# Patient Record
Sex: MALE | Race: WHITE | NOT HISPANIC OR LATINO | Employment: OTHER | URBAN - METROPOLITAN AREA
[De-identification: names, ages, dates, MRNs, and addresses within clinical notes are randomized per-mention and may not be internally consistent; named-entity substitution may affect disease eponyms.]

---

## 2022-11-14 ENCOUNTER — HOSPITAL ENCOUNTER (INPATIENT)
Facility: HOSPITAL | Age: 47
LOS: 1 days | Discharge: HOME HEALTH | End: 2022-11-16
Attending: EMERGENCY MEDICINE | Admitting: INTERNAL MEDICINE

## 2022-11-14 ENCOUNTER — APPOINTMENT (EMERGENCY)
Dept: RADIOLOGY | Facility: HOSPITAL | Age: 47
End: 2022-11-14

## 2022-11-14 DIAGNOSIS — R26.2 AMBULATORY DYSFUNCTION: ICD-10-CM

## 2022-11-14 DIAGNOSIS — M25.561 RIGHT KNEE PAIN: Primary | ICD-10-CM

## 2022-11-14 RX ORDER — DIAZEPAM 5 MG/ML
5 INJECTION, SOLUTION INTRAMUSCULAR; INTRAVENOUS ONCE
Status: COMPLETED | OUTPATIENT
Start: 2022-11-14 | End: 2022-11-14

## 2022-11-14 RX ORDER — MORPHINE SULFATE 10 MG/ML
6 INJECTION, SOLUTION INTRAMUSCULAR; INTRAVENOUS ONCE
Status: COMPLETED | OUTPATIENT
Start: 2022-11-14 | End: 2022-11-14

## 2022-11-14 RX ORDER — HYDROMORPHONE HCL/PF 1 MG/ML
1 SYRINGE (ML) INJECTION ONCE
Status: COMPLETED | OUTPATIENT
Start: 2022-11-14 | End: 2022-11-14

## 2022-11-14 RX ORDER — FENTANYL CITRATE 50 UG/ML
1 INJECTION, SOLUTION INTRAMUSCULAR; INTRAVENOUS ONCE
Status: COMPLETED | OUTPATIENT
Start: 2022-11-14 | End: 2022-11-14

## 2022-11-14 RX ADMIN — DIAZEPAM 5 MG: 10 INJECTION, SOLUTION INTRAMUSCULAR; INTRAVENOUS at 21:30

## 2022-11-14 RX ADMIN — MORPHINE SULFATE 6 MG: 10 INJECTION INTRAVENOUS at 21:29

## 2022-11-14 RX ADMIN — HYDROMORPHONE HYDROCHLORIDE 1 MG: 1 INJECTION, SOLUTION INTRAMUSCULAR; INTRAVENOUS; SUBCUTANEOUS at 23:00

## 2022-11-15 ENCOUNTER — APPOINTMENT (OUTPATIENT)
Dept: CT IMAGING | Facility: HOSPITAL | Age: 47
End: 2022-11-15

## 2022-11-15 PROBLEM — R65.10 SIRS (SYSTEMIC INFLAMMATORY RESPONSE SYNDROME) (HCC): Status: ACTIVE | Noted: 2022-11-15

## 2022-11-15 PROBLEM — S06.9XAA TBI (TRAUMATIC BRAIN INJURY): Status: ACTIVE | Noted: 2022-11-15

## 2022-11-15 PROBLEM — M25.561 RIGHT KNEE PAIN: Status: ACTIVE | Noted: 2022-11-15

## 2022-11-15 PROBLEM — I10 ESSENTIAL HYPERTENSION: Status: ACTIVE | Noted: 2022-11-15

## 2022-11-15 PROBLEM — F11.90 CHRONIC, CONTINUOUS USE OF OPIOIDS: Status: ACTIVE | Noted: 2022-11-15

## 2022-11-15 PROBLEM — E11.9 TYPE 2 DIABETES MELLITUS WITHOUT COMPLICATION, WITHOUT LONG-TERM CURRENT USE OF INSULIN (HCC): Status: ACTIVE | Noted: 2022-11-15

## 2022-11-15 PROBLEM — E87.6 HYPOKALEMIA: Status: ACTIVE | Noted: 2022-11-15

## 2022-11-15 PROBLEM — E83.51 HYPOCALCEMIA: Status: ACTIVE | Noted: 2022-11-15

## 2022-11-15 LAB
ALBUMIN SERPL BCP-MCNC: 3.4 G/DL (ref 3.5–5)
ALP SERPL-CCNC: 80 U/L (ref 46–116)
ALT SERPL W P-5'-P-CCNC: 260 U/L (ref 12–78)
ANION GAP SERPL CALCULATED.3IONS-SCNC: 13 MMOL/L (ref 4–13)
ANION GAP SERPL CALCULATED.3IONS-SCNC: 8 MMOL/L (ref 4–13)
AST SERPL W P-5'-P-CCNC: 53 U/L (ref 5–45)
BASOPHILS # BLD AUTO: 0.04 THOUSANDS/ÂΜL (ref 0–0.1)
BASOPHILS # BLD AUTO: 0.05 THOUSANDS/ÂΜL (ref 0–0.1)
BASOPHILS NFR BLD AUTO: 0 % (ref 0–1)
BASOPHILS NFR BLD AUTO: 0 % (ref 0–1)
BILIRUB SERPL-MCNC: 0.5 MG/DL (ref 0.2–1)
BUN SERPL-MCNC: 13 MG/DL (ref 5–25)
BUN SERPL-MCNC: 17 MG/DL (ref 5–25)
CA-I BLD-SCNC: 1.22 MMOL/L (ref 1.12–1.32)
CALCIUM ALBUM COR SERPL-MCNC: 10.2 MG/DL (ref 8.3–10.1)
CALCIUM SERPL-MCNC: 6.3 MG/DL (ref 8.3–10.1)
CALCIUM SERPL-MCNC: 9.7 MG/DL (ref 8.3–10.1)
CHLORIDE SERPL-SCNC: 113 MMOL/L (ref 96–108)
CHLORIDE SERPL-SCNC: 98 MMOL/L (ref 96–108)
CO2 SERPL-SCNC: 18 MMOL/L (ref 21–32)
CO2 SERPL-SCNC: 22 MMOL/L (ref 21–32)
CREAT SERPL-MCNC: 0.68 MG/DL (ref 0.6–1.3)
CREAT SERPL-MCNC: 1.03 MG/DL (ref 0.6–1.3)
DME PARACHUTE DELIVERY DATE REQUESTED: NORMAL
DME PARACHUTE ITEM DESCRIPTION: NORMAL
DME PARACHUTE ORDER STATUS: NORMAL
DME PARACHUTE SUPPLIER NAME: NORMAL
DME PARACHUTE SUPPLIER PHONE: NORMAL
EOSINOPHIL # BLD AUTO: 0.13 THOUSAND/ÂΜL (ref 0–0.61)
EOSINOPHIL # BLD AUTO: 0.15 THOUSAND/ÂΜL (ref 0–0.61)
EOSINOPHIL NFR BLD AUTO: 1 % (ref 0–6)
EOSINOPHIL NFR BLD AUTO: 1 % (ref 0–6)
ERYTHROCYTE [DISTWIDTH] IN BLOOD BY AUTOMATED COUNT: 12.3 % (ref 11.6–15.1)
ERYTHROCYTE [DISTWIDTH] IN BLOOD BY AUTOMATED COUNT: 12.4 % (ref 11.6–15.1)
GFR SERPL CREATININE-BSD FRML MDRD: 112 ML/MIN/1.73SQ M
GFR SERPL CREATININE-BSD FRML MDRD: 85 ML/MIN/1.73SQ M
GLUCOSE SERPL-MCNC: 157 MG/DL (ref 65–140)
GLUCOSE SERPL-MCNC: 181 MG/DL (ref 65–140)
GLUCOSE SERPL-MCNC: 214 MG/DL (ref 65–140)
GLUCOSE SERPL-MCNC: 228 MG/DL (ref 65–140)
GLUCOSE SERPL-MCNC: 236 MG/DL (ref 65–140)
GLUCOSE SERPL-MCNC: 242 MG/DL (ref 65–140)
HCT VFR BLD AUTO: 39.9 % (ref 36.5–49.3)
HCT VFR BLD AUTO: 42.4 % (ref 36.5–49.3)
HGB BLD-MCNC: 13.5 G/DL (ref 12–17)
HGB BLD-MCNC: 15 G/DL (ref 12–17)
IMM GRANULOCYTES # BLD AUTO: 0.04 THOUSAND/UL (ref 0–0.2)
IMM GRANULOCYTES # BLD AUTO: 0.09 THOUSAND/UL (ref 0–0.2)
IMM GRANULOCYTES NFR BLD AUTO: 0 % (ref 0–2)
IMM GRANULOCYTES NFR BLD AUTO: 1 % (ref 0–2)
LYMPHOCYTES # BLD AUTO: 2.06 THOUSANDS/ÂΜL (ref 0.6–4.47)
LYMPHOCYTES # BLD AUTO: 2.5 THOUSANDS/ÂΜL (ref 0.6–4.47)
LYMPHOCYTES NFR BLD AUTO: 16 % (ref 14–44)
LYMPHOCYTES NFR BLD AUTO: 23 % (ref 14–44)
MAGNESIUM SERPL-MCNC: 1.9 MG/DL (ref 1.6–2.6)
MCH RBC QN AUTO: 29 PG (ref 26.8–34.3)
MCH RBC QN AUTO: 29.8 PG (ref 26.8–34.3)
MCHC RBC AUTO-ENTMCNC: 33.8 G/DL (ref 31.4–37.4)
MCHC RBC AUTO-ENTMCNC: 35.4 G/DL (ref 31.4–37.4)
MCV RBC AUTO: 84 FL (ref 82–98)
MCV RBC AUTO: 86 FL (ref 82–98)
MONOCYTES # BLD AUTO: 0.58 THOUSAND/ÂΜL (ref 0.17–1.22)
MONOCYTES # BLD AUTO: 0.67 THOUSAND/ÂΜL (ref 0.17–1.22)
MONOCYTES NFR BLD AUTO: 4 % (ref 4–12)
MONOCYTES NFR BLD AUTO: 6 % (ref 4–12)
NEUTROPHILS # BLD AUTO: 10.35 THOUSANDS/ÂΜL (ref 1.85–7.62)
NEUTROPHILS # BLD AUTO: 7.41 THOUSANDS/ÂΜL (ref 1.85–7.62)
NEUTS SEG NFR BLD AUTO: 70 % (ref 43–75)
NEUTS SEG NFR BLD AUTO: 78 % (ref 43–75)
NRBC BLD AUTO-RTO: 0 /100 WBCS
NRBC BLD AUTO-RTO: 0 /100 WBCS
PLATELET # BLD AUTO: 250 THOUSANDS/UL (ref 149–390)
PLATELET # BLD AUTO: 271 THOUSANDS/UL (ref 149–390)
PMV BLD AUTO: 10.8 FL (ref 8.9–12.7)
PMV BLD AUTO: 11.1 FL (ref 8.9–12.7)
POTASSIUM SERPL-SCNC: 2.5 MMOL/L (ref 3.5–5.3)
POTASSIUM SERPL-SCNC: 4 MMOL/L (ref 3.5–5.3)
PROT SERPL-MCNC: 7.2 G/DL (ref 6.4–8.4)
RBC # BLD AUTO: 4.66 MILLION/UL (ref 3.88–5.62)
RBC # BLD AUTO: 5.04 MILLION/UL (ref 3.88–5.62)
SODIUM SERPL-SCNC: 133 MMOL/L (ref 135–147)
SODIUM SERPL-SCNC: 139 MMOL/L (ref 135–147)
WBC # BLD AUTO: 10.81 THOUSAND/UL (ref 4.31–10.16)
WBC # BLD AUTO: 13.26 THOUSAND/UL (ref 4.31–10.16)

## 2022-11-15 RX ORDER — AMLODIPINE BESYLATE 5 MG/1
5 TABLET ORAL DAILY
COMMUNITY

## 2022-11-15 RX ORDER — VITAMIN B COMPLEX
1 CAPSULE ORAL DAILY
COMMUNITY

## 2022-11-15 RX ORDER — HYDROMORPHONE HCL/PF 1 MG/ML
1 SYRINGE (ML) INJECTION EVERY 4 HOURS PRN
Status: DISCONTINUED | OUTPATIENT
Start: 2022-11-15 | End: 2022-11-16 | Stop reason: HOSPADM

## 2022-11-15 RX ORDER — GLIMEPIRIDE 2 MG/1
2 TABLET ORAL 4 TIMES DAILY
COMMUNITY

## 2022-11-15 RX ORDER — LISINOPRIL AND HYDROCHLOROTHIAZIDE 25; 20 MG/1; MG/1
1 TABLET ORAL DAILY
COMMUNITY

## 2022-11-15 RX ORDER — AMLODIPINE BESYLATE 5 MG/1
5 TABLET ORAL DAILY
Status: DISCONTINUED | OUTPATIENT
Start: 2022-11-15 | End: 2022-11-16 | Stop reason: HOSPADM

## 2022-11-15 RX ORDER — ACETAMINOPHEN 325 MG/1
650 TABLET ORAL EVERY 6 HOURS PRN
Status: DISCONTINUED | OUTPATIENT
Start: 2022-11-15 | End: 2022-11-15

## 2022-11-15 RX ORDER — ZOLPIDEM TARTRATE 10 MG/1
10 TABLET ORAL
COMMUNITY

## 2022-11-15 RX ORDER — CALCIUM GLUCONATE 20 MG/ML
1 INJECTION, SOLUTION INTRAVENOUS ONCE
Status: COMPLETED | OUTPATIENT
Start: 2022-11-15 | End: 2022-11-15

## 2022-11-15 RX ORDER — MORPHINE SULFATE 30 MG/1
60 TABLET, FILM COATED, EXTENDED RELEASE ORAL EVERY 12 HOURS SCHEDULED
Status: DISCONTINUED | OUTPATIENT
Start: 2022-11-15 | End: 2022-11-16 | Stop reason: HOSPADM

## 2022-11-15 RX ORDER — INSULIN LISPRO 100 [IU]/ML
1-6 INJECTION, SOLUTION INTRAVENOUS; SUBCUTANEOUS
Status: DISCONTINUED | OUTPATIENT
Start: 2022-11-15 | End: 2022-11-16 | Stop reason: HOSPADM

## 2022-11-15 RX ORDER — SIMVASTATIN 40 MG
40 TABLET ORAL DAILY
COMMUNITY

## 2022-11-15 RX ORDER — IBUPROFEN 600 MG/1
600 TABLET ORAL EVERY 6 HOURS PRN
Status: DISCONTINUED | OUTPATIENT
Start: 2022-11-15 | End: 2022-11-16 | Stop reason: HOSPADM

## 2022-11-15 RX ORDER — SUMATRIPTAN 100 MG/1
100 TABLET, FILM COATED ORAL
COMMUNITY

## 2022-11-15 RX ORDER — CYCLOBENZAPRINE HCL 10 MG
10 TABLET ORAL
COMMUNITY

## 2022-11-15 RX ORDER — ACETAMINOPHEN 325 MG/1
975 TABLET ORAL EVERY 8 HOURS SCHEDULED
Status: DISCONTINUED | OUTPATIENT
Start: 2022-11-15 | End: 2022-11-16 | Stop reason: HOSPADM

## 2022-11-15 RX ORDER — CYCLOBENZAPRINE HCL 10 MG
10 TABLET ORAL 3 TIMES DAILY PRN
Status: DISCONTINUED | OUTPATIENT
Start: 2022-11-15 | End: 2022-11-16 | Stop reason: HOSPADM

## 2022-11-15 RX ORDER — PRAVASTATIN SODIUM 80 MG/1
80 TABLET ORAL
Status: DISCONTINUED | OUTPATIENT
Start: 2022-11-15 | End: 2022-11-16 | Stop reason: HOSPADM

## 2022-11-15 RX ORDER — POTASSIUM CHLORIDE 14.9 MG/ML
20 INJECTION INTRAVENOUS ONCE
Status: COMPLETED | OUTPATIENT
Start: 2022-11-15 | End: 2022-11-15

## 2022-11-15 RX ORDER — RISPERIDONE 3 MG/1
3 TABLET ORAL DAILY
COMMUNITY

## 2022-11-15 RX ORDER — HYDROMORPHONE HCL/PF 1 MG/ML
1 SYRINGE (ML) INJECTION ONCE
Status: COMPLETED | OUTPATIENT
Start: 2022-11-15 | End: 2022-11-15

## 2022-11-15 RX ORDER — ONDANSETRON 2 MG/ML
4 INJECTION INTRAMUSCULAR; INTRAVENOUS EVERY 8 HOURS PRN
Status: DISCONTINUED | OUTPATIENT
Start: 2022-11-15 | End: 2022-11-16 | Stop reason: HOSPADM

## 2022-11-15 RX ORDER — POTASSIUM CHLORIDE 20 MEQ/1
40 TABLET, EXTENDED RELEASE ORAL ONCE
Status: COMPLETED | OUTPATIENT
Start: 2022-11-15 | End: 2022-11-15

## 2022-11-15 RX ORDER — MAGNESIUM SULFATE 1 G/100ML
1 INJECTION INTRAVENOUS ONCE
Status: COMPLETED | OUTPATIENT
Start: 2022-11-15 | End: 2022-11-15

## 2022-11-15 RX ORDER — ZOLPIDEM TARTRATE 5 MG/1
10 TABLET ORAL
Status: DISCONTINUED | OUTPATIENT
Start: 2022-11-15 | End: 2022-11-16 | Stop reason: HOSPADM

## 2022-11-15 RX ORDER — CLONAZEPAM 1 MG/1
1 TABLET ORAL 2 TIMES DAILY
Status: ON HOLD | COMMUNITY
End: 2022-11-15

## 2022-11-15 RX ORDER — DOCUSATE SODIUM 250 MG
250 CAPSULE ORAL 2 TIMES DAILY
COMMUNITY

## 2022-11-15 RX ADMIN — HYDROMORPHONE HYDROCHLORIDE 1 MG: 1 INJECTION, SOLUTION INTRAMUSCULAR; INTRAVENOUS; SUBCUTANEOUS at 06:01

## 2022-11-15 RX ADMIN — MORPHINE SULFATE 60 MG: 30 TABLET, EXTENDED RELEASE ORAL at 01:59

## 2022-11-15 RX ADMIN — IBUPROFEN 600 MG: 600 TABLET ORAL at 21:12

## 2022-11-15 RX ADMIN — RISPERIDONE 3 MG: 2 TABLET ORAL at 02:12

## 2022-11-15 RX ADMIN — RISPERIDONE 3 MG: 2 TABLET ORAL at 21:12

## 2022-11-15 RX ADMIN — ACETAMINOPHEN 975 MG: 325 TABLET, FILM COATED ORAL at 13:59

## 2022-11-15 RX ADMIN — ACETAMINOPHEN 975 MG: 325 TABLET, FILM COATED ORAL at 01:59

## 2022-11-15 RX ADMIN — POTASSIUM CHLORIDE 20 MEQ: 14.9 INJECTION, SOLUTION INTRAVENOUS at 01:37

## 2022-11-15 RX ADMIN — POTASSIUM CHLORIDE 40 MEQ: 1500 TABLET, EXTENDED RELEASE ORAL at 01:36

## 2022-11-15 RX ADMIN — HYDROMORPHONE HYDROCHLORIDE 1 MG: 1 INJECTION, SOLUTION INTRAMUSCULAR; INTRAVENOUS; SUBCUTANEOUS at 18:37

## 2022-11-15 RX ADMIN — AMLODIPINE BESYLATE 5 MG: 5 TABLET ORAL at 09:21

## 2022-11-15 RX ADMIN — MORPHINE SULFATE 60 MG: 30 TABLET, EXTENDED RELEASE ORAL at 09:21

## 2022-11-15 RX ADMIN — INSULIN LISPRO 3 UNITS: 100 INJECTION, SOLUTION INTRAVENOUS; SUBCUTANEOUS at 17:00

## 2022-11-15 RX ADMIN — CALCIUM GLUCONATE 1 G: 20 INJECTION, SOLUTION INTRAVENOUS at 03:38

## 2022-11-15 RX ADMIN — HYDROMORPHONE HYDROCHLORIDE 1 MG: 1 INJECTION, SOLUTION INTRAMUSCULAR; INTRAVENOUS; SUBCUTANEOUS at 10:29

## 2022-11-15 RX ADMIN — CYCLOBENZAPRINE HYDROCHLORIDE 10 MG: 10 TABLET, FILM COATED ORAL at 20:25

## 2022-11-15 RX ADMIN — MAGNESIUM SULFATE IN DEXTROSE 1 G: 10 INJECTION, SOLUTION INTRAVENOUS at 03:38

## 2022-11-15 RX ADMIN — PRAVASTATIN SODIUM 80 MG: 80 TABLET ORAL at 17:27

## 2022-11-15 RX ADMIN — MORPHINE SULFATE 60 MG: 30 TABLET, EXTENDED RELEASE ORAL at 21:12

## 2022-11-15 RX ADMIN — HYDROMORPHONE HYDROCHLORIDE 1 MG: 1 INJECTION, SOLUTION INTRAMUSCULAR; INTRAVENOUS; SUBCUTANEOUS at 14:32

## 2022-11-15 RX ADMIN — INSULIN LISPRO 2 UNITS: 100 INJECTION, SOLUTION INTRAVENOUS; SUBCUTANEOUS at 11:39

## 2022-11-15 RX ADMIN — INSULIN LISPRO 3 UNITS: 100 INJECTION, SOLUTION INTRAVENOUS; SUBCUTANEOUS at 21:23

## 2022-11-15 RX ADMIN — ZOLPIDEM TARTRATE 10 MG: 5 TABLET ORAL at 01:59

## 2022-11-15 RX ADMIN — INSULIN LISPRO 1 UNITS: 100 INJECTION, SOLUTION INTRAVENOUS; SUBCUTANEOUS at 07:20

## 2022-11-15 RX ADMIN — HYDROMORPHONE HYDROCHLORIDE 1 MG: 1 INJECTION, SOLUTION INTRAMUSCULAR; INTRAVENOUS; SUBCUTANEOUS at 23:31

## 2022-11-15 RX ADMIN — ZOLPIDEM TARTRATE 10 MG: 5 TABLET ORAL at 21:12

## 2022-11-15 RX ADMIN — HYDROMORPHONE HYDROCHLORIDE 1 MG: 1 INJECTION, SOLUTION INTRAMUSCULAR; INTRAVENOUS; SUBCUTANEOUS at 01:58

## 2022-11-15 RX ADMIN — ONDANSETRON 4 MG: 2 INJECTION INTRAMUSCULAR; INTRAVENOUS at 16:50
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
## 2022-11-15 NOTE — NURSING NOTE
Pt alerted nurse that he had meds in his backpack @ bedside and was requesting to take his personal morning supplements  Nurse educated pt on possibility of interactions between medications he's being given with home meds and to not take anything while in the hospital without provider approval   Pt agreeable to send meds in backpack to pharmacy for safe-keeping and collecting them @ ME  Meds sent to pharmacy in bag # [id number]  Pt denies any other meds in backpack

## 2022-11-15 NOTE — OCCUPATIONAL THERAPY NOTE
Occupational Therapy Evaluation & Treat       Patient Name: Catalina ELIAS Date: 11/15/2022  Problem List  Principal Problem:    Acute on Chronic Right knee pain  Active Problems:    Chronic, continuous use of opioids    Type 2 diabetes mellitus without complication, without long-term current use of insulin (HCC)    Essential hypertension    SIRS (systemic inflammatory response syndrome) (HCC)    Hypokalemia    Hypocalcemia    TBI (traumatic brain injury)    Past Medical History  Past Medical History:   Diagnosis Date    Diabetes mellitus ([address] )     Hypertension      Past Surgical History  Past Surgical History:   Procedure Laterality Date    KNEE SURGERY        11/15/22 1300   OT Last Visit   OT Visit Date 11/15/22   Note Type   Note type Evaluation  (& Treat)   Pain Assessment   Pain Assessment Tool 0-10   Pain Score 9   Hospital Pain Intervention(s) Rest   Restrictions/Precautions   Weight Bearing Precautions Per Order Yes   RLE Weight Bearing Per Order NWB   Braces or Orthoses   (knee immobilizer)   Home Living   Type of Home Apartment   Home Layout Multi-level   Bathroom Shower/Tub Tub/shower unit   Bathroom Toilet Standard   Bathroom Equipment Grab bars in shower;Grab bars around toilet   [address]   Prior Function   Level of [place] Independent with ADLs   Lives With Spouse; Son   [address] Help From Family   IADLs Independent with meal prep   Falls in the last 6 months 1 to 4   Comments Pt's wife at bedside  Pt reports that his memory is poor at baseline  Reports that wife assists pt to remember things  Lifestyle   Autonomy Pt reports that upon D/C will stay on 2nd floor  Wife plans to have all meals at pt's bedside in a cooler  Also plan to sponge bathe and use a urinal  Will go into bathroom to have BMs  Subjective   Subjective Pt received in supine position  Pt agreeable to session     ADL   Eating Assistance 7  Independent   Grooming Assistance 7  Independent   UB Bathing Assistance 5  Supervision/Setup   LB Bathing Assistance 5  Supervision/Setup   UB Dressing Assistance 5  Supervision/Setup   LB Dressing Assistance 5  Supervision/Setup   Toileting Assistance  5  Supervision/Setup   Bed Mobility   Supine to Sit 6  Modified independent   Additional Comments Pt remained seated in recliner by end of session   Transfers   Sit to Stand 6  Modified independent   Stand to Sit 6  Modified independent   Functional Mobility   Functional Mobility 5  Supervision   Additional Comments RW   Balance   Static Sitting Normal   Dynamic Sitting Good   Static Standing Fair +   Dynamic Standing Fair   Activity Tolerance   Activity Tolerance Patient limited by pain   Medical Staff Made Aware PT Rianna   Nurse Made Aware FREDERIC Peñaloza   RUE Assessment   RUE Assessment WFL   LUE Assessment   LUE Assessment WFL   Cognition   Overall Cognitive Status WFL   Arousal/Participation Alert   Attention Within functional limits   Orientation Level Oriented X4   Memory Decreased short term memory   Following Commands Follows all commands and directions without difficulty   Assessment   Limitation Decreased ADL status; Decreased self-care trans;Decreased high-level ADLs   Prognosis Good   Assessment Pt is a 49 y o  male seen for OT evaluation at [facility], admitted 11/14/2022 w/ Right knee pain  CT LE revealed Nondisplaced periprosthetic fracture of the distal femur  Per ortho, NWB in knee immobilizer  No finding of loosening  OT completed extensive review of pt's medical and social history  Comorbidities affecting pt's functional performance at time of assessment include: TBI, type II DM, SIRS, essential HTN, etc (see chart)  Personal factors affecting pt at time of IE include:steps to enter environment, difficulty performing ADLS, difficulty performing IADLS  and decreased functional mobility  Prior to admission, pt was living with family in apt with steps to manage    Pt was I w/  ADLS and required some asssist with IADLS, & required no DME/AD PTA  Upon evaluation: Pt requires mod I for bed mobility, sup-mod I for functional mobility/transfers, sup/I for UB ADLs and sup for LB ADLS 2* the following deficits impacting occupational performance: impaired memory, increased pain and orthopedic restrictions  Full objective findings from OT assessment regarding body systems outlined above  Pt to benefit from continued skilled OT tx while in the hospital to address deficits as defined above and maximize level of functional independence w ADL's and functional mobility  Occupational Performance areas to address include: bathing/shower, toilet hygiene, dressing and functional mobility  Based on findings, pt is of high complexity  The patient's raw score on the AM-PAC Daily Activity inpatient short form is 21  , standardized score is 44 27  , greater than 39 4  Patients at this level are likely to benefit from DC to home, which DOES coincide with CURRENT above OT recommendations  However please refer to therapist recommendation for discharge planning given other factors that may influence destination  At this time, OT recommendations at time of discharge are home OT/home with family support  Goals   Patient Goals Pt wishes to get home   Plan   Treatment Interventions ADL retraining;Functional transfer training; Compensatory technique education;Equipment evaluation/education;Patient/family training   Goal Expiration Date 11/25/22   OT Treatment Day 0   OT Frequency 1-2x/wk   Recommendation   OT Discharge Recommendation Home with home health rehabilitation   [facility] Daily Activity Inpatient   Lower Body Dressing 3   Bathing 3   Toileting 3   Upper Body Dressing 4   Grooming 4   Eating 4   Daily Activity Raw Score 21   Daily Activity Standardized Score (Calc for Raw Score >=11) 44 27   AM-PAC Applied Cognition Inpatient   Following a Speech/Presentation 4   Understanding Ordinary Conversation 4 Taking Medications 3   Remembering Where Things Are Placed or Put Away 3   Remembering List of 4-5 Errands 3   Taking Care of Complicated Tasks 3   Applied Cognition Raw Score 20   Applied Cognition Standardized Score 41 76   Additional Treatment Session   Start Time 1250   End Time 1300   Treatment Assessment Pt seen for subsequent OT treatment session to address LB ADL strategies, review safe tub transfers, and other related DME  Pt seated at EOB  Performed transfers with mod I and functional mobility within room and hallway with supervision and use of RW  Pt seated in chair  Performed sock donning with supervision  Reviewed and simulated donning shorts with appropriate strategy  Discussed tub transfers and use of shower chair, however wife reports that pt will spongebathe  Pt remained seated in recliner  + chair alarm  All needs met  Call bell in reach  End of Consult   Education Provided Yes   Patient Position at End of Consult Bedside chair   Nurse Communication Nurse aware of brace application;Nurse aware of consult       Pt will achieve the following goals within 10 days  *Pt will complete UB bathing and dressing with mod I     *Pt will complete LB bathing and dressing with mod I      * Pt will complete toileting w/ mod I w/ G hygiene/thoroughness using DME PRN    *Pt will complete bed mobility with I, with bed flat and no side rail to prep for purposeful tasks    *Pt will perform functional transfers with on/off all surfaces with mod I using DME as needed w/ G balance/safety  *Pt will increase standing tolerance to 5 minutes in order to complete sinkside ADL task  *Pt will identify 3-5 fall risks during ADL routine to ensure home safety upon discharge  *Pt will improve functional mobility during ADL/IADL/leisure tasks to mod I using DME as needed w/ G balance/safety           Gerald Ventura OTR/L

## 2022-11-15 NOTE — PLAN OF CARE
Problem: PHYSICAL THERAPY ADULT  Goal: Performs mobility at highest level of function for planned discharge setting  See evaluation for individualized goals  Description: Treatment/Interventions: Functional transfer training, ADL retraining, LE strengthening/ROM, Elevations, Therapeutic exercise, Endurance training, Patient/family training, Equipment eval/education, Gait training, Bed mobility, Compensatory technique education, Continued evaluation, Spoke to nursing, Spoke to case management, OT  Equipment Recommended: Brett Johnson       See flowsheet documentation for full assessment, interventions and recommendations  Note: Prognosis: Fair  Problem List: Decreased strength, Decreased range of motion, Decreased endurance, Impaired balance, Decreased mobility, Orthopedic restrictions, Pain  Assessment: Pt is a 49 y o  male who presented to ED 11/15/22 with c/o R knee pain after fall  Dx:  CT scan does demonstrate a nondisplaced distal femur periprosthetic fracture  Patient will need to be placed in a knee immobilizer and nonweightbearing  Surgical intervention is not required at this point  Comorbidities affecting pt's physical performance at time of assessment include: TBI, SIRS, continuous use of opioids  Personal factors affecting pt at time of IE include: pain, WBS, KI  PLOF and home set up listed above;  concerns for return home include but are not limited to lived in multi-level home  Upon evaluation: Pt mod I for bed mobility, mod I for sit to stand, and mod I for ambulation with RW  Full objective findings from PT assessment regarding body systems outlined above  Current limitations include WBS, steps to enter home  Pt's clinical presentation is currently unstable/unpredictable seen in pt's presentation of continuous monitoring in hospital, WBS, and pain     Pt would benefit from continued PT while in hospital and follow up with HHCPT at D/C to increase strength, balance, endurance, independence with funcitonal mobility to return to PLOF, maximize independence, decrease caregiver burden and improve quality of life  The patient's AM-PAC Basic Mobility Inpatient Short Form Raw Score is 23  A Raw score of greater than 17 suggests the patient may benefit from discharge to home  Please also refer to the recommendation of the Physical Therapist for safe discharge planning  Barriers to Discharge: Inaccessible home environment, Decreased caregiver support     PT Discharge Recommendation: Home with home health rehabilitation    See flowsheet documentation for full assessment

## 2022-11-15 NOTE — ASSESSMENT & PLAN NOTE
· Home regimen:  Amlodipine 5 mg daily, lisinopril-HCTZ 20-25 mg daily  · Hold home lisinopril due to hypokalemia

## 2022-11-15 NOTE — ASSESSMENT & PLAN NOTE
· Potassium 2 5 on admission   · Denies N/V/D or decreased appetite   · Hold home lisinopril-hctz  · Continue to monitor and replete as needed   · Telemetry

## 2022-11-15 NOTE — CONSULTS
Consultation - Ruth Osuna 49 y o  male MRN: [id number]  Unit/Bed#: -01 Encounter: [id number]      Assessment/Plan     Assessment:  Right knee injury    Plan:  X-rays of right knee show possible nondisplaced distal femur periprosthetic fracture  Will obtain CT scan RLE  In meantime, remain NWB to RLE with assistance  Knee immobilizer ordered  Can be placed by PT/OT  If no fracture is present will recommend outpatient follow up with possible MRI  Ice and elevation  Pain control  Will follow  History of Present Illness   Physician Requesting Consult: Antony Angelucci*  Reason for Consult / Principal Problem: right knee pain  HPI: Ace Mackay is a 49y o  year old male who is a community ambulator with no assistance presents with right knee pain after a mechanical fall yesterday  Patient states he was coming down the steps and on the last step he placed his right lower extremity on the ground and his right knee gave out on him and twisted  He is unsure if his knee hit the ground  He felt a pop in the right knee when he gave out on him followed by immediate pain  He was unable to bear weight due to pain  He was brought to the emergency room and x-rays were taken  He was admitted to the medical service orthopedics was consulted  He has had extensive surgeries to that knee since he was 5years old  In 2005, he had a right total knee arthroplasty  He then suffered a patella tendon rupture in 2006  It was treated conservatively  He states he has been doing well over the past several years regarding that knee until the fall yesterday  Inpatient consult to Orthopedic Surgery  Consult performed by: Lakia Alford PA-C  Consult ordered by: Fernando Peterson PA-C          Review of Systems   Constitutional: Negative  HENT: Negative  Eyes: Negative  Respiratory: Negative  Cardiovascular: Negative  Gastrointestinal: Negative  Endocrine: Negative      Genitourinary: Negative  Musculoskeletal: Positive for arthralgias, gait problem and joint swelling  Skin: Negative  Allergic/Immunologic: Negative  Hematological: Negative  Psychiatric/Behavioral: Negative  Historical Information   Past Medical History:   Diagnosis Date   • Diabetes mellitus ([address] )    • Hypertension      Past Surgical History:   Procedure Laterality Date   • KNEE SURGERY       Social History   Social History     Substance and Sexual Activity   Alcohol Use Not Currently     Social History     Substance and Sexual Activity   Drug Use Yes   • Types: Marijuana     E-Cigarette/Vaping   • E-Cigarette Use Never User      E-Cigarette/Vaping Substances   • Nicotine Yes    • THC Yes    • CBD Yes    • Flavoring Yes      Social History     Tobacco Use   Smoking Status Never Smoker   Smokeless Tobacco Never Used     Family History: non-contributory    Meds/Allergies   all current active meds have been reviewed  Allergies   Allergen Reactions   • Penicillins Hives       Objective   Vitals: Blood pressure 130/88, pulse 85, temperature (!) 97 3 °F (36 3 °C), resp  rate 18, height 6' 6" (1 981 m), weight 95 9 kg (211 lb 6 7 oz), SpO2 93 %  ,Body mass index is 24 43 kg/m²  No intake or output data in the 24 hours ending 11/15/22 0754  No intake/output data recorded  Invasive Devices  Report    Peripheral Intravenous Line  Duration           Peripheral IV 11/14/22 Left Forearm <1 day                Physical Exam  Vitals and nursing note reviewed  Constitutional:       Appearance: He is well-developed  HENT:      Head: Normocephalic and atraumatic  Eyes:      Conjunctiva/sclera: Conjunctivae normal    Cardiovascular:      Rate and Rhythm: Normal rate and regular rhythm  Heart sounds: No murmur heard  Pulmonary:      Effort: Pulmonary effort is normal  No respiratory distress  Breath sounds: Normal breath sounds  Abdominal:      Palpations: Abdomen is soft  Tenderness:  There is no abdominal tenderness  Musculoskeletal:         General: Swelling, tenderness and signs of injury present  Cervical back: Neck supple  Right knee: No effusion  Skin:     General: Skin is warm and dry  Neurological:      Mental Status: He is alert and oriented to person, place, and time  Psychiatric:         Mood and Affect: Mood normal        Right Knee Exam     Tenderness   Right knee tenderness location: medial femoral condyle, MCL insertion  Other   Erythema: absent  Scars: present  Sensation: normal  Pulse: present  Swelling: mild (medial distal femur)  Effusion: no effusion present    Comments:  Thigh and calf compartments soft and compressible  Actively moving ankle and toes  Quadriceps atrophy            Lab Results: I have personally reviewed pertinent lab results  Imaging Studies: I have personally reviewed pertinent films in PACS  X-ray right knee:  Right total knee prosthesis intact with no evidence of loosening    Possible nondisplaced distal femur periprosthetic fracture

## 2022-11-15 NOTE — ED NOTES
Pt  Reporting he had gotten no relief from any of the medication he has gotten     Felipe Britton, FREDERIC  11/15/22 8081
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
## 2022-11-15 NOTE — CASE MANAGEMENT
Case Management Progress Note    Patient name Leon Millan  Location /-90 MRN [id number]  : 1975 Date 11/15/2022       LOS (days): 0  Geometric Mean LOS (GMLOS) (days):   Days to GMLOS:        OBJECTIVE:        Current admission status: Observation  Preferred Pharmacy:   [address] [place] [address]  [place] [address]  Phone: [phone] Fax: [phone]    Primary Care Provider: Fabienne Pappas MD    Primary Insurance: MEDICARE  Secondary Insurance:     PROGRESS NOTE: Cm spoke with pt regarding PT/OT recommendations of [facility] and walker  Pt in agreement for walker and [facility]  No C agency preference   Referrals sent and walker ordered through [employer]

## 2022-11-15 NOTE — DISCHARGE INSTRUCTIONS
Discharge Instructions - Orthopedics  Kelechi Solis 49 y o  male MRN: [id number]  Unit/Bed#: UB CT SCAN    Weight Bearing Status:                                           Nonweightbearing to RLE with assistance  Keep knee immobilizer on  DO NOT try to bend knee  Pain:  Continue analgesics as directed      Appt Instructions: If you do not have your appointment, please call the clinic at [phone] to schedule appointment with Dr Christina Strong in 2 weeks  Otherwise followup as scheduled     Contact the office sooner if you experience any increased numbness/tingling in the extremities  Miscellaneous:   Ice to right knee

## 2022-11-15 NOTE — PLAN OF CARE
Problem: METABOLIC, FLUID AND ELECTROLYTES - ADULT  Goal: Electrolytes maintained within normal limits  Description: INTERVENTIONS:  - Monitor labs and assess patient for signs and symptoms of electrolyte imbalances  - Administer electrolyte replacement as ordered  - Monitor response to electrolyte replacements, including repeat lab results as appropriate  - Instruct patient on fluid and nutrition as appropriate  Outcome: Progressing  Goal: Fluid balance maintained  Description: INTERVENTIONS:  - Monitor labs   - Monitor I/O and WT  - Instruct patient on fluid and nutrition as appropriate  - Assess for signs & symptoms of volume excess or deficit  Outcome: Progressing  Goal: Glucose maintained within target range  Description: INTERVENTIONS:  - Monitor Blood Glucose as ordered  - Assess for signs and symptoms of hyperglycemia and hypoglycemia  - Administer ordered medications to maintain glucose within target range  - Assess nutritional intake and initiate nutrition service referral as needed  Outcome: Progressing     Problem: MUSCULOSKELETAL - ADULT  Goal: Maintain or return mobility to safest level of function  Description: INTERVENTIONS:  - Assess patient's ability to carry out ADLs; assess patient's baseline for ADL function and identify physical deficits which impact ability to perform ADLs (bathing, care of mouth/teeth, toileting, grooming, dressing, etc )  - Assess/evaluate cause of self-care deficits   - Assess range of motion  - Assess patient's mobility  - Assess patient's need for assistive devices and provide as appropriate  - Encourage maximum independence but intervene and supervise when necessary  - Involve family in performance of ADLs  - Assess for home care needs following discharge   - Consider OT consult to assist with ADL evaluation and planning for discharge  - Provide patient education as appropriate  Outcome: Progressing  Goal: Maintain proper alignment of affected body part  Description: INTERVENTIONS:  - Support, maintain and protect limb and body alignment  - Provide patient/ family with appropriate education  Outcome: Progressing

## 2022-11-15 NOTE — PLAN OF CARE
Problem: OCCUPATIONAL THERAPY ADULT  Goal: Performs self-care activities at highest level of function for planned discharge setting  See evaluation for individualized goals  Description: Treatment Interventions: ADL retraining, Functional transfer training, Compensatory technique education, Equipment evaluation/education, Patient/family training          See flowsheet documentation for full assessment, interventions and recommendations  Note: Limitation: Decreased ADL status, Decreased self-care trans, Decreased high-level ADLs  Prognosis: Good  Assessment: Pt is a 49 y o  male seen for OT evaluation at [facility], admitted 11/14/2022 w/ Right knee pain  CT LE revealed Nondisplaced periprosthetic fracture of the distal femur  Per ortho, NWB in knee immobilizer  No finding of loosening  OT completed extensive review of pt's medical and social history  Comorbidities affecting pt's functional performance at time of assessment include: TBI, type II DM, SIRS, essential HTN, etc (see chart)  Personal factors affecting pt at time of IE include:steps to enter environment, difficulty performing ADLS, difficulty performing IADLS  and decreased functional mobility  Prior to admission, pt was living with family in apt with steps to manage  Pt was I w/  ADLS and required some asssist with IADLS, & required no DME/AD PTA  Upon evaluation: Pt requires mod I for bed mobility, sup-mod I for functional mobility/transfers, sup/I for UB ADLs and sup for LB ADLS 2* the following deficits impacting occupational performance: impaired memory, increased pain and orthopedic restrictions  Full objective findings from OT assessment regarding body systems outlined above  Pt to benefit from continued skilled OT tx while in the hospital to address deficits as defined above and maximize level of functional independence w ADL's and functional mobility   Occupational Performance areas to address include: bathing/shower, toilet hygiene, dressing and functional mobility  Based on findings, pt is of high complexity  The patient's raw score on the AM-PAC Daily Activity inpatient short form is 21  , standardized score is 44 27  , greater than 39 4  Patients at this level are likely to benefit from DC to home, which DOES coincide with CURRENT above OT recommendations  However please refer to therapist recommendation for discharge planning given other factors that may influence destination  At this time, OT recommendations at time of discharge are home OT/home with family support       OT Discharge Recommendation: Home with home health rehabilitation

## 2022-11-15 NOTE — PROGRESS NOTES
-- Patient:  -- MRN: [id number]  -- Aidin Request ID: [id number]  -- Level of care reserved: [facility]  -- Partner Reserved: [facility], [address] [phone]  -- Clinical needs requested:  -- Geography searched: [phone]  -- Start of Service:  -- Request sent: 2:26pm EST on 11/15/2022 by Vanna Varner  -- Partner reserved: 3:50pm EST on 11/15/2022 by Vanna Varner  -- Choice list shared: 2:41pm EST on 11/15/2022 by Vanna Varner

## 2022-11-15 NOTE — PHYSICAL THERAPY NOTE
PHYSICAL THERAPY Evaluation    Performed at least 2 patient identifiers during session:  Patient Active Problem List   Diagnosis    Acute on Chronic Right knee pain    Chronic, continuous use of opioids    Type 2 diabetes mellitus without complication, without long-term current use of insulin (HCC)    Essential hypertension    SIRS (systemic inflammatory response syndrome) (HCC)    Hypokalemia    Hypocalcemia    TBI (traumatic brain injury)       Past Medical History:   Diagnosis Date    Diabetes mellitus ([facility] [address] )     Hypertension        Past Surgical History:   Procedure Laterality Date    KNEE SURGERY          11/15/22 1303   PT Last Visit   PT Visit Date 11/15/22   Note Type   Note type Evaluation   Pain Assessment   Pain Assessment Tool 0-10   Pain Score 8   Hospital Pain Intervention(s) Repositioned   Restrictions/Precautions   Weight Bearing Precautions Per Order Yes   RLE Weight Bearing Per Order NWB  (knee immobilizer)   Home Living   Type of Home Apartment   Home Layout Multi-level  (12 [address], full flight to second floor bed/bath)   Prior Function   Level of [place] Independent with ADLs; Independent with functional mobility; Independent with IADLS   Lives With Spouse;Son;Family   Receives Help From Family   IADLs Independent with meal prep; Independent with medication management   General   Additional Pertinent History hx TBI   Cognition   Overall Cognitive Status WFL   Arousal/Participation Alert   Orientation Level Oriented X4   Memory Within functional limits   Following Commands Follows all commands and directions without difficulty   RUE Assessment   RUE Assessment WFL   LUE Assessment   LUE Assessment WFL   RLE Assessment   RLE Assessment   (Not tested, NWB, knee immobilizer intact)   LLE Assessment   LLE Assessment WFL   Coordination   Movements are Fluid and Coordinated 1   Bed Mobility   Supine to Sit 6  Modified independent   Additional Comments Pt remains OOB in recliner chair at end of session, chair alarm intact   Transfers   Sit to Stand 6  Modified independent  (RLE NWB)   Stand to Sit 6  Modified independent  (RLE NWB)   Ambulation/Elevation   Gait pattern Forward Flexion   Gait Assistance 6  Modified independent   Assistive Device Rolling walker   Distance 100ft x 2, RLE NWB;  no LOB or unsteadiness   Stair Management Assistance   (no crutches available;  Called [facility] with no answer; will continue to assess as crutches available; pt verbalizes understanding of multiple techniques for ascend/descending stairs (bumping, use of crutches) while NWB as he has done this before )   Balance   Static Sitting Fair   Dynamic Sitting Fair   Static Standing Fair   Dynamic Standing Fair   Ambulatory Fair   Activity Tolerance   Activity Tolerance Patient limited by pain   Nurse Made Aware Luke   Assessment   Prognosis Fair   Problem List Decreased strength;Decreased range of motion;Decreased endurance; Impaired balance;Decreased mobility;Orthopedic restrictions;Pain   Assessment Pt is a 49 y o  male who presented to ED 11/15/22 with c/o R knee pain after fall  Dx:  CT scan does demonstrate a nondisplaced distal femur periprosthetic fracture  Patient will need to be placed in a knee immobilizer and nonweightbearing  Surgical intervention is not required at this point  Comorbidities affecting pt's physical performance at time of assessment include: TBI, SIRS, continuous use of opioids  Personal factors affecting pt at time of IE include: pain, WBS, KI  PLOF and home set up listed above;  concerns for return home include but are not limited to lived in multi-level home  Upon evaluation: Pt mod I for bed mobility, mod I for sit to stand, and mod I for ambulation with RW  Full objective findings from PT assessment regarding body systems outlined above  Current limitations include WBS, steps to enter home   Pt's clinical presentation is currently unstable/unpredictable seen in pt's presentation of continuous monitoring in hospital, WBS, and pain  Pt would benefit from continued PT while in hospital and follow up with HHCPT at D/C to increase strength, balance, endurance, independence with funcitonal mobility to return to PLOF, maximize independence, decrease caregiver burden and improve quality of life  The patient's AM-PAC Basic Mobility Inpatient Short Form Raw Score is 23  A Raw score of greater than 17 suggests the patient may benefit from discharge to home  Please also refer to the recommendation of the Physical Therapist for safe discharge planning  Barriers to Discharge Inaccessible home environment;Decreased caregiver support   Goals   Patient Goals to go home   STG Expiration Date 11/22/22   Short Term Goal #1 Pt will be able to demo:  mod I to ascend/descend 10 steps with most appropriate tech;  to return home at mod I level  Plan   Treatment/Interventions Functional transfer training;ADL retraining;LE strengthening/ROM; Elevations; Therapeutic exercise; Endurance training;Patient/family training;Equipment eval/education;Gait training;Bed mobility; Compensatory technique education;Continued evaluation;Spoke to nursing;Spoke to case management;OT   PT Frequency 3-5x/wk   Recommendation   PT Discharge Recommendation Home with home health rehabilitation   [address] Mobility Inpatient   Turning in Bed Without Bedrails 4   Lying on Back to Sitting on Edge of Flat Bed 4   Moving Bed to Chair 4   Standing Up From Chair 4   Walk in Room 4   Climb 3-5 Stairs 3   Basic Mobility Inpatient Raw Score 23   Basic Mobility Standardized Score 50 88   Highest Level Of Mobility   JH-HLM Goal 7: Walk 25 feet or more   JH-HLM Achieved 7: Walk 25 feet or more   Brielle Cabrera      Patient Name: Roberta Snow  [id number] Date: 11/15/2022

## 2022-11-15 NOTE — PLAN OF CARE
Problem: METABOLIC, FLUID AND ELECTROLYTES - ADULT  Goal: Electrolytes maintained within normal limits  Description: INTERVENTIONS:  - Monitor labs and assess patient for signs and symptoms of electrolyte imbalances  - Administer electrolyte replacement as ordered  - Monitor response to electrolyte replacements, including repeat lab results as appropriate  - Instruct patient on fluid and nutrition as appropriate  Outcome: Progressing  Goal: Fluid balance maintained  Description: INTERVENTIONS:  - Monitor labs   - Monitor I/O and WT  - Instruct patient on fluid and nutrition as appropriate  - Assess for signs & symptoms of volume excess or deficit  Outcome: Progressing  Goal: Glucose maintained within target range  Description: INTERVENTIONS:  - Monitor Blood Glucose as ordered  - Assess for signs and symptoms of hyperglycemia and hypoglycemia  - Administer ordered medications to maintain glucose within target range  - Assess nutritional intake and initiate nutrition service referral as needed  Outcome: Progressing     Problem: SKIN/TISSUE INTEGRITY - ADULT  Goal: Skin Integrity remains intact(Skin Breakdown Prevention)  Description: Assess:  -Perform Girma assessment   -Clean and moisturize skin  -Inspect skin when repositioning, toileting, and assisting with ADLS  -Assess under medical devices   -Assess extremities for adequate circulation and sensation     Bed Management:  -Have minimal linens on bed & keep smooth, unwrinkled  -Change linens as needed when moist or perspiring  -Avoid sitting or lying in one position for more than 2 hours while in bed  -Keep HOB at 45degrees     Toileting:  -Offer bedside commode  -Assess for incontinence every 2  -Use incontinent care products after each incontinent episode     Activity:  -Mobilize patient 3 times a day  -Encourage activity and walks on unit  -Encourage or provide ROM exercises   -Turn and reposition patient every 3 Hours  -Use appropriate equipment to lift or move patient in bed  -Instruct/ Assist with weight shifting every 3 when out of bed in chair  -Consider limitation of chair time 3 hour intervals    Skin Care:  -Avoid use of baby powder, tape, friction and shearing, hot water or constrictive clothing  -Relieve pressure over bony prominences   -Do not massage red bony areas    Next Steps:  -Teach patient strategies to minimize risks    -Consider consults to  interdisciplinary teams  Outcome: Progressing  Goal: Incision(s), wounds(s) or drain site(s) healing without S/S of infection  Description: INTERVENTIONS  - Assess and document dressing, incision, wound bed, drain sites and surrounding tissue  - Provide patient and family education  - Perform skin care/dressing changes every 2  Outcome: Progressing  Goal: Pressure injury heals and does not worsen  Description: Interventions:  - Implement low air loss mattress or specialty surface (Criteria met)  - Apply silicone foam dressing  - Instruct/assist with weight shifting every 30 minutes when in chair   - Limit chair time to 2 hour intervals  - Use special pressure reducing interventions when in chair   - Apply fecal or urinary incontinence containment device   - Perform passive or active ROM   - Turn and reposition patient & offload bony prominences every 2 hours   - Utilize friction reducing device or surface for transfers   - Consider consults to  interdisciplinary teams   - Use incontinent care products after each incontinent episode   - Consider nutrition services referral as needed  Outcome: Progressing     Problem: MUSCULOSKELETAL - ADULT  Goal: Maintain or return mobility to safest level of function  Description: INTERVENTIONS:  - Assess patient's ability to carry out ADLs; assess patient's baseline for ADL function and identify physical deficits which impact ability to perform ADLs (bathing, care of mouth/teeth, toileting, grooming, dressing, etc )  - Assess/evaluate cause of self-care deficits   - Assess range of motion  - Assess patient's mobility  - Assess patient's need for assistive devices and provide as appropriate  - Encourage maximum independence but intervene and supervise when necessary  - Involve family in performance of ADLs  - Assess for home care needs following discharge   - Consider OT consult to assist with ADL evaluation and planning for discharge  - Provide patient education as appropriate  Outcome: Progressing  Goal: Maintain proper alignment of affected body part  Description: INTERVENTIONS:  - Support, maintain and protect limb and body alignment  - Provide patient/ family with appropriate education  Outcome: Progressing     Problem: Potential for Falls  Goal: Patient will remain free of falls  Description: INTERVENTIONS:  - Educate patient/family on patient safety including physical limitations  - Instruct patient to call for assistance with activity   - Consult OT/PT to assist with strengthening/mobility   - Keep Call bell within reach  - Keep bed low and locked with side rails adjusted as appropriate  - Keep care items and personal belongings within reach  - Initiate and maintain comfort rounds  - Make Fall Risk Sign visible to staff  - Offer Toileting every 2 Hours, in advance of need  - Initiate/Maintain alarm  - Obtain necessary fall risk management equipment  - Apply yellow socks and bracelet for high fall risk patients  - Consider moving patient to room near nurses station  Outcome: Progressing     Problem: MOBILITY - ADULT  Goal: Maintain or return to baseline ADL function  Description: INTERVENTIONS:  -  Assess patient's ability to carry out ADLs; assess patient's baseline for ADL function and identify physical deficits which impact ability to perform ADLs (bathing, care of mouth/teeth, toileting, grooming, dressing, etc )  - Assess/evaluate cause of self-care deficits   - Assess range of motion  - Assess patient's mobility; develop plan if impaired  - Assess patient's need for assistive devices and provide as appropriate  - Encourage maximum independence but intervene and supervise when necessary  - Involve family in performance of ADLs  - Assess for home care needs following discharge   - Consider OT consult to assist with ADL evaluation and planning for discharge  - Provide patient education as appropriate  Outcome: Progressing  Goal: Maintains/Returns to pre admission functional level  Description: INTERVENTIONS:  - Perform BMAT or MOVE assessment daily    - Set and communicate daily mobility goal to care team and patient/family/caregiver  - Collaborate with rehabilitation services on mobility goals if consulted  - Perform Range of Motion 3 times a day  - Reposition patient every 3 hours    - Dangle patient 3 times a day  - Stand patient 3 times a day  - Ambulate patient 3 times a day  - Out of bed to chair 3 times a day   - Out of bed for meals 3 times a day  - Out of bed for toileting  - Record patient progress and toleration of activity level   Outcome: Progressing

## 2022-11-15 NOTE — ED PROVIDER NOTES
History  Chief Complaint   Patient presents with   • Anjum Diaz down 1 stair, stated he felt his knee pop  His knee went one way, his foot went another  EMS gave 100,cg fentanyl  Has extensive knee surgeries  54-year-old male presents for right knee pain  Patient fell down one stair  [place] his knee pop  Called EMS  100 mcg of fentanyl was given  The patient has acute on chronic knee pain secondary to bilateral total knee arthroplasties years ago with the most recent one being in 2006  All his care has been at other hospitals including [place] and down in [facility]  He denies any other injuries  He has no numbness he feels spasm in his right leg  He has noted back pain abdominal pain did not strike his head no blood thinners  He is under the care of physicians for chronic pain and takes upwards of 160 mg of extended release morphine daily  No other modifying factors or associated symptoms  Assessment plan x-ray rule out fracture, periprosthetic fracture doubt knee dislocation          Prior to Admission Medications   Prescriptions Last Dose Informant Patient Reported? Taking?    Morphine Sulfate ER 15 MG T12A   Yes Yes   Sig: Take 60 mg by mouth every 12 (twelve) hours   Morphine Sulfate ER 15 MG T12A   Yes Yes   Sig: Take 100 mg by mouth daily as needed   SUMAtriptan (IMITREX) 100 mg tablet   Yes No   Sig: Take 100 mg by mouth   amLODIPine (NORVASC) 5 mg tablet   Yes No   Sig: Take 5 mg by mouth daily   b complex vitamins capsule  Self Yes Yes   Sig: Take 1 capsule by mouth daily   cyclobenzaprine (FLEXERIL) 10 mg tablet   Yes No   Sig: Take 10 mg by mouth   docusate sodium (COLACE) 250 MG capsule  Self Yes Yes   Sig: Take 250 mg by mouth 2 (two) times a day   esomeprazole (NexIUM) 20 mg capsule  Self Yes Yes   Sig: Take 20 mg by mouth 2 (two) times a day before meals   glimepiride (AMARYL) 2 mg tablet   Yes No   Sig: Take 2 mg by mouth 4 (four) times a day   lisinopril-hydrochlorothiazide (PRINZIDE,ZESTORETIC) 20-25 MG per tablet   Yes No   Sig: Take 1 tablet by mouth daily   metFORMIN (GLUCOPHAGE) 500 mg tablet   Yes No   Sig: Take 500 mg by mouth 2 (two) times a day with meals   risperiDONE (RisperDAL) 3 mg tablet   Yes No   Sig: Take 3 mg by mouth daily   simvastatin (ZOCOR) 40 mg tablet   Yes No   Sig: Take 40 mg by mouth daily   zolpidem (AMBIEN) 10 mg tablet   Yes No   Sig: Take 10 mg by mouth      Facility-Administered Medications: None       Past Medical History:   Diagnosis Date   • Diabetes mellitus ([facility] [address] )    • Hypertension        Past Surgical History:   Procedure Laterality Date   • KNEE SURGERY         History reviewed  No pertinent family history  I have reviewed and agree with the history as documented  E-Cigarette/Vaping   • E-Cigarette Use Never User      E-Cigarette/Vaping Substances   • Nicotine Yes    • THC Yes    • CBD Yes    • Flavoring Yes      Social History     Tobacco Use   • Smoking status: Never Smoker   • Smokeless tobacco: Never Used   Vaping Use   • Vaping Use: Never used   Substance Use Topics   • Alcohol use: Not Currently   • Drug use: Yes     Types: Marijuana       Review of Systems   Constitutional: Negative for chills, fatigue and fever  Eyes: Negative for photophobia and visual disturbance  Respiratory: Negative for cough and shortness of breath  Cardiovascular: Negative for chest pain, palpitations and leg swelling  Gastrointestinal: Negative for diarrhea, nausea and vomiting  Endocrine: Negative for polydipsia and polyuria  Genitourinary: Negative for decreased urine volume, difficulty urinating, dysuria and frequency  Musculoskeletal: Positive for joint swelling  Negative for back pain, neck pain and neck stiffness  Skin: Negative for color change and rash  Allergic/Immunologic: Negative for environmental allergies and immunocompromised state  Neurological: Negative for dizziness and headaches  Hematological: Negative for adenopathy  Does not bruise/bleed easily  Psychiatric/Behavioral: Negative for dysphoric mood  The patient is not nervous/anxious  Physical Exam  Physical Exam  Vitals and nursing note reviewed  Constitutional:       Appearance: He is well-developed  HENT:      Head: Normocephalic and atraumatic  Right Ear: External ear normal       Left Ear: External ear normal    Eyes:      Conjunctiva/sclera: Conjunctivae normal       Pupils: Pupils are equal, round, and reactive to light  Neck:      Thyroid: No thyromegaly  Vascular: No JVD  Cardiovascular:      Rate and Rhythm: Normal rate and regular rhythm  Heart sounds: Normal heart sounds  No murmur heard  No friction rub  No gallop  Pulmonary:      Effort: Pulmonary effort is normal  No respiratory distress  Breath sounds: Normal breath sounds  No wheezing or rales  Abdominal:      General: Bowel sounds are normal  There is no distension  Palpations: Abdomen is soft  Tenderness: There is no guarding or rebound  Musculoskeletal:         General: Tenderness and signs of injury ( kneeHeld in a slightly flexed position neurovascularly intact distally tender in the medial joint line) present  Normal range of motion  Cervical back: Normal range of motion and neck supple  Lymphadenopathy:      Cervical: No cervical adenopathy  Skin:     General: Skin is warm  Neurological:      Mental Status: He is alert and oriented to person, place, and time  Cranial Nerves: No cranial nerve deficit     Psychiatric:         Behavior: Behavior normal          Vital Signs  ED Triage Vitals [11/14/22 2104]   Temperature Pulse Respirations Blood Pressure SpO2   (!) 97 2 °F (36 2 °C) (!) 115 18 128/89 97 %      Temp Source Heart Rate Source Patient Position - Orthostatic VS BP Location FiO2 (%)   Temporal Monitor Sitting Right arm --      Pain Score       10 - Worst Possible Pain           Vitals:    11/15/22 0125 11/15/22 0435 11/15/22 0734 11/15/22 1430   BP: 123/88  130/88 130/90   Pulse: 102 93 85 100   Patient Position - Orthostatic VS: Lying            Visual Acuity      ED Medications  Medications   risperiDONE (RisperDAL) tablet 3 mg (3 mg Oral Given 11/15/22 0212)   morphine (MS CONTIN) ER tablet 60 mg (60 mg Oral Given 11/15/22 0921)   pravastatin (PRAVACHOL) tablet 80 mg (has no administration in time range)   zolpidem (AMBIEN) tablet 10 mg (10 mg Oral Given 11/15/22 0159)   cyclobenzaprine (FLEXERIL) tablet 10 mg (has no administration in time range)   amLODIPine (NORVASC) tablet 5 mg (5 mg Oral Given 11/15/22 0921)   naloxone (NARCAN) 0 04 mg/mL syringe 0 04 mg (has no administration in time range)   HYDROmorphone (DILAUDID) injection 1 mg (1 mg Intravenous Given 11/15/22 1432)   acetaminophen (TYLENOL) tablet 975 mg (975 mg Oral Given 11/15/22 1359)   ibuprofen (MOTRIN) tablet 600 mg (has no administration in time range)   insulin lispro (HumaLOG) 100 units/mL subcutaneous injection 1-6 Units (2 Units Subcutaneous Given 11/15/22 1139)   insulin lispro (HumaLOG) 100 units/mL subcutaneous injection 1-6 Units (has no administration in time range)   influenza vaccine, quadrivalent (FLUARIX) IM injection 0 5 mL (has no administration in time range)   fentanyl citrate (PF) (FOR EMS ONLY) 100 mcg/2 mL injection 100 mcg (0 mcg Does not apply Given to EMS 11/14/22 2109)   morphine injection 6 mg (6 mg Intravenous Given 11/14/22 2129)   diazepam (VALIUM) injection 5 mg (5 mg Intravenous Given 11/14/22 2130)   HYDROmorphone (DILAUDID) injection 1 mg (1 mg Intravenous Given 11/14/22 2300)   potassium chloride 20 mEq IVPB (premix) (20 mEq Intravenous New Bag 11/15/22 0137)   potassium chloride (K-DUR,KLOR-CON) CR tablet 40 mEq (40 mEq Oral Given 11/15/22 0136)   magnesium sulfate IVPB (premix) SOLN 1 g (1 g Intravenous New Bag 11/15/22 0338)   calcium gluconate 1 g in sodium chloride 0 9% 50 mL (premix) (1 g Intravenous New Bag 11/15/22 0338) HYDROmorphone (DILAUDID) injection 1 mg (1 mg Intravenous Given 11/15/22 0158)       Diagnostic Studies  Results Reviewed     Procedure Component Value Units Date/Time    Magnesium [[id number]]  (Normal) Collected: 11/15/22 0553    Lab Status: Final result Specimen: Blood from Arm, Left Updated: 11/15/22 0642     Magnesium 1 9 mg/dL     CBC and differential [[id number]]  (Abnormal) Collected: 11/15/22 0553    Lab Status: Final result Specimen: Blood from Arm, Left Updated: 11/15/22 0600     WBC 10 81 Thousand/uL      RBC 4 66 Million/uL      Hemoglobin 13 5 g/dL      Hematocrit 39 9 %      MCV 86 fL      MCH 29 0 pg      MCHC 33 8 g/dL      RDW 12 3 %      MPV 10 8 fL      Platelets 463 Thousands/uL      nRBC 0 /100 WBCs      Neutrophils Relative 70 %      Immat GRANS % 0 %      Lymphocytes Relative 23 %      Monocytes Relative 6 %      Eosinophils Relative 1 %      Basophils Relative 0 %      Neutrophils Absolute 7 41 Thousands/µL      Immature Grans Absolute 0 04 Thousand/uL      Lymphocytes Absolute 2 50 Thousands/µL      Monocytes Absolute 0 67 Thousand/µL      Eosinophils Absolute 0 15 Thousand/µL      Basophils Absolute 0 04 Thousands/µL     Basic metabolic panel [[id number]]  (Abnormal) Collected: 11/15/22 0006    Lab Status: Final result Specimen: Blood from Arm, Left Updated: 11/15/22 0046     Sodium 139 mmol/L      Potassium 2 5 mmol/L      Chloride 113 mmol/L      CO2 18 mmol/L      ANION GAP 8 mmol/L      BUN 13 mg/dL      Creatinine 0 68 mg/dL      Glucose 214 mg/dL      Calcium 6 3 mg/dL      eGFR 112 ml/min/1 73sq m     Narrative:      [place] guidelines for Chronic Kidney Disease (CKD):   •  Stage 1 with normal or high GFR (GFR > 90 mL/min/1 73 square meters)  •  Stage 2 Mild CKD (GFR = 60-89 mL/min/1 73 square meters)  •  Stage 3A Moderate CKD (GFR = 45-59 mL/min/1 73 square meters)  •  Stage 3B Moderate CKD (GFR = 30-44 mL/min/1 73 square meters)  •  Stage 4 Severe CKD (GFR = 15-29 mL/min/1 73 square meters)  •  Stage 5 End Stage CKD (GFR <15 mL/min/1 73 square meters)  Note: GFR calculation is accurate only with a steady state creatinine    CBC and differential [[id number]]  (Abnormal) Collected: 11/15/22 0006    Lab Status: Final result Specimen: Blood from Arm, Left Updated: 11/15/22 0011     WBC 13 26 Thousand/uL      RBC 5 04 Million/uL      Hemoglobin 15 0 g/dL      Hematocrit 42 4 %      MCV 84 fL      MCH 29 8 pg      MCHC 35 4 g/dL      RDW 12 4 %      MPV 11 1 fL      Platelets 661 Thousands/uL      nRBC 0 /100 WBCs      Neutrophils Relative 78 %      Immat GRANS % 1 %      Lymphocytes Relative 16 %      Monocytes Relative 4 %      Eosinophils Relative 1 %      Basophils Relative 0 %      Neutrophils Absolute 10 35 Thousands/µL      Immature Grans Absolute 0 09 Thousand/uL      Lymphocytes Absolute 2 06 Thousands/µL      Monocytes Absolute 0 58 Thousand/µL      Eosinophils Absolute 0 13 Thousand/µL      Basophils Absolute 0 05 Thousands/µL                  CT lower extremity wo contrast right   Final Result by Danay Gallegos MD (11/15 1029)      Nondisplaced periprosthetic fracture of the distal femur  No finding of loosening   Knee arthroplasty with patellar resurfacing small knee effusion   Intact quadriceps and patellar tendons   The study was marked in EPIC for significant notification  Workstation performed: [id number]         XR knee 4+ vw right injury   ED Interpretation by Jerica Kimball DO (11/15 0002)   Periposthetic fracture? Final Result by Reyes Howe MD (11/15 1002)      Nondisplaced periprosthetic fracture at the distal femur  Findings concur with the preliminary ER interpretation        Workstation performed: [id number]                    Procedures  Procedures         ED Course                                             MDM  Number of Diagnoses or Management Options  Ambulatory dysfunction: new and requires workup  Right knee pain: new and requires workup     Amount and/or Complexity of Data Reviewed  Tests in the radiology section of CPT®: reviewed and ordered        Disposition  Final diagnoses:   Right knee pain   Ambulatory dysfunction     Time reflects when diagnosis was documented in both MDM as applicable and the Disposition within this note     Time User Action Codes Description Comment    11/15/2022 12:00 AM Reza Mcdowell Add [M25 561] Right knee pain     11/15/2022 12:00 AM Reza Mcdowell Add [R26 2] Ambulatory dysfunction       ED Disposition     ED Disposition   Admit    Condition   Stable    Date/Time   Mon Nov 14, 2022 11:59 PM    Comment   Case was discussed with maurisio and the patient's admission status was agreed to be Admission Status: observation status to the service of Dr Loreta Valdovinos   Follow-up Information     Follow up With Specialties Details Why Contact Info    Arabella Taylor MD Orthopedic Surgery Follow up in 2 week(s)  [address] [place] [address]            Current Discharge Medication List      CONTINUE these medications which have NOT CHANGED    Details   b complex vitamins capsule Take 1 capsule by mouth daily      docusate sodium (COLACE) 250 MG capsule Take 250 mg by mouth 2 (two) times a day      esomeprazole (NexIUM) 20 mg capsule Take 20 mg by mouth 2 (two) times a day before meals      ! ! Morphine Sulfate ER 15 MG T12A Take 60 mg by mouth every 12 (twelve) hours      ! !  Morphine Sulfate ER 15 MG T12A Take 100 mg by mouth daily as needed      amLODIPine (NORVASC) 5 mg tablet Take 5 mg by mouth daily      cyclobenzaprine (FLEXERIL) 10 mg tablet Take 10 mg by mouth      glimepiride (AMARYL) 2 mg tablet Take 2 mg by mouth 4 (four) times a day      lisinopril-hydrochlorothiazide (PRINZIDE,ZESTORETIC) 20-25 MG per tablet Take 1 tablet by mouth daily      metFORMIN (GLUCOPHAGE) 500 mg tablet Take 500 mg by mouth 2 (two) times a day with meals      risperiDONE (RisperDAL) 3 mg tablet Take 3 mg by mouth daily      simvastatin (ZOCOR) 40 mg tablet Take 40 mg by mouth daily      SUMAtriptan (IMITREX) 100 mg tablet Take 100 mg by mouth      zolpidem (AMBIEN) 10 mg tablet Take 10 mg by mouth       !! - Potential duplicate medications found  Please discuss with provider  No discharge procedures on file      PDMP Review       Value Time User    PDMP Reviewed  Yes 11/15/2022 12:06 AM Mackenzie Gallegos PA-C          ED Provider  Electronically Signed by           Blake Montanez DO  11/15/22 2078

## 2022-11-15 NOTE — CASE MANAGEMENT
Case Management Assessment & Discharge Planning Note    Patient name Kelechi Solis  Location /-45 MRN [id number]  : 1975 Date 11/15/2022       Current Admission Date: 2022  Current Admission Diagnosis:Acute on Chronic Right knee pain   Patient Active Problem List    Diagnosis Date Noted   • Acute on Chronic Right knee pain 11/15/2022   • Chronic, continuous use of opioids 11/15/2022   • Type 2 diabetes mellitus without complication, without long-term current use of insulin ([facility] [address] ) 11/15/2022   • Essential hypertension 11/15/2022   • SIRS (systemic inflammatory response syndrome) ([facility] [address] ) 11/15/2022   • Hypokalemia 11/15/2022   • Hypocalcemia 11/15/2022   • TBI (traumatic brain injury) 11/15/2022      LOS (days): 0  Geometric Mean LOS (GMLOS) (days):   Days to GMLOS:     OBJECTIVE:              Current admission status: Observation  Referral Reason: Acute Rehab, VNA    Preferred Pharmacy:   [facility]/pharmacy [address] [place] [address]  Phone: [phone] Fax: [phone]    Primary Care Provider: Braulio Schneider MD    Primary Insurance: MEDICARE  Secondary Insurance:     ASSESSMENT:  Active Health Care Proxies    There are no active Health Care Proxies on file         Advance Directives  Does patient have a [address] [facility] [address]?: No  Was patient offered paperwork?: Yes  Does patient currently have a Health Care decision maker?: Yes, please see Health Care Proxy section  Does patient have Advance Directives?: No  Was patient offered paperwork?: Yes  Primary Contact: Cristo Kelly         Readmission Root Cause  30 Day Readmission: No    Patient Information  Admitted from[de-identified] Home  During Assessment patient was accompanied by: Not accompanied during assessment  Assessment information provided by[de-identified] Patient  Primary Caregiver: Self  Support Systems: Spouse/significant other, Son  [place] of Residence:  [facility] do you live in?: [address] entry access options   Select all that apply : Stairs  Number of steps to enter home : One Flight  Do the steps have railings?: Yes  Type of Current Residence: 3 story home  Upon entering residence, is there a bedroom on the main floor (no further steps)?: No  A bedroom is located on the following floor levels of residence (select all that apply):: 2nd Floor  Upon entering residence, is there a bathroom on the main floor (no further steps)?: Yes  Number of steps to 2nd floor from main floor: One Flight  In the last 12 months, was there a time when you were not able to pay the mortgage or rent on time?: No  In the last 12 months, how many places have you lived?: 1  In the last 12 months, was there a time when you did not have a steady place to sleep or slept in a shelter (including now)?: No  Homeless/housing insecurity resource given?: N/A  Living Arrangements: Lives w/ Spouse/significant other  Is patient a ?: No    Activities of Daily Living Prior to Admission  Functional Status: Independent  Completes ADLs independently?: Yes  Ambulates independently?: Yes  Does patient use assisted devices?: No  Does patient currently own DME?: Yes  What DME does the patient currently own?: Crutches  Does patient have a history of Outpatient Therapy (PT/OT)?: Yes  Does the patient have a history of Short-Term Rehab?: Yes  Does patient have a history of HHC?: Yes  Does patient currently have [facility]?: No         Patient Information Continued  Income Source: SSI/SSD  Does patient have prescription coverage?: Yes  Within the past 12 months, you worried that your food would run out before you got the money to buy more : Never true  Within the past 12 months, the food you bought just didn't last and you didn't have money to get more : Never true  Food insecurity resource given?: N/A  Does patient receive dialysis treatments?: No  Does patient have a history of substance abuse?: No  Does patient have a history of Mental Health Diagnosis?: No         Means of Transportation  Means of Transport to Appts[de-identified] Drives Self  In the past 12 months, has lack of transportation kept you from medical appointments or from getting medications?: No  In the past 12 months, has lack of transportation kept you from meetings, work, or from getting things needed for daily living?: No  Was application for public transport provided?: N/A        DISCHARGE DETAILS:    Discharge planning discussed with[de-identified] patient  Freedom of Choice: Yes  Comments - Freedom of Choice: discussed PT/OT to see and make a recommendation regarding disposition  CM contacted family/caregiver?: No- see comments (pt alert and indpt in room)  Were Treatment Team discharge recommendations reviewed with patient/caregiver?: Yes  Did patient/caregiver verbalize understanding of patient care needs?: Yes       Contacts  Reason/Outcome: Discharge [address]         Is the patient interested in [facility] at discharge?: Yes (Pending PT/OT rec)  [address] requested[de-identified] Occupational Therapy, Physical [address] [facility] Provider[de-identified] PCP  Home Health Services Needed[de-identified] Strengthening/Theraputic Exercises to Improve Function, Gait/ADL Training, Evaluate Functional Status and Safety  Homebound Criteria Met[de-identified] Uses an Assist Device (i e  cane, walker, etc)  Supporting Clincal Findings[de-identified] Limited Endurance    DME Referral Provided  Referral made for DME?: No (Pending Pt/OT recs)    Other Referral/Resources/Interventions Provided:  Interventions: DME, HHC, Short Term Rehab  Referral Comments: Pt/OT to see pt  Pt is hoping to return home today  He states he thinks he will need a walker  Discharge Destination Plan[de-identified] Home, Home with [place] at Discharge : Family                                      Additional Comments: Cm met with pt at bedside  Pt is here on Medicare Obs stay which was expained to him by Attending   Pt reports that he resides with his wife and son in a 3sth with a flight of stairs to enter from the garage and 8 kayla at the front door  Pt states he also has a flight of stairs to bed and shower  Pt has had [facility] before but he does not recall agency  He has also been to STR at [facility] and [facility]  Pt has participated in OP PT/OT multiple times  Pt has no MH/Da hx  Pt reports that he has had many concussions and he has PCS (Post Concussion Syndrome) not a TBI  Pt reports that he drives and sees Dr Kari Kam and her private practice  Pt uses [facility] in [place] for his pharmacy  Pt is to be seen by Pt/Ot for dc recommendations

## 2022-11-15 NOTE — ED NOTES
Pts  wife became very angry when she was made aware visiting hours are over at 2000  Pts  wife repeatedly and loudly saying "I told you to go to [facility]!  I told you!"     Chinyere Marina RN  11/15/22 9721

## 2022-11-15 NOTE — ASSESSMENT & PLAN NOTE
· Chronic opioid use for chronic knee pain   · PDMP verified   · 10/27/22 Morphine sulfate ER 60 mg 60 tablets dispensed  · 10/27/22 Morphine sulfate  mg 30 tablets dispensed  · Takes this medication prn

## 2022-11-15 NOTE — ASSESSMENT & PLAN NOTE
· PTA fell down 1 stair onto his right knee and reports he felt a pop  · Prior history of surgeries performed on the knee  · Reports at baseline able to ambulate on most days but does have days that he is bed bound or unable to use the stairs  · No fracture noted on x-ray  Pending official read  · Manage pain  · Continue Morphine sulfate ER 60 mg q 12 hours (chronic medication)   · Scheduled tylenol 975 mg  · IV dilaudid 1 mg for severe and breakthrough pain     · Consult PT/OT, Ortho, CM

## 2022-11-15 NOTE — CASE MANAGEMENT
Case Management Discharge Planning Note    Patient name Arnel Holguin /-85 MRN [id number]  : 1975 Date 11/15/2022       Current Admission Date: 2022  Current Admission Diagnosis:Acute on Chronic Right knee pain   Patient Active Problem List    Diagnosis Date Noted   • Acute on Chronic Right knee pain 11/15/2022   • Chronic, continuous use of opioids 11/15/2022   • Type 2 diabetes mellitus without complication, without long-term current use of insulin ([facility] [address] ) 11/15/2022   • Essential hypertension 11/15/2022   • SIRS (systemic inflammatory response syndrome) ([facility] [address] ) 11/15/2022   • Hypokalemia 11/15/2022   • Hypocalcemia 11/15/2022   • TBI (traumatic brain injury) 11/15/2022      LOS (days): 0  Geometric Mean LOS (GMLOS) (days):   Days to GMLOS:     OBJECTIVE:            Current admission status: Observation   Preferred Pharmacy:   [facility]/pharmacy [address] [place] [address]  Phone: [phone] Fax: [phone]    Primary Care Provider: Cat Costa MD    Primary Insurance: MEDICARE  Secondary Insurance:     DISCHARGE DETAILS:                                [address]         Is the patient interested in [facility] at discharge?: Yes  [address] requested[de-identified] Occupational Therapy, Physical [address] Name[de-identified]  [address] Provider[de-identified] PCP  Home Health Services Needed[de-identified] Evaluate Functional Status and Safety, Gait/ADL Training, Strengthening/Theraputic Exercises to Improve Function  Homebound Criteria Met[de-identified] Uses an Assist Device (i e  cane, walker, etc), Requires the Assistance of Another Person for Safe Ambulation or to Leave the Home  Supporting Clincal Findings[de-identified] Limited Endurance    DME Referral Provided  Referral made for DME?: Yes  DME referral completed for the following items[de-identified] Sergey Estrada  DME Supplier Name[de-identified] [facility] Additional Comments: Pt to return home tomorrow with [facility] / Rolling walker ordered, delivered and signed for through [facility]

## 2022-11-16 ENCOUNTER — EPISODE CHANGES (OUTPATIENT)
Dept: CASE MANAGEMENT | Facility: OTHER | Age: 47
End: 2022-11-16

## 2022-11-16 VITALS
HEART RATE: 90 BPM | TEMPERATURE: 97.4 F | WEIGHT: 211.42 LBS | SYSTOLIC BLOOD PRESSURE: 141 MMHG | BODY MASS INDEX: 24.46 KG/M2 | DIASTOLIC BLOOD PRESSURE: 96 MMHG | HEIGHT: 78 IN | OXYGEN SATURATION: 96 % | RESPIRATION RATE: 18 BRPM

## 2022-11-16 LAB
ALBUMIN SERPL BCP-MCNC: 3.6 G/DL (ref 3.5–5)
ALP SERPL-CCNC: 84 U/L (ref 46–116)
ALT SERPL W P-5'-P-CCNC: 187 U/L (ref 12–78)
ANION GAP SERPL CALCULATED.3IONS-SCNC: 10 MMOL/L (ref 4–13)
AST SERPL W P-5'-P-CCNC: 28 U/L (ref 5–45)
BASOPHILS # BLD AUTO: 0.04 THOUSANDS/ÂΜL (ref 0–0.1)
BASOPHILS NFR BLD AUTO: 0 % (ref 0–1)
BILIRUB SERPL-MCNC: 0.6 MG/DL (ref 0.2–1)
BUN SERPL-MCNC: 13 MG/DL (ref 5–25)
CALCIUM SERPL-MCNC: 9.1 MG/DL (ref 8.3–10.1)
CHLORIDE SERPL-SCNC: 99 MMOL/L (ref 96–108)
CO2 SERPL-SCNC: 25 MMOL/L (ref 21–32)
CREAT SERPL-MCNC: 0.94 MG/DL (ref 0.6–1.3)
EOSINOPHIL # BLD AUTO: 0.22 THOUSAND/ÂΜL (ref 0–0.61)
EOSINOPHIL NFR BLD AUTO: 2 % (ref 0–6)
ERYTHROCYTE [DISTWIDTH] IN BLOOD BY AUTOMATED COUNT: 12.2 % (ref 11.6–15.1)
GFR SERPL CREATININE-BSD FRML MDRD: 95 ML/MIN/1.73SQ M
GLUCOSE SERPL-MCNC: 176 MG/DL (ref 65–140)
GLUCOSE SERPL-MCNC: 208 MG/DL (ref 65–140)
GLUCOSE SERPL-MCNC: 267 MG/DL (ref 65–140)
HCT VFR BLD AUTO: 43.3 % (ref 36.5–49.3)
HGB BLD-MCNC: 14.6 G/DL (ref 12–17)
IMM GRANULOCYTES # BLD AUTO: 0.04 THOUSAND/UL (ref 0–0.2)
IMM GRANULOCYTES NFR BLD AUTO: 0 % (ref 0–2)
LYMPHOCYTES # BLD AUTO: 1.86 THOUSANDS/ÂΜL (ref 0.6–4.47)
LYMPHOCYTES NFR BLD AUTO: 20 % (ref 14–44)
MCH RBC QN AUTO: 29.1 PG (ref 26.8–34.3)
MCHC RBC AUTO-ENTMCNC: 33.7 G/DL (ref 31.4–37.4)
MCV RBC AUTO: 86 FL (ref 82–98)
MONOCYTES # BLD AUTO: 0.47 THOUSAND/ÂΜL (ref 0.17–1.22)
MONOCYTES NFR BLD AUTO: 5 % (ref 4–12)
NEUTROPHILS # BLD AUTO: 6.76 THOUSANDS/ÂΜL (ref 1.85–7.62)
NEUTS SEG NFR BLD AUTO: 73 % (ref 43–75)
NRBC BLD AUTO-RTO: 0 /100 WBCS
PLATELET # BLD AUTO: 234 THOUSANDS/UL (ref 149–390)
PMV BLD AUTO: 10.6 FL (ref 8.9–12.7)
POTASSIUM SERPL-SCNC: 3.9 MMOL/L (ref 3.5–5.3)
PROT SERPL-MCNC: 7.9 G/DL (ref 6.4–8.4)
RBC # BLD AUTO: 5.01 MILLION/UL (ref 3.88–5.62)
SODIUM SERPL-SCNC: 134 MMOL/L (ref 135–147)
WBC # BLD AUTO: 9.39 THOUSAND/UL (ref 4.31–10.16)

## 2022-11-16 RX ORDER — INSULIN GLARGINE 100 [IU]/ML
6 INJECTION, SOLUTION SUBCUTANEOUS
Status: DISCONTINUED | OUTPATIENT
Start: 2022-11-16 | End: 2022-11-16

## 2022-11-16 RX ORDER — HYDROMORPHONE HYDROCHLORIDE 2 MG/1
2 TABLET ORAL 2 TIMES DAILY PRN
Qty: 14 TABLET | Refills: 0 | Status: SHIPPED | OUTPATIENT
Start: 2022-11-16 | End: 2022-11-23

## 2022-11-16 RX ORDER — INSULIN GLARGINE 100 [IU]/ML
4 INJECTION, SOLUTION SUBCUTANEOUS
Status: DISCONTINUED | OUTPATIENT
Start: 2022-11-16 | End: 2022-11-16 | Stop reason: HOSPADM

## 2022-11-16 RX ADMIN — AMLODIPINE BESYLATE 5 MG: 5 TABLET ORAL at 08:51

## 2022-11-16 RX ADMIN — HYDROMORPHONE HYDROCHLORIDE 1 MG: 1 INJECTION, SOLUTION INTRAMUSCULAR; INTRAVENOUS; SUBCUTANEOUS at 07:40

## 2022-11-16 RX ADMIN — IBUPROFEN 600 MG: 600 TABLET ORAL at 08:51

## 2022-11-16 RX ADMIN — HYDROMORPHONE HYDROCHLORIDE 1 MG: 1 INJECTION, SOLUTION INTRAMUSCULAR; INTRAVENOUS; SUBCUTANEOUS at 03:33

## 2022-11-16 RX ADMIN — INSULIN LISPRO 3 UNITS: 100 INJECTION, SOLUTION INTRAVENOUS; SUBCUTANEOUS at 11:57

## 2022-11-16 RX ADMIN — HYDROMORPHONE HYDROCHLORIDE 1 MG: 1 INJECTION, SOLUTION INTRAMUSCULAR; INTRAVENOUS; SUBCUTANEOUS at 11:58

## 2022-11-16 RX ADMIN — INSULIN LISPRO 1 UNITS: 100 INJECTION, SOLUTION INTRAVENOUS; SUBCUTANEOUS at 08:50

## 2022-11-16 RX ADMIN — MORPHINE SULFATE 60 MG: 30 TABLET, EXTENDED RELEASE ORAL at 08:51

## 2022-11-16 NOTE — ASSESSMENT & PLAN NOTE
· Meeting SIRS criteria due to tachycardia and WBC of 13 26  · Tachycardia improved after receiving pain medication for knee pain  · Suspect elevated WBC and tachycardia as a result of pain in the knee post fall    · No current signs of infection  · Continue to monitor without the use of antibiotics at this time

## 2022-11-16 NOTE — ASSESSMENT & PLAN NOTE
· PTA fell down 1 stair onto his right knee and reports he felt a pop  · Prior history of surgeries performed on the knee  · Reports at baseline able to ambulate on most days but does have days that he is bed bound or unable to use the stairs  · Managed pain  · Continued Morphine sulfate ER 60 mg q 12 hours (chronic medication)   · Scheduled tylenol 975 mg  · IV dilaudid 1 mg for severe and breakthrough pain  · Consulted PT/OT, Ortho, CM  · Per ortho; X-ray of the right knee show possible periprosthetic nondisplaced fracture of distal femur  CT scan does demonstrate a nondisplaced distal femur periprosthetic fracture  Patient will need to be placed in a knee immobilizer and nonweightbearing  Surgical intervention is not required at this point  · Patient to follow up with Dr Alonso Jiménez outpatient
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
## 2022-11-16 NOTE — PROGRESS NOTES
Progress Note - Orthopedics   Augusto Stack 49 y o  male MRN: [id number]  Unit/Bed#: -01 Encounter: [id number]    Assessment:  Right knee nondisplaced distal femur periprosthetic fracture  Plan:  · CT RLE shows nondisplaced distal femur periprosthetic fracture  · No surgical intervention at this time  · Maintain knee immobilizer  · NWB to RLE  · Ice and elevation  · Pain control prn  · Medical management per primary team  · Stable for discharge from orthopedic standpoint  Follow up with Dr Asher Llanos outpatient  Subjective: Patient seen and examined at the bedside this morning  He is resting comfortably in bed  He has his knee immobilizer on and in good position  He states that his knee is feeling better today than it was the other day  No changes overnight  He denies numbness, tingling, fever, or chills  Vitals: Blood pressure 137/92, pulse 87, temperature 98 °F (36 7 °C), resp  rate 18, height 6' 6" (1 981 m), weight 95 9 kg (211 lb 6 7 oz), SpO2 98 %  ,Body mass index is 24 43 kg/m²  Intake/Output Summary (Last 24 hours) at 11/16/2022 0732  Last data filed at 11/16/2022 0501  Gross per 24 hour   Intake 690 ml   Output 1650 ml   Net -960 ml       Invasive Devices     Peripheral Intravenous Line  Duration           Peripheral IV 11/14/22 Left Forearm 1 day                Ortho Exam:   Right knee:  Knee immobilizer in proper position  Swelling noted with no erythema  Thigh and calf soft and compressible  sensation grossly intact L4, L5, S1, palpable pedal pulse, EHL/AT/GS intact     Lab, Imaging and other studies:  CT: IMPRESSION:     Nondisplaced periprosthetic fracture of the distal femur    No finding of loosening  Knee arthroplasty with patellar resurfacing small knee effusion  Intact quadriceps and patellar tendons              Emmanuelle Duque PA-C

## 2022-11-16 NOTE — PLAN OF CARE
Problem: PHYSICAL THERAPY ADULT  Goal: Performs mobility at highest level of function for planned discharge setting  See evaluation for individualized goals  Description: Treatment/Interventions: Functional transfer training, ADL retraining, LE strengthening/ROM, Elevations, Therapeutic exercise, Endurance training, Patient/family training, Equipment eval/education, Gait training, Bed mobility, Compensatory technique education, Continued evaluation, Spoke to nursing, Spoke to case management, OT  Equipment Recommended: Ann Li       See flowsheet documentation for full assessment, interventions and recommendations  Note: Prognosis: Excellent  Problem List: Decreased strength, Decreased endurance, Decreased range of motion, Impaired balance, Decreased mobility, Pain, Orthopedic restrictions  Assessment: Treatment consisted of bed mobility,balance training, ambulation/stair training, safety awareness, fall prevention, endurance training to increase upright positions and functional mobility  Pt with good understanding and able to demo safe mobility with use of RW for hopping and crutch for steps with handrail; Safe for home DC when medically ready, f/u with [facility] PT  The patient's AM-PAC Basic Mobility Inpatient Short Form Raw Score is 23  A Raw score of greater than 17 suggests the patient may benefit from discharge to home  Please also refer to the recommendation of the Physical Therapist for safe discharge planning  Barriers to Discharge: Decreased caregiver support, Inaccessible home environment     PT Discharge Recommendation: Home with home health rehabilitation    See flowsheet documentation for full assessment

## 2022-11-16 NOTE — PHYSICAL THERAPY NOTE
PHYSICAL THERAPY NOTE       11/16/22 0942   PT Last Visit   PT Visit Date 11/16/22   Note Type   Note Type Treatment   Pain Assessment   Pain Assessment Tool 0-10   Pain Score 7  ("that's good for me ")   Hospital Pain Intervention(s) Repositioned   Restrictions/Precautions   RLE Weight Bearing Per Order NWB  (knee immobilizer)   Braces or Orthoses   (knee immobilizer)   General   Chart Reviewed Yes   Additional Pertinent History pt adjusts KI appropriately   Cognition   Overall Cognitive Status WFL   Arousal/Participation Alert; Cooperative   Attention Within functional limits   Orientation Level Oriented X4   Memory Within functional limits   Following Commands Follows all commands and directions without difficulty   Subjective   Subjective pt eager to do steps and get moving   Bed Mobility   Supine to Sit 6  Modified independent   Sit to Supine 6  Modified independent   Transfers   Sit to Stand 6  Modified independent  (RLE NWB)   Stand to Sit 6  Modified independent  (RLE NWB)   Ambulation/Elevation   Gait pattern Decreased foot clearance   Gait Assistance 6  Modified independent   Assistive Device Rolling walker   Distance 200ft with RW, RLE NWB;  fitted pt's own RW appropriately this session     Stair Management Assistance 5  Supervision   Additional items Assist x 1   Stair Management Technique One rail R;With crutches   Number of Stairs 4   Ambulation/Elevation Additional Comments pt able to recall and perform steps with handrail and crutches appropriately and safely with supervision;  pt states he will be able to do this at home and wife able to assist as needed   Balance   Static Sitting Good   Dynamic Sitting Good   Static Standing Good   Dynamic Standing Fair +   Ambulatory Fair   Activity Tolerance   Activity Tolerance   (no adverse effects to PT noted)   Nurse Made Aware Mario Rhoades   Assessment   Prognosis Excellent   Problem List Decreased strength;Decreased endurance;Decreased range of motion; Impaired balance;Decreased mobility;Pain;Orthopedic restrictions   Assessment Treatment consisted of bed mobility,balance training, ambulation/stair training, safety awareness, fall prevention, endurance training to increase upright positions and functional mobility  Pt with good understanding and able to demo safe mobility with use of RW for hopping and crutch for steps with handrail; Safe for home DC when medically ready, f/u with [facility] PT  The patient's [facility] Basic Mobility Inpatient Short Form Raw Score is 23  A Raw score of greater than 17 suggests the patient may benefit from discharge to home  Please also refer to the recommendation of the Physical Therapist for safe discharge planning  Barriers to Discharge Decreased caregiver support; Inaccessible home environment   Goals   Patient Goals to go home   STG Expiration Date 11/22/22   Plan   Treatment/Interventions ADL retraining;Functional transfer training;LE strengthening/ROM; Elevations; Therapeutic exercise; Endurance training;Patient/family training;Equipment eval/education; Bed mobility;Gait training;Continued evaluation; Compensatory technique education;OT   PT Frequency 3-5x/wk   Recommendation   PT Discharge Recommendation Home with home health rehabilitation   [place] walker   [facility] Basic Mobility Inpatient   Turning in Bed Without Bedrails 4   Lying on Back to Sitting on Edge of Flat Bed 4   Moving Bed to Chair 4   Standing Up From Chair 4   Walk in Room 4   Climb 3-5 Stairs 3   Basic Mobility Inpatient Raw Score 23   Basic Mobility Standardized Score 50 88   Highest Level Of Mobility   JH-HLM Goal 7: Walk 25 feet or more   JH-HLM Achieved 7: Walk 25 feet or more   Miles Vega        Patient Name: Pascual Hoang  MGUFC'A Date: 11/16/2022

## 2022-11-16 NOTE — INCIDENTAL FINDINGS
The following findings require follow up:  Radiographic finding   Finding: Nondisplaced periprosthetic fracture of the distal femur  No finding of loosening   Knee arthroplasty with patellar resurfacing small knee effusion  Intact quadriceps and patellar tendons   Follow up required: Orthopedics   Follow up should be done within 1 to 2  month(s)    Please notify the following clinician to assist with the follow up:   Dr Marilee Cuevas MD

## 2022-11-16 NOTE — PLAN OF CARE
Problem: METABOLIC, FLUID AND ELECTROLYTES - ADULT  Goal: Electrolytes maintained within normal limits  Description: INTERVENTIONS:  - Monitor labs and assess patient for signs and symptoms of electrolyte imbalances  - Administer electrolyte replacement as ordered  - Monitor response to electrolyte replacements, including repeat lab results as appropriate  - Instruct patient on fluid and nutrition as appropriate  Outcome: Progressing  Goal: Fluid balance maintained  Description: INTERVENTIONS:  - Monitor labs   - Monitor I/O and WT  - Instruct patient on fluid and nutrition as appropriate  - Assess for signs & symptoms of volume excess or deficit  Outcome: Progressing  Goal: Glucose maintained within target range  Description: INTERVENTIONS:  - Monitor Blood Glucose as ordered  - Assess for signs and symptoms of hyperglycemia and hypoglycemia  - Administer ordered medications to maintain glucose within target range  - Assess nutritional intake and initiate nutrition service referral as needed  Outcome: Progressing     Problem: SKIN/TISSUE INTEGRITY - ADULT  Goal: Skin Integrity remains intact(Skin Breakdown Prevention)  Description: Assess:  -Perform Girma assessment   -Clean and moisturize skin   -Inspect skin when repositioning, toileting, and assisting with ADLS  -Assess under medical devices   -Assess extremities for adequate circulation and sensation     Bed Management:  -Have minimal linens on bed & keep smooth, unwrinkled  -Change linens as needed when moist or perspiring  -Avoid sitting or lying in one position for more than 2 hours while in bed  -Keep HOB at 45 degrees     Toileting:  -Offer bedside commode  -Assess for incontinence   -Use incontinent care products after each incontinent episode    Activity:  -Mobilize patient 3 times a day  -Encourage activity and walks on unit  -Encourage or provide ROM exercises   -Turn and reposition patient every 2 Hours  -Use appropriate equipment to lift or move patient in bed  -Instruct/ Assist with weight shifting when out of bed in chair  -Consider limitation of chair time 2 hour intervals    Skin Care:  -Avoid use of baby powder, tape, friction and shearing, hot water or constrictive clothing  -Relieve pressure over bony prominences   -Do not massage red bony areas    Next Steps:  -Teach patient strategies to minimize risks    -Consider consults to  interdisciplinary teams  Outcome: Progressing  Goal: Incision(s), wounds(s) or drain site(s) healing without S/S of infection  Description: INTERVENTIONS  - Assess and document dressing, incision, wound bed, drain sites and surrounding tissue  - Provide patient and family education  - Perform skin care/dressing changes  Outcome: Progressing  Goal: Pressure injury heals and does not worsen  Description: Interventions:  - Implement low air loss mattress or specialty surface (Criteria met)  - Apply silicone foam dressing  - Instruct/assist with weight shifting every 30 minutes when in chair   - Limit chair time to 2 hour intervals  - Use special pressure reducing interventions when in chair   - Apply fecal or urinary incontinence containment device   - Perform passive or active ROM   - Turn and reposition patient & offload bony prominences every 2 hours   - Utilize friction reducing device or surface for transfers   - Consider consults to  interdisciplinary teams  - Use incontinent care products after each incontinent episode  - Consider nutrition services referral as needed  Outcome: Progressing     Problem: MUSCULOSKELETAL - ADULT  Goal: Maintain or return mobility to safest level of function  Description: INTERVENTIONS:  - Assess patient's ability to carry out ADLs; assess patient's baseline for ADL function and identify physical deficits which impact ability to perform ADLs (bathing, care of mouth/teeth, toileting, grooming, dressing, etc )  - Assess/evaluate cause of self-care deficits   - Assess range of motion  - Assess patient's mobility  - Assess patient's need for assistive devices and provide as appropriate  - Encourage maximum independence but intervene and supervise when necessary  - Involve family in performance of ADLs  - Assess for home care needs following discharge   - Consider OT consult to assist with ADL evaluation and planning for discharge  - Provide patient education as appropriate  Outcome: Progressing  Goal: Maintain proper alignment of affected body part  Description: INTERVENTIONS:  - Support, maintain and protect limb and body alignment  - Provide patient/ family with appropriate education  Outcome: Progressing     Problem: Potential for Falls  Goal: Patient will remain free of falls  Description: INTERVENTIONS:  - Educate patient/family on patient safety including physical limitations  - Instruct patient to call for assistance with activity   - Consult OT/PT to assist with strengthening/mobility   - Keep Call bell within reach  - Keep bed low and locked with side rails adjusted as appropriate  - Keep care items and personal belongings within reach  - Initiate and maintain comfort rounds  - Make Fall Risk Sign visible to staff  - Offer Toileting every 2 Hours, in advance of need  - Initiate/Maintain alarm  - Obtain necessary fall risk management equipment  - Apply yellow socks and bracelet for high fall risk patients  - Consider moving patient to room near nurses station  Outcome: Progressing     Problem: MOBILITY - ADULT  Goal: Maintain or return to baseline ADL function  Description: INTERVENTIONS:  -  Assess patient's ability to carry out ADLs; assess patient's baseline for ADL function and identify physical deficits which impact ability to perform ADLs (bathing, care of mouth/teeth, toileting, grooming, dressing, etc )  - Assess/evaluate cause of self-care deficits   - Assess range of motion  - Assess patient's mobility; develop plan if impaired  - Assess patient's need for assistive devices and provide as appropriate  - Encourage maximum independence but intervene and supervise when necessary  - Involve family in performance of ADLs  - Assess for home care needs following discharge   - Consider OT consult to assist with ADL evaluation and planning for discharge  - Provide patient education as appropriate  Outcome: Progressing  Goal: Maintains/Returns to pre admission functional level  Description: INTERVENTIONS:  - Perform BMAT or MOVE assessment daily    - Set and communicate daily mobility goal to care team and patient/family/caregiver  - Collaborate with rehabilitation services on mobility goals if consulted  - Perform Range of Motion 3 times a day  - Reposition patient every 3 hours    - Dangle patient 3 times a day  - Stand patient 3 times a day  - Ambulate patient 3 times a day  - Out of bed to chair 3 times a day   - Out of bed for meals 3 times a day  - Out of bed for toileting  - Record patient progress and toleration of activity level   Outcome: Progressing

## 2022-11-17 ENCOUNTER — PATIENT OUTREACH (OUTPATIENT)
Dept: CASE MANAGEMENT | Facility: OTHER | Age: 47
End: 2022-11-17

## 2022-11-17 NOTE — PROGRESS NOTES
Outreach TC x2 to patient for CM assessment  No answer to call  No voicemail so unable to leave a message  I will try again later  Patient needs to be informed that he is unable to have [facility] services

## 2022-11-17 NOTE — PROGRESS NOTES
Outreach TC to patient for initial care management assessment  Patient reports that he is feeling well but does have pain in his RLE  Dereck Fabian Patient denies any s/sx of complications related to R distal femur fx including unilateral weakness on affected side, uncontrolled pain, numbness, tingling or unusual swelling  Patient is non-weight bearing on the RLE  Patient reports that he needs minimal assist with ADL's  Patient does need assist with IADLs  Patient resides with spouse and son  who assist as needed  Patient did not complete a SHANEKA appointment yet and needs to schedule a f/u with orthopedics  Patient encouraged to make all follow up appointments  I reviewed medications including dose, schedule, purpose & side effects of  Dilaudid, esompeprazole, B complex vitamin, Colace, Morphine ER , sumatriptan, amlodipine, glimepiride, lisinopril-HCTZ, metformin, risperidone, simvastatin and zolpidem with patient  Patient verbalizes understanding  Reviewed future appointments, s/sx to report and how/when to report symptoms to provider vs  911  Patient verbalizes understanding  Patient educated on role of CM, contact information for CM and BPCI  Patient declines additional calls

## 2022-11-17 NOTE — PROGRESS NOTES
Outreach TC to  ANNA to discuss referral for home care services  Explained that patient would like home health care services through [address] [place] [address] [facility]  Intake reports that they do service the area but currently are not taking new referrals in [facility] due to staffing  This RN care manager verbalized understanding

## 2022-11-21 ENCOUNTER — PATIENT OUTREACH (OUTPATIENT)
Dept: CASE MANAGEMENT | Facility: OTHER | Age: 47
End: 2022-11-21

## 2022-11-21 NOTE — PROGRESS NOTES
Outreach TC to patient for CM assessment  No answer to call  Unable to leave a message as message stated that call was unable to be processed at this time    Second unanswered call to patient

## 2022-11-21 NOTE — PROGRESS NOTES
Second attempt to reach patient  Unable to leave message  Phone rings and then hangs up  Will attempt outreach again on another day

## 2022-11-22 LAB
DME PARACHUTE DELIVERY DATE ACTUAL: NORMAL
DME PARACHUTE DELIVERY DATE REQUESTED: NORMAL
DME PARACHUTE ITEM DESCRIPTION: NORMAL
DME PARACHUTE ORDER STATUS: NORMAL
DME PARACHUTE SUPPLIER NAME: NORMAL
DME PARACHUTE SUPPLIER PHONE: NORMAL

## 2022-11-23 ENCOUNTER — PATIENT OUTREACH (OUTPATIENT)
Dept: CASE MANAGEMENT | Facility: OTHER | Age: 47
End: 2022-11-23

## 2022-11-23 NOTE — PROGRESS NOTES
Outreach TC to patient for CM assessment  No answer to call  Phone rings and after 2-3 rings the phone disconnects  I dialed the number multiple times  Third unanswered call to patient  Chart review reveals no recent office visits, labs or imaging to review     Chart review will continue until end of episode

## 2022-12-02 ENCOUNTER — OFFICE VISIT (OUTPATIENT)
Dept: OBGYN CLINIC | Facility: CLINIC | Age: 47
End: 2022-12-02

## 2022-12-02 ENCOUNTER — APPOINTMENT (OUTPATIENT)
Dept: RADIOLOGY | Facility: CLINIC | Age: 47
End: 2022-12-02

## 2022-12-02 VITALS
HEIGHT: 78 IN | WEIGHT: 211 LBS | BODY MASS INDEX: 24.41 KG/M2 | DIASTOLIC BLOOD PRESSURE: 82 MMHG | SYSTOLIC BLOOD PRESSURE: 126 MMHG

## 2022-12-02 DIAGNOSIS — M89.8X5 PAIN IN RIGHT FEMUR: ICD-10-CM

## 2022-12-02 DIAGNOSIS — M97.11XA PERIPROSTHETIC FRACTURE AROUND INTERNAL PROSTHETIC RIGHT KNEE JOINT, INITIAL ENCOUNTER: ICD-10-CM

## 2022-12-02 DIAGNOSIS — M89.8X5 PAIN IN RIGHT FEMUR: Primary | ICD-10-CM

## 2022-12-02 NOTE — PROGRESS NOTES
Knee New Office Note    Assessment:     1  Pain in right femur        Plan:  Findings today are consistent with minimally displaced supradondylar periprosthetic fracture of the distal femur above a PS TKA  Imaging and prognosis was reviewed with the patient today- fracture is stable and early evidence of healing without further displacement  Discussed with patient that this could take 3 months to heal  Will continue with conservative treatments, will continue with knee immobilizer non weightbearing  Reviewed surgical intervention should fracture become unable which would be ORIF  Follow up in 2 weeks with repeat X-rays  ALLISON DONOVAN in [place]  Problem List Items Addressed This Visit    None  Visit Diagnoses     Pain in right femur    -  Primary    Relevant Orders    XR femur 2 vw right    Durable Medical Equipment         Subjective:     Patient ID: Rachel Molina is a 49 y o  male  Chief Complaint:  HPI:  49year old patient presents for an evaluation of right knee pain  Patient was seen in the ED on 11/14/2022 after sustained a fall on his right knee  Patient was ruled to have nondisplaced periprosthetic fracture of the distal femur  Patient was seen by Dr Angel Hernandez in the ED and was placed in a knee immobilizer and instructed to remain non weight bearing for conservative treatment  Patient states that the pain is through medial aspect of his knee  Patient's TKA was done in 2005 by Hima Trinidad  Allergy:  Allergies   Allergen Reactions   • Penicillins Hives     Medications:  all current active meds have been reviewed  Past Medical History:  Past Medical History:   Diagnosis Date   • Diabetes mellitus ([address] )    • Hypertension      Past Surgical History:  Past Surgical History:   Procedure Laterality Date   • KNEE SURGERY       Family History:  History reviewed  No pertinent family history    Social History:  Social History     Substance and Sexual Activity   Alcohol Use Not Currently     Social History Substance and Sexual Activity   Drug Use Yes   • Types: Marijuana     Social History     Tobacco Use   Smoking Status Never   Smokeless Tobacco Never           ROS:  General: Per HPI  Skin: Negative, except if noted below  HEENT: Negative  Respiratory: Negative  Cardiovascular: Negative  Gastrointestinal: Negative  Urinary: Negative  Vascular: Negative  Musculoskeletal: Positive per HPI   Neurologic: Positive per HPI  Endocrine: Negative    Objective:  BP Readings from Last 1 Encounters:   12/02/22 126/82      Wt Readings from Last 1 Encounters:   12/02/22 95 7 kg (211 lb)        Respiratory:   non-labored respirations    Lymphatics:  no palpable lymph nodes    Gait:   Non weightbearing     Neurologic:   Alert and oriented times 3  Patient with normal sensation except as noted below  Deep tendon reflexes 2+ except as noted in MSK exam    Bilateral Lower Extremity:    Right knee: Inspection: incision from previous TKA    Overall limb alignment normal    Effusion: none    ROM not tested    Extensor Lag: none    Palpation: medial knee TTP    AP Stability at 90 deg not tested    M/L stability in full extension stable    M/L stability in midflexion not tested    Motor: 5/5 IP/Q/HS/TA/GS    Pulses: 2+ DP / 2+ PT    SILT DP/SP/S/S/TN    Imaging:  My interpretation XR AP/lateral right femur: minimally displaced supracondylar distal femur fracture above a cemented PS TKA, mild extension of distal femur, no interval change from injury films  Fracture / Dislocation Treatment    Date/Time: 12/2/2022 6:22 PM  Performed by: Reese Negrete DO  Authorized by: Reese Negrete DO     Patient Location:  [facility]  [place] Protocol:  Consent: Verbal consent obtained    Risks and benefits: risks, benefits and alternatives were discussed  Consent given by: patient  Patient understanding: patient states understanding of the procedure being performed      Injury location:  Knee  Location details:  Right knee  Injury type:  Fracture  Neurovascular status: Neurovascularly intact    Distal perfusion: normal    Neurological function: normal    Range of motion: reduced    Local anesthesia used?: No    Immobilization:  Knee immobilizer  Neurovascular status: Neurovascularly intact    Distal perfusion: normal    Neurological function: normal    Range of motion: unchanged    Patient tolerance:  Patient tolerated the procedure well with no immediate complications        BMI:   Estimated body mass index is 24 38 kg/m² as calculated from the following:    Height as of this encounter: 6' 6" (1 981 m)  Weight as of this encounter: 95 7 kg (211 lb)  BSA:   Estimated body surface area is 2 31 meters squared as calculated from the following:    Height as of this encounter: 6' 6" (1 981 m)  Weight as of this encounter: 95 7 kg (211 lb)             Scribe Attestation    I,:  Kel Issa am acting as a scribe while in the presence of the attending physician :       I,:  Cristy Daley DO personally performed the services described in this documentation    as scribed in my presence :

## 2022-12-16 ENCOUNTER — OFFICE VISIT (OUTPATIENT)
Dept: OBGYN CLINIC | Facility: CLINIC | Age: 47
End: 2022-12-16

## 2022-12-16 ENCOUNTER — APPOINTMENT (OUTPATIENT)
Dept: RADIOLOGY | Facility: CLINIC | Age: 47
End: 2022-12-16

## 2022-12-16 VITALS
SYSTOLIC BLOOD PRESSURE: 116 MMHG | BODY MASS INDEX: 24.41 KG/M2 | HEIGHT: 78 IN | DIASTOLIC BLOOD PRESSURE: 78 MMHG | WEIGHT: 211 LBS

## 2022-12-16 DIAGNOSIS — M97.11XA PERIPROSTHETIC FRACTURE AROUND INTERNAL PROSTHETIC RIGHT KNEE JOINT, INITIAL ENCOUNTER: ICD-10-CM

## 2022-12-16 DIAGNOSIS — M97.11XA PERIPROSTHETIC FRACTURE AROUND INTERNAL PROSTHETIC RIGHT KNEE JOINT, INITIAL ENCOUNTER: Primary | ICD-10-CM

## 2022-12-16 NOTE — PROGRESS NOTES
Knee Follow up Office Note    Assessment:     1  Periprosthetic fracture around internal prosthetic right knee joint, initial encounter        Plan:  Findings today are consistent with healing, minimally displaced supradondylar periprosthetic fracture of the distal femur above a PS TKA  Imaging and prognosis was reviewed with the patient today- fracture is stable with callus formation without further displacement  Discussed with patient that this could take 3 months to heal  Will continue with knee immobilizer non weightbearing for another 4 weeks  We will see him back in 4 weeks with repeat xrays  Problem List Items Addressed This Visit    None  Visit Diagnoses     Periprosthetic fracture around internal prosthetic right knee joint, initial encounter    -  Primary    Relevant Orders    XR knee 1 or 2 vw right         Subjective:     Patient ID: Bear Ragsdale is a 49 y o  male  Chief Complaint:  HPI:  49year old patient presents for a follow up evaluation of right knee pain  Patient was seen in the ED on 11/14/2022 after sustained a fall on his right knee  Patient was ruled to have nondisplaced periprosthetic fracture of the distal femur  Patient was seen by Dr Karishma Mathis in the ED and was placed in a knee immobilizer and instructed to remain non weight bearing for conservative treatment  Patient's TKA was done in 2005 by Gallito Saavedra  At last visit his fracture was stable and showing signs of healing  He is here today for repeat xrays  He states that he has been compliant with knee immobilizer and NWB  Allergy:  Allergies   Allergen Reactions   • Penicillins Hives     Medications:  all current active meds have been reviewed  Past Medical History:  Past Medical History:   Diagnosis Date   • Diabetes mellitus ([address] )    • Hypertension      Past Surgical History:  Past Surgical History:   Procedure Laterality Date   • KNEE SURGERY       Family History:  History reviewed  No pertinent family history    Social History:  Social History     Substance and Sexual Activity   Alcohol Use Not Currently     Social History     Substance and Sexual Activity   Drug Use Yes   • Types: Marijuana     Social History     Tobacco Use   Smoking Status Never   Smokeless Tobacco Never           ROS:  General: Per HPI  Skin: Negative, except if noted below  HEENT: Negative  Respiratory: Negative  Cardiovascular: Negative  Gastrointestinal: Negative  Urinary: Negative  Vascular: Negative  Musculoskeletal: Positive per HPI   Neurologic: Positive per HPI  Endocrine: Negative    Objective:  BP Readings from Last 1 Encounters:   12/16/22 116/78      Wt Readings from Last 1 Encounters:   12/16/22 95 7 kg (211 lb)        Respiratory:   non-labored respirations    Lymphatics:  no palpable lymph nodes    Gait:   Non weightbearing     Neurologic:   Alert and oriented times 3  Patient with normal sensation except as noted below  Deep tendon reflexes 2+ except as noted in MSK exam    Bilateral Lower Extremity:    Right knee: Inspection: incision from previous TKA    Overall limb alignment normal    Effusion: none    ROM not tested    Extensor Lag: none    Palpation: medial knee TTP    AP Stability at 90 deg not tested    M/L stability in full extension stable    M/L stability in midflexion not tested    Motor: 5/5 IP/Q/HS/TA/GS    Pulses: 2+ DP / 2+ PT    SILT DP/SP/S/S/TN    Imaging:  My interpretation XR AP/lateral right femur: no further displacement of minimally displaced supracondylar distal femur fracture above a cemented PS TKA, mild extension of distal femur, callus formation noted  BMI:   Estimated body mass index is 24 38 kg/m² as calculated from the following:    Height as of this encounter: 6' 6" (1 981 m)  Weight as of this encounter: 95 7 kg (211 lb)  BSA:   Estimated body surface area is 2 31 meters squared as calculated from the following:    Height as of this encounter: 6' 6" (1 981 m)      Weight as of this encounter: 95 7 kg (211 lb)               Scribe Attestation    I,:  Rayo Wheatley PA-C am acting as a scribe while in the presence of the attending physician :       I,:  Bibiana Douglas,  personally performed the services described in this documentation    as scribed in my presence :

## 2022-12-21 NOTE — ASSESSMENT & PLAN NOTE
No results found for: HGBA1C     · Home regimen:  Amaryl 2 mg qid, metformin 500 mg bid  · SSI What Type Of Note Output Would You Prefer (Optional)?: Standard Output Hpi Title: Evaluation of Skin Lesions How Severe Are Your Spot(S)?: moderate Have Your Spot(S) Been Treated In The Past?: has not been treated

## 2023-01-13 ENCOUNTER — APPOINTMENT (OUTPATIENT)
Dept: RADIOLOGY | Facility: CLINIC | Age: 48
End: 2023-01-13

## 2023-01-13 ENCOUNTER — OFFICE VISIT (OUTPATIENT)
Dept: OBGYN CLINIC | Facility: CLINIC | Age: 48
End: 2023-01-13

## 2023-01-13 VITALS — SYSTOLIC BLOOD PRESSURE: 119 MMHG | HEART RATE: 106 BPM | DIASTOLIC BLOOD PRESSURE: 81 MMHG

## 2023-01-13 DIAGNOSIS — M97.11XA PERIPROSTHETIC FRACTURE AROUND INTERNAL PROSTHETIC RIGHT KNEE JOINT, INITIAL ENCOUNTER: ICD-10-CM

## 2023-01-13 DIAGNOSIS — M97.11XA PERIPROSTHETIC FRACTURE AROUND INTERNAL PROSTHETIC RIGHT KNEE JOINT, INITIAL ENCOUNTER: Primary | ICD-10-CM

## 2023-01-13 NOTE — PROGRESS NOTES
Knee Follow up Office Note    Assessment:     1  Periprosthetic fracture around internal prosthetic right knee joint, initial encounter        Plan:  48 y/o male treating for healing, minimally displaced supradondylar periprosthetic fracture of the distal femur above a PS TKA  Imaging and prognosis was reviewed with the patient today- fracture healed with callus formation without further displacement  He has minimal pain on exam today  He may discontinue with use of the knee immobilizer  He will start progressively bearing more weight onto the right knee with use of crutches at first   Will give him a script for PT to begin strengthening in 4 weeks  Follow up as needed  Problem List Items Addressed This Visit    None  Visit Diagnoses     Periprosthetic fracture around internal prosthetic right knee joint, initial encounter    -  Primary    Relevant Orders    Ambulatory Referral to Physical Therapy         Subjective:     Patient ID: Gilles Long is a 49 y o  male  Chief Complaint:  HPI:  49year old patient presents for a follow up evaluation of right knee pain  Patient was seen in the ED on 11/14/2022 after sustained a fall on his right knee  Patient was ruled to have nondisplaced periprosthetic fracture of the distal femur  Patient was seen by Dr Danny Martinez in the ED and was placed in a knee immobilizer and instructed to remain non weight bearing for conservative treatment  Patient's TKA was done in 2005 by Jose Cruz Morton  At last visit his fracture was stable and showing signs of healing  He is here today for repeat xrays  He states that he has been compliant with knee immobilizer and NWB  He states today that his pain is back to his normal baseline pain and the knee is feeling well overall      Allergy:  Allergies   Allergen Reactions   • Penicillins Hives     Medications:  all current active meds have been reviewed  Past Medical History:  Past Medical History:   Diagnosis Date   • Diabetes mellitus ([facility] [address] ) • Hypertension      Past Surgical History:  Past Surgical History:   Procedure Laterality Date   • KNEE SURGERY       Family History:  History reviewed  No pertinent family history  Social History:  Social History     Substance and Sexual Activity   Alcohol Use Not Currently     Social History     Substance and Sexual Activity   Drug Use Yes   • Types: Marijuana     Social History     Tobacco Use   Smoking Status Never   Smokeless Tobacco Never           ROS:  General: Per HPI  Skin: Negative, except if noted below  HEENT: Negative  Respiratory: Negative  Cardiovascular: Negative  Gastrointestinal: Negative  Urinary: Negative  Vascular: Negative  Musculoskeletal: Positive per HPI   Neurologic: Positive per HPI  Endocrine: Negative    Objective:  BP Readings from Last 1 Encounters:   01/13/23 119/81      Wt Readings from Last 1 Encounters:   12/16/22 95 7 kg (211 lb)        Respiratory:   non-labored respirations    Lymphatics:  no palpable lymph nodes    Gait:   Non weightbearing     Neurologic:   Alert and oriented times 3  Patient with normal sensation except as noted below  Deep tendon reflexes 2+ except as noted in MSK exam    Bilateral Lower Extremity:    Right knee: Inspection: incision from previous TKA    Overall limb alignment normal    Effusion: none    ROM not tested, in full extension  Extensor Lag: none    Palpation: No significant TTP  AP Stability at 90 deg not tested    M/L stability in full extension stable with minimal pain    M/L stability in midflexion not tested    Motor: 5/5 IP/Q/HS/TA/GS    Pulses: 2+ DP / 2+ PT    SILT DP/SP/S/S/TN    Imaging:  My interpretation XR AP/lateral right femur: no further displacement of minimally displaced supracondylar distal femur fracture above a cemented PS TKA, mild extension of distal femur, increased callus formation noted        BMI:   Estimated body mass index is 24 38 kg/m² as calculated from the following:    Height as of 12/16/22: 6' 6" (1 981 m)  Weight as of 12/16/22: 95 7 kg (211 lb)  BSA:   Estimated body surface area is 2 31 meters squared as calculated from the following:    Height as of 12/16/22: 6' 6" (1 981 m)  Weight as of 12/16/22: 95 7 kg (211 lb)               Scribe Attestation    I,:  Elisabeth Ortiz PA-C am acting as a scribe while in the presence of the attending physician :       I,:  Oscar Clifton DO personally performed the services described in this documentation    as scribed in my presence :

## 2023-01-16 ENCOUNTER — PATIENT OUTREACH (OUTPATIENT)
Dept: CASE MANAGEMENT | Facility: OTHER | Age: 48
End: 2023-01-16

## 2023-01-16 NOTE — PROGRESS NOTES
Chart review reveals that patient completed his initial post-op visit with orthopedics on 12/16/22  Patient was again instructed on non-weight bearing status and use of knee immobilizer  Patient had a second post-op visit on 1/13/23 with orthopedics  Patient was instructed on partial weight bearing with the use of crutches  Knee immobilizer was discontinued  Patient was given a prescription for outpatient PT  Patient does not need formal follow up at this time  No PT visits are noted in Epic at this time  Chart review will continue until end of episode

## 2023-02-14 ENCOUNTER — PATIENT OUTREACH (OUTPATIENT)
Dept: CASE MANAGEMENT | Facility: OTHER | Age: 48
End: 2023-02-14

## 2023-02-14 NOTE — PROGRESS NOTES
Chart review reveals that patient has not completed any recent office visits, labs or imaging to review in [facility]  Care Everywhere was queried but no new information was noted  Patient does have a procedure scheduled at [facility] on 3/10/23  BPCI episode end  The episode has been resolved  I removed myself from the care team and the care coordination note has been updated

## 2023-05-17 ENCOUNTER — HOSPITAL ENCOUNTER (INPATIENT)
Facility: HOSPITAL | Age: 48
LOS: 1 days | Discharge: HOME | End: 2023-05-18
Attending: EMERGENCY MEDICINE | Admitting: INTERNAL MEDICINE

## 2023-05-17 ENCOUNTER — APPOINTMENT (EMERGENCY)
Dept: CT IMAGING | Facility: HOSPITAL | Age: 48
End: 2023-05-17

## 2023-05-17 ENCOUNTER — APPOINTMENT (EMERGENCY)
Dept: RADIOLOGY | Facility: HOSPITAL | Age: 48
End: 2023-05-17

## 2023-05-17 DIAGNOSIS — R93.89 ABNORMAL CT OF THE CHEST: ICD-10-CM

## 2023-05-17 DIAGNOSIS — R06.02 SHORTNESS OF BREATH: ICD-10-CM

## 2023-05-17 DIAGNOSIS — R55 SYNCOPE: Primary | ICD-10-CM

## 2023-05-17 DIAGNOSIS — R07.81 PLEURITIC CHEST PAIN: ICD-10-CM

## 2023-05-17 LAB
ALBUMIN SERPL BCP-MCNC: 4 G/DL (ref 3.5–5)
ALP SERPL-CCNC: 64 U/L (ref 34–104)
ALT SERPL W P-5'-P-CCNC: 21 U/L (ref 7–52)
ANION GAP SERPL CALCULATED.3IONS-SCNC: 9 MMOL/L (ref 4–13)
APTT PPP: 27 SECONDS (ref 23–37)
AST SERPL W P-5'-P-CCNC: 14 U/L (ref 13–39)
BASOPHILS # BLD AUTO: 0.05 THOUSANDS/ÂΜL (ref 0–0.1)
BASOPHILS NFR BLD AUTO: 1 % (ref 0–1)
BILIRUB SERPL-MCNC: 0.48 MG/DL (ref 0.2–1)
BNP SERPL-MCNC: 14 PG/ML (ref 0–100)
BUN SERPL-MCNC: 12 MG/DL (ref 5–25)
CALCIUM SERPL-MCNC: 10 MG/DL (ref 8.4–10.2)
CARDIAC TROPONIN I PNL SERPL HS: 4 NG/L
CHLORIDE SERPL-SCNC: 100 MMOL/L (ref 96–108)
CO2 SERPL-SCNC: 25 MMOL/L (ref 21–32)
CREAT SERPL-MCNC: 0.96 MG/DL (ref 0.6–1.3)
EOSINOPHIL # BLD AUTO: 0.07 THOUSAND/ÂΜL (ref 0–0.61)
EOSINOPHIL NFR BLD AUTO: 1 % (ref 0–6)
ERYTHROCYTE [DISTWIDTH] IN BLOOD BY AUTOMATED COUNT: 12.6 % (ref 11.6–15.1)
FLUAV RNA RESP QL NAA+PROBE: NEGATIVE
FLUBV RNA RESP QL NAA+PROBE: NEGATIVE
GFR SERPL CREATININE-BSD FRML MDRD: 92 ML/MIN/1.73SQ M
GLUCOSE SERPL-MCNC: 361 MG/DL (ref 65–140)
HCT VFR BLD AUTO: 44.2 % (ref 36.5–49.3)
HGB BLD-MCNC: 15.5 G/DL (ref 12–17)
IMM GRANULOCYTES # BLD AUTO: 0.02 THOUSAND/UL (ref 0–0.2)
IMM GRANULOCYTES NFR BLD AUTO: 0 % (ref 0–2)
INR PPP: 0.91 (ref 0.84–1.19)
LACTATE SERPL-SCNC: 1.2 MMOL/L (ref 0.5–2)
LYMPHOCYTES # BLD AUTO: 2.51 THOUSANDS/ÂΜL (ref 0.6–4.47)
LYMPHOCYTES NFR BLD AUTO: 32 % (ref 14–44)
MCH RBC QN AUTO: 29.9 PG (ref 26.8–34.3)
MCHC RBC AUTO-ENTMCNC: 35.1 G/DL (ref 31.4–37.4)
MCV RBC AUTO: 85 FL (ref 82–98)
MONOCYTES # BLD AUTO: 0.38 THOUSAND/ÂΜL (ref 0.17–1.22)
MONOCYTES NFR BLD AUTO: 5 % (ref 4–12)
NEUTROPHILS # BLD AUTO: 4.92 THOUSANDS/ÂΜL (ref 1.85–7.62)
NEUTS SEG NFR BLD AUTO: 61 % (ref 43–75)
NRBC BLD AUTO-RTO: 0 /100 WBCS
PLATELET # BLD AUTO: 265 THOUSANDS/UL (ref 149–390)
PMV BLD AUTO: 10.8 FL (ref 8.9–12.7)
POTASSIUM SERPL-SCNC: 4.1 MMOL/L (ref 3.5–5.3)
PROT SERPL-MCNC: 7 G/DL (ref 6.4–8.4)
PROTHROMBIN TIME: 12.9 SECONDS (ref 11.6–14.5)
RBC # BLD AUTO: 5.19 MILLION/UL (ref 3.88–5.62)
RSV RNA RESP QL NAA+PROBE: NEGATIVE
SARS-COV-2 RNA RESP QL NAA+PROBE: NEGATIVE
SODIUM SERPL-SCNC: 134 MMOL/L (ref 135–147)
TSH SERPL DL<=0.05 MIU/L-ACNC: 0.67 UIU/ML (ref 0.45–4.5)
WBC # BLD AUTO: 7.95 THOUSAND/UL (ref 4.31–10.16)

## 2023-05-17 RX ADMIN — SODIUM CHLORIDE 1000 ML: 0.9 INJECTION, SOLUTION INTRAVENOUS at 22:01

## 2023-05-17 RX ADMIN — IOHEXOL 50 ML: 350 INJECTION, SOLUTION INTRAVENOUS at 22:29

## 2023-05-17 NOTE — Clinical Note
accompanied Sam Ward to the emergency department on 5/17/2023  Return date if applicable: 48/26/9094        If you have any questions or concerns, please don't hesitate to call        Christine Edmond, DO

## 2023-05-18 ENCOUNTER — APPOINTMENT (EMERGENCY)
Dept: CT IMAGING | Facility: HOSPITAL | Age: 48
End: 2023-05-18

## 2023-05-18 ENCOUNTER — APPOINTMENT (OUTPATIENT)
Dept: NON INVASIVE DIAGNOSTICS | Facility: HOSPITAL | Age: 48
End: 2023-05-18

## 2023-05-18 VITALS
HEART RATE: 91 BPM | OXYGEN SATURATION: 98 % | BODY MASS INDEX: 24.3 KG/M2 | RESPIRATION RATE: 17 BRPM | WEIGHT: 210 LBS | SYSTOLIC BLOOD PRESSURE: 127 MMHG | TEMPERATURE: 96.2 F | DIASTOLIC BLOOD PRESSURE: 81 MMHG | HEIGHT: 78 IN

## 2023-05-18 PROBLEM — E27.9 ADRENAL NODULE (HCC): Status: ACTIVE | Noted: 2023-05-18

## 2023-05-18 PROBLEM — R55 SYNCOPE: Status: ACTIVE | Noted: 2023-05-18

## 2023-05-18 PROBLEM — R07.89 COSTOCHONDRAL CHEST PAIN: Status: ACTIVE | Noted: 2023-05-18

## 2023-05-18 PROBLEM — E27.8 ADRENAL NODULE (HCC): Status: ACTIVE | Noted: 2023-05-18

## 2023-05-18 LAB
2HR DELTA HS TROPONIN: -1 NG/L
ANION GAP SERPL CALCULATED.3IONS-SCNC: 5 MMOL/L (ref 4–13)
AORTIC ROOT: 3.7 CM
APICAL FOUR CHAMBER EJECTION FRACTION: 66 %
ASCENDING AORTA: 3.2 CM
BUN SERPL-MCNC: 10 MG/DL (ref 5–25)
CALCIUM SERPL-MCNC: 9.7 MG/DL (ref 8.4–10.2)
CARDIAC TROPONIN I PNL SERPL HS: 3 NG/L
CHLORIDE SERPL-SCNC: 103 MMOL/L (ref 96–108)
CO2 SERPL-SCNC: 28 MMOL/L (ref 21–32)
CREAT SERPL-MCNC: 0.87 MG/DL (ref 0.6–1.3)
DOP CALC LVOT AREA: 3.46 CM2
DOP CALC LVOT DIAMETER: 2.1 CM
E WAVE DECELERATION TIME: 132 MS
ERYTHROCYTE [DISTWIDTH] IN BLOOD BY AUTOMATED COUNT: 12.9 % (ref 11.6–15.1)
EST. AVERAGE GLUCOSE BLD GHB EST-MCNC: 220 MG/DL
EST. AVERAGE GLUCOSE BLD GHB EST-MCNC: 220 MG/DL
FRACTIONAL SHORTENING: 33 (ref 28–44)
GFR SERPL CREATININE-BSD FRML MDRD: 101 ML/MIN/1.73SQ M
GLUCOSE SERPL-MCNC: 170 MG/DL (ref 65–140)
GLUCOSE SERPL-MCNC: 243 MG/DL (ref 65–140)
GLUCOSE SERPL-MCNC: 245 MG/DL (ref 65–140)
GLUCOSE SERPL-MCNC: 259 MG/DL (ref 65–140)
GLUCOSE SERPL-MCNC: 329 MG/DL (ref 65–140)
HBA1C MFR BLD: 9.3 %
HBA1C MFR BLD: 9.3 %
HCT VFR BLD AUTO: 43.5 % (ref 36.5–49.3)
HGB BLD-MCNC: 15 G/DL (ref 12–17)
INTERVENTRICULAR SEPTUM IN DIASTOLE (PARASTERNAL SHORT AXIS VIEW): 1 CM
INTERVENTRICULAR SEPTUM: 1 CM (ref 0.6–1.1)
LAAS-AP2: 15.4 CM2
LAAS-AP4: 16.6 CM2
LEFT ATRIUM AREA SYSTOLE SINGLE PLANE A4C: 16 CM2
LEFT ATRIUM SIZE: 3.2 CM
LEFT INTERNAL DIMENSION IN SYSTOLE: 3.2 CM (ref 2.1–4)
LEFT VENTRICULAR INTERNAL DIMENSION IN DIASTOLE: 4.8 CM (ref 3.5–6)
LEFT VENTRICULAR POSTERIOR WALL IN END DIASTOLE: 0.9 CM
LEFT VENTRICULAR STROKE VOLUME: 67 ML
LVSV (TEICH): 67 ML
MCH RBC QN AUTO: 29.9 PG (ref 26.8–34.3)
MCHC RBC AUTO-ENTMCNC: 34.5 G/DL (ref 31.4–37.4)
MCV RBC AUTO: 87 FL (ref 82–98)
MV E'TISSUE VEL-SEP: 9 CM/S
MV PEAK A VEL: 0.83 M/S
MV PEAK E VEL: 61 CM/S
MV STENOSIS PRESSURE HALF TIME: 38 MS
MV VALVE AREA P 1/2 METHOD: 5.79
PLATELET # BLD AUTO: 255 THOUSANDS/UL (ref 149–390)
PMV BLD AUTO: 10.8 FL (ref 8.9–12.7)
POTASSIUM SERPL-SCNC: 4.2 MMOL/L (ref 3.5–5.3)
PROCALCITONIN SERPL-MCNC: 0.05 NG/ML
RBC # BLD AUTO: 5.02 MILLION/UL (ref 3.88–5.62)
RIGHT ATRIUM AREA SYSTOLE A4C: 19 CM2
RIGHT VENTRICLE ID DIMENSION: 4.1 CM
SL CV LEFT ATRIUM LENGTH A2C: 4.2 CM
SL CV LV EF: 55
SL CV PED ECHO LEFT VENTRICLE DIASTOLIC VOLUME (MOD BIPLANE) 2D: 109 ML
SL CV PED ECHO LEFT VENTRICLE SYSTOLIC VOLUME (MOD BIPLANE) 2D: 42 ML
SODIUM SERPL-SCNC: 136 MMOL/L (ref 135–147)
TRICUSPID ANNULAR PLANE SYSTOLIC EXCURSION: 2.1 CM
WBC # BLD AUTO: 6.93 THOUSAND/UL (ref 4.31–10.16)

## 2023-05-18 RX ORDER — AMLODIPINE BESYLATE 5 MG/1
5 TABLET ORAL DAILY
Status: DISCONTINUED | OUTPATIENT
Start: 2023-05-18 | End: 2023-05-18 | Stop reason: HOSPADM

## 2023-05-18 RX ORDER — DOCUSATE SODIUM 100 MG/1
300 CAPSULE, LIQUID FILLED ORAL 2 TIMES DAILY
Status: DISCONTINUED | OUTPATIENT
Start: 2023-05-18 | End: 2023-05-18 | Stop reason: HOSPADM

## 2023-05-18 RX ORDER — INSULIN LISPRO 100 [IU]/ML
2 INJECTION, SOLUTION INTRAVENOUS; SUBCUTANEOUS
Status: DISCONTINUED | OUTPATIENT
Start: 2023-05-18 | End: 2023-05-18 | Stop reason: HOSPADM

## 2023-05-18 RX ORDER — KETOROLAC TROMETHAMINE 30 MG/ML
15 INJECTION, SOLUTION INTRAMUSCULAR; INTRAVENOUS EVERY 6 HOURS PRN
Status: DISCONTINUED | OUTPATIENT
Start: 2023-05-18 | End: 2023-05-18 | Stop reason: HOSPADM

## 2023-05-18 RX ORDER — ONDANSETRON HYDROCHLORIDE 8 MG/1
TABLET, FILM COATED ORAL
COMMUNITY
Start: 2023-04-11

## 2023-05-18 RX ORDER — MORPHINE SULFATE 30 MG/1
60 TABLET, FILM COATED, EXTENDED RELEASE ORAL EVERY 12 HOURS PRN
Status: DISCONTINUED | OUTPATIENT
Start: 2023-05-18 | End: 2023-05-18 | Stop reason: HOSPADM

## 2023-05-18 RX ORDER — FENTANYL 75 UG/H
1 PATCH TRANSDERMAL
Status: DISCONTINUED | OUTPATIENT
Start: 2023-05-18 | End: 2023-05-18 | Stop reason: HOSPADM

## 2023-05-18 RX ORDER — SODIUM CHLORIDE, SODIUM GLUCONATE, SODIUM ACETATE, POTASSIUM CHLORIDE, MAGNESIUM CHLORIDE, SODIUM PHOSPHATE, DIBASIC, AND POTASSIUM PHOSPHATE .53; .5; .37; .037; .03; .012; .00082 G/100ML; G/100ML; G/100ML; G/100ML; G/100ML; G/100ML; G/100ML
100 INJECTION, SOLUTION INTRAVENOUS CONTINUOUS
Status: DISPENSED | OUTPATIENT
Start: 2023-05-18 | End: 2023-05-18

## 2023-05-18 RX ORDER — PANTOPRAZOLE SODIUM 20 MG/1
20 TABLET, DELAYED RELEASE ORAL
Status: DISCONTINUED | OUTPATIENT
Start: 2023-05-18 | End: 2023-05-18 | Stop reason: HOSPADM

## 2023-05-18 RX ORDER — FENTANYL 75 UG/H
1 PATCH TRANSDERMAL
COMMUNITY

## 2023-05-18 RX ORDER — CYCLOBENZAPRINE HCL 10 MG
10 TABLET ORAL 3 TIMES DAILY PRN
Status: DISCONTINUED | OUTPATIENT
Start: 2023-05-18 | End: 2023-05-18 | Stop reason: HOSPADM

## 2023-05-18 RX ORDER — MORPHINE SULFATE 30 MG/1
60 TABLET, FILM COATED, EXTENDED RELEASE ORAL ONCE
Status: DISCONTINUED | OUTPATIENT
Start: 2023-05-18 | End: 2023-05-18

## 2023-05-18 RX ORDER — ZOLPIDEM TARTRATE 5 MG/1
10 TABLET ORAL
Status: DISCONTINUED | OUTPATIENT
Start: 2023-05-18 | End: 2023-05-18 | Stop reason: HOSPADM

## 2023-05-18 RX ORDER — INSULIN LISPRO 100 [IU]/ML
1-5 INJECTION, SOLUTION INTRAVENOUS; SUBCUTANEOUS
Status: DISCONTINUED | OUTPATIENT
Start: 2023-05-18 | End: 2023-05-18 | Stop reason: HOSPADM

## 2023-05-18 RX ORDER — INSULIN LISPRO 100 [IU]/ML
1-6 INJECTION, SOLUTION INTRAVENOUS; SUBCUTANEOUS
Status: DISCONTINUED | OUTPATIENT
Start: 2023-05-18 | End: 2023-05-18 | Stop reason: HOSPADM

## 2023-05-18 RX ORDER — PRAVASTATIN SODIUM 80 MG/1
80 TABLET ORAL
Status: DISCONTINUED | OUTPATIENT
Start: 2023-05-18 | End: 2023-05-18 | Stop reason: HOSPADM

## 2023-05-18 RX ORDER — IBUPROFEN 800 MG/1
800 TABLET ORAL EVERY 6 HOURS PRN
COMMUNITY

## 2023-05-18 RX ORDER — ONDANSETRON 2 MG/ML
4 INJECTION INTRAMUSCULAR; INTRAVENOUS EVERY 6 HOURS PRN
Status: DISCONTINUED | OUTPATIENT
Start: 2023-05-18 | End: 2023-05-18 | Stop reason: HOSPADM

## 2023-05-18 RX ADMIN — INSULIN LISPRO 2 UNITS: 100 INJECTION, SOLUTION INTRAVENOUS; SUBCUTANEOUS at 07:28

## 2023-05-18 RX ADMIN — INSULIN LISPRO 5 UNITS: 100 INJECTION, SOLUTION INTRAVENOUS; SUBCUTANEOUS at 07:28

## 2023-05-18 RX ADMIN — INSULIN LISPRO 2 UNITS: 100 INJECTION, SOLUTION INTRAVENOUS; SUBCUTANEOUS at 04:29

## 2023-05-18 RX ADMIN — MORPHINE SULFATE 60 MG: 30 TABLET, EXTENDED RELEASE ORAL at 04:21

## 2023-05-18 RX ADMIN — PANTOPRAZOLE SODIUM 20 MG: 20 TABLET, DELAYED RELEASE ORAL at 04:49

## 2023-05-18 RX ADMIN — DOCUSATE SODIUM 300 MG: 100 CAPSULE, LIQUID FILLED ORAL at 08:36

## 2023-05-18 RX ADMIN — SODIUM CHLORIDE, SODIUM GLUCONATE, SODIUM ACETATE, POTASSIUM CHLORIDE, MAGNESIUM CHLORIDE, SODIUM PHOSPHATE, DIBASIC, AND POTASSIUM PHOSPHATE 100 ML/HR: .53; .5; .37; .037; .03; .012; .00082 INJECTION, SOLUTION INTRAVENOUS at 04:21

## 2023-05-18 RX ADMIN — PERFLUTREN 0.8 ML/MIN: 6.52 INJECTION, SUSPENSION INTRAVENOUS at 13:47

## 2023-05-18 RX ADMIN — KETOROLAC TROMETHAMINE 15 MG: 30 INJECTION, SOLUTION INTRAMUSCULAR; INTRAVENOUS at 04:21

## 2023-05-18 RX ADMIN — FENTANYL 1 PATCH: 75 PATCH TRANSDERMAL at 04:21

## 2023-05-18 RX ADMIN — INSULIN LISPRO 2 UNITS: 100 INJECTION, SOLUTION INTRAVENOUS; SUBCUTANEOUS at 11:29

## 2023-05-18 RX ADMIN — RISPERIDONE 3 MG: 2 TABLET ORAL at 04:49

## 2023-05-18 RX ADMIN — AMLODIPINE BESYLATE 5 MG: 5 TABLET ORAL at 08:36

## 2023-05-18 RX ADMIN — INSULIN LISPRO 1 UNITS: 100 INJECTION, SOLUTION INTRAVENOUS; SUBCUTANEOUS at 11:28

## 2023-05-18 RX ADMIN — B-COMPLEX W/ C & FOLIC ACID TAB 1 TABLET: TAB at 08:36

## 2023-05-18 NOTE — CASE MANAGEMENT
Case Management Assessment & Discharge Planning Note    Patient name Payton Lopez  Location /-62 MRN [id number]  : 1975 Date 2023       Current Admission Date: 2023  Current Admission Diagnosis:Syncope   Patient Active Problem List    Diagnosis Date Noted   • Syncope 2023   • Costochondral chest pain 2023   • Adrenal nodule ([facility] [address] ) 2023   • Acute on Chronic Right knee pain 11/15/2022   • Chronic, continuous use of opioids 11/15/2022   • Type 2 diabetes mellitus without complication, without long-term current use of insulin ([facility] [address] ) 11/15/2022   • Essential hypertension 11/15/2022   • SIRS (systemic inflammatory response syndrome) ([facility] [address] ) 11/15/2022   • Hypokalemia 11/15/2022   • Hypocalcemia 11/15/2022   • TBI (traumatic brain injury) ([facility] [address] ) 11/15/2022      LOS (days): 0  Geometric Mean LOS (GMLOS) (days):   Days to GMLOS:     OBJECTIVE:          Current admission status: Inpatient    Preferred Pharmacy:   [address] [place] [address]  Phone: [phone] Fax: [phone]    Primary Care Provider: Mónica Negrete MD    Primary Insurance: MEDICARE  Secondary Insurance:     ASSESSMENT:  [id number] Representative - Spouse   Primary Phone: [phone] (Mobile)               Advance Directives  Does patient have a [address] [place] [address] Avenue?: No  Was patient offered paperwork?: Yes (Declined)  Does patient currently have a Health Care decision maker?: Yes, please see Health Care Proxy section  Does patient have Advance Directives?: No  Was patient offered paperwork?: Yes (Declined)  Primary Contact: Joanne Lee: wife: [phone]    Readmission Root Cause  30 Day Readmission: No    Patient Information  Admitted from[de-identified] Home  Mental Status: Alert  During Assessment patient was accompanied by:  Son  Assessment information provided by[de-identified] Patient  Primary Caregiver: Self  Support Systems: Spouse/significant other, [address] of Residence: [address] [place] [address] do you live in?: [address] entry access options   Select all that apply : Stairs  Number of steps to enter home : 10  Do the steps have railings?: Yes  Type of Current Residence: [address] home  Upon entering residence, is there a bedroom on the main floor (no further steps)?: No  A bedroom is located on the following floor levels of residence (select all that apply):: 2nd Floor  Upon entering residence, is there a bathroom on the main floor (no further steps)?: Yes  Number of steps to 2nd floor from main floor: One Flight  In the last 12 months, was there a time when you were not able to pay the mortgage or rent on time?: No  In the last 12 months, how many places have you lived?: 1  In the last 12 months, was there a time when you did not have a steady place to sleep or slept in a shelter (including now)?: No  Homeless/housing insecurity resource given?: N/A  Living Arrangements: Lives w/ Spouse/significant other, Lives w/ Son  Is patient a ?: No    Activities of Daily Living Prior to Admission  Functional Status: Independent  Completes ADLs independently?: Yes  Ambulates independently?: Yes  Does patient use assisted devices?: Yes  Assisted Devices (DME) used: Crutches, Straight Cane, Walker  Does patient currently own DME?: Yes  What DME does the patient currently own?: Leah Munozs, Straight Cane  Does patient have a history of Outpatient Therapy (PT/OT)?: Yes  Does the patient have a history of Short-Term Rehab?: Yes  Does patient have a history of HHC?: Yes ([address])  Does patient currently have [address]?: No    Patient Information Continued  Income Source: SSI/SSD  Does patient have prescription coverage?: Yes  Within the past 12 months, you worried that your food would run out before you got the money to buy more : Never true  Within the past 12 months, the food you bought just didn't last and you didn't have money to get more : Never true  Food insecurity resource given?: N/A  Does patient receive dialysis treatments?: No  Does patient have a history of substance abuse?: No  Does patient have a history of Mental Health Diagnosis?: No    Means of Transportation  Means of Transport to Appts[de-identified] Drives Self  In the past 12 months, has lack of transportation kept you from medical appointments or from getting medications?: No  In the past 12 months, has lack of transportation kept you from meetings, work, or from getting things needed for daily living?: No  Was application for public transport provided?: N/A    DISCHARGE DETAILS:    Discharge planning discussed with[de-identified] patient  Freedom of Choice: Yes  Comments - Freedom of Choice: Pt plans to return home and does not anticipate discharge needs    Additional Comments: Met with Pt  Pt presents AA&Ox3  Pt's son at bedside  Discussed role of case management  Pt lives with wife and son in [place], [address] and 12 [address]pes to 2nd and 3rd floor  Hx of [facility] and Hx of acute rehab in past  Denies hx of MH/D&A  Drives  Uses canes, crutches, walker  Pt reports his plan is to return home and does not anticipate discharge needs  CM to follow

## 2023-05-18 NOTE — ASSESSMENT & PLAN NOTE
76-year-old male with history of TBI/multiple concussions, provoked VTE (in setting of surgery, 2006), migraines (on sumatriptan), chronic pain syndrome, HTN and DM with neuropathy who presented with sudden onset of substernal chest pain that worsened with deep inspiration and associated shallow breathing  Per medicine team, likely costochondritis  Patient also reports multiple episodes of syncope (lasting between 2-12 seconds) since onset of chest pain (see full HPI)  All syncopal episodes preceded by tunnel vision with no generalized tonic-clonic activity  There are also no events noted on telemetry during syncopal episodes while in the hospital     Workup:  - CTA chest PE study unremarkable for acute aneurysm or dissection  There was subtle scattered geographic groundglass opacities in the lower lobes that could be due to a nonspecific infectious/inflammatory process  - CT head unremarkable  - Orthostatic blood pressures negative, however patient did become tachycardic upon standing     - EKG revealed sinus tachycardia  - Labs on admission remarkable for hyperglycemia (361)  Troponins WNL  Etiology of multiple episodes of syncope likely related to stress/anxiety in the setting of chest pain and recent stressors in his life  No clinical suspicion for seizure activity    Plan:  - No need for AED initiation at this time  - No need for further neuroimaging/EEG at this time per attending neurologist  - [employer] form completed and filled out  Patient made aware they are not to drive at this time, he verbalized understanding    - Fall precautions   - Medical management and supportive care per primary team  Correction of any metabolic or infectious disturbances  Plan discussed with Attending Neurologist, please see attestation for further input/recommendations

## 2023-05-18 NOTE — ASSESSMENT & PLAN NOTE
· Home regimen: Fentanyl 75 mcg/h patch every 72 hours and morphine ER 60 Mg every 12 hours as needed with ibuprofen 800 mg twice daily with additional as needed dose if he takes as needed morphine  · Continue home fentanyl and morphine, ibuprofen held since utilizing Toradol

## 2023-05-18 NOTE — ASSESSMENT & PLAN NOTE
· Reports sudden onset pleuritic chest pain at 2030  · CT C/A/P negative for PE  · High sensitive troponins negative  · Suspect costochondritis as patient reports pain only with deep inspiration with significant improvement since arrival to the ED  · Trial Toradol
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
## 2023-05-18 NOTE — ED PROVIDER NOTES
History  Chief Complaint   Patient presents with   • Chest Pain     Pt reports chest pressure since 8:30pm  With some sob  Hx TBI, PE  Wife reports pt was dizzy when episode occured     Patient is a 69-year-old male who presents with chest pain and shortness of breath that started at 8:30 PM today  Patient states that he suddenly felt like he was having shortness of breath, unable to take a deep breath and developed pain when taking a deep breath in the anterior aspect of the chest   He denies constant chest pain, but states that he needs to take shallow breaths in order to avoid exacerbating the pain he feels when he takes a deep breath  Describes the pain as sharp, intermittent, nonradiating  States that while at home in the bed, he became lightheaded, got tunnel vision, and then passed out  Did not hit his head  Prior to Admission Medications   Prescriptions Last Dose Informant Patient Reported? Taking?    Morphine Sulfate ER 15 MG T12A 5/17/2023  Yes Yes   Sig: Take 60 mg by mouth every 12 (twelve) hours as needed   SUMAtriptan (IMITREX) 100 mg tablet Past Month  Yes Yes   Sig: Take 100 mg by mouth   amLODIPine (NORVASC) 5 mg tablet 5/17/2023  Yes Yes   Sig: Take 5 mg by mouth daily   b complex vitamins capsule 5/17/2023  Yes Yes   Sig: Take 1 capsule by mouth daily   cyclobenzaprine (FLEXERIL) 10 mg tablet 5/17/2023  Yes Yes   Sig: Take 10 mg by mouth 3 (three) times a day as needed   docusate sodium (COLACE) 250 MG capsule 5/17/2023  Yes Yes   Sig: Take 750 mg by mouth 2 (two) times a day   esomeprazole (NexIUM) 20 mg capsule 5/17/2023  Yes Yes   Sig: Take 20 mg by mouth 2 (two) times a day before meals   fentaNYL (DURAGESIC) 75 mcg/hr 5/14/2023  Yes Yes   Sig: Place 1 patch on the skin every third day   glimepiride (AMARYL) 2 mg tablet 5/17/2023  Yes Yes   Sig: Take 2 mg by mouth 4 (four) times a day   ibuprofen (MOTRIN) 800 mg tablet 5/17/2023  Yes Yes   Sig: Take 800 mg by mouth every 6 (six) hours as needed for mild pain BID standing, and PRN with morphine if needed   lisinopril-hydrochlorothiazide (PRINZIDE,ZESTORETIC) 20-25 MG per tablet Past Week  Yes Yes   Sig: Take 1 tablet by mouth daily   metFORMIN (GLUCOPHAGE) 500 mg tablet 5/17/2023  Yes Yes   Sig: Take 500 mg by mouth 2 (two) times a day with meals   ondansetron (ZOFRAN) 8 mg tablet   Yes No   Sig: TAKE 1 TABLET (8 MG) BY MOUTH 3 TIMES PER DAY AS NEEDED   risperiDONE (RisperDAL) 3 mg tablet Past Week  Yes Yes   Sig: Take 3 mg by mouth daily   simvastatin (ZOCOR) 40 mg tablet 5/17/2023  Yes Yes   Sig: Take 40 mg by mouth daily   zolpidem (AMBIEN) 10 mg tablet Past Week  Yes Yes   Sig: Take 10 mg by mouth      Facility-Administered Medications: None       Past Medical History:   Diagnosis Date   • Diabetes mellitus ([facility] [address] )    • Hypertension        Past Surgical History:   Procedure Laterality Date   • KNEE SURGERY         History reviewed  No pertinent family history  I have reviewed and agree with the history as documented  E-Cigarette/Vaping   • E-Cigarette Use Never User      E-Cigarette/Vaping Substances   • Nicotine No    • THC Yes    • CBD Yes    • Flavoring Yes    • Other No    • Unknown No      Social History     Tobacco Use   • Smoking status: Never   • Smokeless tobacco: Never   Vaping Use   • Vaping Use: Never used   Substance Use Topics   • Alcohol use: Not Currently   • Drug use: Yes     Types: Marijuana       Review of Systems   Constitutional: Negative for chills and fever  Respiratory: Positive for shortness of breath  Negative for cough, chest tightness, wheezing and stridor  Cardiovascular: Positive for chest pain  Negative for palpitations and leg swelling  Gastrointestinal: Negative for abdominal pain, constipation, diarrhea, nausea and vomiting  Musculoskeletal: Negative for back pain  Neurological: Positive for light-headedness  Negative for dizziness, weakness, numbness and headaches         Physical Exam  Physical Exam  Vitals and nursing note reviewed  Constitutional:       General: He is not in acute distress  Appearance: Normal appearance  He is not ill-appearing, toxic-appearing or diaphoretic  HENT:      Head: Normocephalic and atraumatic  Mouth/Throat:      Mouth: Mucous membranes are moist    Eyes:      Conjunctiva/sclera: Conjunctivae normal       Pupils: Pupils are equal, round, and reactive to light  Cardiovascular:      Rate and Rhythm: Regular rhythm  Tachycardia present  Pulses: Normal pulses  Heart sounds: Normal heart sounds  No murmur heard  Pulmonary:      Effort: Pulmonary effort is normal  Tachypnea present  No respiratory distress  Breath sounds: Normal breath sounds  No stridor  No wheezing, rhonchi or rales  Chest:      Chest wall: No tenderness  Abdominal:      General: Bowel sounds are normal  There is no distension  Palpations: Abdomen is soft  Tenderness: There is no abdominal tenderness  There is no guarding or rebound  Musculoskeletal:      Cervical back: Neck supple  Right lower leg: No edema  Left lower leg: No edema  Skin:     General: Skin is warm and dry  Neurological:      General: No focal deficit present  Mental Status: He is alert and oriented to person, place, and time  Mental status is at baseline  Psychiatric:         Mood and Affect: Mood is anxious           Vital Signs  ED Triage Vitals   Temperature Pulse Respirations Blood Pressure SpO2   05/17/23 2137 05/17/23 2135 05/17/23 2135 05/17/23 2135 05/17/23 2135   97 6 °F (36 4 °C) (!) 127 20 129/91 98 %      Temp Source Heart Rate Source Patient Position - Orthostatic VS BP Location FiO2 (%)   05/17/23 2137 05/17/23 2135 05/17/23 2135 05/17/23 2135 --   Oral Monitor Sitting Left arm       Pain Score       05/17/23 2135       2           Vitals:    05/18/23 0200 05/18/23 0304 05/18/23 0716 05/18/23 0839   BP: 127/85 130/85 129/85    Pulse: 104 91 84 84 Patient Position - Orthostatic VS: Lying            Visual Acuity      ED Medications  Medications   amLODIPine (NORVASC) tablet 5 mg (5 mg Oral Given 5/18/23 0836)   multivitamin stress formula tablet 1 tablet (1 tablet Oral Given 5/18/23 0836)   docusate sodium (COLACE) capsule 300 mg (300 mg Oral Given 5/18/23 0836)   pantoprazole (PROTONIX) EC tablet 20 mg ( Oral Canceled Entry 5/18/23 0600)   fentaNYL (DURAGESIC) 75 mcg/hr TD 72 hr patch 1 patch (1 patch Transdermal Medication Applied 5/18/23 0421)   risperiDONE (RisperDAL) tablet 3 mg (3 mg Oral Given 5/18/23 0449)   pravastatin (PRAVACHOL) tablet 80 mg (has no administration in time range)   cyclobenzaprine (FLEXERIL) tablet 10 mg (has no administration in time range)   morphine (MS CONTIN) ER tablet 60 mg (60 mg Oral Given 5/18/23 0421)   zolpidem (AMBIEN) tablet 10 mg (has no administration in time range)   multi-electrolyte (PLASMALYTE-A/ISOLYTE-S PH 7 4) IV solution (100 mL/hr Intravenous New Bag 5/18/23 0421)   ondansetron (ZOFRAN) injection 4 mg (has no administration in time range)   ketorolac (TORADOL) injection 15 mg (15 mg Intravenous Given 5/18/23 0421)   insulin lispro (HumaLOG) 100 units/mL subcutaneous injection 2 Units (2 Units Subcutaneous Given 5/18/23 1129)   insulin lispro (HumaLOG) 100 units/mL subcutaneous injection 1-6 Units (1 Units Subcutaneous Given 5/18/23 1128)   insulin lispro (HumaLOG) 100 units/mL subcutaneous injection 1-5 Units (2 Units Subcutaneous Given 5/18/23 0429)   sodium chloride 0 9 % bolus 1,000 mL (0 mL Intravenous Stopped 5/17/23 2301)   iohexol (OMNIPAQUE) 350 MG/ML injection (MULTI-DOSE) 50 mL (50 mL Intravenous Given 5/17/23 2229)       Diagnostic Studies  Results Reviewed     Procedure Component Value Units Date/Time    HS Troponin I 2hr [[id number]]  (Normal) Collected: 05/18/23 0020    Lab Status: Final result Specimen: Blood from Arm, Right Updated: 05/18/23 0050     hs TnI 2hr 3 ng/L      Delta 2hr hsTnI -1 ng/L     FLU/RSV/COVID - if FLU/RSV clinically relevant [[id number]]  (Normal) Collected: 05/17/23 2158    Lab Status: Final result Specimen: Nares from Nose Updated: 05/17/23 2243     SARS-CoV-2 Negative     INFLUENZA A PCR Negative     INFLUENZA B PCR Negative     RSV PCR Negative    Narrative:      FOR PEDIATRIC PATIENTS - copy/paste COVID Guidelines URL to browser: [email or web address][email or web address]  [email or web address]    SARS-CoV-2 assay is a Nucleic Acid Amplification assay intended for the  qualitative detection of nucleic acid from SARS-CoV-2 in nasopharyngeal  swabs  Results are for the presumptive identification of SARS-CoV-2 RNA  Positive results are indicative of infection with SARS-CoV-2, the virus  causing COVID-19, but do not rule out bacterial infection or co-infection  with other viruses  Laboratories within the [place] and its  territories are required to report all positive results to the appropriate  public health authorities  Negative results do not preclude SARS-CoV-2  infection and should not be used as the sole basis for treatment or other  patient management decisions  Negative results must be combined with  clinical observations, patient history, and epidemiological information  This test has not been FDA cleared or approved  This test has been authorized by FDA under an Emergency Use Authorization  (EUA)  This test is only authorized for the duration of time the  declaration that circumstances exist justifying the authorization of the  emergency use of an in vitro diagnostic tests for detection of SARS-CoV-2  virus and/or diagnosis of COVID-19 infection under section 564(b)(1) of  the Act, 21 U  S C  363HLD-6(U)(1), unless the authorization is terminated  or revoked sooner  The test has been validated but independent review by FDA  and CLIA is pending  Test performed using [employer]: This RT-PCR assay targets N2,  a region unique to SARS-CoV-2   A conserved region in the E-gene was chosen  for pan-Sarbecovirus detection which includes SARS-CoV-2  According to CMS-2020-01-R, this platform meets the definition of high-throughput technology  TSH, 3rd generation with Free T4 reflex [[id number]]  (Normal) Collected: 05/17/23 2158    Lab Status: Final result Specimen: Blood from Arm, Right Updated: 05/17/23 2243     TSH 3RD GENERATON 0 671 uIU/mL     B-Type Natriuretic Peptide(BNP) [[id number]]  (Normal) Collected: 05/17/23 2206    Lab Status: Final result Specimen: Blood from Arm, Right Updated: 05/17/23 2236     BNP 14 pg/mL     HS Troponin 0hr (reflex protocol) [[id number]]  (Normal) Collected: 05/17/23 2158    Lab Status: Final result Specimen: Blood from Arm, Right Updated: 05/17/23 2234     hs TnI 0hr 4 ng/L     Lactic acid, plasma (w/reflex if result > 2 0) [[id number]]  (Normal) Collected: 05/17/23 2206    Lab Status: Final result Specimen: Blood from Arm, Right Updated: 05/17/23 2231     LACTIC ACID 1 2 mmol/L     Narrative:      Result may be elevated if tourniquet was used during collection      Comprehensive metabolic panel [[id number]]  (Abnormal) Collected: 05/17/23 2158    Lab Status: Final result Specimen: Blood from Arm, Right Updated: 05/17/23 2228     Sodium 134 mmol/L      Potassium 4 1 mmol/L      Chloride 100 mmol/L      CO2 25 mmol/L      ANION GAP 9 mmol/L      BUN 12 mg/dL      Creatinine 0 96 mg/dL      Glucose 361 mg/dL      Calcium 10 0 mg/dL      AST 14 U/L      ALT 21 U/L      Alkaline Phosphatase 64 U/L      Total Protein 7 0 g/dL      Albumin 4 0 g/dL      Total Bilirubin 0 48 mg/dL      eGFR 92 ml/min/1 73sq m     Narrative:      Deb guidelines for Chronic Kidney Disease (CKD):   •  Stage 1 with normal or high GFR (GFR > 90 mL/min/1 73 square meters)  •  Stage 2 Mild CKD (GFR = 60-89 mL/min/1 73 square meters)  •  Stage 3A Moderate CKD (GFR = 45-59 mL/min/1 73 square meters)  •  Stage 3B Moderate CKD (GFR = 30-44 mL/min/1 73 square meters)  •  Stage 4 Severe CKD (GFR = 15-29 mL/min/1 73 square meters)  •  Stage 5 End Stage CKD (GFR <15 mL/min/1 73 square meters)  Note: GFR calculation is accurate only with a steady state creatinine    Protime-INR [[id number]]  (Normal) Collected: 05/17/23 2158    Lab Status: Final result Specimen: Blood from Arm, Right Updated: 05/17/23 2220     Protime 12 9 seconds      INR 0 91    APTT [[id number]]  (Normal) Collected: 05/17/23 2158    Lab Status: Final result Specimen: Blood from Arm, Right Updated: 05/17/23 2220     PTT 27 seconds     CBC and differential [[id number]] Collected: 05/17/23 2158    Lab Status: Final result Specimen: Blood from Arm, Right Updated: 05/17/23 2208     WBC 7 95 Thousand/uL      RBC 5 19 Million/uL      Hemoglobin 15 5 g/dL      Hematocrit 44 2 %      MCV 85 fL      MCH 29 9 pg      MCHC 35 1 g/dL      RDW 12 6 %      MPV 10 8 fL      Platelets 621 Thousands/uL      nRBC 0 /100 WBCs      Neutrophils Relative 61 %      Immat GRANS % 0 %      Lymphocytes Relative 32 %      Monocytes Relative 5 %      Eosinophils Relative 1 %      Basophils Relative 1 %      Neutrophils Absolute 4 92 Thousands/µL      Immature Grans Absolute 0 02 Thousand/uL      Lymphocytes Absolute 2 51 Thousands/µL      Monocytes Absolute 0 38 Thousand/µL      Eosinophils Absolute 0 07 Thousand/µL      Basophils Absolute 0 05 Thousands/µL                  CT head without contrast   Final Result by Russ Mathias MD (05/18 0725)      No acute intracranial abnormality  Workstation performed: [id number]         CTA ED chest PE Study   Final Result by John Nagy MD (05/17 3393)      1  No acute pulm embolism or thoracic aortic aneurysm/dissection  2   Subtle scattered geographic groundglass opacities in the lower lobes could be due to a nonspecific infectious/inflammatory process of the lung parenchyma, recommend clinical correlation   No pneumothorax, lobar airspace consolidation, suspicious    pulmonary mass, or pleural effusions  3   Ancillary findings of the upper abdomen as detailed above  Workstation performed: [id number]         XR chest 1 view portable   ED Interpretation by Praveen Pacheco DO (05/17 2157)   No acute abnormalities as interpreted by me independently      Final Result by Sue Pina MD (05/18 6046)   No acute cardiopulmonary findings  Workstation performed: [id number]                    Procedures  ECG 12 Lead Documentation Only    Date/Time: 5/17/2023 9:36 PM  Performed by: Praveen Pacheco DO  Authorized by: Praveen Pacheco DO     Indications / Diagnosis:  Cp, sob  Patient location:  ED  Previous ECG:     Previous ECG:  Unavailable  Interpretation:     Interpretation: normal    Rate:     ECG rate:  130    ECG rate assessment: tachycardic    Rhythm:     Rhythm: sinus tachycardia    Ectopy:     Ectopy: none    QRS:     QRS axis:  Normal    QRS intervals:  Normal  Conduction:     Conduction: normal    ST segments:     ST segments:  Normal  T waves:     T waves: normal    Comments:                     ED Course  ED Course as of 05/18/23 1238   Thu May 18, 2023   0031 Went into discussed CTA findings with the patient and his wife at bedside  As patient has no cough, congestion, fevers, chills, at this time do not feel that antibiotics are required for the potential infectious/inflammatory groundglass opacities  COVID is negative  Patient and his wife did tell me that while in the ER room, the patient had another syncopal episode where he was talking to her, describes feeling lightheaded, getting tunnel vision and then he went out for 30 seconds  Wife shook him and then he came to  There was no shaking activity, loss of bladder control or tongue biting  As this is patient's second episode of syncope today, would like to admit the patient under observation and keep him on telemetry     6220 Patient felt lightheaded, got tunnel vision and wife reports that he lost consciousness for another 30 to 45 seconds  They bust for the nurse who got me, by the time that I arrived in the room, the patient was conscious, slightly confused trying to figure out what happened  Telemetry reviewed and did not show any arrhythmia or abnormalities  Repeat EKG ordered  0053 Repeat EKG unchanged  Repeat troponin normal   0103 Patient is now stating that he is unsure if he may have hit his head during the syncopal episode at home  As such, will order a CT head to rule out intracranial bleeding  0104 Patient care signed out to Dr Dena Olszewski  Patient is pending CT head  If negative, will need to be admitted for obs/tele due to syncope    0108 Patient take 60mg morphine at home  Did not get his evening dose  Ordered it now  SBIRT 22yo+    Flowsheet Row Most Recent Value   Initial Alcohol Screen: US AUDIT-C     1  How often do you have a drink containing alcohol? 0 Filed at: 05/17/2023 2135   2  How many drinks containing alcohol do you have on a typical day you are drinking? 0 Filed at: 05/17/2023 2135   3a  Male UNDER 65: How often do you have five or more drinks on one occasion? 0 Filed at: 05/17/2023 2135   3b  FEMALE Any Age, or MALE 65+: How often do you have 4 or more drinks on one occassion? 0 Filed at: 05/17/2023 2135   Audit-C Score 0 Filed at: 05/17/2023 2135   JENAE: How many times in the past year have you    Used an illegal drug or used a prescription medication for non-medical reasons? Never Filed at: 05/17/2023 2135                    Medical Decision Making    Assessment and plan:  Differential includes PE versus pleuritic chest pain versus ACS versus musculoskeletal chest pain versus pericarditis    Will get chest x-ray to evaluate for widened mediastinum, EKG/troponin to evaluate for arrhythmia/ischemia; labs to evaluate for electrode abnormalities, anemia, kidney and liver function; CTA dissection study to evaluate for PE  Reports that he had a PE years ago in the setting of knee operation, but he has not been on anticoagulation for years  Review of medical records from discharge from November 2022 shows that the patient has a past medical history significant for hypertension, diabetes, chronic opioid use for chronic knee pain; traumatic brain injury with some memory issues  Amount and/or Complexity of Data Reviewed  Labs: ordered  Radiology: ordered and independent interpretation performed  Risk  Prescription drug management  Decision regarding hospitalization  Disposition  Final diagnoses:   Syncope   Pleuritic chest pain   Shortness of breath   Abnormal CT of the chest     Time reflects when diagnosis was documented in both MDM as applicable and the Disposition within this note     Time User Action Codes Description Comment    5/18/2023 12:11 AM Tristan [place] Add [R55] Syncope     5/18/2023 12:11 AM Tristan [place] Add [R07 81] Pleuritic chest pain     5/18/2023 12:11 AM Tristan [place] Add [R06 02] Shortness of breath     5/18/2023 12:18 AM Tristan [place] Add [R93 89] Abnormal CT of the chest       ED Disposition     ED Disposition   Admit    Condition   Stable    Date/Time   Thu May 18, 2023 12:11 AM    Comment   Case was discussed with hospitalist and the patient's admission status was agreed to be Admission Status: observation status to the service of Dr Adan De Dios              Follow-up Information    None         Current Discharge Medication List      CONTINUE these medications which have NOT CHANGED    Details   amLODIPine (NORVASC) 5 mg tablet Take 5 mg by mouth daily      b complex vitamins capsule Take 1 capsule by mouth daily      cyclobenzaprine (FLEXERIL) 10 mg tablet Take 10 mg by mouth 3 (three) times a day as needed      docusate sodium (COLACE) 250 MG capsule Take 750 mg by mouth 2 (two) times a day      esomeprazole (NexIUM) 20 mg capsule Take 20 mg by mouth 2 (two) times a day before meals      fentaNYL (DURAGESIC) 75 mcg/hr Place 1 patch on the skin every third day      glimepiride (AMARYL) 2 mg tablet Take 2 mg by mouth 4 (four) times a day      ibuprofen (MOTRIN) 800 mg tablet Take 800 mg by mouth every 6 (six) hours as needed for mild pain BID standing, and PRN with morphine if needed      lisinopril-hydrochlorothiazide (PRINZIDE,ZESTORETIC) 20-25 MG per tablet Take 1 tablet by mouth daily      metFORMIN (GLUCOPHAGE) 500 mg tablet Take 500 mg by mouth 2 (two) times a day with meals      Morphine Sulfate ER 15 MG T12A Take 60 mg by mouth every 12 (twelve) hours as needed      risperiDONE (RisperDAL) 3 mg tablet Take 3 mg by mouth daily      simvastatin (ZOCOR) 40 mg tablet Take 40 mg by mouth daily      SUMAtriptan (IMITREX) 100 mg tablet Take 100 mg by mouth      zolpidem (AMBIEN) 10 mg tablet Take 10 mg by mouth      ondansetron (ZOFRAN) 8 mg tablet TAKE 1 TABLET (8 MG) BY MOUTH 3 TIMES PER DAY AS NEEDED             No discharge procedures on file      PDMP Review       Value Time User    PDMP Reviewed  Yes 11/15/2022 12:06 AM Dez Queen PA-C          ED Provider  Electronically Signed by           Alejo Willis DO  05/18/23 0099

## 2023-05-18 NOTE — PLAN OF CARE
Problem: MOBILITY - ADULT  Goal: Maintain or return to baseline ADL function  Description: INTERVENTIONS:  -  Assess patient's ability to carry out ADLs; assess patient's baseline for ADL function and identify physical deficits which impact ability to perform ADLs (bathing, care of mouth/teeth, toileting, grooming, dressing, etc )  - Assess/evaluate cause of self-care deficits   - Assess range of motion  - Assess patient's mobility; develop plan if impaired  - Assess patient's need for assistive devices and provide as appropriate  - Encourage maximum independence but intervene and supervise when necessary  - Involve family in performance of ADLs  - Assess for home care needs following discharge   - Consider OT consult to assist with ADL evaluation and planning for discharge  - Provide patient education as appropriate  Outcome: Progressing  Goal: Maintains/Returns to pre admission functional level  Description: INTERVENTIONS:  - Perform BMAT or MOVE assessment daily    - Set and communicate daily mobility goal to care team and patient/family/caregiver  - Collaborate with rehabilitation services on mobility goals if consulted  - Perform Range of Motion x times a day  - Reposition patient every x hours    - Dangle patient x times a day  - Stand patient x times a day  - Ambulate patient x times a day  - Out of bed to chair x times a day   - Out of bed for meals x times a day  - Out of bed for toileting  - Record patient progress and toleration of activity level   Outcome: Progressing     Problem: PAIN - ADULT  Goal: Verbalizes/displays adequate comfort level or baseline comfort level  Description: Interventions:  - Encourage patient to monitor pain and request assistance  - Assess pain using appropriate pain scale  - Administer analgesics based on type and severity of pain and evaluate response  - Implement non-pharmacological measures as appropriate and evaluate response  - Consider cultural and social influences on pain and pain management  - Notify physician/advanced practitioner if interventions unsuccessful or patient reports new pain  Outcome: Progressing     Problem: INFECTION - ADULT  Goal: Absence or prevention of progression during hospitalization  Description: INTERVENTIONS:  - Assess and monitor for signs and symptoms of infection  - Monitor lab/diagnostic results  - Monitor all insertion sites, i e  indwelling lines, tubes, and drains  - Monitor endotracheal if appropriate and nasal secretions for changes in amount and color  - [place] appropriate cooling/warming therapies per order  - Administer medications as ordered  - Instruct and encourage patient and family to use good hand hygiene technique  - Identify and instruct in appropriate isolation precautions for identified infection/condition  Outcome: Progressing  Goal: Absence of fever/infection during neutropenic period  Description: INTERVENTIONS:  - Monitor WBC    Outcome: Progressing     Problem: SAFETY ADULT  Goal: Maintain or return to baseline ADL function  Description: INTERVENTIONS:  -  Assess patient's ability to carry out ADLs; assess patient's baseline for ADL function and identify physical deficits which impact ability to perform ADLs (bathing, care of mouth/teeth, toileting, grooming, dressing, etc )  - Assess/evaluate cause of self-care deficits   - Assess range of motion  - Assess patient's mobility; develop plan if impaired  - Assess patient's need for assistive devices and provide as appropriate  - Encourage maximum independence but intervene and supervise when necessary  - Involve family in performance of ADLs  - Assess for home care needs following discharge   - Consider OT consult to assist with ADL evaluation and planning for discharge  - Provide patient education as appropriate  Outcome: Progressing  Goal: Patient will remain free of falls  Description: INTERVENTIONS:  - Educate patient/family on patient safety including physical limitations  - Instruct patient to call for assistance with activity   - Consult OT/PT to assist with strengthening/mobility   - Keep Call bell within reach  - Keep bed low and locked with side rails adjusted as appropriate  - Keep care items and personal belongings within reach  - Initiate and maintain comfort rounds  - Make Fall Risk Sign visible to staff  - Offer Toileting every x Hours, in advance of need  - Initiate/Maintain xalarm  - Obtain necessary fall risk management equipment: x  - Apply yellow socks and bracelet for high fall risk patients  - Consider moving patient to room near nurses station  Outcome: Progressing     Problem: DISCHARGE PLANNING  Goal: Discharge to home or other facility with appropriate resources  Description: INTERVENTIONS:  - Identify barriers to discharge w/patient and caregiver  - Arrange for needed discharge resources and transportation as appropriate  - Identify discharge learning needs (meds, wound care, etc )  - Arrange for interpretive services to assist at discharge as needed  - Refer to Case Management Department for coordinating discharge planning if the patient needs post-hospital services based on physician/advanced practitioner order or complex needs related to functional status, cognitive ability, or social support system  Outcome: Progressing     Problem: Knowledge Deficit  Goal: Patient/family/caregiver demonstrates understanding of disease process, treatment plan, medications, and discharge instructions  Description: Complete learning assessment and assess knowledge base  Interventions:  - Provide teaching at level of understanding  - Provide teaching via preferred learning methods  Outcome: Progressing     Problem: Nutrition/Hydration-ADULT  Goal: Nutrient/Hydration intake appropriate for improving, restoring or maintaining nutritional needs  Description: Monitor and assess patient's nutrition/hydration status for malnutrition   Collaborate with interdisciplinary team and initiate plan and interventions as ordered  Monitor patient's weight and dietary intake as ordered or per policy  Utilize nutrition screening tool and intervene as necessary  Determine patient's food preferences and provide high-protein, high-caloric foods as appropriate       INTERVENTIONS:  - Monitor oral intake, urinary output, labs, and treatment plans  - Assess nutrition and hydration status and recommend course of action  - Evaluate amount of meals eaten  - Assist patient with eating if necessary   - Allow adequate time for meals  - Recommend/ encourage appropriate diets, oral nutritional supplements, and vitamin/mineral supplements  - Order, calculate, and assess calorie counts as needed  - Recommend, monitor, and adjust tube feedings and TPN/PPN based on assessed needs  - Assess need for intravenous fluids  - Provide specific nutrition/hydration education as appropriate  - Include patient/family/caregiver in decisions related to nutrition  Outcome: Progressing     Problem: NEUROSENSORY - ADULT  Goal: Achieves stable or improved neurological status  Description: INTERVENTIONS  - Monitor and report changes in neurological status  - Monitor vital signs such as temperature, blood pressure, glucose, and any other labs ordered   - Initiate measures to prevent increased intracranial pressure  - Monitor for seizure activity and implement precautions if appropriate      Outcome: Progressing  Goal: Remains free of injury related to seizures activity  Description: INTERVENTIONS  - Maintain airway, patient safety  and administer oxygen as ordered  - Monitor patient for seizure activity, document and report duration and description of seizure to physician/advanced practitioner  - If seizure occurs,  ensure patient safety during seizure  - Reorient patient post seizure  - Seizure pads on all 4 side rails  - Instruct patient/family to notify RN of any seizure activity including if an aura is experienced  - Instruct patient/family to call for assistance with activity based on nursing assessment  - Administer anti-seizure medications if ordered    Outcome: Progressing     Problem: CARDIOVASCULAR - ADULT  Goal: Maintains optimal cardiac output and hemodynamic stability  Description: INTERVENTIONS:  - Monitor I/O, vital signs and rhythm  - Monitor for S/S and trends of decreased cardiac output  - Administer and titrate ordered vasoactive medications to optimize hemodynamic stability  - Assess quality of pulses, skin color and temperature  - Assess for signs of decreased coronary artery perfusion  - Instruct patient to report change in severity of symptoms  Outcome: Progressing  Goal: Absence of cardiac dysrhythmias or at baseline rhythm  Description: INTERVENTIONS:  - Continuous cardiac monitoring, vital signs, obtain 12 lead EKG if ordered  - Administer antiarrhythmic and heart rate control medications as ordered  - Monitor electrolytes and administer replacement therapy as ordered  Outcome: Progressing     Problem: METABOLIC, FLUID AND ELECTROLYTES - ADULT  Goal: Electrolytes maintained within normal limits  Description: INTERVENTIONS:  - Monitor labs and assess patient for signs and symptoms of electrolyte imbalances  - Administer electrolyte replacement as ordered  - Monitor response to electrolyte replacements, including repeat lab results as appropriate  - Instruct patient on fluid and nutrition as appropriate  Outcome: Progressing  Goal: Fluid balance maintained  Description: INTERVENTIONS:  - Monitor labs   - Monitor I/O and WT  - Instruct patient on fluid and nutrition as appropriate  - Assess for signs & symptoms of volume excess or deficit  Outcome: Progressing  Goal: Glucose maintained within target range  Description: INTERVENTIONS:  - Monitor Blood Glucose as ordered  - Assess for signs and symptoms of hyperglycemia and hypoglycemia  - Administer ordered medications to maintain glucose within target range  - Assess nutritional intake and initiate nutrition service referral as needed  Outcome: Progressing     Problem: SKIN/TISSUE INTEGRITY - ADULT  Goal: Skin Integrity remains intact(Skin Breakdown Prevention)  Description: Assess:  -Perform Girma assessment every x  -Clean and moisturize skin every x  -Inspect skin when repositioning, toileting, and assisting with ADLS  -Assess under medical devices such as x every x  -Assess extremities for adequate circulation and sensation     Bed Management:  -Have minimal linens on bed & keep smooth, unwrinkled  -Change linens as needed when moist or perspiring  -Avoid sitting or lying in one position for more than x hours while in bed  -Keep HOB at [facility]     Toileting:  -Offer bedside commode  -Assess for incontinence every x  -Use incontinent care products after each incontinent episode such as x    Activity:  -Mobilize patient x times a day  -Encourage activity and walks on unit  -Encourage or provide ROM exercises   -Turn and reposition patient every x Hours  -Use appropriate equipment to lift or move patient in bed  -Instruct/ Assist with weight shifting every x when out of bed in chair  -Consider limitation of chair time xx hour intervals    Skin Care:  -Avoid use of baby powder, tape, friction and shearing, hot water or constrictive clothing  -Relieve pressure over bony prominences using x  -Do not massage red bony areas    Next Steps:  -Teach patient strategies to minimize risks such as x   -Consider consults to  interdisciplinary teams such as x  Outcome: Progressing     Problem: HEMATOLOGIC - ADULT  Goal: Maintains hematologic stability  Description: INTERVENTIONS  - Assess for signs and symptoms of bleeding or hemorrhage  - Monitor labs  - Administer supportive blood products/factors as ordered and appropriate  Outcome: Progressing     Problem: MUSCULOSKELETAL - ADULT  Goal: Maintain or return mobility to safest level of function  Description: INTERVENTIONS:  - Assess patient's ability to carry out ADLs; assess patient's baseline for ADL function and identify physical deficits which impact ability to perform ADLs (bathing, care of mouth/teeth, toileting, grooming, dressing, etc )  - Assess/evaluate cause of self-care deficits   - Assess range of motion  - Assess patient's mobility  - Assess patient's need for assistive devices and provide as appropriate  - Encourage maximum independence but intervene and supervise when necessary  - Involve family in performance of ADLs  - Assess for home care needs following discharge   - Consider OT consult to assist with ADL evaluation and planning for discharge  - Provide patient education as appropriate  Outcome: Progressing     Problem: Prexisting or High Potential for Compromised Skin Integrity  Goal: Skin integrity is maintained or improved  Description: INTERVENTIONS:  - Identify patients at risk for skin breakdown  - Assess and monitor skin integrity  - Assess and monitor nutrition and hydration status  - Monitor labs   - Assess for incontinence   - Turn and reposition patient  - Assist with mobility/ambulation  - Relieve pressure over bony prominences  - Avoid friction and shearing  - Provide appropriate hygiene as needed including keeping skin clean and dry  - Evaluate need for skin moisturizer/barrier cream  - Collaborate with interdisciplinary team   - Patient/family teaching  - Consider wound care consult   Outcome: Progressing     Problem: Potential for Falls  Goal: Patient will remain free of falls  Description: INTERVENTIONS:  - Educate patient/family on patient safety including physical limitations  - Instruct patient to call for assistance with activity   - Consult OT/PT to assist with strengthening/mobility   - Keep Call bell within reach  - Keep bed low and locked with side rails adjusted as appropriate  - Keep care items and personal belongings within reach  - Initiate and maintain comfort rounds  - Make Fall Risk Sign visible to staff  - Offer Toileting every x Hours, in advance of need  - Initiate/Maintain xalarm  - Obtain necessary fall risk management equipment: x  - Apply yellow socks and bracelet for high fall risk patients  - Consider moving patient to room near nurses station  Outcome: Progressing

## 2023-05-18 NOTE — NURSING NOTE
"Patient cardiac monitoring was alarming  This RN went into patients room, noticed a telemetry lead was off  While fixing his cardiac monitor, this RN witnessed the patient lose consciousness for 3 seconds  Patient was sitting in bed with his hands in front of his face, sat his head back on the pillow and lost consciousness for 3 seconds  Patient then awakened stating \" Brennan Antes what just happened, I think I fainted again\"   This RN stated to the patient that ,that is what appeared to have happened for about 3 seconds  Patient stated \" Liz Cat it doesn't happen for a long time\"  Md made aware    "

## 2023-05-18 NOTE — ASSESSMENT & PLAN NOTE
"· CT: \"Nonspecific bilateral adrenal nodules noted measuring 1 6 x 1 2 cm on the right and 1 0 x 1 3 cm on the left  \"  · Recommend follow-up with PCP   "

## 2023-05-18 NOTE — ASSESSMENT & PLAN NOTE
· Multiple episodes of syncope preceded by blurred vision and tunnel vision -3 episodes in the ED with no events noted on telemetry  · CTh negative  · CT C/A/P negative for PE  · Obtain EEG  · Monitor on telemetry  · Obtain echo  · Orthostatic vital signs positive in ED as patient notably tachycardic upon standing, check every shift  · Started on continuous IV fluids

## 2023-05-18 NOTE — ASSESSMENT & PLAN NOTE
· Home regimen: Amlodipine 5 mg daily, lisinopril-HCTZ 20-25 mg daily  · Continue home amlodipine, however hold lisinopril and HCTZ due to concern for orthostatic hypotension and episodes of syncope

## 2023-05-18 NOTE — H&P
"[place]  H&P  Name: Santos Vazquez 51 y o  male I MRN: [id number]  Unit/Bed#: -01 I Date of Admission: 5/17/2023   Date of Service: 5/18/2023 I Hospital Day: 0      Assessment/Plan   * Syncope  Assessment & Plan  · Multiple episodes of syncope preceded by blurred vision and tunnel vision -3 episodes in the ED with no events noted on telemetry  · CTh negative  · CT C/A/P negative for PE  · Obtain EEG  · Monitor on telemetry  · Obtain echo  · Orthostatic vital signs positive in ED as patient notably tachycardic upon standing, check every shift  · Started on continuous IV fluids    Costochondral chest pain  Assessment & Plan  · Reports sudden onset pleuritic chest pain at 2030  · CT C/A/P negative for PE  · High sensitive troponins negative  · Suspect costochondritis as patient reports pain only with deep inspiration with significant improvement since arrival to the ED  · Trial Toradol    SIRS (systemic inflammatory response syndrome) ([facility])  Assessment & Plan  · SIRS criteria met on admission with tachycardia and tachypnea  · CT chest does show \"Subtle scattered geographic groundglass opacities in the lower lobes could be due to a nonspecific infectious/inflammatory process of the lung parenchyma,\" however pt without recent cough, fever, and is no longer dyspneic as pleuritic chest pain improving  · Monitor off abx - and trend CBC and fever curve     Type 2 diabetes mellitus without complication, without long-term current use of insulin ([facility])  Assessment & Plan  No results found for: HGBA1C    Recent Labs     05/18/23  0407   POCGLU 243*       Blood Sugar Average: Last 72 hrs:  (P) 243   · Home regimen: Metformin 500 mg twice daily and glimepiride 2 mg 4 times daily  · Hold oral medications and place on SSI with Humalog 2 units 3 times daily with meals  · Obtain updated hemoglobin A1c    Essential hypertension  Assessment & Plan  · Home regimen: Amlodipine 5 mg daily, " "lisinopril-HCTZ 20-25 mg daily  · Continue home amlodipine, however hold lisinopril and HCTZ due to concern for orthostatic hypotension and episodes of syncope    Chronic, continuous use of opioids  Assessment & Plan  · Home regimen: Fentanyl 75 mcg/h patch every 72 hours and morphine ER 60 Mg every 12 hours as needed with ibuprofen 800 mg twice daily with additional as needed dose if he takes as needed morphine  · Continue home fentanyl and morphine, ibuprofen held since utilizing Toradol    Adrenal nodule (HCC)  Assessment & Plan  · CT: \"Nonspecific bilateral adrenal nodules noted measuring 1 6 x 1 2 cm on the right and 1 0 x 1 3 cm on the left  \"  · Recommend follow-up with PCP          VTE Pharmacologic Prophylaxis: VTE Score: 1 Low Risk (Score 0-2) - Encourage Ambulation  Code Status: Level 1 - Full Code   Discussion with family: Updated  (wife) at bedside  Anticipated Length of Stay: Patient will be admitted on an observation basis with an anticipated length of stay of less than 2 midnights secondary to Syncope requiring echo, IV fluids, cardiac monitoring, and EEG  Total Time Spent on Date of Encounter in care of patient: 65 minutes This time was spent on one or more of the following: performing physical exam; counseling and coordination of care; obtaining or reviewing history; documenting in the medical record; reviewing/ordering tests, medications or procedures; communicating with other healthcare professionals and discussing with patient's family/caregivers  Chief Complaint: \" I suddenly felt short of breath\"    History of Present Illness:  Enrique Toro is a 51 y o  male with a PMH of TBI, provoked VTE in setting of surgery in 2006, HTN, and NIDDM 2 who presents with wife for evaluation of sudden onset dyspnea with pleuritic chest pain at 2000    Patient reports he felt dyspneic because he had significant substernal chest pain feeling like a stabbing sensation when he took a deep " breath, so this caused him to have shallow breathing and become tachypneic  He reports chest pain has significantly resolved and he is able to take a full breath and only feels pain at the end of deep inspiration  Patient also reports he has had multiple episodes of syncope since this onset  He reports shortly after symptom onset he was standing up from bed and began to have tunnel vision and then he passed out for 2 to 3 seconds and slid down the bed  No seizure-like activity  Wife reports he had another episode that lasted 2 to 3 seconds in the ER and another one lasting 2 seconds  All episodes were preceded by tunnel vision  No preceding chest pain, dyspnea, or palpitations  Reports he had another 6 to 9-second episode while in the ED, however it was resolving by the time ED provider entered the room  Wife also notes he had an additional 12 seconds episode while in the elevator on the way to the medical floor  No seizure-like activity through any of the events  Patient reports every event was preceded by tunnel vision  Only the first event was associated with positional change  No recent illnesses  No recent URIs  Review of Systems:  Review of Systems   Constitutional: Negative for chills and fever  HENT: Negative for congestion  Respiratory: Positive for shortness of breath  Negative for cough  Cardiovascular: Positive for chest pain  Negative for palpitations and leg swelling  Gastrointestinal: Negative for abdominal pain, constipation, diarrhea, nausea and vomiting  Genitourinary: Negative for difficulty urinating, dysuria and hematuria  Musculoskeletal: Positive for gait problem (Uses cane and walker at baseline)  Neurological: Positive for syncope  Negative for weakness and numbness  All other systems reviewed and are negative        Past Medical and Surgical History:   Past Medical History:   Diagnosis Date   • Diabetes mellitus ([facility] [address] )    • Hypertension        Past Surgical History:   Procedure Laterality Date   • KNEE SURGERY         Meds/Allergies:  Prior to Admission medications    Medication Sig Start Date End Date Taking?  Authorizing Provider   amLODIPine (NORVASC) 5 mg tablet Take 5 mg by mouth daily   Yes Historical Provider, MD   b complex vitamins capsule Take 1 capsule by mouth daily   Yes Historical Provider, MD   cyclobenzaprine (FLEXERIL) 10 mg tablet Take 10 mg by mouth 3 (three) times a day as needed   Yes Historical Provider, MD   docusate sodium (COLACE) 250 MG capsule Take 750 mg by mouth 2 (two) times a day   Yes Historical Provider, MD   esomeprazole (NexIUM) 20 mg capsule Take 20 mg by mouth 2 (two) times a day before meals   Yes Historical Provider, MD   fentaNYL (DURAGESIC) 75 mcg/hr Place 1 patch on the skin every third day   Yes Historical Provider, MD   glimepiride (AMARYL) 2 mg tablet Take 2 mg by mouth 4 (four) times a day   Yes Historical Provider, MD   ibuprofen (MOTRIN) 800 mg tablet Take 800 mg by mouth every 6 (six) hours as needed for mild pain BID standing, and PRN with morphine if needed   Yes Historical Provider, MD   lisinopril-hydrochlorothiazide (PRINZIDE,ZESTORETIC) 20-25 MG per tablet Take 1 tablet by mouth daily   Yes Historical Provider, MD   metFORMIN (GLUCOPHAGE) 500 mg tablet Take 500 mg by mouth 2 (two) times a day with meals   Yes Historical Provider, MD   Morphine Sulfate ER 15 MG T12A Take 60 mg by mouth every 12 (twelve) hours as needed   Yes Historical Provider, MD   risperiDONE (RisperDAL) 3 mg tablet Take 3 mg by mouth daily   Yes Historical Provider, MD   simvastatin (ZOCOR) 40 mg tablet Take 40 mg by mouth daily   Yes Historical Provider, MD   SUMAtriptan (IMITREX) 100 mg tablet Take 100 mg by mouth   Yes Historical Provider, MD   zolpidem (AMBIEN) 10 mg tablet Take 10 mg by mouth   Yes Historical Provider, MD   ondansetron (ZOFRAN) 8 mg tablet TAKE 1 TABLET (8 MG) BY MOUTH 3 TIMES PER DAY AS NEEDED 4/11/23   Historical Provider, "MD   clonazePAM (KlonoPIN) 1 mg tablet Take 1 mg by mouth 2 (two) times a day  11/15/22  Historical Provider, MD   Morphine Sulfate ER 15 MG T12A Take 100 mg by mouth daily as needed  Patient not taking: Reported on 5/18/2023 5/18/23  Historical Provider, MD     I have reviewed home medications with patient personally  Allergies: Allergies   Allergen Reactions   • Penicillins Hives       Social History:  Marital Status: /Civil Union   Occupation: Not applicable  Patient Pre-hospital Living Situation: Home, With spouse  Patient Pre-hospital Level of Mobility: walks with walker  Patient Pre-hospital Diet Restrictions: None  Substance Use History:   Social History     Substance and Sexual Activity   Alcohol Use Not Currently     Social History     Tobacco Use   Smoking Status Never   Smokeless Tobacco Never     Social History     Substance and Sexual Activity   Drug Use Yes   • Types: Marijuana       Family History:  History reviewed  No pertinent family history  Physical Exam:     Vitals:   Blood Pressure: 130/85 (05/18/23 0304)  Pulse: 91 (05/18/23 0304)  Temperature: (!) 97 3 °F (36 3 °C) (05/18/23 0304)  Temp Source: Oral (05/17/23 2137)  Respirations: 17 (05/18/23 0200)  Height: 6' 6\" (198 1 cm) (05/17/23 2135)  Weight - Scale: 95 3 kg (210 lb) (05/17/23 2135)  SpO2: 96 % (05/18/23 0304)    Physical Exam  Vitals and nursing note reviewed  Constitutional:       General: He is not in acute distress  Appearance: Normal appearance  He is not ill-appearing  Comments: Pleasant and conversational   HENT:      Head: Normocephalic  Nose: Nose normal       Mouth/Throat:      Mouth: Mucous membranes are moist    Eyes:      Extraocular Movements: Extraocular movements intact  Conjunctiva/sclera: Conjunctivae normal    Cardiovascular:      Rate and Rhythm: Normal rate and regular rhythm  Pulses: Normal pulses  Heart sounds: No murmur heard    Pulmonary:      Effort: Pulmonary " effort is normal       Breath sounds: Normal breath sounds  Abdominal:      General: Abdomen is flat  There is no distension  Palpations: Abdomen is soft  Tenderness: There is no abdominal tenderness  There is no guarding or rebound  Musculoskeletal:      Cervical back: Normal range of motion  Right lower leg: No edema  Left lower leg: No edema  Comments: Limited range of motion to the right knee secondary to prior fracture   Skin:     General: Skin is warm and dry  Coloration: Skin is not pale  Neurological:      General: No focal deficit present  Mental Status: He is alert and oriented to person, place, and time  Comments: 5/5 strength in bilateral upper and lower extremities  Decreased sensation to right lower extremity, which patient states is baseline for him  Sensation is equal and intact throughout remainder of extremities  Normal coordination with finger-to-nose bilaterally  EOMI  No dysarthria or aphasia  Psychiatric:         Mood and Affect: Mood normal          Thought Content:  Thought content normal           Additional Data:     Lab Results:  Results from last 7 days   Lab Units 05/17/23  2158   WBC Thousand/uL 7 95   HEMOGLOBIN g/dL 15 5   HEMATOCRIT % 44 2   PLATELETS Thousands/uL 265   NEUTROS PCT % 61   LYMPHS PCT % 32   MONOS PCT % 5   EOS PCT % 1     Results from last 7 days   Lab Units 05/17/23  2158   SODIUM mmol/L 134*   POTASSIUM mmol/L 4 1   CHLORIDE mmol/L 100   CO2 mmol/L 25   BUN mg/dL 12   CREATININE mg/dL 0 96   ANION GAP mmol/L 9   CALCIUM mg/dL 10 0   ALBUMIN g/dL 4 0   TOTAL BILIRUBIN mg/dL 0 48   ALK PHOS U/L 64   ALT U/L 21   AST U/L 14   GLUCOSE RANDOM mg/dL 361*     Results from last 7 days   Lab Units 05/17/23  2158   INR  0 91     Results from last 7 days   Lab Units 05/18/23  0407   POC GLUCOSE mg/dl 243*         Results from last 7 days   Lab Units 05/17/23  2206   LACTIC ACID mmol/L 1 2       Lines/Drains:  Invasive Devices Peripheral Intravenous Line  Duration           Peripheral IV 05/17/23 Right Antecubital <1 day    Peripheral IV 05/18/23 Dorsal (posterior); Left;Proximal Forearm <1 day                    Imaging: Reviewed radiology reports from this admission including: chest CT scan and CT head  CT head without contrast   Final Result by Raymond Aggarwal MD (05/18 1927)      No acute intracranial abnormality  Workstation performed: [id number]         CTA ED chest PE Study   Final Result by Yudith Suazo MD (05/17 4023)      1  No acute pulm embolism or thoracic aortic aneurysm/dissection  2   Subtle scattered geographic groundglass opacities in the lower lobes could be due to a nonspecific infectious/inflammatory process of the lung parenchyma, recommend clinical correlation  No pneumothorax, lobar airspace consolidation, suspicious    pulmonary mass, or pleural effusions  3   Ancillary findings of the upper abdomen as detailed above  Workstation performed: [id number]         XR chest 1 view portable   ED Interpretation by Myrna Cali DO (05/17 1863)   No acute abnormalities as interpreted by me independently          EKG and Other Studies Reviewed on Admission:   · EKG: NSR with no acute ischemia  Normal QT interval     ** Please Note: This note has been constructed using a voice recognition system   **

## 2023-05-18 NOTE — ASSESSMENT & PLAN NOTE
No results found for: HGBA1C    Recent Labs     05/18/23  0407   POCGLU 243*       Blood Sugar Average: Last 72 hrs:  (P) 243   · Home regimen: Metformin 500 mg twice daily and glimepiride 2 mg 4 times daily  · Hold oral medications and place on SSI with Humalog 2 units 3 times daily with meals  · Obtain updated hemoglobin A1c

## 2023-05-18 NOTE — ED NOTES
Pt had fainting episode, provider brought to bedside  Episode lasted about 30-45 seconds   Provider aware and at bedside     [place], [address] [facility]  05/18/23 5130

## 2023-05-18 NOTE — NURSING NOTE
Patient to be discharged to home in stable condition  Instructions given to patient  Spouse to take him home

## 2023-05-19 LAB
ATRIAL RATE: 130 BPM
ATRIAL RATE: 81 BPM
ATRIAL RATE: 95 BPM
P AXIS: 57 DEGREES
P AXIS: 62 DEGREES
P AXIS: 63 DEGREES
PR INTERVAL: 136 MS
PR INTERVAL: 156 MS
PR INTERVAL: 158 MS
QRS AXIS: 38 DEGREES
QRS AXIS: 44 DEGREES
QRS AXIS: 62 DEGREES
QRSD INTERVAL: 78 MS
QRSD INTERVAL: 82 MS
QRSD INTERVAL: 82 MS
QT INTERVAL: 300 MS
QT INTERVAL: 334 MS
QT INTERVAL: 336 MS
QTC INTERVAL: 390 MS
QTC INTERVAL: 419 MS
QTC INTERVAL: 441 MS
T WAVE AXIS: 47 DEGREES
T WAVE AXIS: 48 DEGREES
T WAVE AXIS: 50 DEGREES
VENTRICULAR RATE: 130 BPM
VENTRICULAR RATE: 81 BPM
VENTRICULAR RATE: 95 BPM

## 2023-06-06 ENCOUNTER — TELEPHONE (OUTPATIENT)
Dept: NEUROLOGY | Facility: CLINIC | Age: 48
End: 2023-06-06

## 2023-06-06 NOTE — TELEPHONE ENCOUNTER
1ST ATTEMPT,     Called pt no answer, LMOM       HFU/ SL UPPER BUCKS/ SYNCOPE    [facility]- HOME = 5/18/2023    Karen Trinidad will need follow up in at the next regular appointment with general attending or advance practitioner   He will not require outpatient neurological testing     Thank you,     Olivia Elkins

## 2023-06-08 NOTE — TELEPHONE ENCOUNTER
Patient called back to schedule a HFU  I offered him the next available which was 8/1  Patient stated he didn't realize it was okay for him to wait to be seen, patient thought he had to be seen within a week  I informed the patient that according to the notes from the hospital he can be scheduled in the next available appointment  Patient stated he did not wish to schedule and would follow up with his PCP

## 2024-03-13 NOTE — ASSESSMENT & PLAN NOTE
"· SIRS criteria met on admission with tachycardia and tachypnea  · CT chest does show \"Subtle scattered geographic groundglass opacities in the lower lobes could be due to a nonspecific infectious/inflammatory process of the lung parenchyma,\" however pt without recent cough, fever, and is no longer dyspneic as pleuritic chest pain improving  · Monitor off abx - and trend CBC and fever curve   " Quality 130: Documentation Of Current Medications In The Medical Record: Current Medications Documented Quality 431: Preventive Care And Screening: Unhealthy Alcohol Use - Screening: Patient not identified as an unhealthy alcohol user when screened for unhealthy alcohol use using a systematic screening method Detail Level: Detailed Quality 226: Preventive Care And Screening: Tobacco Use: Screening And Cessation Intervention: Patient screened for tobacco use and is an ex/non-smoker

## 2024-03-22 ENCOUNTER — APPOINTMENT (EMERGENCY)
Dept: RADIOLOGY | Facility: HOSPITAL | Age: 49
DRG: 485 | End: 2024-03-22
Payer: COMMERCIAL

## 2024-03-22 ENCOUNTER — APPOINTMENT (INPATIENT)
Dept: RADIOLOGY | Facility: HOSPITAL | Age: 49
DRG: 485 | End: 2024-03-22
Payer: COMMERCIAL

## 2024-03-22 ENCOUNTER — HOSPITAL ENCOUNTER (INPATIENT)
Facility: HOSPITAL | Age: 49
LOS: 12 days | Discharge: HOME HEALTH | DRG: 485 | End: 2024-04-03
Attending: EMERGENCY MEDICINE | Admitting: STUDENT IN AN ORGANIZED HEALTH CARE EDUCATION/TRAINING PROGRAM
Payer: COMMERCIAL

## 2024-03-22 DIAGNOSIS — G58.9 MONONEUROPATHY: ICD-10-CM

## 2024-03-22 DIAGNOSIS — A41.9 SEPTIC SHOCK (HCC): ICD-10-CM

## 2024-03-22 DIAGNOSIS — F41.9 ANXIETY: ICD-10-CM

## 2024-03-22 DIAGNOSIS — M86.172 ACUTE OSTEOMYELITIS OF LEFT FOOT (HCC): ICD-10-CM

## 2024-03-22 DIAGNOSIS — M25.561 ACUTE PAIN OF RIGHT KNEE: ICD-10-CM

## 2024-03-22 DIAGNOSIS — R65.21 SEPTIC SHOCK (HCC): ICD-10-CM

## 2024-03-22 DIAGNOSIS — M86.9 OSTEOMYELITIS OF SECOND TOE OF LEFT FOOT (HCC): ICD-10-CM

## 2024-03-22 DIAGNOSIS — T84.53XA INFECTION ASSOCIATED WITH INTERNAL RIGHT KNEE PROSTHESIS, INITIAL ENCOUNTER (HCC): ICD-10-CM

## 2024-03-22 DIAGNOSIS — M25.561 RIGHT KNEE PAIN: ICD-10-CM

## 2024-03-22 DIAGNOSIS — E11.10 METABOLIC ACIDOSIS DUE TO DIABETES MELLITUS (HCC): ICD-10-CM

## 2024-03-22 DIAGNOSIS — R78.81 MSSA BACTEREMIA: ICD-10-CM

## 2024-03-22 DIAGNOSIS — R73.9 HYPERGLYCEMIA: Primary | ICD-10-CM

## 2024-03-22 DIAGNOSIS — R45.851 SUICIDAL IDEATION: ICD-10-CM

## 2024-03-22 DIAGNOSIS — L97.509 DIABETIC FOOT ULCER (HCC): ICD-10-CM

## 2024-03-22 DIAGNOSIS — E11.621 DIABETIC FOOT ULCER (HCC): ICD-10-CM

## 2024-03-22 DIAGNOSIS — E11.10 TYPE 2 DIABETES MELLITUS WITH KETOACIDOSIS WITHOUT COMA, WITHOUT LONG-TERM CURRENT USE OF INSULIN (HCC): ICD-10-CM

## 2024-03-22 DIAGNOSIS — B95.61 MSSA BACTEREMIA: ICD-10-CM

## 2024-03-22 DIAGNOSIS — I48.91 A-FIB (HCC): ICD-10-CM

## 2024-03-22 DIAGNOSIS — E87.1 HYPONATREMIA: ICD-10-CM

## 2024-03-22 PROBLEM — E87.8 ELECTROLYTE ABNORMALITY: Status: ACTIVE | Noted: 2024-03-22

## 2024-03-22 PROBLEM — E87.29 INCREASED ANION GAP METABOLIC ACIDOSIS: Status: ACTIVE | Noted: 2024-03-22

## 2024-03-22 PROBLEM — N17.9 AKI (ACUTE KIDNEY INJURY) (HCC): Status: ACTIVE | Noted: 2024-03-22

## 2024-03-22 LAB
2HR DELTA HS TROPONIN: 1 NG/L
ANION GAP SERPL CALCULATED.3IONS-SCNC: 14 MMOL/L (ref 4–13)
ANION GAP SERPL CALCULATED.3IONS-SCNC: 16 MMOL/L (ref 4–13)
APAP SERPL-MCNC: 10 UG/ML (ref 10–20)
B-OH-BUTYR SERPL-MCNC: 1.31 MMOL/L (ref 0.02–0.27)
BACTERIA UR QL AUTO: ABNORMAL /HPF
BASE EX.OXY STD BLDV CALC-SCNC: 85.9 % (ref 60–80)
BASE EXCESS BLDA CALC-SCNC: -6 MMOL/L (ref -2–3)
BASE EXCESS BLDV CALC-SCNC: -3.1 MMOL/L
BASOPHILS # BLD AUTO: 0.05 THOUSANDS/ÂΜL (ref 0–0.1)
BASOPHILS NFR BLD AUTO: 0 % (ref 0–1)
BILIRUB UR QL STRIP: NEGATIVE
BUN SERPL-MCNC: 30 MG/DL (ref 5–25)
BUN SERPL-MCNC: 30 MG/DL (ref 5–25)
CA-I BLD-SCNC: 1.2 MMOL/L (ref 1.12–1.32)
CALCIUM SERPL-MCNC: 9.5 MG/DL (ref 8.4–10.2)
CALCIUM SERPL-MCNC: 9.6 MG/DL (ref 8.4–10.2)
CARDIAC TROPONIN I PNL SERPL HS: 11 NG/L
CARDIAC TROPONIN I PNL SERPL HS: 12 NG/L
CHLORIDE SERPL-SCNC: 88 MMOL/L (ref 96–108)
CHLORIDE SERPL-SCNC: 96 MMOL/L (ref 96–108)
CLARITY UR: CLEAR
CO2 SERPL-SCNC: 19 MMOL/L (ref 21–32)
CO2 SERPL-SCNC: 20 MMOL/L (ref 21–32)
COLOR UR: YELLOW
CREAT SERPL-MCNC: 1.37 MG/DL (ref 0.6–1.3)
CREAT SERPL-MCNC: 1.59 MG/DL (ref 0.6–1.3)
EOSINOPHIL # BLD AUTO: 0.1 THOUSAND/ÂΜL (ref 0–0.61)
EOSINOPHIL NFR BLD AUTO: 1 % (ref 0–6)
ERYTHROCYTE [DISTWIDTH] IN BLOOD BY AUTOMATED COUNT: 12.7 % (ref 11.6–15.1)
ERYTHROCYTE [SEDIMENTATION RATE] IN BLOOD: 25 MM/HOUR (ref 0–14)
EST. AVERAGE GLUCOSE BLD GHB EST-MCNC: 301 MG/DL
ETHANOL SERPL-MCNC: <10 MG/DL
FLUAV RNA RESP QL NAA+PROBE: NEGATIVE
FLUBV RNA RESP QL NAA+PROBE: NEGATIVE
GFR SERPL CREATININE-BSD FRML MDRD: 50 ML/MIN/1.73SQ M
GFR SERPL CREATININE-BSD FRML MDRD: 60 ML/MIN/1.73SQ M
GLUCOSE SERPL-MCNC: 166 MG/DL (ref 65–140)
GLUCOSE SERPL-MCNC: 178 MG/DL (ref 65–140)
GLUCOSE SERPL-MCNC: 182 MG/DL (ref 65–140)
GLUCOSE SERPL-MCNC: 188 MG/DL (ref 65–140)
GLUCOSE SERPL-MCNC: 222 MG/DL (ref 65–140)
GLUCOSE SERPL-MCNC: 305 MG/DL (ref 65–140)
GLUCOSE SERPL-MCNC: 600 MG/DL (ref 65–140)
GLUCOSE SERPL-MCNC: 639 MG/DL (ref 65–140)
GLUCOSE SERPL-MCNC: >600 MG/DL (ref 65–140)
GLUCOSE UR STRIP-MCNC: ABNORMAL MG/DL
HBA1C MFR BLD: 12.1 %
HCO3 BLDA-SCNC: 19 MMOL/L (ref 24–30)
HCO3 BLDV-SCNC: 20.6 MMOL/L (ref 24–30)
HCT VFR BLD AUTO: 50 % (ref 36.5–49.3)
HCT VFR BLD CALC: 50 % (ref 36.5–49.3)
HGB BLD-MCNC: 17.6 G/DL (ref 12–17)
HGB BLDA-MCNC: 17 G/DL (ref 12–17)
HGB UR QL STRIP.AUTO: NEGATIVE
HYALINE CASTS #/AREA URNS LPF: ABNORMAL /LPF
IMM GRANULOCYTES # BLD AUTO: 0.07 THOUSAND/UL (ref 0–0.2)
IMM GRANULOCYTES NFR BLD AUTO: 0 % (ref 0–2)
KETONES UR STRIP-MCNC: ABNORMAL MG/DL
LACTATE SERPL-SCNC: 1.6 MMOL/L (ref 0.5–2)
LACTATE SERPL-SCNC: 2.2 MMOL/L (ref 0.5–2)
LACTATE SERPL-SCNC: 3.1 MMOL/L (ref 0.5–2)
LEUKOCYTE ESTERASE UR QL STRIP: NEGATIVE
LYMPHOCYTES # BLD AUTO: 0.38 THOUSANDS/ÂΜL (ref 0.6–4.47)
LYMPHOCYTES NFR BLD AUTO: 2 % (ref 14–44)
MAGNESIUM SERPL-MCNC: 1.4 MG/DL (ref 1.9–2.7)
MCH RBC QN AUTO: 30.3 PG (ref 26.8–34.3)
MCHC RBC AUTO-ENTMCNC: 35.2 G/DL (ref 31.4–37.4)
MCV RBC AUTO: 86 FL (ref 82–98)
MONOCYTES # BLD AUTO: 0.91 THOUSAND/ÂΜL (ref 0.17–1.22)
MONOCYTES NFR BLD AUTO: 5 % (ref 4–12)
NEUTROPHILS # BLD AUTO: 16.38 THOUSANDS/ÂΜL (ref 1.85–7.62)
NEUTS SEG NFR BLD AUTO: 92 % (ref 43–75)
NITRITE UR QL STRIP: NEGATIVE
NON-SQ EPI CELLS URNS QL MICRO: ABNORMAL /HPF
NRBC BLD AUTO-RTO: 0 /100 WBCS
O2 CT BLDV-SCNC: 20.4 ML/DL
PCO2 BLD: 20 MMOL/L (ref 21–32)
PCO2 BLD: 35.7 MM HG (ref 42–50)
PCO2 BLDV: 33.6 MM HG (ref 42–50)
PH BLD: 7.33 [PH] (ref 7.3–7.4)
PH BLDV: 7.41 [PH] (ref 7.3–7.4)
PH UR STRIP.AUTO: 5.5 [PH]
PHOSPHATE SERPL-MCNC: 3.3 MG/DL (ref 2.7–4.5)
PLATELET # BLD AUTO: 245 THOUSANDS/UL (ref 149–390)
PMV BLD AUTO: 11.1 FL (ref 8.9–12.7)
PO2 BLD: 32 MM HG (ref 35–45)
PO2 BLDV: 55.3 MM HG (ref 35–45)
POTASSIUM BLD-SCNC: 5 MMOL/L (ref 3.5–5.3)
POTASSIUM SERPL-SCNC: 4 MMOL/L (ref 3.5–5.3)
POTASSIUM SERPL-SCNC: 5.3 MMOL/L (ref 3.5–5.3)
PROT UR STRIP-MCNC: ABNORMAL MG/DL
RBC # BLD AUTO: 5.8 MILLION/UL (ref 3.88–5.62)
RBC #/AREA URNS AUTO: ABNORMAL /HPF
RSV RNA RESP QL NAA+PROBE: NEGATIVE
SALICYLATES SERPL-MCNC: <5 MG/DL (ref 3–20)
SAO2 % BLD FROM PO2: 58 % (ref 60–85)
SARS-COV-2 RNA RESP QL NAA+PROBE: NEGATIVE
SODIUM BLD-SCNC: 123 MMOL/L (ref 136–145)
SODIUM SERPL-SCNC: 123 MMOL/L (ref 135–147)
SODIUM SERPL-SCNC: 130 MMOL/L (ref 135–147)
SP GR UR STRIP.AUTO: 1.02 (ref 1–1.03)
SPECIMEN SOURCE: ABNORMAL
TSH SERPL DL<=0.05 MIU/L-ACNC: 0.16 UIU/ML (ref 0.45–4.5)
UROBILINOGEN UR STRIP-ACNC: <2 MG/DL
WBC # BLD AUTO: 17.89 THOUSAND/UL (ref 4.31–10.16)
WBC #/AREA URNS AUTO: ABNORMAL /HPF

## 2024-03-22 PROCEDURE — 87154 CUL TYP ID BLD PTHGN 6+ TRGT: CPT | Performed by: STUDENT IN AN ORGANIZED HEALTH CARE EDUCATION/TRAINING PROGRAM

## 2024-03-22 PROCEDURE — 0241U HB NFCT DS VIR RESP RNA 4 TRGT: CPT | Performed by: STUDENT IN AN ORGANIZED HEALTH CARE EDUCATION/TRAINING PROGRAM

## 2024-03-22 PROCEDURE — 71045 X-RAY EXAM CHEST 1 VIEW: CPT

## 2024-03-22 PROCEDURE — 84295 ASSAY OF SERUM SODIUM: CPT

## 2024-03-22 PROCEDURE — 99223 1ST HOSP IP/OBS HIGH 75: CPT | Performed by: STUDENT IN AN ORGANIZED HEALTH CARE EDUCATION/TRAINING PROGRAM

## 2024-03-22 PROCEDURE — 82948 REAGENT STRIP/BLOOD GLUCOSE: CPT

## 2024-03-22 PROCEDURE — 96361 HYDRATE IV INFUSION ADD-ON: CPT

## 2024-03-22 PROCEDURE — 83605 ASSAY OF LACTIC ACID: CPT

## 2024-03-22 PROCEDURE — 82803 BLOOD GASES ANY COMBINATION: CPT

## 2024-03-22 PROCEDURE — G0426 INPT/ED TELECONSULT50: HCPCS | Performed by: STUDENT IN AN ORGANIZED HEALTH CARE EDUCATION/TRAINING PROGRAM

## 2024-03-22 PROCEDURE — 99285 EMERGENCY DEPT VISIT HI MDM: CPT

## 2024-03-22 PROCEDURE — 84132 ASSAY OF SERUM POTASSIUM: CPT

## 2024-03-22 PROCEDURE — 85652 RBC SED RATE AUTOMATED: CPT

## 2024-03-22 PROCEDURE — 85025 COMPLETE CBC W/AUTO DIFF WBC: CPT

## 2024-03-22 PROCEDURE — 82010 KETONE BODYS QUAN: CPT

## 2024-03-22 PROCEDURE — 36415 COLL VENOUS BLD VENIPUNCTURE: CPT

## 2024-03-22 PROCEDURE — 87186 SC STD MICRODIL/AGAR DIL: CPT | Performed by: STUDENT IN AN ORGANIZED HEALTH CARE EDUCATION/TRAINING PROGRAM

## 2024-03-22 PROCEDURE — 80179 DRUG ASSAY SALICYLATE: CPT | Performed by: STUDENT IN AN ORGANIZED HEALTH CARE EDUCATION/TRAINING PROGRAM

## 2024-03-22 PROCEDURE — 84443 ASSAY THYROID STIM HORMONE: CPT | Performed by: STUDENT IN AN ORGANIZED HEALTH CARE EDUCATION/TRAINING PROGRAM

## 2024-03-22 PROCEDURE — 86140 C-REACTIVE PROTEIN: CPT | Performed by: INTERNAL MEDICINE

## 2024-03-22 PROCEDURE — 84484 ASSAY OF TROPONIN QUANT: CPT | Performed by: STUDENT IN AN ORGANIZED HEALTH CARE EDUCATION/TRAINING PROGRAM

## 2024-03-22 PROCEDURE — 82077 ASSAY SPEC XCP UR&BREATH IA: CPT | Performed by: STUDENT IN AN ORGANIZED HEALTH CARE EDUCATION/TRAINING PROGRAM

## 2024-03-22 PROCEDURE — 93005 ELECTROCARDIOGRAM TRACING: CPT

## 2024-03-22 PROCEDURE — 83036 HEMOGLOBIN GLYCOSYLATED A1C: CPT | Performed by: STUDENT IN AN ORGANIZED HEALTH CARE EDUCATION/TRAINING PROGRAM

## 2024-03-22 PROCEDURE — 96365 THER/PROPH/DIAG IV INF INIT: CPT

## 2024-03-22 PROCEDURE — 99222 1ST HOSP IP/OBS MODERATE 55: CPT | Performed by: STUDENT IN AN ORGANIZED HEALTH CARE EDUCATION/TRAINING PROGRAM

## 2024-03-22 PROCEDURE — 82947 ASSAY GLUCOSE BLOOD QUANT: CPT

## 2024-03-22 PROCEDURE — 81001 URINALYSIS AUTO W/SCOPE: CPT | Performed by: STUDENT IN AN ORGANIZED HEALTH CARE EDUCATION/TRAINING PROGRAM

## 2024-03-22 PROCEDURE — 85014 HEMATOCRIT: CPT

## 2024-03-22 PROCEDURE — 80143 DRUG ASSAY ACETAMINOPHEN: CPT | Performed by: STUDENT IN AN ORGANIZED HEALTH CARE EDUCATION/TRAINING PROGRAM

## 2024-03-22 PROCEDURE — 83605 ASSAY OF LACTIC ACID: CPT | Performed by: PHYSICIAN ASSISTANT

## 2024-03-22 PROCEDURE — 82330 ASSAY OF CALCIUM: CPT

## 2024-03-22 PROCEDURE — 84100 ASSAY OF PHOSPHORUS: CPT

## 2024-03-22 PROCEDURE — 96375 TX/PRO/DX INJ NEW DRUG ADDON: CPT

## 2024-03-22 PROCEDURE — 80048 BASIC METABOLIC PNL TOTAL CA: CPT | Performed by: STUDENT IN AN ORGANIZED HEALTH CARE EDUCATION/TRAINING PROGRAM

## 2024-03-22 PROCEDURE — 87077 CULTURE AEROBIC IDENTIFY: CPT | Performed by: STUDENT IN AN ORGANIZED HEALTH CARE EDUCATION/TRAINING PROGRAM

## 2024-03-22 PROCEDURE — 73560 X-RAY EXAM OF KNEE 1 OR 2: CPT

## 2024-03-22 PROCEDURE — 83735 ASSAY OF MAGNESIUM: CPT

## 2024-03-22 PROCEDURE — 99447 NTRPROF PH1/NTRNET/EHR 11-20: CPT | Performed by: EMERGENCY MEDICINE

## 2024-03-22 PROCEDURE — 80048 BASIC METABOLIC PNL TOTAL CA: CPT

## 2024-03-22 PROCEDURE — 87040 BLOOD CULTURE FOR BACTERIA: CPT | Performed by: STUDENT IN AN ORGANIZED HEALTH CARE EDUCATION/TRAINING PROGRAM

## 2024-03-22 PROCEDURE — 82805 BLOOD GASES W/O2 SATURATION: CPT | Performed by: STUDENT IN AN ORGANIZED HEALTH CARE EDUCATION/TRAINING PROGRAM

## 2024-03-22 RX ORDER — PANTOPRAZOLE SODIUM 20 MG/1
20 TABLET, DELAYED RELEASE ORAL
Status: DISCONTINUED | OUTPATIENT
Start: 2024-03-22 | End: 2024-03-24

## 2024-03-22 RX ORDER — AMLODIPINE BESYLATE 5 MG/1
5 TABLET ORAL DAILY
Status: DISCONTINUED | OUTPATIENT
Start: 2024-03-22 | End: 2024-03-24

## 2024-03-22 RX ORDER — ONDANSETRON 2 MG/ML
4 INJECTION INTRAMUSCULAR; INTRAVENOUS EVERY 4 HOURS PRN
Status: DISCONTINUED | OUTPATIENT
Start: 2024-03-22 | End: 2024-04-03 | Stop reason: HOSPADM

## 2024-03-22 RX ORDER — MORPHINE SULFATE 30 MG/1
60 TABLET, FILM COATED, EXTENDED RELEASE ORAL EVERY 12 HOURS SCHEDULED
Status: DISCONTINUED | OUTPATIENT
Start: 2024-03-22 | End: 2024-03-24

## 2024-03-22 RX ORDER — HYDROMORPHONE HCL/PF 1 MG/ML
1 SYRINGE (ML) INJECTION ONCE
Status: COMPLETED | OUTPATIENT
Start: 2024-03-22 | End: 2024-03-22

## 2024-03-22 RX ORDER — KETOROLAC TROMETHAMINE 30 MG/ML
30 INJECTION, SOLUTION INTRAMUSCULAR; INTRAVENOUS ONCE
Status: DISCONTINUED | OUTPATIENT
Start: 2024-03-22 | End: 2024-03-22

## 2024-03-22 RX ORDER — OXYCODONE HYDROCHLORIDE 10 MG/1
10 TABLET ORAL EVERY 6 HOURS PRN
Status: DISCONTINUED | OUTPATIENT
Start: 2024-03-22 | End: 2024-03-24

## 2024-03-22 RX ORDER — NICOTINE 21 MG/24HR
21 PATCH, TRANSDERMAL 24 HOURS TRANSDERMAL DAILY
Status: DISCONTINUED | OUTPATIENT
Start: 2024-03-22 | End: 2024-03-27

## 2024-03-22 RX ORDER — HYDROMORPHONE HYDROCHLORIDE 2 MG/1
2 TABLET ORAL ONCE
Status: COMPLETED | OUTPATIENT
Start: 2024-03-22 | End: 2024-03-22

## 2024-03-22 RX ORDER — CYCLOBENZAPRINE HCL 10 MG
10 TABLET ORAL 3 TIMES DAILY PRN
Status: DISCONTINUED | OUTPATIENT
Start: 2024-03-22 | End: 2024-03-24

## 2024-03-22 RX ORDER — OXYCODONE HYDROCHLORIDE 5 MG/1
5 TABLET ORAL EVERY 6 HOURS PRN
Status: DISCONTINUED | OUTPATIENT
Start: 2024-03-22 | End: 2024-03-23

## 2024-03-22 RX ORDER — KETOROLAC TROMETHAMINE 30 MG/ML
15 INJECTION, SOLUTION INTRAMUSCULAR; INTRAVENOUS ONCE
Status: COMPLETED | OUTPATIENT
Start: 2024-03-22 | End: 2024-03-22

## 2024-03-22 RX ORDER — MAGNESIUM HYDROXIDE/ALUMINUM HYDROXICE/SIMETHICONE 120; 1200; 1200 MG/30ML; MG/30ML; MG/30ML
30 SUSPENSION ORAL EVERY 6 HOURS PRN
Status: DISCONTINUED | OUTPATIENT
Start: 2024-03-22 | End: 2024-03-24

## 2024-03-22 RX ORDER — ZOLPIDEM TARTRATE 5 MG/1
10 TABLET ORAL
Status: DISCONTINUED | OUTPATIENT
Start: 2024-03-22 | End: 2024-03-24

## 2024-03-22 RX ORDER — ACETAMINOPHEN 325 MG/1
975 TABLET ORAL ONCE
Status: COMPLETED | OUTPATIENT
Start: 2024-03-22 | End: 2024-03-22

## 2024-03-22 RX ORDER — TRAMADOL HYDROCHLORIDE 50 MG/1
50 TABLET ORAL EVERY 6 HOURS PRN
Status: DISCONTINUED | OUTPATIENT
Start: 2024-03-22 | End: 2024-03-22

## 2024-03-22 RX ORDER — MAGNESIUM SULFATE HEPTAHYDRATE 40 MG/ML
2 INJECTION, SOLUTION INTRAVENOUS ONCE
Status: COMPLETED | OUTPATIENT
Start: 2024-03-22 | End: 2024-03-22

## 2024-03-22 RX ORDER — DOCUSATE SODIUM 100 MG/1
300 CAPSULE, LIQUID FILLED ORAL 2 TIMES DAILY
Status: DISCONTINUED | OUTPATIENT
Start: 2024-03-22 | End: 2024-03-24

## 2024-03-22 RX ORDER — FENTANYL 75 UG/1
1 PATCH TRANSDERMAL
Status: DISCONTINUED | OUTPATIENT
Start: 2024-03-24 | End: 2024-03-24

## 2024-03-22 RX ORDER — ACETAMINOPHEN 325 MG/1
975 TABLET ORAL EVERY 8 HOURS
Status: DISCONTINUED | OUTPATIENT
Start: 2024-03-22 | End: 2024-03-22

## 2024-03-22 RX ORDER — SODIUM CHLORIDE, SODIUM GLUCONATE, SODIUM ACETATE, POTASSIUM CHLORIDE, MAGNESIUM CHLORIDE, SODIUM PHOSPHATE, DIBASIC, AND POTASSIUM PHOSPHATE .53; .5; .37; .037; .03; .012; .00082 G/100ML; G/100ML; G/100ML; G/100ML; G/100ML; G/100ML; G/100ML
125 INJECTION, SOLUTION INTRAVENOUS CONTINUOUS
Status: DISCONTINUED | OUTPATIENT
Start: 2024-03-22 | End: 2024-03-23

## 2024-03-22 RX ORDER — PRAVASTATIN SODIUM 80 MG/1
80 TABLET ORAL
Status: DISCONTINUED | OUTPATIENT
Start: 2024-03-22 | End: 2024-04-03 | Stop reason: HOSPADM

## 2024-03-22 RX ORDER — HEPARIN SODIUM 5000 [USP'U]/ML
5000 INJECTION, SOLUTION INTRAVENOUS; SUBCUTANEOUS EVERY 8 HOURS SCHEDULED
Status: DISPENSED | OUTPATIENT
Start: 2024-03-22 | End: 2024-03-24

## 2024-03-22 RX ORDER — POLYETHYLENE GLYCOL 3350 17 G/17G
17 POWDER, FOR SOLUTION ORAL DAILY PRN
Status: DISCONTINUED | OUTPATIENT
Start: 2024-03-22 | End: 2024-03-24

## 2024-03-22 RX ORDER — HYDROMORPHONE HCL/PF 1 MG/ML
2 SYRINGE (ML) INJECTION ONCE
Status: DISCONTINUED | OUTPATIENT
Start: 2024-03-22 | End: 2024-03-22

## 2024-03-22 RX ADMIN — PRAVASTATIN SODIUM 80 MG: 80 TABLET ORAL at 17:04

## 2024-03-22 RX ADMIN — SODIUM CHLORIDE 10 UNITS/HR: 9 INJECTION, SOLUTION INTRAVENOUS at 12:42

## 2024-03-22 RX ADMIN — DOCUSATE SODIUM 300 MG: 100 CAPSULE, LIQUID FILLED ORAL at 17:04

## 2024-03-22 RX ADMIN — DICLOFENAC SODIUM 2 G: 10 GEL TOPICAL at 17:18

## 2024-03-22 RX ADMIN — SODIUM CHLORIDE 1000 ML: 0.9 INJECTION, SOLUTION INTRAVENOUS at 10:41

## 2024-03-22 RX ADMIN — TRAMADOL HYDROCHLORIDE 50 MG: 50 TABLET ORAL at 19:00

## 2024-03-22 RX ADMIN — OXYCODONE HYDROCHLORIDE 10 MG: 10 TABLET ORAL at 22:23

## 2024-03-22 RX ADMIN — NICOTINE 21 MG: 21 PATCH, EXTENDED RELEASE TRANSDERMAL at 12:27

## 2024-03-22 RX ADMIN — THIAMINE HYDROCHLORIDE 100 MG: 100 INJECTION, SOLUTION INTRAMUSCULAR; INTRAVENOUS at 14:50

## 2024-03-22 RX ADMIN — HYDROMORPHONE HYDROCHLORIDE 1 MG: 1 INJECTION, SOLUTION INTRAMUSCULAR; INTRAVENOUS; SUBCUTANEOUS at 23:09

## 2024-03-22 RX ADMIN — MORPHINE SULFATE 60 MG: 30 TABLET, EXTENDED RELEASE ORAL at 20:04

## 2024-03-22 RX ADMIN — OXYCODONE HYDROCHLORIDE 10 MG: 10 TABLET ORAL at 12:55

## 2024-03-22 RX ADMIN — SODIUM CHLORIDE, SODIUM GLUCONATE, SODIUM ACETATE, POTASSIUM CHLORIDE, MAGNESIUM CHLORIDE, SODIUM PHOSPHATE, DIBASIC, AND POTASSIUM PHOSPHATE 125 ML/HR: .53; .5; .37; .037; .03; .012; .00082 INJECTION, SOLUTION INTRAVENOUS at 14:00

## 2024-03-22 RX ADMIN — MAGNESIUM SULFATE HEPTAHYDRATE 2 G: 2 INJECTION, SOLUTION INTRAVENOUS at 11:34

## 2024-03-22 RX ADMIN — RISPERIDONE 3 MG: 2 TABLET ORAL at 17:04

## 2024-03-22 RX ADMIN — MAGNESIUM SULFATE HEPTAHYDRATE 2 G: 2 INJECTION, SOLUTION INTRAVENOUS at 12:56

## 2024-03-22 RX ADMIN — HYDROMORPHONE HYDROCHLORIDE 2 MG: 2 TABLET ORAL at 09:31

## 2024-03-22 RX ADMIN — HEPARIN SODIUM 5000 UNITS: 5000 INJECTION INTRAVENOUS; SUBCUTANEOUS at 17:04

## 2024-03-22 RX ADMIN — ACETAMINOPHEN 975 MG: 325 TABLET, FILM COATED ORAL at 09:30

## 2024-03-22 RX ADMIN — ACETAMINOPHEN 975 MG: 325 TABLET, FILM COATED ORAL at 12:55

## 2024-03-22 RX ADMIN — KETOROLAC TROMETHAMINE 15 MG: 30 INJECTION, SOLUTION INTRAMUSCULAR; INTRAVENOUS at 09:49

## 2024-03-22 RX ADMIN — DICLOFENAC SODIUM 2 G: 10 GEL TOPICAL at 14:50

## 2024-03-22 RX ADMIN — PANTOPRAZOLE SODIUM 20 MG: 20 TABLET, DELAYED RELEASE ORAL at 17:04

## 2024-03-22 RX ADMIN — DICLOFENAC SODIUM 2 G: 10 GEL TOPICAL at 22:24

## 2024-03-22 RX ADMIN — ZOLPIDEM TARTRATE 10 MG: 5 TABLET ORAL at 22:23

## 2024-03-22 RX ADMIN — HYDROMORPHONE HYDROCHLORIDE 1 MG: 1 INJECTION, SOLUTION INTRAMUSCULAR; INTRAVENOUS; SUBCUTANEOUS at 11:34

## 2024-03-22 NOTE — ASSESSMENT & PLAN NOTE
Patient meeting SIRS criteria on admission due to tachycardia and leukocytosis most likely secondary to hyperglycemic ketoacidosis/infection?  Chest x-ray  UA  COVID flu RSV  Blood cultures-1 out of 2 growing gram-positive cocci  Placed on vancomycin, repeat blood cultures ordered

## 2024-03-22 NOTE — ASSESSMENT & PLAN NOTE
Creatinine on admission 1.59  Baseline appears to be around 0.8-1.0  Meeting KDIGO criteria  IV fluids  I's and O's  Avoid hypotension nephrotoxic agents and contrast  Hold lisinopril and hydrochlorothiazide  Urinary retention protocol  Bladder scans  Continue to trend

## 2024-03-22 NOTE — H&P
[facility]  H&P  Name: Armen Llanos 48 y.o. male I MRN: [id number]  Unit/Bed#: -01 I Date of Admission: 3/22/2024   Date of Service: 3/22/2024  Hospital Day: 0      Assessment/Plan   * Type 2 diabetes mellitus with ketoacidosis without coma, without long-term current use of insulin ([facility])  Assessment & Plan  Lab Results   Component Value Date    HGBA1C 9.3 (H) 05/18/2023    HGBA1C 9.3 (H) 05/18/2023       Recent Labs     03/22/24  1210   POCGLU 600*       Blood Sugar Average: Last 72 hrs:  (P) 600    -Patient presenting with polydipsia and polyuria    Found to have a BS of 639 on admission   LA 2.2  BOHB 1.31  HCO3 19  ANG 19    Started on nonDKA insulin gtt  Initiated IVF  Monitor VBG, BMP, Mag and Phos tid  EKG  troponin  Obtain updated A1c        Increased anion gap metabolic acidosis  Assessment & Plan  See management above  Coma panel  VBG  Beta hydroxybutyrate 1.31  Anion gap 16  Bicarb 19  IV fluids  Most likely secondary to ketoacidosis from hyperglycemia      Electrolyte abnormality  Assessment & Plan  Pseudohyponatremia  Sodium on admission 123  corrected due to hyperglycemia is 132  Continue IV fluids    Potassium:  Replete as necessary with goal greater than 4    Hypomagnesemia:  Replete as necessary with goal greater than 2    Phosphorus:  Replete as necessary with goal greater than 3    SIRS (systemic inflammatory response syndrome) ([facility])  Assessment & Plan  Patient meeting SIRS criteria on admission due to tachycardia and leukocytosis most likely secondary to hyperglycemic ketoacidosis  Chest x-ray  UA  COVID flu RSV  Blood cultures x 2    JH (acute kidney injury) ([facility])  Assessment & Plan  Creatinine on admission 1.59  Baseline appears to be around 0.8-1.0  Meeting KDIGO criteria  IV fluids  I's and O's  Avoid hypotension nephrotoxic agents and contrast  Hold lisinopril and hydrochlorothiazide  Urinary retention protocol  Bladder scans  Continue to  trend      Suicidal ideation  Assessment & Plan  Per wife patient has been mentioning and having suicidal ideation  Consult psych  Virtual one-to-one  Finger foods    Acute on Chronic Right knee pain  Assessment & Plan  Pt presenting due to right knee pain after possibly falling  XR right knee per my review WNL awaiting final read  Ortho consulted  See below for mgx    TBI (traumatic brain injury) (HCC)  Assessment & Plan  History of TBI at 7 years old  Experiences memory issues  Continue Ambien and risperidone    Essential hypertension  Assessment & Plan  Continue amlodipine at higher parameters  Hold lisinopril hydrochlorothiazide due to JH    Chronic, continuous use of opioids  Assessment & Plan  MP reviewed  No red flags  Continue fentanyl patch 75 mcg every 72 hours  Continue morphine 60 mg twice daily  Hold ibuprofen due to elevated creatinine  Tylenol scheduled  Tramadol prn        VTE Pharmacologic Prophylaxis: VTE Score: 2 Moderate Risk (Score 3-4) - Pharmacological DVT Prophylaxis Ordered: heparin.  Code Status: Prior full code  Discussion with family: Updated  (wife) at bedside.    Anticipated Length of Stay: Patient will be admitted on an inpatient basis with an anticipated length of stay of greater than 2 midnights secondary to hyperglycemia with ketoacidosis.    Total Time Spent on Date of Encounter in care of patient: 78 mins. This time was spent on one or more of the following: performing physical exam; counseling and coordination of care; obtaining or reviewing history; documenting in the medical record; reviewing/ordering tests, medications or procedures; communicating with other healthcare professionals and discussing with patient's family/caregivers.    Chief Complaint: right knee pain     History of Present Illness:  Armen Llanos is a 48 y.o. male with a PMH of TBI, hypertension, chronic opioid use, chronic knee pain who presents with right knee pain due to potentially  falling.  In the ED was noted that he had been having polydipsia over the past couple of days.  Also per wife at bedside patient had been having suicidal ideation.  X-ray on admission per my review within normal limits consulted Ortho.  Discussed with Ortho.  X-ray per their review also looks okay.  Will officially see tomorrow.  Discussed with psych suicidal ideation and consult was placed awaiting further recs from psych.  Patient placed on non-DKA insulin drip and IV fluids.  Will obtain updated A1c monitor BMP mag Phos and VBG levels.  Will obtain EKG.  Patient meeting SIRS criteria on admission will evaluate for sepsis.    Review of Systems:  Review of Systems   All other systems reviewed and are negative.      Past Medical and Surgical History:   Past Medical History:   Diagnosis Date    Diabetes mellitus (HCC)     Hypertension        Past Surgical History:   Procedure Laterality Date    KNEE SURGERY         Meds/Allergies:  Prior to Admission medications    Medication Sig Start Date End Date Taking? Authorizing Provider   amLODIPine (NORVASC) 5 mg tablet Take 5 mg by mouth daily    Historical Provider, MD   b complex vitamins capsule Take 1 capsule by mouth daily    Historical Provider, MD   cyclobenzaprine (FLEXERIL) 10 mg tablet Take 10 mg by mouth 3 (three) times a day as needed    Historical Provider, MD   docusate sodium (COLACE) 250 MG capsule Take 750 mg by mouth 2 (two) times a day    Historical Provider, MD   esomeprazole (NexIUM) 20 mg capsule Take 20 mg by mouth 2 (two) times a day before meals    Historical Provider, MD   fentaNYL (DURAGESIC) 75 mcg/hr Place 1 patch on the skin every third day    Historical Provider, MD   glimepiride (AMARYL) 2 mg tablet Take 2 mg by mouth 4 (four) times a day    Historical Provider, MD   ibuprofen (MOTRIN) 800 mg tablet Take 800 mg by mouth every 6 (six) hours as needed for mild pain BID standing, and PRN with morphine if needed    Historical Provider, MD    lisinopril-hydrochlorothiazide (PRINZIDE,ZESTORETIC) 20-25 MG per tablet Take 1 tablet by mouth daily    Historical Provider, MD   metFORMIN (GLUCOPHAGE) 500 mg tablet Take 500 mg by mouth 2 (two) times a day with meals    Historical Provider, MD   Morphine Sulfate ER 15 MG T12A Take 60 mg by mouth every 12 (twelve) hours as needed    Historical Provider, MD   ondansetron (ZOFRAN) 8 mg tablet TAKE 1 TABLET (8 MG) BY MOUTH 3 TIMES PER DAY AS NEEDED 4/11/23   Historical Provider, MD   risperiDONE (RisperDAL) 3 mg tablet Take 3 mg by mouth daily    Historical Provider, MD   simvastatin (ZOCOR) 40 mg tablet Take 40 mg by mouth daily    Historical Provider, MD   SUMAtriptan (IMITREX) 100 mg tablet Take 100 mg by mouth    Historical Provider, MD   zolpidem (AMBIEN) 10 mg tablet Take 10 mg by mouth    Historical Provider, MD   clonazePAM (KlonoPIN) 1 mg tablet Take 1 mg by mouth 2 (two) times a day  11/15/22  Historical Provider, MD KAY have reviewed home medications using recent Epic encounter.    Allergies:   Allergies   Allergen Reactions    Penicillins Hives       Social History:  Marital Status: /Civil Union   Occupation:   Patient Pre-hospital Living Situation: Home  Patient Pre-hospital Level of Mobility: walks  Patient Pre-hospital Diet Restrictions: none  Substance Use History:   Social History     Substance and Sexual Activity   Alcohol Use Not Currently     Social History     Tobacco Use   Smoking Status Never   Smokeless Tobacco Never     Social History     Substance and Sexual Activity   Drug Use Yes    Types: Marijuana       Family History:  History reviewed. No pertinent family history.    Physical Exam:     Vitals:   Blood Pressure: 109/74 (03/22/24 1249)  Pulse: (!) 120 (03/22/24 1249)  Temperature: 97.5 °F (36.4 °C) (03/22/24 1249)  Temp Source: Oral (03/22/24 0852)  Respirations: 17 (03/22/24 1249)  Weight - Scale: 91.7 kg (202 lb 2.6 oz) (03/22/24 0852)  SpO2: 94 % (03/22/24 1249)    Physical  Exam  Vitals and nursing note reviewed.   Constitutional:       General: He is not in acute distress.     Appearance: He is ill-appearing.   HENT:      Head: Normocephalic and atraumatic.   Cardiovascular:      Rate and Rhythm: Regular rhythm. Tachycardia present.      Pulses: Normal pulses.      Heart sounds: Normal heart sounds.   Pulmonary:      Effort: Pulmonary effort is normal.      Breath sounds: Normal breath sounds.   Abdominal:      General: Abdomen is flat. Bowel sounds are normal.      Palpations: Abdomen is soft.   Musculoskeletal:      Right lower leg: No edema.      Left lower leg: No edema.      Comments: Right knee edema and decreased ROM no erythema   Skin:     General: Skin is warm.   Neurological:      General: No focal deficit present.      Mental Status: He is alert and oriented to person, place, and time.          Additional Data:     Lab Results:  Results from last 7 days   Lab Units 03/22/24  1051 03/22/24  0948   WBC Thousand/uL  --  17.89*   HEMOGLOBIN g/dL  --  17.6*   I STAT HEMOGLOBIN g/dl 17.0  --    HEMATOCRIT %  --  50.0*   HEMATOCRIT, ISTAT % 50*  --    PLATELETS Thousands/uL  --  245   NEUTROS PCT %  --  92*   LYMPHS PCT %  --  2*   MONOS PCT %  --  5   EOS PCT %  --  1     Results from last 7 days   Lab Units 03/22/24  1051 03/22/24  0948   SODIUM mmol/L  --  123*   POTASSIUM mmol/L  --  5.3   CHLORIDE mmol/L  --  88*   CO2 mmol/L  --  19*   CO2, I-STAT mmol/L 20*  --    BUN mg/dL  --  30*   CREATININE mg/dL  --  1.59*   ANION GAP mmol/L  --  16*   CALCIUM mg/dL  --  9.6   GLUCOSE RANDOM mg/dL  --  639*         Results from last 7 days   Lab Units 03/22/24  1210   POC GLUCOSE mg/dl 600*         Results from last 7 days   Lab Units 03/22/24  1148   LACTIC ACID mmol/L 2.2*       Lines/Drains:  Invasive Devices       Peripheral Intravenous Line  Duration             Peripheral IV 03/22/24 Right Antecubital <1 day                        Imaging: Personally reviewed the following  imaging: xray(s)  XR knee 1 or 2 vw right   ED Interpretation by NATA Espinoza (03/22 1111)   No acute fracture identified by me.      XR chest portable    (Results Pending)       EKG and Other Studies Reviewed on Admission:   EKG: No EKG obtained.    ** Please Note: This note has been constructed using a voice recognition system. **

## 2024-03-22 NOTE — ASSESSMENT & PLAN NOTE
Per wife patient has been mentioning and having suicidal ideation  Consult psych  Virtual one-to-one  [employer]

## 2024-03-22 NOTE — ASSESSMENT & PLAN NOTE
Pt presenting due to right knee pain after possibly falling  XR right knee per my review WNL awaiting final read  Ortho consulted  See below for mgx

## 2024-03-22 NOTE — CONSULTS
TELEConsultation - Behavioral Health   Armen Llanos 48 y.o. male MRN: [id number]  Unit/Bed#: -01 Encounter: [id number]    REQUIRED DOCUMENTATION:     1. This service was provided via Telemedicine.  2. Provider located in [place].  3. TeleMed provider: Gio Pineda MD  4. Identify all parties in room with patient during tele consult: patient  5. After connecting through televideo, patient was identified by name and date of birth. Parent/patient was then informed that this was being conducted confidentially over secure lines. My office door was closed. Parties in the room listed above as per #4.  Patient acknowledged consent and understanding of privacy and security of the Telemedicine visit. The patient is aware this is a billable service and understands that he can discontinue the visit at any time. I informed the patient that I have reviewed their record in Epic and presented the opportunity for them to ask any questions regarding the visit today.  The patient agreed to participate.       Chief Complaint: SI    History of Present Illness   Physician Requesting Consult: Maria T Awan MD    Reason for Consult / Principal Problem: SI    Per H and P: Armen Llanos is a 48 y.o. male with a PMH of TBI, hypertension, chronic opioid use, chronic knee pain who presents with right knee pain due to potentially falling.  In the ED was noted that he had been having polydipsia over the past couple of days.  Also per wife at bedside patient had been having suicidal ideation.  X-ray on admission per my review within normal limits consulted Ortho.  Discussed with Ortho.  X-ray per their review also looks okay.  Will officially see tomorrow.  Discussed with psych suicidal ideation and consult was placed awaiting further recs from psych.  Patient placed on non-DKA insulin drip and IV fluids.  Will obtain updated A1c monitor BMP mag Phos and VBG levels.  Will obtain EKG.  Patient meeting SIRS criteria on admission will  "evaluate for sepsis.     Per Primary Team: Patient has a medical history of TBI, memory issues.  Wife reports that patient has been having frequent suicidal ideation over the past couple of days.  Currently admitted for knee pain and possible fall but was oriented x 3 in the emergency department.  Psychiatry consulted for evaluation of suicidal ideation.    On evaluation,    Patient was calm and cooperative.  He was seen and interviewed with his wife present.  He gave verbal consent to be interviewed with his wife and for his wife to provide input when necessary for interview.  He reports that he has a history of traumatic brain injury beginning when he was 11 and fell over 10 feet from a truck and landed on his head.  He reports that since then he has had issues with spatial awareness and has banged his head on doors, ceilings, walls many times exacerbating his symptoms.  He has chronic TBI symptoms such as erratic sleep wake cycle, anger and irritability, difficulty smelling and tasting, and mood lability.  He reports that he has chronic pain from multiple physical injuries and notably has current knee pain in his right knee.  He reports that when his pain becomes so severe \"when it becomes a 10, I can only think of the pain and I say suicidal statements because I do not want to be here anymore.\"  He specifically says that his suicidal thoughts are only because he wants to stop the pain and he does not know how to properly cope with this, saying things that he does not necessarily mean.  He only reports that he is not sure how to express his frustration with the pain so he makes extreme statements.  He reports that he specifically is not suicidal and does not want to die.  He has never committed any acts of self-harm or self-injurious behaviors.  Has never been hospitalized for psychiatric reasons.  He currently takes risperidone and Klonopin for mood stability and anxiety, using Klonopin only once per week when " he becomes extremely distraught.  Has never had any withdrawal from Klonopin.  Denies current SI/HI/AVH.  Reports that he has never had suicidal thoughts other than when he is in extreme pain.  Denies any ongoing problems with his mood including depressed mood, changes in sleep compared to baseline, denies any problems with appetite, denies any problems with energy or concentration and works as a computer repairman.  He reports that when he has suicidal thoughts and no one is around to help him he called [id number] which is the mental health crisis line.  Pupils are round he relies on his family for support.  Validated his coping mechanisms and encouraged him to continue to do so.  Discussed that he does not meet criteria for inpatient psychiatric hospitalization and is already on appropriate medications.  He expressed understanding and again denies suicidal ideation, intention, or plan at this time.      Wife expressed understanding and agreement with no new medications or psychiatric hospitalization as well.      Psychiatric Review Of Systems:  Medication side effects: none  Sleep: no change  Appetite: no change  Hygiene: able to tend to instrumental and basic ADLs  Anxiety Symptoms: denies  Psychotic Symptoms: denies  Depression Symptoms: as per HPI  Manic Symptoms: denies  PTSD Symptoms: denies  Suicidal Thoughts: as per HPI  Homicidal Thoughts: denies    Historical Information   Psychiatric History:   Diagnoses: TBI  Inpatient Hx: none  Outpatient Hx: in the past had a therapist  Medications/Trials: risperidone, klonopin, ambien    Substance Abuse History:    Social History     Substance and Sexual Activity   Alcohol Use Not Currently     Social History     Substance and Sexual Activity   Drug Use Yes    Types: Marijuana       I discussed substance abuse with the patient and, if pertinent, discussed risks vs benefits of decreasing frequency of use.    Family History:   History reviewed. No pertinent family  history.    Social History  Highest education: [employer] for [employer]  Born: [place]  Currently living: With wife and 12-year-old son  Relationships:   Children: 1 son  Occupation: Computer repair  Rest of social history as per below:  Social History     Socioeconomic History    Marital status: /Civil Union     Spouse name: Not on file    Number of children: Not on file    Years of education: Not on file    Highest education level: Not on file   Occupational History    Not on file   Tobacco Use    Smoking status: Never    Smokeless tobacco: Never   Vaping Use    Vaping status: Some Days    Substances: THC, CBD, Flavoring   Substance and Sexual Activity    Alcohol use: Not Currently    Drug use: Yes     Types: Marijuana    Sexual activity: Not on file   Other Topics Concern    Not on file   Social History Narrative    Not on file     Social Determinants of Health     Financial Resource Strain: Not on file   Food Insecurity: No Food Insecurity (5/18/2023)    Hunger Vital Sign     Worried About Running Out of Food in the Last Year: Never true     Ran Out of Food in the Last Year: Never true   Transportation Needs: No Transportation Needs (5/18/2023)    PRAPARE - Transportation     Lack of Transportation (Medical): No     Lack of Transportation (Non-Medical): No   Physical Activity: Not on file   Stress: Not on file   Social Connections: Not on file   Intimate Partner Violence: Not on file   Housing Stability: Low Risk  (5/18/2023)    Housing Stability Vital Sign     Unable to Pay for Housing in the Last Year: No     Number of Places Lived in the Last Year: 1     Unstable Housing in the Last Year: No       Past Medical History:   Diagnosis Date    Diabetes mellitus (HCC)     Hypertension        Medical Review Of Systems:  Patient denies headache or dizziness.   Patient denies chest pain or palpitations.  Patient denies difficulty breathing or wheezing.  Patient denies nausea, vomiting, or diarrhea.  Patient  denies polyuria or polydipsia.  Patient denies weakness or numbness.  Pertinent positives as per HPI.    Meds/Allergies   Allergies   Allergen Reactions    Penicillins Hives     Current Facility-Administered Medications   Medication Dose Route Frequency    acetaminophen (TYLENOL) tablet 975 mg  975 mg Oral Q8H    Diclofenac Sodium (VOLTAREN) 1 % topical gel 2 g  2 g Topical 4x Daily    insulin regular (HumuLIN R,NovoLIN R) 1 Units/mL in sodium chloride 0.9 % 100 mL infusion  0.3-21 Units/hr Intravenous Titrated    magnesium sulfate 2 g/50 mL IVPB (premix) 2 g  2 g Intravenous Once    multi-electrolyte (PLASMALYTE-A/ISOLYTE-S PH 7.4) IV solution  125 mL/hr Intravenous Continuous    nicotine (NICODERM CQ) 21 mg/24 hr TD 24 hr patch 21 mg  21 mg Transdermal Daily    traMADol (ULTRAM) tablet 50 mg  50 mg Oral Q6H PRN    Or    oxyCODONE (ROXICODONE) immediate release tablet 10 mg  10 mg Oral Q6H PRN    thiamine (VITAMIN B1) 100 mg in sodium chloride 0.9 % 50 mL IVPB  100 mg Intravenous Once       Current Medications:  Current medications as per above. All medications have been reviewed.   Risks, benefits, alternatives, and possible side effects of patient's psychiatric medications were discussed with patient.     Objective   Vital signs in last 24 hours:  Temp:  [97.5 °F (36.4 °C)-97.9 °F (36.6 °C)] 97.5 °F (36.4 °C)  HR:  [120-122] 120  Resp:  [17-20] 17  BP: (109-111)/(73-74) 109/74    Mental Status Exam:  Appearance: casually dressed, consistent with stated age  Motor: no psychomotor retardation, no gait abnormalities  Behavior: cooperative, answers questions appropriately  Speech: Normal rate and rhythm  Mood: Okay  Affect: Euthymic, labile at times  Thought Process: linear and goal-oriented  Thought Content: denies auditory hallucinations, denies visual hallucinations, denies delusions  Risk Potential: denies suicidal ideation, plan, or intent. Denies homicidal ideation  Sensorium: Oriented to person, place, time,  and situation  Cognition: cognitive ability appears intact but was not quantitatively tested  Consciousness: alert and awake  Attention: intact, able to focus without difficulty  Insight: fair  Judgement: Fair      Laboratory results:  I have personally reviewed all pertinent laboratory/tests results.  Recent Results (from the past 48 hour(s))   Basic metabolic panel    Collection Time: 03/22/24  9:48 AM   Result Value Ref Range    Sodium 123 (L) 135 - 147 mmol/L    Potassium 5.3 3.5 - 5.3 mmol/L    Chloride 88 (L) 96 - 108 mmol/L    CO2 19 (L) 21 - 32 mmol/L    ANION GAP 16 (H) 4 - 13 mmol/L    BUN 30 (H) 5 - 25 mg/dL    Creatinine 1.59 (H) 0.60 - 1.30 mg/dL    Glucose 639 (HH) 65 - 140 mg/dL    Calcium 9.6 8.4 - 10.2 mg/dL    eGFR 50 ml/min/1.73sq m   CBC and differential    Collection Time: 03/22/24  9:48 AM   Result Value Ref Range    WBC 17.89 (H) 4.31 - 10.16 Thousand/uL    RBC 5.80 (H) 3.88 - 5.62 Million/uL    Hemoglobin 17.6 (H) 12.0 - 17.0 g/dL    Hematocrit 50.0 (H) 36.5 - 49.3 %    MCV 86 82 - 98 fL    MCH 30.3 26.8 - 34.3 pg    MCHC 35.2 31.4 - 37.4 g/dL    RDW 12.7 11.6 - 15.1 %    MPV 11.1 8.9 - 12.7 fL    Platelets 245 149 - 390 Thousands/uL    nRBC 0 /100 WBCs    Neutrophils Relative 92 (H) 43 - 75 %    Immature Grans % 0 0 - 2 %    Lymphocytes Relative 2 (L) 14 - 44 %    Monocytes Relative 5 4 - 12 %    Eosinophils Relative 1 0 - 6 %    Basophils Relative 0 0 - 1 %    Neutrophils Absolute 16.38 (H) 1.85 - 7.62 Thousands/µL    Absolute Immature Grans 0.07 0.00 - 0.20 Thousand/uL    Absolute Lymphocytes 0.38 (L) 0.60 - 4.47 Thousands/µL    Absolute Monocytes 0.91 0.17 - 1.22 Thousand/µL    Eosinophils Absolute 0.10 0.00 - 0.61 Thousand/µL    Basophils Absolute 0.05 0.00 - 0.10 Thousands/µL   Sedimentation rate, automated    Collection Time: 03/22/24  9:48 AM   Result Value Ref Range    Sed Rate 25 (H) 0 - 14 mm/hour   Phosphorus    Collection Time: 03/22/24  9:48 AM   Result Value Ref Range     Phosphorus 3.3 2.7 - 4.5 mg/dL   Magnesium    Collection Time: 03/22/24  9:48 AM   Result Value Ref Range    Magnesium 1.4 (L) 1.9 - 2.7 mg/dL   Beta Hydroxybutyrate    Collection Time: 03/22/24 10:49 AM   Result Value Ref Range    Beta- Hydroxybutyrate 1.31 (H) 0.02 - 0.27 mmol/L   POCT Blood Gas (CG8+)    Collection Time: 03/22/24 10:51 AM   Result Value Ref Range    ph, Hank ISTAT 7.333 7.300 - 7.400    pCO2, Hank i-STAT 35.7 (L) 42.0 - 50.0 mm HG    pO2, Hank i-STAT 32.0 (L) 35.0 - 45.0 mm HG    BE, i-STAT -6 (L) -2 - 3 mmol/L    HCO3, Hank i-STAT 19.0 (L) 24.0 - 30.0 mmol/L    CO2, i-STAT 20 (L) 21 - 32 mmol/L    O2 Sat, i-STAT 58 (L) 60 - 85 %    SODIUM, I-STAT 123 (L) 136 - 145 mmol/l    Potassium, i-STAT 5.0 3.5 - 5.3 mmol/L    Calcium, Ionized i-STAT 1.20 1.12 - 1.32 mmol/L    Hct, i-STAT 50 (H) 36.5 - 49.3 %    Hgb, i-STAT 17.0 12.0 - 17.0 g/dl    Glucose, i-STAT >600 (HH) 65 - 140 mg/dl    Specimen Type VENOUS    Lactic acid, plasma (w/reflex if result > 2.0)    Collection Time: 03/22/24 11:48 AM   Result Value Ref Range    LACTIC ACID 2.2 (HH) 0.5 - 2.0 mmol/L   Fingerstick Glucose (POCT)    Collection Time: 03/22/24 12:10 PM   Result Value Ref Range    POC Glucose 600 (HH) 65 - 140 mg/dl          Assessment/Plan     Assessment: 48-year-old man with history of TBI presents to the hospital with chronic knee pain and possible fall exacerbating right knee pain.  On chronic opioids, on risperidone and Klonopin for TBI symptoms of mood lability and anxiety.  No significant history of depression.  No inpatient psychiatric hospitalization history.  No history of self-injurious behaviors or self-harm.  Reports that his suicidal statements and ideations are only in the setting of severe pain that he finds difficult to manage.  He reports that if he was not in pain he would not have any suicidal thoughts or any problems with his mood.  He reports that this time he does not have any depressed mood or any other symptoms  of depression.  Denies current suicidal ideation intention or plan.  Feels that his mood is controlled well on risperidone and Klonopin.  Uses Klonopin only once per week and does not appear to be at risk for withdrawal.  Has never experienced withdrawal in the past.  We discussed that he does not meet criteria for inpatient psychiatric hospitalization and does not warrant any new medications for his symptoms as his suicidal thoughts are conditional to his severe pain.  In the setting of conditional suicidality, patient does not meet inpatient psychiatric hospitalization criteria and should be monitored for further mood or suicidal symptoms.  As he is currently denying suicidal ideation, intention, or plan, no observation necessary.  If patient again expresses suicidal ideations or engages in any self-harm behaviors, recommend consideration of virtual observation or one-to-one depending on severity of his symptoms.    Diagnosis: TBI with associated mood changes; suicidal thoughts in the setting of severe pain    Recommendations:   --Patient does not meet criteria for inpatient psychiatric hospitalization, continue with medical stabilization and treat pain appropriately  --Continue home psychiatric medications at the discretion of primary team including risperidone and Klonopin  --Please TigerText with any questions or concerns.    Diagnoses, available treatment options, alternatives to treatment, and risks vs. benefits of current psychiatric treatment plan were discussed with the patient.  Prior records were reviewed in [facility] [employer].  The case was discussed with the primary team.    Gio Pineda MD    This note has been constructed using a voice recognition system. There may be translation, syntax, or grammatical errors. If you have any questions, please contact the dictating provider.

## 2024-03-22 NOTE — ASSESSMENT & PLAN NOTE
MP reviewed  No red flags  Continue fentanyl patch 75 mcg every 72 hours  Continue morphine 60 mg twice daily  Scheduled Tylenol  Pain regimen Oxy 5 mg for moderate, 10 mg for severe, Dilaudid for breakthrough  Ibuprofen as needed not more than twice per day for hyponatremia

## 2024-03-22 NOTE — ASSESSMENT & PLAN NOTE
Lab Results   Component Value Date    HGBA1C 12.1 (H) 03/22/2024       Recent Labs     03/23/24  0353 03/23/24  0554 03/23/24  0837 03/23/24  1000   POCGLU 148* 135 154* 156*       Blood Sugar Average: Last 72 hrs:  (P) [id number]    -Patient presenting with polydipsia and polyuria  Will switch to subcu Lantus with Humalog tonight-will use weight-based insulin requirements  Hemoglobin A1c 12.1-[place] need insulin for discharge

## 2024-03-22 NOTE — ED PROVIDER NOTES
History  Chief Complaint   Patient presents with    Fall     Pt c/o right knee pain after pt unsure if he had a fall this morning. Pt denies thinners. Pt had a previous TBI and does not remember things currently. Pt denies cp/sob/n/v/d or fevers     The patient is a 48-year-old male with PMH of HTN, T2DM, and TBI brought in by his wife for 1 day of right knee pain.  The patient and his wife note that he has chronic bilateral knee pain and has had multiple surgeries/injections on the knees in the past.  He lives at a 7 out of 10 to bilateral knees.  His wife notes when there is change in weather or atmospheric pressure his pain notably worsens.  He has managed with fentanyl patch and 60 mg of extended release morphine twice daily for this chronic pain (prescribed by his PCP Deidre Andrea).  He has not yet taken any of his pain medications this morning.  The patient notes this pain was atraumatic and denies falls.  His wife questions if he may have fallen yesterday or the day prior and notes his memory is sometimes impaired from his TBI.  The patient currently endorses 10 out of 10, severe, all over knee pain with spasms, and that pain is significantly worsened with trying to move the joint due to the spasms. The patient denies associated fevers, chills, change in skin, redness or warmth of the knee, history of IVDU, and history of steroid use.  He denies numbness/tingling to the distal right foot.       History provided by:  Patient and significant other   used: No        Prior to Admission Medications   Prescriptions Last Dose Informant Patient Reported? Taking?   Morphine Sulfate ER 15 MG T12A 3/21/2024  Yes Yes   Sig: Take 60 mg by mouth every 12 (twelve) hours as needed   SUMAtriptan (IMITREX) 100 mg tablet Past Week  Yes Yes   Sig: Take 100 mg by mouth   amLODIPine (NORVASC) 5 mg tablet Past Week  Yes Yes   Sig: Take 5 mg by mouth daily   b complex vitamins capsule Past Week Self Yes Yes    Sig: Take 1 capsule by mouth daily   cyclobenzaprine (FLEXERIL) 10 mg tablet Past Week  Yes Yes   Sig: Take 10 mg by mouth 3 (three) times a day as needed   docusate sodium (COLACE) 250 MG capsule Not Taking Self Yes No   Sig: Take 750 mg by mouth 2 (two) times a day   Patient not taking: Reported on 3/22/2024   esomeprazole (NexIUM) 20 mg capsule Not Taking Self Yes No   Sig: Take 20 mg by mouth 2 (two) times a day before meals   Patient not taking: Reported on 3/22/2024   fentaNYL (DURAGESIC) 75 mcg/hr 3/21/2024  Yes Yes   Sig: Place 1 patch on the skin every third day   glimepiride (AMARYL) 2 mg tablet 3/21/2024  Yes Yes   Sig: Take 2 mg by mouth 4 (four) times a day   ibuprofen (MOTRIN) 800 mg tablet Past Week  Yes Yes   Sig: Take 800 mg by mouth every 6 (six) hours as needed for mild pain BID standing, and PRN with morphine if needed   lisinopril-hydrochlorothiazide (PRINZIDE,ZESTORETIC) 20-25 MG per tablet 3/21/2024  Yes Yes   Sig: Take 1 tablet by mouth daily   metFORMIN (GLUCOPHAGE) 500 mg tablet 3/21/2024  Yes Yes   Sig: Take 500 mg by mouth 2 (two) times a day with meals   ondansetron (ZOFRAN) 8 mg tablet Past Week  Yes Yes   Sig: TAKE 1 TABLET (8 MG) BY MOUTH 3 TIMES PER DAY AS NEEDED   risperiDONE (RisperDAL) 3 mg tablet 3/21/2024  Yes Yes   Sig: Take 3 mg by mouth daily   simvastatin (ZOCOR) 40 mg tablet 3/21/2024  Yes Yes   Sig: Take 40 mg by mouth daily   zolpidem (AMBIEN) 10 mg tablet Past Week  Yes Yes   Sig: Take 10 mg by mouth      Facility-Administered Medications: None       Past Medical History:   Diagnosis Date    Diabetes mellitus (HCC)     Hypertension        Past Surgical History:   Procedure Laterality Date    KNEE SURGERY         History reviewed. No pertinent family history.  I have reviewed and agree with the history as documented.    E-Cigarette/Vaping    E-Cigarette Use Current Some Day User      E-Cigarette/Vaping Substances    Nicotine No     THC Yes     CBD Yes     Flavoring Yes      Other No     Unknown No      Social History     Tobacco Use    Smoking status: Never    Smokeless tobacco: Never   Vaping Use    Vaping status: Some Days    Substances: THC, CBD, Flavoring   Substance Use Topics    Alcohol use: Not Currently    Drug use: Yes     Types: Marijuana       Review of Systems   Constitutional:  Negative for chills and fever.   Respiratory:  Negative for shortness of breath.    Cardiovascular:  Negative for chest pain, palpitations and leg swelling.   Gastrointestinal:  Negative for abdominal pain, nausea and vomiting.   Genitourinary: Negative.    Musculoskeletal:  Positive for arthralgias (Right knee x 1 day), gait problem (Due to right knee pain) and joint swelling (Right knee x 1 day). Negative for back pain.   Skin:  Negative for color change, pallor, rash and wound.   Allergic/Immunologic: Negative for immunocompromised state.   Neurological:  Negative for dizziness, tremors, syncope, weakness, light-headedness, numbness and headaches.   All other systems reviewed and are negative.      Physical Exam  Physical Exam  Vitals and nursing note reviewed.   Constitutional:       General: He is in acute distress (Evidencing acute discomfort and mild distress).      Appearance: Normal appearance. He is well-developed. He is not ill-appearing, toxic-appearing or diaphoretic.   HENT:      Head: Normocephalic and atraumatic.      Jaw: There is normal jaw occlusion.      Nose: Nose normal.      Mouth/Throat:      Lips: Pink.      Mouth: Mucous membranes are moist.   Eyes:      Extraocular Movements: Extraocular movements intact.      Conjunctiva/sclera: Conjunctivae normal.   Cardiovascular:      Rate and Rhythm: Regular rhythm. Tachycardia present.      Pulses: Normal pulses.           Radial pulses are 2+ on the right side and 2+ on the left side.        Dorsalis pedis pulses are 2+ on the right side and 2+ on the left side.      Heart sounds: Normal heart sounds, S1 normal and S2 normal. No  murmur heard.  Pulmonary:      Effort: Pulmonary effort is normal. No tachypnea or respiratory distress.      Breath sounds: Normal breath sounds and air entry. No stridor, decreased air movement or transmitted upper airway sounds. No decreased breath sounds.   Abdominal:      Palpations: Abdomen is soft.      Tenderness: There is no abdominal tenderness. There is no guarding or rebound.   Musculoskeletal:      Cervical back: Neck supple.      Right hip: Normal. No deformity or bony tenderness. Normal range of motion. Normal strength.      Left hip: Normal.      Right upper leg: Tenderness (Distal anterolateral tenderness) present. No bony tenderness.      Left upper leg: Normal.      Right knee: Swelling and effusion present. No deformity, erythema, ecchymosis, bony tenderness or crepitus. Decreased range of motion. Tenderness (Most notably medially and laterally) present.      Left knee: Normal.      Right lower leg: Normal. No edema.      Left lower leg: Normal. No edema.      Right ankle: Normal. No swelling or deformity. Normal range of motion.      Left ankle: Normal.      Right foot: Normal. Normal range of motion and normal capillary refill. No swelling, deformity or bony tenderness. Normal pulse.      Left foot: Normal.   Skin:     General: Skin is warm and dry.      Capillary Refill: Capillary refill takes less than 2 seconds.      Findings: No rash or wound.   Neurological:      General: No focal deficit present.      Mental Status: He is alert and oriented to person, place, and time. Mental status is at baseline.      GCS: GCS eye subscore is 4. GCS verbal subscore is 5. GCS motor subscore is 6.      Sensory: Sensation is intact.         Vital Signs  ED Triage Vitals [03/22/24 0852]   Temperature Pulse Respirations Blood Pressure SpO2   97.9 °F (36.6 °C) (!) 121 18 111/73 98 %      Temp Source Heart Rate Source Patient Position - Orthostatic VS BP Location FiO2 (%)   Oral Monitor Lying Right arm --       Pain Score       10 - Worst Possible Pain           Vitals:    03/22/24 1140 03/22/24 1249 03/22/24 1501 03/22/24 1707   BP:  109/74 120/83 107/73   Pulse: (!) 122 (!) 120 (!) 121 (!) 112   Patient Position - Orthostatic VS:             Visual Acuity      ED Medications  Medications   insulin regular (HumuLIN R,NovoLIN R) 1 Units/mL in sodium chloride 0.9 % 100 mL infusion (3 Units/hr Intravenous Rate/Dose Change 3/22/24 1502)   nicotine (NICODERM CQ) 21 mg/24 hr TD 24 hr patch 21 mg (21 mg Transdermal Medication Applied 3/22/24 1227)   acetaminophen (TYLENOL) tablet 975 mg (975 mg Oral Given 3/22/24 1255)   Diclofenac Sodium (VOLTAREN) 1 % topical gel 2 g (2 g Topical Given 3/22/24 1450)   traMADol (ULTRAM) tablet 50 mg ( Oral See Alternative 3/22/24 1255)     Or   oxyCODONE (ROXICODONE) immediate release tablet 10 mg (10 mg Oral Given 3/22/24 1255)   multi-electrolyte (PLASMALYTE-A/ISOLYTE-S PH 7.4) IV solution (125 mL/hr Intravenous New Bag 3/22/24 1400)   amLODIPine (NORVASC) tablet 5 mg (5 mg Oral Not Given 3/22/24 1703)   cyclobenzaprine (FLEXERIL) tablet 10 mg (has no administration in time range)   docusate sodium (COLACE) capsule 300 mg (300 mg Oral Given 3/22/24 1704)   pantoprazole (PROTONIX) EC tablet 20 mg (20 mg Oral Given 3/22/24 1704)   fentaNYL (DURAGESIC) 75 mcg/hr TD 72 hr patch 1 patch (has no administration in time range)   morphine (MS CONTIN) ER tablet 60 mg (has no administration in time range)   risperiDONE (RisperDAL) tablet 3 mg (3 mg Oral Given 3/22/24 1704)   pravastatin (PRAVACHOL) tablet 80 mg (80 mg Oral Given 3/22/24 1704)   zolpidem (AMBIEN) tablet 10 mg (has no administration in time range)   polyethylene glycol (MIRALAX) packet 17 g (has no administration in time range)   ondansetron (ZOFRAN) injection 4 mg (has no administration in time range)   aluminum-magnesium hydroxide-simethicone (MAALOX) oral suspension 30 mL (has no administration in time range)   heparin (porcine)  subcutaneous injection 5,000 Units (5,000 Units Subcutaneous Given 3/22/24 1704)   acetaminophen (TYLENOL) tablet 975 mg (975 mg Oral Given 3/22/24 0930)   ketorolac (TORADOL) injection 15 mg (15 mg Intravenous Given 3/22/24 0949)   HYDROmorphone (DILAUDID) tablet 2 mg (2 mg Oral Given 3/22/24 0931)   sodium chloride 0.9 % bolus 1,000 mL (1,000 mL Intravenous New Bag 3/22/24 1041)   magnesium sulfate 2 g/50 mL IVPB (premix) 2 g (2 g Intravenous New Bag 3/22/24 1134)   HYDROmorphone (DILAUDID) injection 1 mg (1 mg Intravenous Given 3/22/24 1134)   magnesium sulfate 2 g/50 mL IVPB (premix) 2 g (2 g Intravenous New Bag 3/22/24 1256)   thiamine (VITAMIN B1) 100 mg in sodium chloride 0.9 % 50 mL IVPB (100 mg Intravenous New Bag 3/22/24 1450)       Diagnostic Studies  Results Reviewed       Procedure Component Value Units Date/Time    Hemoglobin A1C w/ EAG Estimation [[id number]] Collected: 03/22/24 0948    Lab Status: In process Specimen: Blood from Arm, Right Updated: 03/22/24 1544    Lactic acid 2 Hours [[id number]]  (Abnormal) Collected: 03/22/24 1432    Lab Status: Final result Specimen: Blood from Line, Venous Updated: 03/22/24 1519     LACTIC ACID 3.1 mmol/L     Narrative:      Result may be elevated if tourniquet was used during collection.    Lactic acid, plasma (w/reflex if result > 2.0) [[id number]]  (Abnormal) Collected: 03/22/24 1148    Lab Status: Final result Specimen: Blood from Arm, Right Updated: 03/22/24 1213     LACTIC ACID 2.2 mmol/L     Narrative:      Result may be elevated if tourniquet was used during collection.    Fingerstick Glucose (POCT) [[id number]]  (Abnormal) Collected: 03/22/24 1210    Lab Status: Final result Specimen: Blood Updated: 03/22/24 1212     POC Glucose 600 mg/dl     Beta Hydroxybutyrate [[id number]]  (Abnormal) Collected: 03/22/24 1049    Lab Status: Final result Specimen: Blood from Arm, Right Updated: 03/22/24 1110     Beta- Hydroxybutyrate 1.31 mmol/L     Phosphorus [[id number]]   (Normal) Collected: 03/22/24 0948    Lab Status: Final result Specimen: Blood from Arm, Right Updated: 03/22/24 1101     Phosphorus 3.3 mg/dL     Magnesium [[id number]]  (Abnormal) Collected: 03/22/24 0948    Lab Status: Final result Specimen: Blood from Arm, Right Updated: 03/22/24 1101     Magnesium 1.4 mg/dL     POCT Blood Gas (CG8+) [[id number]]  (Abnormal) Collected: 03/22/24 1051    Lab Status: Final result Specimen: Venous Updated: 03/22/24 1053     ph, Hank ISTAT 7.333     pCO2, Hank i-STAT 35.7 mm HG      pO2, Hank i-STAT 32.0 mm HG      BE, i-STAT -6 mmol/L      HCO3, Hank i-STAT 19.0 mmol/L      CO2, i-STAT 20 mmol/L      O2 Sat, i-STAT 58 %      SODIUM, I-STAT 123 mmol/l      Potassium, i-STAT 5.0 mmol/L      Calcium, Ionized i-STAT 1.20 mmol/L      Hct, i-STAT 50 %      Hgb, i-STAT 17.0 g/dl      Glucose, i-STAT >600 mg/dl      Specimen Type VENOUS    UA w Reflex to Microscopic w Reflex to Culture [[id number]]     Lab Status: No result Specimen: Urine     Basic metabolic panel [[id number]]  (Abnormal) Collected: 03/22/24 0948    Lab Status: Final result Specimen: Blood from Arm, Right Updated: 03/22/24 1025     Sodium 123 mmol/L      Potassium 5.3 mmol/L      Chloride 88 mmol/L      CO2 19 mmol/L      ANION GAP 16 mmol/L      BUN 30 mg/dL      Creatinine 1.59 mg/dL      Glucose 639 mg/dL      Calcium 9.6 mg/dL      eGFR 50 ml/min/1.73sq m     Narrative:      [employer] guidelines for Chronic Kidney Disease (CKD):     Stage 1 with normal or high GFR (GFR > 90 mL/min/1.73 square meters)    Stage 2 Mild CKD (GFR = 60-89 mL/min/1.73 square meters)    Stage 3A Moderate CKD (GFR = 45-59 mL/min/1.73 square meters)    Stage 3B Moderate CKD (GFR = 30-44 mL/min/1.73 square meters)    Stage 4 Severe CKD (GFR = 15-29 mL/min/1.73 square meters)    Stage 5 End Stage CKD (GFR <15 mL/min/1.73 square meters)  Note: GFR calculation is accurate only with a steady state creatinine    Sedimentation rate,  automated [[id number]]  (Abnormal) Collected: 03/22/24 0948    Lab Status: Final result Specimen: Blood from Arm, Right Updated: 03/22/24 1010     Sed Rate 25 mm/hour     CBC and differential [[id number]]  (Abnormal) Collected: 03/22/24 0948    Lab Status: Final result Specimen: Blood from Arm, Right Updated: 03/22/24 0954     WBC 17.89 Thousand/uL      RBC 5.80 Million/uL      Hemoglobin 17.6 g/dL      Hematocrit 50.0 %      MCV 86 fL      MCH 30.3 pg      MCHC 35.2 g/dL      RDW 12.7 %      MPV 11.1 fL      Platelets 245 Thousands/uL      nRBC 0 /100 WBCs      Neutrophils Relative 92 %      Immature Grans % 0 %      Lymphocytes Relative 2 %      Monocytes Relative 5 %      Eosinophils Relative 1 %      Basophils Relative 0 %      Neutrophils Absolute 16.38 Thousands/µL      Absolute Immature Grans 0.07 Thousand/uL      Absolute Lymphocytes 0.38 Thousands/µL      Absolute Monocytes 0.91 Thousand/µL      Eosinophils Absolute 0.10 Thousand/µL      Basophils Absolute 0.05 Thousands/µL     Narrative:      This is an appended report.  These results have been appended to a previously verified report.                   XR chest portable   Final Result by Agustin Wylie MD (03/22 1348)      No acute cardiopulmonary disease.            Workstation performed: [id number]         XR knee 1 or 2 vw right   ED Interpretation by NATA Espinoza (03/22 1111)   No acute fracture identified by me.      Final Result by Wesly Estrella MD (03/22 1416)      No acute osseous abnormality.            Workstation performed: [id number]                    Procedures  Procedures         ED Course  ED Course as of 03/22/24 1710   Fri Mar 22, 2024   0959 WBC(!): 17.89  Noted leukocytosis   1031 GLUCOSE(!!): 639  Notable hyperglycemia, will obtain VBG   1031 Sed Rate(!): 25  Mildly elevated   1031 ANION GAP(!): 16  Noted anion gap   1031 Creatinine(!): 1.59  Mild JH   1102 POCT Blood Gas (CG8+)(!!)  Noted acidemia   1102 MAGNESIUM(!):  1.4  Will replete   1111 Beta- Hydroxybutyrate(!): 1.31  Elevated    1115 Critical care JUDITH Tiffanie messaged regarding patient case, she recommends adding on lactic acid, starting insulin gtt. Drip, and admission to [facility]   1143 [facility] messaged regarding pt case   1218 LACTIC ACID(!!): 2.2  Given fluid resuscitation, will trend                                             Medical Decision Making  DDX including but not limited to: Arthritis, bursitis, fracture; considered but doubt septic arthritis    The patient is here with 1 day of atraumatic right knee pain with effusion and decreased range of motion secondary to pain.  No fever, erythema, or warmth to suggest infectious etiology.  No breaks in skin recently and no history of gout.  Labs obtained due to patient's wife expressing increased thirst and excessive drinking by mouth the last few days.  Blood work notable for hyponatremia, hyperglycemia with metabolic shift, mild JH, and leukocytosis of 17.89.  Patient is a type II diabetic and has been compliant with his medications, however he does not check his sugar at home.  Discussed case with critical care who recommends insulin drip and admission to [facility].  Will continue monitor patient's pain and patient with likely consult orthopedics.     Problems Addressed:  Acute pain of right knee: acute illness or injury  Hyperglycemia: acute illness or injury  Metabolic acidosis due to diabetes mellitus (HCC): acute illness or injury    Amount and/or Complexity of Data Reviewed  Labs: ordered. Decision-making details documented in ED Course.  Radiology: ordered and independent interpretation performed.    Risk  OTC drugs.  Prescription drug management.  Decision regarding hospitalization.             Disposition  Final diagnoses:   Hyperglycemia   Metabolic acidosis due to diabetes mellitus (HCC)   Acute pain of right knee     Time reflects when diagnosis was documented in both MDM as applicable and the Disposition within  this note       Time User Action Codes Description Comment    3/22/2024 11:59 AM Janice Obando Add [R73.9] Hyperglycemia     3/22/2024 11:59 AM Gisella Janice Add [E11.10] Metabolic acidosis due to diabetes mellitus (HCC)     3/22/2024 11:59 AM Janice Obando Add [M25.561] Acute pain of right knee     3/22/2024 12:59 PM Maria T Awan Add [R45.851] Suicidal ideation           ED Disposition       ED Disposition   Admit    Condition   Stable    Date/Time   Fri Mar 22, 2024 11:59 AM    Comment   Case was discussed with Dr. Ching and the patient's admission status was agreed to be Admission Status: inpatient status to the service of Dr. Ching .               Follow-up Information    None         Current Discharge Medication List        CONTINUE these medications which have NOT CHANGED    Details   amLODIPine (NORVASC) 5 mg tablet Take 5 mg by mouth daily      b complex vitamins capsule Take 1 capsule by mouth daily      cyclobenzaprine (FLEXERIL) 10 mg tablet Take 10 mg by mouth 3 (three) times a day as needed      fentaNYL (DURAGESIC) 75 mcg/hr Place 1 patch on the skin every third day      glimepiride (AMARYL) 2 mg tablet Take 2 mg by mouth 4 (four) times a day      ibuprofen (MOTRIN) 800 mg tablet Take 800 mg by mouth every 6 (six) hours as needed for mild pain BID standing, and PRN with morphine if needed      lisinopril-hydrochlorothiazide (PRINZIDE,ZESTORETIC) 20-25 MG per tablet Take 1 tablet by mouth daily      metFORMIN (GLUCOPHAGE) 500 mg tablet Take 500 mg by mouth 2 (two) times a day with meals      Morphine Sulfate ER 15 MG T12A Take 60 mg by mouth every 12 (twelve) hours as needed      ondansetron (ZOFRAN) 8 mg tablet TAKE 1 TABLET (8 MG) BY MOUTH 3 TIMES PER DAY AS NEEDED      risperiDONE (RisperDAL) 3 mg tablet Take 3 mg by mouth daily      simvastatin (ZOCOR) 40 mg tablet Take 40 mg by mouth daily      SUMAtriptan (IMITREX) 100 mg tablet Take 100 mg by mouth      zolpidem (AMBIEN) 10 mg tablet Take 10 mg  by mouth      docusate sodium (COLACE) 250 MG capsule Take 750 mg by mouth 2 (two) times a day      esomeprazole (NexIUM) 20 mg capsule Take 20 mg by mouth 2 (two) times a day before meals             No discharge procedures on file.    PDMP Review         Value Time User    PDMP Reviewed  Yes 3/22/2024  1:32 PM Maria T Awan MD            ED Provider  Electronically Signed by             NATA Espinoza  03/22/24 8726

## 2024-03-22 NOTE — PLAN OF CARE
Problem: PAIN - ADULT  Goal: Verbalizes/displays adequate comfort level or baseline comfort level  Description: Interventions:  - Encourage patient to monitor pain and request assistance  - Assess pain using appropriate pain scale  - Administer analgesics based on type and severity of pain and evaluate response  - Implement non-pharmacological measures as appropriate and evaluate response  - Consider cultural and social influences on pain and pain management  - Notify physician/advanced practitioner if interventions unsuccessful or patient reports new pain  Outcome: Progressing     Problem: INFECTION - ADULT  Goal: Absence or prevention of progression during hospitalization  Description: INTERVENTIONS:  - Assess and monitor for signs and symptoms of infection  - Monitor lab/diagnostic results  - Monitor all insertion sites, i.e. indwelling lines, tubes, and drains  - Monitor endotracheal if appropriate and nasal secretions for changes in amount and color  - Errol appropriate cooling/warming therapies per order  - Administer medications as ordered  - Instruct and encourage patient and family to use good hand hygiene technique  - Identify and instruct in appropriate isolation precautions for identified infection/condition  Outcome: Progressing

## 2024-03-22 NOTE — ASSESSMENT & PLAN NOTE
Creatinine on admission 1.59  Baseline appears to be around 0.8-1.0  Meeting KDIGO criteria  I's and O's  Avoid hypotension nephrotoxic agents and contrast  Hold lisinopril and hydrochlorothiazide  Urinary retention protocol  Bladder scans  Continue to trend  Also received Toradol yesterday

## 2024-03-22 NOTE — ASSESSMENT & PLAN NOTE
Lab Results   Component Value Date    HGBA1C 9.3 (H) 05/18/2023    HGBA1C 9.3 (H) 05/18/2023       Recent Labs     03/22/24  1210   POCGLU 600*       Blood Sugar Average: Last 72 hrs:  (P) 600    -Patient presenting with polydipsia and polyuria    Found to have a BS of 639 on admission   LA 2.2  BOHB 1.31  HCO3 19  ANG 19    Started on nonDKA insulin gtt  Initiated IVF  Monitor VBG, BMP, Mag and Phos tid  EKG  troponin  Obtain updated A1c

## 2024-03-22 NOTE — ASSESSMENT & PLAN NOTE
Pseudohyponatremia  Sodium on admission 123  corrected due to hyperglycemia is 132  Continue IV fluids    Potassium:  Replete as necessary with goal greater than 4    Hypomagnesemia:  Replete as necessary with goal greater than 2    Phosphorus:  Replete as necessary with goal greater than 3

## 2024-03-22 NOTE — ASSESSMENT & PLAN NOTE
Pt presenting due to right knee pain after possibly falling  Has swelling, warmth-looks like has effusion on palpation  Ttd Ortho for consult  Pain management  Aspirated today by Ortho follow-up on synovial fluid

## 2024-03-22 NOTE — ASSESSMENT & PLAN NOTE
Patient meeting SIRS criteria on admission due to tachycardia and leukocytosis most likely secondary to hyperglycemic ketoacidosis  Chest x-ray  UA  COVID flu RSV  Blood cultures x 2

## 2024-03-22 NOTE — ASSESSMENT & PLAN NOTE
Per wife patient has been mentioning and having suicidal ideation  Continue home medications as per psychiatry-not meet criteria for inpatient hospitalization

## 2024-03-22 NOTE — ASSESSMENT & PLAN NOTE
MP reviewed  No red flags  Continue fentanyl patch 75 mcg every 72 hours  Continue morphine 60 mg twice daily  Hold ibuprofen due to elevated creatinine  Tylenol scheduled  Tramadol prn

## 2024-03-22 NOTE — ASSESSMENT & PLAN NOTE
See management above  Coma panel  VBG  Beta hydroxybutyrate 1.31  Anion gap 16  Bicarb 19  IV fluids  Most likely secondary to ketoacidosis from hyperglycemia

## 2024-03-23 PROBLEM — E87.1 HYPONATREMIA: Status: ACTIVE | Noted: 2024-03-23

## 2024-03-23 PROBLEM — R78.81 POSITIVE BLOOD CULTURE: Status: ACTIVE | Noted: 2024-03-23

## 2024-03-23 PROBLEM — N17.9 AKI (ACUTE KIDNEY INJURY) (HCC): Status: RESOLVED | Noted: 2024-03-22 | Resolved: 2024-03-23

## 2024-03-23 PROBLEM — E87.29 INCREASED ANION GAP METABOLIC ACIDOSIS: Status: RESOLVED | Noted: 2024-03-22 | Resolved: 2024-03-23

## 2024-03-23 LAB
ALBUMIN SERPL BCP-MCNC: 3.6 G/DL (ref 3.5–5)
ALP SERPL-CCNC: 53 U/L (ref 34–104)
ALT SERPL W P-5'-P-CCNC: 16 U/L (ref 7–52)
ANION GAP SERPL CALCULATED.3IONS-SCNC: 13 MMOL/L (ref 4–13)
ANION GAP SERPL CALCULATED.3IONS-SCNC: 14 MMOL/L (ref 4–13)
ANION GAP SERPL CALCULATED.3IONS-SCNC: 9 MMOL/L (ref 4–13)
APPEARANCE FLD: ABNORMAL
AST SERPL W P-5'-P-CCNC: 13 U/L (ref 13–39)
ATRIAL RATE: 120 BPM
BASE EX.OXY STD BLDV CALC-SCNC: 72.4 % (ref 60–80)
BASE EXCESS BLDV CALC-SCNC: -5.9 MMOL/L
BILIRUB DIRECT SERPL-MCNC: 0.27 MG/DL (ref 0–0.2)
BILIRUB SERPL-MCNC: 0.87 MG/DL (ref 0.2–1)
BUN SERPL-MCNC: 15 MG/DL (ref 5–25)
BUN SERPL-MCNC: 17 MG/DL (ref 5–25)
BUN SERPL-MCNC: 27 MG/DL (ref 5–25)
CALCIUM SERPL-MCNC: 8.6 MG/DL (ref 8.4–10.2)
CALCIUM SERPL-MCNC: 8.7 MG/DL (ref 8.4–10.2)
CALCIUM SERPL-MCNC: 9.2 MG/DL (ref 8.4–10.2)
CHLORIDE SERPL-SCNC: 91 MMOL/L (ref 96–108)
CHLORIDE SERPL-SCNC: 94 MMOL/L (ref 96–108)
CHLORIDE SERPL-SCNC: 95 MMOL/L (ref 96–108)
CO2 SERPL-SCNC: 20 MMOL/L (ref 21–32)
CO2 SERPL-SCNC: 21 MMOL/L (ref 21–32)
CO2 SERPL-SCNC: 22 MMOL/L (ref 21–32)
COLOR FLD: ABNORMAL
CREAT SERPL-MCNC: 0.93 MG/DL (ref 0.6–1.3)
CREAT SERPL-MCNC: 1.04 MG/DL (ref 0.6–1.3)
CREAT SERPL-MCNC: 1.04 MG/DL (ref 0.6–1.3)
CRP SERPL QL: 70.5 MG/L
CRYSTALS SNV QL MICRO: NORMAL
ERYTHROCYTE [DISTWIDTH] IN BLOOD BY AUTOMATED COUNT: 13 % (ref 11.6–15.1)
GFR SERPL CREATININE-BSD FRML MDRD: 83 ML/MIN/1.73SQ M
GFR SERPL CREATININE-BSD FRML MDRD: 83 ML/MIN/1.73SQ M
GFR SERPL CREATININE-BSD FRML MDRD: 96 ML/MIN/1.73SQ M
GLUCOSE SERPL-MCNC: 120 MG/DL (ref 65–140)
GLUCOSE SERPL-MCNC: 128 MG/DL (ref 65–140)
GLUCOSE SERPL-MCNC: 135 MG/DL (ref 65–140)
GLUCOSE SERPL-MCNC: 148 MG/DL (ref 65–140)
GLUCOSE SERPL-MCNC: 154 MG/DL (ref 65–140)
GLUCOSE SERPL-MCNC: 156 MG/DL (ref 65–140)
GLUCOSE SERPL-MCNC: 159 MG/DL (ref 65–140)
GLUCOSE SERPL-MCNC: 162 MG/DL (ref 65–140)
GLUCOSE SERPL-MCNC: 165 MG/DL (ref 65–140)
GLUCOSE SERPL-MCNC: 166 MG/DL (ref 65–140)
GLUCOSE SERPL-MCNC: 167 MG/DL (ref 65–140)
GLUCOSE SERPL-MCNC: 170 MG/DL (ref 65–140)
GLUCOSE SERPL-MCNC: 180 MG/DL (ref 65–140)
GLUCOSE SERPL-MCNC: 181 MG/DL (ref 65–140)
GRAM STN SPEC: ABNORMAL
HCO3 BLDV-SCNC: 17.9 MMOL/L (ref 24–30)
HCT VFR BLD AUTO: 40.3 % (ref 36.5–49.3)
HGB BLD-MCNC: 13.7 G/DL (ref 12–17)
LACTATE SERPL-SCNC: 0.7 MMOL/L (ref 0.5–2)
LYMPHOCYTES # SNV MANUAL: 5 %
MAGNESIUM SERPL-MCNC: 1.5 MG/DL (ref 1.9–2.7)
MAGNESIUM SERPL-MCNC: 1.8 MG/DL (ref 1.9–2.7)
MAGNESIUM SERPL-MCNC: 1.9 MG/DL (ref 1.9–2.7)
MCH RBC QN AUTO: 30.3 PG (ref 26.8–34.3)
MCHC RBC AUTO-ENTMCNC: 34 G/DL (ref 31.4–37.4)
MCV RBC AUTO: 89 FL (ref 82–98)
MONOCYTES NFR SNV MANUAL: 4 %
NEUTROPHILS NFR SNV MANUAL: 91 %
O2 CT BLDV-SCNC: 15.5 ML/DL
OSMOLALITY UR: 434 MMOL/KG
P AXIS: 43 DEGREES
PCO2 BLDV: 30.9 MM HG (ref 42–50)
PH BLDV: 7.38 [PH] (ref 7.3–7.4)
PHOSPHATE SERPL-MCNC: 1.7 MG/DL (ref 2.7–4.5)
PLATELET # BLD AUTO: 180 THOUSANDS/UL (ref 149–390)
PMV BLD AUTO: 10.8 FL (ref 8.9–12.7)
PO2 BLDV: 37.5 MM HG (ref 35–45)
POTASSIUM SERPL-SCNC: 3.4 MMOL/L (ref 3.5–5.3)
POTASSIUM SERPL-SCNC: 3.7 MMOL/L (ref 3.5–5.3)
POTASSIUM SERPL-SCNC: 3.8 MMOL/L (ref 3.5–5.3)
PR INTERVAL: 164 MS
PROT SERPL-MCNC: 6.3 G/DL (ref 6.4–8.4)
QRS AXIS: -1 DEGREES
QRSD INTERVAL: 70 MS
QT INTERVAL: 284 MS
QTC INTERVAL: 401 MS
RBC # BLD AUTO: 4.52 MILLION/UL (ref 3.88–5.62)
S AUREUS DNA BLD POS QL NAA+NON-PROBE: DETECTED
SITE: ABNORMAL
SODIUM 24H UR-SCNC: 40 MOL/L
SODIUM SERPL-SCNC: 125 MMOL/L (ref 135–147)
SODIUM SERPL-SCNC: 127 MMOL/L (ref 135–147)
SODIUM SERPL-SCNC: 127 MMOL/L (ref 135–147)
T WAVE AXIS: 19 DEGREES
TOTAL CELLS COUNTED SPEC: 100
TSH SERPL DL<=0.05 MIU/L-ACNC: 0.39 UIU/ML (ref 0.45–4.5)
URATE SERPL-MCNC: 5.9 MG/DL (ref 3.5–8.5)
VENTRICULAR RATE: 120 BPM
WBC # BLD AUTO: 12.38 THOUSAND/UL (ref 4.31–10.16)
WBC # FLD MANUAL: ABNORMAL /UL (ref 0–200)

## 2024-03-23 PROCEDURE — 99233 SBSQ HOSP IP/OBS HIGH 50: CPT | Performed by: INTERNAL MEDICINE

## 2024-03-23 PROCEDURE — 20610 DRAIN/INJ JOINT/BURSA W/O US: CPT | Performed by: ORTHOPAEDIC SURGERY

## 2024-03-23 PROCEDURE — 93010 ELECTROCARDIOGRAM REPORT: CPT | Performed by: INTERNAL MEDICINE

## 2024-03-23 PROCEDURE — 83930 ASSAY OF BLOOD OSMOLALITY: CPT | Performed by: INTERNAL MEDICINE

## 2024-03-23 PROCEDURE — 82805 BLOOD GASES W/O2 SATURATION: CPT | Performed by: STUDENT IN AN ORGANIZED HEALTH CARE EDUCATION/TRAINING PROGRAM

## 2024-03-23 PROCEDURE — 84100 ASSAY OF PHOSPHORUS: CPT | Performed by: STUDENT IN AN ORGANIZED HEALTH CARE EDUCATION/TRAINING PROGRAM

## 2024-03-23 PROCEDURE — 89060 EXAM SYNOVIAL FLUID CRYSTALS: CPT | Performed by: ORTHOPAEDIC SURGERY

## 2024-03-23 PROCEDURE — 84300 ASSAY OF URINE SODIUM: CPT | Performed by: INTERNAL MEDICINE

## 2024-03-23 PROCEDURE — 87070 CULTURE OTHR SPECIMN AEROBIC: CPT | Performed by: ORTHOPAEDIC SURGERY

## 2024-03-23 PROCEDURE — 89051 BODY FLUID CELL COUNT: CPT | Performed by: ORTHOPAEDIC SURGERY

## 2024-03-23 PROCEDURE — 80076 HEPATIC FUNCTION PANEL: CPT | Performed by: STUDENT IN AN ORGANIZED HEALTH CARE EDUCATION/TRAINING PROGRAM

## 2024-03-23 PROCEDURE — 87147 CULTURE TYPE IMMUNOLOGIC: CPT | Performed by: INTERNAL MEDICINE

## 2024-03-23 PROCEDURE — 83735 ASSAY OF MAGNESIUM: CPT | Performed by: STUDENT IN AN ORGANIZED HEALTH CARE EDUCATION/TRAINING PROGRAM

## 2024-03-23 PROCEDURE — 99222 1ST HOSP IP/OBS MODERATE 55: CPT | Performed by: ORTHOPAEDIC SURGERY

## 2024-03-23 PROCEDURE — 83935 ASSAY OF URINE OSMOLALITY: CPT | Performed by: INTERNAL MEDICINE

## 2024-03-23 PROCEDURE — 99223 1ST HOSP IP/OBS HIGH 75: CPT | Performed by: INTERNAL MEDICINE

## 2024-03-23 PROCEDURE — 83605 ASSAY OF LACTIC ACID: CPT | Performed by: INTERNAL MEDICINE

## 2024-03-23 PROCEDURE — 0S9C3ZX DRAINAGE OF RIGHT KNEE JOINT, PERCUTANEOUS APPROACH, DIAGNOSTIC: ICD-10-PCS | Performed by: ORTHOPAEDIC SURGERY

## 2024-03-23 PROCEDURE — 87077 CULTURE AEROBIC IDENTIFY: CPT | Performed by: ORTHOPAEDIC SURGERY

## 2024-03-23 PROCEDURE — 82948 REAGENT STRIP/BLOOD GLUCOSE: CPT

## 2024-03-23 PROCEDURE — 84443 ASSAY THYROID STIM HORMONE: CPT | Performed by: INTERNAL MEDICINE

## 2024-03-23 PROCEDURE — 87205 SMEAR GRAM STAIN: CPT | Performed by: ORTHOPAEDIC SURGERY

## 2024-03-23 PROCEDURE — 87040 BLOOD CULTURE FOR BACTERIA: CPT | Performed by: INTERNAL MEDICINE

## 2024-03-23 PROCEDURE — 80048 BASIC METABOLIC PNL TOTAL CA: CPT | Performed by: NURSE PRACTITIONER

## 2024-03-23 PROCEDURE — 80048 BASIC METABOLIC PNL TOTAL CA: CPT | Performed by: STUDENT IN AN ORGANIZED HEALTH CARE EDUCATION/TRAINING PROGRAM

## 2024-03-23 PROCEDURE — 84550 ASSAY OF BLOOD/URIC ACID: CPT | Performed by: INTERNAL MEDICINE

## 2024-03-23 PROCEDURE — 85027 COMPLETE CBC AUTOMATED: CPT | Performed by: STUDENT IN AN ORGANIZED HEALTH CARE EDUCATION/TRAINING PROGRAM

## 2024-03-23 PROCEDURE — 87186 SC STD MICRODIL/AGAR DIL: CPT | Performed by: ORTHOPAEDIC SURGERY

## 2024-03-23 RX ORDER — IBUPROFEN 400 MG/1
200 TABLET ORAL 2 TIMES DAILY PRN
Status: DISCONTINUED | OUTPATIENT
Start: 2024-03-23 | End: 2024-03-23

## 2024-03-23 RX ORDER — HYDROMORPHONE HCL IN WATER/PF 6 MG/30 ML
0.2 PATIENT CONTROLLED ANALGESIA SYRINGE INTRAVENOUS EVERY 6 HOURS PRN
Status: DISCONTINUED | OUTPATIENT
Start: 2024-03-23 | End: 2024-03-24

## 2024-03-23 RX ORDER — SODIUM CHLORIDE 1 G/1
2 TABLET ORAL ONCE
Status: COMPLETED | OUTPATIENT
Start: 2024-03-23 | End: 2024-03-23

## 2024-03-23 RX ORDER — MAGNESIUM SULFATE 1 G/100ML
1 INJECTION INTRAVENOUS ONCE
Status: COMPLETED | OUTPATIENT
Start: 2024-03-23 | End: 2024-03-23

## 2024-03-23 RX ORDER — HYDROMORPHONE HCL IN WATER/PF 6 MG/30 ML
0.2 PATIENT CONTROLLED ANALGESIA SYRINGE INTRAVENOUS ONCE
Status: COMPLETED | OUTPATIENT
Start: 2024-03-23 | End: 2024-03-23

## 2024-03-23 RX ORDER — XYLITOL/YERBA SANTA
5 AEROSOL, SPRAY WITH PUMP (ML) MUCOUS MEMBRANE 4 TIMES DAILY PRN
Status: DISCONTINUED | OUTPATIENT
Start: 2024-03-23 | End: 2024-03-24

## 2024-03-23 RX ORDER — SODIUM CHLORIDE 1 G/1
2 TABLET ORAL
Status: DISCONTINUED | OUTPATIENT
Start: 2024-03-23 | End: 2024-03-23

## 2024-03-23 RX ORDER — POTASSIUM CHLORIDE 20 MEQ/1
40 TABLET, EXTENDED RELEASE ORAL ONCE
Status: COMPLETED | OUTPATIENT
Start: 2024-03-23 | End: 2024-03-23

## 2024-03-23 RX ORDER — HYDROMORPHONE HCL/PF 1 MG/ML
0.5 SYRINGE (ML) INJECTION ONCE
Status: COMPLETED | OUTPATIENT
Start: 2024-03-23 | End: 2024-03-23

## 2024-03-23 RX ORDER — INSULIN GLARGINE 100 [IU]/ML
10 INJECTION, SOLUTION SUBCUTANEOUS
Status: DISCONTINUED | OUTPATIENT
Start: 2024-03-23 | End: 2024-03-23

## 2024-03-23 RX ORDER — SUMATRIPTAN 50 MG/1
100 TABLET, FILM COATED ORAL ONCE AS NEEDED
Status: COMPLETED | OUTPATIENT
Start: 2024-03-23 | End: 2024-03-23

## 2024-03-23 RX ORDER — INSULIN GLARGINE 100 [IU]/ML
8 INJECTION, SOLUTION SUBCUTANEOUS
Status: COMPLETED | OUTPATIENT
Start: 2024-03-23 | End: 2024-03-23

## 2024-03-23 RX ORDER — OXYCODONE HYDROCHLORIDE 5 MG/1
5 TABLET ORAL EVERY 4 HOURS PRN
Status: DISCONTINUED | OUTPATIENT
Start: 2024-03-23 | End: 2024-03-24

## 2024-03-23 RX ORDER — SODIUM CHLORIDE 1 G/1
2 TABLET ORAL
Status: DISCONTINUED | OUTPATIENT
Start: 2024-03-23 | End: 2024-03-24

## 2024-03-23 RX ADMIN — DICLOFENAC SODIUM 2 G: 10 GEL TOPICAL at 13:06

## 2024-03-23 RX ADMIN — HYDROMORPHONE HYDROCHLORIDE 0.2 MG: 0.2 INJECTION, SOLUTION INTRAMUSCULAR; INTRAVENOUS; SUBCUTANEOUS at 08:23

## 2024-03-23 RX ADMIN — POTASSIUM CHLORIDE 40 MEQ: 1500 TABLET, EXTENDED RELEASE ORAL at 13:03

## 2024-03-23 RX ADMIN — HYDROMORPHONE HYDROCHLORIDE 0.2 MG: 0.2 INJECTION, SOLUTION INTRAMUSCULAR; INTRAVENOUS; SUBCUTANEOUS at 21:47

## 2024-03-23 RX ADMIN — DICLOFENAC SODIUM 2 G: 10 GEL TOPICAL at 18:05

## 2024-03-23 RX ADMIN — HEPARIN SODIUM 5000 UNITS: 5000 INJECTION INTRAVENOUS; SUBCUTANEOUS at 06:11

## 2024-03-23 RX ADMIN — AMLODIPINE BESYLATE 5 MG: 5 TABLET ORAL at 08:29

## 2024-03-23 RX ADMIN — DOCUSATE SODIUM 300 MG: 100 CAPSULE, LIQUID FILLED ORAL at 08:31

## 2024-03-23 RX ADMIN — NICOTINE 21 MG: 21 PATCH, EXTENDED RELEASE TRANSDERMAL at 08:29

## 2024-03-23 RX ADMIN — HYDROMORPHONE HYDROCHLORIDE 0.2 MG: 0.2 INJECTION, SOLUTION INTRAMUSCULAR; INTRAVENOUS; SUBCUTANEOUS at 14:10

## 2024-03-23 RX ADMIN — SODIUM CHLORIDE 2 G: 1 TABLET ORAL at 21:00

## 2024-03-23 RX ADMIN — RISPERIDONE 3 MG: 2 TABLET ORAL at 08:30

## 2024-03-23 RX ADMIN — DICLOFENAC SODIUM 2 G: 10 GEL TOPICAL at 21:01

## 2024-03-23 RX ADMIN — INSULIN GLARGINE 8 UNITS: 100 INJECTION, SOLUTION SUBCUTANEOUS at 20:04

## 2024-03-23 RX ADMIN — PANTOPRAZOLE SODIUM 20 MG: 20 TABLET, DELAYED RELEASE ORAL at 15:21

## 2024-03-23 RX ADMIN — PRAVASTATIN SODIUM 80 MG: 80 TABLET ORAL at 15:22

## 2024-03-23 RX ADMIN — VANCOMYCIN HYDROCHLORIDE 2000 MG: 1 INJECTION, POWDER, LYOPHILIZED, FOR SOLUTION INTRAVENOUS at 11:07

## 2024-03-23 RX ADMIN — DICLOFENAC SODIUM 2 G: 10 GEL TOPICAL at 08:31

## 2024-03-23 RX ADMIN — HEPARIN SODIUM 5000 UNITS: 5000 INJECTION INTRAVENOUS; SUBCUTANEOUS at 13:03

## 2024-03-23 RX ADMIN — ONDANSETRON 4 MG: 2 INJECTION INTRAMUSCULAR; INTRAVENOUS at 15:21

## 2024-03-23 RX ADMIN — DOCUSATE SODIUM 300 MG: 100 CAPSULE, LIQUID FILLED ORAL at 18:04

## 2024-03-23 RX ADMIN — POTASSIUM PHOSPHATE, MONOBASIC POTASSIUM PHOSPHATE, DIBASIC 21 MMOL: 224; 236 INJECTION, SOLUTION, CONCENTRATE INTRAVENOUS at 20:04

## 2024-03-23 RX ADMIN — SODIUM CHLORIDE 2 G: 1 TABLET ORAL at 19:39

## 2024-03-23 RX ADMIN — OXYCODONE HYDROCHLORIDE 10 MG: 10 TABLET ORAL at 10:30

## 2024-03-23 RX ADMIN — MAGNESIUM SULFATE IN DEXTROSE 1 G: 10 INJECTION, SOLUTION INTRAVENOUS at 13:05

## 2024-03-23 RX ADMIN — SODIUM CHLORIDE 1.5 UNITS/HR: 9 INJECTION, SOLUTION INTRAVENOUS at 13:07

## 2024-03-23 RX ADMIN — OXYCODONE HYDROCHLORIDE 10 MG: 10 TABLET ORAL at 18:04

## 2024-03-23 RX ADMIN — VANCOMYCIN HYDROCHLORIDE 1250 MG: 5 INJECTION, POWDER, LYOPHILIZED, FOR SOLUTION INTRAVENOUS at 18:05

## 2024-03-23 RX ADMIN — MORPHINE SULFATE 60 MG: 30 TABLET, EXTENDED RELEASE ORAL at 20:59

## 2024-03-23 RX ADMIN — SODIUM CHLORIDE, SODIUM GLUCONATE, SODIUM ACETATE, POTASSIUM CHLORIDE, MAGNESIUM CHLORIDE, SODIUM PHOSPHATE, DIBASIC, AND POTASSIUM PHOSPHATE 125 ML/HR: .53; .5; .37; .037; .03; .012; .00082 INJECTION, SOLUTION INTRAVENOUS at 11:05

## 2024-03-23 RX ADMIN — SODIUM CHLORIDE, SODIUM GLUCONATE, SODIUM ACETATE, POTASSIUM CHLORIDE, MAGNESIUM CHLORIDE, SODIUM PHOSPHATE, DIBASIC, AND POTASSIUM PHOSPHATE 125 ML/HR: .53; .5; .37; .037; .03; .012; .00082 INJECTION, SOLUTION INTRAVENOUS at 00:30

## 2024-03-23 RX ADMIN — MORPHINE SULFATE 60 MG: 30 TABLET, EXTENDED RELEASE ORAL at 08:30

## 2024-03-23 RX ADMIN — CYCLOBENZAPRINE 10 MG: 10 TABLET, FILM COATED ORAL at 02:25

## 2024-03-23 RX ADMIN — CYCLOBENZAPRINE 10 MG: 10 TABLET, FILM COATED ORAL at 19:39

## 2024-03-23 RX ADMIN — SUMATRIPTAN SUCCINATE 100 MG: 50 TABLET ORAL at 14:10

## 2024-03-23 RX ADMIN — HEPARIN SODIUM 5000 UNITS: 5000 INJECTION INTRAVENOUS; SUBCUTANEOUS at 21:00

## 2024-03-23 RX ADMIN — HYDROMORPHONE HYDROCHLORIDE 0.5 MG: 1 INJECTION, SOLUTION INTRAMUSCULAR; INTRAVENOUS; SUBCUTANEOUS at 06:31

## 2024-03-23 RX ADMIN — OXYCODONE HYDROCHLORIDE 10 MG: 10 TABLET ORAL at 04:07

## 2024-03-23 RX ADMIN — PANTOPRAZOLE SODIUM 20 MG: 20 TABLET, DELAYED RELEASE ORAL at 04:07

## 2024-03-23 NOTE — UTILIZATION REVIEW
Initial Clinical Review    Admission: Date/Time/Statement:   Admission Orders (From admission, onward)       Ordered        03/22/24 1201  INPATIENT ADMISSION  Once                          Orders Placed This Encounter   Procedures    INPATIENT ADMISSION     Standing Status:   Standing     Number of Occurrences:   1     Order Specific Question:   Level of Care     Answer:   Med Surg [16]     Order Specific Question:   Estimated length of stay     Answer:   More than 2 Midnights     Order Specific Question:   Certification     Answer:   I certify that inpatient services are medically necessary for this patient for a duration of greater than two midnights. See H&P and MD Progress Notes for additional information about the patient's course of treatment.     ED Arrival Information       Expected   -    Arrival   3/22/2024 08:43    Acuity   Urgent              Means of arrival   Wheelchair    Escorted by   Spouse    Service   Hospitalist    Admission type   Emergency              Arrival complaint   rt knee pain             Chief Complaint   Patient presents with    Fall     Pt c/o right knee pain after pt unsure if he had a fall this morning. Pt denies thinners. Pt had a previous TBI and does not remember things currently. Pt denies cp/sob/n/v/d or fevers       Initial Presentation: 48 y.o. male to the ED from home with complaints of right knee pain for a day prior to arrival. Admitted to inpatient for DKA, increased anion gap metabolic acidosis. H/O HTN, T2DM, and TBI, chronic knee pain.  Pain is 10/10.  Unclear if he fell the day before as his memory is sometimes limited.  Arrives tachycardic, in acute distress, tender to distal anterolateral right upper leg.   Right knee with swelling, effusion, decrease range of motion. IN the ED, glucose >600.  Betahydroxybutyrate 1.32, mag 1.4. WBcs 17.89, sed rate 25. Anion gap 16. IN the Ed, given IV zofran, iv dilaudid x 2, 1 liter if fluid bolus, iv mag. Started on Insulin  drip. Monitor vbg, anion gap.  Suicidal ideation. 1:1 in placed.  Hold lisinopril, hctz for KEYON.  Baseline around 0.8-1, creat on admit 1.59.     Behavioral tele consult: Does not meet criteria for IP psychiatric hospitalization. Treat pain appropriately  Med/tox:  Detectable tylenol level of 10.  Check LFts.   IF normal, no further treatment.   Date: 3/23   Day 2:  Today with positive blood culture: 1 out of 2 growing gram-positive cocci .  Warm swelling concerning for septic arthritis.  Repeat blood cultures.  Now with hyponatremia likely due to SIADH with pain, nausea.  S/P iv fluids, toradol, on lisinopril.  Check BMP every 8 hours.  Started on fluid restriction.  Nephrology consult: Keyon and anion gap metabolic acidosis resolved. Urine studies pending.  Add am cortisol level.  Stop IV fluids. Mag initially 1.4, given supplement, repeat is 1.9, Give additional iv mag.  Recheck. Remains tachycardic, pale.     Day 3 3/24: Has surpassed a 2nd midnight with active treatments and services.  Found to have periprostehtic joint infection requiring operative intervention, iv abx.  Has had 7 doses of iV albumin thus far 3/24. Heart rate uncontrolled started on IV cardizem, iv heparin drip.  Requiring ICU level of care post op and remains sedated and intubated. INsulin drip has been discontinued.       OPERATIVE NOTE  Surgery Date: 3/24/2024   Procedure(s):  Right - ARTHROPLASTY KNEE TOTAL REVISION. POLY EXCHANGE  Right - INSERTION OF BIODEGRADABLE DRUG DELIVERY DEVICE (STIMULAN)  Right - EXCISIONAL DEBRIDEMENT DOWN TO AND INCLUDING BONE   Anesthesia Type:   General    Operative Findings:  Large purulent effusion  Well-fixed implants  9 mm PS journey 1 polyethylene   Lateral laxity in flexion of approximately 1 cm  No medial laxity  ED Triage Vitals [03/22/24 0852]   Temperature Pulse Respirations Blood Pressure SpO2   97.9 °F (36.6 °C) (!) 121 18 111/73 98 %      Temp Source Heart Rate Source Patient Position - Orthostatic  VS BP Location FiO2 (%)   Oral Monitor Lying Right arm --      Pain Score       10 - Worst Possible Pain          Wt Readings from Last 1 Encounters:   03/24/24 98.2 kg (216 lb 7.9 oz)     Additional Vital Signs: Vital Signs (last 2 days)  Vital Signs (last 2 days)    Date/Time Temp Pulse Resp BP MAP (mmHg) SpO2 O2 Device Patient Position - Orthostatic VS   03/24/24 1120 -- -- -- -- -- 100 % -- --   03/24/24 1116 -- 156 Abnormal  24 Abnormal  -- -- 100 % -- --   03/24/24 1040 -- -- -- -- -- 100 % -- --   03/24/24 1035 -- -- -- -- -- 99 % Ventilator --   03/24/24 1031 99.5 °F (37.5 °C) 163 Abnormal  16 162/62 -- 100 % Ventilator  --   O2 Device: 16 x 500, 100%, peep 6 at 03/24/24 1031   03/24/24 0740 98.7 °F (37.1 °C) 138 Abnormal  20 126/61 -- 98 % None (Room air) --   03/24/24 07:05:39 -- 84 -- 129/76 94 97 % -- --   03/24/24 07:00:14 98.5 °F (36.9 °C) 83 17 129/76 94 97 % -- --   03/24/24 05:53:31 99.5 °F (37.5 °C) 144 Abnormal  -- 130/76 94 93 % -- --     Date/Time Temp Pulse Resp BP MAP (mmHg) SpO2 O2 Device Patient Position - Orthostatic VS   03/23/24 08:29:15 -- 131 Abnormal  -- 133/80 98 96 % -- --   03/23/24 0829 -- -- -- 133/80 -- -- -- --   03/23/24 07:38:26 99.1 °F (37.3 °C) 131 Abnormal  21 134/80 98 96 % -- --   03/22/24 22:34:32 97.9 °F (36.6 °C) 139 Abnormal  -- -- -- 97 % -- --   03/22/24 21:53:14 -- 122 Abnormal  -- 126/81 96 97 % -- --   03/22/24 17:07:44 -- 112 Abnormal  -- 107/73 84 96 % -- --   03/22/24 15:01:30 97.7 °F (36.5 °C) 121 Abnormal  20 120/83 95 96 % -- --   03/22/24 1300 -- -- -- -- -- -- None (Room air) --   03/22/24 12:49:34 97.5 °F (36.4 °C) 120 Abnormal  17 109/74 86 94 % -- --   03/22/24 1140 -- 122 Abnormal  20 -- -- 97 % -- --   03/22/24 0904 -- -- -- -- -- -- None (Room air) --   03/22/24 0852 97.9 °F (36.6 °C) 121 Abnormal  18 111/73 85 98 % None (Room air) Lying     Pertinent Labs/Diagnostic Test Results:   3/22 EKG:         Narrative & Impression    Sinus  tachycardia  Possible Left atrial enlargement  Low voltage QRS  Cannot rule out Anterior infarct , age undetermined  Abnormal ECG  When compared with ECG of 18-MAY-2023 00:49,  Minimal criteria for Anterior infarct are now Present  T wave inversion now evident in Inferior leads        XR chest portable   Final Result by Agustin Wylie MD (03/22 1348)      No acute cardiopulmonary disease.            Workstation performed: [id number]         XR knee 1 or 2 vw right   ED Interpretation by NATA Espinoza (03/22 1111)   No acute fracture identified by me.      Final Result by Wesly Etsrella MD (03/22 1416)      No acute osseous abnormality.            Workstation performed: [id number]         S     Results from last 7 days   Lab Units 03/22/24  1408   SARS-COV-2  Negative     Results from last 7 days   Lab Units 03/24/24  1047 03/24/24  0739 03/23/24  0356 03/22/24  1051 03/22/24  0948   WBC Thousand/uL 6.56 11.62* 12.38*  --  17.89*   HEMOGLOBIN g/dL 10.1* 12.0 13.7  --  17.6*   I STAT HEMOGLOBIN g/dl  --   --   --  17.0  --    HEMATOCRIT % 29.6* 35.2* 40.3  --  50.0*   HEMATOCRIT, ISTAT %  --   --   --  50*  --    PLATELETS Thousands/uL 132* 168 180  --  245   NEUTROS ABS Thousands/µL  --  10.10*  --   --  16.38*   BANDS PCT % 11*  --   --   --   --          Results from last 7 days   Lab Units 03/24/24  1047 03/24/24  0739 03/24/24  0050 03/23/24  1814 03/23/24  1521 03/23/24  1215 03/23/24  1210 03/23/24  0029 03/22/24  1432 03/22/24  1051 03/22/24  0948   SODIUM mmol/L 134*  --  127* 127*  --  127*  --  125*   < >  --  123*   POTASSIUM mmol/L 3.3*  --  4.2 3.7  --  3.4*  --  3.8   < >  --  5.3   CHLORIDE mmol/L 99  --  97 94*  --  91*  --  95*   < >  --  88*   CO2 mmol/L 18*  --  15* 20*  --  22  --  21   < >  --  19*   CO2, I-STAT mmol/L  --   --   --   --   --   --   --   --   --  20*  --    ANION GAP mmol/L 17*  --  15* 13  --  14*  --  9   < >  --  16*   BUN mg/dL 15  --  14 15  --  17  --  27*   < >  --   30*   CREATININE mg/dL 0.93  --  0.94 0.93  --  1.04  --  1.04   < >  --  1.59*   EGFR ml/min/1.73sq m 96  --  94 96  --  83  --  83   < >  --  50   CALCIUM mg/dL 8.8  --  8.3* 8.7  --  9.2  --  8.6   < >  --  9.6   CALCIUM, IONIZED, ISTAT mmol/L  --   --   --   --   --   --   --   --   --  1.20  --    MAGNESIUM mg/dL 1.8*  --  1.5*  --  1.8*  --  1.5* 1.9  --   --  1.4*   PHOSPHORUS mg/dL 3.7 2.0*  --   --   --   --  1.7*  --   --   --  3.3    < > = values in this interval not displayed.     Results from last 7 days   Lab Units 03/23/24  0356   AST U/L 13   ALT U/L 16   ALK PHOS U/L 53   TOTAL PROTEIN g/dL 6.3*   ALBUMIN g/dL 3.6   TOTAL BILIRUBIN mg/dL 0.87   BILIRUBIN DIRECT mg/dL 0.27*     Results from last 7 days   Lab Units 03/24/24  1036 03/24/24  0608 03/24/24  0043 03/23/24  2147 03/23/24  2001 03/23/24  1802 03/23/24  1617 03/23/24  1426 03/23/24  1307 03/23/24  1000 03/23/24  0837 03/23/24  0554   POC GLUCOSE mg/dl 215* 228* 230* 180* 165* 170* 167* 166* 128 156* 154* 135     Results from last 7 days   Lab Units 03/24/24  1047 03/24/24  0050 03/23/24  1814 03/23/24  1215 03/23/24  0029 03/22/24  1432 03/22/24  0948   GLUCOSE RANDOM mg/dL 201* 203* 159* 120 181* 305* 639*     Results from last 7 days   Lab Units 03/23/24  1242   OSMOLALITY, SERUM mmol/*     Results from last 7 days   Lab Units 03/22/24  0948   HEMOGLOBIN A1C % 12.1*   EAG mg/dl 301     Beta- Hydroxybutyrate   Date Value Ref Range Status   03/22/2024 1.31 (H) 0.02 - 0.27 mmol/L Final          Results from last 7 days   Lab Units 03/23/24  1210 03/22/24  1818   PH EARL  7.380 7.405*   PCO2 EARL mm Hg 30.9* 33.6*   PO2 EARL mm Hg 37.5 55.3*   HCO3 EARL mmol/L 17.9* 20.6*   BASE EXC EARL mmol/L -5.9 -3.1   O2 CONTENT EARL ml/dL 15.5 20.4   O2 HGB, VENOUS % 72.4 85.9*     Results from last 7 days   Lab Units 03/22/24  1051   PH, EARL I-STAT  7.333   PCO2, EARL ISTAT mm HG 35.7*   PO2, EARL ISTAT mm HG 32.0*   HCO3, EARL ISTAT mmol/L 19.0*   I STAT  BASE EXC mmol/L -6*   I STAT O2 SAT % 58*         Results from last 7 days   Lab Units 03/22/24  1716 03/22/24  1432   HS TNI 0HR ng/L  --  11   HS TNI 2HR ng/L 12  --    HSTNI D2 ng/L 1  --          Results from last 7 days   Lab Units 03/24/24  1056   PROTIME seconds 17.4*   INR  1.37*   PTT seconds 41*     Results from last 7 days   Lab Units 03/23/24  0029 03/22/24  1716   TSH 3RD GENERATON uIU/mL 0.394* 0.160*     Results from last 7 days   Lab Units 03/24/24  1047   PROCALCITONIN ng/ml 3.39*     Results from last 7 days   Lab Units 03/24/24  1047 03/23/24  1517 03/22/24  1818 03/22/24  1432 03/22/24  1148   LACTIC ACID mmol/L 1.4 0.7 1.6 3.1* 2.2*       Results from last 7 days   Lab Units 03/22/24  1049 03/22/24  0948   CRP mg/L 70.5*  --    SED RATE mm/hour  --  25*         Results from last 7 days   Lab Units 03/23/24  1242 03/23/24  1159   OSMOLALITY, SERUM mmol/*  --    OSMO UR mmol/KG  --  434     Results from last 7 days   Lab Units 03/23/24  1159 03/22/24  1852   CLARITY UA   --  Clear   COLOR UA   --  Yellow   SPEC GRAV UA   --  1.020   PH UA   --  5.5   GLUCOSE UA mg/dl  --  300 (3/10%)*   KETONES UA mg/dl  --  Trace*   BLOOD UA   --  Negative   PROTEIN UA mg/dl  --  30 (1+)*   NITRITE UA   --  Negative   BILIRUBIN UA   --  Negative   UROBILINOGEN UA (BE) mg/dl  --  <2.0   LEUKOCYTES UA   --  Negative   WBC UA /hpf  --  1-2   RBC UA /hpf  --  0-1   BACTERIA UA /hpf  --  Occasional   EPITHELIAL CELLS WET PREP /hpf  --  Occasional   SODIUM UR  40  --      Results from last 7 days   Lab Units 03/22/24  1408   INFLUENZA A PCR  Negative   INFLUENZA B PCR  Negative   RSV PCR  Negative     Results from last 7 days   Lab Units 03/22/24  1432   ETHANOL LVL mg/dL <10   ACETAMINOPHEN LVL ug/mL 10   SALICYLATE LVL mg/dL <5     Results from last 7 days   Lab Units 03/23/24  1245 03/23/24  1158 03/22/24  1448   BLOOD CULTURE  Received in Microbiology Lab. Culture in Progress.  Received in Microbiology Lab.  Culture in Progress.  --   --    GRAM STAIN RESULT   --  4+ Polys*  3+ Gram positive cocci in clusters* Gram positive cocci in clusters*  Gram positive cocci in clusters*       ED Treatment:   Medication Administration from 03/22/2024 0842 to 03/22/2024 1235         Date/Time Order Dose Route Action Comments     03/22/2024 0930 EDT acetaminophen (TYLENOL) tablet 975 mg 975 mg Oral Given --     03/22/2024 0949 EDT ketorolac (TORADOL) injection 15 mg 15 mg Intravenous Given --     03/22/2024 0931 EDT HYDROmorphone (DILAUDID) tablet 2 mg 2 mg Oral Given --     03/22/2024 1041 EDT sodium chloride 0.9 % bolus 1,000 mL 1,000 mL Intravenous New Bag --     03/22/2024 1134 EDT magnesium sulfate 2 g/50 mL IVPB (premix) 2 g 2 g Intravenous New Bag --     03/22/2024 1134 EDT HYDROmorphone (DILAUDID) injection 1 mg 1 mg Intravenous Given --     03/22/2024 1227 EDT nicotine (NICODERM CQ) 21 mg/24 hr TD 24 hr patch 21 mg 21 mg Transdermal Medication Applied --          Past Medical History:   Diagnosis Date    Diabetes mellitus ([facility])     Hypertension      Present on Admission:   Acute on Chronic Right knee pain   Chronic, continuous use of opioids   Type 2 diabetes mellitus with ketoacidosis without coma, without long-term current use of insulin ([facility])   Essential hypertension   SIRS (systemic inflammatory response syndrome) ([facility])   TBI (traumatic brain injury) ([facility])      Admitting Diagnosis: Hyperglycemia [R73.9]  Acute pain of right knee [M25.561]  Unspecified multiple injuries, initial encounter [T07.XXXA]  Metabolic acidosis due to diabetes mellitus ([facility]) [E11.10]  Age/Sex: 48 y.o. male  Admission Orders:  Scheduled Medications:  amLODIPine, 5 mg, Oral, Daily  Diclofenac Sodium, 2 g, Topical, 4x Daily  docusate sodium, 300 mg, Oral, BID  [START ON 3/24/2024] fentaNYL, 1 patch, Transdermal, Q72H  heparin (porcine), 5,000 Units, Subcutaneous, Q8H DARIN  morphine, 60 mg, Oral, Q12H DARIN  nicotine, 21 mg, Transdermal,  Daily  pantoprazole, 20 mg, Oral, BID AC  pravastatin, 80 mg, Oral, Daily With Dinner  risperiDONE, 3 mg, Oral, Daily  vancomycin, 15 mg/kg, Intravenous, Q12H      Continuous IV Infusions:  dexmedetomidine, 0.1-0.7 mcg/kg/hr, Intravenous, Titrated  diltiazem, 1-15 mg/hr, Intravenous, Titrated  fentaNYL, 200 mcg/hr, Intravenous, Continuous  heparin (porcine), 3-20 Units/kg/hr (Order-Specific), Intravenous, Titrated  propofol, 5-50 mcg/kg/min, Intravenous, Titrated      PRN Meds:  acetaminophen, 650 mg, Oral, Q4H PRN  aluminum-magnesium hydroxide-simethicone, 30 mL, Oral, Q6H PRN  calcium carbonate, 1,000 mg, Oral, Daily PRN  cyclobenzaprine, 10 mg, Oral, TID PRN  fentaNYL, 50 mcg, Intravenous, Q1H PRN  heparin (porcine), 2,000 Units, Intravenous, Q6H PRN  heparin (porcine), 4,000 Units, Intravenous, Q6H PRN  lactated ringers, 1,000 mL, Intravenous, Once PRN   And  lactated ringers, 1,000 mL, Intravenous, Once PRN  ondansetron, 4 mg, Intravenous, Q4H PRN  polyethylene glycol, 17 g, Oral, Daily PRN  saliva substitute, 5 spray, Mouth/Throat, 4x Daily PRN  simethicone, 80 mg, Oral, 4x Daily PRN  sodium chloride, 1,000 mL, Intravenous, Once PRN   And  sodium chloride, 1,000 mL, Intravenous, Once PRN  zolpidem, 10 mg, Oral, HS PRN        IP CONSULT TO PSYCHIATRY  IP CONSULT TO ORTHOPEDIC SURGERY  IP CONSULT TO TOXICOLOGY  IP CONSULT TO PHARMACY  IP CONSULT TO NEPHROLOGY  IP CONSULT TO INFECTIOUS DISEASES  IP CONSULT TO CASE MANAGEMENT  IP CONSULT TO CASE MANAGEMENT    Network Utilization Review Department  ATTENTION: Please call with any questions or concerns to [phone] and carefully listen to the prompts so that you are directed to the right person. All voicemails are confidential.   For Discharge needs, contact Care Management DC Support Team at [phone] opt. 2  Send all requests for admission clinical reviews, approved or denied determinations and any other requests to dedicated fax number below belonging to  the [place] where the patient is receiving treatment. List of dedicated fax numbers for the Facilities:  FACILITY NAME UR FAX NUMBER   ADMISSION DENIALS (Administrative/Medical Necessity) [phone]   DISCHARGE SUPPORT TEAM (NETWORK) [phone]   PARENT CHILD HEALTH (Maternity/NICU/Pediatrics) [phone]   [facility] [phone]   [facility] [phone]   [facility] [phone]   [facility] [phone]   [facility] [phone]   [facility] [phone]   [facility] [phone]   [facility] [phone]   [facility] [phone]   [facility] [phone]   [facility] [phone]   [facility] [phone]

## 2024-03-23 NOTE — ASSESSMENT & PLAN NOTE
1 out of 2 growing gram-positive cocci  Placed on vancomycin until further data- ?  Suspicion for septic arthritis given acute knee pain with redness, warmth, swelling  Follow-up on repeat blood cultures ordered from today  Ortho [id number] on synovial fluid

## 2024-03-23 NOTE — PLAN OF CARE
Problem: PAIN - ADULT  Goal: Verbalizes/displays adequate comfort level or baseline comfort level  Description: Interventions:  - Encourage patient to monitor pain and request assistance  - Assess pain using appropriate pain scale  - Administer analgesics based on type and severity of pain and evaluate response  - Implement non-pharmacological measures as appropriate and evaluate response  - Consider cultural and social influences on pain and pain management  - Notify physician/advanced practitioner if interventions unsuccessful or patient reports new pain  Outcome: Progressing     Problem: INFECTION - ADULT  Goal: Absence or prevention of progression during hospitalization  Description: INTERVENTIONS:  - Assess and monitor for signs and symptoms of infection  - Monitor lab/diagnostic results  - Monitor all insertion sites, i.e. indwelling lines, tubes, and drains  - Monitor endotracheal if appropriate and nasal secretions for changes in amount and color  - [place] appropriate cooling/warming therapies per order  - Administer medications as ordered  - Instruct and encourage patient and family to use good hand hygiene technique  - Identify and instruct in appropriate isolation precautions for identified infection/condition  Outcome: Progressing  Goal: Absence of fever/infection during neutropenic period  Description: INTERVENTIONS:  - Monitor WBC    Outcome: Progressing     Problem: SAFETY ADULT  Goal: Patient will remain free of falls  Description: INTERVENTIONS:  - Educate patient/family on patient safety including physical limitations  - Instruct patient to call for assistance with activity   - Consult OT/PT to assist with strengthening/mobility   - Keep Call bell within reach  - Keep bed low and locked with side rails adjusted as appropriate  - Keep care items and personal belongings within reach  - Initiate and maintain comfort rounds  - Make Fall Risk Sign visible to staff  - Offer Toileting every 2 Hours,  in advance of need  - Initiate/Maintain 2alarm  - Obtain necessary fall risk management equipment: 2  - Apply yellow socks and bracelet for high fall risk patients  - Consider moving patient to room near nurses station  Outcome: Progressing  Goal: Maintain or return to baseline ADL function  Description: INTERVENTIONS:  -  Assess patient's ability to carry out ADLs; assess patient's baseline for ADL function and identify physical deficits which impact ability to perform ADLs (bathing, care of mouth/teeth, toileting, grooming, dressing, etc.)  - Assess/evaluate cause of self-care deficits   - Assess range of motion  - Assess patient's mobility; develop plan if impaired  - Assess patient's need for assistive devices and provide as appropriate  - Encourage maximum independence but intervene and supervise when necessary  - Involve family in performance of ADLs  - Assess for home care needs following discharge   - Consider OT consult to assist with ADL evaluation and planning for discharge  - Provide patient education as appropriate  Outcome: Progressing  Goal: Maintains/Returns to pre admission functional level  Description: INTERVENTIONS:  - Perform AM-PAC 6 Click Basic Mobility/ Daily Activity assessment daily.  - Set and communicate daily mobility goal to care team and patient/family/caregiver.   - Collaborate with rehabilitation services on mobility goals if consulted  - Perform Range of Motion 2 times a day.  - Reposition patient every 1 hours.  - Dangle patient 2 times a day  - Stand patient 2 times a day  - Ambulate patient 2 times a day  - Out of bed to chair 2 times a day   - Out of bed for meals 2 times a day  - Out of bed for toileting  - Record patient progress and toleration of activity level   Outcome: Progressing     Problem: DISCHARGE PLANNING  Goal: Discharge to home or other facility with appropriate resources  Description: INTERVENTIONS:  - Identify barriers to discharge w/patient and caregiver  -  Arrange for needed discharge resources and transportation as appropriate  - Identify discharge learning needs (meds, wound care, etc.)  - Arrange for interpretive services to assist at discharge as needed  - Refer to Case Management Department for coordinating discharge planning if the patient needs post-hospital services based on physician/advanced practitioner order or complex needs related to functional status, cognitive ability, or social support system  Outcome: Progressing     Problem: Knowledge Deficit  Goal: Patient/family/caregiver demonstrates understanding of disease process, treatment plan, medications, and discharge instructions  Description: Complete learning assessment and assess knowledge base.  Interventions:  - Provide teaching at level of understanding  - Provide teaching via preferred learning methods  Outcome: Progressing     Problem: Prexisting or High Potential for Compromised Skin Integrity  Goal: Skin integrity is maintained or improved  Description: INTERVENTIONS:  - Identify patients at risk for skin breakdown  - Assess and monitor skin integrity  - Assess and monitor nutrition and hydration status  - Monitor labs   - Assess for incontinence   - Turn and reposition patient  - Assist with mobility/ambulation  - Relieve pressure over bony prominences  - Avoid friction and shearing  - Provide appropriate hygiene as needed including keeping skin clean and dry  - Evaluate need for skin moisturizer/barrier cream  - Collaborate with interdisciplinary team   - Patient/family teaching  - Consider wound care consult   Outcome: Progressing

## 2024-03-23 NOTE — CONSULTS
INTERPROFESSIONAL (PHONE) CONSULTATION - Medical Toxicology  Armen Llanos 48 y.o. male MRN: [id number]  Unit/Bed#: -01 Encounter: [id number]      Reason for Consult / Principal Problem: detectable APAP level  Inpatient consult to Toxicology  Consult performed by: Dmitri Dalton DO  Consult ordered by: Mattie Barragan MD        03/22/24      ASSESSMENT:  DKA  JH  Detectable APAP level    RECOMMENDATIONS:  This appears to perhaps be an epic-triggered tox consult due to a detectable APAP level. There is no documentation of a concern for acute or chronic APAP overdose. Nonetheless, it's reasonable to check LFT's to ensure transaminases are normal. If normal, no further action. If elevated, NAC can be considered. Re-contact med tox if there is liver injury present.     For further questions, please contact the medical  on call via [place] [facility] between 8am and 9pm. If between 9pm and 8am, please reach out to the [facility] at [phone].     Please see additional teaching note below:    Hx and PE limited by the dynamics of a phone consultation. I have not personally interviewed or evaluated the patient, but only advised based on the information provided to me. Primary provider is responsible for all clinical decisions.     Pertinent history, physical exam and clinical findings and course discussed: Armen Llanos is a 48 y.o. year old male who presents with DKA. A coma panel was ordered for unclear reasons. An APAP level of 10 was resulted, which seems to have prompted a med tox consult order per Epic hard stop. There is no history suggestive APAP overuse per my chart review.     Review of systems and physical exam not performed by me.    Historical Information   Past Medical History:   Diagnosis Date    Diabetes mellitus (HCC)     Hypertension      Past Surgical History:   Procedure Laterality Date    KNEE SURGERY       Social History   Social History     Substance and Sexual Activity    Alcohol Use Not Currently     Social History     Substance and Sexual Activity   Drug Use Yes    Types: Marijuana     Social History     Tobacco Use   Smoking Status Never   Smokeless Tobacco Never     History reviewed. No pertinent family history.     Prior to Admission medications    Medication Sig Start Date End Date Taking? Authorizing Provider   amLODIPine (NORVASC) 5 mg tablet Take 5 mg by mouth daily   Yes Historical Provider, MD   b complex vitamins capsule Take 1 capsule by mouth daily   Yes Historical Provider, MD   cyclobenzaprine (FLEXERIL) 10 mg tablet Take 10 mg by mouth 3 (three) times a day as needed   Yes Historical Provider, MD   fentaNYL (DURAGESIC) 75 mcg/hr Place 1 patch on the skin every third day   Yes Historical Provider, MD   glimepiride (AMARYL) 2 mg tablet Take 2 mg by mouth 4 (four) times a day   Yes Historical Provider, MD   ibuprofen (MOTRIN) 800 mg tablet Take 800 mg by mouth every 6 (six) hours as needed for mild pain BID standing, and PRN with morphine if needed   Yes Historical Provider, MD   lisinopril-hydrochlorothiazide (PRINZIDE,ZESTORETIC) 20-25 MG per tablet Take 1 tablet by mouth daily   Yes Historical Provider, MD   metFORMIN (GLUCOPHAGE) 500 mg tablet Take 500 mg by mouth 2 (two) times a day with meals   Yes Historical Provider, MD   Morphine Sulfate ER 15 MG T12A Take 60 mg by mouth every 12 (twelve) hours as needed   Yes Historical Provider, MD   ondansetron (ZOFRAN) 8 mg tablet TAKE 1 TABLET (8 MG) BY MOUTH 3 TIMES PER DAY AS NEEDED 4/11/23  Yes Historical Provider, MD   risperiDONE (RisperDAL) 3 mg tablet Take 3 mg by mouth daily   Yes Historical Provider, MD   simvastatin (ZOCOR) 40 mg tablet Take 40 mg by mouth daily   Yes Historical Provider, MD   SUMAtriptan (IMITREX) 100 mg tablet Take 100 mg by mouth   Yes Historical Provider, MD   zolpidem (AMBIEN) 10 mg tablet Take 10 mg by mouth   Yes Historical Provider, MD   docusate sodium (COLACE) 250 MG capsule Take 750  mg by mouth 2 (two) times a day  Patient not taking: Reported on 3/22/2024    Historical Provider, MD   esomeprazole (NexIUM) 20 mg capsule Take 20 mg by mouth 2 (two) times a day before meals  Patient not taking: Reported on 3/22/2024    Historical Provider, MD   clonazePAM (KlonoPIN) 1 mg tablet Take 1 mg by mouth 2 (two) times a day  11/15/22  Historical Provider, MD       Current Facility-Administered Medications   Medication Dose Route Frequency    aluminum-magnesium hydroxide-simethicone (MAALOX) oral suspension 30 mL  30 mL Oral Q6H PRN    amLODIPine (NORVASC) tablet 5 mg  5 mg Oral Daily    cyclobenzaprine (FLEXERIL) tablet 10 mg  10 mg Oral TID PRN    Diclofenac Sodium (VOLTAREN) 1 % topical gel 2 g  2 g Topical 4x Daily    docusate sodium (COLACE) capsule 300 mg  300 mg Oral BID    [START ON 3/24/2024] fentaNYL (DURAGESIC) 75 mcg/hr TD 72 hr patch 1 patch  1 patch Transdermal Q72H    heparin (porcine) subcutaneous injection 5,000 Units  5,000 Units Subcutaneous Q8H DARIN    insulin regular (HumuLIN R,NovoLIN R) 1 Units/mL in sodium chloride 0.9 % 100 mL infusion  0.3-21 Units/hr Intravenous Titrated    morphine (MS CONTIN) ER tablet 60 mg  60 mg Oral Q12H DARIN    multi-electrolyte (PLASMALYTE-A/ISOLYTE-S PH 7.4) IV solution  125 mL/hr Intravenous Continuous    nicotine (NICODERM CQ) 21 mg/24 hr TD 24 hr patch 21 mg  21 mg Transdermal Daily    ondansetron (ZOFRAN) injection 4 mg  4 mg Intravenous Q4H PRN    traMADol (ULTRAM) tablet 50 mg  50 mg Oral Q6H PRN    Or    oxyCODONE (ROXICODONE) immediate release tablet 10 mg  10 mg Oral Q6H PRN    pantoprazole (PROTONIX) EC tablet 20 mg  20 mg Oral BID AC    polyethylene glycol (MIRALAX) packet 17 g  17 g Oral Daily PRN    pravastatin (PRAVACHOL) tablet 80 mg  80 mg Oral Daily With Dinner    risperiDONE (RisperDAL) tablet 3 mg  3 mg Oral Daily    zolpidem (AMBIEN) tablet 10 mg  10 mg Oral HS PRN       Allergies   Allergen Reactions    Penicillins Hives        Objective       Intake/Output Summary (Last 24 hours) at 3/22/2024 2131  Last data filed at 3/22/2024 1818  Gross per 24 hour   Intake --   Output 400 ml   Net -400 ml       Invasive Devices:   Peripheral IV 03/22/24 Right Antecubital (Active)   Site Assessment WDL 03/22/24 1300   Dressing Type Transparent 03/22/24 1300   Line Status Flushed 03/22/24 1300   Dressing Status Clean;Dry;Intact 03/22/24 1300   Dressing Change Due 03/26/24 03/22/24 1300   Reason Not Rotated Not due 03/22/24 1300       Peripheral IV 03/22/24 Dorsal (posterior);Left Hand (Active)       Vitals   Vitals:    03/22/24 1140 03/22/24 1249 03/22/24 1501 03/22/24 1707   BP:  109/74 120/83 107/73   TempSrc:       Pulse: (!) 122 (!) 120 (!) 121 (!) 112   Resp: 20 17 20    Patient Position - Orthostatic VS:       Temp:  97.5 °F (36.4 °C) 97.7 °F (36.5 °C)          EKG, Pathology, and/or Other Studies: I have personally reviewed pertinent reports.        Lab Results: I have personally reviewed pertinent reports.      Labs:    Results from last 7 days   Lab Units 03/22/24  1051 03/22/24  0948   WBC Thousand/uL  --  17.89*   HEMOGLOBIN g/dL  --  17.6*   I STAT HEMOGLOBIN g/dl 17.0  --    HEMATOCRIT %  --  50.0*   HEMATOCRIT, ISTAT % 50*  --    PLATELETS Thousands/uL  --  245   NEUTROS PCT %  --  92*   LYMPHS PCT %  --  2*   MONOS PCT %  --  5   EOS PCT %  --  1      Results from last 7 days   Lab Units 03/22/24  1432 03/22/24  1051 03/22/24  0948   SODIUM mmol/L 130*  --  123*   POTASSIUM mmol/L 4.0  --  5.3   CHLORIDE mmol/L 96  --  88*   CO2 mmol/L 20*  --  19*   CO2, I-STAT mmol/L  --  20*  --    BUN mg/dL 30*  --  30*   CREATININE mg/dL 1.37*  --  1.59*   CALCIUM mg/dL 9.5  --  9.6   GLUCOSE, ISTAT mg/dl  --  >600*  --    MAGNESIUM mg/dL  --   --  1.4*   PHOSPHORUS mg/dL  --   --  3.3          Results from last 7 days   Lab Units 03/22/24  1818 03/22/24  1432 03/22/24  1148   LACTIC ACID mmol/L 1.6 3.1* 2.2*     No results found for:  "\"TROPONINI\"  Results from last 7 days   Lab Units 03/22/24  1818   PH EARL  7.405*   PCO2 EARL mm Hg 33.6*   PO2 EARL mm Hg 55.3*   HCO3 EARL mmol/L 20.6*   O2 CONTENT EARL ml/dL 20.4   O2 HGB, VENOUS % 85.9*     Results from last 7 days   Lab Units 03/22/24  1432   ACETAMINOPHEN LVL ug/mL 10   ETHANOL LVL mg/dL <10   SALICYLATE LVL mg/dL <5     Invalid input(s): \"EXTPREGUR\"      Imaging Studies: I have personally reviewed pertinent reports.      Counseling / Coordination of Care  Total time spent today 18 minutes. This was a phone consultation.      "

## 2024-03-23 NOTE — ASSESSMENT & PLAN NOTE
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

## 2024-03-23 NOTE — PLAN OF CARE
Problem: PAIN - ADULT  Goal: Verbalizes/displays adequate comfort level or baseline comfort level  Description: Interventions:  - Encourage patient to monitor pain and request assistance  - Assess pain using appropriate pain scale  - Administer analgesics based on type and severity of pain and evaluate response  - Implement non-pharmacological measures as appropriate and evaluate response  - Consider cultural and social influences on pain and pain management  - Notify physician/advanced practitioner if interventions unsuccessful or patient reports new pain  Outcome: Progressing     Problem: INFECTION - ADULT  Goal: Absence or prevention of progression during hospitalization  Description: INTERVENTIONS:  - Assess and monitor for signs and symptoms of infection  - Monitor lab/diagnostic results  - Monitor all insertion sites, i.e. indwelling lines, tubes, and drains  - Monitor endotracheal if appropriate and nasal secretions for changes in amount and color  - [place] appropriate cooling/warming therapies per order  - Administer medications as ordered  - Instruct and encourage patient and family to use good hand hygiene technique  - Identify and instruct in appropriate isolation precautions for identified infection/condition  Outcome: Progressing  Goal: Absence of fever/infection during neutropenic period  Description: INTERVENTIONS:  - Monitor WBC    Outcome: Progressing     Problem: SAFETY ADULT  Goal: Patient will remain free of falls  Description: INTERVENTIONS:  - Educate patient/family on patient safety including physical limitations  - Instruct patient to call for assistance with activity   - Consult OT/PT to assist with strengthening/mobility   - Keep Call bell within reach  - Keep bed low and locked with side rails adjusted as appropriate  - Keep care items and personal belongings within reach  - Initiate and maintain comfort rounds  - Make Fall Risk Sign visible to staff  - Offer Toileting every 2 Hours,  in advance of need  - Initiate/Maintain 2alarm  - Obtain necessary fall risk management equipment: 2  - Apply yellow socks and bracelet for high fall risk patients  - Consider moving patient to room near nurses station  Outcome: Progressing  Goal: Maintain or return to baseline ADL function  Description: INTERVENTIONS:  -  Assess patient's ability to carry out ADLs; assess patient's baseline for ADL function and identify physical deficits which impact ability to perform ADLs (bathing, care of mouth/teeth, toileting, grooming, dressing, etc.)  - Assess/evaluate cause of self-care deficits   - Assess range of motion  - Assess patient's mobility; develop plan if impaired  - Assess patient's need for assistive devices and provide as appropriate  - Encourage maximum independence but intervene and supervise when necessary  - Involve family in performance of ADLs  - Assess for home care needs following discharge   - Consider OT consult to assist with ADL evaluation and planning for discharge  - Provide patient education as appropriate  Outcome: Progressing  Goal: Maintains/Returns to pre admission functional level  Description: INTERVENTIONS:  - Perform AM-PAC 6 Click Basic Mobility/ Daily Activity assessment daily.  - Set and communicate daily mobility goal to care team and patient/family/caregiver.   - Collaborate with rehabilitation services on mobility goals if consulted  - Perform Range of Motion 2 times a day.  - Reposition patient every 1 hours.  - Dangle patient 2 times a day  - Stand patient 2 times a day  - Ambulate patient 2 times a day  - Out of bed to chair 2 times a day   - Out of bed for meals 2 times a day  - Out of bed for toileting  - Record patient progress and toleration of activity level   Outcome: Progressing     Problem: DISCHARGE PLANNING  Goal: Discharge to home or other facility with appropriate resources  Description: INTERVENTIONS:  - Identify barriers to discharge w/patient and caregiver  -  Arrange for needed discharge resources and transportation as appropriate  - Identify discharge learning needs (meds, wound care, etc.)  - Arrange for interpretive services to assist at discharge as needed  - Refer to Case Management Department for coordinating discharge planning if the patient needs post-hospital services based on physician/advanced practitioner order or complex needs related to functional status, cognitive ability, or social support system  Outcome: Progressing     Problem: Knowledge Deficit  Goal: Patient/family/caregiver demonstrates understanding of disease process, treatment plan, medications, and discharge instructions  Description: Complete learning assessment and assess knowledge base.  Interventions:  - Provide teaching at level of understanding  - Provide teaching via preferred learning methods  Outcome: Progressing     Problem: Prexisting or High Potential for Compromised Skin Integrity  Goal: Skin integrity is maintained or improved  Description: INTERVENTIONS:  - Identify patients at risk for skin breakdown  - Assess and monitor skin integrity  - Assess and monitor nutrition and hydration status  - Monitor labs   - Assess for incontinence   - Turn and reposition patient  - Assist with mobility/ambulation  - Relieve pressure over bony prominences  - Avoid friction and shearing  - Provide appropriate hygiene as needed including keeping skin clean and dry  - Evaluate need for skin moisturizer/barrier cream  - Collaborate with interdisciplinary team   - Patient/family teaching  - Consider wound care consult   Outcome: Progressing

## 2024-03-23 NOTE — PLAN OF CARE
Problem: PAIN - ADULT  Goal: Verbalizes/displays adequate comfort level or baseline comfort level  Description: Interventions:  - Encourage patient to monitor pain and request assistance  - Assess pain using appropriate pain scale  - Administer analgesics based on type and severity of pain and evaluate response  - Implement non-pharmacological measures as appropriate and evaluate response  - Consider cultural and social influences on pain and pain management  - Notify physician/advanced practitioner if interventions unsuccessful or patient reports new pain  Outcome: Progressing     Problem: INFECTION - ADULT  Goal: Absence or prevention of progression during hospitalization  Description: INTERVENTIONS:  - Assess and monitor for signs and symptoms of infection  - Monitor lab/diagnostic results  - Monitor all insertion sites, i.e. indwelling lines, tubes, and drains  - Monitor endotracheal if appropriate and nasal secretions for changes in amount and color  - [place] appropriate cooling/warming therapies per order  - Administer medications as ordered  - Instruct and encourage patient and family to use good hand hygiene technique  - Identify and instruct in appropriate isolation precautions for identified infection/condition  Outcome: Progressing  Goal: Absence of fever/infection during neutropenic period  Description: INTERVENTIONS:  - Monitor WBC    Outcome: Progressing     Problem: SAFETY ADULT  Goal: Patient will remain free of falls  Description: INTERVENTIONS:  - Educate patient/family on patient safety including physical limitations  - Instruct patient to call for assistance with activity   - Consult OT/PT to assist with strengthening/mobility   - Keep Call bell within reach  - Keep bed low and locked with side rails adjusted as appropriate  - Keep care items and personal belongings within reach  - Initiate and maintain comfort rounds  - Make Fall Risk Sign visible to staff  - Offer Toileting every 2 Hours,  in advance of need  - Initiate/Maintain 2alarm  - Obtain necessary fall risk management equipment: 2  - Apply yellow socks and bracelet for high fall risk patients  - Consider moving patient to room near nurses station  Outcome: Progressing  Goal: Maintain or return to baseline ADL function  Description: INTERVENTIONS:  -  Assess patient's ability to carry out ADLs; assess patient's baseline for ADL function and identify physical deficits which impact ability to perform ADLs (bathing, care of mouth/teeth, toileting, grooming, dressing, etc.)  - Assess/evaluate cause of self-care deficits   - Assess range of motion  - Assess patient's mobility; develop plan if impaired  - Assess patient's need for assistive devices and provide as appropriate  - Encourage maximum independence but intervene and supervise when necessary  - Involve family in performance of ADLs  - Assess for home care needs following discharge   - Consider OT consult to assist with ADL evaluation and planning for discharge  - Provide patient education as appropriate  Outcome: Progressing  Goal: Maintains/Returns to pre admission functional level  Description: INTERVENTIONS:  - Perform AM-PAC 6 Click Basic Mobility/ Daily Activity assessment daily.  - Set and communicate daily mobility goal to care team and patient/family/caregiver.   - Collaborate with rehabilitation services on mobility goals if consulted  - Perform Range of Motion 2 times a day.  - Reposition patient every 1 hours.  - Dangle patient 2 times a day  - Stand patient 2 times a day  - Ambulate patient 2 times a day  - Out of bed to chair 2 times a day   - Out of bed for meals 2 times a day  - Out of bed for toileting  - Record patient progress and toleration of activity level   Outcome: Progressing     Problem: DISCHARGE PLANNING  Goal: Discharge to home or other facility with appropriate resources  Description: INTERVENTIONS:  - Identify barriers to discharge w/patient and caregiver  -  Arrange for needed discharge resources and transportation as appropriate  - Identify discharge learning needs (meds, wound care, etc.)  - Arrange for interpretive services to assist at discharge as needed  - Refer to Case Management Department for coordinating discharge planning if the patient needs post-hospital services based on physician/advanced practitioner order or complex needs related to functional status, cognitive ability, or social support system  Outcome: Progressing     Problem: Knowledge Deficit  Goal: Patient/family/caregiver demonstrates understanding of disease process, treatment plan, medications, and discharge instructions  Description: Complete learning assessment and assess knowledge base.  Interventions:  - Provide teaching at level of understanding  - Provide teaching via preferred learning methods  Outcome: Progressing     Problem: Prexisting or High Potential for Compromised Skin Integrity  Goal: Skin integrity is maintained or improved  Description: INTERVENTIONS:  - Identify patients at risk for skin breakdown  - Assess and monitor skin integrity  - Assess and monitor nutrition and hydration status  - Monitor labs   - Assess for incontinence   - Turn and reposition patient  - Assist with mobility/ambulation  - Relieve pressure over bony prominences  - Avoid friction and shearing  - Provide appropriate hygiene as needed including keeping skin clean and dry  - Evaluate need for skin moisturizer/barrier cream  - Collaborate with interdisciplinary team   - Patient/family teaching  - Consider wound care consult   Outcome: Progressing

## 2024-03-23 NOTE — CONSULTS
Consultation - Nephrology   Armen MARS Llanos 48 y.o. male MRN: [id number]  Unit/Bed#: -01 Encounter: [id number]    ASSESSMENT and PLAN:  Acute on chronic hyponatremia  Etiology of chronic hyponatremia likely SIADH given chronic pain on opiate along with TBI history, HCTZ use, along with Motrin use  Etiology of acute: Suspect polydipsia, IV fluids for sepsis criteria,and  IV Toradol  Baseline sodium 133-136 dating back to 2022  Admission sodium corrects to 133 given glucose of 639  Current sodium at midnight corrects to 127 with elevated glucose of 181  Repeat at 12:15 this am 127-stablized  Currently on IV Plasma-Lyte and IV insulin  Workup:  Urine sodium, urine osmolality, uric acid serum osmolality-pending  Plan  Will add a.m. cortisol to complete hyponatremia workup  Placed on 1.8 L fluid restriction  Recommend stopping IV fluids  Discontinue Motrin recommended  Would not restart HCTZ on discharge  Recommend sodium correction 6-8 points in 24 hours  Likely sodium will increase with limiting fluids  Pain management per primary team    Acute kidney injury (POA)-improved  Etiology: Likely prerenal in the setting of SIRS, relative hypotension, NSAID use, RAAS blockade and HCTZ  Baseline creatinine 0.7-1.0 dating back to 2022  Admission creatinine 1.59 on 3/20/2024  Current creatinine 1.04  Likely prerenal given rapid correction with IV fluids  Lisinopril/HCTZ currently on hold  Avoid NSAIDs, nephrotoxins and limit IV contrast  Avoid hypotension and perturbations of blood pressure to event decreased renal perfusion  Status post IV fluids  Check BMP in a.m.  If renal function worsens consider renal ultrasound    Blood pressure/hypertension:  Current BP acceptable 133/80; blood pressure on the soft side on arrival  Home medications include: Amlodipine 5 mg daily, lisinopril/HCTZ 20 to 25 mg daily  Current medications include: Amlodipine 5 mg daily/will place hold parameters for SBP less than 130  Maximize  "hemodynamics to maintain MAP >65  Avoid hypotension or fluctuations in blood pressure  Will continue to trend    Diabetes with DKA  Most recent A1c 9.3 on 5/18/2023 with repeat on admission at 12.1  Needs improved glucose control  Blood sugar on arrival 639 now on IV insulin  Status post IV fluids-now off  BS improving  Management per primary team    Increase metabolic anion gap/lactic acidosis  VBG ordered per primary team  Status post IV Plasma-Lyte  Suspect secondary to DKA with hyperglycemia  Primary team following    Electrolyte abnormality  Hypomagnesia  Admission magnesium 1.4 status post supplementation and currently 1.9  Recommend additional IV magnesium 1-2 grm now and repeat  Keep magnesium at 2.0  Hyperkalemia-in the setting of hyperglycemia  Admission potassium 5.3 improved initially to3.8  Repeat 3.4  Plan to treat with oral k per primary team  Keep potassium at 4.0  Continue to monitor for now    Acute on chronic right knee pain-concern for septic arthritis  Status post aspiration-culture and Gram stain along with synovial crystal cell count-pending  Orthopedic consulted  May benefit from pain management    Suicidal ideations  Behavioral health consulted-recommendations appreciated  Management per primary team    Other medical issues:  TBI at 7 years old  Chronic opioid use    Overall plan and recommendations: Discussed with primary team who is in agreement the plan as stated below  Discontinue IV fluids  Moderate fluid restriction at 1.8 L daily at this time  Check a.m. cortisol level for completeness  Continue to hold lisinopril/HCTZ for now-Do not restart HCTZ given hyponatremia  Add mouth kote \"for mouth comfort  Treat hypokalemia and hypomagnesium  Avoid NSAID/Motrin/Toradol given hyponatremia and acute kidney injury on arrival  Await hyponatremia workup-which is pending    Medical records through [facility] Everywhere has been reviewed for this patient encounter    HISTORY OF PRESENT " ILLNESS:  Requesting Physician: Penelope Ha MD  Reason for Consult: Acute on chronic hyponatremia    Armen Llanos is a 48 y.o. male who has PMH of hypertension, TBI, chronic opioid use, and knee pain who presents to the ER with right knee pain with potential fall.  It has been reported by his wife that he is having suicidal ideation and ER reported polydipsia.  Workup revealed hyperglycemia, acute kidney injury along with worsening hyponatremia.  Patient met SIRS criteria on admission now being evaluated for sepsis . A renal consultation is requested today for hyponatremia.  On discussion the patient is unaware of having any low sodium history.  Patient reports having excessive dry mouth and is always drinking ice water.  Patient reports having worsening pain especially in right knee.  Currently having nausea but not vomiting.     PAST MEDICAL HISTORY:  Past Medical History:   Diagnosis Date    Diabetes mellitus (HCC)     Hypertension        PAST SURGICAL HISTORY:  Past Surgical History:   Procedure Laterality Date    KNEE SURGERY         ALLERGIES:  Allergies   Allergen Reactions    Penicillins Hives       SOCIAL HISTORY:  Social History     Substance and Sexual Activity   Alcohol Use Not Currently     Social History     Substance and Sexual Activity   Drug Use Yes    Types: Marijuana     Social History     Tobacco Use   Smoking Status Never   Smokeless Tobacco Never       FAMILY HISTORY:  History reviewed. No pertinent family history.    MEDICATIONS:    Current Facility-Administered Medications:     aluminum-magnesium hydroxide-simethicone (MAALOX) oral suspension 30 mL, 30 mL, Oral, Q6H PRN, Maria T Awan MD    amLODIPine (NORVASC) tablet 5 mg, 5 mg, Oral, Daily, Maria T Awan MD, 5 mg at 03/23/24 0829    cyclobenzaprine (FLEXERIL) tablet 10 mg, 10 mg, Oral, TID PRN, Maria T Awan MD, 10 mg at 03/23/24 0225    Diclofenac Sodium (VOLTAREN) 1 % topical gel 2 g, 2 g, Topical, 4x Daily, Maria T Awan  MD, 2 g at 03/23/24 0831    docusate sodium (COLACE) capsule 300 mg, 300 mg, Oral, BID, Maria T Awan MD, 300 mg at 03/23/24 0831    [START ON 3/24/2024] fentaNYL (DURAGESIC) 75 mcg/hr TD 72 hr patch 1 patch, 1 patch, Transdermal, Q72H, Maria T Awan MD    heparin (porcine) subcutaneous injection 5,000 Units, 5,000 Units, Subcutaneous, Q8H DARIN, Maria T Awan MD, 5,000 Units at 03/23/24 0611    insulin regular (HumuLIN R,NovoLIN R) 1 Units/mL in sodium chloride 0.9 % 100 mL infusion, 0.3-21 Units/hr, Intravenous, Titrated, Maria T Awan MD, Last Rate: 3 mL/hr at 03/23/24 0838, 3 Units/hr at 03/23/24 0838    morphine (MS CONTIN) ER tablet 60 mg, 60 mg, Oral, Q12H DARIN, Maria T Awan MD, 60 mg at 03/23/24 0830    multi-electrolyte (PLASMALYTE-A/ISOLYTE-S PH 7.4) IV solution, 125 mL/hr, Intravenous, Continuous, Maria T Awan MD, Last Rate: 125 mL/hr at 03/23/24 1105, 125 mL/hr at 03/23/24 1105    nicotine (NICODERM CQ) 21 mg/24 hr TD 24 hr patch 21 mg, 21 mg, Transdermal, Daily, Maria T Awan MD, 21 mg at 03/23/24 0829    ondansetron (ZOFRAN) injection 4 mg, 4 mg, Intravenous, Q4H PRN, Maria T Awan MD    [DISCONTINUED] traMADol (ULTRAM) tablet 50 mg, 50 mg, Oral, Q6H PRN, 50 mg at 03/22/24 1900 **OR** oxyCODONE (ROXICODONE) immediate release tablet 10 mg, 10 mg, Oral, Q6H PRN, Maria T Awan MD, 10 mg at 03/23/24 1030    oxyCODONE (ROXICODONE) IR tablet 5 mg, 5 mg, Oral, Q6H PRN, Mattie V. Jegede, MD    pantoprazole (PROTONIX) EC tablet 20 mg, 20 mg, Oral, BID AC, Maria T Awan MD, 20 mg at 03/23/24 0407    polyethylene glycol (MIRALAX) packet 17 g, 17 g, Oral, Daily PRN, Maria T Awan MD    pravastatin (PRAVACHOL) tablet 80 mg, 80 mg, Oral, Daily With Dinner, Maria T Awan MD, 80 mg at 03/22/24 1704    risperiDONE (RisperDAL) tablet 3 mg, 3 mg, Oral, Daily, Maria T Awan MD, 3 mg at 03/23/24 0830    vancomycin (VANCOCIN) 1,250 mg in sodium chloride 0.9 % 250 mL IVPB, 1,250 mg, Intravenous,  Q12H, Penelope Ha MD    vancomycin (VANCOCIN) 2,000 mg in sodium chloride 0.9 % 500 mL IVPB, 2,000 mg, Intravenous, Once, Penelope Ha MD, Last Rate: 250 mL/hr at 03/23/24 1107, 2,000 mg at 03/23/24 1107    zolpidem (AMBIEN) tablet 10 mg, 10 mg, Oral, HS PRN, Maria T Awan MD, 10 mg at 03/22/24 2223      REVIEW OF SYSTEMS:  Patient seen and examined at bedside  Review of Systems   Constitutional:  Positive for fatigue.   HENT:          Dry mouth   Eyes: Negative.    Respiratory: Negative.     Cardiovascular: Negative.    Gastrointestinal:  Positive for nausea.   Endocrine: Negative.    Genitourinary: Negative.    Musculoskeletal:         Pain all over and  right knee   Skin: Negative.    Neurological:  Positive for weakness.   Hematological: Negative.    Psychiatric/Behavioral: Negative.           PHYSICAL EXAM:  Current weight: Weight - Scale: 91.7 kg (202 lb 2.6 oz)  Vitals:    03/22/24 2234 03/23/24 0738 03/23/24 0829 03/23/24 0829   BP:  134/80 133/80 133/80   BP Location:       Pulse: (!) 139 (!) 131  (!) 131   Resp:  21     Temp: 97.9 °F (36.6 °C) 99.1 °F (37.3 °C)     TempSrc:       SpO2: 97% 96%  96%   Weight:           Physical Exam  Vitals reviewed.   Constitutional:       Appearance: He is ill-appearing.      Comments: NAD, thin   HENT:      Head: Normocephalic and atraumatic.      Nose: Nose normal.      Mouth/Throat:      Mouth: Mucous membranes are dry.      Pharynx: Oropharynx is clear.   Eyes:      Extraocular Movements: Extraocular movements intact.      Conjunctiva/sclera: Conjunctivae normal.   Cardiovascular:      Rate and Rhythm: Regular rhythm. Tachycardia present.      Pulses: Normal pulses.      Heart sounds: Normal heart sounds.   Pulmonary:      Effort: Pulmonary effort is normal.      Breath sounds: Normal breath sounds.   Abdominal:      General: Bowel sounds are normal.      Palpations: Abdomen is soft.   Musculoskeletal:         General: Normal range of motion.      Cervical  "back: Normal range of motion and neck supple.   Skin:     General: Skin is warm and dry.      Coloration: Skin is pale.   Neurological:      General: No focal deficit present.      Mental Status: He is alert and oriented to person, place, and time.   Psychiatric:         Mood and Affect: Mood normal.         Behavior: Behavior normal.           Lab Results:   Results from last 7 days   Lab Units 03/23/24  0356 03/23/24  0029 03/22/24  1432 03/22/24  1051 03/22/24  0948   WBC Thousand/uL 12.38*  --   --   --  17.89*   HEMOGLOBIN g/dL 13.7  --   --   --  17.6*   I STAT HEMOGLOBIN g/dl  --   --   --  17.0  --    HEMATOCRIT % 40.3  --   --   --  50.0*   HEMATOCRIT, ISTAT %  --   --   --  50*  --    PLATELETS Thousands/uL 180  --   --   --  245   SODIUM mmol/L  --  125* 130*  --  123*   POTASSIUM mmol/L  --  3.8 4.0  --  5.3   CHLORIDE mmol/L  --  95* 96  --  88*   CO2 mmol/L  --  21 20*  --  19*   CO2, I-STAT mmol/L  --   --   --  20*  --    BUN mg/dL  --  27* 30*  --  30*   CREATININE mg/dL  --  1.04 1.37*  --  1.59*   CALCIUM mg/dL  --  8.6 9.5  --  9.6   MAGNESIUM mg/dL  --  1.9  --   --  1.4*   PHOSPHORUS mg/dL  --   --   --   --  3.3   ALK PHOS U/L 53  --   --   --   --    ALT U/L 16  --   --   --   --    AST U/L 13  --   --   --   --    GLUCOSE, ISTAT mg/dl  --   --   --  >600*  --          Portions of the record may have been created with voice recognition software. Occasional wrong word or \"sound a like\" substitutions may have occurred due to the inherent limitations of voice recognition software. Read the chart carefully and recognize,   "

## 2024-03-23 NOTE — CONSULTS
Orthopedics   Armen Llanos 48 y.o. male MRN: [id number]  Unit/Bed#: -01      Chief Complaint:   right knee pain    HPI:   48 y.o.male complaining of right knee pain.  He had an old injury to the right knee including a periprosthetic distal femur fracture on November 2022 which was treated nonsurgically.  He also had a total knee arthroplasty in 2005.  He had a patella tendon rupture in 2016 treated conservatively.  He has had many surgeries prior to the knee replacement surgery which was done at an outside facility.  He notes 2 days of significant pain to the right knee and lack of ability to bear weight progressively over that timeframe.  He notes increased swelling in the right knee.  He denies any recent injury or other infections.      Review Of Systems:   Skin: Normal  Neuro: See HPI  Musculoskeletal: See HPI  14 point review of systems negative except as stated above     Past Medical History:   Past Medical History:   Diagnosis Date    Diabetes mellitus (HCC)     Hypertension        Past Surgical History:   Past Surgical History:   Procedure Laterality Date    KNEE SURGERY         Family History:  Family history reviewed and non-contributory  History reviewed. No pertinent family history.    Social History:  Social History     Socioeconomic History    Marital status: /Civil Union     Spouse name: None    Number of children: None    Years of education: None    Highest education level: None   Occupational History    None   Tobacco Use    Smoking status: Never    Smokeless tobacco: Never   Vaping Use    Vaping status: Some Days    Substances: THC, CBD, Flavoring   Substance and Sexual Activity    Alcohol use: Not Currently    Drug use: Yes     Types: Marijuana    Sexual activity: None   Other Topics Concern    None   Social History Narrative    None     Social Determinants of Health     Financial Resource Strain: Not on file   Food Insecurity: No Food Insecurity (5/18/2023)    Hunger Vital Sign      Worried About Running Out of Food in the Last Year: Never true     Ran Out of Food in the Last Year: Never true   Transportation Needs: No Transportation Needs (5/18/2023)    PRAPARE - Transportation     Lack of Transportation (Medical): No     Lack of Transportation (Non-Medical): No   Physical Activity: Not on file   Stress: Not on file   Social Connections: Not on file   Intimate Partner Violence: Not on file   Housing Stability: Low Risk  (5/18/2023)    Housing Stability Vital Sign     Unable to Pay for Housing in the Last Year: No     Number of Places Lived in the Last Year: 1     Unstable Housing in the Last Year: No       Allergies:   Allergies   Allergen Reactions    Penicillins Hives           Labs:  0   Lab Value Date/Time    HCT 40.3 03/23/2024 0356    HCT 50 (H) 03/22/2024 1051    HCT 50.0 (H) 03/22/2024 0948    HCT 43.5 05/18/2023 0456    HGB 13.7 03/23/2024 0356    HGB 17.0 03/22/2024 1051    HGB 17.6 (H) 03/22/2024 0948    HGB 15.0 05/18/2023 0456    INR 0.91 05/17/2023 2158    WBC 12.38 (H) 03/23/2024 0356    WBC 17.89 (H) 03/22/2024 0948    WBC 6.93 05/18/2023 0456    ESR 25 (H) 03/22/2024 0948    CRP 70.5 (H) 03/22/2024 1049       Meds:    Current Facility-Administered Medications:     aluminum-magnesium hydroxide-simethicone (MAALOX) oral suspension 30 mL, 30 mL, Oral, Q6H PRN, Maria T Awan MD    amLODIPine (NORVASC) tablet 5 mg, 5 mg, Oral, Daily, Maria T Awan MD, 5 mg at 03/23/24 0829    cyclobenzaprine (FLEXERIL) tablet 10 mg, 10 mg, Oral, TID PRN, Maria T Awan MD, 10 mg at 03/23/24 0225    Diclofenac Sodium (VOLTAREN) 1 % topical gel 2 g, 2 g, Topical, 4x Daily, Maria T Awan MD, 2 g at 03/23/24 1306    docusate sodium (COLACE) capsule 300 mg, 300 mg, Oral, BID, Maria T Awan MD, 300 mg at 03/23/24 0831    [START ON 3/24/2024] fentaNYL (DURAGESIC) 75 mcg/hr TD 72 hr patch 1 patch, 1 patch, Transdermal, Q72H, Maria T Awan MD    heparin (porcine) subcutaneous injection 5,000  Units, 5,000 Units, Subcutaneous, Q8H DARIN, Maria T Awan MD, 5,000 Units at 03/23/24 1303    HYDROmorphone HCl (DILAUDID) injection 0.2 mg, 0.2 mg, Intravenous, Q6H PRN, Penelope Ha MD, 0.2 mg at 03/23/24 1410    insulin regular (HumuLIN R,NovoLIN R) 1 Units/mL in sodium chloride 0.9 % 100 mL infusion, 0.3-21 Units/hr, Intravenous, Titrated, Maria T Awan MD, Last Rate: 1.5 mL/hr at 03/23/24 1308, 1.5 Units/hr at 03/23/24 1308    magnesium sulfate IVPB (premix) SOLN 1 g, 1 g, Intravenous, Once, Penelope Ha MD, 1 g at 03/23/24 1305    morphine (MS CONTIN) ER tablet 60 mg, 60 mg, Oral, Q12H DARIN, Maria T Awan MD, 60 mg at 03/23/24 0830    nicotine (NICODERM CQ) 21 mg/24 hr TD 24 hr patch 21 mg, 21 mg, Transdermal, Daily, aMria T Awan MD, 21 mg at 03/23/24 0829    ondansetron (ZOFRAN) injection 4 mg, 4 mg, Intravenous, Q4H PRN, Maria T Awan MD    [DISCONTINUED] traMADol (ULTRAM) tablet 50 mg, 50 mg, Oral, Q6H PRN, 50 mg at 03/22/24 1900 **OR** oxyCODONE (ROXICODONE) immediate release tablet 10 mg, 10 mg, Oral, Q6H PRN, Maria T Awan MD, 10 mg at 03/23/24 1030    oxyCODONE (ROXICODONE) IR tablet 5 mg, 5 mg, Oral, Q4H PRN, Penelope Ha MD    pantoprazole (PROTONIX) EC tablet 20 mg, 20 mg, Oral, BID AC, Maria T Awan MD, 20 mg at 03/23/24 0407    polyethylene glycol (MIRALAX) packet 17 g, 17 g, Oral, Daily PRN, Maria T Awan MD    pravastatin (PRAVACHOL) tablet 80 mg, 80 mg, Oral, Daily With Dinner, Maria T Awan MD, 80 mg at 03/22/24 1704    risperiDONE (RisperDAL) tablet 3 mg, 3 mg, Oral, Daily, Maria T Awan MD, 3 mg at 03/23/24 0830    saliva substitute (MOUTH KOTE) mucosal solution 5 spray, 5 spray, Mouth/Throat, 4x Daily PRN, NATA Zhou    vancomycin (VANCOCIN) 1,250 mg in sodium chloride 0.9 % 250 mL IVPB, 1,250 mg, Intravenous, Q12H, Penelope Ha MD    zolpidem (AMBIEN) tablet 10 mg, 10 mg, Oral, HS PRN, Maria T Awan MD, 10 mg at 03/22/24 6725    Blood Culture:  "  No results found for: \"BLOODCX\"    Wound Culture:   No results found for: \"WOUNDCULT\"    Ins and Outs:  I/O last 24 hours:  In: 3712.5 [P.O.:2400; I.V.:1312.5]  Out: 975 [Urine:975]          Physical Exam:   /80   Pulse (!) 131   Temp 99.1 °F (37.3 °C)   Resp 21   Wt 91.7 kg (202 lb 2.6 oz)   SpO2 96%   BMI 23.36 kg/m²   Gen: Alert and oriented to person, place, time  HEENT: EOMI, eyes clear, moist mucus membranes, hearing intact  Respiratory: Bilateral chest rise. No audible wheezing found  Cardiovascular: Regular Rate and Rhythm  Abdomen: soft nontender/nondistended  Musculoskeletal: right lower extremity  Skin intact  Tender to palpation over Medial, lateral, superior, inferior, posterior, and anterior knee  Can perform striaght leg raise  Stable to varus/valgus stress  Negative lachmans, Posterior draw, Jayce's test  Sensation intact L1-S1  5/5 strength to hip flexion/extension, knee flexion/extension ankle dorsi/plantar flexion, EHL/FHL  Large effusion of right knee  Significant pain with any passive or active range of motion  To right knee and minimal erythema    Radiology:   I personally reviewed the films.  X-rays right knee shows prior total knee arthroplasty with appropriate alignment and no obvious loosening.  I reviewed the radiology report and agree with the impression    [unfilled]    An aspiration of the right knee was performed under aseptic technique with an 18-gauge needle and 30 cc of purulent fluid was obtained.  Please see separate procedure note for further details    Assessment:  48 y.o.male with right knee possible periprosthetic joint infection R knee    Plan:   Cell count 221,00.  Culture pending, crystals pending, crp pending  Concern for acute periprosthetic joint infection.  We will discuss with the medical team clearance for an I and D and poly exchange tomorrow with Dr. Washington.  Case discussed with Dr. Washington and pending clearance will make npo for OR tomorrow    Yunior " Marina, DO

## 2024-03-23 NOTE — QUICK NOTE
Patient likely has septic arthritis, will likely need washout by ortho.        Risk Factor Score (Yes=1, No=0)   Hx of TIA/CVA 0   Hx of prior ischemic heart disease (AMI, unstable angina, Q waves on EKG, CABG) 0   Hx of congestive heart failure 0   Serum Creatinine >2 mg/dl 0   Insulin dependent diabetes mellitus 1   Total Score 1     Risk of MACE (30-day)     Points Risk % (95% CI), Manuel, 2017 Risk % (95% CI) Sameer, 1999   0 3.9 (2.8-5.4) 0.4 (0.05-1.5)   1 6 (4.9-7.4) 0.9 (0.3-2.1)   2 10.1 (8.1-12.6) 6.6 (3.9-10.3)   3 or more 15 (11.1-20) >11 (5.8-18.4)       Advice    In patients with 3 or more RCRI risk factors (e.g., diabetes mellitus, HF, coronary artery disease, renal insufficiency, cerebrovascular accident), it may be reasonable to begin beta blockers before surgery. (Level of Evidence: B)    Beta-blocker therapy should not be started on the day of surgery (Level of Evidence: B)    In patients with a compelling long-term indication for betablocker therapy but no other RCRI risk factors, initiating beta blockers in the perioperative setting as an approach to reduce perioperative risk is of uncertain benefit  (Level of Evidence: B)         Patient at low risk (class II)  for complications- 6%, may proceed with OR.

## 2024-03-23 NOTE — PROGRESS NOTES
[facility]  Progress Note  Name: Armen Llanos I  MRN: [id number]  Unit/Bed#: -01 I Date of Admission: 3/22/2024   Date of Service: 3/23/2024 I Hospital Day: 1    Assessment/Plan   Positive blood culture  Assessment & Plan  1 out of 2 growing gram-positive cocci  Placed on vancomycin until further data- ?  Suspicion for septic arthritis given acute knee pain with redness, warmth, swelling  Follow-up on repeat blood cultures ordered from today  Ortho [id number] on synovial fluid    Hyponatremia  Assessment & Plan  Likely secondary to physiologic SIADH with pain, nausea  Received IV fluids, Toradol, on lisinopril  Obtain serum osm, urine awesome, urine sodium, TSH, uric acid  BMP every 8 hours  Placed on fluid restriction nephrology  Nephrology consulted-discontinue IV fluids      Suicidal ideation  Assessment & Plan  Per wife patient has been mentioning and having suicidal ideation  Continue home medications as per psychiatry-not meet criteria for inpatient hospitalization    TBI (traumatic brain injury) ([facility])  Assessment & Plan  History of TBI at 7 years old  Experiences memory issues  Continue Ambien and risperidone    SIRS (systemic inflammatory response syndrome) ([facility])  Assessment & Plan  Patient meeting SIRS criteria on admission due to tachycardia and leukocytosis most likely secondary to hyperglycemic ketoacidosis/infection?  Chest x-ray  UA  COVID flu RSV  Blood cultures-1 out of 2 growing gram-positive cocci  Placed on vancomycin, repeat blood cultures ordered    Essential hypertension  Assessment & Plan  Continue amlodipine at higher parameters  Hold lisinopril hydrochlorothiazide due to JH    Chronic, continuous use of opioids  Assessment & Plan  MP reviewed  No red flags  Continue fentanyl patch 75 mcg every 72 hours  Continue morphine 60 mg twice daily  Scheduled Tylenol  Pain regimen Oxy 5 mg for moderate, 10 mg for severe, Dilaudid for  breakthrough  Ibuprofen as needed not more than twice per day for hyponatremia    Acute on Chronic Right knee pain  Assessment & Plan  Pt presenting due to right knee pain after possibly falling  Has swelling, warmth-looks like has effusion on palpation  Ttd Ortho for consult  Pain management  Aspirated today by Ortho follow-up on synovial fluid    * Type 2 diabetes mellitus with ketoacidosis without coma, without long-term current use of insulin (HCC)  Assessment & Plan  Lab Results   Component Value Date    HGBA1C 12.1 (H) 03/22/2024       Recent Labs     03/23/24  0353 03/23/24  0554 03/23/24  0837 03/23/24  1000   POCGLU 148* 135 154* 156*       Blood Sugar Average: Last 72 hrs:  (P) [id number]    -Patient presenting with polydipsia and polyuria  Will switch to subcu Lantus with Humalog tonight-will use weight-based insulin requirements  Hemoglobin A1c 12.1-Emily need insulin for discharge        JH (acute kidney injury) (HCC)-resolved as of 3/23/2024  Assessment & Plan  Creatinine on admission 1.59  Baseline appears to be around 0.8-1.0  Meeting KDIGO criteria  I's and O's  Avoid hypotension nephrotoxic agents and contrast  Hold lisinopril and hydrochlorothiazide  Urinary retention protocol  Bladder scans  Continue to trend  Also received Toradol yesterday      Increased anion gap metabolic acidosis-resolved as of 3/23/2024  Assessment & Plan  See management above  REsolved               VTE Pharmacologic Prophylaxis: VTE Score: 2 Low Risk (Score 0-2) - Encourage Ambulation.    Mobility:   Basic Mobility Inpatient Raw Score: 15  JH-HLM Goal: 4: Move to chair/commode  JH-HLM Achieved: 5: Stand (1 or more minutes)  JH-HLM Goal achieved. Continue to encourage appropriate mobility.    Patient Centered Rounds: I performed bedside rounds with nursing staff today.   Discussions with Specialists or Other Care Team Provider: Ortho, nephrology    Education and Discussions with Family / Patient:  Patient.      Total Time Spent on Date of Encounter in care of patient: 55 mins. This time was spent on one or more of the following: performing physical exam; counseling and coordination of care; obtaining or reviewing history; documenting in the medical record; reviewing/ordering tests, medications or procedures; communicating with other healthcare professionals and discussing with patient's family/caregivers.    Current Length of Stay: 1 day(s)  Current Patient Status: Inpatient   Certification Statement: The patient will continue to require additional inpatient hospital stay due to hyponatremia, abnormal blood cultures, acute knee pain  Discharge Plan:  Not ready    Code Status: Level 1 - Full Code    Subjective:   Complains of right knee pain.  States knee pain is 10 out of 10. No fever, but tachycardic.    Objective:     Vitals:   Temp (24hrs), Av.1 °F (36.7 °C), Min:97.5 °F (36.4 °C), Max:99.1 °F (37.3 °C)    Temp:  [97.5 °F (36.4 °C)-99.1 °F (37.3 °C)] 99.1 °F (37.3 °C)  HR:  [112-139] 131  Resp:  [17-21] 21  BP: (107-134)/(73-83) 133/80  SpO2:  [94 %-97 %] 96 %  Body mass index is 23.36 kg/m².     Input and Output Summary (last 24 hours):     Intake/Output Summary (Last 24 hours) at 3/23/2024 1228  Last data filed at 3/23/2024 0200  Gross per 24 hour   Intake 3712.5 ml   Output 975 ml   Net 2737.5 ml       Physical Exam:   Physical Exam     Gen.-Patient in pain  Neck- Supple. No thyromegaly or lymphadenopathy  Lungs-Clear bilaterally without any wheeze or rales   Heart S1-S2, regular rate and rhythm, no murmurs  Abdomen-soft nontender, no organomegaly. Bowel sounds present  Extremities-no cyanosi,  clubbing, edema  Right knee-swollen, warm,Pain with ROM  Skin- no rash  Neuro-nonfocal     Additional Data:     Labs:  Results from last 7 days   Lab Units 24  0356 24  1051 24  0948   WBC Thousand/uL 12.38*  --  17.89*   HEMOGLOBIN g/dL 13.7  --  17.6*   I STAT HEMOGLOBIN   --    < >  --     HEMATOCRIT % 40.3  --  50.0*   HEMATOCRIT, ISTAT   --    < >  --    PLATELETS Thousands/uL 180  --  245   NEUTROS PCT %  --   --  92*   LYMPHS PCT %  --   --  2*   MONOS PCT %  --   --  5   EOS PCT %  --   --  1    < > = values in this interval not displayed.     Results from last 7 days   Lab Units 03/23/24  0356 03/23/24  0029   SODIUM mmol/L  --  125*   POTASSIUM mmol/L  --  3.8   CHLORIDE mmol/L  --  95*   CO2 mmol/L  --  21   BUN mg/dL  --  27*   CREATININE mg/dL  --  1.04   ANION GAP mmol/L  --  9   CALCIUM mg/dL  --  8.6   ALBUMIN g/dL 3.6  --    TOTAL BILIRUBIN mg/dL 0.87  --    ALK PHOS U/L 53  --    ALT U/L 16  --    AST U/L 13  --    GLUCOSE RANDOM mg/dL  --  181*         Results from last 7 days   Lab Units 03/23/24  1000 03/23/24  0837 03/23/24  0554 03/23/24  0353 03/23/24  0129 03/22/24  2252 03/22/24  2120 03/22/24  1856 03/22/24  1726 03/22/24  1458 03/22/24  1210   POC GLUCOSE mg/dl 156* 154* 135 148* 162* 178* 188* 166* 182* 222* 600*     Results from last 7 days   Lab Units 03/22/24  0948   HEMOGLOBIN A1C % 12.1*     Results from last 7 days   Lab Units 03/22/24  1818 03/22/24  1432 03/22/24  1148   LACTIC ACID mmol/L 1.6 3.1* 2.2*       Lines/Drains:  Invasive Devices       Peripheral Intravenous Line  Duration             Peripheral IV 03/22/24 Right Antecubital 1 day    Peripheral IV 03/23/24 Right;Ventral (anterior) Forearm <1 day                          Imaging: Reviewed radiology reports from this admission including: xray(s)    Recent Cultures (last 7 days):   Results from last 7 days   Lab Units 03/22/24  1448   GRAM STAIN RESULT  Gram positive cocci in clusters*  Gram positive cocci in clusters*       Last 24 Hours Medication List:   Current Facility-Administered Medications   Medication Dose Route Frequency Provider Last Rate    aluminum-magnesium hydroxide-simethicone  30 mL Oral Q6H PRN Maria T Awan MD      amLODIPine  5 mg Oral Daily Maria T Awan MD      cyclobenzaprine  10  mg Oral TID PRN Maria T Awan MD      Diclofenac Sodium  2 g Topical 4x Daily Maria T Awan MD      docusate sodium  300 mg Oral BID Maria T Awan MD      [START ON 3/24/2024] fentaNYL  1 patch Transdermal Q72H Maria T Awan MD      heparin (porcine)  5,000 Units Subcutaneous Q8H [facility] Maria T Awan MD      HYDROmorphone  0.2 mg Intravenous Q6H PRN Penelope Ha MD      insulin regular (HumuLIN R,NovoLIN R) 1 Units/mL in sodium chloride 0.9 % 100 mL infusion  0.3-21 Units/hr Intravenous Titrated Maria T Awan MD 3 Units/hr (03/23/24 0838)    morphine  60 mg Oral Q12H DARIN Maria T Awan MD      nicotine  21 mg Transdermal Daily Maria T Awan MD      ondansetron  4 mg Intravenous Q4H PRN Maria T Awan MD      oxyCODONE  10 mg Oral Q6H PRN Maria T Awan MD      oxyCODONE  5 mg Oral Q4H PRN Penelope Ha MD      pantoprazole  20 mg Oral BID AC Mari aT Awan MD      polyethylene glycol  17 g Oral Daily PRN Maria T Awan MD      pravastatin  80 mg Oral Daily With Dinner Maria T Awan MD      risperiDONE  3 mg Oral Daily Maria T Awan MD      saliva substitute  5 spray Mouth/Throat 4x Daily PRN Shannan AMADOR'NATA Tineo      vancomycin  1,250 mg Intravenous Q12H Penelope Ha MD      vancomycin  2,000 mg Intravenous Once Penelope Ha MD 2,000 mg (03/23/24 1107)    zolpidem  10 mg Oral HS PRN Maria T Awan MD          Today, Patient Was Seen By: Penelope Ha MD    **Please Note: This note may have been constructed using a voice recognition system.**

## 2024-03-24 ENCOUNTER — APPOINTMENT (INPATIENT)
Dept: RADIOLOGY | Facility: HOSPITAL | Age: 49
DRG: 485 | End: 2024-03-24
Payer: COMMERCIAL

## 2024-03-24 ENCOUNTER — ANESTHESIA EVENT (INPATIENT)
Dept: PERIOP | Facility: HOSPITAL | Age: 49
DRG: 485 | End: 2024-03-24
Payer: COMMERCIAL

## 2024-03-24 ENCOUNTER — ANESTHESIA (INPATIENT)
Dept: PERIOP | Facility: HOSPITAL | Age: 49
DRG: 485 | End: 2024-03-24
Payer: COMMERCIAL

## 2024-03-24 PROBLEM — R65.20 SEVERE SEPSIS (HCC): Status: ACTIVE | Noted: 2022-11-15

## 2024-03-24 PROBLEM — G58.9 MONONEUROPATHY: Status: ACTIVE | Noted: 2024-03-24

## 2024-03-24 PROBLEM — B95.61 MSSA BACTEREMIA: Status: ACTIVE | Noted: 2024-03-23

## 2024-03-24 PROBLEM — R65.21 SEPTIC SHOCK (HCC): Status: ACTIVE | Noted: 2022-11-15

## 2024-03-24 PROBLEM — I48.91 A-FIB (HCC): Status: ACTIVE | Noted: 2024-03-24

## 2024-03-24 PROBLEM — A41.9 SEPSIS (HCC): Status: ACTIVE | Noted: 2022-11-15

## 2024-03-24 PROBLEM — Z97.8 ENDOTRACHEALLY INTUBATED: Status: ACTIVE | Noted: 2024-03-24

## 2024-03-24 PROBLEM — M17.9 OSTEOARTHRITIS OF KNEE: Status: ACTIVE | Noted: 2024-03-24

## 2024-03-24 PROBLEM — G89.4 CHRONIC PAIN DISORDER: Status: ACTIVE | Noted: 2022-09-27

## 2024-03-24 LAB
ABO GROUP BLD: NORMAL
ABO GROUP BLD: NORMAL
ANION GAP SERPL CALCULATED.3IONS-SCNC: 10 MMOL/L (ref 4–13)
ANION GAP SERPL CALCULATED.3IONS-SCNC: 13 MMOL/L (ref 4–13)
ANION GAP SERPL CALCULATED.3IONS-SCNC: 15 MMOL/L (ref 4–13)
ANION GAP SERPL CALCULATED.3IONS-SCNC: 15 MMOL/L (ref 4–13)
ANION GAP SERPL CALCULATED.3IONS-SCNC: 17 MMOL/L (ref 4–13)
ANION GAP SERPL CALCULATED.3IONS-SCNC: 18 MMOL/L (ref 4–13)
APTT PPP: 41 SECONDS (ref 23–37)
APTT PPP: 57 SECONDS (ref 23–37)
ATRIAL RATE: 156 BPM
ATRIAL RATE: 192 BPM
BASE EX.OXY STD BLDV CALC-SCNC: 60.5 % (ref 60–80)
BASE EXCESS BLDA CALC-SCNC: -13 MMOL/L (ref -2–3)
BASE EXCESS BLDA CALC-SCNC: -6.8 MMOL/L
BASE EXCESS BLDA CALC-SCNC: -7.9 MMOL/L
BASE EXCESS BLDA CALC-SCNC: -9 MMOL/L (ref -2–3)
BASE EXCESS BLDV CALC-SCNC: -7.4 MMOL/L
BASOPHILS # BLD AUTO: 0.03 THOUSANDS/ÂΜL (ref 0–0.1)
BASOPHILS # BLD MANUAL: 0 THOUSAND/UL (ref 0–0.1)
BASOPHILS NFR BLD AUTO: 0 % (ref 0–1)
BASOPHILS NFR MAR MANUAL: 0 % (ref 0–1)
BLD GP AB SCN SERPL QL: NEGATIVE
BUN SERPL-MCNC: 14 MG/DL (ref 5–25)
BUN SERPL-MCNC: 15 MG/DL (ref 5–25)
BUN SERPL-MCNC: 16 MG/DL (ref 5–25)
BUN SERPL-MCNC: 18 MG/DL (ref 5–25)
BUN SERPL-MCNC: 19 MG/DL (ref 5–25)
BUN SERPL-MCNC: 20 MG/DL (ref 5–25)
CA-I BLD-SCNC: 1.3 MMOL/L (ref 1.12–1.32)
CA-I BLD-SCNC: 1.33 MMOL/L (ref 1.12–1.32)
CALCIUM SERPL-MCNC: 8.3 MG/DL (ref 8.4–10.2)
CALCIUM SERPL-MCNC: 8.3 MG/DL (ref 8.4–10.2)
CALCIUM SERPL-MCNC: 8.5 MG/DL (ref 8.4–10.2)
CALCIUM SERPL-MCNC: 8.6 MG/DL (ref 8.4–10.2)
CALCIUM SERPL-MCNC: 8.7 MG/DL (ref 8.4–10.2)
CALCIUM SERPL-MCNC: 8.8 MG/DL (ref 8.4–10.2)
CARDIAC TROPONIN I PNL SERPL HS: 13 NG/L (ref 8–18)
CHLORIDE SERPL-SCNC: 100 MMOL/L (ref 96–108)
CHLORIDE SERPL-SCNC: 97 MMOL/L (ref 96–108)
CHLORIDE SERPL-SCNC: 98 MMOL/L (ref 96–108)
CHLORIDE SERPL-SCNC: 99 MMOL/L (ref 96–108)
CO2 SERPL-SCNC: 12 MMOL/L (ref 21–32)
CO2 SERPL-SCNC: 15 MMOL/L (ref 21–32)
CO2 SERPL-SCNC: 17 MMOL/L (ref 21–32)
CO2 SERPL-SCNC: 18 MMOL/L (ref 21–32)
CO2 SERPL-SCNC: 19 MMOL/L (ref 21–32)
CO2 SERPL-SCNC: 20 MMOL/L (ref 21–32)
CORTIS AM PEAK SERPL-MCNC: 35.4 UG/DL (ref 6.7–22.6)
CREAT SERPL-MCNC: 0.93 MG/DL (ref 0.6–1.3)
CREAT SERPL-MCNC: 0.94 MG/DL (ref 0.6–1.3)
CREAT SERPL-MCNC: 1.03 MG/DL (ref 0.6–1.3)
CREAT SERPL-MCNC: 1.07 MG/DL (ref 0.6–1.3)
CREAT SERPL-MCNC: 1.1 MG/DL (ref 0.6–1.3)
CREAT SERPL-MCNC: 1.14 MG/DL (ref 0.6–1.3)
EOSINOPHIL # BLD AUTO: 0.07 THOUSAND/ÂΜL (ref 0–0.61)
EOSINOPHIL # BLD MANUAL: 0 THOUSAND/UL (ref 0–0.4)
EOSINOPHIL NFR BLD AUTO: 1 % (ref 0–6)
EOSINOPHIL NFR BLD MANUAL: 0 % (ref 0–6)
ERYTHROCYTE [DISTWIDTH] IN BLOOD BY AUTOMATED COUNT: 12.4 % (ref 11.6–15.1)
ERYTHROCYTE [DISTWIDTH] IN BLOOD BY AUTOMATED COUNT: 12.5 % (ref 11.6–15.1)
GFR SERPL CREATININE-BSD FRML MDRD: 75 ML/MIN/1.73SQ M
GFR SERPL CREATININE-BSD FRML MDRD: 78 ML/MIN/1.73SQ M
GFR SERPL CREATININE-BSD FRML MDRD: 81 ML/MIN/1.73SQ M
GFR SERPL CREATININE-BSD FRML MDRD: 84 ML/MIN/1.73SQ M
GFR SERPL CREATININE-BSD FRML MDRD: 94 ML/MIN/1.73SQ M
GFR SERPL CREATININE-BSD FRML MDRD: 96 ML/MIN/1.73SQ M
GLUCOSE SERPL-MCNC: 194 MG/DL (ref 65–140)
GLUCOSE SERPL-MCNC: 201 MG/DL (ref 65–140)
GLUCOSE SERPL-MCNC: 203 MG/DL (ref 65–140)
GLUCOSE SERPL-MCNC: 215 MG/DL (ref 65–140)
GLUCOSE SERPL-MCNC: 219 MG/DL (ref 65–140)
GLUCOSE SERPL-MCNC: 228 MG/DL (ref 65–140)
GLUCOSE SERPL-MCNC: 230 MG/DL (ref 65–140)
GLUCOSE SERPL-MCNC: 236 MG/DL (ref 65–140)
GLUCOSE SERPL-MCNC: 237 MG/DL (ref 65–140)
GLUCOSE SERPL-MCNC: 248 MG/DL (ref 65–140)
GLUCOSE SERPL-MCNC: 258 MG/DL (ref 65–140)
GLUCOSE SERPL-MCNC: 259 MG/DL (ref 65–140)
GLUCOSE SERPL-MCNC: 259 MG/DL (ref 65–140)
GLUCOSE SERPL-MCNC: 292 MG/DL (ref 65–140)
GLUCOSE SERPL-MCNC: 309 MG/DL (ref 65–140)
HCO3 BLDA-SCNC: 14.5 MMOL/L (ref 24–30)
HCO3 BLDA-SCNC: 16.8 MMOL/L (ref 22–28)
HCO3 BLDA-SCNC: 18 MMOL/L (ref 22–28)
HCO3 BLDA-SCNC: 18.2 MMOL/L (ref 24–30)
HCO3 BLDV-SCNC: 18.4 MMOL/L (ref 24–30)
HCT VFR BLD AUTO: 29.6 % (ref 36.5–49.3)
HCT VFR BLD AUTO: 35.2 % (ref 36.5–49.3)
HCT VFR BLD CALC: 29 % (ref 36.5–49.3)
HCT VFR BLD CALC: 34 % (ref 36.5–49.3)
HGB BLD-MCNC: 10.1 G/DL (ref 12–17)
HGB BLD-MCNC: 12 G/DL (ref 12–17)
HGB BLDA-MCNC: 11.6 G/DL (ref 12–17)
HGB BLDA-MCNC: 9.9 G/DL (ref 12–17)
HOROWITZ INDEX BLDA+IHG-RTO: 100 MM[HG]
HOROWITZ INDEX BLDA+IHG-RTO: 50 MM[HG]
HOROWITZ INDEX BLDA+IHG-RTO: 70 MM[HG]
IMM GRANULOCYTES # BLD AUTO: 0.15 THOUSAND/UL (ref 0–0.2)
IMM GRANULOCYTES NFR BLD AUTO: 1 % (ref 0–2)
INR PPP: 1.37 (ref 0.84–1.19)
LACTATE SERPL-SCNC: 1.4 MMOL/L (ref 0.5–2)
LYMPHOCYTES # BLD AUTO: 0.39 THOUSAND/UL (ref 0.6–4.47)
LYMPHOCYTES # BLD AUTO: 0.39 THOUSANDS/ÂΜL (ref 0.6–4.47)
LYMPHOCYTES # BLD AUTO: 6 % (ref 14–44)
LYMPHOCYTES NFR BLD AUTO: 3 % (ref 14–44)
MAGNESIUM SERPL-MCNC: 1.5 MG/DL (ref 1.9–2.7)
MAGNESIUM SERPL-MCNC: 1.6 MG/DL (ref 1.9–2.7)
MAGNESIUM SERPL-MCNC: 1.8 MG/DL (ref 1.9–2.7)
MAGNESIUM SERPL-MCNC: 1.8 MG/DL (ref 1.9–2.7)
MCH RBC QN AUTO: 30.1 PG (ref 26.8–34.3)
MCH RBC QN AUTO: 30.3 PG (ref 26.8–34.3)
MCHC RBC AUTO-ENTMCNC: 34.1 G/DL (ref 31.4–37.4)
MCHC RBC AUTO-ENTMCNC: 34.1 G/DL (ref 31.4–37.4)
MCV RBC AUTO: 88 FL (ref 82–98)
MCV RBC AUTO: 89 FL (ref 82–98)
MONOCYTES # BLD AUTO: 0.13 THOUSAND/UL (ref 0–1.22)
MONOCYTES # BLD AUTO: 0.88 THOUSAND/ÂΜL (ref 0.17–1.22)
MONOCYTES NFR BLD AUTO: 8 % (ref 4–12)
MONOCYTES NFR BLD: 2 % (ref 4–12)
NEUTROPHILS # BLD AUTO: 10.1 THOUSANDS/ÂΜL (ref 1.85–7.62)
NEUTROPHILS # BLD MANUAL: 6.04 THOUSAND/UL (ref 1.85–7.62)
NEUTS BAND NFR BLD MANUAL: 11 % (ref 0–8)
NEUTS SEG NFR BLD AUTO: 81 % (ref 43–75)
NEUTS SEG NFR BLD AUTO: 87 % (ref 43–75)
NRBC BLD AUTO-RTO: 0 /100 WBCS
O2 CT BLDA-SCNC: 16 ML/DL (ref 16–23)
O2 CT BLDA-SCNC: 16.6 ML/DL (ref 16–23)
O2 CT BLDV-SCNC: 10.3 ML/DL
OSMOLALITY UR/SERPL-RTO: 269 MMOL/KG (ref 282–298)
OXYHGB MFR BLDA: 98 % (ref 94–97)
OXYHGB MFR BLDA: 98.4 % (ref 94–97)
PCO2 BLD: 16 MMOL/L (ref 21–32)
PCO2 BLD: 20 MMOL/L (ref 21–32)
PCO2 BLD: 40.8 MM HG (ref 42–50)
PCO2 BLD: 45.7 MM HG (ref 42–50)
PCO2 BLDA: 31.8 MM HG (ref 36–44)
PCO2 BLDA: 33.6 MM HG (ref 36–44)
PCO2 BLDV: 38.4 MM HG (ref 42–50)
PEEP RESPIRATORY: 6 CM[H2O]
PEEP RESPIRATORY: 6 CM[H2O]
PH BLD: 7.16 [PH] (ref 7.3–7.4)
PH BLD: 7.21 [PH] (ref 7.3–7.4)
PH BLDA: 7.34 [PH] (ref 7.35–7.45)
PH BLDA: 7.35 [PH] (ref 7.35–7.45)
PH BLDV: 7.3 [PH] (ref 7.3–7.4)
PHOSPHATE SERPL-MCNC: 2 MG/DL (ref 2.7–4.5)
PHOSPHATE SERPL-MCNC: 3.7 MG/DL (ref 2.7–4.5)
PLATELET # BLD AUTO: 132 THOUSANDS/UL (ref 149–390)
PLATELET # BLD AUTO: 168 THOUSANDS/UL (ref 149–390)
PLATELET BLD QL SMEAR: ADEQUATE
PMV BLD AUTO: 10.2 FL (ref 8.9–12.7)
PMV BLD AUTO: 10.5 FL (ref 8.9–12.7)
PO2 BLD: 130 MM HG (ref 35–45)
PO2 BLD: 386 MM HG (ref 35–45)
PO2 BLDA: 155.7 MM HG (ref 75–129)
PO2 BLDA: 319.1 MM HG (ref 75–129)
PO2 BLDV: 30.5 MM HG (ref 35–45)
POTASSIUM BLD-SCNC: 3.5 MMOL/L (ref 3.5–5.3)
POTASSIUM BLD-SCNC: 5.4 MMOL/L (ref 3.5–5.3)
POTASSIUM SERPL-SCNC: 3.3 MMOL/L (ref 3.5–5.3)
POTASSIUM SERPL-SCNC: 3.8 MMOL/L (ref 3.5–5.3)
POTASSIUM SERPL-SCNC: 4 MMOL/L (ref 3.5–5.3)
POTASSIUM SERPL-SCNC: 4.1 MMOL/L (ref 3.5–5.3)
POTASSIUM SERPL-SCNC: 4.1 MMOL/L (ref 3.5–5.3)
POTASSIUM SERPL-SCNC: 4.2 MMOL/L (ref 3.5–5.3)
PROCALCITONIN SERPL-MCNC: 3.39 NG/ML
PROTHROMBIN TIME: 17.4 SECONDS (ref 11.6–14.5)
QRS AXIS: 77 DEGREES
QRS AXIS: 78 DEGREES
QRSD INTERVAL: 88 MS
QRSD INTERVAL: 88 MS
QT INTERVAL: 242 MS
QT INTERVAL: 304 MS
QTC INTERVAL: 429 MS
QTC INTERVAL: 470 MS
RBC # BLD AUTO: 3.35 MILLION/UL (ref 3.88–5.62)
RBC # BLD AUTO: 3.96 MILLION/UL (ref 3.88–5.62)
RBC MORPH BLD: NORMAL
RH BLD: NEGATIVE
RH BLD: NEGATIVE
SAO2 % BLD FROM PO2: 100 % (ref 60–85)
SAO2 % BLD FROM PO2: 98 % (ref 60–85)
SODIUM BLD-SCNC: 128 MMOL/L (ref 136–145)
SODIUM BLD-SCNC: 130 MMOL/L (ref 136–145)
SODIUM SERPL-SCNC: 127 MMOL/L (ref 135–147)
SODIUM SERPL-SCNC: 127 MMOL/L (ref 135–147)
SODIUM SERPL-SCNC: 129 MMOL/L (ref 135–147)
SODIUM SERPL-SCNC: 130 MMOL/L (ref 135–147)
SODIUM SERPL-SCNC: 130 MMOL/L (ref 135–147)
SODIUM SERPL-SCNC: 134 MMOL/L (ref 135–147)
SPECIMEN EXPIRATION DATE: NORMAL
SPECIMEN SOURCE: ABNORMAL
T WAVE AXIS: 253 DEGREES
T WAVE AXIS: 33 DEGREES
VENT AC: 16
VENT- AC: AC
VENTRICULAR RATE: 144 BPM
VENTRICULAR RATE: 189 BPM
VT SETTING VENT: 650 ML
VT SETTING VENT: 650 ML
VT SETTING VENT: 700 ML
WBC # BLD AUTO: 11.62 THOUSAND/UL (ref 4.31–10.16)
WBC # BLD AUTO: 6.56 THOUSAND/UL (ref 4.31–10.16)

## 2024-03-24 PROCEDURE — 85014 HEMATOCRIT: CPT

## 2024-03-24 PROCEDURE — 84100 ASSAY OF PHOSPHORUS: CPT

## 2024-03-24 PROCEDURE — 87075 CULTR BACTERIA EXCEPT BLOOD: CPT | Performed by: STUDENT IN AN ORGANIZED HEALTH CARE EDUCATION/TRAINING PROGRAM

## 2024-03-24 PROCEDURE — 84295 ASSAY OF SERUM SODIUM: CPT

## 2024-03-24 PROCEDURE — 20704 MNL PREP&INSJ I-ARTIC RX DEV: CPT | Performed by: STUDENT IN AN ORGANIZED HEALTH CARE EDUCATION/TRAINING PROGRAM

## 2024-03-24 PROCEDURE — 83735 ASSAY OF MAGNESIUM: CPT

## 2024-03-24 PROCEDURE — 94002 VENT MGMT INPAT INIT DAY: CPT

## 2024-03-24 PROCEDURE — 02HV33Z INSERTION OF INFUSION DEVICE INTO SUPERIOR VENA CAVA, PERCUTANEOUS APPROACH: ICD-10-PCS | Performed by: INTERNAL MEDICINE

## 2024-03-24 PROCEDURE — 82805 BLOOD GASES W/O2 SATURATION: CPT

## 2024-03-24 PROCEDURE — 71045 X-RAY EXAM CHEST 1 VIEW: CPT

## 2024-03-24 PROCEDURE — 84100 ASSAY OF PHOSPHORUS: CPT | Performed by: INTERNAL MEDICINE

## 2024-03-24 PROCEDURE — 93005 ELECTROCARDIOGRAM TRACING: CPT

## 2024-03-24 PROCEDURE — C1713 ANCHOR/SCREW BN/BN,TIS/BN: HCPCS | Performed by: STUDENT IN AN ORGANIZED HEALTH CARE EDUCATION/TRAINING PROGRAM

## 2024-03-24 PROCEDURE — 85007 BL SMEAR W/DIFF WBC COUNT: CPT

## 2024-03-24 PROCEDURE — 82533 TOTAL CORTISOL: CPT | Performed by: NURSE PRACTITIONER

## 2024-03-24 PROCEDURE — 83735 ASSAY OF MAGNESIUM: CPT | Performed by: PHYSICIAN ASSISTANT

## 2024-03-24 PROCEDURE — 86901 BLOOD TYPING SEROLOGIC RH(D): CPT | Performed by: STUDENT IN AN ORGANIZED HEALTH CARE EDUCATION/TRAINING PROGRAM

## 2024-03-24 PROCEDURE — 87147 CULTURE TYPE IMMUNOLOGIC: CPT | Performed by: STUDENT IN AN ORGANIZED HEALTH CARE EDUCATION/TRAINING PROGRAM

## 2024-03-24 PROCEDURE — 86900 BLOOD TYPING SEROLOGIC ABO: CPT | Performed by: STUDENT IN AN ORGANIZED HEALTH CARE EDUCATION/TRAINING PROGRAM

## 2024-03-24 PROCEDURE — 83735 ASSAY OF MAGNESIUM: CPT | Performed by: STUDENT IN AN ORGANIZED HEALTH CARE EDUCATION/TRAINING PROGRAM

## 2024-03-24 PROCEDURE — 0SPC09Z REMOVAL OF LINER FROM RIGHT KNEE JOINT, OPEN APPROACH: ICD-10-PCS | Performed by: STUDENT IN AN ORGANIZED HEALTH CARE EDUCATION/TRAINING PROGRAM

## 2024-03-24 PROCEDURE — 80048 BASIC METABOLIC PNL TOTAL CA: CPT

## 2024-03-24 PROCEDURE — 80048 BASIC METABOLIC PNL TOTAL CA: CPT | Performed by: NURSE PRACTITIONER

## 2024-03-24 PROCEDURE — 0SBC0ZZ EXCISION OF RIGHT KNEE JOINT, OPEN APPROACH: ICD-10-PCS | Performed by: STUDENT IN AN ORGANIZED HEALTH CARE EDUCATION/TRAINING PROGRAM

## 2024-03-24 PROCEDURE — 82948 REAGENT STRIP/BLOOD GLUCOSE: CPT

## 2024-03-24 PROCEDURE — 3E0U029 INTRODUCTION OF OTHER ANTI-INFECTIVE INTO JOINTS, OPEN APPROACH: ICD-10-PCS | Performed by: STUDENT IN AN ORGANIZED HEALTH CARE EDUCATION/TRAINING PROGRAM

## 2024-03-24 PROCEDURE — 87205 SMEAR GRAM STAIN: CPT | Performed by: STUDENT IN AN ORGANIZED HEALTH CARE EDUCATION/TRAINING PROGRAM

## 2024-03-24 PROCEDURE — 99223 1ST HOSP IP/OBS HIGH 75: CPT | Performed by: INTERNAL MEDICINE

## 2024-03-24 PROCEDURE — 85730 THROMBOPLASTIN TIME PARTIAL: CPT

## 2024-03-24 PROCEDURE — 94760 N-INVAS EAR/PLS OXIMETRY 1: CPT

## 2024-03-24 PROCEDURE — 87070 CULTURE OTHR SPECIMN AEROBIC: CPT | Performed by: STUDENT IN AN ORGANIZED HEALTH CARE EDUCATION/TRAINING PROGRAM

## 2024-03-24 PROCEDURE — 20704 MNL PREP&INSJ I-ARTIC RX DEV: CPT | Performed by: PHYSICIAN ASSISTANT

## 2024-03-24 PROCEDURE — 82947 ASSAY GLUCOSE BLOOD QUANT: CPT

## 2024-03-24 PROCEDURE — 0SUV09Z SUPPLEMENT RIGHT KNEE JOINT, TIBIAL SURFACE WITH LINER, OPEN APPROACH: ICD-10-PCS | Performed by: STUDENT IN AN ORGANIZED HEALTH CARE EDUCATION/TRAINING PROGRAM

## 2024-03-24 PROCEDURE — 94150 VITAL CAPACITY TEST: CPT

## 2024-03-24 PROCEDURE — 0QBB0ZZ EXCISION OF RIGHT LOWER FEMUR, OPEN APPROACH: ICD-10-PCS | Performed by: STUDENT IN AN ORGANIZED HEALTH CARE EDUCATION/TRAINING PROGRAM

## 2024-03-24 PROCEDURE — 87176 TISSUE HOMOGENIZATION CULTR: CPT | Performed by: STUDENT IN AN ORGANIZED HEALTH CARE EDUCATION/TRAINING PROGRAM

## 2024-03-24 PROCEDURE — 83605 ASSAY OF LACTIC ACID: CPT

## 2024-03-24 PROCEDURE — 80048 BASIC METABOLIC PNL TOTAL CA: CPT | Performed by: PHYSICIAN ASSISTANT

## 2024-03-24 PROCEDURE — 99233 SBSQ HOSP IP/OBS HIGH 50: CPT | Performed by: INTERNAL MEDICINE

## 2024-03-24 PROCEDURE — 87186 SC STD MICRODIL/AGAR DIL: CPT | Performed by: STUDENT IN AN ORGANIZED HEALTH CARE EDUCATION/TRAINING PROGRAM

## 2024-03-24 PROCEDURE — 82330 ASSAY OF CALCIUM: CPT

## 2024-03-24 PROCEDURE — 86850 RBC ANTIBODY SCREEN: CPT | Performed by: STUDENT IN AN ORGANIZED HEALTH CARE EDUCATION/TRAINING PROGRAM

## 2024-03-24 PROCEDURE — C9113 INJ PANTOPRAZOLE SODIUM, VIA: HCPCS

## 2024-03-24 PROCEDURE — 84484 ASSAY OF TROPONIN QUANT: CPT

## 2024-03-24 PROCEDURE — 85610 PROTHROMBIN TIME: CPT

## 2024-03-24 PROCEDURE — C9290 INJ, BUPIVACAINE LIPOSOME: HCPCS | Performed by: STUDENT IN AN ORGANIZED HEALTH CARE EDUCATION/TRAINING PROGRAM

## 2024-03-24 PROCEDURE — 36556 INSERT NON-TUNNEL CV CATH: CPT

## 2024-03-24 PROCEDURE — 85025 COMPLETE CBC W/AUTO DIFF WBC: CPT | Performed by: INTERNAL MEDICINE

## 2024-03-24 PROCEDURE — 82803 BLOOD GASES ANY COMBINATION: CPT

## 2024-03-24 PROCEDURE — 27486 REVISE/REPLACE KNEE JOINT: CPT | Performed by: PHYSICIAN ASSISTANT

## 2024-03-24 PROCEDURE — 99233 SBSQ HOSP IP/OBS HIGH 50: CPT | Performed by: STUDENT IN AN ORGANIZED HEALTH CARE EDUCATION/TRAINING PROGRAM

## 2024-03-24 PROCEDURE — 27486 REVISE/REPLACE KNEE JOINT: CPT | Performed by: STUDENT IN AN ORGANIZED HEALTH CARE EDUCATION/TRAINING PROGRAM

## 2024-03-24 PROCEDURE — 85027 COMPLETE CBC AUTOMATED: CPT

## 2024-03-24 PROCEDURE — C1776 JOINT DEVICE (IMPLANTABLE): HCPCS | Performed by: STUDENT IN AN ORGANIZED HEALTH CARE EDUCATION/TRAINING PROGRAM

## 2024-03-24 PROCEDURE — 84145 PROCALCITONIN (PCT): CPT

## 2024-03-24 PROCEDURE — 84132 ASSAY OF SERUM POTASSIUM: CPT

## 2024-03-24 DEVICE — JOURNEY II INSERT XLPE DEEP DISHED                                    RIGHT SIZE 5-6 11MM
Type: IMPLANTABLE DEVICE | Status: FUNCTIONAL
Brand: JOURNEY

## 2024-03-24 DEVICE — STIMULAN® RAPID CURE PROVIDED STERILE FOR SINGLE PATIENT USE. STIMULAN® RAPID CURE CONTAINS CALCIUM SULFATE POWDER AND MIXING SOLUTION IN PRE-MEASURED QUANTITIES SO THAT WHEN MIXED TOGETHER IN A STERILE MIXING BOWL, THE RESULTANT PASTE IS TO BE DIGITALLY PACKED INTO OPEN BONE VOID/GAP TO SET INSITU OR PLACED INTO THE MOULD PROVIDED, THE MIXTURE SETS TO FORM BEADS. THE BIODEGRADABLE, RADIOPAQUE BEADS ARE RESORBED IN APPROXIMATELY 30 – 60 DAYS WHEN USED IN ACCORDANCE WITH THE DEVICE LABELLING. STIMULAN® RAPID CURE IS MANUFACTURED FROM SYNTHETIC IMPLANT GRADE CALCIUM SULFATE DIHYDRATE(CASO4.2H2O) THAT RESORBS AND IS REPLACED WITH BONE DURING THE HEALING PROCESS. ALSO, AS THE BONE VOID FILLER BEADS ARE BIODEGRADABLE AND BIOCOMPATIBLE, THEY MAY BE USED AT AN INFECTED SITE.
Type: IMPLANTABLE DEVICE | Status: FUNCTIONAL
Brand: STIMULAN® RAPID CURE

## 2024-03-24 RX ORDER — CALCIUM CARBONATE 500 MG/1
1000 TABLET, CHEWABLE ORAL DAILY PRN
Status: DISCONTINUED | OUTPATIENT
Start: 2024-03-24 | End: 2024-03-24

## 2024-03-24 RX ORDER — MIDAZOLAM HYDROCHLORIDE 2 MG/2ML
INJECTION, SOLUTION INTRAMUSCULAR; INTRAVENOUS AS NEEDED
Status: DISCONTINUED | OUTPATIENT
Start: 2024-03-24 | End: 2024-03-24

## 2024-03-24 RX ORDER — POTASSIUM CHLORIDE 20MEQ/15ML
40 LIQUID (ML) ORAL ONCE
Status: COMPLETED | OUTPATIENT
Start: 2024-03-24 | End: 2024-03-24

## 2024-03-24 RX ORDER — FENTANYL CITRATE 50 UG/ML
50 INJECTION, SOLUTION INTRAMUSCULAR; INTRAVENOUS
Status: DISCONTINUED | OUTPATIENT
Start: 2024-03-24 | End: 2024-03-24

## 2024-03-24 RX ORDER — DILTIAZEM HYDROCHLORIDE 5 MG/ML
INJECTION INTRAVENOUS
Status: COMPLETED
Start: 2024-03-24 | End: 2024-03-24

## 2024-03-24 RX ORDER — ONDANSETRON 2 MG/ML
INJECTION INTRAMUSCULAR; INTRAVENOUS AS NEEDED
Status: DISCONTINUED | OUTPATIENT
Start: 2024-03-24 | End: 2024-03-24

## 2024-03-24 RX ORDER — POLYETHYLENE GLYCOL 3350 17 G/17G
17 POWDER, FOR SOLUTION ORAL DAILY
Status: DISCONTINUED | OUTPATIENT
Start: 2024-03-25 | End: 2024-04-03 | Stop reason: HOSPADM

## 2024-03-24 RX ORDER — ALBUMIN, HUMAN INJ 5% 5 %
SOLUTION INTRAVENOUS CONTINUOUS PRN
Status: DISCONTINUED | OUTPATIENT
Start: 2024-03-24 | End: 2024-03-24

## 2024-03-24 RX ORDER — LORAZEPAM 2 MG/ML
2 INJECTION INTRAMUSCULAR ONCE
Status: COMPLETED | OUTPATIENT
Start: 2024-03-24 | End: 2024-03-24

## 2024-03-24 RX ORDER — METOPROLOL TARTRATE 1 MG/ML
5 INJECTION, SOLUTION INTRAVENOUS ONCE
Status: COMPLETED | OUTPATIENT
Start: 2024-03-24 | End: 2024-03-24

## 2024-03-24 RX ORDER — TOBRAMYCIN 1.2 G/30ML
INJECTION, POWDER, LYOPHILIZED, FOR SOLUTION INTRAVENOUS AS NEEDED
Status: DISCONTINUED | OUTPATIENT
Start: 2024-03-24 | End: 2024-03-24 | Stop reason: HOSPADM

## 2024-03-24 RX ORDER — FENTANYL CITRATE-0.9 % NACL/PF 10 MCG/ML
200 PLASTIC BAG, INJECTION (ML) INTRAVENOUS CONTINUOUS
Status: DISCONTINUED | OUTPATIENT
Start: 2024-03-24 | End: 2024-03-24

## 2024-03-24 RX ORDER — TRANEXAMIC ACID 10 MG/ML
INJECTION, SOLUTION INTRAVENOUS AS NEEDED
Status: DISCONTINUED | OUTPATIENT
Start: 2024-03-24 | End: 2024-03-24

## 2024-03-24 RX ORDER — CHLORHEXIDINE GLUCONATE ORAL RINSE 1.2 MG/ML
15 SOLUTION DENTAL EVERY 12 HOURS SCHEDULED
Status: DISCONTINUED | OUTPATIENT
Start: 2024-03-24 | End: 2024-03-30

## 2024-03-24 RX ORDER — INSULIN LISPRO 100 [IU]/ML
1-6 INJECTION, SOLUTION INTRAVENOUS; SUBCUTANEOUS EVERY 6 HOURS SCHEDULED
Status: DISCONTINUED | OUTPATIENT
Start: 2024-03-24 | End: 2024-03-24

## 2024-03-24 RX ORDER — HEPARIN SODIUM 5000 [USP'U]/ML
5000 INJECTION, SOLUTION INTRAVENOUS; SUBCUTANEOUS EVERY 8 HOURS SCHEDULED
Status: DISCONTINUED | OUTPATIENT
Start: 2024-03-24 | End: 2024-03-24 | Stop reason: SDUPTHER

## 2024-03-24 RX ORDER — HYDROMORPHONE HCL/PF 1 MG/ML
SYRINGE (ML) INJECTION AS NEEDED
Status: DISCONTINUED | OUTPATIENT
Start: 2024-03-24 | End: 2024-03-24

## 2024-03-24 RX ORDER — BUPIVACAINE HYDROCHLORIDE 5 MG/ML
INJECTION, SOLUTION EPIDURAL; INTRACAUDAL
Status: DISCONTINUED | OUTPATIENT
Start: 2024-03-24 | End: 2024-03-24

## 2024-03-24 RX ORDER — PROPOFOL 10 MG/ML
INJECTION, EMULSION INTRAVENOUS
Status: COMPLETED
Start: 2024-03-24 | End: 2024-03-24

## 2024-03-24 RX ORDER — SODIUM CHLORIDE, SODIUM GLUCONATE, SODIUM ACETATE, POTASSIUM CHLORIDE, MAGNESIUM CHLORIDE, SODIUM PHOSPHATE, DIBASIC, AND POTASSIUM PHOSPHATE .53; .5; .37; .037; .03; .012; .00082 G/100ML; G/100ML; G/100ML; G/100ML; G/100ML; G/100ML; G/100ML
1000 INJECTION, SOLUTION INTRAVENOUS ONCE
Status: COMPLETED | OUTPATIENT
Start: 2024-03-24 | End: 2024-03-24

## 2024-03-24 RX ORDER — SIMETHICONE 80 MG
80 TABLET,CHEWABLE ORAL 4 TIMES DAILY PRN
Status: DISCONTINUED | OUTPATIENT
Start: 2024-03-24 | End: 2024-03-24

## 2024-03-24 RX ORDER — CEFAZOLIN SODIUM 1 G/3ML
INJECTION, POWDER, FOR SOLUTION INTRAMUSCULAR; INTRAVENOUS AS NEEDED
Status: DISCONTINUED | OUTPATIENT
Start: 2024-03-24 | End: 2024-03-24

## 2024-03-24 RX ORDER — DILTIAZEM HYDROCHLORIDE 5 MG/ML
10 INJECTION INTRAVENOUS ONCE
Status: COMPLETED | OUTPATIENT
Start: 2024-03-24 | End: 2024-03-24

## 2024-03-24 RX ORDER — HEPARIN SODIUM 1000 [USP'U]/ML
4000 INJECTION, SOLUTION INTRAVENOUS; SUBCUTANEOUS EVERY 6 HOURS PRN
Status: DISCONTINUED | OUTPATIENT
Start: 2024-03-24 | End: 2024-03-25

## 2024-03-24 RX ORDER — HEPARIN SODIUM 1000 [USP'U]/ML
2000 INJECTION, SOLUTION INTRAVENOUS; SUBCUTANEOUS EVERY 6 HOURS PRN
Status: DISCONTINUED | OUTPATIENT
Start: 2024-03-24 | End: 2024-03-25

## 2024-03-24 RX ORDER — FENTANYL CITRATE 50 UG/ML
50 INJECTION, SOLUTION INTRAMUSCULAR; INTRAVENOUS ONCE
Status: COMPLETED | OUTPATIENT
Start: 2024-03-24 | End: 2024-03-24

## 2024-03-24 RX ORDER — HYDROMORPHONE HYDROCHLORIDE 2 MG/ML
2 INJECTION, SOLUTION INTRAMUSCULAR; INTRAVENOUS; SUBCUTANEOUS ONCE
Status: COMPLETED | OUTPATIENT
Start: 2024-03-24 | End: 2024-03-24

## 2024-03-24 RX ORDER — MAGNESIUM HYDROXIDE 1200 MG/15ML
LIQUID ORAL AS NEEDED
Status: DISCONTINUED | OUTPATIENT
Start: 2024-03-24 | End: 2024-03-24 | Stop reason: HOSPADM

## 2024-03-24 RX ORDER — DEXMEDETOMIDINE HYDROCHLORIDE 4 UG/ML
.1-.7 INJECTION, SOLUTION INTRAVENOUS
Status: DISCONTINUED | OUTPATIENT
Start: 2024-03-24 | End: 2024-03-25

## 2024-03-24 RX ORDER — PROPOFOL 10 MG/ML
5-50 INJECTION, EMULSION INTRAVENOUS
Status: DISCONTINUED | OUTPATIENT
Start: 2024-03-24 | End: 2024-03-25

## 2024-03-24 RX ORDER — ROCURONIUM BROMIDE 10 MG/ML
INJECTION, SOLUTION INTRAVENOUS AS NEEDED
Status: DISCONTINUED | OUTPATIENT
Start: 2024-03-24 | End: 2024-03-24

## 2024-03-24 RX ORDER — MIDAZOLAM HYDROCHLORIDE 2 MG/2ML
2 INJECTION, SOLUTION INTRAMUSCULAR; INTRAVENOUS EVERY 4 HOURS PRN
Status: DISCONTINUED | OUTPATIENT
Start: 2024-03-24 | End: 2024-03-25

## 2024-03-24 RX ORDER — HYDROMORPHONE HCL/PF 1 MG/ML
1 SYRINGE (ML) INJECTION
Status: DISCONTINUED | OUTPATIENT
Start: 2024-03-24 | End: 2024-03-25

## 2024-03-24 RX ORDER — ESMOLOL HYDROCHLORIDE 10 MG/ML
INJECTION INTRAVENOUS AS NEEDED
Status: DISCONTINUED | OUTPATIENT
Start: 2024-03-24 | End: 2024-03-24

## 2024-03-24 RX ORDER — AMIODARONE HYDROCHLORIDE 150 MG/3ML
INJECTION, SOLUTION INTRAVENOUS AS NEEDED
Status: DISCONTINUED | OUTPATIENT
Start: 2024-03-24 | End: 2024-03-24

## 2024-03-24 RX ORDER — MAGNESIUM SULFATE HEPTAHYDRATE 40 MG/ML
2 INJECTION, SOLUTION INTRAVENOUS ONCE
Status: COMPLETED | OUTPATIENT
Start: 2024-03-24 | End: 2024-03-24

## 2024-03-24 RX ORDER — HEPARIN SODIUM 10000 [USP'U]/100ML
3-20 INJECTION, SOLUTION INTRAVENOUS
Status: DISCONTINUED | OUTPATIENT
Start: 2024-03-24 | End: 2024-03-25

## 2024-03-24 RX ORDER — ACETAMINOPHEN 325 MG/1
650 TABLET ORAL EVERY 4 HOURS PRN
Status: DISCONTINUED | OUTPATIENT
Start: 2024-03-24 | End: 2024-03-24

## 2024-03-24 RX ORDER — HYDROMORPHONE HYDROCHLORIDE 2 MG/ML
INJECTION, SOLUTION INTRAMUSCULAR; INTRAVENOUS; SUBCUTANEOUS
Status: COMPLETED
Start: 2024-03-24 | End: 2024-03-24

## 2024-03-24 RX ORDER — CHLORHEXIDINE GLUCONATE ORAL RINSE 1.2 MG/ML
15 SOLUTION DENTAL EVERY 12 HOURS SCHEDULED
Status: DISCONTINUED | OUTPATIENT
Start: 2024-03-24 | End: 2024-03-24

## 2024-03-24 RX ORDER — VANCOMYCIN HYDROCHLORIDE 1 G/20ML
INJECTION, POWDER, LYOPHILIZED, FOR SOLUTION INTRAVENOUS AS NEEDED
Status: DISCONTINUED | OUTPATIENT
Start: 2024-03-24 | End: 2024-03-24 | Stop reason: HOSPADM

## 2024-03-24 RX ORDER — EPINEPHRINE 0.1 MG/ML
INJECTION INTRAVENOUS
Status: DISPENSED
Start: 2024-03-24 | End: 2024-03-25

## 2024-03-24 RX ORDER — PROPOFOL 10 MG/ML
INJECTION, EMULSION INTRAVENOUS AS NEEDED
Status: DISCONTINUED | OUTPATIENT
Start: 2024-03-24 | End: 2024-03-24

## 2024-03-24 RX ORDER — CALCIUM CHLORIDE 100 MG/ML
INJECTION INTRAVENOUS; INTRAVENTRICULAR AS NEEDED
Status: DISCONTINUED | OUTPATIENT
Start: 2024-03-24 | End: 2024-03-24

## 2024-03-24 RX ORDER — ACETAMINOPHEN 10 MG/ML
1000 INJECTION, SOLUTION INTRAVENOUS EVERY 6 HOURS PRN
Status: DISCONTINUED | OUTPATIENT
Start: 2024-03-24 | End: 2024-03-25

## 2024-03-24 RX ORDER — LIDOCAINE HYDROCHLORIDE 10 MG/ML
INJECTION, SOLUTION EPIDURAL; INFILTRATION; INTRACAUDAL; PERINEURAL AS NEEDED
Status: DISCONTINUED | OUTPATIENT
Start: 2024-03-24 | End: 2024-03-24

## 2024-03-24 RX ORDER — FENTANYL CITRATE 50 UG/ML
INJECTION, SOLUTION INTRAMUSCULAR; INTRAVENOUS AS NEEDED
Status: DISCONTINUED | OUTPATIENT
Start: 2024-03-24 | End: 2024-03-24

## 2024-03-24 RX ORDER — LORAZEPAM 2 MG/ML
INJECTION INTRAMUSCULAR
Status: COMPLETED
Start: 2024-03-24 | End: 2024-03-24

## 2024-03-24 RX ORDER — METOPROLOL TARTRATE 1 MG/ML
5 INJECTION, SOLUTION INTRAVENOUS EVERY 6 HOURS PRN
Status: DISCONTINUED | OUTPATIENT
Start: 2024-03-24 | End: 2024-04-03 | Stop reason: HOSPADM

## 2024-03-24 RX ORDER — SODIUM CHLORIDE, SODIUM LACTATE, POTASSIUM CHLORIDE, CALCIUM CHLORIDE 600; 310; 30; 20 MG/100ML; MG/100ML; MG/100ML; MG/100ML
75 INJECTION, SOLUTION INTRAVENOUS CONTINUOUS
Status: DISCONTINUED | OUTPATIENT
Start: 2024-03-24 | End: 2024-03-24

## 2024-03-24 RX ORDER — PANTOPRAZOLE SODIUM 40 MG/10ML
40 INJECTION, POWDER, LYOPHILIZED, FOR SOLUTION INTRAVENOUS
Status: DISCONTINUED | OUTPATIENT
Start: 2024-03-24 | End: 2024-03-26 | Stop reason: SDUPTHER

## 2024-03-24 RX ORDER — SODIUM CHLORIDE, SODIUM LACTATE, POTASSIUM CHLORIDE, CALCIUM CHLORIDE 600; 310; 30; 20 MG/100ML; MG/100ML; MG/100ML; MG/100ML
INJECTION, SOLUTION INTRAVENOUS CONTINUOUS PRN
Status: DISCONTINUED | OUTPATIENT
Start: 2024-03-24 | End: 2024-03-24

## 2024-03-24 RX ADMIN — HYDROMORPHONE HYDROCHLORIDE 1 MG: 1 INJECTION, SOLUTION INTRAMUSCULAR; INTRAVENOUS; SUBCUTANEOUS at 08:35

## 2024-03-24 RX ADMIN — HYDROMORPHONE HYDROCHLORIDE 1 MG: 1 INJECTION, SOLUTION INTRAMUSCULAR; INTRAVENOUS; SUBCUTANEOUS at 22:20

## 2024-03-24 RX ADMIN — CEFAZOLIN 2000 MG: 1 INJECTION, POWDER, FOR SOLUTION INTRAMUSCULAR; INTRAVENOUS at 08:35

## 2024-03-24 RX ADMIN — BUPIVACAINE HYDROCHLORIDE 20 ML: 5 INJECTION, SOLUTION EPIDURAL; INTRACAUDAL; PERINEURAL at 10:05

## 2024-03-24 RX ADMIN — POTASSIUM CHLORIDE 40 MEQ: 1.5 SOLUTION ORAL at 17:03

## 2024-03-24 RX ADMIN — PANTOPRAZOLE SODIUM 40 MG: 40 INJECTION, POWDER, FOR SOLUTION INTRAVENOUS at 17:03

## 2024-03-24 RX ADMIN — ESMOLOL HYDROCHLORIDE 50 MG: 100 INJECTION, SOLUTION INTRAVENOUS at 10:02

## 2024-03-24 RX ADMIN — VANCOMYCIN HYDROCHLORIDE 1250 MG: 5 INJECTION, POWDER, LYOPHILIZED, FOR SOLUTION INTRAVENOUS at 19:27

## 2024-03-24 RX ADMIN — HYDROMORPHONE HYDROCHLORIDE 0.5 MG: 1 INJECTION, SOLUTION INTRAMUSCULAR; INTRAVENOUS; SUBCUTANEOUS at 08:12

## 2024-03-24 RX ADMIN — METOPROLOL TARTRATE 5 MG: 1 INJECTION, SOLUTION INTRAVENOUS at 10:47

## 2024-03-24 RX ADMIN — HYDROMORPHONE HYDROCHLORIDE 2 MG: 2 INJECTION, SOLUTION INTRAMUSCULAR; INTRAVENOUS; SUBCUTANEOUS at 14:54

## 2024-03-24 RX ADMIN — ESMOLOL HYDROCHLORIDE 50 MG: 100 INJECTION, SOLUTION INTRAVENOUS at 09:34

## 2024-03-24 RX ADMIN — PROPOFOL 40 MCG/KG/MIN: 10 INJECTION, EMULSION INTRAVENOUS at 10:58

## 2024-03-24 RX ADMIN — FENTANYL CITRATE 50 MCG: 50 INJECTION INTRAMUSCULAR; INTRAVENOUS at 10:58

## 2024-03-24 RX ADMIN — HYDROCORTISONE SODIUM SUCCINATE 50 MG: 100 INJECTION, POWDER, FOR SOLUTION INTRAMUSCULAR; INTRAVENOUS at 23:59

## 2024-03-24 RX ADMIN — ESMOLOL HYDROCHLORIDE 50 MG: 100 INJECTION, SOLUTION INTRAVENOUS at 09:24

## 2024-03-24 RX ADMIN — DILTIAZEM HYDROCHLORIDE 10 MG: 5 INJECTION INTRAVENOUS at 11:23

## 2024-03-24 RX ADMIN — ACETAMINOPHEN 1000 MG: 10 INJECTION INTRAVENOUS at 15:30

## 2024-03-24 RX ADMIN — AMIODARONE HYDROCHLORIDE 1 MG/MIN: 50 INJECTION, SOLUTION INTRAVENOUS at 13:12

## 2024-03-24 RX ADMIN — ESMOLOL HYDROCHLORIDE 50 MG: 100 INJECTION, SOLUTION INTRAVENOUS at 09:22

## 2024-03-24 RX ADMIN — PROPOFOL 30 MCG/KG/MIN: 10 INJECTION, EMULSION INTRAVENOUS at 10:10

## 2024-03-24 RX ADMIN — SODIUM CHLORIDE, SODIUM LACTATE, POTASSIUM CHLORIDE, AND CALCIUM CHLORIDE: .6; .31; .03; .02 INJECTION, SOLUTION INTRAVENOUS at 09:05

## 2024-03-24 RX ADMIN — PROPOFOL 50 MG: 10 INJECTION, EMULSION INTRAVENOUS at 10:04

## 2024-03-24 RX ADMIN — INSULIN LISPRO 2 UNITS: 100 INJECTION, SOLUTION INTRAVENOUS; SUBCUTANEOUS at 11:40

## 2024-03-24 RX ADMIN — HEPARIN SODIUM 11.1 UNITS/KG/HR: 10000 INJECTION, SOLUTION INTRAVENOUS at 15:02

## 2024-03-24 RX ADMIN — PHENYLEPHRINE HYDROCHLORIDE 20 MCG/MIN: 50 INJECTION INTRAVENOUS at 13:15

## 2024-03-24 RX ADMIN — ALBUMIN (HUMAN): 12.5 INJECTION, SOLUTION INTRAVENOUS at 09:05

## 2024-03-24 RX ADMIN — HYDROMORPHONE HYDROCHLORIDE 0.2 MG: 0.2 INJECTION, SOLUTION INTRAMUSCULAR; INTRAVENOUS; SUBCUTANEOUS at 05:47

## 2024-03-24 RX ADMIN — AMIODARONE HYDROCHLORIDE 150 MG: 50 INJECTION, SOLUTION INTRAVENOUS at 12:55

## 2024-03-24 RX ADMIN — PROPOFOL 40 MCG/KG/MIN: 10 INJECTION, EMULSION INTRAVENOUS at 17:34

## 2024-03-24 RX ADMIN — FENTANYL CITRATE 50 MCG: 50 INJECTION INTRAMUSCULAR; INTRAVENOUS at 12:50

## 2024-03-24 RX ADMIN — NOREPINEPHRINE BITARTRATE 5 MCG/MIN: 1 SOLUTION INTRAVENOUS at 15:48

## 2024-03-24 RX ADMIN — SODIUM CHLORIDE, SODIUM GLUCONATE, SODIUM ACETATE, POTASSIUM CHLORIDE, MAGNESIUM CHLORIDE, SODIUM PHOSPHATE, DIBASIC, AND POTASSIUM PHOSPHATE 1000 ML: .53; .5; .37; .037; .03; .012; .00082 INJECTION, SOLUTION INTRAVENOUS at 10:41

## 2024-03-24 RX ADMIN — SODIUM CHLORIDE 4 UNITS/HR: 9 INJECTION, SOLUTION INTRAVENOUS at 16:18

## 2024-03-24 RX ADMIN — CHLORHEXIDINE GLUCONATE 0.12% ORAL RINSE 15 ML: 1.2 LIQUID ORAL at 20:16

## 2024-03-24 RX ADMIN — ALBUMIN (HUMAN): 12.5 INJECTION, SOLUTION INTRAVENOUS at 09:15

## 2024-03-24 RX ADMIN — FENTANYL CITRATE 100 MCG: 50 INJECTION INTRAMUSCULAR; INTRAVENOUS at 08:12

## 2024-03-24 RX ADMIN — INSULIN HUMAN 10 UNITS: 100 INJECTION, SOLUTION PARENTERAL at 09:36

## 2024-03-24 RX ADMIN — CALCIUM CHLORIDE 1 G: 100 INJECTION INTRAVENOUS; INTRAVENTRICULAR at 09:58

## 2024-03-24 RX ADMIN — FENTANYL CITRATE 50 MCG: 50 INJECTION INTRAMUSCULAR; INTRAVENOUS at 14:54

## 2024-03-24 RX ADMIN — LIDOCAINE HYDROCHLORIDE 50 MG: 10 INJECTION, SOLUTION EPIDURAL; INFILTRATION; INTRACAUDAL; PERINEURAL at 08:12

## 2024-03-24 RX ADMIN — INSULIN HUMAN 10 UNITS: 100 INJECTION, SOLUTION PARENTERAL at 09:13

## 2024-03-24 RX ADMIN — HYDROMORPHONE HYDROCHLORIDE 1.5 MG/HR: 10 INJECTION, SOLUTION INTRAMUSCULAR; INTRAVENOUS; SUBCUTANEOUS at 15:10

## 2024-03-24 RX ADMIN — SODIUM BICARBONATE 50 MEQ: 84 INJECTION, SOLUTION INTRAVENOUS at 08:12

## 2024-03-24 RX ADMIN — ROCURONIUM BROMIDE 50 MG: 10 INJECTION, SOLUTION INTRAVENOUS at 08:12

## 2024-03-24 RX ADMIN — Medication 100 MCG/HR: at 10:59

## 2024-03-24 RX ADMIN — BUPIVACAINE 20 ML: 13.3 INJECTION, SUSPENSION, LIPOSOMAL INFILTRATION at 10:05

## 2024-03-24 RX ADMIN — HYDROCORTISONE SODIUM SUCCINATE 50 MG: 100 INJECTION, POWDER, FOR SOLUTION INTRAMUSCULAR; INTRAVENOUS at 17:14

## 2024-03-24 RX ADMIN — SODIUM CHLORIDE, SODIUM LACTATE, POTASSIUM CHLORIDE, AND CALCIUM CHLORIDE: .6; .31; .03; .02 INJECTION, SOLUTION INTRAVENOUS at 10:09

## 2024-03-24 RX ADMIN — CHLORHEXIDINE GLUCONATE 0.12% ORAL RINSE 15 ML: 1.2 LIQUID ORAL at 11:03

## 2024-03-24 RX ADMIN — HYDROMORPHONE HYDROCHLORIDE 0.5 MG: 1 INJECTION, SOLUTION INTRAMUSCULAR; INTRAVENOUS; SUBCUTANEOUS at 08:23

## 2024-03-24 RX ADMIN — CALCIUM CHLORIDE 1 G: 100 INJECTION INTRAVENOUS; INTRAVENTRICULAR at 08:42

## 2024-03-24 RX ADMIN — ROCURONIUM BROMIDE 20 MG: 10 INJECTION, SOLUTION INTRAVENOUS at 08:47

## 2024-03-24 RX ADMIN — FENTANYL CITRATE 50 MCG: 50 INJECTION INTRAMUSCULAR; INTRAVENOUS at 12:49

## 2024-03-24 RX ADMIN — DOCUSATE SODIUM 100 MG: 50 LIQUID ORAL at 17:24

## 2024-03-24 RX ADMIN — TRANEXAMIC ACID 1000 MG: 10 INJECTION, SOLUTION INTRAVENOUS at 08:20

## 2024-03-24 RX ADMIN — HEPARIN SODIUM 2000 UNITS: 1000 INJECTION INTRAVENOUS; SUBCUTANEOUS at 18:13

## 2024-03-24 RX ADMIN — PROPOFOL 50 MCG/KG/MIN: 10 INJECTION, EMULSION INTRAVENOUS at 14:53

## 2024-03-24 RX ADMIN — DEXMEDETOMIDINE HYDROCHLORIDE 0.7 MCG/KG/HR: 4 INJECTION, SOLUTION INTRAVENOUS at 19:45

## 2024-03-24 RX ADMIN — FENTANYL CITRATE 50 MCG: 50 INJECTION INTRAMUSCULAR; INTRAVENOUS at 14:28

## 2024-03-24 RX ADMIN — ALBUMIN (HUMAN): 12.5 INJECTION, SOLUTION INTRAVENOUS at 08:37

## 2024-03-24 RX ADMIN — SODIUM BICARBONATE 50 MEQ: 84 INJECTION, SOLUTION INTRAVENOUS at 10:01

## 2024-03-24 RX ADMIN — LORAZEPAM 2 MG: 2 INJECTION INTRAMUSCULAR at 14:54

## 2024-03-24 RX ADMIN — Medication 5 SPRAY: at 07:05

## 2024-03-24 RX ADMIN — DILTIAZEM HYDROCHLORIDE 2.5 MG/HR: 5 INJECTION INTRAVENOUS at 11:44

## 2024-03-24 RX ADMIN — VANCOMYCIN HYDROCHLORIDE 1250 MG: 5 INJECTION, POWDER, LYOPHILIZED, FOR SOLUTION INTRAVENOUS at 05:32

## 2024-03-24 RX ADMIN — SODIUM CHLORIDE, SODIUM GLUCONATE, SODIUM ACETATE, POTASSIUM CHLORIDE, MAGNESIUM CHLORIDE, SODIUM PHOSPHATE, DIBASIC, AND POTASSIUM PHOSPHATE 1000 ML: .53; .5; .37; .037; .03; .012; .00082 INJECTION, SOLUTION INTRAVENOUS at 10:39

## 2024-03-24 RX ADMIN — POTASSIUM CHLORIDE 40 MEQ: 1.5 SOLUTION ORAL at 13:20

## 2024-03-24 RX ADMIN — AMIODARONE HYDROCHLORIDE 0.5 MG/MIN: 50 INJECTION, SOLUTION INTRAVENOUS at 19:23

## 2024-03-24 RX ADMIN — SODIUM CHLORIDE, SODIUM LACTATE, POTASSIUM CHLORIDE, AND CALCIUM CHLORIDE: .6; .31; .03; .02 INJECTION, SOLUTION INTRAVENOUS at 07:55

## 2024-03-24 RX ADMIN — INSULIN LISPRO 2 UNITS: 100 INJECTION, SOLUTION INTRAVENOUS; SUBCUTANEOUS at 06:28

## 2024-03-24 RX ADMIN — ESMOLOL HYDROCHLORIDE 50 MG: 100 INJECTION, SOLUTION INTRAVENOUS at 08:40

## 2024-03-24 RX ADMIN — FENTANYL 1 PATCH: 75 PATCH TRANSDERMAL at 08:03

## 2024-03-24 RX ADMIN — MIDAZOLAM 2 MG: 1 INJECTION INTRAMUSCULAR; INTRAVENOUS at 10:05

## 2024-03-24 RX ADMIN — FENTANYL CITRATE 100 MCG: 50 INJECTION INTRAMUSCULAR; INTRAVENOUS at 09:35

## 2024-03-24 RX ADMIN — ESMOLOL HYDROCHLORIDE 50 MG: 100 INJECTION, SOLUTION INTRAVENOUS at 09:57

## 2024-03-24 RX ADMIN — ZOLPIDEM TARTRATE 10 MG: 5 TABLET ORAL at 00:04

## 2024-03-24 RX ADMIN — PROPOFOL 25 MCG/KG/MIN: 10 INJECTION, EMULSION INTRAVENOUS at 14:20

## 2024-03-24 RX ADMIN — PROPOFOL 50 MG: 10 INJECTION, EMULSION INTRAVENOUS at 10:14

## 2024-03-24 RX ADMIN — MIDAZOLAM 4 MG: 1 INJECTION INTRAMUSCULAR; INTRAVENOUS at 08:04

## 2024-03-24 RX ADMIN — OXYCODONE HYDROCHLORIDE 10 MG: 10 TABLET ORAL at 00:04

## 2024-03-24 RX ADMIN — DEXMEDETOMIDINE HYDROCHLORIDE 0.3 MCG/KG/HR: 4 INJECTION, SOLUTION INTRAVENOUS at 11:34

## 2024-03-24 RX ADMIN — HEPARIN SODIUM 11.1 UNITS/KG/HR: 10000 INJECTION, SOLUTION INTRAVENOUS at 11:17

## 2024-03-24 RX ADMIN — HYDROMORPHONE HYDROCHLORIDE 1 MG: 1 INJECTION, SOLUTION INTRAMUSCULAR; INTRAVENOUS; SUBCUTANEOUS at 09:08

## 2024-03-24 RX ADMIN — DEXMEDETOMIDINE HYDROCHLORIDE 0.7 MCG/KG/HR: 4 INJECTION, SOLUTION INTRAVENOUS at 14:55

## 2024-03-24 RX ADMIN — MAGNESIUM SULFATE HEPTAHYDRATE 2 G: 2 INJECTION, SOLUTION INTRAVENOUS at 17:21

## 2024-03-24 RX ADMIN — PRAVASTATIN SODIUM 80 MG: 80 TABLET ORAL at 17:03

## 2024-03-24 RX ADMIN — ONDANSETRON 4 MG: 2 INJECTION INTRAMUSCULAR; INTRAVENOUS at 08:01

## 2024-03-24 RX ADMIN — VASOPRESSIN 0.04 UNITS/MIN: 20 INJECTION INTRAVENOUS at 17:03

## 2024-03-24 RX ADMIN — AMIODARONE HYDROCHLORIDE 150 MG: 50 INJECTION, SOLUTION INTRAVENOUS at 10:10

## 2024-03-24 RX ADMIN — PROPOFOL 40 MCG/KG/MIN: 10 INJECTION, EMULSION INTRAVENOUS at 22:23

## 2024-03-24 RX ADMIN — SODIUM BICARBONATE 50 MEQ: 84 INJECTION, SOLUTION INTRAVENOUS at 08:35

## 2024-03-24 RX ADMIN — METOPROLOL TARTRATE 5 MG: 5 INJECTION INTRAVENOUS at 11:03

## 2024-03-24 RX ADMIN — ALBUMIN (HUMAN): 12.5 INJECTION, SOLUTION INTRAVENOUS at 08:19

## 2024-03-24 RX ADMIN — LORAZEPAM 2 MG: 2 INJECTION INTRAMUSCULAR; INTRAVENOUS at 14:54

## 2024-03-24 RX ADMIN — SODIUM BICARBONATE 50 MEQ: 84 INJECTION, SOLUTION INTRAVENOUS at 10:00

## 2024-03-24 RX ADMIN — PROPOFOL 50 MG: 10 INJECTION, EMULSION INTRAVENOUS at 08:14

## 2024-03-24 RX ADMIN — NOREPINEPHRINE BITARTRATE 5 MCG/MIN: 1 SOLUTION INTRAVENOUS at 13:35

## 2024-03-24 RX ADMIN — PROPOFOL 150 MG: 10 INJECTION, EMULSION INTRAVENOUS at 08:12

## 2024-03-24 RX ADMIN — MAGNESIUM SULFATE HEPTAHYDRATE 2 G: 2 INJECTION, SOLUTION INTRAVENOUS at 01:42

## 2024-03-24 NOTE — ASSESSMENT & PLAN NOTE
Home regimen: Amlodipine, Lisinopril    Plan:  Hold home regimen in the setting of septic shock requiring vasopressors as above

## 2024-03-24 NOTE — PROCEDURES
Central Line Insertion    Date/Time: 3/24/2024 2:20 PM    Performed by: NATA Paulino  Authorized by: NATA Paulino    Patient location:  ICU  Other Assisting Provider: No    Consent:     Consent obtained:  Written    Consent given by:  Spouse    Risks discussed:  Arterial puncture, incorrect placement, nerve damage, pneumothorax, infection and bleeding    Alternatives discussed:  No treatment  Universal protocol:     Procedure explained and questions answered to patient or proxy's satisfaction: yes      Relevant documents present and verified: yes      Test results available and properly labeled: yes      Radiology Images displayed and confirmed.  If images not available, report reviewed: yes      Required blood products, implants, devices, and special equipment available: yes      Site/side marked: yes      Immediately prior to procedure, a time out was called: yes      Patient identity confirmed:  Hospital-assigned identification number, anonymous protocol, patient vented/unresponsive and arm band  Pre-procedure details:     Hand hygiene: Hand hygiene performed prior to insertion      Sterile barrier technique: All elements of maximal sterile technique followed      Skin preparation:  2% chlorhexidine    Skin preparation agent: Skin preparation agent completely dried prior to procedure    Indications:     Central line indications: medications requiring central line    Anesthesia (see MAR for exact dosages):     Anesthesia method:  Local infiltration    Local anesthetic:  Lidocaine 1% w/o epi  Procedure details:     Location:  Right internal jugular    Vessel type: vein      Laterality:  Right    Approach: percutaneous endoscopic technique used      Patient position:  Flat    Catheter type:  Triple lumen 16cm    Catheter size:  7 Fr    Landmarks identified: yes      Ultrasound guidance: yes      Ultrasound image availability:  Images available in PACS and still images obtained    Sterile  ultrasound techniques: Sterile gel and sterile probe covers were used      Manometry confirmation: yes      Number of attempts:  1    Successful placement: yes      Catheter tip vessel location: superior vena cava    Post-procedure details:     Post-procedure:  Dressing applied and line sutured    Assessment:  Blood return through all ports, no pneumothorax on x-ray, placement verified by x-ray and free fluid flow    Post-procedure complications: none      Patient tolerance of procedure:  Tolerated well, no immediate complications    Observer: Yes      Observer name:  FREDERIC Armstrong

## 2024-03-24 NOTE — ASSESSMENT & PLAN NOTE
2/2 sets of blood cultures on 3/22 positive for MSSA. Repeat blood cultures 3/23 still positive 2/2 bottles for gram positive cocci- presumed MSSA  Patient to OR 3/24 to gain source control- likely from septic joint from previous arthoplasty     Plan:  Continue with IV Vancomycin  Pharmacy consulted for Vancomycin management  ID consulted- appreciate recommendations  Repeat blood cultures in the AM  2D echo pending- evaluation for endocarditis

## 2024-03-24 NOTE — ANESTHESIA PROCEDURE NOTES
"Arterial Line Insertion    Performed by: Andi Ryan MD  Authorized by: Andi Ryan MD  Consent: The procedure was performed in an emergent situation.  Risks and benefits: risks, benefits and alternatives were discussed  Consent given by: patient  Patient understanding: patient states understanding of the procedure being performed  Patient consent: the patient's understanding of the procedure matches consent given  Procedure consent: procedure consent matches procedure scheduled  Relevant documents: relevant documents present and verified  Test results: test results available and properly labeled  Site marked: the operative site was marked  Radiology Images: Radiology Images displayed and confirmed. If images not available, report reviewed  Required items: required blood products, implants, devices, and special equipment available  Patient identity confirmed: arm band  Time out: Immediately prior to procedure a \"time out\" was called to verify the correct patient, procedure, equipment, support staff and site/side marked as required.  Preparation: Patient was prepped and draped in the usual sterile fashion.  Indications: multiple ABGs and hemodynamic monitoring  Orientation:  Left  Location: radial artery  Procedure Details:  Needle gauge: 20  Number of attempts: 1    Post-procedure:  Post-procedure: dressing applied  Waveform: good waveform          "

## 2024-03-24 NOTE — ASSESSMENT & PLAN NOTE
By history. Home regimen: Amlodipine, Lisinopril    Plan:  Hold home dose antihypertensives in the setting of shock

## 2024-03-24 NOTE — ANESTHESIA POSTPROCEDURE EVALUATION
Post-Op Assessment Note    CV Status:  Unstable    Pain control: unable to assess d/t sedation.       Mental Status:  Sleepy (sedated)   Hydration Status:  Euvolemic   PONV status: unable to assess d/t sedation.   Airway Patency:  Patent  Airway: intubated     Post Op Vitals Reviewed: Yes    No anethesia notable event occurred.    Staff: LOVE               /62 (03/24/24 1031)    Temp 99.5 °F (37.5 °C) (03/24/24 1031)    Pulse (!) 163 (03/24/24 1031)   Resp 16 (03/24/24 1031)    SpO2 100 % (03/24/24 1031)

## 2024-03-24 NOTE — ASSESSMENT & PLAN NOTE
No previous history of Afib- Likely exacerbated due to critical illness, bacteremia, and stress from OR  3/24 AF RVR to the 190s intra-op, initially HD stable   Persistent AF RVR despite IVF bolus, 150mg Amio bolus, esmolol bolus  Once in the ICU, 5 mg IV Metoprolol with improvement of HR to 150's  Initiated on Cardizem gtt with resultant hypotension, and Cardizem was thus d/c'ed   Amio bolus and gtt with eventual conversion to NSR    Plan:  Continues on Amio @ 0.5mcg per protocol   AC: Heparin ACS  Echo pending

## 2024-03-24 NOTE — ASSESSMENT & PLAN NOTE
Lab Results   Component Value Date    HGBA1C 12.1 (H) 03/22/2024       Recent Labs     03/23/24  2147 03/24/24  0043 03/24/24  0608 03/24/24  1036   POCGLU 180* 230* 228* 215*       Blood Sugar Average: Last 72 hrs:  (P) 197    POA: Glucose > 600. BHB 1.41. pH 7.35.  Was previously fluid resuscitated and on insulin infusion  Home regimen: Metformin, Glimepiride    Plan:  Hold home regimen  Due to critical illness, hold SSI start non DKA insulin drip  Blood glucose goal 140-180  Hypoglycemia protocol  Diabetes education when appropriate

## 2024-03-24 NOTE — ASSESSMENT & PLAN NOTE
Lab Results   Component Value Date    HGBA1C 12.1 (H) 03/22/2024       Recent Labs     03/23/24  2147 03/24/24  0043 03/24/24  0608 03/24/24  1036   POCGLU 180* 230* 228* 215*         Blood Sugar Average: Last 72 hrs:  (P) 197    POA: Glucose > 600. BHB 1.41. pH 7.35.  Was previously fluid resuscitated and on insulin infusion  Home regimen: Metformin, Glimepiride    Plan:  Hold home regimen  Cont NCC insulin gtt for goal glucose 140-180  Diabetes education when appropriate

## 2024-03-24 NOTE — ASSESSMENT & PLAN NOTE
SIRS POA: Tachycardia, leukocytosis  Chest XR clear, UA not indicative of infection, negative RVP, negative lactate   Presents with worsening R. Knee pain, history of R. Knee arthroplasty in 2005. XR negative. Knee aspiration by ortho with purulent drainage.   Blood cultures positive for MSSA- likely secondary to knee abscess  S/p R. Knee arthoplasty with I&D washout with ortho 3/24- during case became tachycardic with HR in 190's during case. Hemodynamically stable. Received 1.25 L of IVF during OR case. Given additional 2 L in the ICU  Post op labs- negative lactate, Procal 3.39  Initially hemodynamically stable but BP trending down, likely in the setting of tachyarrhythmia requiring Cardizem infusion and sedation and sepsis/SIRS response from surgery. Started on ARSALAN but increasing requirements. POCUS with dilated LV, global hypokenesis. Switched to Levo. Central line placed     Plan:  Continue with IV Vancomycin- pharmacy consulted for vancomycin management  Follow up with repeat cultures-suspect they will likely be positive as they were drawn prior to OR washout. If second set is positive will get repeat cultures  Echo ordered to r/o endocarditis   ID following- appreciate recommendations  Repeat CBC in the AM- monitor and trend WBC  Monitor and trend fever curve  Repeat Procal in the AM  Control of HR as stated below  MAP goal > 65 mmHg. Titrate Levo to MAP goal  Check SCVO2

## 2024-03-24 NOTE — ASSESSMENT & PLAN NOTE
Patient remained intubated after OR 2/2 hemodynamic instability due tachycardia  Current vent settings: ACVC 16/700/50/6   3/24 ETT advanced 3cm based on CXR   Initial ABG 7.341/31.8/319/16.8    Plan:  Wean fio2 for goal spo2 > 90%   Sedation: Propofol, Precedex for goal RASS -1 to -2  Pain: Dilaudid 1.5mg/hr   PRN: dilaudid, versed   Daily SBT, SAT  Scheduled oral care, HOB 30 degrees

## 2024-03-24 NOTE — ASSESSMENT & PLAN NOTE
Per wife patient has been mentioning and having suicidal ideation  Psych consulted- No need for inpatient hospitalization- continue home dose Risperidone when appropriate

## 2024-03-24 NOTE — ASSESSMENT & PLAN NOTE
Hold home fentanyl patch, motrin, morphine  Continue with Dilaudid drip while intubated as above   Continue with Colace, start Miralax for bowel regimen

## 2024-03-24 NOTE — ANESTHESIA PROCEDURE NOTES
[facility] Hospitalist Progress Note    SUBJECTIVE:    The patient is up in the chair. She states she is feeling sleepy. She denies shortness of breath, chest pain, nausea, or vomiting. She knows her name and that she is in the hospital.     OBJECTIVE:    Current Facility-Administered Medications   Medication   • sodium chloride (NORMAL SALINE) 0.9 % bolus 100-200 mL   • albumin human (SPA) 25 % injection 12.5 g   • [START ON 5/28/2021] epoetin jose-epbx (RETACRIT) 30269 UNIT/ML injection 10,000 Units   • midodrine (PROAMATINE) tablet 5 mg   • sodium chloride 0.9 % flush bag 25 mL   • sodium chloride (PF) 0.9 % injection 2 mL   • sodium chloride (NORMAL SALINE) 0.9 % bolus 500 mL   • acetaminophen (TYLENOL) tablet 650 mg   • docusate sodium (COLACE) capsule 100 mg   • ondansetron (ZOFRAN) injection 4 mg   • zolpidem (AMBIEN) tablet 5 mg   • LORazepam (ATIVAN) injection 0.5 mg       I/O's  No intake or output data in the 24 hours ending 05/27/21 1036    Last Recorded Vitals  Vital Last Value 24 Hour Range   Temperature 98.1 °F (36.7 °C) (05/27/21 0730) Temp  Min: 97.7 °F (36.5 °C)  Max: 98.8 °F (37.1 °C)   Pulse 68 (05/27/21 0730) Pulse  Min: 63  Max: 70   Respiratory 16 (05/27/21 0730) Resp  Min: 16  Max: 24   Non-Invasive  Blood Pressure 129/64 (05/27/21 0730) BP  Min: 79/35  Max: 146/66   Pulse Oximetry 96 % (05/27/21 0730) SpO2  Min: 94 %  Max: 98 %     Vital Today Admitted   Weight 54.1 kg (119 lb 4.3 oz) (05/27/21 0608) Weight: 59.3 kg (130 lb 11.7 oz) (05/26/21 1614)   Height N/A Height: 5' 4\" (162.6 cm) (05/26/21 2112)   BMI N/A BMI (Calculated): 21.68 (05/26/21 2112)       Physical Exam  Eyes:      Extraocular Movements: Extraocular movements intact.      Conjunctiva/sclera: Conjunctivae normal.   Cardiovascular:      Rate and Rhythm: Normal rate and regular rhythm.      Heart sounds: Murmur present.   Pulmonary:      Effort: Pulmonary effort is normal. No respiratory distress.      Breath sounds: Normal breath  Peripheral Block    Patient location during procedure: OR  Start time: 3/24/2024 10:05 AM  Reason for block: procedure for pain, at surgeon's request and post-op pain management  Staffing  Performed by: Andi Ryan MD  Authorized by: Andi Ryan MD    Preanesthetic Checklist  Completed: patient identified, IV checked, site marked, risks and benefits discussed, surgical consent, monitors and equipment checked, pre-op evaluation and timeout performed  Peripheral Block  Patient position: supine  Prep: ChloraPrep  Patient monitoring: frequent blood pressure checks, continuous pulse oximetry and heart rate  Block type: Femoral  Laterality: right  Injection technique: single-shot  Procedures: ultrasound guided, Ultrasound guidance required for the procedure to increase accuracy and safety of medication placement and decrease risk of complications.  Ultrasound permanent image savedbupivacaine (PF) (MARCAINE) 0.5 % injection 20 mL - Perineural   20 mL - 3/24/2024 10:05:00 AM  bupivacaine liposomal (EXPAREL) 1.3 % injection 20 mL - Perineural   20 mL - 3/24/2024 10:05:00 AM  Needle  Needle type: Stimuplex   Needle gauge: 20 G  Needle length: 4 in  Needle localization: anatomical landmarks and ultrasound guidance  Assessment  Injection assessment: incremental injection, frequent aspiration, injected with ease, negative aspiration, negative for heart rate change, no paresthesia on injection, no symptoms of intraneural/intravenous injection and needle tip visualized at all times  Paresthesia pain: none  Post-procedure:  site cleaned  patient tolerated the procedure well with no immediate complications           sounds. No wheezing.   Abdominal:      General: Bowel sounds are normal. There is no distension.      Palpations: Abdomen is soft.      Tenderness: There is no abdominal tenderness.   Musculoskeletal:         General: No swelling or tenderness.      Cervical back: Neck supple.   Neurological:      Mental Status: She is alert.      Comments: Oriented to name and place         Labs     Recent Labs   Lab 05/27/21  0611 05/26/21  1712   WBC 2.7* 3.0*   HCT 24.9* 22.9*   HGB 8.1* 7.4*   PLT 35* 34*   INR  --  2.0   PTT  --  37*   SODIUM 134* 135   POTASSIUM 5.1 4.8   CHLORIDE 101 100   CO2 31 35*   CALCIUM 8.6 8.1*   GLUCOSE 67 81   BUN 32* 29*   CREATININE 3.23* 2.66*   AST  --  17   GPT  --  7   ALKPT  --  145*   BILIRUBIN  --  0.5   ALBUMIN  --  2.9*       Imaging  CT HEAD WO CONTRAST    Result Date: 5/26/2021  EXAM: CT HEAD WO CONTRAST CLINICAL INDICATION: Fall, head trauma, on anticoagulation, headache COMPARISON: 11/22/2019 TECHNIQUE: Helical images obtained from skull base to vertex without IV contrast. Coronal and sagittal reformats were produced.  Automated dose reduction techniques utilized. FINDINGS: The ventricles and cortical sulci are mildly enlarged.  No midline shift or mass effect.  No acute intra or extra axial hemorrhage. There is patchy hypodensity in the periventricular and subcortical white matter.  Small old right cerebellar lacunar infarct is noted.  Small subcentimeter right falx seen calcification or 5 mm calcified meningioma again noted in the right frontal paramidline region. The skull base and calvarium appear intact.  The visualized paranasal sinuses are grossly clear.  There is interval development of extensive opacity of the left mastoid air cells and middle ear with no basilar skull fracture identified.  Right mastoid air cells are clear.     1.   Stable mild atrophy and chronic ischemic deep white matter changes with no acute intracranial hemorrhage or midline shift identified.  2.    Interval development of left otomastoiditis. Electronically Signed by: AQUILES ROACH M.D. Signed on: 5/26/2021 7:29 PM         ASSESSMENT/PLAN:    AVF malfunction in the setting of ESRD on HD (MWF) with history of failed right kidney transplant (2005) and recent right arm graft declot with PTA on 2/1/2021  - HD as scheduled per Nephrology  - Plan for IR fistulogram   - Nephrology following     Acute on chronic pancytopenia, possibly related to above   - WBC 3.0 --> 2.7, Hgb 7.4 --> 8.1, Plt 34 --> 35  - Continue Epogen with HD  - Monitor CBC. No overt bleeding noted   - Hematology consulted, Input appreciated     Hyponatremia likely due to ESRD  - Na 135 -->134  - Correct with HD   - Maintain fluid restriction    Severe aortic stenosis s/p mechanical prosthetic AVR - Hold warfarin for now for the procedure. Unable to start heparin due to platelets-35,   I have consulted and discussed with Dr. Fermin Goodman, cardiology recommended to hold warfarin for now as her fistula needs to be declotted.  I discussed with him about inability to start the heparin drip due to platelets<50,000, he is okay to hold off on anticoagulation for now and consider starting heparin subcutaneous bid in the meantime if ok with hematology.  Hematology is consulted and notified for further recommendations regarding anticoagulation, ruling out HIT    CAD s/p CABG and stent x5 - Stable. On ASA, atorvastatin, and amlodipine  Chronic diastolic heart failure - Compensated. Fluid management with HD     Paroxysmal atrial fibrillation - HR in 60-70's. On warfarin   Primary hypertension and orthostatic hypotension - BP in 's. Continue midodrine. On amlodipine and hydralazine   Diabetes mellitus type 2 - BG well controlled.   COPD - No acute exacerbation noted. On albuterol   Hyperlipidemia - On atorvastatin   GERD - On Protonix   Chronic constipation in the setting of IBS - On PRN Bentyl and   Diabetic autonomic neuropathy - No acute  concerns  Osteoarthritis - Continue PRN analgesics   Osteoporosis - No acute concerns   Glaucoma with diabetic retinopathy, legally blind - Staff is aware  Hard of hearing - Staff is aware  Vascular dementia - No acute concerns   Anxiety and depression NOS - Continue PRN Ativan   History of chronic RLE DVT - on warfarin, currently on hold  History of CVA - on warfarin. On atorvastatin   History of seizures - On Keppra 500mg BID    DVT PPx: Hold warfarin for now  For the procedure unable to start heparin due to plt <26993, ruling out HIT  .  DISPO: Pending fistulogram and further input per specialists. Palliative care consulted for discussion of goals of care.    Discussed the case with the nephrology, cardiology and hematology.  I have consulted and discussed with Dr. Fermin Goodman, cardiology recommended to hold warfarin for now as her fistula needs to be declotted for continuing dialysis.  I discussed with him about inability to start the heparin drip due to platelets<50,000, he is okay to hold off on anticoagulation for now, although she continues to have the risk for mechanical prosthetic aortic valve thrombosis and rec to consider starting heparin subcutaneous bid in the meantime if ok with hematology.      Hematology is consulted and notified for further recommendations regarding anticoagulation.        PCP:  Easton Nunez MD     Charting performed by galen Florentino for Dr. Flory Hill     All medical record entries made by the galen were at my direction. I have reviewed the chart and agree that the record accurately reflects my personal performance of the history, physical exam, hospital course, and assessment and plan.

## 2024-03-24 NOTE — ASSESSMENT & PLAN NOTE
History of R. Knee arthoplasty  Acute pain present 2-3 days prior to admission  Knee was hot to touch and red  XR negative  Ortho tapped knee with purulent drainage  Went to OR 3/24 for I&D with washout and repeat arthoplasty  Home regimen: Fentanyl patch, Motrin, Morphine    Plan:  Ortho following- appreciate further recommendations   Hold home dose Pain management. On systemic pain management in the ICU- Continue with dilaudid infusion, and precedex  Consider Ketamine infusion

## 2024-03-24 NOTE — ASSESSMENT & PLAN NOTE
Hold home dose fentanyl patch, motrin, morphine  Continue with Dilaudid drip in the ICU  Continue with Colace, start Mirralax for bowel regimen

## 2024-03-24 NOTE — ASSESSMENT & PLAN NOTE
Patient left intubated after OR case due to hemodynamic instability due tachycardia  Current vent settings: ACVC 16/650/100%/6   CXR with ETT slightly high  Initial ABG 7.341/31.8/319/16.8    Plan:  Push ETT down 1 cm  Titrate FiO2 down to 50%  Pain and sedation: Fentanyl 200 mcg/hr, Precedex 0.7 mcg/kg/hr, Propofol 50 mcg/kg/min- RASS goal -2

## 2024-03-24 NOTE — OP NOTE
OPERATIVE REPORT  PATIENT NAME: Armen Llanos  : 1975  MRN: [id number]  Pt Location:   OR ROOM 02    Surgery Date: 3/24/2024    Surgeons and Role:     * Tao Washington,  - Primary     * Pan Felipe PA-C - Assisting     Preop Diagnosis:  Infection associated with internal right knee prosthesis, initial encounter ([facility]) [T84.53XA]    Post-Op Diagnosis Codes:     * Infection associated with internal right knee prosthesis, initial encounter ([facility]) [T84.53XA]    Procedure(s):  Right - ARTHROPLASTY KNEE TOTAL REVISION. POLY EXCHANGE  Right - INSERTION OF BIODEGRADABLE DRUG DELIVERY DEVICE (STIMULAN)  Right - EXCISIONAL DEBRIDEMENT DOWN TO AND INCLUDING BONE     Specimens:  ID Type Source Tests Collected by Time Destination   A : Tissue Right Knee #1 Tissue Soft Tissue, Other ANAEROBIC CULTURE AND GRAM STAIN, CULTURE, TISSUE AND GRAM STAIN Tao Washington, DO 3/24/2024 0839    B : Tissue Right Knee #2 Tissue Soft Tissue, Other ANAEROBIC CULTURE AND GRAM STAIN, CULTURE, TISSUE AND GRAM STAIN Tao Washington, DO 3/24/2024 0840    C : Tissue Right Knee #3 Tissue Soft Tissue, Other ANAEROBIC CULTURE AND GRAM STAIN, CULTURE, TISSUE AND GRAM STAIN Tao Washington, DO 3/24/2024 0841        Estimated Blood Loss:   25 cc    Drains:  Urethral Catheter Latex 16 Fr. (Active)   Number of days: 0       Anesthesia Type:   General     Operative Indications:  Infection associated with internal right knee prosthesis, initial encounter ([facility]) [T84.53XA]    Patient is a 48M who is admitted for DKA and found to have a right knee periprosthetic joint infection.  He states that he has had right knee pain with difficulty ambulating then to the point of unable to ambulate on the knee due to pain for the last 2 to 3 days.  Before that he just had his typical chronic pains.  Upon admission he was found to be in diabetic ketoacidosis with a blood sugar of 600+.  He has been closely monitored and been started on  insulin monitoring his electrolytes.  Orthopedics was then consulted and my partner aspirated his knee which was positive for staph with a cell count of 221k.  His blood cultures are positive for likely MSSA. His Gram stain of the knee is positive for gram-positive cells in clusters.  In discussing with medicine his sodium has been stable and is cleared for the operating room to obtain source control in his right knee.  With his few days of symptoms and overall medical status we will plan for I&D with extensive synovectomy, poly exchange and insertion of antibiotic Stimulan beads.  Risks and benefits of the surgery were discussed to include but not limited to bleeding, infection, damage surrounding structures, continued infection, need for explant of total knee arthroplasty, blood clots, stroke, heart attack and death.  Patient was accepting of risks and is proceeding to the operating room urgently in regards to attempting to save his arthroplasty and more importantly source control for his medical status.  We discussed that his poor glucose control with A1c of 12.1 has factored into his periprosthetic joint infection and must be better controlled postoperatively to hopefully clear infection.  His uncontrolled diabetes is a risk factor for continued infection and potential loss of limb.    Operative Findings:  Large purulent effusion  Well-fixed implants  9 mm PS journey 1 polyethylene   Lateral laxity in flexion of approximately 1 cm  No medial laxity    Implant Name Type Inv. Item Serial No.  Lot No. LRB No. Used Action   FILLER SYNTHETIC STIMULAN RAPID CURE 10ML - [id number]  FILLER SYNTHETIC STIMULAN RAPID CURE 10ML  [employer] [id number] Right 1 Implanted   Journey II xlpe Deep Disshed Articular insert    GIR[employer] NEPH MADELIN SCHILLING [id number] Right 1 Implanted     Complications:   None    Knee Technique: Suture (direct) Repair  Knee Approach: Medial Parapatellar    Procedure and Technique:  Patient was  seen and examined in the preoperative holding area.  The right lower extremity was marked as the correct operative site.  Informed consent was obtained and all questions were answered.  Patient is systemically ill and is accepting of risks involved with surgery as this is not an elective case and source control is needed.  Patient taken back to the operating room and general anesthesia was performed.  Patient was transferred supine to the operating room table.  All bony prominences were well-padded.  Right lower extremity was prepped and draped in usual sterile fashion.  Patient was given 2 g of Ancef prior to incision and has been on vancomycin continuously since he was admitted.  His previous anterior knee incision was laterally based and was resected down to the level of the extensor mechanism.  Upon evaluation he had Ethibond sutures along his lateral extensor mechanism consistent with previous lateral parapatellar approach.  Due to his previous lateral parapatellar approach to utilize this as it has already been devascularized and would require a large medial skin flap in regards to doing a medial parapatellar approach.    Upon entering his knee there was a large purulent effusion.  We performed an extensive synovectomy of his extensor mechanism down to his medial gutters.  At this time we removed his 9 mm PS polyethylene complication.  His femoral and tibial components along with patellar were evaluated and found to be well-fixed.  He has a [employer] Oxin[employer] journey 1 total knee arthroplasty.  We carefully debrided the posterior compartment.  3 soft tissue samples were taken for culture.  At this time we irrigated the knee with 3 L of normal saline solution while we continued our debridement of all devitalized tissues.  We next irrigated the knee with 1 L of Bactisure solution and allowed this to sit for 3 minutes while we continue to debridement with Villatoro elevator of the distal femur and extensor  mechanism.  We described the implants with a clean lap while with the solution.  At this time we debrided the knee further and irrigated with 3 L of normal saline solution.  At this time we took half a bottle of Irrisept solution and allowed to sit in the knee for 3 minutes.  While this was sitting in the soft tissues we trialed the knee with a 9, 10 and 11 mm deep dish CR polyethylene as there are no longer PS polyethylene's available.  The knee had excellent stability with an 11 mm polyethylene obtaining full extension.  In flexion he continues to have lateral laxity.  At this time everyone changed their gloves and we impacted an 11 mm deep dish CR polyethylene into a clean tibial tray.    At this time we took the knee through range of motion and had excellent stability.  We next irrigated the knee again the remainder of the Irrisept solution.  This was followed by 3 L of normal saline solution.  We prepared, mixed and formed 10 cc of Stimulan beads with 1 g of vancomycin powder and 600 mg of tobramycin powder.  We placed the beads within the knee at the level of the arthroplasty.  We closed the extensor mechanism with interrupted #1 suture.  This was followed by #1 PDS strata fix suture.  Subcutaneous tissues were closed with 2-0 suture followed by 2-0 nylon sutures in horizontal mattress fashion.  All instrument and scrub counts were correct x 2.  A sterile Mepilex dressing was placed over the knee followed by 6 inch Ace wrap's.  A knee immobilizer was placed due to quad weakness from femoral nerve block.    The patient maintained an acidotic state at the end of the case.  An A-line was placed and a Leon was maintained.  Patient was left intubated and transferred to the intensive care unit due to his acid-base status.  I updated his wife with his status.       I was present for the entire procedure., A qualified resident physician was not available., and A physician assistant was required during the procedure  for retraction, tissue handling, dissection and suturing.    Patient Disposition:  PACU     48-year-old male status post right knee I&D with polyethylene exchange for periprosthetic joint infection 3/24  - multi-modal pain control  - continue vancomycin pending cultures, likely down-grade to Ancef   - DVT ppx: on heparin drip   - PT/OT  - WBAT  - KI x 48 hrs, can D/C once quad strength has resumed   - ID consult   - medical management per ICU  - will need PICC line with 6 weeks IV abx and minimum 3 months of total abx therapy   - close glucose control as underlying A1c is 12.1      SIGNATURE: Tao Washington DO  DATE: March 24, 2024  TIME: 10:40 AM

## 2024-03-24 NOTE — ASSESSMENT & PLAN NOTE
POA serum Na+ 123. Nephrology following- Hyponatremia likely secondary to physiologic SIADH   Received IV fluids, Toradol, on lisinopril  Serum osmo- 269, Urine osmo 434, Urine sodium 40  Post op patient meeting sepsis criteria- requiring fluid resuscitation  Most recent Na+ 134 up from 127- Nephrology aware    Plan:  BMP every 4 hours  Hold off on further fluid resuscitation  Nephrology consulted-Appreciate recommendations

## 2024-03-24 NOTE — ASSESSMENT & PLAN NOTE
History of TBI at 7 years old  Experiences memory issues at baseline  Home regimen: Ambien risperidone    Plan:  Hold home Ambien and risperidone while on sedation as above

## 2024-03-24 NOTE — CONSULTS
[facility]  Consult  Name: Armen Llanos 48 y.o. male I MRN: [id number]  Unit/Bed#: - I Date of Admission: 3/22/2024   Date of Service: 3/24/2024 I Hospital Day: 2    Consults    Assessment/Plan   Septic shock (HCC)  Assessment & Plan  SIRS POA: Tachycardia, leukocytosis  Chest XR clear, UA not indicative of infection, negative RVP, negative lactate   Presents with worsening R. Knee pain, history of R. Knee arthroplasty in 2005. XR negative. Knee aspiration by ortho with purulent drainage.   Blood cultures positive for MSSA- likely secondary to knee abscess  S/p R. Knee arthoplasty with I&D washout with ortho 3/24- during case became tachycardic with HR in 190's during case. Hemodynamically stable. Received 1.25 L of IVF during OR case. Given additional 2 L in the ICU  Post op labs- negative lactate, Procal 3.39  Initially hemodynamically stable but BP trending down, likely in the setting of tachyarrhythmia requiring Cardizem infusion and sedation and sepsis/SIRS response from surgery. Started on ARSALAN but increasing requirements. POCUS with dilated LV, global hypokenesis. Switched to Levo. Central line placed     Plan:  Continue with IV Vancomycin- pharmacy consulted for vancomycin management  Follow up with repeat cultures-suspect they will likely be positive as they were drawn prior to OR washout. If second set is positive will get repeat cultures  Echo ordered to r/o endocarditis   ID following- appreciate recommendations  Repeat CBC in the AM- monitor and trend WBC  Monitor and trend fever curve  Repeat Procal in the AM  Control of HR as stated below  MAP goal > 65 mmHg. Titrate Levo to MAP goal  Check SCVO2    Endotracheally intubated  Assessment & Plan  Patient left intubated after OR case due to hemodynamic instability due tachycardia  Current vent settings: ACVC 16/650/100%/6   CXR with ETT slightly high  Initial ABG 7.341/31.8/319/16.8    Plan:  Push ETT down 1  cm  Titrate FiO2 down to 50%  Pain and sedation: Fentanyl 200 mcg/hr, Precedex 0.7 mcg/kg/hr, Propofol 50 mcg/kg/min- RASS goal -2    A-fib with RVR (HCC)  Assessment & Plan  No previous history of Afib- Likely exacerbated due to critical illness, bacteremia, and stress from OR  During OR case patient went into HR of 190's. Hemodynamically stable. In the OR the patient received 1.25 L of IVF, 150 mg Amiodorone bolus, and 300 mcg of Esmolol without resolution of tachycardia  In the ICU patient was given additional 2 L IVF for fluid resuscitation and 5 mg IV Metoprolol- HR improved to 150's. Still hemodynamically stable, started on Cardizem drip  Became hypotensive. Cardizem stopped, Amio drip with bolus started- HR improved < 120    Plan:  Continue Amio gtts goal HR < 130  Start Heparin drip- OK per Ortho  Echo pending  Consider cardiology consult   PRN Metoprolol vs rebolus amio    Hyponatremia  Assessment & Plan  POA serum Na+ 123. Nephrology following- Hyponatremia likely secondary to physiologic SIADH   Received IV fluids, Toradol, on lisinopril  Serum osmo- 269, Urine osmo 434, Urine sodium 40  Post op patient meeting sepsis criteria- requiring fluid resuscitation  Most recent Na+ 134 up from 127- Nephrology aware    Plan:  BMP every 4 hours  Hold off on further fluid resuscitation  Nephrology consulted-Appreciate recommendations     MSSA bacteremia  Assessment & Plan  2/2 sets of blood cultures on 3/22 positive for MSSA. Repeat blood cultures 3/23 still positive 2/2 bottles for gram positive cocci- presumed MSSA  Patient to OR 3/24 to gain source control- likely from septic joint from previous arthoplasty     Plan:  Continue with IV Vancomycin  Pharmacy consulted for Vancomycin management  ID consulted- appreciate recommendations  Repeat blood cultures in the AM  2D echo pending- evaluation for endocarditis     Suicidal ideation  Assessment & Plan  Per wife patient has been mentioning and having suicidal  ideation  Psych consulted- No need for inpatient hospitalization- continue home dose Risperidone when appropriate     TBI (traumatic brain injury) ([facility])  Assessment & Plan  History of TBI at 7 years old  Experiences memory issues  Home regimen: Ambien risperidone    Plan:  Hold home dose Ambien and risperidone    Essential hypertension  Assessment & Plan  By history. Home regimen: Amlodipine, Lisinopril    Plan:  Hold home dose antihypertensives in the setting of shock    Chronic, continuous use of opioids  Assessment & Plan  Hold home dose fentanyl patch, motrin, morphine  Continue with Dilaudid drip in the ICU  Continue with Colace, start Mirralax for bowel regimen    Acute on Chronic Right knee pain  Assessment & Plan  History of R. Knee arthoplasty  Acute pain present 2-3 days prior to admission  Knee was hot to touch and red  XR negative  Ortho tapped knee with purulent drainage  Went to OR 3/24 for I&D with washout and repeat arthoplasty  Home regimen: Fentanyl patch, Motrin, Morphine    Plan:  Ortho following- appreciate further recommendations   Hold home dose Pain management. On systemic pain management in the ICU- Continue with dilaudid infusion, and precedex  Consider Ketamine infusion      * Type 2 diabetes mellitus with ketoacidosis without coma, without long-term current use of insulin ([facility])  Assessment & Plan  Lab Results   Component Value Date    HGBA1C 12.1 (H) 03/22/2024       Recent Labs     03/23/24  2147 03/24/24  0043 03/24/24  0608 03/24/24  1036   POCGLU 180* 230* 228* 215*         Blood Sugar Average: Last 72 hrs:  (P) 197    POA: Glucose > 600. BHB 1.41. pH 7.35.  Was previously fluid resuscitated and on insulin infusion  Home regimen: Metformin, Glimepiride    Plan:  Hold home regimen  Due to critical illness, hold SSI start non DKA insulin drip  Blood glucose goal 140-180  Hypoglycemia protocol  Diabetes education when appropriate            History of Present Illness     HPI: Armen CREWS  Viet is a 48 y.o. male with a past medical history significant for TBI, hypertension, chronic opioid use, chronic knee pain-history of right knee arthroplasty in 2005, type 2 diabetes initially admitted to [facility] on 3/22/2024 for acute right knee pain potentially due to fall.  Found to be hyperglycemic with multiple electrolyte abnormalities meeting sepsis criteria.  Patient was admitted to medicine on IV insulin drip, broad-spectrum antibiotics, nephrology consult for JH as well as hyponatremia.  On admission right knee was noted to be erythemic and hot to touch.  Knee x-ray was negative.  Orthopedic medicine was asked to evaluate patient's knee and perform aspiration to assess for infection.  Knee aspiration was performed on 3/23 with purulent drainage.  Patient's blood cultures positive for likely MSSA pending final sensitivities, patient covered with IV vancomycin.  Patient taken to the OR early morning 3/24 for I&D of the right knee and repeat arthroplasty by Ortho to gain source control.  IntraOp patient became increasingly tachycardic up to the 190s.  He was given additional IV fluid in the OR as well as bolused with amnio as well as given esmolol without resolution of heart rate.  The case was completed to gain source control and the patient was brought to the ICU for postoperative care.  Patient initially was hemodynamically stable but left intubated until heart rate could be controlled.  Once in the ICU patient was given IV metoprolol, additional IV fluids to control heart rate which brought heart rate from 200 down to the 150s.  Patient was still hemodynamically stable and so a Cardizem drip was attempted.  Patient became slightly hypotensive.  Likely secondary to worsening sepsis/SIRS response status post surgery versus sedation as the patient required a good deal of sedation to remain calm on the vent versus tachycardia mediated as he had been in A-fib RVR for a prolonged period.   Patient required the use of vasopressors.  Patient transferred to the ICU under the critical care service for septic shock, A-fib RVR, management of his mechanical ventilation.    History obtained from chart review and unobtainable from patient due to Mechanically ventilated .  Review of Systems   Unable to perform ROS: Intubated     Disposition: Critical care   Historical Information   Past Medical History:  No date: Diabetes mellitus (HCC)  No date: Hypertension Past Surgical History:  No date: KNEE SURGERY   Current Outpatient Medications   Medication Instructions    amLODIPine (NORVASC) 5 mg, Oral, Daily    b complex vitamins capsule 1 capsule, Oral, Daily    cyclobenzaprine (FLEXERIL) 10 mg, Oral, 3 times daily PRN    docusate sodium (COLACE) 750 mg, 2 times daily    esomeprazole (NEXIUM) 20 mg, 2 times daily before meals    fentaNYL (DURAGESIC) 75 mcg/hr 1 patch, Transdermal, Every 72 hours    glimepiride (AMARYL) 2 mg, Oral, 4 times daily    ibuprofen (MOTRIN) 800 mg, Oral, Every 6 hours PRN, BID standing, and PRN with morphine if needed    lisinopril-hydrochlorothiazide (PRINZIDE,ZESTORETIC) 20-25 MG per tablet 1 tablet, Oral, Daily    metFORMIN (GLUCOPHAGE) 500 mg, Oral, 2 times daily with meals    Morphine Sulfate ER 60 mg, Oral, Every 12 hours PRN    ondansetron (ZOFRAN) 8 mg tablet TAKE 1 TABLET (8 MG) BY MOUTH 3 TIMES PER DAY AS NEEDED    risperiDONE (RISPERDAL) 3 mg, Oral, Daily    simvastatin (ZOCOR) 40 mg, Oral, Daily    SUMAtriptan (IMITREX) 100 mg, Oral    zolpidem (AMBIEN) 10 mg, Oral    Allergies   Allergen Reactions    Penicillins Hives      Social History     Tobacco Use    Smoking status: Never    Smokeless tobacco: Never   Vaping Use    Vaping status: Some Days    Substances: THC, CBD, Flavoring   Substance Use Topics    Alcohol use: Not Currently    Drug use: Yes     Types: Marijuana    History reviewed. No pertinent family history.     Objective                            Vitals I/O       Most Recent Min/Max in 24hrs   Temp 99.5 °F (37.5 °C) Temp  Min: 98.5 °F (36.9 °C)  Max: 99.5 °F (37.5 °C)   Pulse (!) 156 Pulse  Min: 83  Max: 163   Resp (!) 24 Resp  Min: 16  Max: 24   /62 BP  Min: 126/61  Max: 162/62   O2 Sat 96 % SpO2  Min: 92 %  Max: 100 %      Intake/Output Summary (Last 24 hours) at 3/24/2024 1616  Last data filed at 3/24/2024 1154  Gross per 24 hour   Intake 3607.15 ml   Output 2260 ml   Net 1347.15 ml       Diet Regular; Regular House    Invasive Monitoring   Arterial Line  [place] BP    No data recorded   MAP    No data recorded           Physical Exam   Physical Exam  Eyes:      General: Vision grossly intact. No scleral icterus.     Extraocular Movements: Extraocular movements intact.      Conjunctiva/sclera: Conjunctivae normal.      Pupils: Pupils are equal, round, and reactive to light.   Skin:     General: Skin is warm and dry.      Capillary Refill: Capillary refill takes less than 2 seconds.   HENT:      Head: Normocephalic and atraumatic.      Mouth/Throat:      Mouth: Mucous membranes are moist.   Cardiovascular:      Rate and Rhythm: Tachycardia present. Rhythm irregular.      Pulses: Normal pulses.      Heart sounds: Normal heart sounds.   Musculoskeletal:         General: Normal range of motion.      Right lower leg: No edema.      Left lower leg: No edema.   Abdominal: General: Bowel sounds are normal.      Palpations: Abdomen is soft.      Tenderness: There is no abdominal tenderness.   Constitutional:       General: He is in acute distress.      Appearance: He is ill-appearing and toxic-appearing.      Interventions: He is sedated, intubated and restrained.   Pulmonary:      Effort: Pulmonary effort is normal. He is intubated.      Breath sounds: Normal breath sounds. No wheezing, rhonchi or rales.   Neurological:      Mental Status: He is lethargic.      GCS: GCS eye subscore is 3. GCS verbal subscore is 1. GCS motor subscore is 6.        Corneal reflex present, cough  reflex and gag reflex intact.      Comments: Neuro exam performed on 50 mcg propofol, 200 mcg of fentanyl, 0.7 mcg of Precedex   Genitourinary/Anorectal:  Leon present.          Diagnostic Studies      EKG: Afib with RVR  Imaging:  I have personally reviewed pertinent reports.   and I have personally reviewed pertinent films in PACS     Medications:  Scheduled PRN   bupivacaine liposomal, 20 mL, Once  chlorhexidine, 15 mL, Q12H DARIN  docusate sodium, 300 mg, BID  EPINEPHrine, ,   nicotine, 21 mg, Daily  pantoprazole, 40 mg, Q24H DARIN  potassium chloride, 40 mEq, Once  pravastatin, 80 mg, Daily With Dinner  vancomycin, 1,250 mg, Q12H      acetaminophen, 1,000 mg, Q6H PRN  acetaminophen, 650 mg, Q4H PRN  aluminum-magnesium hydroxide-simethicone, 30 mL, Q6H PRN  calcium carbonate, 1,000 mg, Daily PRN  cyclobenzaprine, 10 mg, TID PRN  EPINEPHrine, ,   fentaNYL, 50 mcg, Q1H PRN  heparin (porcine), 2,000 Units, Q6H PRN  heparin (porcine), 4,000 Units, Q6H PRN  lactated ringers, 1,000 mL, Once PRN   And  lactated ringers, 1,000 mL, Once PRN  ondansetron, 4 mg, Q4H PRN  polyethylene glycol, 17 g, Daily PRN  saliva substitute, 5 spray, 4x Daily PRN  simethicone, 80 mg, 4x Daily PRN  sodium chloride, 1,000 mL, Once PRN   And  sodium chloride, 1,000 mL, Once PRN  zolpidem, 10 mg, HS PRN       Continuous    amiodarone (CORDARONE) 900 mg in dextrose 5 % 500 mL infusion, 1 mg/min, Last Rate: 1 mg/min (03/24/24 1312)   Followed by  amiodarone (CORDARONE) 900 mg in dextrose 5 % 500 mL infusion, 0.5 mg/min  dexmedetomidine, 0.1-0.7 mcg/kg/hr, Last Rate: 0.7 mcg/kg/hr (03/24/24 1455)  heparin (porcine), 3-20 Units/kg/hr (Order-Specific), Last Rate: 11.1 Units/kg/hr (03/24/24 1502)  HYDROmorphone, 1.5 mg/hr, Last Rate: 1.5 mg/hr (03/24/24 1510)  insulin regular (HumuLIN R,NovoLIN R) 1 Units/mL in sodium chloride 0.9 % 100 mL infusion, 0.3-21 Units/hr  norepinephrine, 1-30 mcg/min, Last Rate: 5 mcg/min (03/24/24 1548)  phenylephine,   mcg/min, Last Rate: Stopped (03/24/24 1453)  propofol, 5-50 mcg/kg/min, Last Rate: 45 mcg/kg/min (03/24/24 1602)         Labs:    CBC    Recent Labs     03/24/24  0739 03/24/24  0900 03/24/24  0951 03/24/24  1047   WBC 11.62*  --   --  6.56   HGB 12.0   < > 9.9* 10.1*   HCT 35.2*   < > 29* 29.6*     --   --  132*   BANDSPCT  --   --   --  11*    < > = values in this interval not displayed.     BMP    Recent Labs     03/24/24  1047 03/24/24  1520   SODIUM 134* 129*   K 3.3* 4.1   CL 99 97   CO2 18* 19*   AGAP 17* 13   BUN 15 18   CREATININE 0.93 1.10   CALCIUM 8.8 8.6       Coags    Recent Labs     03/24/24  1056   INR 1.37*   PTT 41*        Additional Electrolytes  Recent Labs     03/24/24  0739 03/24/24  0900 03/24/24  0951 03/24/24  1047 03/24/24  1520   MG 1.8*  --   --  1.8* 1.6*   PHOS 2.0*  --   --  3.7  --    CAIONIZED  --  1.33* 1.30  --   --           Blood Gas    Recent Labs     03/24/24  1217   PHART 7.341*   AKL1HNO 31.8*   PO2ART 319.1*   YDQ3UCH 16.8*   BEART -7.9   SOURCE Line, Arterial     Recent Labs     03/24/24  1217 03/24/24  1520   PHVEN  --  7.298*   [id number]  --  38.4*   PO2VEN  --  30.5*   [id number]  --  18.4*   BEVEN  --  -7.4   [id number]  --  60.5   SOURCE Line, Arterial  --     LFTs  Recent Labs     03/23/24  0356   ALT 16   AST 13   ALKPHOS 53   ALB 3.6   TBILI 0.87       Infectious  Recent Labs     03/24/24  1047   PROCALCITONI 3.39*     Glucose  Recent Labs     03/24/24  0050 03/24/24  0739 03/24/24  1047 03/24/24  1520   GLUC 203* 248* 201* 292*               NATA Paulino

## 2024-03-24 NOTE — ASSESSMENT & PLAN NOTE
+ MSSA on BC x2, R knee aspirate as above  3/24 R knee I&D to bone, total knee arthroplasty revision, insertion of stimulan with Dr. Washington    Plan:  Continue Vanc as above  ID following  Echo pending

## 2024-03-24 NOTE — ANESTHESIA PREPROCEDURE EVALUATION
Procedure:  ARTHROPLASTY KNEE TOTAL REVISION, POLY EXCHANGE (Right: Knee)    Relevant Problems   CARDIO   (+) Costochondral chest pain   (+) Essential hypertension      ENDO   (+) Type 2 diabetes mellitus with ketoacidosis without coma, without long-term current use of insulin ([facility])      NEURO/PSYCH   (+) Chronic, continuous use of opioids      Behavioral Health   (+) Suicidal ideation      Neurology/Sleep   (+) TBI (traumatic brain injury) ([facility])      Orthopedic/Musculoskeletal   (+) Acute on Chronic Right knee pain      Other   (+) Hyponatremia   (+) SIRS (systemic inflammatory response syndrome) ([facility])      Lab Results   Component Value Date    SODIUM 127 (L) 03/24/2024    K 4.2 03/24/2024    CL 97 03/24/2024    CO2 15 (L) 03/24/2024    AGAP 15 (H) 03/24/2024    BUN 14 03/24/2024    CREATININE 0.94 03/24/2024    GLUC 203 (H) 03/24/2024    CALCIUM 8.3 (L) 03/24/2024    AST 13 03/23/2024    ALT 16 03/23/2024    ALKPHOS 53 03/23/2024    TP 6.3 (L) 03/23/2024    TBILI 0.87 03/23/2024    EGFR 94 03/24/2024     Lab Results   Component Value Date    WBC 12.38 (H) 03/23/2024    HGB 13.7 03/23/2024    HCT 40.3 03/23/2024    MCV 89 03/23/2024     03/23/2024              Anesthesia Plan  ASA Score- 3     Anesthesia Type- general with ASA Monitors.         Additional Monitors:     Airway Plan: ETT.           Plan Factors-Exercise tolerance (METS): >4 METS.    Chart reviewed. EKG reviewed. Imaging results reviewed. Existing labs reviewed. Patient summary reviewed.                  Induction- intravenous.    Postoperative Plan-     Informed Consent-

## 2024-03-24 NOTE — ASSESSMENT & PLAN NOTE
sNa 123 on admission.   Nephrology following- Hyponatremia likely secondary to physiologic SIADH, OP thiazide use with superimposed volume depletion in the setting of sepsis   Serum osmo- 269, Urine osmo 434, Urine sodium 40    Plan:  Currently BMP q4h -- consider deescalation in frequency  Appreciate Nephrology recc's

## 2024-03-24 NOTE — ASSESSMENT & PLAN NOTE
History of R. Knee arthoplasty with replacement at outside facility   Acute pain present 2-3 days prior to admission with erythema and tenderness, warmth at the site   3/22 R knee XR -- Total arthroplasty stable hardware and bony alignment without hardware complication.No acute fracture or dislocation. No joint effusion. No lytic or blastic osseous lesion. Unremarkable soft tissues.  S/p OR 3/24 as above   Home regimen: Fentanyl patch, Motrin, Morphine    Plan:  Ortho following- appreciate further recommendations   Hold home Pain management in the setting of continuous pain control via Dilaudid gtt as above

## 2024-03-24 NOTE — ASSESSMENT & PLAN NOTE
No previous history of Afib- Likely exacerbated due to critical illness, bacteremia, and stress from OR  During OR case patient went into HR of 190's. Hemodynamically stable. In the OR the patient received 1.25 L of IVF, 150 mg Amiodorone bolus, and 300 mcg of Esmolol without resolution of tachycardia  In the ICU patient was given additional 2 L IVF for fluid resuscitation and 5 mg IV Metoprolol- HR improved to 150's. Still hemodynamically stable, started on Cardizem drip  Became hypotensive. Cardizem stopped, Amio drip with bolus started- HR improved < 120    Plan:  Continue Amio gtts goal HR < 130  Echo pending  Consider cardiology consult   PRN Metoprolol vs rebolus amio

## 2024-03-24 NOTE — ASSESSMENT & PLAN NOTE
SIRS: Tachycardia, leukocytosis  LA 1.4, procal 3.39  Source: R knee septic joint   BC x2 (3/22 and 3/23) + MSSA   3/23 R knee aspirate + MSSA  3/24 Intra-op R knee sample pending  3/24 R knee I&D to bone, total knee arthroplasty revision, insertion of stimulan with Dr. Washington  Intra-op became acute tachycardia with new AF RVR, remained intubated, received 1200mL IVF intra-op   Received additional 2L IVF in the ICU, with eventual need for levophed, vaso  3/24 RIJ TLC, L radial art line   ScVO2 60  3/24 Hydrocortisone added     Plan:  Abx D3: Vanc D3  SDS D2: Hydrocortisone 50mg q6h  Echo pending  ID following- appreciate recommendations  Trend fever curve  Overnight, vaso weaned off, levo weaned down   Goal MAP <65

## 2024-03-24 NOTE — ASSESSMENT & PLAN NOTE
Per wife patient has been mentioning and having suicidal ideation  Psych consulted -- per note 3/22 by Dr. Pineda, patient does not meet criteria for IP stay    Spoke with patient and he was asking if the [facility] paperwork would be filled out. I explained to him that Dr. Elvi Ramos would fill it out on Friday during his admin time.

## 2024-03-24 NOTE — PROGRESS NOTES
Armen Llanos is a 48 y.o. male who is currently ordered Vancomycin IV with management by the Pharmacy Consult service.  Relevant clinical data and objective / subjective history reviewed.  Vancomycin Assessment:  Indication and Goal AUC/Trough: Bacteremia (goal -600, trough >10)  Clinical Status: stable  Micro:     Renal Function:  SCr: 0.94 mg/dL  CrCl: 122.9 mL/min  Renal replacement: Not on dialysis  Days of Therapy: 2  Current Dose: 1250mg every 12 hours  Vancomycin Plan:  New Dosing: No change  Estimated AUC: 442 mcg*hr/mL  Estimated Trough: 11.4 mcg/mL  Next Level: 3/25/24 at 0600  Renal Function Monitoring: Daily BMP and UOP  Pharmacy will continue to follow closely for s/sx of nephrotoxicity, infusion reactions and appropriateness of therapy.  BMP and CBC will be ordered per protocol. We will continue to follow the patient’s culture results and clinical progress daily.    George Esquivel, Pharmacist, PharmD, BCPS

## 2024-03-24 NOTE — PROGRESS NOTES
Patient is a 48M who is admitted for DKA and found to have a right knee periprosthetic joint infection.  He states that he has had right knee pain with difficulty ambulating then to the point of unable to ambulate on the knee due to pain for the last 2 to 3 days.  Before that he just had his typical chronic pains.  Upon admission he was found to be in diabetic ketoacidosis with a blood sugar of 600+.  He has been closely monitored and been started on insulin monitoring his electrolytes.  Orthopedics was then consulted and my partner aspirated his knee which was positive for staph with a cell count of 221k.  His blood cultures are positive for likely MSSA.  His Gram stain of the knee is positive for gram-positive cells in clusters.  In discussing with medicine his sodium has been stable and is cleared for the operating room to obtain source control in his right knee.  With his few days of symptoms and overall medical status we will plan for I&D with extensive synovectomy, poly exchange and insertion of antibiotic Stimulan beads.  Risks and benefits of the surgery were discussed to include but not limited to bleeding, infection, damage surrounding structures, continued infection, need for explant of total knee arthroplasty, blood clots, stroke, heart attack and death.  Patient was accepting of risks and is proceeding to the operating room urgently in regards to attempting to save his arthroplasty and more importantly source control for his medical status.  He has been n.p.o. since midnight.  He has been on vancomycin.  We will give Ancef 2 g prior to incision for skin prophylaxis as well.

## 2024-03-24 NOTE — ASSESSMENT & PLAN NOTE
History of TBI at 7 years old  Experiences memory issues  Home regimen: Ambien risperidone    Plan:  Hold home dose Ambien and risperidone

## 2024-03-24 NOTE — PROGRESS NOTES
Progress note- Nephrology   Armen Llanos 48 y.o. male MRN: [id number]  Unit/Bed#: -01 Encounter: [id number]    48 y.o. male who has PMH of hypertension, TBI, chronic opioid use, and knee pain who presents to the ER with right knee pain with potential fall.  Nephrology consulted and managing for hyponatremia and electrolyte balances.       ASSESSMENT and PLAN:  Acute on chronic hyponatremia  Etiology of chronic hyponatremia likely SIADH given chronic pain on opiate along with TBI history, HCTZ use, along with Motrin use  Etiology of acute: Suspect polydipsia, IV fluids for sepsis criteria,and  IV Toradol  Baseline sodium 133-136 dating back to 2022  Admission sodium corrects to 133 given glucose of 639  Status post 2 g sodium chloride tablets x 2 doses  And labs pending  Status post IV fluids and insulin drip both off  Workup:  Urine sodium 40, urine osmolality 434, -suggestive of SIADH uric acid 5.9  Plan  A.m. cortisol-pending  Remains on 1.8 L fluid restriction  Avoid NSAIDs to include Motrin, IV Toradol, naproxen, Celebrex, Mobic  Avoid Motrin   Would not restart HCTZ on discharge  Pain management per primary team  Cautious with IV fluids with surgical intervention today  Likely will require sodium salt tablets moving forward  Continue on every 8 hour BMP     Acute kidney injury (POA)-improved  Etiology: Likely prerenal in the setting of SIRS, relative hypotension, NSAID use, RAAS blockade and HCTZ  Baseline creatinine 0.7-1.0 dating back to 2022  Admission creatinine 1.59 on 3/20/2024  Most recent creatinine at midnight improved back to baseline at 0.94  Likely prerenal given rapid correction with IV fluids  Lisinopril/HCTZ currently on hold  Avoid NSAIDs, nephrotoxins and limit IV contrast  Avoid hypotension and perturbations of blood pressure to event decreased renal perfusion  Check BMP in a.m.     Blood pressure/hypertension:  Current BP acceptable and stable  Home medications include:  Amlodipine 5 mg daily, lisinopril/HCTZ 20 to 25 mg daily  Current medications include: Amlodipine 5 mg daily with hold parameters for SBP less than 130  Maximize hemodynamics to maintain MAP >65  Avoid hypotension or fluctuations in blood pressure  Will continue to trend     Diabetes with DKA  Most recent A1c 9.3 on 5/18/2023 with repeat on admission at 12.1  Needs improved glucose control  Blood sugar on arrival 639 s/p IV insulin  Both IVF and insulin off  BS improving  Management per primary team     Increase metabolic anion gap  Repeat VBG yesterday pH 7.38, CO2 30.9, pO2 37.5, bicarb 17.9  Lactic acid-improved 0.7< 2.2 on admission  Suspect secondary to DKA with hyperglycemia  Primary team following  Anesthesia notified this am with MN BMP results with acidosis-appreciative of notification     Electrolyte abnormality  Hypomagnesia  Requiring intermittent IV supplementation  Continue to monitor and treat as needed  Keep magnesium at 2.0  Hyperkalemia-in the setting of hyperglycemia  Admission potassium 5.3 improved initially to3.8  Most recent potassium at midnight 4.2 and stable     Acute on chronic right knee pain-concern for septic arthritis  Status post aspiration-culture and Gram stain along with synovial crystal cell count-pending  Orthopedic consulted  Plan for surgical intervention with washout today     Suicidal ideations  Behavioral health consulted-recommendations appreciated  Management per primary team     Other medical issues:  TBI at 7 years old  Chronic opioid use     Overall plan and recommendations: Discussed with primary team who is in agreement the plan as stated below  Continue to monitor BMP closely for electrolyte imbalances and treat as needed  Continue with fluid restriction at 1.8 L daily at this time  Await a.m. labs  Continue to hold lisinopril/HCTZ for now-Do not restart HCTZ given hyponatremia  K to leave mouth kote at bedside for mouth comfort  Avoid NSAID/Motrin/Toradol given  hyponatremia  Check BMP in a.m. and post OR procedure      Medical records have been reviewed through [facility] and care everywhere for this patient encounter    Review of Systems  Patient seen and examined at bedside  Review of Systems   Constitutional:  Positive for fatigue. Negative for activity change, appetite change, chills, diaphoresis and fever.   HENT: Negative.  Negative for congestion and facial swelling.         Dry mouth and thirsty   Respiratory: Negative.     Cardiovascular: Negative.    Gastrointestinal: Negative.    Endocrine: Negative.    Genitourinary: Negative.    Musculoskeletal:  Positive for joint swelling.        Right knee pain   Skin: Negative.    Allergic/Immunologic: Negative.    Neurological: Negative.    Hematological: Negative.    Psychiatric/Behavioral: Negative.           Physical Exam:  Current Weight: Weight - Scale: 91.7 kg (202 lb 2.6 oz)  Vitals:    03/23/24 2004 03/24/24 0553 03/24/24 0700 03/24/24 0705   BP: 132/80 130/76 129/76 129/76   Pulse: (!) 121 (!) 144 83 84   Resp:   17    Temp: 99.5 °F (37.5 °C) 99.5 °F (37.5 °C) 98.5 °F (36.9 °C)    TempSrc:       SpO2: 97% 93% 97% 97%   Weight:           Physical Exam  Vitals and nursing note reviewed.   Constitutional:       Appearance: Normal appearance.   HENT:      Head: Normocephalic and atraumatic.      Nose: Nose normal.      Mouth/Throat:      Mouth: Mucous membranes are dry.      Pharynx: Oropharynx is clear.   Eyes:      Extraocular Movements: Extraocular movements intact.      Conjunctiva/sclera: Conjunctivae normal.   Cardiovascular:      Rate and Rhythm: Normal rate and regular rhythm.      Pulses: Normal pulses.      Heart sounds: Normal heart sounds.   Pulmonary:      Effort: Pulmonary effort is normal.      Breath sounds: Normal breath sounds.   Abdominal:      General: Bowel sounds are normal.      Palpations: Abdomen is soft.   Musculoskeletal:         General: Normal range of motion.      Cervical back: Normal  range of motion and neck supple.   Skin:     General: Skin is warm and dry.   Neurological:      General: No focal deficit present.      Mental Status: He is alert and oriented to person, place, and time.   Psychiatric:         Mood and Affect: Mood normal.         Behavior: Behavior normal.            Medications:    Current Facility-Administered Medications:     aluminum-magnesium hydroxide-simethicone (MAALOX) oral suspension 30 mL, 30 mL, Oral, Q6H PRN, Maria T Awan MD    amLODIPine (NORVASC) tablet 5 mg, 5 mg, Oral, Daily, Maria T Awan MD, 5 mg at 03/23/24 0829    cyclobenzaprine (FLEXERIL) tablet 10 mg, 10 mg, Oral, TID PRN, Maria T Awan MD, 10 mg at 03/23/24 1939    Diclofenac Sodium (VOLTAREN) 1 % topical gel 2 g, 2 g, Topical, 4x Daily, Maria T Awan MD, 2 g at 03/23/24 2101    docusate sodium (COLACE) capsule 300 mg, 300 mg, Oral, BID, Maria T Awan MD, 300 mg at 03/23/24 1804    fentaNYL (DURAGESIC) 75 mcg/hr TD 72 hr patch 1 patch, 1 patch, Transdermal, Q72H, Maria T Awan MD    HYDROmorphone HCl (DILAUDID) injection 0.2 mg, 0.2 mg, Intravenous, Q6H PRN, Penelope Ha MD, 0.2 mg at 03/24/24 0547    insulin lispro (HumALOG/ADMELOG) 100 units/mL subcutaneous injection 1-6 Units, 1-6 Units, Subcutaneous, Q6H DARIN, 2 Units at 03/24/24 0628 **AND** Fingerstick Glucose (POCT), , , Q6H, Suzette Burton PA-C    morphine (MS CONTIN) ER tablet 60 mg, 60 mg, Oral, Q12H DARIN, Maria T Awan MD, 60 mg at 03/23/24 2059    nicotine (NICODERM CQ) 21 mg/24 hr TD 24 hr patch 21 mg, 21 mg, Transdermal, Daily, Maria T Awan MD, 21 mg at 03/23/24 0829    ondansetron (ZOFRAN) injection 4 mg, 4 mg, Intravenous, Q4H PRN, Maria T Awan MD, 4 mg at 03/23/24 1521    [DISCONTINUED] traMADol (ULTRAM) tablet 50 mg, 50 mg, Oral, Q6H PRN, 50 mg at 03/22/24 1900 **OR** oxyCODONE (ROXICODONE) immediate release tablet 10 mg, 10 mg, Oral, Q6H PRN, Maria T Awan MD, 10 mg at 03/24/24 0004    oxyCODONE (ROXICODONE) IR  tablet 5 mg, 5 mg, Oral, Q4H PRN, Penelope Ha MD    pantoprazole (PROTONIX) EC tablet 20 mg, 20 mg, Oral, BID AC, Maria T Awan MD, 20 mg at 03/23/24 1521    polyethylene glycol (MIRALAX) packet 17 g, 17 g, Oral, Daily PRN, Maria T Awan MD    pravastatin (PRAVACHOL) tablet 80 mg, 80 mg, Oral, Daily With Dinner, Maria T Awan MD, 80 mg at 03/23/24 1522    risperiDONE (RisperDAL) tablet 3 mg, 3 mg, Oral, Daily, Maria T Awan MD, 3 mg at 03/23/24 0830    saliva substitute (MOUTH KOTE) mucosal solution 5 spray, 5 spray, Mouth/Throat, 4x Daily PRN, Shannan AMADOR'NATA Tineo, 5 spray at 03/24/24 0705    sodium chloride tablet 2 g, 2 g, Oral, HS, Paris Kelsey MD, 2 g at 03/23/24 2100    vancomycin (VANCOCIN) 1,250 mg in sodium chloride 0.9 % 250 mL IVPB, 1,250 mg, Intravenous, Q12H, Penelope Ha MD, Last Rate: 166.7 mL/hr at 03/24/24 0532, 1,250 mg at 03/24/24 0532    zolpidem (AMBIEN) tablet 10 mg, 10 mg, Oral, HS PRN, Maria T Awan MD, 10 mg at 03/24/24 0004    Laboratory Results:  Results from last 7 days   Lab Units 03/24/24  0050 03/23/24  1814 03/23/24  1521 03/23/24  1215 03/23/24  1210 03/23/24  0356 03/23/24  0029 03/22/24  1432 03/22/24  1051 03/22/24  0948   WBC Thousand/uL  --   --   --   --   --  12.38*  --   --   --  17.89*   HEMOGLOBIN g/dL  --   --   --   --   --  13.7  --   --   --  17.6*   I STAT HEMOGLOBIN g/dl  --   --   --   --   --   --   --   --  17.0  --    HEMATOCRIT %  --   --   --   --   --  40.3  --   --   --  50.0*   HEMATOCRIT, ISTAT %  --   --   --   --   --   --   --   --  50*  --    PLATELETS Thousands/uL  --   --   --   --   --  180  --   --   --  245   SODIUM mmol/L 127* 127*  --  127*  --   --  125* 130*  --  123*   POTASSIUM mmol/L 4.2 3.7  --  3.4*  --   --  3.8 4.0  --  5.3   CHLORIDE mmol/L 97 94*  --  91*  --   --  95* 96  --  88*   CO2 mmol/L 15* 20*  --  22  --   --  21 20*  --  19*   CO2, I-STAT mmol/L  --   --   --   --   --   --   --   --  20*  --    BUN  "mg/dL 14 15  --  17  --   --  27* 30*  --  30*   CREATININE mg/dL 0.94 0.93  --  1.04  --   --  1.04 1.37*  --  1.59*   CALCIUM mg/dL 8.3* 8.7  --  9.2  --   --  8.6 9.5  --  9.6   MAGNESIUM mg/dL 1.5*  --  1.8*  --  1.5*  --  1.9  --   --  1.4*   PHOSPHORUS mg/dL  --   --   --   --  1.7*  --   --   --   --  3.3   GLUCOSE, ISTAT mg/dl  --   --   --   --   --   --   --   --  >600*  --          Portions of the record may have been created with voice recognition software. Occasional wrong word or \"sound a like\" substitutions may have occurred due to the inherent limitations of voice recognition software. Read the chart carefully and recognize,   "

## 2024-03-25 ENCOUNTER — APPOINTMENT (INPATIENT)
Dept: NON INVASIVE DIAGNOSTICS | Facility: HOSPITAL | Age: 49
DRG: 485 | End: 2024-03-25
Payer: COMMERCIAL

## 2024-03-25 PROBLEM — E87.20 METABOLIC ACIDOSIS: Status: ACTIVE | Noted: 2024-03-22

## 2024-03-25 PROBLEM — I42.9 CARDIOMYOPATHY (HCC): Status: ACTIVE | Noted: 2024-03-25

## 2024-03-25 PROBLEM — J96.01 ACUTE RESPIRATORY FAILURE WITH HYPOXIA (HCC): Status: ACTIVE | Noted: 2024-03-24

## 2024-03-25 PROBLEM — J96.01 ACUTE RESPIRATORY FAILURE WITH HYPOXIA (HCC): Status: RESOLVED | Noted: 2024-03-24 | Resolved: 2024-03-25

## 2024-03-25 PROBLEM — E87.20 METABOLIC ACIDOSIS: Status: RESOLVED | Noted: 2024-03-22 | Resolved: 2024-03-25

## 2024-03-25 LAB
ALBUMIN SERPL BCP-MCNC: 2.9 G/DL (ref 3.5–5)
ALP SERPL-CCNC: 50 U/L (ref 34–104)
ALT SERPL W P-5'-P-CCNC: 16 U/L (ref 7–52)
ANION GAP SERPL CALCULATED.3IONS-SCNC: 8 MMOL/L (ref 4–13)
AORTIC ROOT: 3.6 CM
APICAL FOUR CHAMBER EJECTION FRACTION: 52 %
APTT PPP: 56 SECONDS (ref 23–37)
APTT PPP: 57 SECONDS (ref 23–37)
APTT PPP: 68 SECONDS (ref 23–37)
ASCENDING AORTA: 3.8 CM
AST SERPL W P-5'-P-CCNC: 18 U/L (ref 13–39)
BACTERIA BLD CULT: ABNORMAL
BACTERIA SPEC BFLD CULT: ABNORMAL
BASOPHILS # BLD MANUAL: 0 THOUSAND/UL (ref 0–0.1)
BASOPHILS NFR MAR MANUAL: 0 % (ref 0–1)
BILIRUB DIRECT SERPL-MCNC: 0.32 MG/DL (ref 0–0.2)
BILIRUB SERPL-MCNC: 0.64 MG/DL (ref 0.2–1)
BSA FOR ECHO PROCEDURE: 2.31 M2
BUN SERPL-MCNC: 18 MG/DL (ref 5–25)
CALCIUM SERPL-MCNC: 8.7 MG/DL (ref 8.4–10.2)
CHLORIDE SERPL-SCNC: 104 MMOL/L (ref 96–108)
CO2 SERPL-SCNC: 22 MMOL/L (ref 21–32)
CREAT SERPL-MCNC: 0.96 MG/DL (ref 0.6–1.3)
E WAVE DECELERATION TIME: 175 MS
E/A RATIO: 1
EOSINOPHIL # BLD MANUAL: 0 THOUSAND/UL (ref 0–0.4)
EOSINOPHIL NFR BLD MANUAL: 0 % (ref 0–6)
ERYTHROCYTE [DISTWIDTH] IN BLOOD BY AUTOMATED COUNT: 12.7 % (ref 11.6–15.1)
FRACTIONAL SHORTENING: 30 (ref 28–44)
GFR SERPL CREATININE-BSD FRML MDRD: 92 ML/MIN/1.73SQ M
GLUCOSE SERPL-MCNC: 139 MG/DL (ref 65–140)
GLUCOSE SERPL-MCNC: 143 MG/DL (ref 65–140)
GLUCOSE SERPL-MCNC: 145 MG/DL (ref 65–140)
GLUCOSE SERPL-MCNC: 153 MG/DL (ref 65–140)
GLUCOSE SERPL-MCNC: 154 MG/DL (ref 65–140)
GLUCOSE SERPL-MCNC: 161 MG/DL (ref 65–140)
GLUCOSE SERPL-MCNC: 166 MG/DL (ref 65–140)
GLUCOSE SERPL-MCNC: 169 MG/DL (ref 65–140)
GLUCOSE SERPL-MCNC: 170 MG/DL (ref 65–140)
GLUCOSE SERPL-MCNC: 170 MG/DL (ref 65–140)
GLUCOSE SERPL-MCNC: 172 MG/DL (ref 65–140)
GLUCOSE SERPL-MCNC: 198 MG/DL (ref 65–140)
GLUCOSE SERPL-MCNC: 212 MG/DL (ref 65–140)
GRAM STN SPEC: ABNORMAL
HCT VFR BLD AUTO: 31.3 % (ref 36.5–49.3)
HGB BLD-MCNC: 11.1 G/DL (ref 12–17)
INTERVENTRICULAR SEPTUM IN DIASTOLE (PARASTERNAL SHORT AXIS VIEW): 1 CM
INTERVENTRICULAR SEPTUM: 1 CM (ref 0.6–1.1)
LAAS-AP2: 14.1 CM2
LAAS-AP4: 17.9 CM2
LEFT ATRIUM SIZE: 3.6 CM
LEFT ATRIUM VOLUME (MOD BIPLANE): 51 ML
LEFT ATRIUM VOLUME INDEX (MOD BIPLANE): 21.9 ML/M2
LEFT INTERNAL DIMENSION IN SYSTOLE: 3.5 CM (ref 2.1–4)
LEFT VENTRICLE DIASTOLIC VOLUME (MOD BIPLANE): 140 ML
LEFT VENTRICLE DIASTOLIC VOLUME INDEX (MOD BIPLANE): 60.6 ML/M2
LEFT VENTRICLE SYSTOLIC VOLUME (MOD BIPLANE): 70 ML
LEFT VENTRICLE SYSTOLIC VOLUME INDEX (MOD BIPLANE): 30.3 ML/M2
LEFT VENTRICULAR INTERNAL DIMENSION IN DIASTOLE: 5 CM (ref 3.5–6)
LEFT VENTRICULAR POSTERIOR WALL IN END DIASTOLE: 1.2 CM
LEFT VENTRICULAR STROKE VOLUME: 66 ML
LV EF: 50 %
LVSV (TEICH): 66 ML
LYMPHOCYTES # BLD AUTO: 0.32 THOUSAND/UL (ref 0.6–4.47)
LYMPHOCYTES # BLD AUTO: 3 % (ref 14–44)
MAGNESIUM SERPL-MCNC: 2 MG/DL (ref 1.9–2.7)
MCH RBC QN AUTO: 30.7 PG (ref 26.8–34.3)
MCHC RBC AUTO-ENTMCNC: 35.5 G/DL (ref 31.4–37.4)
MCV RBC AUTO: 87 FL (ref 82–98)
MONOCYTES # BLD AUTO: 0.42 THOUSAND/UL (ref 0–1.22)
MONOCYTES NFR BLD: 4 % (ref 4–12)
MV E'TISSUE VEL-LAT: 15 CM/S
MV E'TISSUE VEL-SEP: 13 CM/S
MV PEAK A VEL: 0.72 M/S
MV PEAK E VEL: 72 CM/S
MV STENOSIS PRESSURE HALF TIME: 51 MS
MV VALVE AREA P 1/2 METHOD: 4.31
NEUTROPHILS # BLD MANUAL: 9.8 THOUSAND/UL (ref 1.85–7.62)
NEUTS BAND NFR BLD MANUAL: 6 % (ref 0–8)
NEUTS SEG NFR BLD AUTO: 87 % (ref 43–75)
PHOSPHATE SERPL-MCNC: 1.8 MG/DL (ref 2.7–4.5)
PLATELET # BLD AUTO: 155 THOUSANDS/UL (ref 149–390)
PLATELET BLD QL SMEAR: ADEQUATE
PMV BLD AUTO: 10.6 FL (ref 8.9–12.7)
POTASSIUM SERPL-SCNC: 3.6 MMOL/L (ref 3.5–5.3)
PROCALCITONIN SERPL-MCNC: 5.42 NG/ML
PROT SERPL-MCNC: 5.6 G/DL (ref 6.4–8.4)
RBC # BLD AUTO: 3.61 MILLION/UL (ref 3.88–5.62)
RBC MORPH BLD: NORMAL
RIGHT ATRIUM AREA SYSTOLE A4C: 16.6 CM2
RIGHT VENTRICLE ID DIMENSION: 4.1 CM
SL CV LEFT ATRIUM LENGTH A2C: 3.7 CM
SL CV LV EF: 45
SL CV PED ECHO LEFT VENTRICLE DIASTOLIC VOLUME (MOD BIPLANE) 2D: 116 ML
SL CV PED ECHO LEFT VENTRICLE SYSTOLIC VOLUME (MOD BIPLANE) 2D: 50 ML
SODIUM SERPL-SCNC: 134 MMOL/L (ref 135–147)
T4 FREE SERPL-MCNC: 1.66 NG/DL (ref 0.61–1.12)
TR MAX PG: 18 MMHG
TR PEAK VELOCITY: 2.1 M/S
TRICUSPID ANNULAR PLANE SYSTOLIC EXCURSION: 1.9 CM
TRICUSPID VALVE PEAK REGURGITATION VELOCITY: 2.09 M/S
VANCOMYCIN TROUGH SERPL-MCNC: 12.8 UG/ML (ref 10–20)
WBC # BLD AUTO: 10.54 THOUSAND/UL (ref 4.31–10.16)

## 2024-03-25 PROCEDURE — 82948 REAGENT STRIP/BLOOD GLUCOSE: CPT

## 2024-03-25 PROCEDURE — 99223 1ST HOSP IP/OBS HIGH 75: CPT | Performed by: INTERNAL MEDICINE

## 2024-03-25 PROCEDURE — 99233 SBSQ HOSP IP/OBS HIGH 50: CPT | Performed by: INTERNAL MEDICINE

## 2024-03-25 PROCEDURE — 85007 BL SMEAR W/DIFF WBC COUNT: CPT

## 2024-03-25 PROCEDURE — 87154 CUL TYP ID BLD PTHGN 6+ TRGT: CPT

## 2024-03-25 PROCEDURE — C9113 INJ PANTOPRAZOLE SODIUM, VIA: HCPCS

## 2024-03-25 PROCEDURE — 80076 HEPATIC FUNCTION PANEL: CPT

## 2024-03-25 PROCEDURE — 85027 COMPLETE CBC AUTOMATED: CPT

## 2024-03-25 PROCEDURE — 87040 BLOOD CULTURE FOR BACTERIA: CPT

## 2024-03-25 PROCEDURE — 87186 SC STD MICRODIL/AGAR DIL: CPT

## 2024-03-25 PROCEDURE — 94150 VITAL CAPACITY TEST: CPT

## 2024-03-25 PROCEDURE — 84145 PROCALCITONIN (PCT): CPT

## 2024-03-25 PROCEDURE — 85730 THROMBOPLASTIN TIME PARTIAL: CPT | Performed by: NURSE PRACTITIONER

## 2024-03-25 PROCEDURE — 93306 TTE W/DOPPLER COMPLETE: CPT | Performed by: INTERNAL MEDICINE

## 2024-03-25 PROCEDURE — 80048 BASIC METABOLIC PNL TOTAL CA: CPT | Performed by: NURSE PRACTITIONER

## 2024-03-25 PROCEDURE — 99024 POSTOP FOLLOW-UP VISIT: CPT | Performed by: STUDENT IN AN ORGANIZED HEALTH CARE EDUCATION/TRAINING PROGRAM

## 2024-03-25 PROCEDURE — 94760 N-INVAS EAR/PLS OXIMETRY 1: CPT

## 2024-03-25 PROCEDURE — 84439 ASSAY OF FREE THYROXINE: CPT | Performed by: PHYSICIAN ASSISTANT

## 2024-03-25 PROCEDURE — 83735 ASSAY OF MAGNESIUM: CPT

## 2024-03-25 PROCEDURE — 87147 CULTURE TYPE IMMUNOLOGIC: CPT

## 2024-03-25 PROCEDURE — 94003 VENT MGMT INPAT SUBQ DAY: CPT

## 2024-03-25 PROCEDURE — 80202 ASSAY OF VANCOMYCIN: CPT | Performed by: PHYSICIAN ASSISTANT

## 2024-03-25 PROCEDURE — 85730 THROMBOPLASTIN TIME PARTIAL: CPT

## 2024-03-25 PROCEDURE — 84100 ASSAY OF PHOSPHORUS: CPT

## 2024-03-25 PROCEDURE — C8929 TTE W OR WO FOL WCON,DOPPLER: HCPCS

## 2024-03-25 RX ORDER — CEFAZOLIN SODIUM 2 G/50ML
2000 SOLUTION INTRAVENOUS EVERY 8 HOURS
Status: DISCONTINUED | OUTPATIENT
Start: 2024-03-25 | End: 2024-04-03 | Stop reason: HOSPADM

## 2024-03-25 RX ORDER — SODIUM CHLORIDE 9 MG/ML
10 INJECTION, SOLUTION INTRAVENOUS CONTINUOUS
Status: DISCONTINUED | OUTPATIENT
Start: 2024-03-25 | End: 2024-03-26

## 2024-03-25 RX ORDER — LORAZEPAM 2 MG/ML
1 INJECTION INTRAMUSCULAR ONCE
Status: COMPLETED | OUTPATIENT
Start: 2024-03-25 | End: 2024-03-25

## 2024-03-25 RX ORDER — LORAZEPAM 2 MG/ML
0.5 INJECTION INTRAMUSCULAR ONCE
Status: COMPLETED | OUTPATIENT
Start: 2024-03-25 | End: 2024-03-25

## 2024-03-25 RX ORDER — POTASSIUM CHLORIDE 20MEQ/15ML
20 LIQUID (ML) ORAL ONCE
Status: COMPLETED | OUTPATIENT
Start: 2024-03-25 | End: 2024-03-25

## 2024-03-25 RX ORDER — HYDROMORPHONE HYDROCHLORIDE 2 MG/ML
2 INJECTION, SOLUTION INTRAMUSCULAR; INTRAVENOUS; SUBCUTANEOUS
Status: DISCONTINUED | OUTPATIENT
Start: 2024-03-25 | End: 2024-03-29

## 2024-03-25 RX ORDER — DOCUSATE SODIUM 100 MG/1
100 CAPSULE, LIQUID FILLED ORAL 2 TIMES DAILY
Status: DISCONTINUED | OUTPATIENT
Start: 2024-03-25 | End: 2024-03-26

## 2024-03-25 RX ORDER — AMIODARONE HYDROCHLORIDE 200 MG/1
400 TABLET ORAL 2 TIMES DAILY WITH MEALS
Status: DISCONTINUED | OUTPATIENT
Start: 2024-03-25 | End: 2024-04-03 | Stop reason: HOSPADM

## 2024-03-25 RX ORDER — ACETAMINOPHEN 325 MG/1
650 TABLET ORAL EVERY 6 HOURS PRN
Status: DISCONTINUED | OUTPATIENT
Start: 2024-03-25 | End: 2024-04-03 | Stop reason: HOSPADM

## 2024-03-25 RX ORDER — AMIODARONE HYDROCHLORIDE 200 MG/1
200 TABLET ORAL
Status: DISCONTINUED | OUTPATIENT
Start: 2024-04-05 | End: 2024-04-03 | Stop reason: HOSPADM

## 2024-03-25 RX ADMIN — CHLORHEXIDINE GLUCONATE 0.12% ORAL RINSE 15 ML: 1.2 LIQUID ORAL at 20:37

## 2024-03-25 RX ADMIN — DOCUSATE SODIUM 100 MG: 50 LIQUID ORAL at 08:18

## 2024-03-25 RX ADMIN — VANCOMYCIN HYDROCHLORIDE 1250 MG: 5 INJECTION, POWDER, LYOPHILIZED, FOR SOLUTION INTRAVENOUS at 07:56

## 2024-03-25 RX ADMIN — HYDROMORPHONE HYDROCHLORIDE 1 MG: 1 INJECTION, SOLUTION INTRAMUSCULAR; INTRAVENOUS; SUBCUTANEOUS at 02:53

## 2024-03-25 RX ADMIN — HYDROMORPHONE HYDROCHLORIDE 2 MG: 2 INJECTION, SOLUTION INTRAMUSCULAR; INTRAVENOUS; SUBCUTANEOUS at 19:51

## 2024-03-25 RX ADMIN — SODIUM CHLORIDE 5 UNITS/HR: 9 INJECTION, SOLUTION INTRAVENOUS at 18:39

## 2024-03-25 RX ADMIN — PERFLUTREN 0.6 ML/MIN: 6.52 INJECTION, SUSPENSION INTRAVENOUS at 11:29

## 2024-03-25 RX ADMIN — SODIUM CHLORIDE 10 ML/HR: 0.9 INJECTION, SOLUTION INTRAVENOUS at 16:49

## 2024-03-25 RX ADMIN — CEFAZOLIN SODIUM 2000 MG: 2 SOLUTION INTRAVENOUS at 23:59

## 2024-03-25 RX ADMIN — PRAVASTATIN SODIUM 80 MG: 80 TABLET ORAL at 15:54

## 2024-03-25 RX ADMIN — HYDROMORPHONE HYDROCHLORIDE 2 MG: 2 INJECTION, SOLUTION INTRAMUSCULAR; INTRAVENOUS; SUBCUTANEOUS at 22:03

## 2024-03-25 RX ADMIN — LORAZEPAM 0.5 MG: 2 INJECTION INTRAMUSCULAR; INTRAVENOUS at 15:54

## 2024-03-25 RX ADMIN — AMIODARONE HYDROCHLORIDE 400 MG: 200 TABLET ORAL at 16:42

## 2024-03-25 RX ADMIN — POTASSIUM PHOSPHATE, MONOBASIC POTASSIUM PHOSPHATE, DIBASIC 30 MMOL: 224; 236 INJECTION, SOLUTION, CONCENTRATE INTRAVENOUS at 08:48

## 2024-03-25 RX ADMIN — PROPOFOL 40 MCG/KG/MIN: 10 INJECTION, EMULSION INTRAVENOUS at 02:50

## 2024-03-25 RX ADMIN — HYDROMORPHONE HYDROCHLORIDE 1.5 MG/HR: 10 INJECTION, SOLUTION INTRAMUSCULAR; INTRAVENOUS; SUBCUTANEOUS at 05:28

## 2024-03-25 RX ADMIN — PANTOPRAZOLE SODIUM 40 MG: 40 INJECTION, POWDER, FOR SOLUTION INTRAVENOUS at 08:18

## 2024-03-25 RX ADMIN — HYDROMORPHONE HYDROCHLORIDE 2 MG: 2 INJECTION, SOLUTION INTRAMUSCULAR; INTRAVENOUS; SUBCUTANEOUS at 15:12

## 2024-03-25 RX ADMIN — HEPARIN SODIUM 2000 UNITS: 1000 INJECTION INTRAVENOUS; SUBCUTANEOUS at 00:36

## 2024-03-25 RX ADMIN — ACETAMINOPHEN 650 MG: 325 TABLET, FILM COATED ORAL at 20:36

## 2024-03-25 RX ADMIN — NICOTINE 21 MG: 21 PATCH, EXTENDED RELEASE TRANSDERMAL at 08:18

## 2024-03-25 RX ADMIN — APIXABAN 5 MG: 5 TABLET, FILM COATED ORAL at 17:27

## 2024-03-25 RX ADMIN — LORAZEPAM 1 MG: 2 INJECTION INTRAMUSCULAR; INTRAVENOUS at 23:09

## 2024-03-25 RX ADMIN — CHLORHEXIDINE GLUCONATE 0.12% ORAL RINSE 15 ML: 1.2 LIQUID ORAL at 08:18

## 2024-03-25 RX ADMIN — HEPARIN SODIUM 15.1 UNITS/KG/HR: 10000 INJECTION, SOLUTION INTRAVENOUS at 08:37

## 2024-03-25 RX ADMIN — HYDROMORPHONE HYDROCHLORIDE 2 MG: 2 INJECTION, SOLUTION INTRAMUSCULAR; INTRAVENOUS; SUBCUTANEOUS at 11:49

## 2024-03-25 RX ADMIN — PROPOFOL 40 MCG/KG/MIN: 10 INJECTION, EMULSION INTRAVENOUS at 06:14

## 2024-03-25 RX ADMIN — ONDANSETRON 4 MG: 2 INJECTION INTRAMUSCULAR; INTRAVENOUS at 23:06

## 2024-03-25 RX ADMIN — AMIODARONE HYDROCHLORIDE 0.5 MG/MIN: 50 INJECTION, SOLUTION INTRAVENOUS at 14:16

## 2024-03-25 RX ADMIN — HYDROCORTISONE SODIUM SUCCINATE 50 MG: 100 INJECTION, POWDER, FOR SOLUTION INTRAMUSCULAR; INTRAVENOUS at 05:52

## 2024-03-25 RX ADMIN — DEXMEDETOMIDINE HYDROCHLORIDE 0.7 MCG/KG/HR: 4 INJECTION, SOLUTION INTRAVENOUS at 06:14

## 2024-03-25 RX ADMIN — ACETAMINOPHEN 1000 MG: 10 INJECTION INTRAVENOUS at 00:36

## 2024-03-25 RX ADMIN — POTASSIUM CHLORIDE 20 MEQ: 1.5 SOLUTION ORAL at 00:36

## 2024-03-25 RX ADMIN — POLYETHYLENE GLYCOL 3350 17 G: 17 POWDER, FOR SOLUTION ORAL at 08:18

## 2024-03-25 RX ADMIN — HYDROCORTISONE SODIUM SUCCINATE 50 MG: 100 INJECTION, POWDER, FOR SOLUTION INTRAMUSCULAR; INTRAVENOUS at 11:49

## 2024-03-25 RX ADMIN — HYDROCORTISONE SODIUM SUCCINATE 50 MG: 100 INJECTION, POWDER, FOR SOLUTION INTRAMUSCULAR; INTRAVENOUS at 17:27

## 2024-03-25 RX ADMIN — DEXMEDETOMIDINE HYDROCHLORIDE 0.7 MCG/KG/HR: 4 INJECTION, SOLUTION INTRAVENOUS at 01:30

## 2024-03-25 RX ADMIN — DOCUSATE SODIUM 100 MG: 100 CAPSULE, LIQUID FILLED ORAL at 17:27

## 2024-03-25 RX ADMIN — CEFAZOLIN SODIUM 2000 MG: 2 SOLUTION INTRAVENOUS at 16:42

## 2024-03-25 RX ADMIN — HYDROCORTISONE SODIUM SUCCINATE 50 MG: 100 INJECTION, POWDER, FOR SOLUTION INTRAMUSCULAR; INTRAVENOUS at 23:06

## 2024-03-25 RX ADMIN — NOREPINEPHRINE BITARTRATE 2 MCG/MIN: 1 SOLUTION INTRAVENOUS at 05:30

## 2024-03-25 RX ADMIN — HEPARIN SODIUM 2000 UNITS: 1000 INJECTION INTRAVENOUS; SUBCUTANEOUS at 13:31

## 2024-03-25 NOTE — ASSESSMENT & PLAN NOTE
+ MSSA on BC x2, R knee aspirate as above  3/24 R knee I&D to bone, total knee arthroplasty revision, insertion of stimulan with Dr. Washington    Plan:  Continue Ancef as above   Appreciate ID input

## 2024-03-25 NOTE — ASSESSMENT & PLAN NOTE
3/25 Echo -- EF 45%, mild global hypokinesis   Likely 2/2 septic shock, AF RVR   Appreciate Cardiology recc's   Cont on PO Amio and Eliquis as above   Likely will start BB today

## 2024-03-25 NOTE — PROGRESS NOTES
Orthopedics   Armen Llanos 48 y.o. male MRN: [id number]  Unit/Bed#: UB CAR NON INV      Subjective:  48 y.o.male post operative day 1 right total knee revision arthroplasty with poly exchange, insertion of stimulant and debridement down to and including bone. Patient was extubated just prior to my evaluation. He has some discomfort, but is just happy to be awake and breathing on his own. Family present at bedside. Patient is responsive to verbal cues and is answering appropriately, he is aware that today is Monday, but feels a little confused with the loss of time while intubated. He is able to answer questions and follow commands.     Labs:  0   Lab Value Date/Time    HCT 31.3 (L) 2024 0449    HCT 29.6 (L) 2024 1047    HCT 29 (L) 2024 0951    HCT 34 (L) 2024 0900    HCT 35.2 (L) 2024 0739    HGB 11.1 (L) 2024 0449    HGB 10.1 (L) 2024 1047    HGB 9.9 (L) 2024 0951    HGB 11.6 (L) 2024 0900    HGB 12.0 2024 0739    INR 1.37 (H) 2024 1056    WBC 10.54 (H) 2024 0449    WBC 6.56 2024 1047    WBC 11.62 (H) 2024 0739    ESR 25 (H) 2024 0948    CRP 70.5 (H) 2024 1049       Meds:    Current Facility-Administered Medications:     acetaminophen (Ofirmev) injection 1,000 mg, 1,000 mg, Intravenous, Q6H PRN, NATA Paulino, Stopped at 24 0201    [] amiodarone (CORDARONE) 900 mg in dextrose 5 % 500 mL infusion, 1 mg/min, Intravenous, Continuous, Stopped at 24 1922 **FOLLOWED BY** amiodarone (CORDARONE) 900 mg in dextrose 5 % 500 mL infusion, 0.5 mg/min, Intravenous, Continuous, NATA Paulino, Last Rate: 16.7 mL/hr at 24 0600, 0.5 mg/min at 24 0600    bupivacaine liposomal (EXPAREL) 1.3 % injection 20 mL, 20 mL, Infiltration, Once, Pan Felipe PA-C    chlorhexidine (PERIDEX) 0.12 % oral rinse 15 mL, 15 mL, Mouth/Throat, Q12H DARIN, NATA Paulino, 15 mL at 24 0818     dexmedeTOMIDine (Precedex) 400 mcg in sodium chloride 0.9% 100 mL, 0.1-0.7 mcg/kg/hr, Intravenous, Titrated, NATA Paulino, Last Rate: 9.2 mL/hr at 03/25/24 1059, 0.4 mcg/kg/hr at 03/25/24 1059    docusate (COLACE) oral liquid 100 mg, 100 mg, Per NG Tube, BID, NATA Paulino, 100 mg at 03/25/24 0818    heparin (porcine) 25,000 units in 0.45% NaCl 250 mL infusion (premix), 3-20 Units/kg/hr (Order-Specific), Intravenous, Titrated, NATA Paulino, Last Rate: 13.6 mL/hr at 03/25/24 0837, 15.1 Units/kg/hr at 03/25/24 0837    heparin (porcine) injection 2,000 Units, 2,000 Units, Intravenous, Q6H PRN, NATA Paulino, 2,000 Units at 03/25/24 0036    heparin (porcine) injection 4,000 Units, 4,000 Units, Intravenous, Q6H PRN, NATA Paulino    hydrocortisone (Solu-CORTEF) injection 50 mg, 50 mg, Intravenous, Q6H DARIN, NATA Paulino, 50 mg at 03/25/24 0552    HYDROmorphone (DILAUDID) injection 2 mg, 2 mg, Intravenous, Q3H PRN, NATA Paulino    insulin regular (HumuLIN R,NovoLIN R) 1 Units/mL in sodium chloride 0.9 % 100 mL infusion, 0.3-21 Units/hr, Intravenous, Titrated, NATA Paulino, Last Rate: 3 mL/hr at 03/25/24 0954, 3 Units/hr at 03/25/24 0954    metoprolol (LOPRESSOR) injection 5 mg, 5 mg, Intravenous, Q6H PRN, NATA Paulino    nicotine (NICODERM CQ) 21 mg/24 hr TD 24 hr patch 21 mg, 21 mg, Transdermal, Daily, Pan Felipe PA-C, 21 mg at 03/25/24 0818    norepinephrine (LEVOPHED) 4 mg (STANDARD CONCENTRATION) IV in sodium chloride 0.9% 250 mL, 1-30 mcg/min, Intravenous, Titrated, NATA Paulino, Last Rate: 3.8 mL/hr at 03/25/24 0900, 1 mcg/min at 03/25/24 0900    ondansetron (ZOFRAN) injection 4 mg, 4 mg, Intravenous, Q4H PRN, Pan Felipe PA-C, 4 mg at 03/23/24 1521    pantoprazole (PROTONIX) injection 40 mg, 40 mg, Intravenous, Q24H DARIN, NATA Paulino, 40 mg at 03/25/24 0818    polyethylene glycol (MIRALAX) packet 17  "g, 17 g, Per NG Tube, Daily, NATA Paulino, 17 g at 03/25/24 0818    potassium phosphates 30 mmol in sodium chloride 0.9 % 250 mL infusion, 30 mmol, Intravenous, Once, NATA Paulino, Last Rate: 41.7 mL/hr at 03/25/24 0848, 30 mmol at 03/25/24 0848    pravastatin (PRAVACHOL) tablet 80 mg, 80 mg, Oral, Daily With Dinner, Pan Felipe PA-C, 80 mg at 03/24/24 1703    vancomycin (VANCOCIN) 1,250 mg in sodium chloride 0.9 % 250 mL IVPB, 1,250 mg, Intravenous, Q12H, Pan Felipe PA-C, Last Rate: 166.7 mL/hr at 03/25/24 0756, 1,250 mg at 03/25/24 0756    Blood Culture:   Lab Results   Component Value Date    BLOODCX Staphylococcus aureus (A) 03/23/2024    BLOODCX Staphylococcus aureus (A) 03/23/2024       Wound Culture:   No results found for: \"WOUNDCULT\"    Ins and Outs:  I/O last 24 hours:  In: 7846.8 [I.V.:6266.8; NG/GT:130; IV Piggyback:1450]  Out: 3910 [Urine:3150; Emesis/NG output:750; Blood:10]          Physical Exam:  Vitals:    03/25/24 1105   BP:    Pulse:    Resp:    Temp:    SpO2: 98%     right Lower Extremity extremity:  Dressing intact without drainage  Minimal RLE swelling  TTP diffusely  Sensation intact distally  Motor intact to +FHL/EHL, +ankle dorsi/plantar flexion  2+ DP pulse, symmetric bilaterally  Digits warm and well perfused  Capillary refill < 2 seconds    Assessment: 48 y.o.male post operative day 1 right total knee revision arthroplasty with poly exchange, insertion of stimulant and debridement down to and including bone. EXTUBATED TODAY    Plan:  WBAT to the RLE in Knee Immobilizer (x 48 hours)  OK to DC KI once the quad strength has returned  ID recommendations for antibiotic management  Will likely need 6 weeks of IV abx  Currently on Vancomycin PENDING cultures  DVT prophylaxis  Analgesics  PT/OT  Dispo: Ortho will follow  Patient noted to have acute blood loss anemia due to a drop in Hbg of > 2.0g from preop levels, will monitor vital signs and resuscitate with IV " fluids as needed    Jason Wilder PA-C

## 2024-03-25 NOTE — ASSESSMENT & PLAN NOTE
Home fentanyl patch, motrin, morphine initially held on admission to ICU   Continue with Colace, start Miralax for bowel regimen    Plan:  PRN dilaudid for pain control   Overnight, home Fentanyl patch, PO morphine PRN restarted   Holding home flexeril as it may cause arrhythmias, and he has developed new AF RVR on this admission in the setting of sepsis

## 2024-03-25 NOTE — PROGRESS NOTES
NEPHROLOGY PROGRESS NOTE   Armen Llanos 48 y.o. male MRN: [id number]  Unit/Bed#: -01 Encounter: [id number]      HPI/24hr EVENTS:    -48-year-old male past medical history of hypertension, TBI, opioid use, knee pain.  Presented with knee pain after fall.  Nephrology consult for management of hyponatremia    -Stable sodium level    ASSESSMENT/PLAN:    Acute on chronic hyponatremia  -Has a history of intermittent hyponatremia as far back as 2022 with sodium range of 133-136  -Sodium on admission was glucose corrected to 133, received salt tablets initially on admission  -Noted to have SIRS/shock/sepsis yesterday requiring admission to the ICU for fluid resuscitation and vasopressors  -Workup: A.m. cortisol is not low, serum osmolality 269, uric acid is 5.9, urine sodium is 40, urine osmolality is 434, TSH is 0.39, recommend sending free T4  -Most recent sodium corrects to 135  -He is not on any salt tablets currently, he is off IV fluid, not on tube feeds  -Monitor sodium level  -Continue holding HCTZ    JH (POA)-> resolved  -Baseline creatinine: 0.7-1.0  -Creatinine on admission 1.59, most recent creatinine 0.9  -Etiology: Multifactorial, SIRS/sepsis, hypotension, NSAID use, lisinopril use  -Creatinine stable at baseline, monitor  -Has Leon catheter currently 2.9 L urine output    History of hypertension/shock  -On lisinopril 20 mg daily, HCTZ 25 mg daily, Norvasc 5 mg daily as outpatient  -Currently not on any antihypertensives  -Had septic shock currently on pressors, on stress dose steroids    Septic arthritis with septic shock  -Initial blood cultures growing Staph aureus  -Status post I&D of right knee by orthopedics on 3/24  -ID is consulted on admission, currently vancomycin    Addition medical problems: DM2, history of TBI, A-fib, chronic pain    Discussed with critical care team, discussed with family bedside    SUBJECTIVE:  None offered intubated and sedated    ROS:  Review of Systems   Unable  to perform ROS: Intubated      A complete 10 point review of systems was performed and found to be negative unless otherwise noted above or in the HPI.    OBJECTIVE:  Current Weight: Weight - Scale: 97.8 kg (215 lb 9.8 oz)  Vitals:    03/25/24 0815 03/25/24 0830 03/25/24 0845 03/25/24 0900   BP:    105/63   BP Location:       Pulse: 73 72 74 74   Resp: 18 16 17 17   Temp: 99.5 °F (37.5 °C) (!) 91.6 °F (33.1 °C) 99.7 °F (37.6 °C) 99.9 °F (37.7 °C)   TempSrc:       SpO2: 100% 100% 100% 100%   Weight:           Intake/Output Summary (Last 24 hours) at 3/25/2024 1021  Last data filed at 3/25/2024 0809  Gross per 24 hour   Intake 4996.83 ml   Output 3200 ml   Net 1796.83 ml     Physical Exam  Vitals and nursing note reviewed.   Constitutional:       General: He is not in acute distress.     Appearance: He is ill-appearing. He is not toxic-appearing.      Comments: Intubated and sedated   HENT:      Head: Normocephalic and atraumatic.      Nose: Nose normal.      Mouth/Throat:      Mouth: Mucous membranes are dry.   Eyes:      General: No scleral icterus.  Cardiovascular:      Rate and Rhythm: Normal rate and regular rhythm.      Pulses: Normal pulses.   Pulmonary:      Effort: Pulmonary effort is normal. No respiratory distress.      Breath sounds: No wheezing or rales.      Comments: Mechanical breath sounds  Abdominal:      General: Abdomen is flat.   Genitourinary:     Comments: Leon catheter with clear yellow urine  Musculoskeletal:      Cervical back: Neck supple.      Right lower leg: No edema.      Left lower leg: No edema.   Skin:     General: Skin is warm and dry.      Capillary Refill: Capillary refill takes less than 2 seconds.   Neurological:      Mental Status: He is alert.      Comments: Sedated, does not follow verbal commands          Medications:    Current Facility-Administered Medications:     acetaminophen (Ofirmev) injection 1,000 mg, 1,000 mg, Intravenous, Q6H PRN, NATA Paulino, Stopped  at 24 0201    [] amiodarone (CORDARONE) 900 mg in dextrose 5 % 500 mL infusion, 1 mg/min, Intravenous, Continuous, Stopped at 24 1922 **FOLLOWED BY** amiodarone (CORDARONE) 900 mg in dextrose 5 % 500 mL infusion, 0.5 mg/min, Intravenous, Continuous, NATA Paulino, Last Rate: 16.7 mL/hr at 24 0600, 0.5 mg/min at 24 0600    bupivacaine liposomal (EXPAREL) 1.3 % injection 20 mL, 20 mL, Infiltration, Once, Pan Felipe PA-C    chlorhexidine (PERIDEX) 0.12 % oral rinse 15 mL, 15 mL, Mouth/Throat, Q12H DARIN, NATA Paulino, 15 mL at 24 0818    dexmedeTOMIDine (Precedex) 400 mcg in sodium chloride 0.9% 100 mL, 0.1-0.7 mcg/kg/hr, Intravenous, Titrated, NATA Paulino, Last Rate: 16 mL/hr at 24 0614, 0.7 mcg/kg/hr at 24 0614    docusate (COLACE) oral liquid 100 mg, 100 mg, Per NG Tube, BID, NATA Paulino, 100 mg at 24 0818    heparin (porcine) 25,000 units in 0.45% NaCl 250 mL infusion (premix), 3-20 Units/kg/hr (Order-Specific), Intravenous, Titrated, NATA Paulino, Last Rate: 13.6 mL/hr at 24 0837, 15.1 Units/kg/hr at 24 0837    heparin (porcine) injection 2,000 Units, 2,000 Units, Intravenous, Q6H PRN, NATA Paulino, 2,000 Units at 24 0036    heparin (porcine) injection 4,000 Units, 4,000 Units, Intravenous, Q6H PRN, NATA Paulino    hydrocortisone (Solu-CORTEF) injection 50 mg, 50 mg, Intravenous, Q6H DARIN, NATA Paulino, 50 mg at 24 0552    HYDROmorphone (DILAUDID) 50 mg in sodium chloride 0.9% 50mL drip, 1.5 mg/hr, Intravenous, Continuous, NATA Paulino, Last Rate: 0.75 mL/hr at 24 1014, 0.75 mg/hr at 24 1014    HYDROmorphone (DILAUDID) injection 1 mg, 1 mg, Intravenous, Q1H PRN, NATA Paulino, 1 mg at 24 0253    insulin regular (HumuLIN R,NovoLIN R) 1 Units/mL in sodium chloride 0.9 % 100 mL infusion, 0.3-21 Units/hr, Intravenous,  Titrated, NATA Paulino, Last Rate: 3 mL/hr at 03/25/24 0954, 3 Units/hr at 03/25/24 0954    metoprolol (LOPRESSOR) injection 5 mg, 5 mg, Intravenous, Q6H PRN, NATA Paulino    midazolam (VERSED) injection 2 mg, 2 mg, Intravenous, Q4H PRN, NATA Paulino    nicotine (NICODERM CQ) 21 mg/24 hr TD 24 hr patch 21 mg, 21 mg, Transdermal, Daily, Pan Felipe PA-C, 21 mg at 03/25/24 0818    norepinephrine (LEVOPHED) 4 mg (STANDARD CONCENTRATION) IV in sodium chloride 0.9% 250 mL, 1-30 mcg/min, Intravenous, Titrated, NATA Paulino, Last Rate: 3.8 mL/hr at 03/25/24 0900, 1 mcg/min at 03/25/24 0900    ondansetron (ZOFRAN) injection 4 mg, 4 mg, Intravenous, Q4H PRN, Pan Felipe PA-C, 4 mg at 03/23/24 1521    pantoprazole (PROTONIX) injection 40 mg, 40 mg, Intravenous, Q24H DARIN, NATA Paulino, 40 mg at 03/25/24 0818    polyethylene glycol (MIRALAX) packet 17 g, 17 g, Per NG Tube, Daily, NATA Paulino, 17 g at 03/25/24 0818    potassium phosphates 30 mmol in sodium chloride 0.9 % 250 mL infusion, 30 mmol, Intravenous, Once, NATA Paulino, Last Rate: 41.7 mL/hr at 03/25/24 0848, 30 mmol at 03/25/24 0848    pravastatin (PRAVACHOL) tablet 80 mg, 80 mg, Oral, Daily With Dinner, Pan Felipe PA-C, 80 mg at 03/24/24 1703    propofol (DIPRIVAN) 1000 mg in 100 mL infusion (premix), 5-50 mcg/kg/min, Intravenous, Titrated, NATA Paulino, Last Rate: 9.6 mL/hr at 03/25/24 1015, 17.5 mcg/kg/min at 03/25/24 1015    vancomycin (VANCOCIN) 1,250 mg in sodium chloride 0.9 % 250 mL IVPB, 1,250 mg, Intravenous, Q12H, Pan Felipe PA-C, Last Rate: 166.7 mL/hr at 03/25/24 0756, 1,250 mg at 03/25/24 0756    Laboratory Results:  Results from last 7 days   Lab Units 03/25/24  0449 03/24/24  1955 03/24/24  1701 03/24/24  1520 03/24/24  1047 03/24/24  0951 03/24/24  0900 03/24/24  0739 03/24/24  0050 03/23/24  1814 03/23/24  1521 03/23/24  1215 03/23/24  1210  03/23/24  0356 03/22/24  1432 03/22/24  1051 03/22/24  0948   0000   WBC Thousand/uL 10.54*  --   --   --  6.56  --   --  11.62*  --   --   --   --   --  12.38*  --   --  17.89*  --    HEMOGLOBIN g/dL 11.1*  --   --   --  10.1*  --   --  12.0  --   --   --   --   --  13.7  --   --  17.6*  --    I STAT HEMOGLOBIN g/dl  --   --   --   --   --  9.9* 11.6*  --   --   --   --   --   --   --   --  17.0  --   --    HEMATOCRIT % 31.3*  --   --   --  29.6*  --   --  35.2*  --   --   --   --   --  40.3  --   --  50.0*  --    HEMATOCRIT, ISTAT %  --   --   --   --   --  29* 34*  --   --   --   --   --   --   --   --  50*  --   --    PLATELETS Thousands/uL 155  --   --   --  132*  --   --  168  --   --   --   --   --  180  --   --  245  --    POTASSIUM mmol/L 3.6 3.8 4.0 4.1 3.3*  --   --  4.1 4.2   < >  --    < >  --   --    < >  --  5.3  --    CHLORIDE mmol/L 104 100 98 97 99  --   --  97 97   < >  --    < >  --   --    < >  --  88*  --    CO2 mmol/L 22 20* 17* 19* 18*  --   --  12* 15*   < >  --    < >  --   --    < >  --  19*  --    CO2, I-STAT mmol/L  --   --   --   --   --  16* 20*  --   --   --   --   --   --   --   --  20*  --    < >   BUN mg/dL 18 19 20 18 15  --   --  16 14   < >  --    < >  --   --    < >  --  30*  --    CREATININE mg/dL 0.96 1.07 1.14 1.10 0.93  --   --  1.03 0.94   < >  --    < >  --   --    < >  --  1.59*  --    CALCIUM mg/dL 8.7 8.7 8.5 8.6 8.8  --   --  8.3* 8.3*   < >  --    < >  --   --    < >  --  9.6  --    MAGNESIUM mg/dL 2.0  --   --  1.6* 1.8*  --   --  1.8* 1.5*  --  1.8*  --  1.5*  --    < >  --  1.4*  --    PHOSPHORUS mg/dL 1.8*  --   --   --  3.7  --   --  2.0*  --   --   --   --  1.7*  --   --   --  3.3  --    GLUCOSE, ISTAT mg/dl  --   --   --   --   --  219* 259*  --   --   --   --   --   --   --   --  >600*  --   --     < > = values in this interval not displayed.       I have personally reviewed the blood work as stated above and in my note.  I have personally reviewed chest  x-ray imaging studies.  I have personally reviewed internal medicine, critical care, orthopedics note.

## 2024-03-25 NOTE — PLAN OF CARE
Problem: INFECTION - ADULT  Goal: Absence or prevention of progression during hospitalization  Description: INTERVENTIONS:  - Assess and monitor for signs and symptoms of infection  - Monitor lab/diagnostic results  - Monitor all insertion sites, i.e. indwelling lines, tubes, and drains  - Monitor endotracheal if appropriate and nasal secretions for changes in amount and color  - [place] appropriate cooling/warming therapies per order  - Administer medications as ordered  - Instruct and encourage patient and family to use good hand hygiene technique  - Identify and instruct in appropriate isolation precautions for identified infection/condition  Outcome: Progressing     Problem: SAFETY ADULT  Goal: Patient will remain free of falls  Description: INTERVENTIONS:  - Educate patient/family on patient safety including physical limitations  - Instruct patient to call for assistance with activity   - Consult OT/PT to assist with strengthening/mobility   - Keep Call bell within reach  - Keep bed low and locked with side rails adjusted as appropriate  - Keep care items and personal belongings within reach  - Initiate and maintain comfort rounds  - Make Fall Risk Sign visible to staff  - Offer Toileting every 2 Hours, in advance of need  - Initiate/Maintain 2alarm  - Obtain necessary fall risk management equipment: 2  - Apply yellow socks and bracelet for high fall risk patients  - Consider moving patient to room near nurses station  Outcome: Progressing     Problem: CARDIOVASCULAR - ADULT  Goal: Maintains optimal cardiac output and hemodynamic stability  Description: INTERVENTIONS:  - Monitor I/O, vital signs and rhythm  - Monitor for S/S and trends of decreased cardiac output  - Administer and titrate ordered vasoactive medications to optimize hemodynamic stability  - Assess quality of pulses, skin color and temperature  - Assess for signs of decreased coronary artery perfusion  - Instruct patient to report change in  severity of symptoms  Outcome: Progressing     Problem: RESPIRATORY - ADULT  Goal: Achieves optimal ventilation and oxygenation  Description: INTERVENTIONS:  - Assess for changes in respiratory status  - Assess for changes in mentation and behavior  - Position to facilitate oxygenation and minimize respiratory effort  - Oxygen administered by appropriate delivery if ordered  - Initiate smoking cessation education as indicated  - Encourage broncho-pulmonary hygiene including cough, deep breathe, Incentive Spirometry  - Assess the need for suctioning and aspirate as needed  - Assess and instruct to report SOB or any respiratory difficulty  - Respiratory Therapy support as indicated  Outcome: Progressing     Problem: GASTROINTESTINAL - ADULT  Goal: Minimal or absence of nausea and/or vomiting  Description: INTERVENTIONS:  - Administer IV fluids if ordered to ensure adequate hydration  - Maintain NPO status until nausea and vomiting are resolved  - Nasogastric tube if ordered  - Administer ordered antiemetic medications as needed  - Provide nonpharmacologic comfort measures as appropriate  - Advance diet as tolerated, if ordered  - Consider nutrition services referral to assist patient with adequate nutrition and appropriate food choices  Outcome: Progressing     Problem: METABOLIC, FLUID AND ELECTROLYTES - ADULT  Goal: Electrolytes maintained within normal limits  Description: INTERVENTIONS:  - Monitor labs and assess patient for signs and symptoms of electrolyte imbalances  - Administer electrolyte replacement as ordered  - Monitor response to electrolyte replacements, including repeat lab results as appropriate  - Instruct patient on fluid and nutrition as appropriate  Outcome: Progressing

## 2024-03-25 NOTE — WOUND OSTOMY CARE
Consult Note - Wound   Armen Llanos 48 y.o. male MRN: [id number]  Unit/Bed#: -01 Encounter: [id number]        History and Present Illness:  48 year old male admitted with Acure respiratory failure and hypoxia, afib with RVR MSSA bacteremia, metabolic acidosis,hyponatremia, suicidal ideation.mononeuropathy.osteoarthritis of knee.PMH ;TBI 11/2022. Type 2 DM    Assessment Findings:   Seen in [facility] ICU for initial wound consult for left foot second toe   Pt states he initially had a callous form on his toe, He states with the anatomy of his feet , he wears regular sneakers, and this has formed  a wound on the second toe. He does not have any type of modified footwear  1)Left foot second toe, Dry deep purple  intact eschar edges of wound. Center of wound bed mix of pink and white intact tissue.   Podiatry consulted by provider    Skin care plans:  1-Hydraguard to bilateral sacrum, buttock and heels BID and PRN  2-Elevate heels to offload pressure.  3-Ehob cushion in chair when out of bed.  4-Moisturize skin daily with skin nourishing cream.  5-Turn/reposition q2h or when medically stable for pressure re-distribution on skin.   6-Cleanse left second toe with NSS apply Betadine then 2x2 dsd wrap with duglas change daily and prn soil or dislodgement.    Wounds:  Wound 03/24/24 Toe D2, second Anterior;Left (Active)   Wound Image   03/25/24 1653   Wound Description Beefy red;Black;Edema 03/25/24 1653   Shaina-wound Assessment Denuded;Erythema 03/25/24 1653   Wound Length (cm) 1.5 cm 03/25/24 1653   Wound Width (cm) 1 cm 03/25/24 1653   Wound Surface Area (cm^2) 1.5 cm^2 03/25/24 1653   Drainage Amount None 03/25/24 1653   Treatments Cleansed;Site care 03/25/24 1653   Dressing Dry dressing 03/25/24 1653   Dressing Changed New 03/25/24 1653   Patient Tolerance Tolerated well 03/25/24 1653   Dressing Status Clean;Dry;Intact 03/25/24 1653     Call or tigertext with any questions  Wound Care will continue to follow  weekly    Renetta HARDWICKN RN [facility]ON

## 2024-03-25 NOTE — ASSESSMENT & PLAN NOTE
Lab Results   Component Value Date    HGBA1C 12.1 (H) 03/22/2024       Recent Labs     03/25/24  1159 03/25/24  1404 03/25/24  1557 03/25/24  1817   POCGLU 145* 166* 198* 212*         Blood Sugar Average: Last 72 hrs:  (P) 192    POA: Glucose > 600. BHB 1.41. pH 7.35.  Was previously fluid resuscitated and on insulin infusion  Home regimen: Metformin, Glimepiride    Plan:  Hold home regimen  Cont NCC insulin gtt for goal glucose 140-180  Diabetes education when appropriate   CHO controlled diet

## 2024-03-25 NOTE — ASSESSMENT & PLAN NOTE
Patient remained intubated after OR 2/2 hemodynamic instability due tachycardia  Current vent settings: ACVC 16/700/50/6   3/24 ETT advanced 3cm based on CXR   3/25 Extubated to RA    Plan:  Pulm toileting, IS  Goal spo2 > 90%

## 2024-03-25 NOTE — OCCUPATIONAL THERAPY NOTE
Occupational Therapy Cx Note     Patient Name: Armen Llanos  Today's Date: 3/25/2024  Problem List  Principal Problem:    Type 2 diabetes mellitus with ketoacidosis without coma, without long-term current use of insulin ([facility])  Active Problems:    Acute on Chronic Right knee pain    Chronic, continuous use of opioids    Essential hypertension    Septic shock ([facility])    TBI (traumatic brain injury) ([facility])    Metabolic acidosis    Suicidal ideation    Hyponatremia    MSSA bacteremia    Acute respiratory failure with hypoxia ([facility])    A-fib with RVR ([facility])            03/25/24 0858   OT Last Visit   OT Visit Date 03/25/24   Note Type   Note type Cancelled Session   Cancel Reasons Intubated/sedated   Additional Comments Pt currently intubated with sedation, in restraints. Will hold & until medically stable/appropriate to participate in therapy eval. Will continue to follow.       Stephani Phelan, OTR/L

## 2024-03-25 NOTE — CONSULTS
Consult - Cardiology   Armen Llanos 48 y.o. male MRN: [id number]  Unit/Bed#: -01 Encounter: [id number]    Reason For Consult: New AF, MSSA bacteremia   Outpatient Cardiologist: None             ASSESSMENT:  Septic shock 2/2 septic arthritis of R knee   MSSA bacteremia   Acute hypoxic respiratory failure  DM type 2 with DKA/metabolic acidosis on admission   New onset atrial fibrillation   Hypertension   Hx of TBI  Hyponatremia  Chronic opioid use    TTE shows mildly reduced EF 45%, global hypokinesis, normal IVC size. 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     TSH low. Add T4 to AM labs. LFTs WNL. CXR grossly clear.     Plan:  Switch IV to PO amiodarone 400mg BID x 10 days and 200mg QD thereafter. Plan to eventually stop in the outpatient setting.   Switch IV heparin gtt to PO Eliquis 5mg BID  Recommend low dose beta-blocker (I.e.Toprol xl) when BP allows followed by ACEi/ARB/ARNI and SGLT2 inhibitor if affordable.            History of Present Illness:  Armen Llanos is a 48 y.o. male with history of TBI, hypertension, right knee arthroplasty in 2005, DM type II comes in with acute right knee pain status post mechanical fall. He was discovered with hyperglycemia with multiple electrolyte abnormalities. Blood cultures positive on admit for Staph aureus bacteremia and was managed for septic shock and DKA. Was placed on IV insulin drip, antibiotics and nephrology consultation for JH and hyponatremia. Right knee was erythematous and swollen on arrival. Underwent knee aspiration on 3/23/2023 with purulent drainage and taken to the OR 3/24/2024 for I&D and repeat arthroplasty by Ortho. Was found to be severely tachycardic and discovered with new onset atrial fibrillation at that time.  He was also intubated and sedated for airway protection given his hemodynamic instability. He was given IV metoprolol and fluids with heart rates coming down to the 150s. Cardizem drip was attempted but became transiently hypotensive  switched to IV amiodarone with conversion to sinus rhythm. He was started on heparin drip. TTE ordered and pending.     Past Medical History:        Past Medical History:   Diagnosis Date    Diabetes mellitus (HCC)     Hypertension       Past Surgical History:   Procedure Laterality Date    KNEE SURGERY          Allergy:        Allergies   Allergen Reactions    Penicillins Hives       Medications:       Prior to Admission medications    Medication Sig Start Date End Date Taking? Authorizing Provider   amLODIPine (NORVASC) 5 mg tablet Take 5 mg by mouth daily   Yes Historical Provider, MD   b complex vitamins capsule Take 1 capsule by mouth daily   Yes Historical Provider, MD   cyclobenzaprine (FLEXERIL) 10 mg tablet Take 10 mg by mouth 3 (three) times a day as needed   Yes Historical Provider, MD   fentaNYL (DURAGESIC) 75 mcg/hr Place 1 patch on the skin every third day   Yes Historical Provider, MD   glimepiride (AMARYL) 2 mg tablet Take 2 mg by mouth 4 (four) times a day   Yes Historical Provider, MD   ibuprofen (MOTRIN) 800 mg tablet Take 800 mg by mouth every 6 (six) hours as needed for mild pain BID standing, and PRN with morphine if needed   Yes Historical Provider, MD   lisinopril-hydrochlorothiazide (PRINZIDE,ZESTORETIC) 20-25 MG per tablet Take 1 tablet by mouth daily   Yes Historical Provider, MD   metFORMIN (GLUCOPHAGE) 500 mg tablet Take 500 mg by mouth 2 (two) times a day with meals   Yes Historical Provider, MD   Morphine Sulfate ER 15 MG T12A Take 60 mg by mouth every 12 (twelve) hours as needed   Yes Historical Provider, MD   ondansetron (ZOFRAN) 8 mg tablet TAKE 1 TABLET (8 MG) BY MOUTH 3 TIMES PER DAY AS NEEDED 4/11/23  Yes Historical Provider, MD   risperiDONE (RisperDAL) 3 mg tablet Take 3 mg by mouth daily   Yes Historical Provider, MD   simvastatin (ZOCOR) 40 mg tablet Take 40 mg by mouth daily   Yes Historical Provider, MD   SUMAtriptan (IMITREX) 100 mg tablet Take 100 mg by mouth   Yes  Historical Provider, MD   zolpidem (AMBIEN) 10 mg tablet Take 10 mg by mouth   Yes Historical Provider, MD   docusate sodium (COLACE) 250 MG capsule Take 750 mg by mouth 2 (two) times a day  Patient not taking: Reported on 3/22/2024    Historical Provider, MD   esomeprazole (NexIUM) 20 mg capsule Take 20 mg by mouth 2 (two) times a day before meals  Patient not taking: Reported on 3/22/2024    Historical Provider, MD   clonazePAM (KlonoPIN) 1 mg tablet Take 1 mg by mouth 2 (two) times a day  11/15/22  Historical Provider, MD       Family History:     History reviewed. No pertinent family history.     Social History:       Social History     Socioeconomic History    Marital status: /Civil Union     Spouse name: None    Number of children: None    Years of education: None    Highest education level: None   Occupational History    None   Tobacco Use    Smoking status: Never    Smokeless tobacco: Never   Vaping Use    Vaping status: Some Days    Substances: THC, CBD, Flavoring   Substance and Sexual Activity    Alcohol use: Not Currently    Drug use: Yes     Types: Marijuana    Sexual activity: None   Other Topics Concern    None   Social History Narrative    None     Social Determinants of Health     Financial Resource Strain: Not on file   Food Insecurity: No Food Insecurity (3/25/2024)    Hunger Vital Sign     Worried About Running Out of Food in the Last Year: Never true     Ran Out of Food in the Last Year: Never true   Transportation Needs: No Transportation Needs (3/25/2024)    PRAPARE - Transportation     Lack of Transportation (Medical): No     Lack of Transportation (Non-Medical): No   Physical Activity: Not on file   Stress: Not on file   Social Connections: Not on file   Intimate Partner Violence: Not on file   Housing Stability: Low Risk  (3/25/2024)    Housing Stability Vital Sign     Unable to Pay for Housing in the Last Year: No     Number of Places Lived in the Last Year: 1     Unstable Housing  in the Last Year: No       Weights/BMI:    Wt Readings from Last 3 Encounters:   03/25/24 97.5 kg (215 lb)   05/18/23 95.3 kg (210 lb)   04/14/23 95.7 kg (211 lb)   , Body mass index is 25.5 kg/m².      ROS:  14 point ROS negative except as outlined above  Remainder review of systems is negative    Exam:  General: Alert, oriented and in no acute distress, cooperative  Head: Normocephalic, atraumatic.  Eyes: PERRLA. No icterus. Normal Conjunctiva.   Oropharynx: Moist and normal-appearing mucosa  Neck: Supple, symmetrical, trachea midline, JVD not appreciated.   Heart: RRR, no murmur, rub or gallop, S1 & S2 normal   Lungs: Normal air entry, lungs clear to auscultation and no rales, rhonchi or wheezing   Abdomen: Flat, normal findings: bowel sounds normal and soft, non-tender  Lower Limbs:  No pitting edema, 2+ peripheral pulses, capillary refill within normal limits  Musculoskeletal: ROM grossly normal

## 2024-03-25 NOTE — PLAN OF CARE
Problem: PAIN - ADULT  Goal: Verbalizes/displays adequate comfort level or baseline comfort level  Description: Interventions:  - Encourage patient to monitor pain and request assistance  - Assess pain using appropriate pain scale  - Administer analgesics based on type and severity of pain and evaluate response  - Implement non-pharmacological measures as appropriate and evaluate response  - Consider cultural and social influences on pain and pain management  - Notify physician/advanced practitioner if interventions unsuccessful or patient reports new pain  Outcome: Progressing     Problem: INFECTION - ADULT  Goal: Absence or prevention of progression during hospitalization  Description: INTERVENTIONS:  - Assess and monitor for signs and symptoms of infection  - Monitor lab/diagnostic results  - Monitor all insertion sites, i.e. indwelling lines, tubes, and drains  - Monitor endotracheal if appropriate and nasal secretions for changes in amount and color  - [place] appropriate cooling/warming therapies per order  - Administer medications as ordered  - Instruct and encourage patient and family to use good hand hygiene technique  - Identify and instruct in appropriate isolation precautions for identified infection/condition  Outcome: Progressing  Goal: Absence of fever/infection during neutropenic period  Description: INTERVENTIONS:  - Monitor WBC    Outcome: Progressing     Problem: SAFETY ADULT  Goal: Patient will remain free of falls  Description: INTERVENTIONS:  - Educate patient/family on patient safety including physical limitations  - Instruct patient to call for assistance with activity   - Consult OT/PT to assist with strengthening/mobility   - Keep Call bell within reach  - Keep bed low and locked with side rails adjusted as appropriate  - Keep care items and personal belongings within reach  - Initiate and maintain comfort rounds  - Make Fall Risk Sign visible to staff  - Offer Toileting every 2 Hours,  in advance of need  - Initiate/Maintain 2alarm  - Obtain necessary fall risk management equipment: 2  - Apply yellow socks and bracelet for high fall risk patients  - Consider moving patient to room near nurses station  Outcome: Progressing  Goal: Maintain or return to baseline ADL function  Description: INTERVENTIONS:  -  Assess patient's ability to carry out ADLs; assess patient's baseline for ADL function and identify physical deficits which impact ability to perform ADLs (bathing, care of mouth/teeth, toileting, grooming, dressing, etc.)  - Assess/evaluate cause of self-care deficits   - Assess range of motion  - Assess patient's mobility; develop plan if impaired  - Assess patient's need for assistive devices and provide as appropriate  - Encourage maximum independence but intervene and supervise when necessary  - Involve family in performance of ADLs  - Assess for home care needs following discharge   - Consider OT consult to assist with ADL evaluation and planning for discharge  - Provide patient education as appropriate  Outcome: Progressing  Goal: Maintains/Returns to pre admission functional level  Description: INTERVENTIONS:  - Perform AM-PAC 6 Click Basic Mobility/ Daily Activity assessment daily.  - Set and communicate daily mobility goal to care team and patient/family/caregiver.   - Collaborate with rehabilitation services on mobility goals if consulted  - Perform Range of Motion 2 times a day.  - Reposition patient every 1 hours.  - Dangle patient 2 times a day  - Stand patient 2 times a day  - Ambulate patient 2 times a day  - Out of bed to chair 2 times a day   - Out of bed for meals 2 times a day  - Out of bed for toileting  - Record patient progress and toleration of activity level   Outcome: Progressing     Problem: DISCHARGE PLANNING  Goal: Discharge to home or other facility with appropriate resources  Description: INTERVENTIONS:  - Identify barriers to discharge w/patient and caregiver  -  Arrange for needed discharge resources and transportation as appropriate  - Identify discharge learning needs (meds, wound care, etc.)  - Arrange for interpretive services to assist at discharge as needed  - Refer to Case Management Department for coordinating discharge planning if the patient needs post-hospital services based on physician/advanced practitioner order or complex needs related to functional status, cognitive ability, or social support system  Outcome: Progressing     Problem: Knowledge Deficit  Goal: Patient/family/caregiver demonstrates understanding of disease process, treatment plan, medications, and discharge instructions  Description: Complete learning assessment and assess knowledge base.  Interventions:  - Provide teaching at level of understanding  - Provide teaching via preferred learning methods  Outcome: Progressing     Problem: Prexisting or High Potential for Compromised Skin Integrity  Goal: Skin integrity is maintained or improved  Description: INTERVENTIONS:  - Identify patients at risk for skin breakdown  - Assess and monitor skin integrity  - Assess and monitor nutrition and hydration status  - Monitor labs   - Assess for incontinence   - Turn and reposition patient  - Assist with mobility/ambulation  - Relieve pressure over bony prominences  - Avoid friction and shearing  - Provide appropriate hygiene as needed including keeping skin clean and dry  - Evaluate need for skin moisturizer/barrier cream  - Collaborate with interdisciplinary team   - Patient/family teaching  - Consider wound care consult   Outcome: Progressing     Problem: NEUROSENSORY - ADULT  Goal: Achieves stable or improved neurological status  Description: INTERVENTIONS  - Monitor and report changes in neurological status  - Monitor vital signs such as temperature, blood pressure, glucose, and any other labs ordered   - Initiate measures to prevent increased intracranial pressure  - Monitor for seizure activity and  implement precautions if appropriate      Outcome: Progressing  Goal: Remains free of injury related to seizures activity  Description: INTERVENTIONS  - Maintain airway, patient safety  and administer oxygen as ordered  - Monitor patient for seizure activity, document and report duration and description of seizure to physician/advanced practitioner  - If seizure occurs,  ensure patient safety during seizure  - Reorient patient post seizure  - Seizure pads on all 4 side rails  - Instruct patient/family to notify RN of any seizure activity including if an aura is experienced  - Instruct patient/family to call for assistance with activity based on nursing assessment  - Administer anti-seizure medications if ordered    Outcome: Progressing  Goal: Achieves maximal functionality and self care  Description: INTERVENTIONS  - Monitor swallowing and airway patency with patient fatigue and changes in neurological status  - Encourage and assist patient to increase activity and self care.   - Encourage visually impaired, hearing impaired and aphasic patients to use assistive/communication devices  Outcome: Progressing     Problem: CARDIOVASCULAR - ADULT  Goal: Maintains optimal cardiac output and hemodynamic stability  Description: INTERVENTIONS:  - Monitor I/O, vital signs and rhythm  - Monitor for S/S and trends of decreased cardiac output  - Administer and titrate ordered vasoactive medications to optimize hemodynamic stability  - Assess quality of pulses, skin color and temperature  - Assess for signs of decreased coronary artery perfusion  - Instruct patient to report change in severity of symptoms  Outcome: Progressing  Goal: Absence of cardiac dysrhythmias or at baseline rhythm  Description: INTERVENTIONS:  - Continuous cardiac monitoring, vital signs, obtain 12 lead EKG if ordered  - Administer antiarrhythmic and heart rate control medications as ordered  - Monitor electrolytes and administer replacement therapy as  ordered  Outcome: Progressing     Problem: RESPIRATORY - ADULT  Goal: Achieves optimal ventilation and oxygenation  Description: INTERVENTIONS:  - Assess for changes in respiratory status  - Assess for changes in mentation and behavior  - Position to facilitate oxygenation and minimize respiratory effort  - Oxygen administered by appropriate delivery if ordered  - Initiate smoking cessation education as indicated  - Encourage broncho-pulmonary hygiene including cough, deep breathe, Incentive Spirometry  - Assess the need for suctioning and aspirate as needed  - Assess and instruct to report SOB or any respiratory difficulty  - Respiratory Therapy support as indicated  Outcome: Progressing     Problem: GASTROINTESTINAL - ADULT  Goal: Minimal or absence of nausea and/or vomiting  Description: INTERVENTIONS:  - Administer IV fluids if ordered to ensure adequate hydration  - Maintain NPO status until nausea and vomiting are resolved  - Nasogastric tube if ordered  - Administer ordered antiemetic medications as needed  - Provide nonpharmacologic comfort measures as appropriate  - Advance diet as tolerated, if ordered  - Consider nutrition services referral to assist patient with adequate nutrition and appropriate food choices  Outcome: Progressing  Goal: Maintains or returns to baseline bowel function  Description: INTERVENTIONS:  - Assess bowel function  - Encourage oral fluids to ensure adequate hydration  - Administer IV fluids if ordered to ensure adequate hydration  - Administer ordered medications as needed  - Encourage mobilization and activity  - Consider nutritional services referral to assist patient with adequate nutrition and appropriate food choices  Outcome: Progressing  Goal: Maintains adequate nutritional intake  Description: INTERVENTIONS:  - Monitor percentage of each meal consumed  - Identify factors contributing to decreased intake, treat as appropriate  - Assist with meals as needed  - Monitor I&O,  weight, and lab values if indicated  - Obtain nutrition services referral as needed  Outcome: Progressing  Goal: Establish and maintain optimal ostomy function  Description: INTERVENTIONS:  - Assess bowel function  - Encourage oral fluids to ensure adequate hydration  - Administer IV fluids if ordered to ensure adequate hydration   - Administer ordered medications as needed  - Encourage mobilization and activity  - Nutrition services referral to assist patient with appropriate food choices  - Assess stoma site  - Consider wound care consult   Outcome: Progressing  Goal: Oral mucous membranes remain intact  Description: INTERVENTIONS  - Assess oral mucosa and hygiene practices  - Implement preventative oral hygiene regimen  - Implement oral medicated treatments as ordered  - Initiate Nutrition services referral as needed  Outcome: Progressing     Problem: GENITOURINARY - ADULT  Goal: Maintains or returns to baseline urinary function  Description: INTERVENTIONS:  - Assess urinary function  - Encourage oral fluids to ensure adequate hydration if ordered  - Administer IV fluids as ordered to ensure adequate hydration  - Administer ordered medications as needed  - Offer frequent toileting  - Follow urinary retention protocol if ordered  Outcome: Progressing  Goal: Absence of urinary retention  Description: INTERVENTIONS:  - Assess patient’s ability to void and empty bladder  - Monitor I/O  - Bladder scan as needed  - Discuss with physician/AP medications to alleviate retention as needed  - Discuss catheterization for long term situations as appropriate  Outcome: Progressing  Goal: Urinary catheter remains patent  Description: INTERVENTIONS:  - Assess patency of urinary catheter  - If patient has a chronic campa, consider changing catheter if non-functioning  - Follow guidelines for intermittent irrigation of non-functioning urinary catheter  Outcome: Progressing     Problem: METABOLIC, FLUID AND ELECTROLYTES -  ADULT  Goal: Electrolytes maintained within normal limits  Description: INTERVENTIONS:  - Monitor labs and assess patient for signs and symptoms of electrolyte imbalances  - Administer electrolyte replacement as ordered  - Monitor response to electrolyte replacements, including repeat lab results as appropriate  - Instruct patient on fluid and nutrition as appropriate  Outcome: Progressing  Goal: Fluid balance maintained  Description: INTERVENTIONS:  - Monitor labs   - Monitor I/O and WT  - Instruct patient on fluid and nutrition as appropriate  - Assess for signs & symptoms of volume excess or deficit  Outcome: Progressing  Goal: Glucose maintained within target range  Description: INTERVENTIONS:  - Monitor Blood Glucose as ordered  - Assess for signs and symptoms of hyperglycemia and hypoglycemia  - Administer ordered medications to maintain glucose within target range  - Assess nutritional intake and initiate nutrition service referral as needed  Outcome: Progressing     Problem: SKIN/TISSUE INTEGRITY - ADULT  Goal: Skin Integrity remains intact(Skin Breakdown Prevention)  Description: Assess:  -Perform [place] assessment every   -Clean and moisturize skin every   -Inspect skin when repositioning, toileting, and assisting with ADLS  -Assess under medical devices such as  every   -Assess extremities for adequate circulation and sensation     Bed Management:  -Have minimal linens on bed & keep smooth, unwrinkled  -Change linens as needed when moist or perspiring  -Avoid sitting or lying in one position for more than  hours while in bed  -Keep HOB at degrees     Toileting:  -Offer bedside commode  -Assess for incontinence every   -Use incontinent care products after each incontinent episode such as     Activity:  -Mobilize patient  times a day  -Encourage activity and walks on unit  -Encourage or provide ROM exercises   -Turn and reposition patient every  Hours  -Use appropriate equipment to lift or move patient in  bed  -Instruct/ Assist with weight shifting every  when out of bed in chair  -Consider limitation of chair time  hour intervals    Skin Care:  -Avoid use of baby powder, tape, friction and shearing, hot water or constrictive clothing  -Relieve pressure over bony prominences using   -Do not massage red bony areas    Next Steps:  -Teach patient strategies to minimize risks such as    -Consider consults to  interdisciplinary teams such as   Outcome: Progressing  Goal: Incision(s), wounds(s) or drain site(s) healing without S/S of infection  Description: INTERVENTIONS  - Assess and document dressing, incision, wound bed, drain sites and surrounding tissue  - Provide patient and family education  - Perform skin care/dressing changes every   Outcome: Progressing  Goal: Pressure injury heals and does not worsen  Description: Interventions:  - Implement low air loss mattress or specialty surface (Criteria met)  - Apply silicone foam dressing  - Instruct/assist with weight shifting every  minutes when in chair   - Limit chair time to  hour intervals  - Use special pressure reducing interventions such as  when in chair   - Apply fecal or urinary incontinence containment device   - Perform passive or active ROM every   - Turn and reposition patient & offload bony prominences every  hours   - Utilize friction reducing device or surface for transfers   - Consider consults to  interdisciplinary teams such as   - Use incontinent care products after each incontinent episode such as  - Consider nutrition services referral as needed  Outcome: Progressing     Problem: MUSCULOSKELETAL - ADULT  Goal: Maintain or return mobility to safest level of function  Description: INTERVENTIONS:  - Assess patient's ability to carry out ADLs; assess patient's baseline for ADL function and identify physical deficits which impact ability to perform ADLs (bathing, care of mouth/teeth, toileting, grooming, dressing, etc.)  - Assess/evaluate cause of  self-care deficits   - Assess range of motion  - Assess patient's mobility  - Assess patient's need for assistive devices and provide as appropriate  - Encourage maximum independence but intervene and supervise when necessary  - Involve family in performance of ADLs  - Assess for home care needs following discharge   - Consider OT consult to assist with ADL evaluation and planning for discharge  - Provide patient education as appropriate  Outcome: Progressing  Goal: Maintain proper alignment of affected body part  Description: INTERVENTIONS:  - Support, maintain and protect limb and body alignment  - Provide patient/ family with appropriate education  Outcome: Progressing     Problem: Nutrition/Hydration-ADULT  Goal: Nutrient/Hydration intake appropriate for improving, restoring or maintaining nutritional needs  Description: Monitor and assess patient's nutrition/hydration status for malnutrition. Collaborate with interdisciplinary team and initiate plan and interventions as ordered.  Monitor patient's weight and dietary intake as ordered or per policy. Utilize nutrition screening tool and intervene as necessary. Determine patient's food preferences and provide high-protein, high-caloric foods as appropriate.     INTERVENTIONS:  - Monitor oral intake, urinary output, labs, and treatment plans  - Assess nutrition and hydration status and recommend course of action  - Evaluate amount of meals eaten  - Assist patient with eating if necessary   - Allow adequate time for meals  - Recommend/ encourage appropriate diets, oral nutritional supplements, and vitamin/mineral supplements  - Order, calculate, and assess calorie counts as needed  - Recommend, monitor, and adjust tube feedings and TPN/PPN based on assessed needs  - Assess need for intravenous fluids  - Provide specific nutrition/hydration education as appropriate  - Include patient/family/caregiver in decisions related to nutrition  Outcome: Progressing

## 2024-03-25 NOTE — ASSESSMENT & PLAN NOTE
History of R. Knee arthoplasty with replacement at outside facility   Acute pain present 2-3 days prior to admission with erythema and tenderness, warmth at the site   3/22 R knee XR -- Total arthroplasty stable hardware and bony alignment without hardware complication.No acute fracture or dislocation. No joint effusion. No lytic or blastic osseous lesion. Unremarkable soft tissues.  S/p OR 3/24 as above   Home regimen: Fentanyl patch, Motrin, Morphine    Plan:  Ortho following- appreciate further recommendations   PRN dilaudid for pain control

## 2024-03-25 NOTE — CASE MANAGEMENT
Case Management Assessment & Discharge Planning Note    Patient name rAmen Llanos  Location /-01 MRN [id number]  : 1975 Date 3/25/2024       Current Admission Date: 3/22/2024  Current Admission Diagnosis:Type 2 diabetes mellitus with ketoacidosis without coma, without long-term current use of insulin ([facility])   Patient Active Problem List    Diagnosis Date Noted    Mononeuropathy 2024    Osteoarthritis of knee 2024    Acute respiratory failure with hypoxia ([facility]) 2024    A-fib with RVR ([facility]) 2024    Hyponatremia 2024    MSSA bacteremia 2024    Metabolic acidosis 2024    Suicidal ideation 2024    Syncope 2023    Costochondral chest pain 2023    Adrenal nodule ([facility]) 2023    Acute on Chronic Right knee pain 11/15/2022    Chronic, continuous use of opioids 11/15/2022    Type 2 diabetes mellitus with ketoacidosis without coma, without long-term current use of insulin ([facility]) 11/15/2022    Essential hypertension 11/15/2022    Septic shock ([facility]) 11/15/2022    Hypokalemia 11/15/2022    Hypocalcemia 11/15/2022    TBI (traumatic brain injury) ([facility]) 11/15/2022    Chronic pain disorder 2022      LOS (days): 3  Geometric Mean LOS (GMLOS) (days): 3  Days to GMLOS:0.1     OBJECTIVE:    Risk of Unplanned Readmission Score: 29.28         Current admission status: Inpatient       Preferred Pharmacy:   [id number] - [place] - [address]  [address] [address]  Phone: [phone] Fax: [phone]    Primary Care Provider: Deidre Andrea MD    Primary Insurance: [employer]  Secondary Insurance:     ASSESSMENT:  Active Health Care Proxies       Macie Zamudio Health Care Representative - Spouse   Primary Phone: [phone] (Mobile)                   Readmission Root Cause  30 Day Readmission: No    Patient Information  Admitted from:: Home  During Assessment patient was accompanied by:  Spouse  Assessment information provided by:: Spouse  Primary Caregiver: Self  Support Systems: Self, Spouse/significant other, Children  County of Residence: [place]  What city do you live in?: [place]  Home entry access options. Select all that apply.: Stairs  Number of steps to enter home.: 10  Do the steps have railings?: Yes  Type of Current Residence: 2 story home  Upon entering residence, is there a bedroom on the main floor (no further steps)?: No  A bedroom is located on the following floor levels of residence (select all that apply):: 2nd Floor  Upon entering residence, is there a bathroom on the main floor (no further steps)?: Yes  Number of steps to 2nd floor from main floor: 6  Living Arrangements: Lives w/ Spouse/significant other  Is patient a ?: No    Activities of Daily Living Prior to Admission  Functional Status: Independent  Completes ADLs independently?: No  Level of ADL dependence: Assistance  Ambulates independently?: Yes  Does patient use assisted devices?: No  Does patient currently own DME?: No  Does patient have a history of Outpatient Therapy (PT/OT)?: No  Does the patient have a history of Short-Term Rehab?: No  Does patient have a history of HHC?: No  Does patient currently have HHC?: No         Patient Information Continued  Income Source: Employed  Does patient have prescription coverage?: Yes  Does patient receive dialysis treatments?: No  Does patient have a history of substance abuse?: No  Does patient have a history of Mental Health Diagnosis?: Yes  Is patient receiving treatment for mental health?: Yes  Has patient received inpatient treatment related to mental health in the last 2 years?: No         Means of Transportation  Means of Transport to Appts:: Drives Self      Social Determinants of Health (SDOH)      Flowsheet Row Most Recent Value   Housing Stability    In the last 12 months, was there a time when you were not able to pay the mortgage or rent on time? N   In the  last 12 months, how many places have you lived? 1   In the last 12 months, was there a time when you did not have a steady place to sleep or slept in a shelter (including now)? N   Transportation Needs    In the past 12 months, has lack of transportation kept you from medical appointments or from getting medications? no   In the past 12 months, has lack of transportation kept you from meetings, work, or from getting things needed for daily living? No   Food Insecurity    Within the past 12 months, you worried that your food would run out before you got the money to buy more. Never true   Within the past 12 months, the food you bought just didn't last and you didn't have money to get more. Never true   Utilities    In the past 12 months has the electric, gas, oil, or water company threatened to shut off services in your home? No            DISCHARGE DETAILS:    Discharge planning discussed with:: Pt's wife at bedside  Freedom of Choice: Yes     CM contacted family/caregiver?: Yes  Were Treatment Team discharge recommendations reviewed with patient/caregiver?: Yes  Did patient/caregiver verbalize understanding of patient care needs?: Yes  Were patient/caregiver advised of the risks associated with not following Treatment Team discharge recommendations?: Yes    Contacts  Patient Contacts: Wife, Hannah  Relationship to Patient:: Family  Contact Method: In Person  Reason/Outcome: Continuity of Care, Discharge Planning    Requested Home Health Care         Is the patient interested in HHC at discharge?: No    DME Referral Provided  Referral made for DME?: No          Additional Comments: Met with the pt's wife at bedside to review discharge planning needs and CM role. Pt's wife stated that they live in a 2SH with 10 steps to enter the home and 6 more to the second floor bedrooms. Pt's wife stated that the pt has been independent with his ADL care except for showering and ambulates independently. Pt has been working  full time and has been driving. Pt has a hx of  a TBI. Pt has been making suicideal ideations, psych was consulted and stated no inpt psych needs. Pt's wife is requesting hospital bed for the pt's home but understands that the pt may also need rehab at d/c. Pt has a hx at [facility] and would prefer acute options before SNF options are considered. Waiting for PT/OT eval and recommendations. Made pt's wife aware of CM availability and ongoing discharge planning needs.

## 2024-03-25 NOTE — ASSESSMENT & PLAN NOTE
Decreased serum bicarb in the setting of intravascular volume depletion, septic shock   Improving on serial draw s/p large volume crystalloid resuscitation and initiation of vasopressors   Trend UO, serum bicarb closely

## 2024-03-25 NOTE — ASSESSMENT & PLAN NOTE
No previous history of Afib- Likely exacerbated due to critical illness, bacteremia, and stress from OR  3/24 AF RVR to the 190s intra-op, initially HD stable   Persistent AF RVR despite IVF bolus, 150mg Amio bolus, esmolol bolus  Once in the ICU, 5 mg IV Metoprolol with improvement of HR to 150's  Initiated on Cardizem gtt with resultant hypotension, and Cardizem was thus d/c'ed   Amio bolus and gtt with eventual conversion to NSR    Plan:  Transitioned to Amio PO by Cardiology   AC: Eliquis   To f/u as OP with Cards   Continuous tele   Currently NSR on tele

## 2024-03-25 NOTE — ASSESSMENT & PLAN NOTE
History of TBI at 7 years old  Experiences memory issues at baseline  Home regimen: Ambien risperidone    Plan:  Hold home Ambien and risperidone while IP

## 2024-03-25 NOTE — DISCHARGE INSTR - OTHER ORDERS
Skin care plans:  1-Hydraguard to bilateral sacrum, buttock and heels BID and PRN  2-Elevate heels to offload pressure.  3-Ehob cushion in chair when out of bed.  4-Moisturize skin daily with skin nourishing cream.  5-Turn/reposition q2h or when medically stable for pressure re-distribution on skin.     LEFT 2nd TOE  VNA to change dressing,  Adaptic, gauze dry dressing every 2 or 3 days as needed.    Partial weightbearing left foot with postop shoe and crutches.  Follow-up within 1 or 2 weeks with Dr. Toussaint as scheduled.      For care with your PICC Line and IV Ancef outside of your normal appointments, please call [facility] On Call Line at [phone].

## 2024-03-25 NOTE — PROGRESS NOTES
Armen Llanos is a 48 y.o. male who is currently ordered Vancomycin IV with management by the Pharmacy Consult service.  Relevant clinical data and objective / subjective history reviewed.  Vancomycin Assessment:  Indication and Goal AUC/Trough: Bacteremia (goal -600, trough >10), -600, trough >10  Clinical Status: stable  Micro:       Renal Function:  SCr: 0.96 mg/dL  CrCl: 119 mL/min  Renal replacement: Not on dialysis  Days of Therapy: 3  Current Dose: 1250mg IV Q12H  Vancomycin Plan: Patient is currently on vancomycin 1250mg IV Q12h and most recent trough level drawn today with AM labs is 12.8 ug/ml. Based on today's assessment will continue same dose as follows   New Dosing: No change  Estimated AUC: 466 mcg*hr/mL  Estimated Trough: 12.7 mcg/mL  Next Level: With AM labs at 0600 on 3/28/24  Renal Function Monitoring: Daily BMP and UOP  Pharmacy will continue to follow closely for s/sx of nephrotoxicity, infusion reactions and appropriateness of therapy.  BMP and CBC will be ordered per protocol. We will continue to follow the patient’s culture results and clinical progress daily.    Guillermo Kelsey, Pharmacist

## 2024-03-25 NOTE — ASSESSMENT & PLAN NOTE
sNa 123 on admission.   Nephrology following- Hyponatremia likely secondary to physiologic SIADH, OP thiazide use with superimposed volume depletion in the setting of sepsis   Serum osmo- 269, Urine osmo 434, Urine sodium 40    Plan:  Appreciate Nephrology recc's  Improving on serial draws

## 2024-03-25 NOTE — ASSESSMENT & PLAN NOTE
SIRS: Tachycardia, leukocytosis  LA 1.4, procal 3.39  Source: R knee septic joint   BC x2 (3/22 and 3/23) + MSSA   3/23 R knee aspirate + MSSA  3/24 Intra-op R knee sample pending  3/24 R knee I&D to bone, total knee arthroplasty revision, insertion of stimulan with Dr. Washington  Intra-op became acute tachycardia with new AF RVR, remained intubated, received 1200mL IVF intra-op   Received additional 2L IVF in the ICU, with eventual need for levophed, vaso  3/24 RIJ TLC, L radial art line   ScVO2 60  3/24 Hydrocortisone added     Plan:  Abx D4: Ancef D2  SDS D3: Hydrocortisone 50mg q6h  ID following- appreciate recommendations  Trend fever curve  Remains off vasopressors

## 2024-03-25 NOTE — PLAN OF CARE
Problem: PAIN - ADULT  Goal: Verbalizes/displays adequate comfort level or baseline comfort level  Description: Interventions:  - Encourage patient to monitor pain and request assistance  - Assess pain using appropriate pain scale  - Administer analgesics based on type and severity of pain and evaluate response  - Implement non-pharmacological measures as appropriate and evaluate response  - Consider cultural and social influences on pain and pain management  - Notify physician/advanced practitioner if interventions unsuccessful or patient reports new pain  Outcome: Progressing     Problem: INFECTION - ADULT  Goal: Absence or prevention of progression during hospitalization  Description: INTERVENTIONS:  - Assess and monitor for signs and symptoms of infection  - Monitor lab/diagnostic results  - Monitor all insertion sites, i.e. indwelling lines, tubes, and drains  - Monitor endotracheal if appropriate and nasal secretions for changes in amount and color  - [place] appropriate cooling/warming therapies per order  - Administer medications as ordered  - Instruct and encourage patient and family to use good hand hygiene technique  - Identify and instruct in appropriate isolation precautions for identified infection/condition  Outcome: Progressing  Goal: Absence of fever/infection during neutropenic period  Description: INTERVENTIONS:  - Monitor WBC    Outcome: Progressing     Problem: NEUROSENSORY - ADULT  Goal: Achieves stable or improved neurological status  Description: INTERVENTIONS  - Monitor and report changes in neurological status  - Monitor vital signs such as temperature, blood pressure, glucose, and any other labs ordered   - Initiate measures to prevent increased intracranial pressure  - Monitor for seizure activity and implement precautions if appropriate      Outcome: Progressing  Goal: Remains free of injury related to seizures activity  Description: INTERVENTIONS  - Maintain airway, patient safety   and administer oxygen as ordered  - Monitor patient for seizure activity, document and report duration and description of seizure to physician/advanced practitioner  - If seizure occurs,  ensure patient safety during seizure  - Reorient patient post seizure  - Seizure pads on all 4 side rails  - Instruct patient/family to notify RN of any seizure activity including if an aura is experienced  - Instruct patient/family to call for assistance with activity based on nursing assessment  - Administer anti-seizure medications if ordered    Outcome: Progressing  Goal: Achieves maximal functionality and self care  Description: INTERVENTIONS  - Monitor swallowing and airway patency with patient fatigue and changes in neurological status  - Encourage and assist patient to increase activity and self care.   - Encourage visually impaired, hearing impaired and aphasic patients to use assistive/communication devices  Outcome: Progressing     Problem: RESPIRATORY - ADULT  Goal: Achieves optimal ventilation and oxygenation  Description: INTERVENTIONS:  - Assess for changes in respiratory status  - Assess for changes in mentation and behavior  - Position to facilitate oxygenation and minimize respiratory effort  - Oxygen administered by appropriate delivery if ordered  - Initiate smoking cessation education as indicated  - Encourage broncho-pulmonary hygiene including cough, deep breathe, Incentive Spirometry  - Assess the need for suctioning and aspirate as needed  - Assess and instruct to report SOB or any respiratory difficulty  - Respiratory Therapy support as indicated  Outcome: Progressing     Problem: CARDIOVASCULAR - ADULT  Goal: Maintains optimal cardiac output and hemodynamic stability  Description: INTERVENTIONS:  - Monitor I/O, vital signs and rhythm  - Monitor for S/S and trends of decreased cardiac output  - Administer and titrate ordered vasoactive medications to optimize hemodynamic stability  - Assess quality of  pulses, skin color and temperature  - Assess for signs of decreased coronary artery perfusion  - Instruct patient to report change in severity of symptoms  Outcome: Progressing  Goal: Absence of cardiac dysrhythmias or at baseline rhythm  Description: INTERVENTIONS:  - Continuous cardiac monitoring, vital signs, obtain 12 lead EKG if ordered  - Administer antiarrhythmic and heart rate control medications as ordered  - Monitor electrolytes and administer replacement therapy as ordered  Outcome: Progressing     Problem: MUSCULOSKELETAL - ADULT  Goal: Maintain or return mobility to safest level of function  Description: INTERVENTIONS:  - Assess patient's ability to carry out ADLs; assess patient's baseline for ADL function and identify physical deficits which impact ability to perform ADLs (bathing, care of mouth/teeth, toileting, grooming, dressing, etc.)  - Assess/evaluate cause of self-care deficits   - Assess range of motion  - Assess patient's mobility  - Assess patient's need for assistive devices and provide as appropriate  - Encourage maximum independence but intervene and supervise when necessary  - Involve family in performance of ADLs  - Assess for home care needs following discharge   - Consider OT consult to assist with ADL evaluation and planning for discharge  - Provide patient education as appropriate  Outcome: Progressing  Goal: Maintain proper alignment of affected body part  Description: INTERVENTIONS:  - Support, maintain and protect limb and body alignment  - Provide patient/ family with appropriate education  Outcome: Progressing

## 2024-03-25 NOTE — ANESTHESIA POSTPROCEDURE EVALUATION
Post-Op Assessment Note            No anethesia notable event occurred.      Reason for prolonged intubation > 24 hours:  Septic shock          BP      Temp      Pulse     Resp      SpO2

## 2024-03-25 NOTE — QUICK NOTE
Called wife for requested update. We discussed the weaning of his vasopressors and continued sedation for mechanical ventilation. She was appreciative of the update and had no further questions.

## 2024-03-25 NOTE — PROGRESS NOTES
[facility]  Progress Note  Name: Armen Llanos I  MRN: [id number]  Unit/Bed#: -01 I Date of Admission: 3/22/2024   Date of Service: 3/25/2024 I Hospital Day: 3    Assessment/Plan   Septic shock (HCC)  Assessment & Plan  SIRS: Tachycardia, leukocytosis  LA 1.4, procal 3.39  Source: R knee septic joint   BC x2 (3/22 and 3/23) + MSSA   3/23 R knee aspirate + MSSA  3/24 Intra-op R knee sample pending  3/24 R knee I&D to bone, total knee arthroplasty revision, insertion of stimulan with Dr. Washington  Intra-op became acute tachycardia with new AF RVR, remained intubated, received 1200mL IVF intra-op   Received additional 2L IVF in the ICU, with eventual need for levophed, vaso  3/24 RIJ TLC, L radial art line   ScVO2 60  3/24 Hydrocortisone added     Plan:  Abx D3: Vanc D3  SDS D2: Hydrocortisone 50mg q6h  Echo pending  ID following- appreciate recommendations  Trend fever curve  Overnight, vaso weaned off, levo weaned down   Goal MAP <65    Acute respiratory failure with hypoxia (HCC)  Assessment & Plan  Patient remained intubated after OR 2/2 hemodynamic instability due tachycardia  Current vent settings: ACVC 16/700/50/6   3/24 ETT advanced 3cm based on CXR   Initial ABG 7.341/31.8/319/16.8    Plan:  Wean fio2 for goal spo2 > 90%   Sedation: Propofol, Precedex for goal RASS -1 to -2  Pain: Dilaudid 1.5mg/hr   PRN: dilaudid, versed   Daily SBT, SAT  Scheduled oral care, HOB 30 degrees     A-fib with RVR (HCC)  Assessment & Plan  No previous history of Afib- Likely exacerbated due to critical illness, bacteremia, and stress from OR  3/24 AF RVR to the 190s intra-op, initially HD stable   Persistent AF RVR despite IVF bolus, 150mg Amio bolus, esmolol bolus  Once in the ICU, 5 mg IV Metoprolol with improvement of HR to 150's  Initiated on Cardizem gtt with resultant hypotension, and Cardizem was thus d/c'ed   Amio bolus and gtt with eventual conversion to NSR    Plan:  Continues on  Amio @ 0.5mcg per protocol   AC: Heparin ACS  Echo pending    Hyponatremia  Assessment & Plan  sNa 123 on admission.   Nephrology following- Hyponatremia likely secondary to physiologic SIADH, OP thiazide use with superimposed volume depletion in the setting of sepsis   Serum osmo- 269, Urine osmo 434, Urine sodium 40    Plan:  Currently BMP q4h -- consider deescalation in frequency  Appreciate Nephrology recc's    MSSA bacteremia  Assessment & Plan  + MSSA on BC x2, R knee aspirate as above  3/24 R knee I&D to bone, total knee arthroplasty revision, insertion of stimulan with Dr. Washington    Plan:  Continue Vanc as above  ID following  Echo pending     Suicidal ideation  Assessment & Plan  Per wife patient has been mentioning and having suicidal ideation  Psych consulted -- per note 3/22 by Dr. Pineda, patient does not meet criteria for IP stay     Metabolic acidosis  Assessment & Plan  Decreased serum bicarb in the setting of intravascular volume depletion, septic shock   Improving on serial draw s/p large volume crystalloid resuscitation and initiation of vasopressors   Trend UO, serum bicarb closely    TBI (traumatic brain injury) ([facility])  Assessment & Plan  History of TBI at 7 years old  Experiences memory issues at baseline  Home regimen: Ambien risperidone    Plan:  Hold home Ambien and risperidone while on sedation as above     Essential hypertension  Assessment & Plan  Home regimen: Amlodipine, Lisinopril    Plan:  Hold home regimen in the setting of septic shock requiring vasopressors as above     Chronic, continuous use of opioids  Assessment & Plan  Hold home fentanyl patch, motrin, morphine  Continue with Dilaudid drip while intubated as above   Continue with Colace, start Miralax for bowel regimen    Acute on Chronic Right knee pain  Assessment & Plan  History of R. Knee arthoplasty with replacement at outside facility   Acute pain present 2-3 days prior to admission with erythema and tenderness, warmth at  the site   3/22 R knee XR -- Total arthroplasty stable hardware and bony alignment without hardware complication.No acute fracture or dislocation. No joint effusion. No lytic or blastic osseous lesion. Unremarkable soft tissues.  S/p OR 3/24 as above   Home regimen: Fentanyl patch, Motrin, Morphine    Plan:  Ortho following- appreciate further recommendations   Hold home Pain management in the setting of continuous pain control via Dilaudid gtt as above     * Type 2 diabetes mellitus with ketoacidosis without coma, without long-term current use of insulin ([facility])  Assessment & Plan  Lab Results   Component Value Date    HGBA1C 12.1 (H) 03/22/2024       Recent Labs     03/23/24  2147 03/24/24  0043 03/24/24  0608 03/24/24  1036   POCGLU 180* 230* 228* 215*         Blood Sugar Average: Last 72 hrs:  (P) 197    POA: Glucose > 600. BHB 1.41. pH 7.35.  Was previously fluid resuscitated and on insulin infusion  Home regimen: Metformin, Glimepiride    Plan:  Hold home regimen  Cont NCC insulin gtt for goal glucose 140-180  Diabetes education when appropriate              Disposition: Critical care    ICU Core Measures     Vented Patient  VAP Bundle  VAP bundle ordered     A: Assess, Prevent, and Manage Pain Has pain been assessed? Yes  Need for changes to pain regimen? No   B: Both Spontaneous Awakening Trials (SATs) and Spontaneous Breathing Trials (SBTs) Plan to perform spontaneous awakening trial today? Yes   Plan to perform spontaneous breathing trial today? Yes   Obvious barriers to extubation? Yes   C: Choice of Sedation RASS Goal: -1 Drowsy  Need for changes to sedation or analgesia regimen? No   D: Delirium CAM-ICU: Unable to perform secondary to Acute cognitive dysfunction   E: Early Mobility  Plan for early mobility? Yes   F: Family Engagement Plan for family engagement today? Yes       Antibiotic Review: Infectious disease consulted    Review of Invasive Devices:    [place] Plan: Continue for accurate I/O monitoring  "for 48 hours  Central access plan: Medications requiring central line  [place] Plan: Keep arterial line for hemodynamic monitoring    Prophylaxis:  VTE VTE covered by:  heparin (porcine), Intravenous, 15.1 Units/kg/hr at 03/25/24 0400  heparin (porcine), Intravenous, 2,000 Units at 03/25/24 0036  heparin (porcine), Intravenous       Stress Ulcer  covered byesomeprazole (NexIUM) 20 mg capsule [[id number]] (Long-Term Med), pantoprazole (PROTONIX) injection 40 mg [[id number]]         Significant 24hr Events     24hr events: Armen is a 47yo M with a PMH DM2, TBI, HTN who is currently HD 3 for MSSA bacteremia in the setting of septic joint of R knee. Yesterday, he was admitted to the ICU from the OR, where he underwent R knee total knee arthroplasty revision, I&D to bone and insertion of stimulan with Dr. Washington. Intra-op, he developed new AF RVR that ws not amenable to IVF, amio bolus or esmolol bolus. He remained intubated and was brought to the ICU, where he was further fluid resuscitated, and was given IV lopressor, Cardizem gtt with resultant hypotension. Cardizem was switched to Amio, and he was started on levophed and vaso with improvement in BP. Eventually, he converted to NSR on Amio gtt. He remains intubated and sedated on Propofol, Precedex and continuous Dilaudid. He continues on Vancomycin for MSSA bacteremia with pending echo. NCC insulin gtt and Heparin ACS protocols continued. Overnight, his vaso was weaned off and his levo continues to wean down. He wakes fervently with cares, moves ext x4 briskly. He motions to ETT and mouths \"I can't breathe\", however is able to be verbally reoriented and calmed, after which he returns to sleep.      Subjective   Review of Systems   Objective                            Vitals I/O      Most Recent Min/Max in 24hrs   Temp 99.1 °F (37.3 °C) Temp  Min: 98.1 °F (36.7 °C)  Max: 104 °F (40 °C)   Pulse 71 Pulse  Min: 71  Max: 180   Resp 14 Resp  Min: 14  Max: 45   /66 BP  " Min: 85/58  Max: 167/91   O2 Sat 100 % SpO2  Min: 89 %  Max: 100 %      Intake/Output Summary (Last 24 hours) at 3/25/2024 0447  Last data filed at 3/25/2024 0400  Gross per 24 hour   Intake 7458.69 ml   Output 3085 ml   Net 4373.69 ml       Diet NPO    Invasive Monitoring   Arterial Line  Sandra /55  Arterial Line BP  Min: 58/40  Max: 175/76   MAP 71 mmHg  Arterial Line MAP (mmHg)  Min: 46 mmHg  Max: 108 mmHg           Physical Exam   Physical Exam  Vitals and nursing note reviewed.   Skin:     General: Skin is warm.      Capillary Refill: Capillary refill takes less than 2 seconds.      Coloration: Skin is not pale.   HENT:      Head: Normocephalic and atraumatic.      Mouth/Throat:      Lips: Pink.      Mouth: Mucous membranes are moist.   Neck:      Comments: RIJ TLC with intact dressing  Cardiovascular:      Rate and Rhythm: Normal rate and regular rhythm.      Pulses:           Dorsalis pedis pulses are 2+ on the right side and 2+ on the left side.   Musculoskeletal:      Right lower leg: No edema.      Left lower leg: No edema.        Feet:       Comments: + R knee immobilizer    Abdominal: General: Abdomen is flat. Bowel sounds are normal.      Palpations: Abdomen is soft.      Tenderness: There is no abdominal tenderness.   Constitutional:       General: He is sleeping. He is not in acute distress.     Appearance: He is well-developed. He is ill-appearing.      Interventions: He is sedated, intubated and restrained.   Pulmonary:      Effort: He is intubated.      Breath sounds: Normal breath sounds.      Comments: ACVC 16/700/50/6 with -760s, RR 16-20, ppeak 23  Neurological:      Mental Status: He is easily aroused.      Comments: Wakes with cares, moves ext x4 briskly, attempts to communicate with staff    Genitourinary/Anorectal:     Comments: + campa           Diagnostic Studies      EKG: NSR on tele, HR 76  Imaging:   XR chest portable ICU   Final Result      Question mild pulmonary venous  congestion.            Workstation performed: [id number]         XR chest portable ICU   Final Result      No acute cardiopulmonary disease.      Right jugular catheter in mid SVC with no pneumothorax.      Workstation performed: [id number]         STAT CXR ICU   Final Result   Tubes and lines as described without pneumothorax or other complication.   No acute cardiopulmonary disease.            Workstation performed: [id number]         XR chest portable   Final Result      No acute cardiopulmonary disease.            Workstation performed: [id number]         XR knee 1 or 2 vw right   ED Interpretation   No acute fracture identified by me.      Final Result      No acute osseous abnormality.            Workstation performed: [id number]            I have personally reviewed pertinent reports.       Medications:  Scheduled PRN   bupivacaine liposomal, 20 mL, Once  chlorhexidine, 15 mL, Q12H DARIN  docusate, 100 mg, BID  hydrocortisone sodium succinate, 50 mg, Q6H DARIN  nicotine, 21 mg, Daily  pantoprazole, 40 mg, Q24H DARIN  polyethylene glycol, 17 g, Daily  pravastatin, 80 mg, Daily With Dinner  vancomycin, 1,250 mg, Q12H      acetaminophen, 1,000 mg, Q6H PRN  heparin (porcine), 2,000 Units, Q6H PRN  heparin (porcine), 4,000 Units, Q6H PRN  HYDROmorphone, 1 mg, Q1H PRN  metoprolol, 5 mg, Q6H PRN  midazolam, 2 mg, Q4H PRN  ondansetron, 4 mg, Q4H PRN       Continuous    amiodarone (CORDARONE) 900 mg in dextrose 5 % 500 mL infusion, 0.5 mg/min, Last Rate: 0.5 mg/min (03/25/24 0400)  dexmedetomidine, 0.1-0.7 mcg/kg/hr, Last Rate: 0.7 mcg/kg/hr (03/25/24 0400)  heparin (porcine), 3-20 Units/kg/hr (Order-Specific), Last Rate: 15.1 Units/kg/hr (03/25/24 0400)  HYDROmorphone, 1.5 mg/hr, Last Rate: 1.5 mg/hr (03/25/24 0400)  insulin regular (HumuLIN R,NovoLIN R) 1 Units/mL in sodium chloride 0.9 % 100 mL infusion, 0.3-21 Units/hr, Last Rate: 3 Units/hr (03/25/24 0412)  norepinephrine, 1-30 mcg/min, Last Rate: 2 mcg/min (03/25/24  0400)  propofol, 5-50 mcg/kg/min, Last Rate: 40 mcg/kg/min (03/25/24 0400)         Labs:    CBC    Recent Labs     03/24/24  0739 03/24/24  0900 03/24/24  0951 03/24/24  1047   WBC 11.62*  --   --  6.56   HGB 12.0   < > 9.9* 10.1*   HCT 35.2*   < > 29* 29.6*     --   --  132*   BANDSPCT  --   --   --  11*    < > = values in this interval not displayed.     BMP    Recent Labs     03/24/24  1701 03/24/24 1955   SODIUM 130* 130*   K 4.0 3.8   CL 98 100   CO2 17* 20*   AGAP 15* 10   BUN 20 19   CREATININE 1.14 1.07   CALCIUM 8.5 8.7       Coags    Recent Labs     03/24/24  1056 03/24/24  1700 03/25/24  0004   INR 1.37*  --   --    PTT 41* 57* 57*        Additional Electrolytes  Recent Labs     03/24/24  0739 03/24/24  0900 03/24/24  0951 03/24/24  1047 03/24/24  1520   MG 1.8*  --   --  1.8* 1.6*   PHOS 2.0*  --   --  3.7  --    CAIONIZED  --  1.33* 1.30  --   --           Blood Gas    Recent Labs     03/24/24  1701   PHART 7.346*   [id number] 33.6*   PO2ART 155.7*   [id number] 18.0*   BEART -6.8   SOURCE Line, Arterial     Recent Labs     03/24/24  1520 03/24/24  1701   PHVEN 7.298*  --    [id number] 38.4*  --    PO2VEN 30.5*  --    [id number] 18.4*  --    BEVEN -7.4  --    Z2MIDAM 60.5  --    SOURCE  --  Line, Arterial    LFTs  No recent results      Infectious  Recent Labs     03/24/24  1047   PROCALCITONI 3.39*     Glucose  Recent Labs     03/24/24  1047 03/24/24  1520 03/24/24  1701 03/24/24  1955   GLUC 201* 292* 309* 236*               NATA Mar

## 2024-03-26 ENCOUNTER — APPOINTMENT (INPATIENT)
Dept: RADIOLOGY | Facility: HOSPITAL | Age: 49
DRG: 485 | End: 2024-03-26
Payer: COMMERCIAL

## 2024-03-26 ENCOUNTER — APPOINTMENT (INPATIENT)
Dept: MRI IMAGING | Facility: HOSPITAL | Age: 49
DRG: 485 | End: 2024-03-26
Payer: COMMERCIAL

## 2024-03-26 DIAGNOSIS — I42.9 CARDIOMYOPATHY (HCC): Primary | ICD-10-CM

## 2024-03-26 PROBLEM — F41.9 ANXIETY: Status: ACTIVE | Noted: 2024-03-26

## 2024-03-26 LAB
ANION GAP SERPL CALCULATED.3IONS-SCNC: 11 MMOL/L (ref 4–13)
ANION GAP SERPL CALCULATED.3IONS-SCNC: 12 MMOL/L (ref 4–13)
ANION GAP SERPL CALCULATED.3IONS-SCNC: 8 MMOL/L (ref 4–13)
BACTERIA BLD CULT: ABNORMAL
BACTERIA SPEC ANAEROBE CULT: NORMAL
BACTERIA TISS AEROBE CULT: ABNORMAL
BASOPHILS # BLD AUTO: 0.02 THOUSANDS/ÂΜL (ref 0–0.1)
BASOPHILS NFR BLD AUTO: 0 % (ref 0–1)
BUN SERPL-MCNC: 15 MG/DL (ref 5–25)
BUN SERPL-MCNC: 15 MG/DL (ref 5–25)
BUN SERPL-MCNC: 16 MG/DL (ref 5–25)
CALCIUM SERPL-MCNC: 8.6 MG/DL (ref 8.4–10.2)
CALCIUM SERPL-MCNC: 8.8 MG/DL (ref 8.4–10.2)
CALCIUM SERPL-MCNC: 9.2 MG/DL (ref 8.4–10.2)
CHLORIDE SERPL-SCNC: 101 MMOL/L (ref 96–108)
CHLORIDE SERPL-SCNC: 103 MMOL/L (ref 96–108)
CHLORIDE SERPL-SCNC: 106 MMOL/L (ref 96–108)
CO2 SERPL-SCNC: 21 MMOL/L (ref 21–32)
CO2 SERPL-SCNC: 23 MMOL/L (ref 21–32)
CO2 SERPL-SCNC: 23 MMOL/L (ref 21–32)
CREAT SERPL-MCNC: 0.87 MG/DL (ref 0.6–1.3)
CREAT SERPL-MCNC: 0.88 MG/DL (ref 0.6–1.3)
CREAT SERPL-MCNC: 1 MG/DL (ref 0.6–1.3)
EOSINOPHIL # BLD AUTO: 0.01 THOUSAND/ÂΜL (ref 0–0.61)
EOSINOPHIL NFR BLD AUTO: 0 % (ref 0–6)
ERYTHROCYTE [DISTWIDTH] IN BLOOD BY AUTOMATED COUNT: 12.8 % (ref 11.6–15.1)
GFR SERPL CREATININE-BSD FRML MDRD: 100 ML/MIN/1.73SQ M
GFR SERPL CREATININE-BSD FRML MDRD: 101 ML/MIN/1.73SQ M
GFR SERPL CREATININE-BSD FRML MDRD: 87 ML/MIN/1.73SQ M
GLUCOSE SERPL-MCNC: 115 MG/DL (ref 65–140)
GLUCOSE SERPL-MCNC: 127 MG/DL (ref 65–140)
GLUCOSE SERPL-MCNC: 147 MG/DL (ref 65–140)
GLUCOSE SERPL-MCNC: 152 MG/DL (ref 65–140)
GLUCOSE SERPL-MCNC: 157 MG/DL (ref 65–140)
GLUCOSE SERPL-MCNC: 200 MG/DL (ref 65–140)
GLUCOSE SERPL-MCNC: 206 MG/DL (ref 65–140)
GLUCOSE SERPL-MCNC: 207 MG/DL (ref 65–140)
GLUCOSE SERPL-MCNC: 209 MG/DL (ref 65–140)
GLUCOSE SERPL-MCNC: 218 MG/DL (ref 65–140)
GLUCOSE SERPL-MCNC: 224 MG/DL (ref 65–140)
GLUCOSE SERPL-MCNC: 225 MG/DL (ref 65–140)
GLUCOSE SERPL-MCNC: 238 MG/DL (ref 65–140)
GLUCOSE SERPL-MCNC: 241 MG/DL (ref 65–140)
GLUCOSE SERPL-MCNC: 257 MG/DL (ref 65–140)
GRAM STN SPEC: ABNORMAL
HCT VFR BLD AUTO: 31.7 % (ref 36.5–49.3)
HGB BLD-MCNC: 11.3 G/DL (ref 12–17)
IMM GRANULOCYTES # BLD AUTO: 0.1 THOUSAND/UL (ref 0–0.2)
IMM GRANULOCYTES NFR BLD AUTO: 1 % (ref 0–2)
LYMPHOCYTES # BLD AUTO: 0.92 THOUSANDS/ÂΜL (ref 0.6–4.47)
LYMPHOCYTES NFR BLD AUTO: 8 % (ref 14–44)
MAGNESIUM SERPL-MCNC: 1.7 MG/DL (ref 1.9–2.7)
MCH RBC QN AUTO: 30.6 PG (ref 26.8–34.3)
MCHC RBC AUTO-ENTMCNC: 35.6 G/DL (ref 31.4–37.4)
MCV RBC AUTO: 86 FL (ref 82–98)
MONOCYTES # BLD AUTO: 1 THOUSAND/ÂΜL (ref 0.17–1.22)
MONOCYTES NFR BLD AUTO: 9 % (ref 4–12)
NEUTROPHILS # BLD AUTO: 9.07 THOUSANDS/ÂΜL (ref 1.85–7.62)
NEUTS SEG NFR BLD AUTO: 82 % (ref 43–75)
NRBC BLD AUTO-RTO: 0 /100 WBCS
PHOSPHATE SERPL-MCNC: 1.9 MG/DL (ref 2.7–4.5)
PLATELET # BLD AUTO: 228 THOUSANDS/UL (ref 149–390)
PMV BLD AUTO: 10.4 FL (ref 8.9–12.7)
POTASSIUM SERPL-SCNC: 2.7 MMOL/L (ref 3.5–5.3)
POTASSIUM SERPL-SCNC: 2.8 MMOL/L (ref 3.5–5.3)
POTASSIUM SERPL-SCNC: 2.8 MMOL/L (ref 3.5–5.3)
PROCALCITONIN SERPL-MCNC: 2.78 NG/ML
RBC # BLD AUTO: 3.69 MILLION/UL (ref 3.88–5.62)
S AUREUS DNA BLD POS QL NAA+NON-PROBE: DETECTED
SODIUM SERPL-SCNC: 135 MMOL/L (ref 135–147)
SODIUM SERPL-SCNC: 136 MMOL/L (ref 135–147)
SODIUM SERPL-SCNC: 137 MMOL/L (ref 135–147)
WBC # BLD AUTO: 11.12 THOUSAND/UL (ref 4.31–10.16)

## 2024-03-26 PROCEDURE — 83735 ASSAY OF MAGNESIUM: CPT

## 2024-03-26 PROCEDURE — 99222 1ST HOSP IP/OBS MODERATE 55: CPT | Performed by: PODIATRIST

## 2024-03-26 PROCEDURE — 82948 REAGENT STRIP/BLOOD GLUCOSE: CPT

## 2024-03-26 PROCEDURE — 80048 BASIC METABOLIC PNL TOTAL CA: CPT

## 2024-03-26 PROCEDURE — 11042 DBRDMT SUBQ TIS 1ST 20SQCM/<: CPT | Performed by: PODIATRIST

## 2024-03-26 PROCEDURE — 73718 MRI LOWER EXTREMITY W/O DYE: CPT

## 2024-03-26 PROCEDURE — 85025 COMPLETE CBC W/AUTO DIFF WBC: CPT

## 2024-03-26 PROCEDURE — 99232 SBSQ HOSP IP/OBS MODERATE 35: CPT | Performed by: INTERNAL MEDICINE

## 2024-03-26 PROCEDURE — 99024 POSTOP FOLLOW-UP VISIT: CPT | Performed by: PHYSICIAN ASSISTANT

## 2024-03-26 PROCEDURE — 99233 SBSQ HOSP IP/OBS HIGH 50: CPT | Performed by: INTERNAL MEDICINE

## 2024-03-26 PROCEDURE — 99222 1ST HOSP IP/OBS MODERATE 55: CPT | Performed by: STUDENT IN AN ORGANIZED HEALTH CARE EDUCATION/TRAINING PROGRAM

## 2024-03-26 PROCEDURE — 84100 ASSAY OF PHOSPHORUS: CPT

## 2024-03-26 PROCEDURE — 87040 BLOOD CULTURE FOR BACTERIA: CPT | Performed by: INTERNAL MEDICINE

## 2024-03-26 PROCEDURE — 97116 GAIT TRAINING THERAPY: CPT

## 2024-03-26 PROCEDURE — 97163 PT EVAL HIGH COMPLEX 45 MIN: CPT

## 2024-03-26 PROCEDURE — G0426 INPT/ED TELECONSULT50: HCPCS | Performed by: STUDENT IN AN ORGANIZED HEALTH CARE EDUCATION/TRAINING PROGRAM

## 2024-03-26 PROCEDURE — 73630 X-RAY EXAM OF FOOT: CPT

## 2024-03-26 PROCEDURE — 80048 BASIC METABOLIC PNL TOTAL CA: CPT | Performed by: INTERNAL MEDICINE

## 2024-03-26 PROCEDURE — 97167 OT EVAL HIGH COMPLEX 60 MIN: CPT

## 2024-03-26 PROCEDURE — 97530 THERAPEUTIC ACTIVITIES: CPT

## 2024-03-26 PROCEDURE — 84145 PROCALCITONIN (PCT): CPT

## 2024-03-26 RX ORDER — POTASSIUM CHLORIDE 20 MEQ/1
40 TABLET, EXTENDED RELEASE ORAL ONCE
Status: COMPLETED | OUTPATIENT
Start: 2024-03-26 | End: 2024-03-26

## 2024-03-26 RX ORDER — CLONAZEPAM 1 MG/1
1 TABLET ORAL DAILY
Status: DISCONTINUED | OUTPATIENT
Start: 2024-03-26 | End: 2024-04-03 | Stop reason: HOSPADM

## 2024-03-26 RX ORDER — CYCLOBENZAPRINE HCL 10 MG
10 TABLET ORAL 3 TIMES DAILY PRN
Status: DISCONTINUED | OUTPATIENT
Start: 2024-03-26 | End: 2024-03-26

## 2024-03-26 RX ORDER — METOPROLOL SUCCINATE 25 MG/1
25 TABLET, EXTENDED RELEASE ORAL DAILY
Status: DISCONTINUED | OUTPATIENT
Start: 2024-03-26 | End: 2024-04-03 | Stop reason: HOSPADM

## 2024-03-26 RX ORDER — DULOXETIN HYDROCHLORIDE 20 MG/1
20 CAPSULE, DELAYED RELEASE ORAL DAILY
Status: DISCONTINUED | OUTPATIENT
Start: 2024-03-26 | End: 2024-04-03 | Stop reason: HOSPADM

## 2024-03-26 RX ORDER — MAGNESIUM SULFATE HEPTAHYDRATE 40 MG/ML
2 INJECTION, SOLUTION INTRAVENOUS ONCE
Status: COMPLETED | OUTPATIENT
Start: 2024-03-26 | End: 2024-03-26

## 2024-03-26 RX ORDER — PANTOPRAZOLE SODIUM 20 MG/1
20 TABLET, DELAYED RELEASE ORAL
Status: DISCONTINUED | OUTPATIENT
Start: 2024-03-26 | End: 2024-04-03 | Stop reason: HOSPADM

## 2024-03-26 RX ORDER — MORPHINE SULFATE 30 MG/1
60 TABLET, FILM COATED, EXTENDED RELEASE ORAL EVERY 12 HOURS PRN
Status: DISCONTINUED | OUTPATIENT
Start: 2024-03-26 | End: 2024-03-29

## 2024-03-26 RX ORDER — SODIUM CHLORIDE 9 MG/ML
10 INJECTION, SOLUTION INTRAVENOUS CONTINUOUS
Status: DISCONTINUED | OUTPATIENT
Start: 2024-03-26 | End: 2024-03-29

## 2024-03-26 RX ORDER — FENTANYL 75 UG/1
1 PATCH TRANSDERMAL
Status: DISCONTINUED | OUTPATIENT
Start: 2024-03-26 | End: 2024-04-03 | Stop reason: HOSPADM

## 2024-03-26 RX ORDER — FENTANYL 75 UG/1
1 PATCH TRANSDERMAL
Status: DISCONTINUED | OUTPATIENT
Start: 2024-03-26 | End: 2024-03-26

## 2024-03-26 RX ORDER — SODIUM CHLORIDE 9 MG/ML
50 INJECTION, SOLUTION INTRAVENOUS CONTINUOUS
Status: DISCONTINUED | OUTPATIENT
Start: 2024-03-26 | End: 2024-03-26

## 2024-03-26 RX ORDER — ALPRAZOLAM 0.5 MG/1
0.5 TABLET ORAL 3 TIMES DAILY PRN
Status: DISCONTINUED | OUTPATIENT
Start: 2024-03-26 | End: 2024-03-26

## 2024-03-26 RX ORDER — POTASSIUM CHLORIDE 14.9 MG/ML
20 INJECTION INTRAVENOUS
Status: DISPENSED | OUTPATIENT
Start: 2024-03-26 | End: 2024-03-26

## 2024-03-26 RX ORDER — DOCUSATE SODIUM 100 MG/1
200 CAPSULE, LIQUID FILLED ORAL
Status: DISCONTINUED | OUTPATIENT
Start: 2024-03-26 | End: 2024-04-03 | Stop reason: HOSPADM

## 2024-03-26 RX ADMIN — DOCUSATE SODIUM 200 MG: 100 CAPSULE, LIQUID FILLED ORAL at 22:57

## 2024-03-26 RX ADMIN — HYDROMORPHONE HYDROCHLORIDE 2 MG: 2 INJECTION, SOLUTION INTRAMUSCULAR; INTRAVENOUS; SUBCUTANEOUS at 01:02

## 2024-03-26 RX ADMIN — CHLORHEXIDINE GLUCONATE 0.12% ORAL RINSE 15 ML: 1.2 LIQUID ORAL at 10:59

## 2024-03-26 RX ADMIN — PRAVASTATIN SODIUM 80 MG: 80 TABLET ORAL at 15:58

## 2024-03-26 RX ADMIN — CHLORHEXIDINE GLUCONATE 0.12% ORAL RINSE 15 ML: 1.2 LIQUID ORAL at 21:08

## 2024-03-26 RX ADMIN — POTASSIUM CHLORIDE 40 MEQ: 1500 TABLET, EXTENDED RELEASE ORAL at 11:12

## 2024-03-26 RX ADMIN — FENTANYL 1 PATCH: 75 PATCH TRANSDERMAL at 00:59

## 2024-03-26 RX ADMIN — SODIUM CHLORIDE 9 UNITS/HR: 9 INJECTION, SOLUTION INTRAVENOUS at 14:00

## 2024-03-26 RX ADMIN — HYDROMORPHONE HYDROCHLORIDE 2 MG: 2 INJECTION, SOLUTION INTRAMUSCULAR; INTRAVENOUS; SUBCUTANEOUS at 15:06

## 2024-03-26 RX ADMIN — ALPRAZOLAM 0.5 MG: 0.5 TABLET ORAL at 07:49

## 2024-03-26 RX ADMIN — ACETAMINOPHEN 650 MG: 325 TABLET, FILM COATED ORAL at 14:53

## 2024-03-26 RX ADMIN — HYDROMORPHONE HYDROCHLORIDE 2 MG: 2 INJECTION, SOLUTION INTRAMUSCULAR; INTRAVENOUS; SUBCUTANEOUS at 11:43

## 2024-03-26 RX ADMIN — PANTOPRAZOLE SODIUM 20 MG: 20 TABLET, DELAYED RELEASE ORAL at 14:55

## 2024-03-26 RX ADMIN — CEFAZOLIN SODIUM 2000 MG: 2 SOLUTION INTRAVENOUS at 08:47

## 2024-03-26 RX ADMIN — POTASSIUM CHLORIDE 40 MEQ: 1500 TABLET, EXTENDED RELEASE ORAL at 15:58

## 2024-03-26 RX ADMIN — CLONAZEPAM 1 MG: 1 TABLET ORAL at 11:12

## 2024-03-26 RX ADMIN — POTASSIUM PHOSPHATE, MONOBASIC POTASSIUM PHOSPHATE, DIBASIC 30 MMOL: 224; 236 INJECTION, SOLUTION, CONCENTRATE INTRAVENOUS at 13:19

## 2024-03-26 RX ADMIN — AMIODARONE HYDROCHLORIDE 400 MG: 200 TABLET ORAL at 15:58

## 2024-03-26 RX ADMIN — HYDROCORTISONE SODIUM SUCCINATE 50 MG: 100 INJECTION, POWDER, FOR SOLUTION INTRAMUSCULAR; INTRAVENOUS at 19:29

## 2024-03-26 RX ADMIN — PANTOPRAZOLE SODIUM 20 MG: 20 TABLET, DELAYED RELEASE ORAL at 07:49

## 2024-03-26 RX ADMIN — METOPROLOL SUCCINATE 25 MG: 25 TABLET, EXTENDED RELEASE ORAL at 10:56

## 2024-03-26 RX ADMIN — ONDANSETRON 4 MG: 2 INJECTION INTRAMUSCULAR; INTRAVENOUS at 08:30

## 2024-03-26 RX ADMIN — HYDROMORPHONE HYDROCHLORIDE 2 MG: 2 INJECTION, SOLUTION INTRAMUSCULAR; INTRAVENOUS; SUBCUTANEOUS at 19:47

## 2024-03-26 RX ADMIN — DULOXETINE HYDROCHLORIDE 20 MG: 20 CAPSULE, DELAYED RELEASE ORAL at 14:53

## 2024-03-26 RX ADMIN — HYDROCORTISONE SODIUM SUCCINATE 50 MG: 100 INJECTION, POWDER, FOR SOLUTION INTRAMUSCULAR; INTRAVENOUS at 06:02

## 2024-03-26 RX ADMIN — RISPERIDONE 3 MG: 1 TABLET, FILM COATED ORAL at 10:56

## 2024-03-26 RX ADMIN — ACETAMINOPHEN 650 MG: 325 TABLET, FILM COATED ORAL at 07:47

## 2024-03-26 RX ADMIN — SODIUM CHLORIDE 10 ML/HR: 0.9 INJECTION, SOLUTION INTRAVENOUS at 16:00

## 2024-03-26 RX ADMIN — POTASSIUM CHLORIDE 20 MEQ: 14.9 INJECTION, SOLUTION INTRAVENOUS at 15:45

## 2024-03-26 RX ADMIN — MAGNESIUM SULFATE HEPTAHYDRATE 2 G: 2 INJECTION, SOLUTION INTRAVENOUS at 13:15

## 2024-03-26 RX ADMIN — APIXABAN 5 MG: 5 TABLET, FILM COATED ORAL at 10:56

## 2024-03-26 RX ADMIN — CEFAZOLIN SODIUM 2000 MG: 2 SOLUTION INTRAVENOUS at 20:04

## 2024-03-26 RX ADMIN — SODIUM CHLORIDE 50 ML/HR: 0.9 INJECTION, SOLUTION INTRAVENOUS at 09:17

## 2024-03-26 RX ADMIN — HYDROMORPHONE HYDROCHLORIDE 2 MG: 2 INJECTION, SOLUTION INTRAMUSCULAR; INTRAVENOUS; SUBCUTANEOUS at 07:49

## 2024-03-26 RX ADMIN — APIXABAN 5 MG: 5 TABLET, FILM COATED ORAL at 19:29

## 2024-03-26 RX ADMIN — POTASSIUM CHLORIDE 40 MEQ: 1500 TABLET, EXTENDED RELEASE ORAL at 22:57

## 2024-03-26 RX ADMIN — MORPHINE SULFATE 60 MG: 30 TABLET, EXTENDED RELEASE ORAL at 14:51

## 2024-03-26 RX ADMIN — POTASSIUM CHLORIDE 40 MEQ: 1500 TABLET, EXTENDED RELEASE ORAL at 20:08

## 2024-03-26 RX ADMIN — ALPRAZOLAM 0.5 MG: 0.5 TABLET ORAL at 01:58

## 2024-03-26 RX ADMIN — AMIODARONE HYDROCHLORIDE 400 MG: 200 TABLET ORAL at 07:49

## 2024-03-26 RX ADMIN — HYDROMORPHONE HYDROCHLORIDE 2 MG: 2 INJECTION, SOLUTION INTRAMUSCULAR; INTRAVENOUS; SUBCUTANEOUS at 04:18

## 2024-03-26 NOTE — PROGRESS NOTES
Critical Care Interval Transfer Note:    Please refer to progress note from earlier today for full details.     Barriers to discharge:   Anxiety and uncontrolled pain -> Psych re-consulted  Outpatient medications not taken as prescribed  Afib ->Cardiology consulted  PO amio load  Start Toprol XL  Eliquis  MSSA bacteremia ->ID consulted  Continue Ancef  Will need PICC when BC negative  POD #2 right total knee revision arthroplasty with poly exchange, insertion of stimulant and debridement down to and including bone ->Ortho consulted     Consults: IP CONSULT TO PSYCHIATRY  IP CONSULT TO ORTHOPEDIC SURGERY  IP CONSULT TO TOXICOLOGY  IP CONSULT TO NEPHROLOGY  IP CONSULT TO INFECTIOUS DISEASES  IP CONSULT TO CASE MANAGEMENT  IP CONSULT TO CARDIOLOGY  IP CONSULT TO PODIATRY  IP CONSULT TO PSYCHIATRY     Discharge Plan: Anticipate discharge in >72 hrs to discharge location to be determined pending rehab evaluations.    PT Recommendations: Home with home health rehabilitation  OT Recommendations: Home with home health rehabilitation    Patient seen and evaluated by Critical Care today and deemed to be appropriate for transfer to Med Surg with Telemetry. Spoke to Dr. Jc from [facility] to accept transfer. Critical care can be contacted via [employer] with any questions or concerns.

## 2024-03-26 NOTE — ASSESSMENT & PLAN NOTE
Patient reports increased anxiety   Has received lorazepam IV x2 with intermittent relief   Reports taking Klonopin at home, but will hold given recent shock with vasopressor use   Add PRN Xanax   Restart home risperdal

## 2024-03-26 NOTE — CONSULTS
[facility] Podiatry - Consultation    Patient Information:   Armen Llanos 48 y.o. male MRN: [id number]  Unit/Bed#: -01 Encounter: [id number]  PCP: Deidre Andrea MD  Date of Admission:  3/22/2024  Date of Consultation: 03/26/24  Requesting Physician: Ronaldo Beltrán DO      ASSESSMENT:    Armen Llanos is a 48 y.o. male with:    Diabetic Joy grade 2 ulceration distal plantar second toe left foot  Uncontrolled type 2 diabetes with peripheral neuropathy  History of TBI, DKA with increased anion gap with metabolic acidosis resolving, electrolyte abnormality resolved, SIRS, JH, suicidal ideation, acute on chronic right knee pain with septic joint, essential hypertension, chronic continuous use of opioids    PLAN:    Initial inpatient consultation and beside pedal examination.  I personally reviewed patient's medical records in epic, ED note, H&P, all consultation reports, pertinent blood work, blood cultures and imaging.  Xray left foot was ordered to evaluate distal 2nd digit -upon my review, the x-ray is suspicious for distal phalanx osteomyelitis, MRI was ordered to rule this out given there is no source of infection for his septic joint would be remiss if I did not rule this out.  Please see debridement note, wound was dressed with Dermagran gauze dry dressing, we appreciate nursing assistance with daily dressing changes.  IV antibiotics per primary team and orthopedics  Today I reviewed proper diabetic footcare, shoe gear, daily foot inspection, patient has uncontrolled diabetes, we discussed his uncontrolled diabetes and his risk of infection and delayed healing.  Thank you for consultation, we will gladly follow along.  Rest of care per primary team.        SUBJECTIVE    History of Present Illness:    Armen Llanos is a 48 y.o. male with past medical history of  TBI, hypertension, chronic opioid use, chronic knee pain who presented to ED 3/22/24 with right knee pain due to  potentially falling  which was a septic joint requiring OR debridement, we have been consulted for care of 2nd toe left which he states wad initially been a callous and is now ulcerated.  He states because of his mallet toe he gets thick skin which he generally peels off, this time it bled when he did that a few weeks back, he has never seen a podiatrist but is on his list.  He is an uncontrolled Type 2 Dm with most recent Hba1c of 12.1 on 3/22/24.    Debridement Procedure:    After  Verbal Consent was obtained and time out performed. Wound located 2nd toe left foot was  excisionally Surgical- Subcutaneous  (CPT: 79537) debrided using scapel. Pre-debridement wound measures 1 cm x 1 cm x 0 cm.  Pain was controlled by Lack of sensation due to Neuropathy . Post debridement measurement 1 cm x 1.5 cm x 0.2 cm for a total of 1.5 square centimeters, with wound appearing Fresh bleeding tissue, viable, and granular. 100%  of wound debrided. Tissue debrided includes depth of Epidermis, Dermis, and Subcutaneous with devitalized tissue being debrided including Hyperkeratosis, Slough, Fibrous, and Necrotic/eschar.  with a small amount of bleeding bleeding was noted during procedure. Hemostasis was achieved using pressure. Pain noted during procedure rated as 0. Pain noted after procedure rated as 0. Procedure was Well tolerated by patient.   .  Review of Systems:    Constitutional: Negative.    HENT: Negative.    Eyes: Negative.    Respiratory: Negative.    Cardiovascular: Negative.    Gastrointestinal: Negative.    Musculoskeletal: Weakness  Skin: Ulceration second toe left foot  Neurological: Known peripheral neuropathy  Psych: Negative.     Past Medical and Surgical History:     Past Medical History:   Diagnosis Date    Diabetes mellitus (HCC)     Hypertension        Past Surgical History:   Procedure Laterality Date    KNEE SURGERY      CO REVJ TOTAL KNEE ARTHRP W/WO ALGRFT 1 COMPONENT Right 3/24/2024    Procedure:  "ARTHROPLASTY KNEE TOTAL REVISION, POLY EXCHANGE;  Surgeon: Tao Washington DO;  Location:  MAIN OR;  Service: Orthopedics       Meds/Allergies:    Medications Prior to Admission   Medication    amLODIPine (NORVASC) 5 mg tablet    b complex vitamins capsule    cyclobenzaprine (FLEXERIL) 10 mg tablet    fentaNYL (DURAGESIC) 75 mcg/hr    glimepiride (AMARYL) 2 mg tablet    ibuprofen (MOTRIN) 800 mg tablet    lisinopril-hydrochlorothiazide (PRINZIDE,ZESTORETIC) 20-25 MG per tablet    metFORMIN (GLUCOPHAGE) 500 mg tablet    Morphine Sulfate ER 15 MG T12A    ondansetron (ZOFRAN) 8 mg tablet    risperiDONE (RisperDAL) 3 mg tablet    simvastatin (ZOCOR) 40 mg tablet    SUMAtriptan (IMITREX) 100 mg tablet    zolpidem (AMBIEN) 10 mg tablet    docusate sodium (COLACE) 250 MG capsule    esomeprazole (NexIUM) 20 mg capsule       Allergies   Allergen Reactions    Penicillins Hives       Social History:     Marital Status: /Civil Union    Substance Use History:   Social History     Substance and Sexual Activity   Alcohol Use Not Currently     Social History     Tobacco Use   Smoking Status Never   Smokeless Tobacco Never     Social History     Substance and Sexual Activity   Drug Use Yes    Types: Marijuana       Family History:    History reviewed. No pertinent family history.      OBJECTIVE:    Vitals:   Blood Pressure: 123/75 (03/26/24 0800)  Pulse: 103 (03/26/24 0800)  Temperature: 98.3 °F (36.8 °C) (03/26/24 0800)  Temp Source: Oral (03/26/24 0800)  Respirations: (!) 25 (03/26/24 0800)  Height: 6' 5\" (195.6 cm) (03/25/24 0805)  Weight - Scale: 99 kg (218 lb 4.1 oz) (03/26/24 0600)  SpO2: 96 % (03/26/24 0800)    Physical Exam    General Appearance: Alert, cooperative, no distress.  HEENT: Head normocephalic, atraumatic, without obvious abnormality.  Heart: Normal rate and rhythm.  Lungs: Non-labored breathing. No respiratory distress.  Abdomen: Without distension.  Psychiatric: AAOx3  Lower Extremity:  Vascular: "   Pedal pulses are present. CRT < 3 seconds at the digits. +2/4 edema noted at bilateral lower extremities. Pedal hair is absent. Skin temperature is WNL bilaterally.    Musculoskeletal:  MMT is 4/5 in all muscle compartments bilaterally. ROM at the 1st MPJ and ankle joint are WNL bilaterally with the leg extended. No Pain on palpation of bilateral foot and ankles. No gross deformities noted.  Positive mallet toes 2 through 5 bilateral    Dermatological:   Lower extremity wounds as noted below:    Wound (1) located distal plantar tip second toe left foot, measures approximately 1 cm x 1 cm x 0 cm  without sinus tracking or undermining. Wound bed appears granular with no drainage.  Deepest tissue noted in base is questionable probe to bone. Malodor is not noticed. Wound edge appears Attached. Shaina-wound skin appears intact. Probe to bone is questionable. Signs of infection are not present at this time.     Neurological:  Gross sensation is absent. Protective sensation is absent. Patient Reports numbness and/or paresthesias.    Clinical Images 03/26/24:  Post debridement    Additional Data:     Lab Results: I have personally reviewed pertinent labs including:    Results from last 7 days   Lab Units 03/26/24  0605   WBC Thousand/uL 11.12*   HEMOGLOBIN g/dL 11.3*   HEMATOCRIT % 31.7*   PLATELETS Thousands/uL 228   NEUTROS PCT % 82*   LYMPHS PCT % 8*   MONOS PCT % 9   EOS PCT % 0     Results from last 7 days   Lab Units 03/26/24  0605 03/25/24  0449 03/24/24  1047 03/24/24  0951   POTASSIUM mmol/L 2.7* 3.6   < >  --    CHLORIDE mmol/L 106 104   < >  --    CO2 mmol/L 23 22   < >  --    CO2, I-STAT mmol/L  --   --   --  16*   BUN mg/dL 15 18   < >  --    CREATININE mg/dL 0.87 0.96   < >  --    CALCIUM mg/dL 8.6 8.7   < >  --    ALK PHOS U/L  --  50  --   --    ALT U/L  --  16  --   --    AST U/L  --  18  --   --    GLUCOSE, ISTAT mg/dl  --   --   --  219*    < > = values in this interval not displayed.     Results from  "last 7 days   Lab Units 03/24/24  1056   INR  1.37*       Cultures: I have personally reviewed pertinent cultures including:    Results from last 7 days   Lab Units 03/25/24  0521 03/24/24  0841 03/24/24  0840 03/24/24  0839 03/23/24  1245 03/23/24  1158 03/22/24  1448   BLOOD CULTURE  Received in Microbiology Lab. Culture in Progress.  --   --   --  Staphylococcus aureus*  Staphylococcus aureus*  --  Staphylococcus aureus*  Staphylococcus aureus*   GRAM STAIN RESULT  Gram positive cocci in clusters* No Polys or Bacteria seen No Polys or Bacteria seen 1+ Disintegrating polys*  2+ Gram positive cocci in pairs* Gram positive cocci in clusters*  Gram positive cocci in clusters* 4+ Polys*  3+ Gram positive cocci in clusters* Gram positive cocci in clusters*  Gram positive cocci in clusters*   BODY FLUID CULTURE, STERILE   --   --   --   --   --  4+ Growth of Staphylococcus aureus*  --      Results from last 7 days   Lab Units 03/24/24  0841 03/24/24  0840 03/24/24  0839   ANAEROBIC CULTURE  No anaerobes isolated No anaerobes isolated No anaerobes isolated       Imaging: I have personally reviewed pertinent films in PACS.  Pathology, and Other Studies: I have personally reviewed pertinent reports.        ** Please Note: Portions of the record may have been created with voice recognition software. Occasional wrong word or \"sound a like\" substitutions may have occurred due to the inherent limitations of voice recognition software. Read the chart carefully and recognize, using context, where substitutions have occurred. **   "

## 2024-03-26 NOTE — ASSESSMENT & PLAN NOTE
3/25 Echo -- EF 45%, mild global hypokinesis   Likely 2/2 septic shock, AF RVR   Appreciate Cardiology recc's   Cont on PO Amio   Eliquis on hold in anticipation of surgery  Continue metoprolol

## 2024-03-26 NOTE — ASSESSMENT & PLAN NOTE
Per wife patient has been mentioning and having suicidal ideation  Psych consulted -- per note 3/22 by Dr. Pineda, patient does not meet criteria for IP stay

## 2024-03-26 NOTE — ASSESSMENT & PLAN NOTE
Patient reports increased anxiety   Has received lorazepam IV x2 with intermittent relief   Reports taking Klonopin at home, but will hold given recent shock with vasopressor use   Add PRN Xanax   Restart home risperdal  Psych consulted

## 2024-03-26 NOTE — PROGRESS NOTES
NEPHROLOGY PROGRESS NOTE   Armen Llanos 48 y.o. male MRN: [id number]  Unit/Bed#: -01 Encounter: [id number]      HPI/24hr EVENTS:    -48-year-old male past medical history of hypertension, TBI, opioid use, knee pain.  Presented with knee pain after fall.  Nephrology consult for management of hyponatremia     -Stable sodium level    ASSESSMENT/PLAN:    Acute on chronic hyponatremia  -Has a history of intermittent hyponatremia as far back as 2022 with sodium range of 133-136  -Sodium on admission was glucose corrected to 133, received salt tablets initially on admission  -Noted to have SIRS/shock/sepsis yesterday requiring admission to the ICU for fluid resuscitation and vasopressors  -Workup: A.m. cortisol is not low, serum osmolality 269, uric acid is 5.9, urine sodium is 40, urine osmolality is 434, TSH is 0.39, recommend sending free T4  -Most recent sodium 137/138  -He is not on any salt tablets currently, he is on 0.9% saline at 50 cc/h per critical care team  -Continue holding HCTZ     JH (POA)-> resolved  -Baseline creatinine: 0.7-1.0  -Creatinine on admission 1.59, most recent creatinine 0.87  -Etiology: Multifactorial, SIRS/sepsis, hypotension, NSAID use, lisinopril use  -Creatinine stable at baseline, monitor  -Leon catheter was discontinued, required straight cath x 1, continue urinary retention protocol     History of hypertension/shock  -On lisinopril 20 mg daily, HCTZ 25 mg daily, Norvasc 5 mg daily as outpatient  -Currently not on any antihypertensives  -Blood pressures have improved, he is off vasopressors     Septic arthritis with septic shock  -Initial blood cultures growing Staph aureus  -Status post I&D of right knee by orthopedics on 3/24  -ID is consulted on admission, currently vancomycin     Electrolyte abnormalities  -Hypokalemia, K is 2.7, repleted per critical care team  -Hypomagnesemia mag is 1.7, receiving 2 g IV x 1  -Hypophosphatemia, receiving K-Phos IV    Addition medical  problems: DM2, history of TBI, A-fib, chronic pain     Discussed with critical care team    SUBJECTIVE:  Patient reports he is having significant anxiety starting earlier this morning continuing to now, he denies any nausea/vomiting reported his appetite is slowly improving    ROS:  Review of Systems   Constitutional:  Positive for fatigue.   Respiratory: Negative.     Cardiovascular: Negative.    Gastrointestinal: Negative.    Psychiatric/Behavioral:  The patient is nervous/anxious.       A complete 10 point review of systems was performed and found to be negative unless otherwise noted above or in the HPI.    OBJECTIVE:  Current Weight: Weight - Scale: 99 kg (218 lb 4.1 oz)  Vitals:    03/26/24 0000 03/26/24 0400 03/26/24 0600 03/26/24 0800   BP: 131/70 132/71 134/81 123/75   BP Location: Right arm Right arm     Pulse: (!) 112 99 104 103   Resp: (!) 27 17 18 (!) 25   Temp: 98.7 °F (37.1 °C) 98 °F (36.7 °C)  98.3 °F (36.8 °C)   TempSrc: Oral Oral  Oral   SpO2: 94% 95% 97% 96%   Weight:   99 kg (218 lb 4.1 oz)    Height:           Intake/Output Summary (Last 24 hours) at 3/26/2024 0903  Last data filed at 3/26/2024 0731  Gross per 24 hour   Intake 1769.14 ml   Output 2575 ml   Net -805.86 ml     Physical Exam  Vitals and nursing note reviewed.   Constitutional:       General: He is not in acute distress.     Appearance: He is not toxic-appearing or diaphoretic.      Comments: Awake sitting in bed eating breakfast   HENT:      Head: Normocephalic and atraumatic.      Nose: Nose normal.      Mouth/Throat:      Mouth: Mucous membranes are dry.   Eyes:      General: No scleral icterus.  Cardiovascular:      Rate and Rhythm: Normal rate.      Pulses: Normal pulses.   Pulmonary:      Effort: Pulmonary effort is normal. No respiratory distress.      Breath sounds: No wheezing or rales.   Abdominal:      General: Abdomen is flat.   Musculoskeletal:      Cervical back: Neck supple.      Right lower leg: No edema.      Left  lower leg: No edema.   Skin:     General: Skin is warm and dry.      Capillary Refill: Capillary refill takes less than 2 seconds.   Neurological:      Mental Status: He is alert and oriented to person, place, and time.          Medications:    Current Facility-Administered Medications:     acetaminophen (TYLENOL) tablet 650 mg, 650 mg, Oral, Q6H PRN, NATA Paulino, 650 mg at 03/26/24 0747    ALPRAZolam (XANAX) tablet 0.5 mg, 0.5 mg, Oral, TID PRN, NATA Zhang, 0.5 mg at 03/26/24 0749    amiodarone tablet 400 mg, 400 mg, Oral, BID With Meals, 400 mg at 03/26/24 0749 **FOLLOWED BY** [START ON 4/5/2024] amiodarone tablet 200 mg, 200 mg, Oral, Daily With Breakfast, Arturo Ferro PA-C    apixaban (ELIQUIS) tablet 5 mg, 5 mg, Oral, BID, Arturo Ferro PA-C, 5 mg at 03/25/24 1727    bupivacaine liposomal (EXPAREL) 1.3 % injection 20 mL, 20 mL, Infiltration, Once, Pan Felipe PA-C    ceFAZolin (ANCEF) IVPB (premix in dextrose) 2,000 mg 50 mL, 2,000 mg, Intravenous, Q8H, Vee Walker MD, Last Rate: 100 mL/hr at 03/26/24 0847, 2,000 mg at 03/26/24 0847    chlorhexidine (PERIDEX) 0.12 % oral rinse 15 mL, 15 mL, Mouth/Throat, Q12H DARIN, NAAT Paulino, 15 mL at 03/25/24 2037    docusate sodium (COLACE) capsule 100 mg, 100 mg, Oral, BID, NATA Paulino, 100 mg at 03/25/24 1727    fentaNYL (DURAGESIC) 75 mcg/hr TD 72 hr patch 1 patch, 1 patch, Transdermal, Q72H, NATA Zhang, 1 patch at 03/26/24 0059    hydrocortisone (Solu-CORTEF) injection 50 mg, 50 mg, Intravenous, Q6H DARIN, NATA Paulino, 50 mg at 03/26/24 0602    HYDROmorphone (DILAUDID) injection 2 mg, 2 mg, Intravenous, Q3H PRN, NATA Paulino, 2 mg at 03/26/24 0749    insulin regular (HumuLIN R,NovoLIN R) 1 Units/mL in sodium chloride 0.9 % 100 mL infusion, 0.3-21 Units/hr, Intravenous, Titrated, NATA Paulino, Last Rate: 4 mL/hr at 03/26/24 0820, 4 Units/hr at 03/26/24 0820    metoprolol (LOPRESSOR)  injection 5 mg, 5 mg, Intravenous, Q6H PRN, NATA Paulino    morphine (MS CONTIN) ER tablet 60 mg, 60 mg, Oral, Q12H PRN, NATA Zhang    nicotine (NICODERM CQ) 21 mg/24 hr TD 24 hr patch 21 mg, 21 mg, Transdermal, Daily, Pan Felipe PA-C, 21 mg at 03/25/24 0818    ondansetron (ZOFRAN) injection 4 mg, 4 mg, Intravenous, Q4H PRN, Pan Felipe PA-C, 4 mg at 03/26/24 0830    pantoprazole (PROTONIX) EC tablet 20 mg, 20 mg, Oral, BID AC, NATA Zhang, 20 mg at 03/26/24 0749    polyethylene glycol (MIRALAX) packet 17 g, 17 g, Per NG Tube, Daily, NATA Paulino, 17 g at 03/25/24 0818    potassium chloride (Klor-Con M20) CR tablet 40 mEq, 40 mEq, Oral, Once, NATA Day    potassium chloride 20 mEq IVPB (premix), 20 mEq, Intravenous, Q2H, NATA Day    pravastatin (PRAVACHOL) tablet 80 mg, 80 mg, Oral, Daily With Dinner, Pan Felipe PA-C, 80 mg at 03/25/24 1554    risperiDONE (RisperDAL) tablet 3 mg, 3 mg, Oral, Daily, NATA Zhang    sodium chloride 0.9 % infusion, 50 mL/hr, Intravenous, Continuous, NATA Day    Laboratory Results:  Results from last 7 days   Lab Units 03/26/24  0605 03/25/24  0449 03/24/24  1955 03/24/24  1701 03/24/24  1520 03/24/24  1047 03/24/24  0951 03/24/24  0900 03/24/24  0739 03/24/24  0050 03/23/24  1814 03/23/24  1521 03/23/24  1215 03/23/24  1210 03/23/24  0356 03/22/24  1432 03/22/24  1051 03/22/24  0948   0000   WBC Thousand/uL 11.12* 10.54*  --   --   --  6.56  --   --  11.62*  --   --   --   --   --  12.38*  --   --  17.89*  --    HEMOGLOBIN g/dL 11.3* 11.1*  --   --   --  10.1*  --   --  12.0  --   --   --   --   --  13.7  --   --  17.6*  --    I STAT HEMOGLOBIN g/dl  --   --   --   --   --   --  9.9* 11.6*  --   --   --   --   --   --   --   --  17.0  --   --    HEMATOCRIT % 31.7* 31.3*  --   --   --  29.6*  --   --  35.2*  --   --   --   --   --  40.3  --   --  50.0*  --    HEMATOCRIT, ISTAT  %  --   --   --   --   --   --  29* 34*  --   --   --   --   --   --   --   --  50*  --   --    PLATELETS Thousands/uL 228 155  --   --   --  132*  --   --  168  --   --   --   --   --  180  --   --  245  --    POTASSIUM mmol/L 2.7* 3.6 3.8 4.0 4.1 3.3*  --   --  4.1 4.2   < >  --    < >  --   --    < >  --  5.3  --    CHLORIDE mmol/L 106 104 100 98 97 99  --   --  97 97   < >  --    < >  --   --    < >  --  88*  --    CO2 mmol/L 23 22 20* 17* 19* 18*  --   --  12* 15*   < >  --    < >  --   --    < >  --  19*  --    CO2, I-STAT mmol/L  --   --   --   --   --   --  16* 20*  --   --   --   --   --   --   --   --  20*  --    < >   BUN mg/dL 15 18 19 20 18 15  --   --  16 14   < >  --    < >  --   --    < >  --  30*  --    CREATININE mg/dL 0.87 0.96 1.07 1.14 1.10 0.93  --   --  1.03 0.94   < >  --    < >  --   --    < >  --  1.59*  --    CALCIUM mg/dL 8.6 8.7 8.7 8.5 8.6 8.8  --   --  8.3* 8.3*   < >  --    < >  --   --    < >  --  9.6  --    MAGNESIUM mg/dL 1.7* 2.0  --   --  1.6* 1.8*  --   --  1.8* 1.5*  --  1.8*  --  1.5*  --    < >  --  1.4*  --    PHOSPHORUS mg/dL 1.9* 1.8*  --   --   --  3.7  --   --  2.0*  --   --   --   --  1.7*  --   --   --  3.3  --    GLUCOSE, ISTAT mg/dl  --   --   --   --   --   --  219* 259*  --   --   --   --   --   --   --   --  >600*  --   --     < > = values in this interval not displayed.       I have personally reviewed the blood work as stated above and in my note.  I have personally reviewed critical care, orthopedics note.

## 2024-03-26 NOTE — CONSULTS
TELEConsultation - Behavioral Health   Armen Llanos 48 y.o. male MRN: [id number]  Unit/Bed#: -01 Encounter: [id number]    REQUIRED DOCUMENTATION:     1. This service was provided via Telemedicine.  2. Provider located in [place].  3. TeleMed provider: Gio Pineda MD  4. Identify all parties in room with patient during tele consult: patient  5. After connecting through televideo, patient was identified by name and date of birth. Parent/patient was then informed that this was being conducted confidentially over secure lines. My office door was closed. Parties in the room listed above as per #4.  Patient acknowledged consent and understanding of privacy and security of the Telemedicine visit. The patient is aware this is a billable service and understands that he can discontinue the visit at any time. I informed the patient that I have reviewed their record in Epic and presented the opportunity for them to ask any questions regarding the visit today.  The patient agreed to participate.       Chief Complaint: SI    History of Present Illness   Physician Requesting Consult: Ronaldo Beltrán DO    Reason for Consult / Principal Problem: SI    Per H and P: Armen Llanos is a 48 y.o. male with a PMH of TBI, hypertension, chronic opioid use, chronic knee pain who presents with right knee pain due to potentially falling.  In the ED was noted that he had been having polydipsia over the past couple of days.  Also per wife at bedside patient had been having suicidal ideation.  X-ray on admission per my review within normal limits consulted Ortho.  Discussed with Ortho.  X-ray per their review also looks okay.  Will officially see tomorrow.  Discussed with psych suicidal ideation and consult was placed awaiting further recs from psych.  Patient placed on non-DKA insulin drip and IV fluids.  Will obtain updated A1c monitor BMP mag Phos and VBG levels.  Will obtain EKG.  Patient meeting SIRS criteria on admission  will evaluate for sepsis.     Please see my previous consult note from 3/22/2024 for additional HPI and information.    Per chart review, since last visit patient required intubation after ORIF secondary to hemodynamic instability and tachycardia.  Treating for septic shock.    Per Primary Team: Patient continues to complain of anxiety, reports that he often uses more of his Klonopin than appropriate.  Psychiatry consulted regarding possible adjustments to medication regimen such as adding an SNRI or adjusting Klonopin dosage.    On evaluation,    Patient was calm and cooperative with interview.  Was extremely pleasant and euthymic and mood with normal range with reactive mood.  He reports that he is no longer feeling terribly anxious but that he does not know when his anxiety will come about.  We discussed the initiation of long-term medication to decrease baseline levels of anxiety in the form of an SNRI.  He is amenable to a trial of Cymbalta 20 mg daily.  We discussed that this could also help with his neuropathic pain.  He reports that he would like to discuss his benzodiazepine regimen with his outpatient provider and we discussed together that that is not something we are going to change while he is in the hospital.  He expressed understanding and requested outpatient behavioral health referrals.  Denies current SI/HI/AVH.  Mental status was much improved from last interview.  He is alert and oriented.        Psychiatric Review Of Systems:  Medication side effects: none  Sleep: no change  Appetite: no change  Hygiene: able to tend to instrumental and basic ADLs  Anxiety Symptoms: As per HPI  Psychotic Symptoms: denies  Depression Symptoms: as per HPI  Manic Symptoms: denies  PTSD Symptoms: denies  Suicidal Thoughts: as per HPI  Homicidal Thoughts: denies    Historical Information   Psychiatric History:   Diagnoses: TBI  Inpatient Hx: none  Outpatient Hx: in the past had a therapist  Medications/Trials:  risperidone, klonopin, ambien    Substance Abuse History:    Social History     Substance and Sexual Activity   Alcohol Use Not Currently     Social History     Substance and Sexual Activity   Drug Use Yes    Types: Marijuana       I discussed substance abuse with the patient and, if pertinent, discussed risks vs benefits of decreasing frequency of use.    Family History:   History reviewed. No pertinent family history.    Social History  Highest education: [employer] for [employer]  Born: [place]  Currently living: With wife and 12-year-old son  Relationships:   Children: 1 son  Occupation: Computer repair  Rest of social history as per below:  Social History     Socioeconomic History    Marital status: /Civil Union     Spouse name: Not on file    Number of children: Not on file    Years of education: Not on file    Highest education level: Not on file   Occupational History    Not on file   Tobacco Use    Smoking status: Never    Smokeless tobacco: Never   Vaping Use    Vaping status: Some Days    Substances: THC, CBD, Flavoring   Substance and Sexual Activity    Alcohol use: Not Currently    Drug use: Yes     Types: Marijuana    Sexual activity: Not on file   Other Topics Concern    Not on file   Social History Narrative    Not on file     Social Determinants of Health     Financial Resource Strain: Not on file   Food Insecurity: No Food Insecurity (3/25/2024)    Hunger Vital Sign     Worried About Running Out of Food in the Last Year: Never true     Ran Out of Food in the Last Year: Never true   Transportation Needs: No Transportation Needs (3/25/2024)    PRAPARE - Transportation     Lack of Transportation (Medical): No     Lack of Transportation (Non-Medical): No   Physical Activity: Not on file   Stress: Not on file   Social Connections: Not on file   Intimate Partner Violence: Not on file   Housing Stability: Low Risk  (3/25/2024)    Housing Stability Vital Sign     Unable to Pay for Housing in the  Last Year: No     Number of Places Lived in the Last Year: 1     Unstable Housing in the Last Year: No       Past Medical History:   Diagnosis Date    Diabetes mellitus (HCC)     Hypertension        Medical Review Of Systems:  Patient denies headache or dizziness.   Patient denies chest pain or palpitations.  Patient denies difficulty breathing or wheezing.  Patient denies nausea, vomiting, or diarrhea.  Patient denies polyuria or polydipsia.  Patient denies weakness or numbness.  Pertinent positives as per HPI.    Meds/Allergies   Allergies   Allergen Reactions    Penicillins Hives     Current Facility-Administered Medications   Medication Dose Route Frequency    acetaminophen (TYLENOL) tablet 650 mg  650 mg Oral Q6H PRN    amiodarone tablet 400 mg  400 mg Oral BID With Meals    Followed by    [START ON 4/5/2024] amiodarone tablet 200 mg  200 mg Oral Daily With Breakfast    apixaban (ELIQUIS) tablet 5 mg  5 mg Oral BID    bupivacaine liposomal (EXPAREL) 1.3 % injection 20 mL  20 mL Infiltration Once    ceFAZolin (ANCEF) IVPB (premix in dextrose) 2,000 mg 50 mL  2,000 mg Intravenous Q8H    chlorhexidine (PERIDEX) 0.12 % oral rinse 15 mL  15 mL Mouth/Throat Q12H DARIN    clonazePAM (KlonoPIN) tablet 1 mg  1 mg Oral Daily    docusate sodium (COLACE) capsule 100 mg  100 mg Oral BID    fentaNYL (DURAGESIC) 75 mcg/hr TD 72 hr patch 1 patch  1 patch Transdermal Q72H    hydrocortisone (Solu-CORTEF) injection 50 mg  50 mg Intravenous Q12H    HYDROmorphone (DILAUDID) injection 2 mg  2 mg Intravenous Q3H PRN    insulin regular (HumuLIN R,NovoLIN R) 1 Units/mL in sodium chloride 0.9 % 100 mL infusion  0.3-21 Units/hr Intravenous Titrated    magnesium sulfate 2 g/50 mL IVPB (premix) 2 g  2 g Intravenous Once    metoprolol (LOPRESSOR) injection 5 mg  5 mg Intravenous Q6H PRN    metoprolol succinate (TOPROL-XL) 24 hr tablet 25 mg  25 mg Oral Daily    morphine (MS CONTIN) ER tablet 60 mg  60 mg Oral Q12H PRN    nicotine (NICODERM  CQ) 21 mg/24 hr TD 24 hr patch 21 mg  21 mg Transdermal Daily    ondansetron (ZOFRAN) injection 4 mg  4 mg Intravenous Q4H PRN    pantoprazole (PROTONIX) EC tablet 20 mg  20 mg Oral BID AC    polyethylene glycol (MIRALAX) packet 17 g  17 g Per NG Tube Daily    potassium chloride (Klor-Con M20) CR tablet 40 mEq  40 mEq Oral Once    potassium phosphates 30 mmol in sodium chloride 0.9 % 250 mL infusion  30 mmol Intravenous Once    pravastatin (PRAVACHOL) tablet 80 mg  80 mg Oral Daily With Dinner    risperiDONE (RisperDAL) tablet 3 mg  3 mg Oral Daily    sodium chloride 0.9 % infusion  50 mL/hr Intravenous Continuous       Current Medications:  Current medications as per above. All medications have been reviewed.   Risks, benefits, alternatives, and possible side effects of patient's psychiatric medications were discussed with patient.     Objective   Vital signs in last 24 hours:  Temp:  [98 °F (36.7 °C)-99.9 °F (37.7 °C)] 98.3 °F (36.8 °C)  HR:  [] 121  Resp:  [12-28] 25  BP: ()/() 150/106  Arterial Line BP: (101-155)/(51-74) 151/74    Mental Status Exam:  Appearance: casually dressed, consistent with stated age  Motor: no psychomotor retardation, no gait abnormalities  Behavior: cooperative, answers questions appropriately  Speech: Normal rate and rhythm  Mood: Okay  Affect: Euthymic, normal range  Thought Process: Linear and logical  Thought Content: denies auditory hallucinations, denies visual hallucinations, denies delusions  Risk Potential: denies suicidal ideation, plan, or intent. Denies homicidal ideation  Sensorium: Oriented to person, place, time, and situation  Cognition: cognitive ability appears intact but was not quantitatively tested  Consciousness: alert and awake  Attention: intact, able to focus without difficulty  Insight: fair  Judgement: Fair      Laboratory results:  I have personally reviewed all pertinent laboratory/tests results.  Recent Results (from the past 48 hour(s))    APTT    Collection Time: 03/24/24 10:56 AM   Result Value Ref Range    PTT 41 (H) 23 - 37 seconds   Protime-INR    Collection Time: 03/24/24 10:56 AM   Result Value Ref Range    Protime 17.4 (H) 11.6 - 14.5 seconds    INR 1.37 (H) 0.84 - 1.19   Blood gas, arterial    Collection Time: 03/24/24 12:17 PM   Result Value Ref Range    pH, Arterial 7.341 (L) 7.350 - 7.450    pCO2, Arterial 31.8 (L) 36.0 - 44.0 mm Hg    pO2, Arterial 319.1 (H) 75.0 - 129.0 mm Hg    HCO3, Arterial 16.8 (L) 22.0 - 28.0 mmol/L    Base Excess, Arterial -7.9 mmol/L    O2 Content, Arterial 16.0 16.0 - 23.0 mL/dL    O2 HGB,Arterial  98.4 (H) 94.0 - 97.0 %    SOURCE Line, Arterial     AC Rate 16     Tidal Volume 650 ml    Inspired Air (FIO2) 100     PEEP 6    Basic metabolic panel    Collection Time: 03/24/24  3:20 PM   Result Value Ref Range    Sodium 129 (L) 135 - 147 mmol/L    Potassium 4.1 3.5 - 5.3 mmol/L    Chloride 97 96 - 108 mmol/L    CO2 19 (L) 21 - 32 mmol/L    ANION GAP 13 4 - 13 mmol/L    BUN 18 5 - 25 mg/dL    Creatinine 1.10 0.60 - 1.30 mg/dL    Glucose 292 (H) 65 - 140 mg/dL    Calcium 8.6 8.4 - 10.2 mg/dL    eGFR 78 ml/min/1.73sq m   Magnesium    Collection Time: 03/24/24  3:20 PM   Result Value Ref Range    Magnesium 1.6 (L) 1.9 - 2.7 mg/dL   Blood gas, venous    Collection Time: 03/24/24  3:20 PM   Result Value Ref Range    pH, Hank 7.298 (L) 7.300 - 7.400    pCO2, Hank 38.4 (L) 42.0 - 50.0 mm Hg    pO2, Hank 30.5 (L) 35.0 - 45.0 mm Hg    HCO3, Hank 18.4 (L) 24 - 30 mmol/L    Base Excess, Hank -7.4 mmol/L    O2 Content, Hank 10.3 ml/dL    O2 HGB, VENOUS 60.5 60.0 - 80.0 %    Vent Type- AC AC     AC Rate 16     Tidal Volume 650 ml    Inspired Air (FIO2) 50    ECG 12 lead    Collection Time: 03/24/24  3:34 PM   Result Value Ref Range    Ventricular Rate 103 BPM    Atrial Rate 103 BPM    OR Interval 150 ms    QRSD Interval 92 ms    QT Interval 304 ms    QTC Interval 398 ms    P Axis 81 degrees    QRS Axis 66 degrees    T Wave Axis 64  degrees   Fingerstick Glucose (POCT)    Collection Time: 03/24/24  4:22 PM   Result Value Ref Range    POC Glucose 259 (H) 65 - 140 mg/dl   APTT    Collection Time: 03/24/24  5:00 PM   Result Value Ref Range    PTT 57 (H) 23 - 37 seconds   Basic metabolic panel    Collection Time: 03/24/24  5:01 PM   Result Value Ref Range    Sodium 130 (L) 135 - 147 mmol/L    Potassium 4.0 3.5 - 5.3 mmol/L    Chloride 98 96 - 108 mmol/L    CO2 17 (L) 21 - 32 mmol/L    ANION GAP 15 (H) 4 - 13 mmol/L    BUN 20 5 - 25 mg/dL    Creatinine 1.14 0.60 - 1.30 mg/dL    Glucose 309 (H) 65 - 140 mg/dL    Calcium 8.5 8.4 - 10.2 mg/dL    eGFR 75 ml/min/1.73sq m   Blood gas, arterial    Collection Time: 03/24/24  5:01 PM   Result Value Ref Range    pH, Arterial 7.346 (L) 7.350 - 7.450    pCO2, Arterial 33.6 (L) 36.0 - 44.0 mm Hg    pO2, Arterial 155.7 (H) 75.0 - 129.0 mm Hg    HCO3, Arterial 18.0 (L) 22.0 - 28.0 mmol/L    Base Excess, Arterial -6.8 mmol/L    O2 Content, Arterial 16.6 16.0 - 23.0 mL/dL    O2 HGB,Arterial  98.0 (H) 94.0 - 97.0 %    SOURCE Line, Arterial     AC Rate 16     Tidal Volume 700 ml    Inspired Air (FIO2) 70     PEEP 6    Fingerstick Glucose (POCT)    Collection Time: 03/24/24  6:08 PM   Result Value Ref Range    POC Glucose 258 (H) 65 - 140 mg/dl   Fingerstick Glucose (POCT)    Collection Time: 03/24/24  7:53 PM   Result Value Ref Range    POC Glucose 237 (H) 65 - 140 mg/dl   Basic metabolic panel    Collection Time: 03/24/24  7:55 PM   Result Value Ref Range    Sodium 130 (L) 135 - 147 mmol/L    Potassium 3.8 3.5 - 5.3 mmol/L    Chloride 100 96 - 108 mmol/L    CO2 20 (L) 21 - 32 mmol/L    ANION GAP 10 4 - 13 mmol/L    BUN 19 5 - 25 mg/dL    Creatinine 1.07 0.60 - 1.30 mg/dL    Glucose 236 (H) 65 - 140 mg/dL    Calcium 8.7 8.4 - 10.2 mg/dL    eGFR 81 ml/min/1.73sq m   Fingerstick Glucose (POCT)    Collection Time: 03/24/24 10:02 PM   Result Value Ref Range    POC Glucose 194 (H) 65 - 140 mg/dl   Fingerstick Glucose  (POCT)    Collection Time: 03/25/24 12:02 AM   Result Value Ref Range    POC Glucose 143 (H) 65 - 140 mg/dl   APTT    Collection Time: 03/25/24 12:04 AM   Result Value Ref Range    PTT 57 (H) 23 - 37 seconds   Fingerstick Glucose (POCT)    Collection Time: 03/25/24  2:07 AM   Result Value Ref Range    POC Glucose 169 (H) 65 - 140 mg/dl   Fingerstick Glucose (POCT)    Collection Time: 03/25/24  4:10 AM   Result Value Ref Range    POC Glucose 161 (H) 65 - 140 mg/dl   Magnesium    Collection Time: 03/25/24  4:49 AM   Result Value Ref Range    Magnesium 2.0 1.9 - 2.7 mg/dL   Phosphorus    Collection Time: 03/25/24  4:49 AM   Result Value Ref Range    Phosphorus 1.8 (L) 2.7 - 4.5 mg/dL   CBC and differential    Collection Time: 03/25/24  4:49 AM   Result Value Ref Range    WBC 10.54 (H) 4.31 - 10.16 Thousand/uL    RBC 3.61 (L) 3.88 - 5.62 Million/uL    Hemoglobin 11.1 (L) 12.0 - 17.0 g/dL    Hematocrit 31.3 (L) 36.5 - 49.3 %    MCV 87 82 - 98 fL    MCH 30.7 26.8 - 34.3 pg    MCHC 35.5 31.4 - 37.4 g/dL    RDW 12.7 11.6 - 15.1 %    MPV 10.6 8.9 - 12.7 fL    Platelets 155 149 - 390 Thousands/uL   Hepatic function panel    Collection Time: 03/25/24  4:49 AM   Result Value Ref Range    Total Bilirubin 0.64 0.20 - 1.00 mg/dL    Bilirubin, Direct 0.32 (H) 0.00 - 0.20 mg/dL    Alkaline Phosphatase 50 34 - 104 U/L    AST 18 13 - 39 U/L    ALT 16 7 - 52 U/L    Total Protein 5.6 (L) 6.4 - 8.4 g/dL    Albumin 2.9 (L) 3.5 - 5.0 g/dL   Procalcitonin    Collection Time: 03/25/24  4:49 AM   Result Value Ref Range    Procalcitonin 5.42 (H) <=0.25 ng/ml   Basic metabolic panel    Collection Time: 03/25/24  4:49 AM   Result Value Ref Range    Sodium 134 (L) 135 - 147 mmol/L    Potassium 3.6 3.5 - 5.3 mmol/L    Chloride 104 96 - 108 mmol/L    CO2 22 21 - 32 mmol/L    ANION GAP 8 4 - 13 mmol/L    BUN 18 5 - 25 mg/dL    Creatinine 0.96 0.60 - 1.30 mg/dL    Glucose 170 (H) 65 - 140 mg/dL    Calcium 8.7 8.4 - 10.2 mg/dL    eGFR 92 ml/min/1.73sq  m   Manual Differential(PHLEBS Do Not Order)    Collection Time: 03/25/24  4:49 AM   Result Value Ref Range    Segmented % 87 (H) 43 - 75 %    Bands % 6 0 - 8 %    Lymphocytes % 3 (L) 14 - 44 %    Monocytes % 4 4 - 12 %    Eosinophils % 0 0 - 6 %    Basophils % 0 0 - 1 %    Absolute Neutrophils 9.80 (H) 1.85 - 7.62 Thousand/uL    Absolute Lymphocytes 0.32 (L) 0.60 - 4.47 Thousand/uL    Absolute Monocytes 0.42 0.00 - 1.22 Thousand/uL    Absolute Eosinophils 0.00 0.00 - 0.40 Thousand/uL    Absolute Basophils 0.00 0.00 - 0.10 Thousand/uL    Total Counted      RBC Morphology Normal     Platelet Estimate Adequate Adequate   T4, free    Collection Time: 03/25/24  4:49 AM   Result Value Ref Range    Free T4 1.66 (H) 0.61 - 1.12 ng/dL   Blood culture    Collection Time: 03/25/24  5:21 AM    Specimen: Arm, Left; Blood   Result Value Ref Range    Blood Culture Staphylococcus aureus (A)     Gram Stain Result Gram positive cocci in clusters (A)    Blood culture    Collection Time: 03/25/24  5:21 AM    Specimen: Arm, Right; Blood   Result Value Ref Range    Blood Culture Staphylococcus aureus (A)     Gram Stain Result Gram positive cocci in clusters (A)    Blood Culture Identification Panel    Collection Time: 03/25/24  5:21 AM    Specimen: Arm, Left; Blood   Result Value Ref Range    Staphylococcus aureus Detected (A) Not Detected   Vancomycin, trough Please draw peripherally 30 minutes before the dose, call pharmacy if the result is <10 or >20. If any question please contact pharmacy. Thank you.    Collection Time: 03/25/24  6:10 AM   Result Value Ref Range    Vancomycin Tr 12.8 10.0 - 20.0 ug/mL   APTT    Collection Time: 03/25/24  6:10 AM   Result Value Ref Range    PTT 68 (H) 23 - 37 seconds   Fingerstick Glucose (POCT)    Collection Time: 03/25/24  6:10 AM   Result Value Ref Range    POC Glucose 139 65 - 140 mg/dl   Fingerstick Glucose (POCT)    Collection Time: 03/25/24  8:09 AM   Result Value Ref Range    POC Glucose 153  (H) 65 - 140 mg/dl   Echo complete w/ contrast if indicated    Collection Time: 03/25/24  9:00 AM   Result Value Ref Range    BSA 2.31 m2    A4C EF 52 %    LV Diastolic Volume (BP) 140 mL    LV Systolic Volume (BP) 70 mL    EF 50 %    LVIDd 5.00 cm    LVIDS 3.50 cm    IVSd 1.00 cm    LVPWd 1.20 cm    FS 30 28 - 44    MV E' Tissue Velocity Septal 13 cm/s    MV E' Tissue Velocity Lateral 15 cm/s    LA Volume Index (BP) 21.9 mL/m2    E/A ratio 1.00     E wave deceleration time 175 ms    MV Peak E Pierre 72 cm/s    MV Peak A Pierre 0.72 m/s    RVID d 4.1 cm    Tricuspid annular plane systolic excursion 1.90 cm    LA size 3.6 cm    LA length (A2C) 3.70 cm    LA volume (BP) 51 mL    RAA A4C 16.6 cm2    MV stenosis pressure 1/2 time 51 ms    MV valve area p 1/2 method 4.31     TR Peak Pierre 2.1 m/s    Triscuspid Valve Regurgitation Peak Gradient 18.0 mmHg    Ao root 3.60 cm    Asc Ao 3.8 cm    Tricuspid valve peak regurgitation velocity 2.09 m/s    Left ventricular stroke volume (2D) 66.00 mL    IVS 1 cm    LEFT VENTRICLE SYSTOLIC VOLUME (MOD BIPLANE) 2D 50 mL    LV DIASTOLIC VOLUME (MOD BIPLANE) 2D 116 mL    Left Atrium Area-systolic Four Chamber 17.9 cm2    Left Atrium Area-systolic Apical Two Chamber 14.1 cm2    LVSV, 2D 66 mL    LV Diastolic Volume Index (BP) 60.6 mL/m2    LV Systolic Volume Index (BP) 30.3 mL/m2    LV EF 45    Fingerstick Glucose (POCT)    Collection Time: 03/25/24  9:43 AM   Result Value Ref Range    POC Glucose 154 (H) 65 - 140 mg/dl   APTT    Collection Time: 03/25/24 11:59 AM   Result Value Ref Range    PTT 56 (H) 23 - 37 seconds   Fingerstick Glucose (POCT)    Collection Time: 03/25/24 11:59 AM   Result Value Ref Range    POC Glucose 145 (H) 65 - 140 mg/dl   Fingerstick Glucose (POCT)    Collection Time: 03/25/24  2:04 PM   Result Value Ref Range    POC Glucose 166 (H) 65 - 140 mg/dl   Fingerstick Glucose (POCT)    Collection Time: 03/25/24  3:57 PM   Result Value Ref Range    POC Glucose 198 (H) 65 -  140 mg/dl   Fingerstick Glucose (POCT)    Collection Time: 03/25/24  6:17 PM   Result Value Ref Range    POC Glucose 212 (H) 65 - 140 mg/dl   Fingerstick Glucose (POCT)    Collection Time: 03/25/24  8:11 PM   Result Value Ref Range    POC Glucose 170 (H) 65 - 140 mg/dl   Fingerstick Glucose (POCT)    Collection Time: 03/25/24 10:09 PM   Result Value Ref Range    POC Glucose 172 (H) 65 - 140 mg/dl   Fingerstick Glucose (POCT)    Collection Time: 03/25/24 11:59 PM   Result Value Ref Range    POC Glucose 224 (H) 65 - 140 mg/dl   Fingerstick Glucose (POCT)    Collection Time: 03/26/24  2:00 AM   Result Value Ref Range    POC Glucose 257 (H) 65 - 140 mg/dl   Fingerstick Glucose (POCT)    Collection Time: 03/26/24  4:14 AM   Result Value Ref Range    POC Glucose 207 (H) 65 - 140 mg/dl   Basic metabolic panel    Collection Time: 03/26/24  6:05 AM   Result Value Ref Range    Sodium 137 135 - 147 mmol/L    Potassium 2.7 (LL) 3.5 - 5.3 mmol/L    Chloride 106 96 - 108 mmol/L    CO2 23 21 - 32 mmol/L    ANION GAP 8 4 - 13 mmol/L    BUN 15 5 - 25 mg/dL    Creatinine 0.87 0.60 - 1.30 mg/dL    Glucose 147 (H) 65 - 140 mg/dL    Calcium 8.6 8.4 - 10.2 mg/dL    eGFR 101 ml/min/1.73sq m   Magnesium    Collection Time: 03/26/24  6:05 AM   Result Value Ref Range    Magnesium 1.7 (L) 1.9 - 2.7 mg/dL   Phosphorus    Collection Time: 03/26/24  6:05 AM   Result Value Ref Range    Phosphorus 1.9 (L) 2.7 - 4.5 mg/dL   CBC and differential    Collection Time: 03/26/24  6:05 AM   Result Value Ref Range    WBC 11.12 (H) 4.31 - 10.16 Thousand/uL    RBC 3.69 (L) 3.88 - 5.62 Million/uL    Hemoglobin 11.3 (L) 12.0 - 17.0 g/dL    Hematocrit 31.7 (L) 36.5 - 49.3 %    MCV 86 82 - 98 fL    MCH 30.6 26.8 - 34.3 pg    MCHC 35.6 31.4 - 37.4 g/dL    RDW 12.8 11.6 - 15.1 %    MPV 10.4 8.9 - 12.7 fL    Platelets 228 149 - 390 Thousands/uL    nRBC 0 /100 WBCs    Neutrophils Relative 82 (H) 43 - 75 %    Immature Grans % 1 0 - 2 %    Lymphocytes Relative 8 (L)  14 - 44 %    Monocytes Relative 9 4 - 12 %    Eosinophils Relative 0 0 - 6 %    Basophils Relative 0 0 - 1 %    Neutrophils Absolute 9.07 (H) 1.85 - 7.62 Thousands/µL    Absolute Immature Grans 0.10 0.00 - 0.20 Thousand/uL    Absolute Lymphocytes 0.92 0.60 - 4.47 Thousands/µL    Absolute Monocytes 1.00 0.17 - 1.22 Thousand/µL    Eosinophils Absolute 0.01 0.00 - 0.61 Thousand/µL    Basophils Absolute 0.02 0.00 - 0.10 Thousands/µL   Procalcitonin    Collection Time: 03/26/24  6:05 AM   Result Value Ref Range    Procalcitonin 2.78 (H) <=0.25 ng/ml   Fingerstick Glucose (POCT)    Collection Time: 03/26/24  6:28 AM   Result Value Ref Range    POC Glucose 127 65 - 140 mg/dl   Fingerstick Glucose (POCT)    Collection Time: 03/26/24  8:01 AM   Result Value Ref Range    POC Glucose 152 (H) 65 - 140 mg/dl   Fingerstick Glucose (POCT)    Collection Time: 03/26/24 10:22 AM   Result Value Ref Range    POC Glucose 238 (H) 65 - 140 mg/dl          Assessment/Plan     Assessment: 48-year-old man with history of TBI presents to the hospital with chronic knee pain and possible fall exacerbating right knee pain.  On chronic opioids, on risperidone and Klonopin for TBI symptoms of mood lability and anxiety.  Psychiatry reconsulted for medication recommendations in the setting of ongoing anxiety.  Patient denies current anxiety but reports that he has been having intermittent periods of increased anxiety that has not completely resolved with his clonazepam.  We discussed initiation of an SNRI for anxiety and neuropathic pain in the form of Cymbalta 20 mg daily and he is amenable to this.  We discussed risk and benefits of this medication and he agreed to initiate this medication while in the hospital.  We discussed that he should follow-up with his outpatient provider for continued prescription of his medications including benzodiazepines.  He requested outpatient behavioral health referrals and should be given referrals for outpatient  psychiatry.    Diagnosis: TBI with associated mood changes; suicidal thoughts in the setting of severe pain    Recommendations:   --Initiate Cymbalta 20 mg daily  --Continue clonazepam at current dose 1 mg daily  --Please refer for outpatient psychiatric treatment  --No criteria for inpatient psychiatric hospitalization, denies SI/HI/AVH  --Please Tiger[employer] with any questions or concerns.    Diagnoses, available treatment options, alternatives to treatment, and risks vs. benefits of current psychiatric treatment plan were discussed with the patient.  Prior records were reviewed in [facility].  The case was discussed with the primary team.    Gio Pineda MD    This note has been constructed using a voice recognition system. There may be translation, syntax, or grammatical errors. If you have any questions, please contact the dictating provider.

## 2024-03-26 NOTE — ASSESSMENT & PLAN NOTE
SIRS: Tachycardia, leukocytosis  LA 1.4, procal 3.39  Source: R knee septic joint   BC x2 (3/22 and 3/23) + MSSA   3/23 R knee aspirate + MSSA  3/24 Intra-op R knee sample: + MSSA  3/24 R knee I&D to bone, total knee arthroplasty revision, insertion of stimulan with Dr. Washington  Intra-op became acute tachycardia with new AF RVR, remained intubated, received 1200mL IVF intra-op   Received additional 2L IVF in the ICU, with eventual need for levophed, vaso  3/24 RIJ TLC, L radial art line   ScVO2 60  3/24 Hydrocortisone added   Plan:  Continue Ancef  Discontinued steroids  ID following- appreciate recommendations  Trend fever curve  Remains off vasopressors

## 2024-03-26 NOTE — CASE MANAGEMENT
Case Management Discharge Planning Note    Patient name Armen Llanos  Location /-01 MRN [id number]  : 1975 Date 3/26/2024       Current Admission Date: 3/22/2024  Current Admission Diagnosis:Type 2 diabetes mellitus with ketoacidosis without coma, without long-term current use of insulin ([facility])   Patient Active Problem List    Diagnosis Date Noted    Anxiety 2024    Cardiomyopathy ([facility]) 2024    Mononeuropathy 2024    Osteoarthritis of knee 2024    A-fib with RVR ([facility]) 2024    Hyponatremia 2024    MSSA bacteremia 2024    Suicidal ideation 2024    Syncope 2023    Costochondral chest pain 2023    Adrenal nodule ([facility]) 2023    Acute on Chronic Right knee pain 11/15/2022    Chronic, continuous use of opioids 11/15/2022    Type 2 diabetes mellitus with ketoacidosis without coma, without long-term current use of insulin ([facility]) 11/15/2022    Essential hypertension 11/15/2022    Septic shock ([facility]) 11/15/2022    Hypokalemia 11/15/2022    Hypocalcemia 11/15/2022    TBI (traumatic brain injury) ([facility]) 11/15/2022    Chronic pain disorder 2022      LOS (days): 4  Geometric Mean LOS (GMLOS) (days): 9.9  Days to GMLOS:5.7     OBJECTIVE:  Risk of Unplanned Readmission Score: 28.31         Current admission status: Inpatient   Preferred Pharmacy:   [id number] - [place] - [address]  [address] [address]  Phone: [phone] Fax: [phone]    Primary Care Provider: Deidre Andrea MD    Primary Insurance: [employer] ([employer])  Secondary Insurance:     DISCHARGE DETAILS:     Met with patient and his wife, Macie, discussed ppatient's needs for SNF rehab and they are agreeable. Referrals sent via [employer]. Wait availability and preference.

## 2024-03-26 NOTE — PLAN OF CARE
Problem: OCCUPATIONAL THERAPY ADULT  Goal: Performs self-care activities at highest level of function for planned discharge setting.  See evaluation for individualized goals.  Description: Treatment Interventions: ADL retraining, Functional transfer training, Endurance training, Equipment evaluation/education, Patient/family training, Compensatory technique education, Continued evaluation          See flowsheet documentation for full assessment, interventions and recommendations.   Note: Limitation: Decreased ADL status, Decreased endurance, Decreased self-care trans, Decreased high-level ADLs  Prognosis: Good  Assessment: Pt is a 48 y.o. male seen for OT evaluation at [facility], admitted 3/22/2024 w/ Type 2 diabetes mellitus with ketoacidosis without coma, without long-term current use of insulin (HCC). Pt additionally with R knee pain. Pt is now POD #2 right total knee revision arthroplasty with poly exchange, insertion of stimulant and debridement down to and including bone. Pt with active OT orders & WBAT to RLE orders. OT completed extensive review of pt's medical and social history. Comorbidities affecting pt's functional performance at time of assessment include: HTN, TBI, cardiomyopathy, anxiety, chronic pain disorder, chronic pain disorder. Personal factors affecting pt at time of IE include: steps to enter environment, limited home support, difficulty performing ADLS, difficulty performing IADLS , and environment. Prior to admission, pt was living with his wife in a 3SH with 9 DELMY & B/B on 3rd floor.  Pt was I for dressing, A for bathing; and A w/ IADLS, (+) drove, & required use of cane PTA. Upon evaluation: Pt requires Min Ax1 for bed mobility, Min Ax1 for functional transfers, Min Ax1 for functional mobility, S for UB ADLs and Mod A for LB ADLS 2* the following deficits impacting occupational performance: weakness, decreased strength, decreased balance, decreased tolerance, impaired  memory, increased pain, and orthopedic restrictions. Full objective findings from OT assessment regarding body systems outlined above. Pt to benefit from continued skilled OT tx while in the hospital to address deficits as defined above and maximize level of functional independence w/ ADL's and functional mobility. Occupational Performance areas to address include: bathing/shower, toilet hygiene, dressing, functional mobility, and clothing management. Based on findings, pt is of high complexity. The patient's raw score on the -PAC Daily Activity inpatient short form is 19, standardized score is 40.22, greater than 39.4. Patients at this level are likely to benefit from DC to home. However, please refer to therapist recommendation for discharge planning given other factors that may influence destination. At this time, OT recommendations at time of discharge are DC with Level I resources.     Rehab Resource Intensity Level, OT: I (Maximum Resource Intensity) (vs level III pending pt's progress)

## 2024-03-26 NOTE — ASSESSMENT & PLAN NOTE
No previous history of Afib- Likely exacerbated due to critical illness, bacteremia, and stress from OR  3/24 AF RVR to the 190s intra-op, initially HD stable   Persistent AF RVR despite IVF bolus, 150mg Amio bolus, esmolol bolus  Once in the ICU, 5 mg IV Metoprolol with improvement of HR to 150's  Initiated on Cardizem gtt with resultant hypotension, and Cardizem was thus d/c'ed   Amio bolus and gtt with eventual conversion to NS  Plan:  Transitioned to Amio PO by Cardiology   AC: Eliquis -on hold in anticipation of surgery  To f/u as OP with Cards   Rates stable, can   Currently NSR on tele   Cardiology to start Toprol XL

## 2024-03-26 NOTE — OCCUPATIONAL THERAPY NOTE
Occupational Therapy Evaluation & Treatment     Patient Name: Armen Llanos  Today's Date: 3/26/2024  Problem List  Principal Problem:    Type 2 diabetes mellitus with ketoacidosis without coma, without long-term current use of insulin (HCC)  Active Problems:    Acute on Chronic Right knee pain    Chronic, continuous use of opioids    Essential hypertension    Septic shock (HCC)    TBI (traumatic brain injury) (HCC)    Suicidal ideation    Hyponatremia    MSSA bacteremia    A-fib with RVR (HCC)    Cardiomyopathy (HCC)    Anxiety    Past Medical History  Past Medical History:   Diagnosis Date    Diabetes mellitus (HCC)     Hypertension      Past Surgical History  Past Surgical History:   Procedure Laterality Date    KNEE SURGERY      KS REVJ TOTAL KNEE ARTHRP W/WO ALGRFT 1 COMPONENT Right 3/24/2024    Procedure: ARTHROPLASTY KNEE TOTAL REVISION, POLY EXCHANGE;  Surgeon: Tao Washington DO;  Location:  MAIN OR;  Service: Orthopedics               03/26/24 0936   OT Last Visit   OT Visit Date 03/26/24   Note Type   Note type Evaluation   Additional Comments POD #2 right total knee revision arthroplasty with poly exchange, insertion of stimulant and debridement down to and including bone   Pain Assessment   Pain Assessment Tool 0-10   Pain Score 6   Pain Location/Orientation Orientation: Right;Location: Knee   Hospital Pain Intervention(s) Ambulation/increased activity   Restrictions/Precautions   Weight Bearing Precautions Per Order Yes   RLE Weight Bearing Per Order WBAT   Other Precautions Cognitive;Chair Alarm;Bed Alarm;Telemetry;Multiple lines;Fall Risk   Home Living   Type of Home House   Home Layout Multi-level;Bed/bath upstairs;1/2 bath on main level;Stairs to enter with rails  (3 SH, B/B on third floor. 9 DELMY)   Bathroom Shower/Tub Tub/shower unit  (Bathes in tub with  spouse assisting)   Bathroom Equipment Hand-held shower   Home Equipment Cane;Walker   Additional Comments SPC PTA   Prior  "Function   Level of [place] Independent with ADLs;Independent with functional mobility;Independent with IADLS   Lives With Spouse   Receives Help From Family   IADLs Independent with driving;Family/Friend/Other provides meals;Independent with medication management   Falls in the last 6 months 0  (Pt unsure, states wife would know but he does not believe he has)   Vocational Part time employment   Lifestyle   Autonomy Independent with dressing, wife assists with showering   Reciprocal Relationships Lives with very supportive wife   Service to Others Technology repair, self-employed   General   Additional Pertinent History H/o TBI from childhood   Family/Caregiver Present No   Subjective   Subjective \"I have been having anxiety attacks off and on\"   ADL   Eating Assistance 7  Independent   Grooming Assistance 7  Independent   UB Bathing Assistance 5  Supervision/Setup   LB Bathing Assistance 4  Minimal Assistance   UB Dressing Assistance 5  Supervision/Setup   LB Dressing Assistance 3  Moderate Assistance   Toileting Assistance  4  Minimal Assistance   Bed Mobility   Supine to Sit 4  Minimal assistance   Additional items Assist x 1;LE management;HOB elevated;Bedrails;Increased time required   Additional Comments Excessive amount of time required to get to EOB. BP stable supine/sit. Pt sat EOB independently x3 min   Transfers   Sit to Stand 4  Minimal assistance   Additional items Assist x 1;Armrests;Increased time required;Verbal cues   Stand to Sit 4  Minimal assistance   Additional items Assist x 1;Increased time required;Verbal cues;Armrests   Additional Comments Refer to tx session for additional mobility   Balance   Static Sitting Fair +   Dynamic Sitting Fair +   Static Standing Fair   Dynamic Standing Fair   Ambulatory Fair   Activity Tolerance   Activity Tolerance Patient limited by fatigue   Medical Staff Made Aware PT Ruth Ann   Nurse Made Aware FREDERIC ALBARADO Assessment   RUE Assessment WFL   LUE Assessment "   LUE Assessment WFL   Cognition   Overall Cognitive Status WFL   Arousal/Participation Alert   Attention Within functional limits   Orientation Level Oriented X4   Memory Decreased short term memory;Decreased recall of precautions   Following Commands Follows multistep commands without difficulty   Comments Pt reports memory difficulties at baseline   Assessment   Limitation Decreased ADL status;Decreased endurance;Decreased self-care trans;Decreased high-level ADLs   Prognosis Good   Assessment Pt is a 48 y.o. male seen for OT evaluation at [facility], admitted 3/22/2024 w/ Type 2 diabetes mellitus with ketoacidosis without coma, without long-term current use of insulin (HCC). Pt additionally with R knee pain. Pt is now POD #2 right total knee revision arthroplasty with poly exchange, insertion of stimulant and debridement down to and including bone. Pt with active OT orders & WBAT to RLE orders. OT completed extensive review of pt's medical and social history. Comorbidities affecting pt's functional performance at time of assessment include: HTN, TBI, cardiomyopathy, anxiety, chronic pain disorder, chronic pain disorder. Personal factors affecting pt at time of IE include: steps to enter environment, limited home support, difficulty performing ADLS, difficulty performing IADLS , and environment. Prior to admission, pt was living with his wife in a 3SH with 9 DELMY & B/B on 3rd floor.  Pt was I for dressing, A for bathing; and A w/ IADLS, (+) drove, & required use of cane PTA. Upon evaluation: Pt requires Min Ax1 for bed mobility, Min Ax1 for functional transfers, Min Ax1 for functional mobility, S for UB ADLs and Mod A for LB ADLS 2* the following deficits impacting occupational performance: weakness, decreased strength, decreased balance, decreased tolerance, impaired memory, increased pain, and orthopedic restrictions. Full objective findings from OT assessment regarding body systems outlined above. Pt  to benefit from continued skilled OT tx while in the hospital to address deficits as defined above and maximize level of functional independence w/ ADL's and functional mobility. Occupational Performance areas to address include: bathing/shower, toilet hygiene, dressing, functional mobility, and clothing management. Based on findings, pt is of high complexity. The patient's raw score on the -PAC Daily Activity inpatient short form is 19, standardized score is 40.22, greater than 39.4. Patients at this level are likely to benefit from DC to home. However, please refer to therapist recommendation for discharge planning given other factors that may influence destination. At this time, OT recommendations at time of discharge are DC with Level I resources.   Goals   Patient Goals Pt wants to get better & walk more   Plan   Treatment Interventions ADL retraining;Functional transfer training;Endurance training;Equipment evaluation/education;Patient/family training;Compensatory technique education;Continued evaluation   Goal Expiration Date 04/05/24   OT Treatment Day 0   OT Frequency 3-5x/wk   Discharge Recommendation   Rehab Resource Intensity Level, OT I (Maximum Resource Intensity)  (vs level III pending pt's progress)   -[facility] Daily Activity Inpatient   Lower Body Dressing 2   Bathing 3   Toileting 3   Upper Body Dressing 3   Grooming 4   Eating 4   Daily Activity Raw Score 19   Daily Activity Standardized Score (Calc for Raw Score >=11) 40.22   -PAC Applied Cognition Inpatient   Following a Speech/Presentation 4   Understanding Ordinary Conversation 4   Taking Medications 4   Remembering Where Things Are Placed or Put Away 4   Remembering List of 4-5 Errands 4   Taking Care of Complicated Tasks 4   Applied Cognition Raw Score 24   Applied Cognition Standardized Score 62.21   Additional Treatment Session   Start Time 0950   End Time 1002   Treatment Assessment Pt completed functional mobility with Min Ax1 with RW &  SBA of another. Pt's BUE fatigued, pt becoming mildly tremulous, diaphoretic, & tachycardic (HR in 130's). Pt completed stand > sit with Min Ax1. Educated pt on room safety & to be going to [facility].  for toileting only with nursing staff present. Pt very agreeable & appreciate of session.   End of Consult   Education Provided Yes   Patient Position at End of Consult Bedside chair;Bed/Chair alarm activated;All needs within reach   Nurse Communication Nurse aware of consult       Pt will achieve the following goals within 10 days.    *Pt will complete LB dressing with Mod I & DME PRN.    *Pt will complete toileting w/ Mod I w/ G hygiene/thoroughness using DME PRN    *Pt will complete bed mobility with Mod I, with HOB elevated & use of side rails PRN to prep for purposeful tasks    *Pt will perform functional transfers with on/off all surfaces with Mod I using DME as needed w/ G balance/safety.    *Pt will improve functional mobility during ADL/IADL/leisure tasks to Mod I using DME as needed w/ G balance/safety.      *Pt will demonstrate G carryover of pt/caregiver education and training as appropriate w/ Mod I w/o cues w/ good tolerance      *Assess DME needs    Stephani Phelan OTR/L

## 2024-03-26 NOTE — PLAN OF CARE
Problem: PHYSICAL THERAPY ADULT  Goal: Performs mobility at highest level of function for planned discharge setting.  See evaluation for individualized goals.  Description: Treatment/Interventions: Functional transfer training, LE strengthening/ROM, Elevations, Therapeutic exercise, Endurance training, Patient/family training, Equipment eval/education, Bed mobility, Gait training, Spoke to nursing, OT, Spoke to case management          See flowsheet documentation for full assessment, interventions and recommendations.  Note: Prognosis: Good  Problem List: Decreased strength, Decreased range of motion, Decreased endurance, Impaired balance, Decreased mobility, Decreased cognition, Pain  Assessment: Patient is a 49y/o who presented with septic shock. 3/24 R knee I&D to bone, total knee arthroplasty revision, insertion of stimulan with Dr. Washington. Intra-op became acute tachycardia with new AF RVR, remained intubated.  Patient extubated on 3/25. He has a history of a TBI. Patient resides with spouse in a multi-story home with steps to enter. He is normally independent with a SPC. Current medical status includes ICU stay, telemetry, multiple lines, fall risk, bed/chair alarm, pain, decreased short term memory, decreased ROM, strength, balance, endurance and mobility. Patient was agreeable and motivated during session. He required increased time to complete mobility due to pain and dizziness. BP stable. Patient was tachycardic during session-RN aware. He required assistance of 1 for all mobility. Tolerated a short amb distance before becoming fatigued. He is deconditioned and will continue to benefit from ongoing inpatient P.T. Recommending level 2 resources. Moderate intensity. The patient's AM-PAC Basic Mobility Inpatient Short Form Raw Score is 15. A Raw score of less than or equal to 17 suggests the patient may benefit from discharge to post-acute rehabilitation services. Please also refer to the recommendation of  the Physical Therapist for safe discharge planning.  Barriers to Discharge: Inaccessible home environment, Decreased caregiver support     Rehab Resource Intensity Level, PT: II (Moderate Resource Intensity)    See flowsheet documentation for full assessment.

## 2024-03-26 NOTE — PROGRESS NOTES
[facility]  Progress Note  Name: Armen Llanos I  MRN: [id number]  Unit/Bed#: -01 I Date of Admission: 3/22/2024   Date of Service: 3/26/2024 I Hospital Day: 4    Assessment/Plan   Septic shock (HCC)  Assessment & Plan  SIRS: Tachycardia, leukocytosis  LA 1.4, procal 3.39  Source: R knee septic joint   BC x2 (3/22 and 3/23) + MSSA   3/23 R knee aspirate + MSSA  3/24 Intra-op R knee sample pending  3/24 R knee I&D to bone, total knee arthroplasty revision, insertion of stimulan with Dr. Washington  Intra-op became acute tachycardia with new AF RVR, remained intubated, received 1200mL IVF intra-op   Received additional 2L IVF in the ICU, with eventual need for levophed, vaso  3/24 RIJ TLC, L radial art line   ScVO2 60  3/24 Hydrocortisone added     Plan:  Abx D4: Ancef D2  SDS D3: Hydrocortisone 50mg q6h  ID following- appreciate recommendations  Trend fever curve  Remains off vasopressors    Acute respiratory failure with hypoxia (HCC)-resolved as of 3/25/2024  Assessment & Plan  Patient remained intubated after OR 2/2 hemodynamic instability due tachycardia  Current vent settings: ACVC 16/700/50/6   3/24 ETT advanced 3cm based on CXR   3/25 Extubated to RA    Plan:  Pulm toileting, IS  Goal spo2 > 90%    A-fib with RVR (HCC)  Assessment & Plan  No previous history of Afib- Likely exacerbated due to critical illness, bacteremia, and stress from OR  3/24 AF RVR to the 190s intra-op, initially HD stable   Persistent AF RVR despite IVF bolus, 150mg Amio bolus, esmolol bolus  Once in the ICU, 5 mg IV Metoprolol with improvement of HR to 150's  Initiated on Cardizem gtt with resultant hypotension, and Cardizem was thus d/c'ed   Amio bolus and gtt with eventual conversion to NSR    Plan:  Transitioned to Amio PO by Cardiology   AC: Eliquis   To f/u as OP with Cards   Continuous tele   Currently NSR on tele     Hyponatremia  Assessment & Plan  sNa 123 on admission.   Nephrology  following- Hyponatremia likely secondary to physiologic SIADH, OP thiazide use with superimposed volume depletion in the setting of sepsis   Serum osmo- 269, Urine osmo 434, Urine sodium 40    Plan:  Appreciate Nephrology recc's  Improving on serial draws    MSSA bacteremia  Assessment & Plan  + MSSA on BC x2, R knee aspirate as above  3/24 R knee I&D to bone, total knee arthroplasty revision, insertion of stimulan with Dr. Washington    Plan:  Continue Ancef as above   Appreciate ID input    Anxiety  Assessment & Plan  Patient reports increased anxiety   Has received lorazepam IV x2 with intermittent relief   Reports taking Klonopin at home, but will hold given recent shock with vasopressor use   Add PRN Xanax   Restart home risperdal    Cardiomyopathy (HCC)  Assessment & Plan  3/25 Echo -- EF 45%, mild global hypokinesis   Likely 2/2 septic shock, AF RVR   Appreciate Cardiology recc's   Cont on PO Amio and Eliquis as above   Likely will start BB today    Suicidal ideation  Assessment & Plan  Per wife patient has been mentioning and having suicidal ideation  Psych consulted -- per note 3/22 by Dr. Pineda, patient does not meet criteria for IP stay      TBI (traumatic brain injury) ([facility])  Assessment & Plan  History of TBI at 7 years old  Experiences memory issues at baseline  Home regimen: Ambien risperidone    Plan:  Hold home Ambien and risperidone while IP    Essential hypertension  Assessment & Plan  Home regimen: Amlodipine, Lisinopril    Plan:  Can likely restart today     Chronic, continuous use of opioids  Assessment & Plan  Home fentanyl patch, motrin, morphine initially held on admission to ICU   Continue with Colace, start Miralax for bowel regimen    Plan:  PRN dilaudid for pain control   Overnight, home Fentanyl patch, PO morphine PRN restarted   Holding home flexeril as it may cause arrhythmias, and he has developed new AF RVR on this admission in the setting of sepsis     Acute on Chronic Right knee  pain  Assessment & Plan  History of R. Knee arthoplasty with replacement at outside facility   Acute pain present 2-3 days prior to admission with erythema and tenderness, warmth at the site   3/22 R knee XR -- Total arthroplasty stable hardware and bony alignment without hardware complication.No acute fracture or dislocation. No joint effusion. No lytic or blastic osseous lesion. Unremarkable soft tissues.  S/p OR 3/24 as above   Home regimen: Fentanyl patch, Motrin, Morphine    Plan:  Ortho following- appreciate further recommendations   PRN dilaudid for pain control     * Type 2 diabetes mellitus with ketoacidosis without coma, without long-term current use of insulin ([facility])  Assessment & Plan  Lab Results   Component Value Date    HGBA1C 12.1 (H) 03/22/2024       Recent Labs     03/25/24  1159 03/25/24  1404 03/25/24  1557 03/25/24  1817   POCGLU 145* 166* 198* 212*         Blood Sugar Average: Last 72 hrs:  (P) 192    POA: Glucose > 600. BHB 1.41. pH 7.35.  Was previously fluid resuscitated and on insulin infusion  Home regimen: Metformin, Glimepiride    Plan:  Hold home regimen  Cont NCC insulin gtt for goal glucose 140-180  Diabetes education when appropriate   CHO controlled diet     Metabolic acidosis-resolved as of 3/25/2024  Assessment & Plan  Decreased serum bicarb in the setting of intravascular volume depletion, septic shock   Improving on serial draw s/p large volume crystalloid resuscitation and initiation of vasopressors   Trend UO, serum bicarb closely  Resolved on BMP             Disposition: Stepdown Level 1    ICU Core Measures     A: Assess, Prevent, and Manage Pain Has pain been assessed? Yes  Need for changes to pain regimen? No   B: Both SAT/SAT  N/A   C: Choice of Sedation RASS Goal: N/A patient not on sedation  Need for changes to sedation or analgesia regimen? NA   D: Delirium CAM-ICU: Negative   E: Early Mobility  Plan for early mobility? Yes   F: Family Engagement Plan for family  engagement today? Yes       Antibiotic Review: Infectious disease consulted    Review of Invasive Devices:      Central access plan: Medications requiring central line -- order for d/c placed   Sandra Plan: Keep arterial line for hemodynamic monitoring -- order for d/c placed     Prophylaxis:  VTE VTE covered by:  apixaban, Oral, 5 mg at 03/25/24 1727       Stress Ulcer  covered byesomeprazole (NexIUM) 20 mg capsule [[id number]] (Long-Term Med), pantoprazole (PROTONIX) injection 40 mg [[id number]]         Significant 24hr Events     24hr events: Yesterday, Armen was weaned from all vasopressors and extubated to RA. Diet was advanced. Leon removed and he required straight cath per protocol. He remains HD stable and afebrile. Overnight, he slept very little and reported increasing anxiety and RLE pain, despite restarting home fentanyl patch, PO morphine PRN, continued dilaudid IV PRN, additional ativan IV 1mg and initiation of Xanax PO PRN.      Subjective   Review of Systems   Objective                            Vitals I/O      Most Recent Min/Max in 24hrs   Temp 98.7 °F (37.1 °C) Temp  Min: 91.6 °F (33.1 °C)  Max: 100 °F (37.8 °C)   Pulse (!) 112 Pulse  Min: 69  Max: 112   Resp (!) 27 Resp  Min: 12  Max: 28   /70 BP  Min: 86/51  Max: 142/75   O2 Sat 94 % SpO2  Min: 93 %  Max: 100 %      Intake/Output Summary (Last 24 hours) at 3/26/2024 0441  Last data filed at 3/26/2024 0000  Gross per 24 hour   Intake 2032.63 ml   Output 2275 ml   Net -242.37 ml       Diet Yadiel/CHO Controlled; Consistent Carbohydrate Diet Level 2 (5 carb servings/75 grams CHO/meal)    Invasive Monitoring           Physical Exam   Physical Exam  Vitals and nursing note reviewed.   Skin:     General: Skin is warm.      Capillary Refill: Capillary refill takes less than 2 seconds.      Coloration: Skin is not pale.   HENT:      Head: Normocephalic and atraumatic.      Mouth/Throat:      Lips: Pink.      Mouth: Mucous membranes are moist.    Cardiovascular:      Rate and Rhythm: Regular rhythm. Tachycardia present.   Musculoskeletal:      Right lower leg: No edema.      Left lower leg: No edema.   Abdominal: General: Abdomen is flat. Bowel sounds are normal.      Palpations: Abdomen is soft.      Tenderness: There is no abdominal tenderness.   Constitutional:       General: He is awake. He is not in acute distress.     Appearance: He is well-developed. He is ill-appearing.   Pulmonary:      Effort: Pulmonary effort is normal.      Breath sounds: Normal breath sounds.   Psychiatric:         Mood and Affect: Mood and affect normal.         Speech: Speech normal.         Behavior: Behavior is cooperative.   Neurological:      General: No focal deficit present.      Mental Status: He is alert and oriented to person, place, and time.      GCS: GCS eye subscore is 4. GCS verbal subscore is 5. GCS motor subscore is 6.      Cranial Nerves: Cranial nerves 2-12 are intact.   Genitourinary/Anorectal:     Comments: Voiding independently           Diagnostic Studies      EKG: NSR to ST on tele   Imaging:   XR chest portable ICU   Final Result      Question mild pulmonary venous congestion.            Workstation performed: [id number]         XR chest portable ICU   Final Result      No acute cardiopulmonary disease.      Right jugular catheter in mid SVC with no pneumothorax.      Workstation performed: [id number]         STAT CXR ICU   Final Result   Tubes and lines as described without pneumothorax or other complication.   No acute cardiopulmonary disease.            Workstation performed: [id number]         XR chest portable   Final Result      No acute cardiopulmonary disease.            Workstation performed: [id number]         XR knee 1 or 2 vw right   ED Interpretation   No acute fracture identified by me.      Final Result      No acute osseous abnormality.            Workstation performed: [id number]            I have personally reviewed pertinent  reports.       Medications:  Scheduled PRN   amiodarone, 400 mg, BID With Meals   Followed by  [START ON 4/5/2024] amiodarone, 200 mg, Daily With Breakfast  apixaban, 5 mg, BID  bupivacaine liposomal, 20 mL, Once  cefazolin, 2,000 mg, Q8H  chlorhexidine, 15 mL, Q12H DARIN  docusate sodium, 100 mg, BID  fentaNYL, 1 patch, Q72H  hydrocortisone sodium succinate, 50 mg, Q6H DARIN  nicotine, 21 mg, Daily  pantoprazole, 20 mg, BID AC  polyethylene glycol, 17 g, Daily  pravastatin, 80 mg, Daily With Dinner  risperiDONE, 3 mg, Daily      acetaminophen, 650 mg, Q6H PRN  ALPRAZolam, 0.5 mg, TID PRN  HYDROmorphone, 2 mg, Q3H PRN  metoprolol, 5 mg, Q6H PRN  morphine, 60 mg, Q12H PRN  ondansetron, 4 mg, Q4H PRN       Continuous    insulin regular (HumuLIN R,NovoLIN R) 1 Units/mL in sodium chloride 0.9 % 100 mL infusion, 0.3-21 Units/hr, Last Rate: 6 Units/hr (03/26/24 0415)  sodium chloride, 10 mL/hr, Last Rate: 10 mL/hr (03/25/24 1649)         Labs:    CBC    Recent Labs     03/24/24  1047 03/25/24  0449   WBC 6.56 10.54*   HGB 10.1* 11.1*   HCT 29.6* 31.3*   * 155   BANDSPCT 11* 6     BMP    Recent Labs     03/24/24  1955 03/25/24  0449   SODIUM 130* 134*   K 3.8 3.6    104   CO2 20* 22   AGAP 10 8   BUN 19 18   CREATININE 1.07 0.96   CALCIUM 8.7 8.7       Coags    Recent Labs     03/24/24  1056 03/24/24  1700 03/25/24  0610 03/25/24  1159   INR 1.37*  --   --   --    PTT 41*   < > 68* 56*    < > = values in this interval not displayed.        Additional Electrolytes  Recent Labs     03/24/24  0900 03/24/24  0951 03/24/24  1047 03/24/24  1520 03/25/24  0449   MG  --   --  1.8* 1.6* 2.0   PHOS  --   --  3.7  --  1.8*   CAIONIZED 1.33* 1.30  --   --   --           Blood Gas    Recent Labs     03/24/24  1701   PHART 7.346*   [id number] 33.6*   PO2ART 155.7*   YJS6GPD 18.0*   BEART -6.8   SOURCE Line, Arterial     Recent Labs     03/24/24  1520 03/24/24  1701   PHVEN 7.298*  --    [id number] 38.4*  --    PO2VEN 30.5*  --     [id number] 18.4*  --    BEVEN -7.4  --    F1MKFYM 60.5  --    SOURCE  --  Line, Arterial    LFTs  Recent Labs     03/25/24  0449   ALT 16   AST 18   ALKPHOS 50   ALB 2.9*   TBILI 0.64       Infectious  Recent Labs     03/24/24  1047 03/25/24  0449   PROCALCITONI 3.39* 5.42*     Glucose  Recent Labs     03/24/24  1520 03/24/24  1701 03/24/24  1955 03/25/24  0449   GLUC 292* 309* 236* 170*               NATA Mar

## 2024-03-26 NOTE — CASE MANAGEMENT
Case Management Progress Note    Patient name Armen Llanos  Location /-01 MRN [id number]  : 1975 Date 3/26/2024       LOS (days): 4  Geometric Mean LOS (GMLOS) (days): 9.9  Days to GMLOS:5.8        OBJECTIVE:        Current admission status: Inpatient  Preferred Pharmacy:   [id number] - [place], [place] - [address]  [address] [address]  Phone: [phone] Fax: [phone]    Primary Care Provider: Deidre Andrea MD    Primary Insurance: [employer]  Secondary Insurance:     PROGRESS NOTE:    CM was requested to check to cost of Jardiance. CM called pt's pharmacy and spoke to Marizol who states copay is $47. CM informed Arturo Ferro PA-C of copay.

## 2024-03-26 NOTE — PLAN OF CARE
Problem: PAIN - ADULT  Goal: Verbalizes/displays adequate comfort level or baseline comfort level  Description: Interventions:  - Encourage patient to monitor pain and request assistance  - Assess pain using appropriate pain scale  - Administer analgesics based on type and severity of pain and evaluate response  - Implement non-pharmacological measures as appropriate and evaluate response  - Consider cultural and social influences on pain and pain management  - Notify physician/advanced practitioner if interventions unsuccessful or patient reports new pain  Outcome: Progressing     Problem: INFECTION - ADULT  Goal: Absence or prevention of progression during hospitalization  Description: INTERVENTIONS:  - Assess and monitor for signs and symptoms of infection  - Monitor lab/diagnostic results  - Monitor all insertion sites, i.e. indwelling lines, tubes, and drains  - Monitor endotracheal if appropriate and nasal secretions for changes in amount and color  - Minto appropriate cooling/warming therapies per order  - Administer medications as ordered  - Instruct and encourage patient and family to use good hand hygiene technique  - Identify and instruct in appropriate isolation precautions for identified infection/condition  Outcome: Progressing  Goal: Absence of fever/infection during neutropenic period  Description: INTERVENTIONS:  - Monitor WBC    Outcome: Progressing     Problem: SAFETY ADULT  Goal: Patient will remain free of falls  Description: INTERVENTIONS:  - Educate patient/family on patient safety including physical limitations  - Instruct patient to call for assistance with activity   - Consult OT/PT to assist with strengthening/mobility   - Keep Call bell within reach  - Keep bed low and locked with side rails adjusted as appropriate  - Keep care items and personal belongings within reach  - Initiate and maintain comfort rounds  - Make Fall Risk Sign visible to staff  - Offer Toileting every 2 Hours,  in advance of need  - Initiate/Maintain 2alarm  - Obtain necessary fall risk management equipment: 2  - Apply yellow socks and bracelet for high fall risk patients  - Consider moving patient to room near nurses station  Outcome: Progressing  Goal: Maintain or return to baseline ADL function  Description: INTERVENTIONS:  -  Assess patient's ability to carry out ADLs; assess patient's baseline for ADL function and identify physical deficits which impact ability to perform ADLs (bathing, care of mouth/teeth, toileting, grooming, dressing, etc.)  - Assess/evaluate cause of self-care deficits   - Assess range of motion  - Assess patient's mobility; develop plan if impaired  - Assess patient's need for assistive devices and provide as appropriate  - Encourage maximum independence but intervene and supervise when necessary  - Involve family in performance of ADLs  - Assess for home care needs following discharge   - Consider OT consult to assist with ADL evaluation and planning for discharge  - Provide patient education as appropriate  Outcome: Progressing  Goal: Maintains/Returns to pre admission functional level  Description: INTERVENTIONS:  - Perform AM-PAC 6 Click Basic Mobility/ Daily Activity assessment daily.  - Set and communicate daily mobility goal to care team and patient/family/caregiver.   - Collaborate with rehabilitation services on mobility goals if consulted  - Perform Range of Motion 2 times a day.  - Reposition patient every 1 hours.  - Dangle patient 2 times a day  - Stand patient 2 times a day  - Ambulate patient 2 times a day  - Out of bed to chair 2 times a day   - Out of bed for meals 2 times a day  - Out of bed for toileting  - Record patient progress and toleration of activity level   Outcome: Progressing     Problem: DISCHARGE PLANNING  Goal: Discharge to home or other facility with appropriate resources  Description: INTERVENTIONS:  - Identify barriers to discharge w/patient and caregiver  -  Arrange for needed discharge resources and transportation as appropriate  - Identify discharge learning needs (meds, wound care, etc.)  - Arrange for interpretive services to assist at discharge as needed  - Refer to Case Management Department for coordinating discharge planning if the patient needs post-hospital services based on physician/advanced practitioner order or complex needs related to functional status, cognitive ability, or social support system  Outcome: Progressing     Problem: Knowledge Deficit  Goal: Patient/family/caregiver demonstrates understanding of disease process, treatment plan, medications, and discharge instructions  Description: Complete learning assessment and assess knowledge base.  Interventions:  - Provide teaching at level of understanding  - Provide teaching via preferred learning methods  Outcome: Progressing     Problem: Prexisting or High Potential for Compromised Skin Integrity  Goal: Skin integrity is maintained or improved  Description: INTERVENTIONS:  - Identify patients at risk for skin breakdown  - Assess and monitor skin integrity  - Assess and monitor nutrition and hydration status  - Monitor labs   - Assess for incontinence   - Turn and reposition patient  - Assist with mobility/ambulation  - Relieve pressure over bony prominences  - Avoid friction and shearing  - Provide appropriate hygiene as needed including keeping skin clean and dry  - Evaluate need for skin moisturizer/barrier cream  - Collaborate with interdisciplinary team   - Patient/family teaching  - Consider wound care consult   Outcome: Progressing     Problem: NEUROSENSORY - ADULT  Goal: Achieves stable or improved neurological status  Description: INTERVENTIONS  - Monitor and report changes in neurological status  - Monitor vital signs such as temperature, blood pressure, glucose, and any other labs ordered   - Initiate measures to prevent increased intracranial pressure  - Monitor for seizure activity and  implement precautions if appropriate      Outcome: Progressing     Problem: CARDIOVASCULAR - ADULT  Goal: Maintains optimal cardiac output and hemodynamic stability  Description: INTERVENTIONS:  - Monitor I/O, vital signs and rhythm  - Monitor for S/S and trends of decreased cardiac output  - Administer and titrate ordered vasoactive medications to optimize hemodynamic stability  - Assess quality of pulses, skin color and temperature  - Assess for signs of decreased coronary artery perfusion  - Instruct patient to report change in severity of symptoms  Outcome: Progressing  Goal: Absence of cardiac dysrhythmias or at baseline rhythm  Description: INTERVENTIONS:  - Continuous cardiac monitoring, vital signs, obtain 12 lead EKG if ordered  - Administer antiarrhythmic and heart rate control medications as ordered  - Monitor electrolytes and administer replacement therapy as ordered  Outcome: Progressing     Problem: RESPIRATORY - ADULT  Goal: Achieves optimal ventilation and oxygenation  Description: INTERVENTIONS:  - Assess for changes in respiratory status  - Assess for changes in mentation and behavior  - Position to facilitate oxygenation and minimize respiratory effort  - Oxygen administered by appropriate delivery if ordered  - Initiate smoking cessation education as indicated  - Encourage broncho-pulmonary hygiene including cough, deep breathe, Incentive Spirometry  - Assess the need for suctioning and aspirate as needed  - Assess and instruct to report SOB or any respiratory difficulty  - Respiratory Therapy support as indicated  Outcome: Progressing     Problem: GENITOURINARY - ADULT  Goal: Maintains or returns to baseline urinary function  Description: INTERVENTIONS:  - Assess urinary function  - Encourage oral fluids to ensure adequate hydration if ordered  - Administer IV fluids as ordered to ensure adequate hydration  - Administer ordered medications as needed  - Offer frequent toileting  - Follow urinary  retention protocol if ordered  Outcome: Progressing  Goal: Absence of urinary retention  Description: INTERVENTIONS:  - Assess patient’s ability to void and empty bladder  - Monitor I/O  - Bladder scan as needed  - Discuss with physician/AP medications to alleviate retention as needed  - Discuss catheterization for long term situations as appropriate  Outcome: Progressing  Goal: Urinary catheter remains patent  Description: INTERVENTIONS:  - Assess patency of urinary catheter  - If patient has a chronic campa, consider changing catheter if non-functioning  - Follow guidelines for intermittent irrigation of non-functioning urinary catheter  Outcome: Progressing     Problem: METABOLIC, FLUID AND ELECTROLYTES - ADULT  Goal: Electrolytes maintained within normal limits  Description: INTERVENTIONS:  - Monitor labs and assess patient for signs and symptoms of electrolyte imbalances  - Administer electrolyte replacement as ordered  - Monitor response to electrolyte replacements, including repeat lab results as appropriate  - Instruct patient on fluid and nutrition as appropriate  Outcome: Progressing  Goal: Fluid balance maintained  Description: INTERVENTIONS:  - Monitor labs   - Monitor I/O and WT  - Instruct patient on fluid and nutrition as appropriate  - Assess for signs & symptoms of volume excess or deficit  Outcome: Progressing  Goal: Glucose maintained within target range  Description: INTERVENTIONS:  - Monitor Blood Glucose as ordered  - Assess for signs and symptoms of hyperglycemia and hypoglycemia  - Administer ordered medications to maintain glucose within target range  - Assess nutritional intake and initiate nutrition service referral as needed  Outcome: Progressing     Problem: SKIN/TISSUE INTEGRITY - ADULT  Goal: Skin Integrity remains intact(Skin Breakdown Prevention)  Description: Assess:  -Perform Girma assessment every shift  -Clean and moisturize skin every shift  -Inspect skin when repositioning,  toileting, and assisting with ADLS  -Assess extremities for adequate circulation and sensation     Bed Management:  -Have minimal linens on bed & keep smooth, unwrinkled  -Change linens as needed when moist or perspiring  -Avoid sitting or lying in one position for more than 2 hours while in bed  -Keep HOB at 30 degrees     Toileting:  -Offer bedside commode  -Assess for incontinence every shift  -Use incontinent care products after each incontinent episode such as     Activity:  -Mobilize patient 2 times a day  -Encourage activity and walks on unit  -Encourage or provide ROM exercises   -Turn and reposition patient every 2 Hours  -Use appropriate equipment to lift or move patient in bed  -Instruct/ Assist with weight shifting every 30 min when out of bed in chair  -Consider limitation of chair time 2 hour intervals    Skin Care:  -Avoid use of baby powder, tape, friction and shearing, hot water or constrictive clothing  -Relieve pressure over bony prominences using weight shifting  -Do not massage red bony areas    Next Steps:  -Teach patient strategies to minimize risks such as weight shifting   -Consider consults to  interdisciplinary teams such as   Outcome: Progressing  Goal: Incision(s), wounds(s) or drain site(s) healing without S/S of infection  Description: INTERVENTIONS  - Assess and document dressing, incision, wound bed, drain sites and surrounding tissue  - Provide patient and family education  - Perform skin care/dressing changes every shift  Outcome: Progressing  Goal: Pressure injury heals and does not worsen  Description: Interventions:  - Implement low air loss mattress or specialty surface (Criteria met)  - Apply silicone foam dressing  - Instruct/assist with weight shifting every 30 minutes when in chair   - Limit chair time to 2 hour intervals  - Use special pressure reducing interventions such as weight shifting  when in chair   - Apply fecal or urinary incontinence containment device   -  Perform passive or active ROM every shift  - Turn and reposition patient & offload bony prominences every 2 hours   - Utilize friction reducing device or surface for transfers   - Consider consults to  interdisciplinary teams such as   - Use incontinent care products after each incontinent episode such as chucks  - Consider nutrition services referral as needed  Outcome: Progressing     Problem: MUSCULOSKELETAL - ADULT  Goal: Maintain or return mobility to safest level of function  Description: INTERVENTIONS:  - Assess patient's ability to carry out ADLs; assess patient's baseline for ADL function and identify physical deficits which impact ability to perform ADLs (bathing, care of mouth/teeth, toileting, grooming, dressing, etc.)  - Assess/evaluate cause of self-care deficits   - Assess range of motion  - Assess patient's mobility  - Assess patient's need for assistive devices and provide as appropriate  - Encourage maximum independence but intervene and supervise when necessary  - Involve family in performance of ADLs  - Assess for home care needs following discharge   - Consider OT consult to assist with ADL evaluation and planning for discharge  - Provide patient education as appropriate  Outcome: Progressing  Goal: Maintain proper alignment of affected body part  Description: INTERVENTIONS:  - Support, maintain and protect limb and body alignment  - Provide patient/ family with appropriate education  Outcome: Progressing     Problem: Nutrition/Hydration-ADULT  Goal: Nutrient/Hydration intake appropriate for improving, restoring or maintaining nutritional needs  Description: Monitor and assess patient's nutrition/hydration status for malnutrition. Collaborate with interdisciplinary team and initiate plan and interventions as ordered.  Monitor patient's weight and dietary intake as ordered or per policy. Utilize nutrition screening tool and intervene as necessary. Determine patient's food preferences and provide  high-protein, high-caloric foods as appropriate.     INTERVENTIONS:  - Monitor oral intake, urinary output, labs, and treatment plans  - Assess nutrition and hydration status and recommend course of action  - Evaluate amount of meals eaten  - Assist patient with eating if necessary   - Allow adequate time for meals  - Recommend/ encourage appropriate diets, oral nutritional supplements, and vitamin/mineral supplements  - Order, calculate, and assess calorie counts as needed  - Recommend, monitor, and adjust tube feedings and TPN/PPN based on assessed needs  - Assess need for intravenous fluids  - Provide specific nutrition/hydration education as appropriate  - Include patient/family/caregiver in decisions related to nutrition  Outcome: Progressing

## 2024-03-26 NOTE — PROGRESS NOTES
"Cardiology Progress Note   Armen Llanos 48 y.o. male MRN: [id number]    Unit/Bed#: -01 Encounter: [id number]      Assessment:  Septic shock 2/2 septic arthritis of R knee   MSSA bacteremia   Acute hypoxic respiratory failure  DM type 2 with DKA/metabolic acidosis on admission   New onset atrial fibrillation   Presumed NICM (tachy vs stress related), EF 45%  Hypertension   Hx of TBI  Hyponatremia  Chronic opioid use    TTE shows mildly reduced EF 45%, global hypokinesis, normal IVC size    Plan:  Continue PO amiodarone load as ordered.   Plan to dc after 4 weeks in the OP setting when he is recovered from this hospitalization.   Continue Eliquis 5mg BID for CV prophylaxis (CV score is 2 (htn, dm))  Start Toprol XL 25mg QD today   Attempt to further uptitrate GDMT here if able with ACEI/ARB/ARNi +/- SGLT2 inhibitor. Will price check Jardiance today with CM assistance.     Subjective:   Patient seen and examined. Some episodes of shortness of breath overnight with palpitations, felt like \"panic attack\" lasted a few seconds. Telemetry showing sinus tachycardia, HR 90-100s.     Objective:     Vitals: Blood pressure (!) 150/106, pulse (!) 121, temperature 98.3 °F (36.8 °C), temperature source Oral, resp. rate (!) 25, height 6' 5\" (1.956 m), weight 99 kg (218 lb 4.1 oz), SpO2 97%., Body mass index is 25.88 kg/m².,   Orthostatic Blood Pressures      Flowsheet Row Most Recent Value   Blood Pressure 150/106 filed at 03/26/2024 1000   Patient Position - Orthostatic VS Lying filed at 03/26/2024 0400              Intake/Output Summary (Last 24 hours) at 3/26/2024 1103  Last data filed at 3/26/2024 0731  Gross per 24 hour   Intake 1769.14 ml   Output 2575 ml   Net -805.86 ml         Physical Exam:    GEN: Armen Llanos appears well, alert and oriented x 3, pleasant and cooperative   HEENT: Sclera anicteric, conjunctivae pink, mucous membranes moist. Oropharynx clear.   NECK: supple, no significant JVD, Trachea " midline, no thyromegaly.   HEART: regular rhythm, normal S1 and S2, no murmurs, clicks, gallops or rubs   LUNGS: clear to auscultation bilaterally; no wheezes, rales, or rhonchi. No increased work of breathing or signs of respiratory distress.   ABDOMEN: Soft, nontender, nondistended  EXTREMITIES: Skin warm and well perfused, no clubbing, cyanosis, or edema.  NEURO: No focal findings. Normal speech. Mood and affect normal.   SKIN: Normal without suspicious lesions on exposed skin.      Medications:      Current Facility-Administered Medications:     acetaminophen (TYLENOL) tablet 650 mg, 650 mg, Oral, Q6H PRN, NATA Paulino, 650 mg at 03/26/24 0747    amiodarone tablet 400 mg, 400 mg, Oral, BID With Meals, 400 mg at 03/26/24 0749 **FOLLOWED BY** [START ON 4/5/2024] amiodarone tablet 200 mg, 200 mg, Oral, Daily With Breakfast, Arturo Ferro PA-C    apixaban (ELIQUIS) tablet 5 mg, 5 mg, Oral, BID, Arturo Ferro PA-C, 5 mg at 03/26/24 1056    bupivacaine liposomal (EXPAREL) 1.3 % injection 20 mL, 20 mL, Infiltration, Once, Pan Felipe PA-C    ceFAZolin (ANCEF) IVPB (premix in dextrose) 2,000 mg 50 mL, 2,000 mg, Intravenous, Q8H, Vee Walker MD, Last Rate: 100 mL/hr at 03/26/24 0847, 2,000 mg at 03/26/24 0847    chlorhexidine (PERIDEX) 0.12 % oral rinse 15 mL, 15 mL, Mouth/Throat, Q12H DARIN, NATA Paulino, 15 mL at 03/26/24 1059    clonazePAM (KlonoPIN) tablet 1 mg, 1 mg, Oral, Daily, NATA Day    docusate sodium (COLACE) capsule 200 mg, 200 mg, Oral, HS, NATA Day    fentaNYL (DURAGESIC) 75 mcg/hr TD 72 hr patch 1 patch, 1 patch, Transdermal, Q72H, NATA Zhang, 1 patch at 03/26/24 0059    hydrocortisone (Solu-CORTEF) injection 50 mg, 50 mg, Intravenous, Q12H, Ronaldo Beltrán DO    HYDROmorphone (DILAUDID) injection 2 mg, 2 mg, Intravenous, Q3H PRN, NATA Paulino, 2 mg at 03/26/24 0749    insulin regular (HumuLIN R,NovoLIN R) 1 Units/mL in  sodium chloride 0.9 % 100 mL infusion, 0.3-21 Units/hr, Intravenous, Titrated, NATA Paulino, Last Rate: 8 mL/hr at 03/26/24 1024, 8 Units/hr at 03/26/24 1024    magnesium sulfate 2 g/50 mL IVPB (premix) 2 g, 2 g, Intravenous, Once, NATA Day    metoprolol (LOPRESSOR) injection 5 mg, 5 mg, Intravenous, Q6H PRN, NATA Paulino    metoprolol succinate (TOPROL-XL) 24 hr tablet 25 mg, 25 mg, Oral, Daily, Arturo Ferro PA-C, 25 mg at 03/26/24 1056    morphine (MS CONTIN) ER tablet 60 mg, 60 mg, Oral, Q12H PRN, NATA Zhang    nicotine (NICODERM CQ) 21 mg/24 hr TD 24 hr patch 21 mg, 21 mg, Transdermal, Daily, Pan Felipe PA-C, 21 mg at 03/25/24 0818    ondansetron (ZOFRAN) injection 4 mg, 4 mg, Intravenous, Q4H PRN, Pan Felipe PA-C, 4 mg at 03/26/24 0830    pantoprazole (PROTONIX) EC tablet 20 mg, 20 mg, Oral, BID AC, NATA Zhang, 20 mg at 03/26/24 0749    polyethylene glycol (MIRALAX) packet 17 g, 17 g, Per NG Tube, Daily, NATA Paulino, 17 g at 03/25/24 0818    potassium chloride (Klor-Con M20) CR tablet 40 mEq, 40 mEq, Oral, Once, NATA Day    potassium phosphates 30 mmol in sodium chloride 0.9 % 250 mL infusion, 30 mmol, Intravenous, Once, NATA Day    pravastatin (PRAVACHOL) tablet 80 mg, 80 mg, Oral, Daily With Dinner, Pan Felipe PA-C, 80 mg at 03/25/24 1554    risperiDONE (RisperDAL) tablet 3 mg, 3 mg, Oral, Daily, NATA Zhang, 3 mg at 03/26/24 1056    sodium chloride 0.9 % infusion, 50 mL/hr, Intravenous, Continuous, NATA Day, Last Rate: 50 mL/hr at 03/26/24 0917, 50 mL/hr at 03/26/24 0917     Labs & Results:        Results from last 7 days   Lab Units 03/26/24  0605 03/25/24  0449 03/24/24  1047   WBC Thousand/uL 11.12* 10.54* 6.56   HEMOGLOBIN g/dL 11.3* 11.1* 10.1*   HEMATOCRIT % 31.7* 31.3* 29.6*   PLATELETS Thousands/uL 228 155 132*         Results from last 7 days   Lab Units  03/26/24  0605 03/25/24 0449 03/24/24  1955 03/24/24  1047 03/24/24  0951 03/24/24  0900 03/23/24  1215 03/23/24  0356 03/22/24  1432 03/22/24  1051   POTASSIUM mmol/L 2.7* 3.6 3.8   < >  --   --    < >  --    < >  --    CHLORIDE mmol/L 106 104 100   < >  --   --    < >  --    < >  --    CO2 mmol/L 23 22 20*   < >  --   --    < >  --    < >  --    CO2, I-STAT mmol/L  --   --   --   --  16* 20*  --   --   --  20*   BUN mg/dL 15 18 19   < >  --   --    < >  --    < >  --    CREATININE mg/dL 0.87 0.96 1.07   < >  --   --    < >  --    < >  --    CALCIUM mg/dL 8.6 8.7 8.7   < >  --   --    < >  --    < >  --    ALK PHOS U/L  --  50  --   --   --   --   --  53  --   --    ALT U/L  --  16  --   --   --   --   --  16  --   --    AST U/L  --  18  --   --   --   --   --  13  --   --    GLUCOSE, ISTAT mg/dl  --   --   --   --  219* 259*  --   --   --  >600*    < > = values in this interval not displayed.     Results from last 7 days   Lab Units 03/25/24  1159 03/25/24  0610 03/25/24  0004 03/24/24  1700 03/24/24  1056   INR   --   --   --   --  1.37*   PTT seconds 56* 68* 57*   < > 41*    < > = values in this interval not displayed.     Results from last 7 days   Lab Units 03/26/24  0605 03/25/24 0449 03/24/24  1520   MAGNESIUM mg/dL 1.7* 2.0 1.6*

## 2024-03-26 NOTE — PHYSICAL THERAPY NOTE
PHYSICAL THERAPY EVAL/TX  Physical Therapy Evaluation    Performed at least 2 patient identifiers during session:  Patient Active Problem List   Diagnosis    Acute on Chronic Right knee pain    Chronic, continuous use of opioids    Type 2 diabetes mellitus with ketoacidosis without coma, without long-term current use of insulin (HCC)    Essential hypertension    Septic shock (HCC)    Hypokalemia    Hypocalcemia    TBI (traumatic brain injury) (HCC)    Syncope    Costochondral chest pain    Adrenal nodule (HCC)    Suicidal ideation    Hyponatremia    MSSA bacteremia    Chronic pain disorder    Mononeuropathy    Osteoarthritis of knee    A-fib with RVR (HCC)    Cardiomyopathy (HCC)    Anxiety       Past Medical History:   Diagnosis Date    Diabetes mellitus (HCC)     Hypertension        Past Surgical History:   Procedure Laterality Date    KNEE SURGERY      NM REVJ TOTAL KNEE ARTHRP W/WO ALGRFT 1 COMPONENT Right 3/24/2024    Procedure: ARTHROPLASTY KNEE TOTAL REVISION, POLY EXCHANGE;  Surgeon: Tao Washington DO;  Location:  MAIN OR;  Service: Orthopedics            03/26/24 1002   PT Last Visit   PT Visit Date 03/26/24   Note Type   Note type Evaluation   Pain Assessment   Pain Assessment Tool Mclean-Baker FACES   Mclean-Baker FACES Pain Rating 6   Pain Location/Orientation Orientation: Right;Location: Knee   Hospital Pain Intervention(s) Medication (See MAR);Repositioned;Ambulation/increased activity   Restrictions/Precautions   Weight Bearing Precautions Per Order Yes   RLE Weight Bearing Per Order WBAT   Other Precautions Pain;Fall Risk;Telemetry;Multiple lines;Bed Alarm;Chair Alarm;Cognitive   Home Living   Type of Home House   Home Layout Multi-level;Bed/bath upstairs;1/2 bath on main level;Stairs to enter with rails  (9STE. Bed and bath on third floor. Patient reports that spouse in looking into getting a hospital bed for the  "first floor.)   Bathroom Shower/Tub Tub/shower unit  (bathes in tub with spouse assisting)   Bathroom Equipment Hand-held shower   Home Equipment Walker;Cane   Additional Comments SPC PTA   Prior Function   Level of [place] Independent with functional mobility;Needs assistance with ADLs;Needs assistance with IADLS   Lives With Spouse   Receives Help From Family   IADLs Independent with driving;Family/Friend/Other provides meals;Independent with medication management   Falls in the last 6 months 0   Vocational Part time employment   General   Additional Pertinent History Hx of TBI   Family/Caregiver Present No   Cognition   Overall Cognitive Status WFL   Arousal/Participation Alert   Orientation Level Oriented X4   Memory Decreased short term memory   Following Commands Follows one step commands without difficulty   Subjective   Subjective \"I am nervous about getting up.\"   RLE Assessment   RLE Assessment   (Unable to fully assess due to pain)   LLE Assessment   LLE Assessment WFL   Bed Mobility   Supine to Sit 4  Minimal assistance   Additional items Assist x 1;HOB elevated;Bedrails;Increased time required;Verbal cues;LE management   Additional Comments BP taken in supine and sitting and after activity. Stable.   Transfers   Sit to Stand 4  Minimal assistance   Additional items Assist x 1;Armrests;Verbal cues   Stand to Sit 4  Minimal assistance   Additional items Assist x 1;Armrests;Verbal cues   Additional Comments Refer to treatment session for amb.   Balance   Static Sitting Fair +   Dynamic Sitting Fair +   Static Standing Fair   Dynamic Standing Fair   Ambulatory Fair   Endurance Deficit   Endurance Deficit Yes   Endurance Deficit Description Easily fatigued   Activity Tolerance   Activity Tolerance Patient limited by fatigue   Medical Staff Made Aware Stephani HARMON. PABLO Suazo   Nurse Made Aware Simón DE LA GARZA   Assessment   Prognosis Good   Problem List Decreased strength;Decreased range of motion;Decreased " endurance;Impaired balance;Decreased mobility;Decreased cognition;Pain   Assessment Patient is a 49y/o who presented with septic shock. 3/24 R knee I&D to bone, total knee arthroplasty revision, insertion of stimulan with Dr. Washington. Intra-op became acute tachycardia with new AF RVR, remained intubated.  Patient extubated on 3/25. He has a history of a TBI. Patient resides with spouse in a multi-story home with steps to enter. He is normally independent with a SPC. Current medical status includes ICU stay, telemetry, multiple lines, fall risk, bed/chair alarm, pain, decreased short term memory, decreased ROM, strength, balance, endurance and mobility. Patient was agreeable and motivated during session. He required increased time to complete mobility due to pain and dizziness. BP stable. Patient was tachycardic during session-RN aware. He required assistance of 1 for all mobility. Tolerated a short amb distance before becoming fatigued. He is deconditioned and will continue to benefit from ongoing inpatient P.T. Recommending level 2 resources. Moderate intensity. The patient's AM-PAC Basic Mobility Inpatient Short Form Raw Score is 15. A Raw score of less than or equal to 17 suggests the patient may benefit from discharge to post-acute rehabilitation services. Please also refer to the recommendation of the Physical Therapist for safe discharge planning.   Barriers to Discharge Inaccessible home environment;Decreased caregiver support   Goals   Patient Goals To get better and start walking.   STG Expiration Date 04/09/24   Short Term Goal #1 1. Perform supine<>sit with HOB flat without the use of bedrails ind 2. perform sit<>stand transfers mod I 3. Ambulate 100ft with a RW mod I level 4. Ascend/descend 9 steps with nonreciprocal pattern with use of railing supervision level.   PT Treatment Day 1   Plan   Treatment/Interventions Functional transfer training;LE strengthening/ROM;Elevations;Therapeutic  exercise;Endurance training;Patient/family training;Equipment eval/education;Bed mobility;Gait training;Spoke to nursing;OT;Spoke to case management   PT Frequency 3-5x/wk   Discharge Recommendation   Rehab Resource Intensity Level, PT II (Moderate Resource Intensity)   AM-PAC Basic Mobility Inpatient   Turning in Flat Bed Without Bedrails 3   Lying on Back to Sitting on Edge of Flat Bed Without Bedrails 3   Moving Bed to Chair 3   Standing Up From Chair Using Arms 3   Walk in Room 2   Climb 3-5 Stairs With Railing 1   Basic Mobility Inpatient Raw Score 15   Basic Mobility Standardized Score 36.97   [facility] Highest Level Of Mobility   -[facility] Goal 4: Move to chair/commode   -[facility] Achieved 5: Stand (1 or more minutes)   Additional Treatment Session   Start Time 0950   End Time 1002   Treatment Assessment Patient ambulated 5ft from bed to chair with min A x 1. Step to pattern with decreased stance on the RLE due to pain. Verbal cues for sequencing. Stand to sit with min A x 1.   Equipment Use RW   End of Consult   Patient Position at End of Consult Bedside chair;Bed/Chair alarm activated;All needs within reach     Marion Lobo, PT             Patient Name: Armen Llanos  Today's Date: 3/26/2024

## 2024-03-26 NOTE — PROGRESS NOTES
Orthopedics   Armen Llanos 48 y.o. male MRN: [id number]  Unit/Bed#: -01      Subjective:  48 y.o.male post operative day 2 right total knee revision arthroplasty with poly exchange, insertion of stimulant and debridement down to and including bone. Patient seen and examined this morning. Pain is controlled at 3/10. Patient crying at bedside due to having anxiety. He states he is used to taking 5 pills of Klonopin at home prn for anxiety and the medication he is receiving here is not helping his anxiety well. He denies any fever, chills or sweats.    Labs:  0   Lab Value Date/Time    HCT 31.7 (L) 03/26/2024 0605    HCT 31.3 (L) 03/25/2024 0449    HCT 29.6 (L) 03/24/2024 1047    HCT 29 (L) 03/24/2024 0951    HCT 34 (L) 03/24/2024 0900    HGB 11.3 (L) 03/26/2024 0605    HGB 11.1 (L) 03/25/2024 0449    HGB 10.1 (L) 03/24/2024 1047    HGB 9.9 (L) 03/24/2024 0951    HGB 11.6 (L) 03/24/2024 0900    INR 1.37 (H) 03/24/2024 1056    WBC 11.12 (H) 03/26/2024 0605    WBC 10.54 (H) 03/25/2024 0449    WBC 6.56 03/24/2024 1047    ESR 25 (H) 03/22/2024 0948    CRP 70.5 (H) 03/22/2024 1049       Meds:    Current Facility-Administered Medications:     acetaminophen (TYLENOL) tablet 650 mg, 650 mg, Oral, Q6H PRN, NATA Paulino, 650 mg at 03/25/24 2036    ALPRAZolam (XANAX) tablet 0.5 mg, 0.5 mg, Oral, TID PRN, NATA Zhang, 0.5 mg at 03/26/24 0158    amiodarone tablet 400 mg, 400 mg, Oral, BID With Meals, 400 mg at 03/25/24 1642 **FOLLOWED BY** [START ON 4/5/2024] amiodarone tablet 200 mg, 200 mg, Oral, Daily With Breakfast, Arturo Ferro PA-C    apixaban (ELIQUIS) tablet 5 mg, 5 mg, Oral, BID, Arturo Ferro PA-C, 5 mg at 03/25/24 1727    bupivacaine liposomal (EXPAREL) 1.3 % injection 20 mL, 20 mL, Infiltration, Once, Pan Felipe PA-C    ceFAZolin (ANCEF) IVPB (premix in dextrose) 2,000 mg 50 mL, 2,000 mg, Intravenous, Q8H, Vee Walker MD, Stopped at 03/26/24 0000    chlorhexidine (PERIDEX) 0.12 %  oral rinse 15 mL, 15 mL, Mouth/Throat, Q12H DARIN, NATA Paulino, 15 mL at 03/25/24 2037    docusate sodium (COLACE) capsule 100 mg, 100 mg, Oral, BID, NATA Paulino, 100 mg at 03/25/24 1727    fentaNYL (DURAGESIC) 75 mcg/hr TD 72 hr patch 1 patch, 1 patch, Transdermal, Q72H, NATA Zhang, 1 patch at 03/26/24 0059    hydrocortisone (Solu-CORTEF) injection 50 mg, 50 mg, Intravenous, Q6H DARIN, NATA Paulino, 50 mg at 03/26/24 0602    HYDROmorphone (DILAUDID) injection 2 mg, 2 mg, Intravenous, Q3H PRN, ANTA Paulino, 2 mg at 03/26/24 0418    insulin regular (HumuLIN R,NovoLIN R) 1 Units/mL in sodium chloride 0.9 % 100 mL infusion, 0.3-21 Units/hr, Intravenous, Titrated, NATA Paulino, Last Rate: 2 mL/hr at 03/26/24 0628, 2 Units/hr at 03/26/24 0628    metoprolol (LOPRESSOR) injection 5 mg, 5 mg, Intravenous, Q6H PRN, NATA Paulino    morphine (MS CONTIN) ER tablet 60 mg, 60 mg, Oral, Q12H PRN, NATA Zhang    nicotine (NICODERM CQ) 21 mg/24 hr TD 24 hr patch 21 mg, 21 mg, Transdermal, Daily, Pan Felipe PA-C, 21 mg at 03/25/24 0818    ondansetron (ZOFRAN) injection 4 mg, 4 mg, Intravenous, Q4H PRN, Pan Felipe PA-C, 4 mg at 03/25/24 2306    pantoprazole (PROTONIX) EC tablet 20 mg, 20 mg, Oral, BID AC, NATA Zhang    polyethylene glycol (MIRALAX) packet 17 g, 17 g, Per NG Tube, Daily, NATA Paulino, 17 g at 03/25/24 0818    pravastatin (PRAVACHOL) tablet 80 mg, 80 mg, Oral, Daily With Dinner, Pan Felipe PA-C, 80 mg at 03/25/24 1554    risperiDONE (RisperDAL) tablet 3 mg, 3 mg, Oral, Daily, NATA Zhang    sodium chloride 0.9 % infusion, 10 mL/hr, Intravenous, Continuous, NATA Paulino, Last Rate: 10 mL/hr at 03/25/24 1649, 10 mL/hr at 03/25/24 1649    Blood Culture:   Lab Results   Component Value Date    BLOODCX Received in Microbiology Lab. Culture in Progress. 03/25/2024       Wound Culture:   No results  "found for: \"WOUNDCULT\"    Ins and Outs:  I/O last 24 hours:  In: 3645.4 [P.O.:1020; I.V.:1885.4; NG/GT:40; IV Piggyback:700]  Out: 4050 [Urine:3300; Emesis/NG output:750]          Physical Exam:  Vitals:    03/26/24 0400   BP: 132/71   Pulse: 99   Resp: 17   Temp: 98 °F (36.7 °C)   SpO2: 95%     right Lower Extremity extremity:  Dressing intact without drainage  Minimal RLE swelling  TTP diffusely  Sensation intact distally  Motor intact to +FHL/EHL, +ankle dorsi/plantar flexion  2+ DP pulse, symmetric bilaterally  Digits warm and well perfused  Capillary refill < 2 seconds    Assessment: 48 y.o.male post operative day 2 right total knee revision arthroplasty with poly exchange, insertion of stimulant and debridement down to and including bone. OR cultures growing Staph aureus.     Plan:  WBAT to the RLE- knee immobilizer removed  ID recommendations for antibiotic management- IV ancef  Will likely need 6 weeks of IV abx then suppressive abx after that. PICC to be placed once blood cultures are negative  DVT prophylaxis- Eliquis  Analgesics  PT/OT  Dispo: Ortho will follow  Patient noted to have acute blood loss anemia due to a drop in Hbg of > 2.0g from preop levels, will monitor vital signs and resuscitate with IV fluids as needed    Karli Cedillo PA-C             "

## 2024-03-26 NOTE — CASE MANAGEMENT
Case Management Discharge Planning Note    Patient name Armen Llanos  Location /-01 MRN [id number]  : 1975 Date 3/26/2024       Current Admission Date: 3/22/2024  Current Admission Diagnosis:Type 2 diabetes mellitus with ketoacidosis without coma, without long-term current use of insulin ([facility])   Patient Active Problem List    Diagnosis Date Noted    Anxiety 2024    Cardiomyopathy ([facility]) 2024    Mononeuropathy 2024    Osteoarthritis of knee 2024    A-fib with RVR ([facility]) 2024    Hyponatremia 2024    MSSA bacteremia 2024    Suicidal ideation 2024    Syncope 2023    Costochondral chest pain 2023    Adrenal nodule ([facility]) 2023    Acute on Chronic Right knee pain 11/15/2022    Chronic, continuous use of opioids 11/15/2022    Type 2 diabetes mellitus with ketoacidosis without coma, without long-term current use of insulin ([facility]) 11/15/2022    Essential hypertension 11/15/2022    Septic shock ([facility]) 11/15/2022    Hypokalemia 11/15/2022    Hypocalcemia 11/15/2022    TBI (traumatic brain injury) ([facility]) 11/15/2022    Chronic pain disorder 2022      LOS (days): 4  Geometric Mean LOS (GMLOS) (days): 9.9  Days to GMLOS:5.7     OBJECTIVE:  Risk of Unplanned Readmission Score: 27.49         Current admission status: Inpatient   Preferred Pharmacy:   [id number] - [place] - [address]  [address] [address]  Phone: [phone] Fax: [phone]    Primary Care Provider: Deidre Andrea MD    Primary Insurance: [employer]  Secondary Insurance:     DISCHARGE DETAILS:        Met with patient to discuss PT/OT's recommendation for SNF rehab and he is agreeable. Referrals made to SNF's within a 15 mile radius. Wait availability and patient preference.  Patient's wife will be visiting around 4:30, CM to stop by and discuss patient's needs for SNF as per patient request.

## 2024-03-26 NOTE — PROGRESS NOTES
Armen Llanos  48 y.o.  male  1975  mrn [id number]    Assessment/Plan:  Sepsis/MSSA bacteremia/MSSA right TKR infxn/Fever/Leukocytosis/  Hypotension/JH: Pt presented with c/o right knee pain and swelling x 1 day. Pt does not remember if he fell one day PTA.     On admission, he did not have fever but WBC count was elevated at 17.8K. Creatinine was elevated at 1.59 c/w JH. Ortho aspirated the Pt's right knee on 3/23. Cell count showed 221,280 WBC's with poly predominance c/w septic arthritis in setting of TKR. No crystal were seen. He was placed on Vanco.      He went to the OR on 3/24 for washout of right knee with polyexchange. He developed hypotension post-op and fever to 103.5 c/w sepsis. He was transferred to ICU and required pressor support. Steroids were started.     Admission bld cx's grew MSSA. Initial right knee fluid cx also grew MSSA. Repeat bld cx's drawn on 3/23 grew MSSA. Right knee OR cx's grew MSSA. Repeat bld cx's were drawn on 3/25. ECHO is neg for valvular vegetation. He was extubated today. He is off pressor support. WBC count has decreased to 10.5K. Creatinine is down to 0.96 c/w resolution of JH.    3/26: No further fever and WBC has decreased to 10.5K. Admission bld cx's grew MSSA. Ortho aspirated the Pt's right knee on 3/23. Cell count showed 221,280 WBC's with poly predominance c/w septic arthritis in setting of TKR. Initial right knee fluid cx grew MSSA. Repeat bld cx's drawn on 3/23 and 3/25 also grew MSSA. ECHO is neg for valvular vegetation.     He went to the OR on 3/24 for washout of right knee with polyexchange. He developed hypotension post-op and fever to 103.5 c/w sepsis. Right knee OR cx's grew MSSA. Abx regimen was narrowed to Ancef on 3/25     - Cont Ancef   - Will repeat bld cx's today  - When bld cx's are neg, Pt will need placement of PICC line so he can complete 6 week course of IV abx  - Cont local wound care as per Ortho  - Planning to add Rifampin for synergy  in a few days  - Pt will need chronic suppressive abx tx after he finishes 6 week course of IV abx because right TKR was not removed.  - Will cont to monitor for the development of toxicity to current abx tx    Discussed case with Tiffanie PEACE    The Pt is critically ill and has acute impairment of one or more vital organ systems and there is high probability of life threatening clinical deterioration in the Pt's condition. Infection is the likely cause of sepsis and critical illness. The duration of critical care services reported by me represents the total time spent today providing direct bedside care. I was physically available to the Pt. The time reported includes, but was not limited to, detailed review of the Pt's medical records, vital signs, hemodynamic monitoring data, physical examination, evaluation of laboratory/radiographic/other diagnostic studies, and detailed discussion of the patient's condition and care with one or more members of the medical staff involved in the patient's care.      CCT: 30 mins       Subjective: Pt is feeling better this afternoon. He has less right knee pain post-op. Repeat bld cx's are still growing MSSA. Temps have decreased and WBC count is back down to nml. No probs with current abx tx    Objective:  VSS, Tmax: 100  HEENT: No scleral icterus  NECK: Supple    CARDIAC:  RRR, nml S1, S2  LUNGS:  Clear  ABDOMEN:  +BS, soft, nontender  MUSCULOSKELETAL:  No calf tenderness  EXTREMITIES:  +Intact dressing on right leg  SKIN:  No rash      Labs:  CBC w/diff  Recent Labs     03/26/24  0605   WBC 11.12*   HGB 11.3*   HCT 31.7*      NEUTOPHILPCT 82*   LYMPHOPCT 8*   MONOPCT 9   EOSPCT 0     BMP  Recent Labs     03/24/24  0951 03/24/24  1047 03/26/24  1415   K  --    < > 2.8*   CL  --    < > 103   CO2 16*   < > 21   BUN  --    < > 16   CREATININE  --    < > 0.88   GLUCOSE 219*  --   --    CALCIUM  --    < > 8.8    < > = values in this interval not displayed.  "    CMP  Recent Labs     03/24/24  0951 03/24/24  1047 03/25/24  0449 03/26/24  0605 03/26/24  1415   K  --    < > 3.6   < > 2.8*   CL  --    < > 104   < > 103   CO2 16*   < > 22   < > 21   BUN  --    < > 18   < > 16   CREATININE  --    < > 0.96   < > 0.88   CALCIUM  --    < > 8.7   < > 8.8   ALKPHOS  --   --  50  --   --    ALT  --   --  16  --   --    AST  --   --  18  --   --    GLUCOSE 219*  --   --   --   --     < > = values in this interval not displayed.       .labrc    Cultures:  Lab Results   Component Value Date    BLOODCX Staphylococcus aureus (A) 03/25/2024    BLOODCX Staphylococcus aureus (A) 03/25/2024    BLOODCX Staphylococcus aureus (A) 03/23/2024    BLOODCX Staphylococcus aureus (A) 03/23/2024    BLOODCX Staphylococcus aureus (A) 03/22/2024    BLOODCX Staphylococcus aureus (A) 03/22/2024     No results found for: \"WOUNDCULT\"  No results found for: \"URINECX\"  No results found for: \"SPUTUMCULTUR\"    MED:  Ancef: #2  Abx: #4    Completed:  Vanco x 3 days on 3/25         Current Facility-Administered Medications:     acetaminophen (TYLENOL) tablet 650 mg, 650 mg, Oral, Q6H PRN, NATA Day, 650 mg at 03/26/24 1453    amiodarone tablet 400 mg, 400 mg, Oral, BID With Meals, 400 mg at 03/26/24 1558 **FOLLOWED BY** [START ON 4/5/2024] amiodarone tablet 200 mg, 200 mg, Oral, Daily With Breakfast, NATA Day    apixaban (ELIQUIS) tablet 5 mg, 5 mg, Oral, BID, NATA Day, 5 mg at 03/26/24 1056    bupivacaine liposomal (EXPAREL) 1.3 % injection 20 mL, 20 mL, Infiltration, Once, NATA Day    ceFAZolin (ANCEF) IVPB (premix in dextrose) 2,000 mg 50 mL, 2,000 mg, Intravenous, Q8H, NATA Day, Last Rate: 100 mL/hr at 03/26/24 0847, 2,000 mg at 03/26/24 0847    chlorhexidine (PERIDEX) 0.12 % oral rinse 15 mL, 15 mL, Mouth/Throat, Q12H DARIN, NATA Day, 15 mL at 03/26/24 1059    clonazePAM (KlonoPIN) tablet 1 mg, 1 mg, Oral, " Daily, Tiffanie Bass, SAMANTHANP, 1 mg at 03/26/24 1112    docusate sodium (COLACE) capsule 200 mg, 200 mg, Oral, HS, Tiffanie Bass, SAMANTHANP    DULoxetine (CYMBALTA) delayed release capsule 20 mg, 20 mg, Oral, Daily, Nancy Jc MD, 20 mg at 03/26/24 1453    fentaNYL (DURAGESIC) 75 mcg/hr TD 72 hr patch 1 patch, 1 patch, Transdermal, Q72H, Tiffanie Bass, SAMANTHANP, 1 patch at 03/26/24 0059    hydrocortisone (Solu-CORTEF) injection 50 mg, 50 mg, Intravenous, Q12H, SAMANTHA DayNP    HYDROmorphone (DILAUDID) injection 2 mg, 2 mg, Intravenous, Q3H PRN, Tiffanie Bass, SAMANTHANP, 2 mg at 03/26/24 1506    insulin regular (HumuLIN R,NovoLIN R) 1 Units/mL in sodium chloride 0.9 % 100 mL infusion, 0.3-21 Units/hr, Intravenous, Titrated, SAMANTHA DayNP, Last Rate: 9 mL/hr at 03/26/24 1420, 9 Units/hr at 03/26/24 1420    metoprolol (LOPRESSOR) injection 5 mg, 5 mg, Intravenous, Q6H PRN, Tiffanie GO Bass, CRNP    metoprolol succinate (TOPROL-XL) 24 hr tablet 25 mg, 25 mg, Oral, Daily, Tiffanie GO Bass, CRNP, 25 mg at 03/26/24 1056    morphine (MS CONTIN) ER tablet 60 mg, 60 mg, Oral, Q12H PRN, Tiffanie Bass, CRNP, 60 mg at 03/26/24 1451    nicotine (NICODERM CQ) 21 mg/24 hr TD 24 hr patch 21 mg, 21 mg, Transdermal, Daily, Tiffanie Bass, CRNP, 21 mg at 03/25/24 0818    ondansetron (ZOFRAN) injection 4 mg, 4 mg, Intravenous, Q4H PRN, Tiffanie Bass, SAMANTHANP, 4 mg at 03/26/24 0830    pantoprazole (PROTONIX) EC tablet 20 mg, 20 mg, Oral, BID AC, NATA Day, 20 mg at 03/26/24 1455    polyethylene glycol (MIRALAX) packet 17 g, 17 g, Per NG Tube, Daily, NATA Day, 17 g at 03/25/24 0818    [Transfer Hold] potassium chloride 20 mEq IVPB (premix), 20 mEq, Intravenous, Q2H, NATA Day, Last Rate: 50 mL/hr at 03/26/24 1545, 20 mEq at 03/26/24 1545    potassium phosphates 30 mmol in sodium chloride 0.9 % 250 mL infusion, 30 mmol,  Intravenous, Once, NATA Day, Last Rate: 41.7 mL/hr at 03/26/24 1319, 30 mmol at 03/26/24 1319    pravastatin (PRAVACHOL) tablet 80 mg, 80 mg, Oral, Daily With Dinner, NATA Day, 80 mg at 03/26/24 1558    risperiDONE (RisperDAL) tablet 3 mg, 3 mg, Oral, Daily, NATA Day, 3 mg at 03/26/24 1056    sodium chloride 0.9 % infusion, 10 mL/hr, Intravenous, Continuous, Nancy Jc MD, Last Rate: 10 mL/hr at 03/26/24 1600, 10 mL/hr at 03/26/24 1600    Principal Problem:    Type 2 diabetes mellitus with ketoacidosis without coma, without long-term current use of insulin (HCC)  Active Problems:    Acute on Chronic Right knee pain    Chronic, continuous use of opioids    Essential hypertension    Septic shock (HCC)    TBI (traumatic brain injury) ([facility])    Suicidal ideation    Hyponatremia    MSSA bacteremia    A-fib with RVR (HCC)    Cardiomyopathy (HCC)    Anxiety      Vee Walker MD

## 2024-03-26 NOTE — ASSESSMENT & PLAN NOTE
Lab Results   Component Value Date    HGBA1C 12.1 (H) 03/22/2024       Recent Labs     03/26/24  0414 03/26/24  0628 03/26/24  0801 03/26/24  1022   POCGLU 207* 127 152* 238*         Blood Sugar Average: Last 72 hrs:  (P) [id number]    POA: Glucose > 600. BHB 1.41. pH 7.35.  Was previously fluid resuscitated and on insulin infusion  Home regimen: Metformin, Glimepiride  Plan:  Hold home regimen  Cont NCC insulin gtt for goal glucose 140-180  Endocrinology consulted to transition patient to SQ regimen  Anticipate transition to Lantus 30u daily plus 10u short acting meal coverage and SSI per Endocrinology recommendations  Diabetes education when appropriate   CHO controlled diet

## 2024-03-26 NOTE — CONSULTS
Consultation - Infectious Disease   Armen Llanos 48 y.o. male MRN: [id number]  Unit/Bed#: -01 Encounter: [id number]      Assessment/Plan   Sepsis/MSSA bacteremia/MSSA right TKR infxn/Fever/Leukocytosis/  Hypotension/JH: Pt presented with c/o right knee pain and swelling x 1 day. Pt does not remember if he fell one day PTA.    On admission, he did not have fever but WBC count was elevated at 17.8K. Creatinine was elevated at 1.59 c/w JH. Ortho aspirated the Pt's right knee on 3/23. Cell count showed 221,280 WBC's with poly predominance c/w septic arthritis in setting of TKR. No crystal were seen. He was placed on Vanco.     He went to the OR on 3/24 for washout of right knee with polyexchange. He developed hypotension post-op and fever to 103.5 c/w sepsis. He was transferred to ICU and required pressor support. Steroids were started.    Admission bld cx's grew MSSA. Initial right knee fluid cx also grew MSSA. Repeat bld cx's drawn on 3/23 also grew MSSA. Right knee OR cx's are growing Staph aureus. Repeat bld cx's were drawn today. ECHO is neg for valvular vegetation. Pt was extubated today. He is off pressor support. WBC count has decreased to 10.5K. Creatinine is down to 0.96 c/w resolution of JH.    - Will narrow abx regimen to Ancef - Pt received dose in the OR pre-op without any adverse effect  - Awaiting final results of repeat bld cx's and OR cx's  - When bld cx's are neg, Pt will need placement of PICC line so he can complete 6 week course of IV abx  - Cont local wound care as per Ortho  - Pt will need chronic suppressive abx tx after he finishes 6 week course of IV abx because right TKR was not removed.  - Will monitor for the development of toxicity to current abx tx    Discussed case with Dov PEACE    The Pt is critically ill and has acute impairment of one or more vital organ systems and there is high probability of life threatening clinical deterioration in the Pt's condition.  Infection is the likely cause of sepsis and critical illness. The duration of critical care services reported by me represents the total time spent today providing direct bedside care. I was physically available to the Pt. The time reported includes, but was not limited to, detailed review of the Pt's medical records, vital signs, hemodynamic monitoring data, physical examination, evaluation of laboratory/radiographic/other diagnostic studies, and detailed discussion of the patient's condition and care with one or more members of the medical staff involved in the patient's care.     CCT: 50 mins      History of Present Illness   Physician Requesting Consult: Ronaldo Beltrán DO  Reason for Consult / Principal Problem: Right knee pain and swelling    HPI: Armen Llanos is a 48 y.o. year old male with TBI with memory issues, HTN, DM, chronic opioid use, and s/p right TKR who was admitted on 3/22 with c/o right knee pain and swelling x 1 day. Pt does not remember if he fell one day PTA.    On admission, he did not have fever but WBC count was elevated at 17.8K. Creatinine was elevated at 1.59. Ortho aspirated the Pt's right knee on 3/23. Cell count showed 221,280 WBC's with poly predominance. No crystal were seen. He was placed on Vanco.     He went to the OR on 3/24 for washout of right knee with polyexchange. He developed hypotension post-op and fever to 103.5. He was transferred to ICU and required pressor support. Steroids were started. Admission bld cx's grew MSSA. Initial right knee fluid cx also grew MSSA. Repeat bld cx's drawn on 3/23 also grew MSSA. Right knee OR cx's are growing Staph aureus. Repeat bld cx's were drawn today. ECHO is neg for valvular vegetation. Pt was extubated today. He is off pressor support. WBC count has decreased to 10.5K. Creatinine is down to 0.96.    He denies cough, SOB, CP, N/V/D, abd pain or dysuria    Inpatient consult to Infectious Diseases  Consult performed by: Vee  MD Aaron  Consult ordered by: Penelope Ha MD          ROS: See HPI. 14 systems reviewed, remainder is neg.    Historical Information   Past Medical History:   Diagnosis Date    Diabetes mellitus (HCC)     Hypertension      Past Surgical History:   Procedure Laterality Date    KNEE SURGERY       Social History   Social History     Substance and Sexual Activity   Alcohol Use Not Currently     Social History     Substance and Sexual Activity   Drug Use Yes    Types: Marijuana     Social History     Tobacco Use   Smoking Status Never   Smokeless Tobacco Never     History reviewed. No pertinent family history.    Meds/Allergies   MEDS:  Ancef: #1  Vanco: #3      Current Facility-Administered Medications:     acetaminophen (TYLENOL) tablet 650 mg, 650 mg, Oral, Q6H PRN, NATA Paulino, 650 mg at 03/25/24 2036    amiodarone tablet 400 mg, 400 mg, Oral, BID With Meals, 400 mg at 03/25/24 1642 **FOLLOWED BY** [START ON 4/5/2024] amiodarone tablet 200 mg, 200 mg, Oral, Daily With Breakfast, Arturo Ferro PA-C    apixaban (ELIQUIS) tablet 5 mg, 5 mg, Oral, BID, Arturo Ferro PA-C, 5 mg at 03/25/24 1727    bupivacaine liposomal (EXPAREL) 1.3 % injection 20 mL, 20 mL, Infiltration, Once, Pan Felipe PA-C    ceFAZolin (ANCEF) IVPB (premix in dextrose) 2,000 mg 50 mL, 2,000 mg, Intravenous, Q8H, Vee Walker MD, Last Rate: 100 mL/hr at 03/25/24 1642, 2,000 mg at 03/25/24 1642    chlorhexidine (PERIDEX) 0.12 % oral rinse 15 mL, 15 mL, Mouth/Throat, Q12H DARIN, NATA Paulino, 15 mL at 03/25/24 2037    docusate sodium (COLACE) capsule 100 mg, 100 mg, Oral, BID, NATA Paulino, 100 mg at 03/25/24 1727    hydrocortisone (Solu-CORTEF) injection 50 mg, 50 mg, Intravenous, Q6H DARIN, NATA Paulino, 50 mg at 03/25/24 1727    HYDROmorphone (DILAUDID) injection 2 mg, 2 mg, Intravenous, Q3H PRN, NATA Paulino, 2 mg at 03/25/24 2047    insulin regular (HumuLIN R,NovoLIN R) 1 Units/mL in  sodium chloride 0.9 % 100 mL infusion, 0.3-21 Units/hr, Intravenous, Titrated, NATA Paulino, Last Rate: 3 mL/hr at 03/25/24 2210, 3 Units/hr at 03/25/24 2210    LORazepam (ATIVAN) injection 1 mg, 1 mg, Intravenous, Once, NATA Zhang    metoprolol (LOPRESSOR) injection 5 mg, 5 mg, Intravenous, Q6H PRN, NATA Paulino    nicotine (NICODERM CQ) 21 mg/24 hr TD 24 hr patch 21 mg, 21 mg, Transdermal, Daily, Pan Felipe PA-C, 21 mg at 03/25/24 0818    ondansetron (ZOFRAN) injection 4 mg, 4 mg, Intravenous, Q4H PRN, Pan Felipe PA-C, 4 mg at 03/23/24 1521    pantoprazole (PROTONIX) injection 40 mg, 40 mg, Intravenous, Q24H DARIN, NATA Paulino, 40 mg at 03/25/24 0818    polyethylene glycol (MIRALAX) packet 17 g, 17 g, Per NG Tube, Daily, NATA Paulino, 17 g at 03/25/24 0818    pravastatin (PRAVACHOL) tablet 80 mg, 80 mg, Oral, Daily With Dinner, Pan Felipe PA-C, 80 mg at 03/25/24 1554    sodium chloride 0.9 % infusion, 10 mL/hr, Intravenous, Continuous, NATA Paulino, Last Rate: 10 mL/hr at 03/25/24 1649, 10 mL/hr at 03/25/24 1649    Allergies   Allergen Reactions    Penicillins Hives         Intake/Output Summary (Last 24 hours) at 3/25/2024 2308  Last data filed at 3/25/2024 2217  Gross per 24 hour   Intake 2404.28 ml   Output 3110 ml   Net -705.72 ml       PE:  WD, WN, WM in NAD  VSS, Tmax:103.8  HEENT:  No scleral icterus, pharynx clear  NECK: Supple  CARDIAC:  RRR, nml S1, S2  LUNGS:  +Few rhonchi anteriorly  ABDOMEN:  +BS, soft, nontender  MUSCULOSKELETAL:  No calf tenderness  EXTREMITIES:  +Intact dressing on right knee  SKIN:  No rash  NEURO:  Grossly nonfocal    Invasive Devices:   CVC Central Lines 03/24/24 Triple 16cm Right Internal jugular (Active)   Reasons to continue CVC line  Medications requiring central line 03/25/24 2000   Goal for Removal D/C when meds requiring line finished 03/25/24 2000   Line Necessity Reviewed Yes, reviewed with  provider 03/25/24 2000   Site Assessment [facility] 03/25/24 2000   Proximal Lumen Status Blood return noted;Flushed;Passive disinfecting cap applied 03/25/24 2000   Medial Lumen Status Blood return noted;Flushed;Infusing 03/25/24 2000   Distal Lumen Status Blood return noted;Flushed;Passive disinfecting cap applied 03/25/24 2000   Dressing Type Chlorhexidine dressing 03/25/24 2000   Dressing Status Clean;Dry;Intact 03/25/24 2000   Dressing Change Due 03/31/24 03/24/24 2000       Peripheral IV 03/23/24 Right;Ventral (anterior) Forearm (Active)   Site Assessment [facility] 03/25/24 2000   Dressing Type Transparent 03/25/24 2000   Line Status Flushed;Passive disinfecting cap applied;Saline locked 03/25/24 2000   Dressing Status Clean;Dry;Intact 03/25/24 2000   Dressing Change Due 03/27/24 03/25/24 1200   Reason Not Rotated Not due 03/25/24 1600       Peripheral IV 03/24/24 Left Wrist (Active)   Site Assessment [facility] 03/25/24 2000   Dressing Type Transparent 03/25/24 2000   Line Status Flushed;Passive disinfecting cap applied;Saline locked 03/25/24 2000   Dressing Status Clean;Dry;Intact 03/25/24 2000   Dressing Change Due 03/28/24 03/25/24 1200   Reason Not Rotated Not due 03/24/24 1600       Arterial Line 03/24/24 Left Radial (Active)   Site Assessment [facility] 03/25/24 2000   Line Status Pulsatile blood flow 03/25/24 2000   Art Line Waveform Appropriate 03/25/24 2000   Art Line Interventions Zeroed and calibrated;Leveled;Connections checked and tightened;Flushed per protocol;Line pulled back 03/25/24 2000   Color/Movement/Sensation Capillary refill less than 3 sec 03/25/24 2000   Dressing Type Transparent 03/25/24 2000   Dressing Status Clean;Dry;Intact 03/25/24 2000   Dressing Change Due 03/28/24 03/24/24 2000           Lab Results:   No results displayed because visit has over 200 results.        Imaging Studies: I have personally reviewed pertinent films in PACS  EKG, Pathology, and Other Studies: I have personally reviewed pertinent  "reports.      Culture  Lab Results   Component Value Date    BLOODCX Received in Microbiology Lab. Culture in Progress. 03/25/2024    BLOODCX Received in Microbiology Lab. Culture in Progress. 03/25/2024    BLOODCX Staphylococcus aureus (A) 03/23/2024    BLOODCX Staphylococcus aureus (A) 03/23/2024    BLOODCX Staphylococcus aureus (A) 03/22/2024    BLOODCX Staphylococcus aureus (A) 03/22/2024     No results found for: \"WOUNDCULT\"  No results found for: \"URINECX\"  No results found for: \"SPUTUMCULTUR\"    Principal Problem:    Type 2 diabetes mellitus with ketoacidosis without coma, without long-term current use of insulin ([facility])  Active Problems:    Acute on Chronic Right knee pain    Chronic, continuous use of opioids    Essential hypertension    Septic shock ([facility])    TBI (traumatic brain injury) ([facility])    Suicidal ideation    Hyponatremia    MSSA bacteremia    A-fib with RVR ([facility])    Cardiomyopathy ([facility])         "

## 2024-03-26 NOTE — ASSESSMENT & PLAN NOTE
Decreased serum bicarb in the setting of intravascular volume depletion, septic shock   Improving on serial draw s/p large volume crystalloid resuscitation and initiation of vasopressors   Trend UO, serum bicarb closely  Resolved on BMP

## 2024-03-27 ENCOUNTER — ANESTHESIA EVENT (INPATIENT)
Dept: PERIOP | Facility: HOSPITAL | Age: 49
DRG: 485 | End: 2024-03-27
Payer: COMMERCIAL

## 2024-03-27 ENCOUNTER — DOCUMENTATION (OUTPATIENT)
Dept: ENDOCRINOLOGY | Facility: HOSPITAL | Age: 49
End: 2024-03-27

## 2024-03-27 DIAGNOSIS — I42.0 DILATED CARDIOMYOPATHY (HCC): Primary | ICD-10-CM

## 2024-03-27 LAB
ANION GAP SERPL CALCULATED.3IONS-SCNC: 10 MMOL/L (ref 4–13)
ATRIAL RATE: 102 BPM
ATRIAL RATE: 103 BPM
BASOPHILS # BLD AUTO: 0.03 THOUSANDS/ÂΜL (ref 0–0.1)
BASOPHILS NFR BLD AUTO: 0 % (ref 0–1)
BUN SERPL-MCNC: 15 MG/DL (ref 5–25)
CALCIUM SERPL-MCNC: 8.7 MG/DL (ref 8.4–10.2)
CHLORIDE SERPL-SCNC: 102 MMOL/L (ref 96–108)
CO2 SERPL-SCNC: 23 MMOL/L (ref 21–32)
CREAT SERPL-MCNC: 0.83 MG/DL (ref 0.6–1.3)
EOSINOPHIL # BLD AUTO: 0.01 THOUSAND/ÂΜL (ref 0–0.61)
EOSINOPHIL NFR BLD AUTO: 0 % (ref 0–6)
ERYTHROCYTE [DISTWIDTH] IN BLOOD BY AUTOMATED COUNT: 13.1 % (ref 11.6–15.1)
GFR SERPL CREATININE-BSD FRML MDRD: 103 ML/MIN/1.73SQ M
GLUCOSE SERPL-MCNC: 112 MG/DL (ref 65–140)
GLUCOSE SERPL-MCNC: 125 MG/DL (ref 65–140)
GLUCOSE SERPL-MCNC: 129 MG/DL (ref 65–140)
GLUCOSE SERPL-MCNC: 144 MG/DL (ref 65–140)
GLUCOSE SERPL-MCNC: 150 MG/DL (ref 65–140)
GLUCOSE SERPL-MCNC: 154 MG/DL (ref 65–140)
GLUCOSE SERPL-MCNC: 177 MG/DL (ref 65–140)
GLUCOSE SERPL-MCNC: 182 MG/DL (ref 65–140)
GLUCOSE SERPL-MCNC: 185 MG/DL (ref 65–140)
GLUCOSE SERPL-MCNC: 207 MG/DL (ref 65–140)
GLUCOSE SERPL-MCNC: 210 MG/DL (ref 65–140)
GLUCOSE SERPL-MCNC: 216 MG/DL (ref 65–140)
GLUCOSE SERPL-MCNC: 84 MG/DL (ref 65–140)
GLUCOSE SERPL-MCNC: 91 MG/DL (ref 65–140)
HCT VFR BLD AUTO: 34.1 % (ref 36.5–49.3)
HGB BLD-MCNC: 11.5 G/DL (ref 12–17)
IMM GRANULOCYTES # BLD AUTO: 0.15 THOUSAND/UL (ref 0–0.2)
IMM GRANULOCYTES NFR BLD AUTO: 2 % (ref 0–2)
LYMPHOCYTES # BLD AUTO: 0.65 THOUSANDS/ÂΜL (ref 0.6–4.47)
LYMPHOCYTES NFR BLD AUTO: 7 % (ref 14–44)
MAGNESIUM SERPL-MCNC: 1.8 MG/DL (ref 1.9–2.7)
MCH RBC QN AUTO: 29.7 PG (ref 26.8–34.3)
MCHC RBC AUTO-ENTMCNC: 33.7 G/DL (ref 31.4–37.4)
MCV RBC AUTO: 88 FL (ref 82–98)
MONOCYTES # BLD AUTO: 0.68 THOUSAND/ÂΜL (ref 0.17–1.22)
MONOCYTES NFR BLD AUTO: 7 % (ref 4–12)
NEUTROPHILS # BLD AUTO: 7.89 THOUSANDS/ÂΜL (ref 1.85–7.62)
NEUTS SEG NFR BLD AUTO: 84 % (ref 43–75)
NRBC BLD AUTO-RTO: 0 /100 WBCS
P AXIS: 81 DEGREES
PHOSPHATE SERPL-MCNC: 2.5 MG/DL (ref 2.7–4.5)
PLATELET # BLD AUTO: 267 THOUSANDS/UL (ref 149–390)
PMV BLD AUTO: 10.9 FL (ref 8.9–12.7)
POTASSIUM SERPL-SCNC: 3.3 MMOL/L (ref 3.5–5.3)
PR INTERVAL: 150 MS
PROCALCITONIN SERPL-MCNC: 1.55 NG/ML
QRS AXIS: 66 DEGREES
QRS AXIS: 83 DEGREES
QRSD INTERVAL: 88 MS
QRSD INTERVAL: 92 MS
QT INTERVAL: 262 MS
QT INTERVAL: 304 MS
QTC INTERVAL: 398 MS
QTC INTERVAL: 449 MS
RBC # BLD AUTO: 3.87 MILLION/UL (ref 3.88–5.62)
SODIUM SERPL-SCNC: 135 MMOL/L (ref 135–147)
T WAVE AXIS: 64 DEGREES
T WAVE AXIS: 64 DEGREES
VENTRICULAR RATE: 103 BPM
VENTRICULAR RATE: 177 BPM
WBC # BLD AUTO: 9.41 THOUSAND/UL (ref 4.31–10.16)

## 2024-03-27 PROCEDURE — 82948 REAGENT STRIP/BLOOD GLUCOSE: CPT

## 2024-03-27 PROCEDURE — 99024 POSTOP FOLLOW-UP VISIT: CPT | Performed by: STUDENT IN AN ORGANIZED HEALTH CARE EDUCATION/TRAINING PROGRAM

## 2024-03-27 PROCEDURE — 80048 BASIC METABOLIC PNL TOTAL CA: CPT | Performed by: INTERNAL MEDICINE

## 2024-03-27 PROCEDURE — 84145 PROCALCITONIN (PCT): CPT | Performed by: INTERNAL MEDICINE

## 2024-03-27 PROCEDURE — 99233 SBSQ HOSP IP/OBS HIGH 50: CPT | Performed by: PODIATRIST

## 2024-03-27 PROCEDURE — 93010 ELECTROCARDIOGRAM REPORT: CPT | Performed by: INTERNAL MEDICINE

## 2024-03-27 PROCEDURE — 99232 SBSQ HOSP IP/OBS MODERATE 35: CPT | Performed by: PHYSICIAN ASSISTANT

## 2024-03-27 PROCEDURE — 84100 ASSAY OF PHOSPHORUS: CPT | Performed by: INTERNAL MEDICINE

## 2024-03-27 PROCEDURE — 83735 ASSAY OF MAGNESIUM: CPT | Performed by: INTERNAL MEDICINE

## 2024-03-27 PROCEDURE — 99232 SBSQ HOSP IP/OBS MODERATE 35: CPT | Performed by: INTERNAL MEDICINE

## 2024-03-27 PROCEDURE — 85025 COMPLETE CBC W/AUTO DIFF WBC: CPT | Performed by: INTERNAL MEDICINE

## 2024-03-27 PROCEDURE — 99223 1ST HOSP IP/OBS HIGH 75: CPT | Performed by: STUDENT IN AN ORGANIZED HEALTH CARE EDUCATION/TRAINING PROGRAM

## 2024-03-27 RX ORDER — BUTALBITAL, ACETAMINOPHEN AND CAFFEINE 50; 325; 40 MG/1; MG/1; MG/1
1 TABLET ORAL EVERY 4 HOURS PRN
Status: DISCONTINUED | OUTPATIENT
Start: 2024-03-27 | End: 2024-04-03 | Stop reason: HOSPADM

## 2024-03-27 RX ORDER — SACUBITRIL AND VALSARTAN 24; 26 MG/1; MG/1
1 TABLET, FILM COATED ORAL 2 TIMES DAILY
Qty: 60 TABLET | Refills: 0 | Status: SHIPPED | OUTPATIENT
Start: 2024-03-27

## 2024-03-27 RX ORDER — ZOLPIDEM TARTRATE 5 MG/1
5 TABLET ORAL
Status: DISCONTINUED | OUTPATIENT
Start: 2024-03-27 | End: 2024-04-03 | Stop reason: HOSPADM

## 2024-03-27 RX ORDER — SUMATRIPTAN 50 MG/1
50 TABLET, FILM COATED ORAL ONCE
Status: COMPLETED | OUTPATIENT
Start: 2024-03-27 | End: 2024-03-27

## 2024-03-27 RX ORDER — NICOTINE 21 MG/24HR
21 PATCH, TRANSDERMAL 24 HOURS TRANSDERMAL
Status: DISCONTINUED | OUTPATIENT
Start: 2024-03-27 | End: 2024-04-03 | Stop reason: HOSPADM

## 2024-03-27 RX ORDER — INSULIN GLARGINE 100 [IU]/ML
35 INJECTION, SOLUTION SUBCUTANEOUS EVERY MORNING
Status: DISCONTINUED | OUTPATIENT
Start: 2024-03-27 | End: 2024-03-28

## 2024-03-27 RX ORDER — NAPROXEN 500 MG/1
500 TABLET ORAL 2 TIMES DAILY WITH MEALS
Status: DISCONTINUED | OUTPATIENT
Start: 2024-03-27 | End: 2024-04-03 | Stop reason: HOSPADM

## 2024-03-27 RX ORDER — ZOLPIDEM TARTRATE 5 MG/1
5 TABLET ORAL ONCE AS NEEDED
Status: ACTIVE | OUTPATIENT
Start: 2024-03-27 | End: 2024-03-28

## 2024-03-27 RX ADMIN — SODIUM CHLORIDE 14 UNITS/HR: 9 INJECTION, SOLUTION INTRAVENOUS at 12:46

## 2024-03-27 RX ADMIN — CLONAZEPAM 1 MG: 1 TABLET ORAL at 08:35

## 2024-03-27 RX ADMIN — NAPROXEN 500 MG: 500 TABLET ORAL at 17:18

## 2024-03-27 RX ADMIN — MORPHINE SULFATE 60 MG: 30 TABLET, EXTENDED RELEASE ORAL at 11:55

## 2024-03-27 RX ADMIN — CHLORHEXIDINE GLUCONATE 0.12% ORAL RINSE 15 ML: 1.2 LIQUID ORAL at 20:35

## 2024-03-27 RX ADMIN — HYDROMORPHONE HYDROCHLORIDE 2 MG: 2 INJECTION, SOLUTION INTRAMUSCULAR; INTRAVENOUS; SUBCUTANEOUS at 08:35

## 2024-03-27 RX ADMIN — NAPROXEN 500 MG: 500 TABLET ORAL at 08:35

## 2024-03-27 RX ADMIN — CHLORHEXIDINE GLUCONATE 0.12% ORAL RINSE 15 ML: 1.2 LIQUID ORAL at 08:34

## 2024-03-27 RX ADMIN — METOPROLOL SUCCINATE 25 MG: 25 TABLET, EXTENDED RELEASE ORAL at 08:35

## 2024-03-27 RX ADMIN — SUMATRIPTAN SUCCINATE 50 MG: 50 TABLET ORAL at 07:15

## 2024-03-27 RX ADMIN — HYDROCORTISONE SODIUM SUCCINATE 50 MG: 100 INJECTION, POWDER, FOR SOLUTION INTRAMUSCULAR; INTRAVENOUS at 05:43

## 2024-03-27 RX ADMIN — DOCUSATE SODIUM 200 MG: 100 CAPSULE, LIQUID FILLED ORAL at 21:25

## 2024-03-27 RX ADMIN — PRAVASTATIN SODIUM 80 MG: 80 TABLET ORAL at 17:18

## 2024-03-27 RX ADMIN — SODIUM CHLORIDE 9 UNITS/HR: 9 INJECTION, SOLUTION INTRAVENOUS at 02:27

## 2024-03-27 RX ADMIN — INSULIN GLARGINE 35 UNITS: 100 INJECTION, SOLUTION SUBCUTANEOUS at 10:13

## 2024-03-27 RX ADMIN — HYDROMORPHONE HYDROCHLORIDE 2 MG: 2 INJECTION, SOLUTION INTRAMUSCULAR; INTRAVENOUS; SUBCUTANEOUS at 20:45

## 2024-03-27 RX ADMIN — APIXABAN 5 MG: 5 TABLET, FILM COATED ORAL at 08:35

## 2024-03-27 RX ADMIN — AMIODARONE HYDROCHLORIDE 400 MG: 200 TABLET ORAL at 17:18

## 2024-03-27 RX ADMIN — DULOXETINE HYDROCHLORIDE 20 MG: 20 CAPSULE, DELAYED RELEASE ORAL at 08:35

## 2024-03-27 RX ADMIN — PANTOPRAZOLE SODIUM 20 MG: 20 TABLET, DELAYED RELEASE ORAL at 17:18

## 2024-03-27 RX ADMIN — NICOTINE 21 MG: 21 PATCH, EXTENDED RELEASE TRANSDERMAL at 23:42

## 2024-03-27 RX ADMIN — ZOLPIDEM TARTRATE 5 MG: 5 TABLET ORAL at 21:29

## 2024-03-27 RX ADMIN — CEFAZOLIN SODIUM 2000 MG: 2 SOLUTION INTRAVENOUS at 20:00

## 2024-03-27 RX ADMIN — PANTOPRAZOLE SODIUM 20 MG: 20 TABLET, DELAYED RELEASE ORAL at 07:21

## 2024-03-27 RX ADMIN — CEFAZOLIN SODIUM 2000 MG: 2 SOLUTION INTRAVENOUS at 11:55

## 2024-03-27 RX ADMIN — RISPERIDONE 3 MG: 1 TABLET, FILM COATED ORAL at 08:35

## 2024-03-27 RX ADMIN — HYDROMORPHONE HYDROCHLORIDE 2 MG: 2 INJECTION, SOLUTION INTRAMUSCULAR; INTRAVENOUS; SUBCUTANEOUS at 05:24

## 2024-03-27 RX ADMIN — POLYETHYLENE GLYCOL 3350 17 G: 17 POWDER, FOR SOLUTION ORAL at 08:34

## 2024-03-27 RX ADMIN — HYDROMORPHONE HYDROCHLORIDE 2 MG: 2 INJECTION, SOLUTION INTRAMUSCULAR; INTRAVENOUS; SUBCUTANEOUS at 02:07

## 2024-03-27 RX ADMIN — AMIODARONE HYDROCHLORIDE 400 MG: 200 TABLET ORAL at 07:21

## 2024-03-27 RX ADMIN — CEFAZOLIN SODIUM 2000 MG: 2 SOLUTION INTRAVENOUS at 02:54

## 2024-03-27 NOTE — PROGRESS NOTES
[facility]  Progress Note  Name: Armen Llanos I  MRN: [id number]  Unit/Bed#: -01 I Date of Admission: 3/22/2024   Date of Service: 3/27/2024 I Hospital Day: 5    Assessment/Plan   * Type 2 diabetes mellitus with ketoacidosis without coma, without long-term current use of insulin (HCC)  Assessment & Plan  Lab Results   Component Value Date    HGBA1C 12.1 (H) 03/22/2024       Recent Labs     03/27/24  0219 03/27/24  0352 03/27/24  0526 03/27/24  0737   POCGLU 207* 210* 185* 150*         Blood Sugar Average: Last 72 hrs:  (P) 187.7987058620112721    POA: Glucose > 600. BHB 1.41. pH 7.35.  Was previously fluid resuscitated and on insulin infusion  Home regimen: Metformin, Glimepiride  Plan:  Hold home regimen  Cont NCC insulin gtt for goal glucose 140-180  Diabetes education when appropriate   CHO controlled diet   Endocrinology consulted to transition patient from insulin drip and assist in establishing insulin regimen            VTE Pharmacologic Prophylaxis: VTE Score: 2 Low Risk (Score 0-2) - Encourage Ambulation. On Eliquis for Afib    Mobility:   Basic Mobility Inpatient Raw Score: 15  JH-HLM Goal: 4: Move to chair/commode  JH-HLM Achieved: 2: Bed activities/Dependent transfer  JH-HLM Goal NOT achieved. Continue with multidisciplinary rounding and encourage appropriate mobility to improve upon JH-HLM goals.    Patient Centered Rounds: I performed bedside rounds with nursing staff today.   Discussions with Specialists or Other Care Team Provider: Appreciate Cardiology, Ortho and ID notes.     Education and Discussions with Family / Patient: Patient declined call to .     Total Time Spent on Date of Encounter in care of patient: 45 mins. This time was spent on one or more of the following: performing physical exam; counseling and coordination of care; obtaining or reviewing history; documenting in the medical record; reviewing/ordering tests, medications or  "procedures; communicating with other healthcare professionals and discussing with patient's family/caregivers.    Current Length of Stay: 5 day(s)  Current Patient Status: Inpatient   Certification Statement: The patient will continue to require additional inpatient hospital stay due to bacteremia, on IV antibiotics, osteo of toe  Discharge Plan: Anticipate discharge in >72 hrs to home.    Code Status: Level 1 - Full Code    Subjective:   \"It's like staying at a  resort, having my pain at a 3/10 instead of a 7/10 has been amazing. This Dilaudid really works!\"    Objective:     Vitals:   Temp (24hrs), Av.1 °F (36.7 °C), Min:97.9 °F (36.6 °C), Max:98.3 °F (36.8 °C)    Temp:  [97.9 °F (36.6 °C)-98.3 °F (36.8 °C)] 97.9 °F (36.6 °C)  HR:  [] 104  Resp:  [16-18] 16  BP: (138-158)/(92-94) 153/93  SpO2:  [95 %-97 %] 95 %  Body mass index is 25.53 kg/m².     Input and Output Summary (last 24 hours):     Intake/Output Summary (Last 24 hours) at 3/27/2024 1053  Last data filed at 3/27/2024 0845  Gross per 24 hour   Intake 2224.49 ml   Output 2050 ml   Net 174.49 ml       Physical Exam:   Physical Exam  Cardiovascular:      Heart sounds: Murmur heard.          Additional Data:     Labs:  Results from last 7 days   Lab Units 24  0209 24  0605 24  0449   WBC Thousand/uL 9.41   < > 10.54*   HEMOGLOBIN g/dL 11.5*   < > 11.1*   HEMATOCRIT % 34.1*   < > 31.3*   PLATELETS Thousands/uL 267   < > 155   BANDS PCT %  --   --  6   NEUTROS PCT % 84*   < >  --    LYMPHS PCT % 7*   < >  --    LYMPHO PCT %  --   --  3*   MONOS PCT % 7   < >  --    MONO PCT %  --   --  4   EOS PCT % 0   < > 0    < > = values in this interval not displayed.     Results from last 7 days   Lab Units 24  0209 24  0605 24  0449   SODIUM mmol/L 135   < > 134*   POTASSIUM mmol/L 3.3*   < > 3.6   CHLORIDE mmol/L 102   < > 104   CO2 mmol/L 23   < > 22   BUN mg/dL 15   < > 18   CREATININE mg/dL 0.83   < > 0.96   ANION " GAP mmol/L 10   < > 8   CALCIUM mg/dL 8.7   < > 8.7   ALBUMIN g/dL  --   --  2.9*   TOTAL BILIRUBIN mg/dL  --   --  0.64   ALK PHOS U/L  --   --  50   ALT U/L  --   --  16   AST U/L  --   --  18   GLUCOSE RANDOM mg/dL 182*   < > 170*    < > = values in this interval not displayed.     Results from last 7 days   Lab Units 03/24/24  1056   INR  1.37*     Results from last 7 days   Lab Units 03/27/24  0958 03/27/24  0737 03/27/24  0526 03/27/24  0352 03/27/24  0219 03/27/24  0054 03/27/24  0018 03/26/24  2227 03/26/24  2035 03/26/24  1907 03/26/24  1614 03/26/24  1424   POC GLUCOSE mg/dl 177* 150* 185* 210* 207* 125 84 157* 225* 218* 115 200*     Results from last 7 days   Lab Units 03/22/24  0948   HEMOGLOBIN A1C % 12.1*     Results from last 7 days   Lab Units 03/27/24  0209 03/26/24  0605 03/25/24  0449 03/24/24  1047 03/23/24  1517 03/22/24  1818 03/22/24  1432   LACTIC ACID mmol/L  --   --   --  1.4 0.7 1.6 3.1*   PROCALCITONIN ng/ml 1.55* 2.78* 5.42* 3.39*  --   --   --        Lines/Drains:  Invasive Devices       Peripheral Intravenous Line  Duration             Peripheral IV 03/26/24 Right;Upper;Ventral (anterior) Arm <1 day    Peripheral IV 03/27/24 Left Antecubital <1 day                      Telemetry:  Telemetry Orders (From admission, onward)               24 Hour Telemetry Monitoring  Continuous x 24 Hours (Telem)        Question:  Reason for 24 Hour Telemetry  Answer:  Arrhythmias requiring acute medical intervention / PPM or ICD malfunction                     Telemetry Reviewed: Atrial fibrillation. HR averaging 100s  Indication for Continued Telemetry Use: Arrthymias requiring medical therapy             Imaging: Reviewed radiology reports from this admission including: MRI foot    Recent Cultures (last 7 days):   Results from last 7 days   Lab Units 03/26/24  1427 03/26/24  1416 03/25/24  0521 03/24/24  0841 03/24/24  0840 03/24/24  0839 03/23/24  1245 03/23/24  1158 03/22/24  1448   BLOOD CULTURE   Received in Microbiology Lab. Culture in Progress. Received in Microbiology Lab. Culture in Progress. Staphylococcus aureus*  Staphylococcus aureus*  --   --   --  Staphylococcus aureus*  Staphylococcus aureus*  --  Staphylococcus aureus*  Staphylococcus aureus*   GRAM STAIN RESULT   --   --  Gram positive cocci in clusters*  Gram positive cocci in clusters* No Polys or Bacteria seen No Polys or Bacteria seen 1+ Disintegrating polys*  2+ Gram positive cocci in pairs* Gram positive cocci in clusters*  Gram positive cocci in clusters* 4+ Polys*  3+ Gram positive cocci in clusters* Gram positive cocci in clusters*  Gram positive cocci in clusters*   BODY FLUID CULTURE, STERILE   --   --   --   --   --   --   --  4+ Growth of Staphylococcus aureus*  --        Last 24 Hours Medication List:   Current Facility-Administered Medications   Medication Dose Route Frequency Provider Last Rate    acetaminophen  650 mg Oral Q6H PRN Patti Sher PA-C      amiodarone  400 mg Oral BID With Meals Patti Sher PA-C      Followed by    [START ON 4/5/2024] amiodarone  200 mg Oral Daily With Breakfast Patti Sher PA-C      apixaban  5 mg Oral BID Patti Sher PA-C      bupivacaine liposomal  20 mL Infiltration Once Patti Sher PA-C      butalbital-acetaminophen-caffeine  1 tablet Oral Q4H PRN Patti Sher PA-C      cefazolin  2,000 mg Intravenous Q8H Patti Sher PA-C 2,000 mg (03/27/24 0254)    chlorhexidine  15 mL Mouth/Throat Q12H DARIN Patti Sher PA-C      clonazePAM  1 mg Oral Daily Patti Sher PA-C      docusate sodium  200 mg Oral HS Patti Sher PA-C      DULoxetine  20 mg Oral Daily Patti Sher PA-C      fentaNYL  1 patch Transdermal Q72H Patti Sher PA-C      hydrocortisone sodium succinate  50 mg Intravenous Q12H Patti Sher PA-C      HYDROmorphone  2 mg Intravenous Q3H PRN Patti Sher PA-C      insulin glargine  35 Units Subcutaneous QAM Esha العراقي MD      insulin regular (HumuLIN R,NovoLIN R) 1 Units/mL in sodium chloride  0.9 % 100 mL infusion  0.3-21 Units/hr Intravenous Titrated Patti Sher PA-C 8 Units/hr (03/27/24 0738)    metoprolol  5 mg Intravenous Q6H PRN Patti Sher PA-C      metoprolol succinate  25 mg Oral Daily Patti Sher PA-C      morphine  60 mg Oral Q12H PRN Patti Sher PA-C      naproxen  500 mg Oral BID With Meals Patti Sher PA-C      nicotine  21 mg Transdermal Daily Patti Sher PA-C      ondansetron  4 mg Intravenous Q4H PRN Patti Sher PA-C      pantoprazole  20 mg Oral BID AC Patti Sher PA-C      polyethylene glycol  17 g Per NG Tube Daily Patti Sher PA-C      pravastatin  80 mg Oral Daily With Dinner Patti Sher PA-C      risperiDONE  3 mg Oral Daily Patti Sher PA-C      sodium chloride  10 mL/hr Intravenous Continuous Patti Sher PA-C 10 mL/hr (03/26/24 1600)        Today, Patient Was Seen By: Patti Sher PA-C    **Please Note: This note may have been constructed using a voice recognition system.**

## 2024-03-27 NOTE — CASE MANAGEMENT
Case Management Progress Note    Patient name Armen Llanos  Location /-01 MRN [id number]  : 1975 Date 3/27/2024       LOS (days): 5  Geometric Mean LOS (GMLOS) (days): 9.9  Days to GMLOS:5        OBJECTIVE:        Current admission status: Inpatient  Preferred Pharmacy:   [id number] - [place], [place] - [address]  [address] [address]  Phone: [phone] Fax: [phone]    Primary Care Provider: Deidre Andrea MD    Primary Insurance: [employer]  Secondary Insurance:     PROGRESS NOTE:    CM was requested to check the cost of Entresto. CM called pt's pharmacy and spoke to Tatiana who states copay is $47. CM informed Arturo Ferro PA-C of same.

## 2024-03-27 NOTE — PROGRESS NOTES
"Progress Note - Podiatry  Armen Llanos 48 y.o. 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  Treatment options discussed, recommend amputation left second toe.  Consent reviewed with patient in detail and signed, all risk benefits alternatives and possible complications discussed.  Patient understands risk of delayed healing due to his diabetes and infection that is present.  Case request submitted, plan for surgical amputation tomorrow afternoon with Dr. Breana REHMAN 12 midnight  Eliquis on hold    Uncontrolled type 2 diabetes with peripheral neuropathy  Encourage patient to be more vigilant with his diabetes management and how good blood sugar control can affect his feet with peripheral neuropathy and other diabetic complications.  Recommend follow-up for routine diabetic footcare every 3 to 4 months upon discharge.    History of traumatic brain injury, diabetic ketoacidosis with metabolic acidosis, electrolyte abnormality resolved, SIRS, JH, suicidal ideation, acute on chronic right knee pain and septic joint.  Essential hypertension, and chronic opioid use.  Managed per internal medicine, cardiology, and infectious disease      Subjective/Objective   Chief Complaint:   Chief Complaint   Patient presents with    Fall     Pt c/o right knee pain after pt unsure if he had a fall this morning. Pt denies thinners. Pt had a previous TBI and does not remember things currently. Pt denies cp/sob/n/v/d or fevers       Subjective: 48-year-old type II diabetic seen today resting comfortably in bed.  Denies any new pain/discomfort.  Reports his knee is feeling better.  Anxious to find out about his MRI results.    Blood pressure 121/82, pulse 88, temperature 97.6 °F (36.4 °C), resp. rate 16, height 6' 5\" (1.956 m), " weight 100 kg (220 lb 14.4 oz), SpO2 97%.,Body mass index is 25.53 kg/m².    Invasive Devices       Peripheral Intravenous Line  Duration             Peripheral IV 03/26/24 Right;Upper;Ventral (anterior) Arm 1 day    Peripheral IV 03/27/24 Left Antecubital <1 day                    Physical Exam:   General appearance: alert, cooperative and no distress    Neuro/Vascular: Palpable pedal pulses with immediate capillary filling (3 seconds).  +1 edema noted bilateral.  Epicritic sensations diminished bilaterally consistent with diabetic neuropathy.  Numbness tingling paresthesias reported.    Extremity: Left second toe edematous with distal ulceration.  Localized edema and erythema of left second toe noted.  Minimal serosanguineous drainage.                      Labs and other studies:   CBC w/diff  Results from last 7 days   Lab Units 03/27/24  0209   WBC Thousand/uL 9.41   HEMOGLOBIN g/dL 11.5*   HEMATOCRIT % 34.1*   PLATELETS Thousands/uL 267   NEUTROS PCT % 84*   LYMPHS PCT % 7*   MONOS PCT % 7   EOS PCT % 0     BMP  Results from last 7 days   Lab Units 03/27/24  0209 03/24/24  1047 03/24/24  0951   POTASSIUM mmol/L 3.3*   < >  --    CHLORIDE mmol/L 102   < >  --    CO2 mmol/L 23   < >  --    CO2, I-STAT mmol/L  --   --  16*   BUN mg/dL 15   < >  --    CREATININE mg/dL 0.83   < >  --    GLUCOSE, ISTAT mg/dl  --   --  219*   CALCIUM mg/dL 8.7   < >  --     < > = values in this interval not displayed.     CMP  Results from last 7 days   Lab Units 03/27/24  0209 03/26/24  0605 03/25/24  0449 03/24/24  1047 03/24/24  0951   POTASSIUM mmol/L 3.3*   < > 3.6   < >  --    CHLORIDE mmol/L 102   < > 104   < >  --    CO2 mmol/L 23   < > 22   < >  --    CO2, I-STAT mmol/L  --   --   --   --  16*   BUN mg/dL 15   < > 18   < >  --    CREATININE mg/dL 0.83   < > 0.96   < >  --    CALCIUM mg/dL 8.7   < > 8.7   < >  --    ALK PHOS U/L  --   --  50  --   --    ALT U/L  --   --  16  --   --    AST U/L  --   --  18  --   --    GLUCOSE,  "ISTAT mg/dl  --   --   --   --  219*    < > = values in this interval not displayed.       @Culture@  Lab Results   Component Value Date    BLOODCX Received in Microbiology Lab. Culture in Progress. 03/26/2024    BLOODCX Received in Microbiology Lab. Culture in Progress. 03/26/2024    BLOODCX Staphylococcus aureus (A) 03/25/2024    BLOODCX Staphylococcus aureus (A) 03/25/2024    BLOODCX Staphylococcus aureus (A) 03/23/2024    BLOODCX Staphylococcus aureus (A) 03/23/2024    BLOODCX Staphylococcus aureus (A) 03/22/2024    BLOODCX Staphylococcus aureus (A) 03/22/2024     No results found for: \"WOUNDCULT\"      Current Facility-Administered Medications:     acetaminophen (TYLENOL) tablet 650 mg, 650 mg, Oral, Q6H PRN, Patti Sher PA-C, 650 mg at 03/26/24 1453    amiodarone tablet 400 mg, 400 mg, Oral, BID With Meals, 400 mg at 03/27/24 0721 **FOLLOWED BY** [START ON 4/5/2024] amiodarone tablet 200 mg, 200 mg, Oral, Daily With Breakfast, Patti Sher PA-C    bupivacaine liposomal (EXPAREL) 1.3 % injection 20 mL, 20 mL, Infiltration, Once, Patti Sher PA-C    butalbital-acetaminophen-caffeine (FIORICET,ESGIC) -40 mg per tablet 1 tablet, 1 tablet, Oral, Q4H PRN, Patti Sher PA-C    ceFAZolin (ANCEF) IVPB (premix in dextrose) 2,000 mg 50 mL, 2,000 mg, Intravenous, Q8H, Patti Sher PA-C, Last Rate: 100 mL/hr at 03/27/24 1155, 2,000 mg at 03/27/24 1155    chlorhexidine (PERIDEX) 0.12 % oral rinse 15 mL, 15 mL, Mouth/Throat, Q12H DARIN, Patti Sher PA-C, 15 mL at 03/27/24 0834    clonazePAM (KlonoPIN) tablet 1 mg, 1 mg, Oral, Daily, Patti Sher PA-C, 1 mg at 03/27/24 0835    docusate sodium (COLACE) capsule 200 mg, 200 mg, Oral, HS, Patti Sher PA-C, 200 mg at 03/26/24 2257    DULoxetine (CYMBALTA) delayed release capsule 20 mg, 20 mg, Oral, Daily, Patti Sher PA-C, 20 mg at 03/27/24 0835    fentaNYL (DURAGESIC) 75 mcg/hr TD 72 hr patch 1 patch, 1 patch, Transdermal, Q72H, Patti Sher PA-C, 1 patch at 03/26/24 0059    " HYDROmorphone (DILAUDID) injection 2 mg, 2 mg, Intravenous, Q3H PRN, Patti Sher PA-C, 2 mg at 03/27/24 0835    insulin glargine (LANTUS) subcutaneous injection 35 Units 0.35 mL, 35 Units, Subcutaneous, QAM, Esha العراقي MD, 35 Units at 03/27/24 1013    insulin regular (HumuLIN R,NovoLIN R) 1 Units/mL in sodium chloride 0.9 % 100 mL infusion, 0.3-21 Units/hr, Intravenous, Titrated, Patti Sher PA-C, Last Rate: 1.5 mL/hr at 03/27/24 1348, 1.5 Units/hr at 03/27/24 1348    metoprolol (LOPRESSOR) injection 5 mg, 5 mg, Intravenous, Q6H PRN, Patti Sher PA-C    metoprolol succinate (TOPROL-XL) 24 hr tablet 25 mg, 25 mg, Oral, Daily, Patti Sher PA-C, 25 mg at 03/27/24 0835    morphine (MS CONTIN) ER tablet 60 mg, 60 mg, Oral, Q12H PRN, Patti Sher PA-C, 60 mg at 03/27/24 1155    naproxen (NAPROSYN) tablet 500 mg, 500 mg, Oral, BID With Meals, Patti Sher PA-C, 500 mg at 03/27/24 0835    nicotine (NICODERM CQ) 21 mg/24 hr TD 24 hr patch 21 mg, 21 mg, Transdermal, Daily, Patti Sher PA-C, 21 mg at 03/25/24 0818    ondansetron (ZOFRAN) injection 4 mg, 4 mg, Intravenous, Q4H PRN, Patti Sher PA-C, 4 mg at 03/26/24 0830    pantoprazole (PROTONIX) EC tablet 20 mg, 20 mg, Oral, BID AC, Patti Sher PA-C, 20 mg at 03/27/24 0721    polyethylene glycol (MIRALAX) packet 17 g, 17 g, Per NG Tube, Daily, Patti Sher PA-C, 17 g at 03/27/24 0834    pravastatin (PRAVACHOL) tablet 80 mg, 80 mg, Oral, Daily With Dinner, Patti Sher PA-C, 80 mg at 03/26/24 1558    risperiDONE (RisperDAL) tablet 3 mg, 3 mg, Oral, Daily, Patti Sher PA-C, 3 mg at 03/27/24 0835    sodium chloride 0.9 % infusion, 10 mL/hr, Intravenous, Continuous, Patti Sher PA-C, Last Rate: 10 mL/hr at 03/26/24 1600, 10 mL/hr at 03/26/24 1600    Imaging: I have personally reviewed pertinent films in PACS  EKG, Pathology, and Other Studies: I have personally reviewed pertinent reports.  VTE Pharmacologic Prophylaxis: Eliquis  VTE Mechanical Prophylaxis: sequential compression  device    GREGORY MontanoM

## 2024-03-27 NOTE — PROGRESS NOTES
Orthopedics   Armen Llanos 48 y.o. male MRN: [id number]  Unit/Bed#: -01      Subjective:  48 y.o.male post operative day 3 right total knee revision arthroplasty with poly exchange, insertion of stimulant and debridement down to and including bone. Patient seen and examined this morning. He states today that he is having significant pain in the bilateral legs as well as a severe migraine.. Patient crying at bedside due to his migraine. He denies any fever, chills or sweats.    Labs:  0   Lab Value Date/Time    HCT 34.1 (L) 03/27/2024 0209    HCT 31.7 (L) 03/26/2024 0605    HCT 31.3 (L) 03/25/2024 0449    HGB 11.5 (L) 03/27/2024 0209    HGB 11.3 (L) 03/26/2024 0605    HGB 11.1 (L) 03/25/2024 0449    INR 1.37 (H) 03/24/2024 1056    WBC 9.41 03/27/2024 0209    WBC 11.12 (H) 03/26/2024 0605    WBC 10.54 (H) 03/25/2024 0449    ESR 25 (H) 03/22/2024 0948    CRP 70.5 (H) 03/22/2024 1049       Meds:    Current Facility-Administered Medications:     acetaminophen (TYLENOL) tablet 650 mg, 650 mg, Oral, Q6H PRN, NATA Day, 650 mg at 03/26/24 1453    amiodarone tablet 400 mg, 400 mg, Oral, BID With Meals, 400 mg at 03/27/24 0721 **FOLLOWED BY** [START ON 4/5/2024] amiodarone tablet 200 mg, 200 mg, Oral, Daily With Breakfast, NATA Day    apixaban (ELIQUIS) tablet 5 mg, 5 mg, Oral, BID, NATA Day, 5 mg at 03/26/24 1929    bupivacaine liposomal (EXPAREL) 1.3 % injection 20 mL, 20 mL, Infiltration, Once, NATA Day    ceFAZolin (ANCEF) IVPB (premix in dextrose) 2,000 mg 50 mL, 2,000 mg, Intravenous, Q8H, NATA Day, Last Rate: 100 mL/hr at 03/27/24 0254, 2,000 mg at 03/27/24 0254    chlorhexidine (PERIDEX) 0.12 % oral rinse 15 mL, 15 mL, Mouth/Throat, Q12H DARIN, NATA Day, 15 mL at 03/26/24 2108    clonazePAM (KlonoPIN) tablet 1 mg, 1 mg, Oral, Daily, NATA Day, 1 mg at 03/26/24 1112    docusate sodium (COLACE)  capsule 200 mg, 200 mg, Oral, HS, Tiffanie R Kali, CRNP, 200 mg at 03/26/24 2257    DULoxetine (CYMBALTA) delayed release capsule 20 mg, 20 mg, Oral, Daily, Nancy Jc MD, 20 mg at 03/26/24 1453    fentaNYL (DURAGESIC) 75 mcg/hr TD 72 hr patch 1 patch, 1 patch, Transdermal, Q72H, Tiffanie R Kali, CRNP, 1 patch at 03/26/24 0059    hydrocortisone (Solu-CORTEF) injection 50 mg, 50 mg, Intravenous, Q12H, Tiffanie R Kali, CRNP, 50 mg at 03/27/24 0543    HYDROmorphone (DILAUDID) injection 2 mg, 2 mg, Intravenous, Q3H PRN, Tiffanie R Kali, CRNP, 2 mg at 03/27/24 0524    insulin regular (HumuLIN R,NovoLIN R) 1 Units/mL in sodium chloride 0.9 % 100 mL infusion, 0.3-21 Units/hr, Intravenous, Titrated, Tiffanie R Kali, CRNP, Last Rate: 12 mL/hr at 03/27/24 0551, 12 Units/hr at 03/27/24 0551    metoprolol (LOPRESSOR) injection 5 mg, 5 mg, Intravenous, Q6H PRN, Tiffanie R Kali, CRNP    metoprolol succinate (TOPROL-XL) 24 hr tablet 25 mg, 25 mg, Oral, Daily, Tiffanie R Kali, CRNP, 25 mg at 03/26/24 1056    morphine (MS CONTIN) ER tablet 60 mg, 60 mg, Oral, Q12H PRN, Tiffanie R Kali, CRNP, 60 mg at 03/26/24 1451    nicotine (NICODERM CQ) 21 mg/24 hr TD 24 hr patch 21 mg, 21 mg, Transdermal, Daily, Tiffanie R Kali, CRNP, 21 mg at 03/25/24 0818    ondansetron (ZOFRAN) injection 4 mg, 4 mg, Intravenous, Q4H PRN, Tiffanie R Kali, CRNP, 4 mg at 03/26/24 0830    pantoprazole (PROTONIX) EC tablet 20 mg, 20 mg, Oral, BID AC, Tiffanie R Kali, CRNP, 20 mg at 03/27/24 0721    polyethylene glycol (MIRALAX) packet 17 g, 17 g, Per NG Tube, Daily, NATA Day, 17 g at 03/25/24 0818    pravastatin (PRAVACHOL) tablet 80 mg, 80 mg, Oral, Daily With Dinner, NATA Day, 80 mg at 03/26/24 1558    risperiDONE (RisperDAL) tablet 3 mg, 3 mg, Oral, Daily, NATA Day, 3 mg at 03/26/24 1056    sodium chloride 0.9 % infusion, 10 mL/hr, Intravenous, Continuous,  "Nancy Jc MD, Last Rate: 10 mL/hr at 03/26/24 1600, 10 mL/hr at 03/26/24 1600    Blood Culture:   Lab Results   Component Value Date    BLOODCX Received in Microbiology Lab. Culture in Progress. 03/26/2024       Wound Culture:   No results found for: \"WOUNDCULT\"    Ins and Outs:  I/O last 24 hours:  In: 2466.4 [P.O.:1960; I.V.:456.4; IV Piggyback:50]  Out: 2700 [Urine:2700]          Physical Exam:  Vitals:    03/27/24 0720   BP: 153/93   Pulse: 104   Resp:    Temp: 97.9 °F (36.6 °C)   SpO2: 95%     right Lower Extremity extremity:  Dressing intact without drainage  Minimal RLE swelling  TTP diffusely  Sensation intact distally  Motor intact to +FHL/EHL, +ankle dorsi/plantar flexion  2+ DP pulse, symmetric bilaterally  Digits warm and well perfused  Capillary refill < 2 seconds    Assessment: 48 y.o.male post operative day 3 right total knee revision arthroplasty with poly exchange, insertion of stimulant and debridement down to and including bone. OR cultures growing Staph aureus.     Plan:  WBAT to the RLE- knee immobilizer removed  ID recommendations for antibiotic management- IV ancef  Will likely need 6 weeks of IV abx then suppressive abx after that. PICC to be placed once blood cultures are negative  DVT prophylaxis- Eliquis  Medical management per primary team.  Analgesics  PT/OT  Dispo: Ortho will follow  Patient noted to have acute blood loss anemia due to a drop in Hbg of > 2.0g from preop levels, will monitor vital signs and resuscitate with IV fluids as needed.  Hemoglobin 11.5 this AM.         Selam Fitzpatrick PA-C             "

## 2024-03-27 NOTE — PLAN OF CARE
Problem: PAIN - ADULT  Goal: Verbalizes/displays adequate comfort level or baseline comfort level  Description: Interventions:  - Encourage patient to monitor pain and request assistance  - Assess pain using appropriate pain scale  - Administer analgesics based on type and severity of pain and evaluate response  - Implement non-pharmacological measures as appropriate and evaluate response  - Consider cultural and social influences on pain and pain management  - Notify physician/advanced practitioner if interventions unsuccessful or patient reports new pain  Outcome: Progressing     Problem: INFECTION - ADULT  Goal: Absence or prevention of progression during hospitalization  Description: INTERVENTIONS:  - Assess and monitor for signs and symptoms of infection  - Monitor lab/diagnostic results  - Monitor all insertion sites, i.e. indwelling lines, tubes, and drains  - Monitor endotracheal if appropriate and nasal secretions for changes in amount and color  - [place] appropriate cooling/warming therapies per order  - Administer medications as ordered  - Instruct and encourage patient and family to use good hand hygiene technique  - Identify and instruct in appropriate isolation precautions for identified infection/condition  Outcome: Progressing  Goal: Absence of fever/infection during neutropenic period  Description: INTERVENTIONS:  - Monitor WBC    Outcome: Progressing     Problem: SAFETY ADULT  Goal: Patient will remain free of falls  Description: INTERVENTIONS:  - Educate patient/family on patient safety including physical limitations  - Instruct patient to call for assistance with activity   - Consult OT/PT to assist with strengthening/mobility   - Keep Call bell within reach  - Keep bed low and locked with side rails adjusted as appropriate  - Keep care items and personal belongings within reach  - Initiate and maintain comfort rounds  - Make Fall Risk Sign visible to staff  - Offer Toileting every 2 Hours,  in advance of need  - Initiate/Maintain 2alarm  - Obtain necessary fall risk management equipment: 2  - Apply yellow socks and bracelet for high fall risk patients  - Consider moving patient to room near nurses station  Outcome: Progressing  Goal: Maintain or return to baseline ADL function  Description: INTERVENTIONS:  -  Assess patient's ability to carry out ADLs; assess patient's baseline for ADL function and identify physical deficits which impact ability to perform ADLs (bathing, care of mouth/teeth, toileting, grooming, dressing, etc.)  - Assess/evaluate cause of self-care deficits   - Assess range of motion  - Assess patient's mobility; develop plan if impaired  - Assess patient's need for assistive devices and provide as appropriate  - Encourage maximum independence but intervene and supervise when necessary  - Involve family in performance of ADLs  - Assess for home care needs following discharge   - Consider OT consult to assist with ADL evaluation and planning for discharge  - Provide patient education as appropriate  Outcome: Progressing  Goal: Maintains/Returns to pre admission functional level  Description: INTERVENTIONS:  - Perform AM-PAC 6 Click Basic Mobility/ Daily Activity assessment daily.  - Set and communicate daily mobility goal to care team and patient/family/caregiver.   - Collaborate with rehabilitation services on mobility goals if consulted  - Perform Range of Motion 2 times a day.  - Reposition patient every 1 hours.  - Dangle patient 2 times a day  - Stand patient 2 times a day  - Ambulate patient 2 times a day  - Out of bed to chair 2 times a day   - Out of bed for meals 2 times a day  - Out of bed for toileting  - Record patient progress and toleration of activity level   Outcome: Progressing     Problem: DISCHARGE PLANNING  Goal: Discharge to home or other facility with appropriate resources  Description: INTERVENTIONS:  - Identify barriers to discharge w/patient and caregiver  -  Arrange for needed discharge resources and transportation as appropriate  - Identify discharge learning needs (meds, wound care, etc.)  - Arrange for interpretive services to assist at discharge as needed  - Refer to Case Management Department for coordinating discharge planning if the patient needs post-hospital services based on physician/advanced practitioner order or complex needs related to functional status, cognitive ability, or social support system  Outcome: Progressing     Problem: Knowledge Deficit  Goal: Patient/family/caregiver demonstrates understanding of disease process, treatment plan, medications, and discharge instructions  Description: Complete learning assessment and assess knowledge base.  Interventions:  - Provide teaching at level of understanding  - Provide teaching via preferred learning methods  Outcome: Progressing     Problem: Prexisting or High Potential for Compromised Skin Integrity  Goal: Skin integrity is maintained or improved  Description: INTERVENTIONS:  - Identify patients at risk for skin breakdown  - Assess and monitor skin integrity  - Assess and monitor nutrition and hydration status  - Monitor labs   - Assess for incontinence   - Turn and reposition patient  - Assist with mobility/ambulation  - Relieve pressure over bony prominences  - Avoid friction and shearing  - Provide appropriate hygiene as needed including keeping skin clean and dry  - Evaluate need for skin moisturizer/barrier cream  - Collaborate with interdisciplinary team   - Patient/family teaching  - Consider wound care consult   Outcome: Progressing     Problem: NEUROSENSORY - ADULT  Goal: Achieves stable or improved neurological status  Description: INTERVENTIONS  - Monitor and report changes in neurological status  - Monitor vital signs such as temperature, blood pressure, glucose, and any other labs ordered   - Initiate measures to prevent increased intracranial pressure  - Monitor for seizure activity and  implement precautions if appropriate      Outcome: Progressing  Goal: Remains free of injury related to seizures activity  Description: INTERVENTIONS  - Maintain airway, patient safety  and administer oxygen as ordered  - Monitor patient for seizure activity, document and report duration and description of seizure to physician/advanced practitioner  - If seizure occurs,  ensure patient safety during seizure  - Reorient patient post seizure  - Seizure pads on all 4 side rails  - Instruct patient/family to notify RN of any seizure activity including if an aura is experienced  - Instruct patient/family to call for assistance with activity based on nursing assessment  - Administer anti-seizure medications if ordered    Outcome: Progressing  Goal: Achieves maximal functionality and self care  Description: INTERVENTIONS  - Monitor swallowing and airway patency with patient fatigue and changes in neurological status  - Encourage and assist patient to increase activity and self care.   - Encourage visually impaired, hearing impaired and aphasic patients to use assistive/communication devices  Outcome: Progressing     Problem: CARDIOVASCULAR - ADULT  Goal: Maintains optimal cardiac output and hemodynamic stability  Description: INTERVENTIONS:  - Monitor I/O, vital signs and rhythm  - Monitor for S/S and trends of decreased cardiac output  - Administer and titrate ordered vasoactive medications to optimize hemodynamic stability  - Assess quality of pulses, skin color and temperature  - Assess for signs of decreased coronary artery perfusion  - Instruct patient to report change in severity of symptoms  Outcome: Progressing  Goal: Absence of cardiac dysrhythmias or at baseline rhythm  Description: INTERVENTIONS:  - Continuous cardiac monitoring, vital signs, obtain 12 lead EKG if ordered  - Administer antiarrhythmic and heart rate control medications as ordered  - Monitor electrolytes and administer replacement therapy as  ordered  Outcome: Progressing     Problem: RESPIRATORY - ADULT  Goal: Achieves optimal ventilation and oxygenation  Description: INTERVENTIONS:  - Assess for changes in respiratory status  - Assess for changes in mentation and behavior  - Position to facilitate oxygenation and minimize respiratory effort  - Oxygen administered by appropriate delivery if ordered  - Initiate smoking cessation education as indicated  - Encourage broncho-pulmonary hygiene including cough, deep breathe, Incentive Spirometry  - Assess the need for suctioning and aspirate as needed  - Assess and instruct to report SOB or any respiratory difficulty  - Respiratory Therapy support as indicated  Outcome: Progressing     Problem: GASTROINTESTINAL - ADULT  Goal: Minimal or absence of nausea and/or vomiting  Description: INTERVENTIONS:  - Administer IV fluids if ordered to ensure adequate hydration  - Maintain NPO status until nausea and vomiting are resolved  - Nasogastric tube if ordered  - Administer ordered antiemetic medications as needed  - Provide nonpharmacologic comfort measures as appropriate  - Advance diet as tolerated, if ordered  - Consider nutrition services referral to assist patient with adequate nutrition and appropriate food choices  Outcome: Progressing  Goal: Maintains or returns to baseline bowel function  Description: INTERVENTIONS:  - Assess bowel function  - Encourage oral fluids to ensure adequate hydration  - Administer IV fluids if ordered to ensure adequate hydration  - Administer ordered medications as needed  - Encourage mobilization and activity  - Consider nutritional services referral to assist patient with adequate nutrition and appropriate food choices  Outcome: Progressing  Goal: Maintains adequate nutritional intake  Description: INTERVENTIONS:  - Monitor percentage of each meal consumed  - Identify factors contributing to decreased intake, treat as appropriate  - Assist with meals as needed  - Monitor I&O,  weight, and lab values if indicated  - Obtain nutrition services referral as needed  Outcome: Progressing  Goal: Establish and maintain optimal ostomy function  Description: INTERVENTIONS:  - Assess bowel function  - Encourage oral fluids to ensure adequate hydration  - Administer IV fluids if ordered to ensure adequate hydration   - Administer ordered medications as needed  - Encourage mobilization and activity  - Nutrition services referral to assist patient with appropriate food choices  - Assess stoma site  - Consider wound care consult   Outcome: Progressing  Goal: Oral mucous membranes remain intact  Description: INTERVENTIONS  - Assess oral mucosa and hygiene practices  - Implement preventative oral hygiene regimen  - Implement oral medicated treatments as ordered  - Initiate Nutrition services referral as needed  Outcome: Progressing     Problem: GENITOURINARY - ADULT  Goal: Maintains or returns to baseline urinary function  Description: INTERVENTIONS:  - Assess urinary function  - Encourage oral fluids to ensure adequate hydration if ordered  - Administer IV fluids as ordered to ensure adequate hydration  - Administer ordered medications as needed  - Offer frequent toileting  - Follow urinary retention protocol if ordered  Outcome: Progressing  Goal: Absence of urinary retention  Description: INTERVENTIONS:  - Assess patient’s ability to void and empty bladder  - Monitor I/O  - Bladder scan as needed  - Discuss with physician/AP medications to alleviate retention as needed  - Discuss catheterization for long term situations as appropriate  Outcome: Progressing  Goal: Urinary catheter remains patent  Description: INTERVENTIONS:  - Assess patency of urinary catheter  - If patient has a chronic campa, consider changing catheter if non-functioning  - Follow guidelines for intermittent irrigation of non-functioning urinary catheter  Outcome: Progressing     Problem: METABOLIC, FLUID AND ELECTROLYTES -  ADULT  Goal: Electrolytes maintained within normal limits  Description: INTERVENTIONS:  - Monitor labs and assess patient for signs and symptoms of electrolyte imbalances  - Administer electrolyte replacement as ordered  - Monitor response to electrolyte replacements, including repeat lab results as appropriate  - Instruct patient on fluid and nutrition as appropriate  Outcome: Progressing  Goal: Fluid balance maintained  Description: INTERVENTIONS:  - Monitor labs   - Monitor I/O and WT  - Instruct patient on fluid and nutrition as appropriate  - Assess for signs & symptoms of volume excess or deficit  Outcome: Progressing  Goal: Glucose maintained within target range  Description: INTERVENTIONS:  - Monitor Blood Glucose as ordered  - Assess for signs and symptoms of hyperglycemia and hypoglycemia  - Administer ordered medications to maintain glucose within target range  - Assess nutritional intake and initiate nutrition service referral as needed  Outcome: Progressing     Problem: SKIN/TISSUE INTEGRITY - ADULT  Goal: Skin Integrity remains intact(Skin Breakdown Prevention)  Description: Assess:  -Perform Girma assessment every shift  -Clean and moisturize skin every shift  -Inspect skin when repositioning, toileting, and assisting with ADLS  -Assess under medical devices such as x every shift  -Assess extremities for adequate circulation and sensation     Bed Management:  -Have minimal linens on bed & keep smooth, unwrinkled  -Change linens as needed when moist or perspiring  -Avoid sitting or lying in one position for more than 2 hours while in bed  -Keep HOB at 30 degrees     Toileting:  -Offer bedside commode  -Assess for incontinence every 2 hours  -Use incontinent care products after each incontinent episode such as x    Activity:  -Mobilize patient 3 times a day  -Encourage activity and walks on unit  -Encourage or provide ROM exercises   -Turn and reposition patient every 2 Hours  -Use appropriate  equipment to lift or move patient in bed  -Instruct/ Assist with weight shifting every 30 minutes when out of bed in chair  -Consider limitation of chair time 2 hour intervals    Skin Care:  -Avoid use of baby powder, tape, friction and shearing, hot water or constrictive clothing  -Relieve pressure over bony prominences using x  -Do not massage red bony areas    Outcome: Progressing  Goal: Incision(s), wounds(s) or drain site(s) healing without S/S of infection  Description: INTERVENTIONS  - Assess and document dressing, incision, wound bed, drain sites and surrounding tissue  - Provide patient and family education  - Perform skin care/dressing changes every shift  Outcome: Progressing  Goal: Pressure injury heals and does not worsen  Description: Interventions:  - Implement low air loss mattress or specialty surface (Criteria met)  - Apply silicone foam dressing  - Instruct/assist with weight shifting every 30 minutes when in chair   - Limit chair time to 2 hour intervals  - Use special pressure reducing interventions such as cushion when in chair   - Apply fecal or urinary incontinence containment device   - Perform passive or active ROM every shift  - Turn and reposition patient & offload bony prominences every 2 hours   - Utilize friction reducing device or surface for transfers   - Consider consults to  interdisciplinary teams such as PT/OT  - Use incontinent care products after each incontinent episode such as x  - Consider nutrition services referral as needed  Outcome: Progressing     Problem: MUSCULOSKELETAL - ADULT  Goal: Maintain or return mobility to safest level of function  Description: INTERVENTIONS:  - Assess patient's ability to carry out ADLs; assess patient's baseline for ADL function and identify physical deficits which impact ability to perform ADLs (bathing, care of mouth/teeth, toileting, grooming, dressing, etc.)  - Assess/evaluate cause of self-care deficits   - Assess range of motion  -  Assess patient's mobility  - Assess patient's need for assistive devices and provide as appropriate  - Encourage maximum independence but intervene and supervise when necessary  - Involve family in performance of ADLs  - Assess for home care needs following discharge   - Consider OT consult to assist with ADL evaluation and planning for discharge  - Provide patient education as appropriate  Outcome: Progressing  Goal: Maintain proper alignment of affected body part  Description: INTERVENTIONS:  - Support, maintain and protect limb and body alignment  - Provide patient/ family with appropriate education  Outcome: Progressing     Problem: Nutrition/Hydration-ADULT  Goal: Nutrient/Hydration intake appropriate for improving, restoring or maintaining nutritional needs  Description: Monitor and assess patient's nutrition/hydration status for malnutrition. Collaborate with interdisciplinary team and initiate plan and interventions as ordered.  Monitor patient's weight and dietary intake as ordered or per policy. Utilize nutrition screening tool and intervene as necessary. Determine patient's food preferences and provide high-protein, high-caloric foods as appropriate.     INTERVENTIONS:  - Monitor oral intake, urinary output, labs, and treatment plans  - Assess nutrition and hydration status and recommend course of action  - Evaluate amount of meals eaten  - Assist patient with eating if necessary   - Allow adequate time for meals  - Recommend/ encourage appropriate diets, oral nutritional supplements, and vitamin/mineral supplements  - Order, calculate, and assess calorie counts as needed  - Recommend, monitor, and adjust tube feedings and TPN/PPN based on assessed needs  - Assess need for intravenous fluids  - Provide specific nutrition/hydration education as appropriate  - Include patient/family/caregiver in decisions related to nutrition  Outcome: Progressing

## 2024-03-27 NOTE — ASSESSMENT & PLAN NOTE
Lab Results   Component Value Date    HGBA1C 12.1 (H) 03/22/2024       Recent Labs     03/27/24  0219 03/27/24  0352 03/27/24  0526 03/27/24  0737   POCGLU 207* 210* 185* 150*         Blood Sugar Average: Last 72 hrs:  (P) [id number]    POA: Glucose > 600. BHB 1.41. pH 7.35.  Was previously fluid resuscitated and on insulin infusion  Home regimen: Metformin, Glimepiride  Plan:  Hold home regimen  Cont NCC insulin gtt for goal glucose 140-180  Diabetes education when appropriate   CHO controlled diet   Endocrinology consulted to transition patient from insulin drip and assist in establishing insulin regimen

## 2024-03-27 NOTE — CONSULTS
Consultation - Armen Llanos 48 y.o. male MRN: [id number]    Unit/Bed#: -01 Encounter: [id number]      Assessment/Plan     Assessment:  This is a 48 y.o.-year-old male with diabetes with hyperglycemia with poor control d/t non compliant admitted for sepsis d/t OM with hyperglycemia. Currently on insulin drip non DKA. Also on cortef 50mg BID for pressure support and pending surgery tomorrow for 2nd toe OM. Will c/w drip for now until off steroids and also for intra-op management. Once off steroids and on diet, can transition to MDI tomorrow.      Plan:  - c/w non DKA drip until on steroids, once off steroids and also when not NPO for surgery will consider transition to MDI   - for now will give lantus 35 for now to help load with basal insulin in hopes to transition off drip in future. May need lower basal units when on lower dose/off steroids. Would transition to Lantus 30u and lispro 10u with meals with ISS tomorrow.   - please taper off steroids unless absolutely needed since BP has been stable. Would recommend d/c steroids now if possible.     Inpatient consult to Endocrinology  Consult performed by: Esha العراقي MD  Consult ordered by: Patti Sher PA-C        CC: Hyperglycemia     History of Present Illness     HPI: Armen Llanos is a 48 y.o. year old male with type 2 diabetes without any reported complications on metformin and MILLER at home with poor control with other Pmhx of TBI, chronic opioid use, who was admitted for right knee pain after fall and also having suicidal ideation. Needing right knee InD with polyethylene exchange for periprosthetic joint infection. Patient was found to be septic on arrival and also intubated needing ICU stay. On arrival BG was 639 with AG 16, HCO 19 and beta 1.31, started on non DKA drip for hyperglycemia management. Endocrine consulted for BG management. Patient also currently on cortef 50mg BID for 1 more day with plans to go to daily tomorrow and d/c after. On  steroid for unclear reason- possible pressure support d/t hypotension from sepsis. Clinically has been improving from sepsis but does have 2nd toe OM, Scheduled for 2nd toe amp tomorrow d/t OM    Patients HbA1C on admission 12.1% with prior A1C 05/23 9.3%   Home regime- glimepride 2mg QID? Metformin 500mg BID- non compliant with regime specially with MILLER  Doesn't check BG at home. Reports wanted to just die d/t issues with chronic pain and therefore ate very badly and poorly in hopes his high BG would kill him. But would like to work better on his BG now  Current regime- non DKA drip with high drip requirements at 9-14u/hr     Patient reports he has been eating well. Denies any fever, chills. Does have pain at baseline     Review of Systems   Constitutional:  Negative for diaphoresis, fatigue and unexpected weight change.   Respiratory:  Negative for shortness of breath.    Cardiovascular:  Negative for chest pain and palpitations.   Gastrointestinal:  Negative for constipation and diarrhea.   Endocrine: Negative for polydipsia and polyuria.       Historical Information   Past Medical History:   Diagnosis Date    Diabetes mellitus (HCC)     Hypertension      Past Surgical History:   Procedure Laterality Date    KNEE SURGERY      NV REVJ TOTAL KNEE ARTHRP W/WO ALGRFT 1 COMPONENT Right 3/24/2024    Procedure: ARTHROPLASTY KNEE TOTAL REVISION, POLY EXCHANGE;  Surgeon: Tao Washington DO;  Location:  MAIN OR;  Service: Orthopedics     Social History   Social History     Substance and Sexual Activity   Alcohol Use Not Currently     Social History     Substance and Sexual Activity   Drug Use Yes    Types: Marijuana     Social History     Tobacco Use   Smoking Status Never   Smokeless Tobacco Never     Family History: History reviewed. No pertinent family history.    Meds/Allergies   Current Facility-Administered Medications   Medication Dose Route Frequency Provider Last Rate Last Admin    acetaminophen (TYLENOL)  tablet 650 mg  650 mg Oral Q6H PRN Patti Sher PA-C   650 mg at 03/26/24 1453    amiodarone tablet 400 mg  400 mg Oral BID With Meals Patti Sher PA-C   400 mg at 03/27/24 0721    Followed by    [START ON 4/5/2024] amiodarone tablet 200 mg  200 mg Oral Daily With Breakfast Patti Sher PA-C        bupivacaine liposomal (EXPAREL) 1.3 % injection 20 mL  20 mL Infiltration Once Patti Sher PA-C        butalbital-acetaminophen-caffeine (FIORICET,ESGIC) -40 mg per tablet 1 tablet  1 tablet Oral Q4H PRN Patti Sher PA-C        ceFAZolin (ANCEF) IVPB (premix in dextrose) 2,000 mg 50 mL  2,000 mg Intravenous Q8H Patti Sher PA-C 100 mL/hr at 03/27/24 1155 2,000 mg at 03/27/24 1155    chlorhexidine (PERIDEX) 0.12 % oral rinse 15 mL  15 mL Mouth/Throat Q12H DARIN Patti Sher PA-C   15 mL at 03/27/24 0834    clonazePAM (KlonoPIN) tablet 1 mg  1 mg Oral Daily Patti Sher PA-C   1 mg at 03/27/24 0835    docusate sodium (COLACE) capsule 200 mg  200 mg Oral HS Patti Sher PA-C   200 mg at 03/26/24 2257    DULoxetine (CYMBALTA) delayed release capsule 20 mg  20 mg Oral Daily Patti Sher PA-C   20 mg at 03/27/24 0835    fentaNYL (DURAGESIC) 75 mcg/hr TD 72 hr patch 1 patch  1 patch Transdermal Q72H Patti Sher PA-C   1 patch at 03/26/24 0059    hydrocortisone (Solu-CORTEF) injection 50 mg  50 mg Intravenous Q12H Patti Sher PA-C   50 mg at 03/27/24 0543    HYDROmorphone (DILAUDID) injection 2 mg  2 mg Intravenous Q3H PRN Patti Sher PA-C   2 mg at 03/27/24 0835    insulin glargine (LANTUS) subcutaneous injection 35 Units 0.35 mL  35 Units Subcutaneous NAOMY العراقي MD   35 Units at 03/27/24 1013    insulin regular (HumuLIN R,NovoLIN R) 1 Units/mL in sodium chloride 0.9 % 100 mL infusion  0.3-21 Units/hr Intravenous Titrated Patti Sher PA-C 1.5 mL/hr at 03/27/24 1348 1.5 Units/hr at 03/27/24 1348    metoprolol (LOPRESSOR) injection 5 mg  5 mg Intravenous Q6H PRN Patti Sher PA-C        metoprolol succinate (TOPROL-XL) 24 hr  "tablet 25 mg  25 mg Oral Daily Patti Sher PA-C   25 mg at 03/27/24 0835    morphine (MS CONTIN) ER tablet 60 mg  60 mg Oral Q12H PRN Patti Sher PA-C   60 mg at 03/27/24 1155    naproxen (NAPROSYN) tablet 500 mg  500 mg Oral BID With Meals Patti Sher PA-C   500 mg at 03/27/24 0835    nicotine (NICODERM CQ) 21 mg/24 hr TD 24 hr patch 21 mg  21 mg Transdermal Daily Patti Sher PA-C   21 mg at 03/25/24 0818    ondansetron (ZOFRAN) injection 4 mg  4 mg Intravenous Q4H PRN Patti Sher PA-C   4 mg at 03/26/24 0830    pantoprazole (PROTONIX) EC tablet 20 mg  20 mg Oral BID AC Patti Sher PA-C   20 mg at 03/27/24 0721    polyethylene glycol (MIRALAX) packet 17 g  17 g Per NG Tube Daily Patti Sher PA-C   17 g at 03/27/24 0834    pravastatin (PRAVACHOL) tablet 80 mg  80 mg Oral Daily With Dinner Patti Sher PA-C   80 mg at 03/26/24 1558    risperiDONE (RisperDAL) tablet 3 mg  3 mg Oral Daily Patti Sher PA-C   3 mg at 03/27/24 0835    sodium chloride 0.9 % infusion  10 mL/hr Intravenous Continuous Patti Sher PA-C 10 mL/hr at 03/26/24 1600 10 mL/hr at 03/26/24 1600     Allergies   Allergen Reactions    Penicillins Hives       Objective   Vitals: Blood pressure 153/93, pulse 104, temperature 97.9 °F (36.6 °C), resp. rate 16, height 6' 5\" (1.956 m), weight 100 kg (220 lb 14.4 oz), SpO2 95%.    Intake/Output Summary (Last 24 hours) at 3/27/2024 1511  Last data filed at 3/27/2024 1246  Gross per 24 hour   Intake 1186.93 ml   Output 2600 ml   Net -1413.07 ml     Invasive Devices       Peripheral Intravenous Line  Duration             Peripheral IV 03/26/24 Right;Upper;Ventral (anterior) Arm 1 day    Peripheral IV 03/27/24 Left Antecubital <1 day                    Physical Exam  Constitutional:       Appearance: Normal appearance.   Cardiovascular:      Rate and Rhythm: Normal rate and regular rhythm.      Pulses: Normal pulses.   Pulmonary:      Effort: Pulmonary effort is normal.   Abdominal:      General: Abdomen is flat.    " "  Palpations: Abdomen is soft.   Skin:     General: Skin is warm.      Capillary Refill: Capillary refill takes less than 2 seconds.   Neurological:      General: No focal deficit present.      Mental Status: He is alert.         The history was obtained from the review of the chart, patient.    Lab Results:   Results from last 7 days   Lab Units 03/22/24  0948   HEMOGLOBIN A1C % 12.1*     Lab Results   Component Value Date    WBC 9.41 03/27/2024    HGB 11.5 (L) 03/27/2024    HCT 34.1 (L) 03/27/2024    MCV 88 03/27/2024     03/27/2024     Lab Results   Component Value Date/Time    BUN 15 03/27/2024 02:09 AM    K 3.3 (L) 03/27/2024 02:09 AM     03/27/2024 02:09 AM    CO2 23 03/27/2024 02:09 AM    CO2 16 (L) 03/24/2024 09:51 AM    CREATININE 0.83 03/27/2024 02:09 AM    AST 18 03/25/2024 04:49 AM    ALT 16 03/25/2024 04:49 AM    TP 5.6 (L) 03/25/2024 04:49 AM    ALB 2.9 (L) 03/25/2024 04:49 AM     No results for input(s): \"CHOL\", \"HDL\", \"LDL\", \"TRIG\", \"VLDL\" in the last 72 hours.  No results found for: \"MICROALBUR\", \"[id number]\"  POC Glucose (mg/dl)   Date Value   03/27/2024 91   03/27/2024 216 (H)       Imaging Studies: I have personally reviewed pertinent reports.      Portions of the record may have been created with voice recognition software.    "

## 2024-03-27 NOTE — PROGRESS NOTES
"Cardiology Progress Note   Armen Llanos 48 y.o. male MRN: [id number]    Unit/Bed#: -01 Encounter: [id number]      Assessment:  Septic shock POA/ MSSA bacteremia 2/2 septic arthritis of R knee  ID on board --On IV ancef  Plan for PICC line when BC negative  Acute rehab planned post-dc for R knee replacement    DM type 2 with DKA/metabolic acidosis on admission  W/ A1c 12.1%  On IV insulin gtt currently   New onset atrial fibrillation  Developed AF with RVR, HR >200s intra-op during total R knee revision and debridement due to septic joint.   Started on IV cardizem gtt and given IV lopressor bolus' but become hypotension.   Switched to IV amiodarone and subsequently converted to NSR. Now on PO amiodarone load.   IV heparin initiated postsurgery now switched to Eliquis 5mg BID.   Presumed NICM (tachy vs stress related), EF 45%  TTE: mildly reduced EF 45%, global hypokinesis, normal IVC size  Started on Toprol XL 25mg QD 3/26/2024  Hypertension   Hx of TBI  Hyponatremia  Chronic opioid use     Plan:  Continue PO amiodarone load as ordered.   Plan to dc after 4 weeks in the OP setting when he is recovered from this hospitalization.   Continue Eliquis 5mg BID for CV prophylaxis (CV score is 2 (htn, dm))  Continue Toprol XL 25mg QD today   Start Jardiance 10mg QD; quoted at $47/monthly and feasible per patient  Will price check Entresto today to see if this would be affordable. BP today continues to rise now in the 150s systolic and would benefit from adding ACEi/ARB/ARNi today as well.     Subjective:   Patient seen and examined. Overnight events reviewed.     Objective:     Vitals: Blood pressure 153/93, pulse 104, temperature 97.9 °F (36.6 °C), resp. rate 16, height 6' 5\" (1.956 m), weight 100 kg (220 lb 14.4 oz), SpO2 95%., Body mass index is 25.53 kg/m².,   Orthostatic Blood Pressures      Flowsheet Row Most Recent Value   Blood Pressure 153/93 filed at 03/27/2024 0720   Patient Position - Orthostatic VS " Lying filed at 03/26/2024 0400              Intake/Output Summary (Last 24 hours) at 3/27/2024 0855  Last data filed at 3/27/2024 0845  Gross per 24 hour   Intake 2634.49 ml   Output 2050 ml   Net 584.49 ml         Physical Exam:    GEN: Armen Llanos appears well, alert and oriented x 3, pleasant and cooperative   HEENT: Sclera anicteric, conjunctivae pink, mucous membranes moist. Oropharynx clear.   NECK: supple, no significant JVD, Trachea midline, no thyromegaly.   HEART: regular rhythm, normal S1 and S2, no murmurs, clicks, gallops or rubs   LUNGS: clear to auscultation bilaterally; no wheezes, rales, or rhonchi. No increased work of breathing or signs of respiratory distress.   ABDOMEN: Soft, nontender, nondistended  EXTREMITIES: Skin warm and well perfused, no clubbing, cyanosis, or edema.  NEURO: No focal findings. Normal speech. Mood and affect normal.   SKIN: Normal without suspicious lesions on exposed skin.      Medications:      Current Facility-Administered Medications:     acetaminophen (TYLENOL) tablet 650 mg, 650 mg, Oral, Q6H PRN, NATA Day, 650 mg at 03/26/24 1453    amiodarone tablet 400 mg, 400 mg, Oral, BID With Meals, 400 mg at 03/27/24 0721 **FOLLOWED BY** [START ON 4/5/2024] amiodarone tablet 200 mg, 200 mg, Oral, Daily With Breakfast, NATA Day    apixaban (ELIQUIS) tablet 5 mg, 5 mg, Oral, BID, NATA Day, 5 mg at 03/27/24 0835    bupivacaine liposomal (EXPAREL) 1.3 % injection 20 mL, 20 mL, Infiltration, Once, NATA Day    butalbital-acetaminophen-caffeine (FIORICET,ESGIC) -40 mg per tablet 1 tablet, 1 tablet, Oral, Q4H PRN, Patti Sher PA-C    ceFAZolin (ANCEF) IVPB (premix in dextrose) 2,000 mg 50 mL, 2,000 mg, Intravenous, Q8H, NATA Day, Last Rate: 100 mL/hr at 03/27/24 0254, 2,000 mg at 03/27/24 0254    chlorhexidine (PERIDEX) 0.12 % oral rinse 15 mL, 15 mL, Mouth/Throat, Q12H Tiffanie WEBSTER  SAMANTHA BassNP, 15 mL at 03/27/24 0834    clonazePAM (KlonoPIN) tablet 1 mg, 1 mg, Oral, Daily, Tiffanie SAMANTHA AlvaradoNP, 1 mg at 03/27/24 0835    docusate sodium (COLACE) capsule 200 mg, 200 mg, Oral, HS, Tiffanie GO Bass, CRNP, 200 mg at 03/26/24 2257    DULoxetine (CYMBALTA) delayed release capsule 20 mg, 20 mg, Oral, Daily, Nancy Jc MD, 20 mg at 03/27/24 0835    fentaNYL (DURAGESIC) 75 mcg/hr TD 72 hr patch 1 patch, 1 patch, Transdermal, Q72H, NATA Day, 1 patch at 03/26/24 0059    hydrocortisone (Solu-CORTEF) injection 50 mg, 50 mg, Intravenous, Q12H, Tiffanie R Kali, CRNP, 50 mg at 03/27/24 0543    HYDROmorphone (DILAUDID) injection 2 mg, 2 mg, Intravenous, Q3H PRN, Tiffanie Bass CRNP, 2 mg at 03/27/24 0835    insulin regular (HumuLIN R,NovoLIN R) 1 Units/mL in sodium chloride 0.9 % 100 mL infusion, 0.3-21 Units/hr, Intravenous, Titrated, SAMANTHA DayNP, Last Rate: 8 mL/hr at 03/27/24 0738, 8 Units/hr at 03/27/24 0738    metoprolol (LOPRESSOR) injection 5 mg, 5 mg, Intravenous, Q6H PRN, NATA Day    metoprolol succinate (TOPROL-XL) 24 hr tablet 25 mg, 25 mg, Oral, Daily, Tiffanie GO Bass CRNP, 25 mg at 03/27/24 0835    morphine (MS CONTIN) ER tablet 60 mg, 60 mg, Oral, Q12H PRN, Tiffanie GO Bass, CRNP, 60 mg at 03/26/24 1451    naproxen (NAPROSYN) tablet 500 mg, 500 mg, Oral, BID With Meals, Patti Sher PA-C, 500 mg at 03/27/24 0835    nicotine (NICODERM CQ) 21 mg/24 hr TD 24 hr patch 21 mg, 21 mg, Transdermal, Daily, NATA Day, 21 mg at 03/25/24 0818    ondansetron (ZOFRAN) injection 4 mg, 4 mg, Intravenous, Q4H PRN, NATA Day, 4 mg at 03/26/24 0830    pantoprazole (PROTONIX) EC tablet 20 mg, 20 mg, Oral, BID AC, NATA Day, 20 mg at 03/27/24 0721    polyethylene glycol (MIRALAX) packet 17 g, 17 g, Per NG Tube, Daily, NATA Day, 17 g at 03/27/24 0834    pravastatin  (PRAVACHOL) tablet 80 mg, 80 mg, Oral, Daily With Dinner, NATA Day, 80 mg at 03/26/24 1558    risperiDONE (RisperDAL) tablet 3 mg, 3 mg, Oral, Daily, NATA Day, 3 mg at 03/27/24 0835    sodium chloride 0.9 % infusion, 10 mL/hr, Intravenous, Continuous, Nancy Jc MD, Last Rate: 10 mL/hr at 03/26/24 1600, 10 mL/hr at 03/26/24 1600     Labs & Results:        Results from last 7 days   Lab Units 03/27/24  0209 03/26/24  0605 03/25/24  0449   WBC Thousand/uL 9.41 11.12* 10.54*   HEMOGLOBIN g/dL 11.5* 11.3* 11.1*   HEMATOCRIT % 34.1* 31.7* 31.3*   PLATELETS Thousands/uL 267 228 155         Results from last 7 days   Lab Units 03/27/24  0209 03/26/24  2045 03/26/24  1415 03/26/24  0605 03/25/24  0449 03/24/24  1047 03/24/24  0951 03/24/24  0900 03/23/24  1215 03/23/24  0356 03/22/24  1432 03/22/24  1051   POTASSIUM mmol/L 3.3* 2.8* 2.8*   < > 3.6   < >  --   --    < >  --    < >  --    CHLORIDE mmol/L 102 101 103   < > 104   < >  --   --    < >  --    < >  --    CO2 mmol/L 23 23 21   < > 22   < >  --   --    < >  --    < >  --    CO2, I-STAT mmol/L  --   --   --   --   --   --  16* 20*  --   --   --  20*   BUN mg/dL 15 15 16   < > 18   < >  --   --    < >  --    < >  --    CREATININE mg/dL 0.83 1.00 0.88   < > 0.96   < >  --   --    < >  --    < >  --    CALCIUM mg/dL 8.7 9.2 8.8   < > 8.7   < >  --   --    < >  --    < >  --    ALK PHOS U/L  --   --   --   --  50  --   --   --   --  53  --   --    ALT U/L  --   --   --   --  16  --   --   --   --  16  --   --    AST U/L  --   --   --   --  18  --   --   --   --  13  --   --    GLUCOSE, ISTAT mg/dl  --   --   --   --   --   --  219* 259*  --   --   --  >600*    < > = values in this interval not displayed.     Results from last 7 days   Lab Units 03/25/24  1159 03/25/24  0610 03/25/24  0004 03/24/24  1700 03/24/24  1056   INR   --   --   --   --  1.37*   PTT seconds 56* 68* 57*   < > 41*    < > = values in this interval not  displayed.     Results from last 7 days   Lab Units 03/27/24  0209 03/26/24  0605 03/25/24  0449   MAGNESIUM mg/dL 1.8* 1.7* 2.0

## 2024-03-27 NOTE — PHYSICAL THERAPY NOTE
PHYSICAL THERAPY CANCEL NOTE      Patient Name: Armen Llanos  Today's Date: 3/27/2024       03/27/24 4676   Note Type   Note type Cancelled Session   Cancel Reasons Other   Additional Comments New order placed for planned OR for L 2nd toe amputation tomorrow secondary to osteomyelitis. No weight bearing orders entered for L foot. PT will defer today and follow up post-op with formal weight bearing orders.     Eric Fay, PT

## 2024-03-27 NOTE — OCCUPATIONAL THERAPY NOTE
Occupational Therapy Cx Note     Patient Name: Armen Llanos  Today's Date: 3/27/2024  Problem List  Principal Problem:    Type 2 diabetes mellitus with ketoacidosis without coma, without long-term current use of insulin (HCC)  Active Problems:    Acute on Chronic Right knee pain    Chronic, continuous use of opioids    Essential hypertension    Septic shock (HCC)    TBI (traumatic brain injury) ([facility])    Suicidal ideation    Hyponatremia    MSSA bacteremia    A-fib with RVR ([facility])    Cardiomyopathy ([facility])    Anxiety              03/27/24 1448   OT Last Visit   OT Visit Date 03/27/24   Note Type   Note type Cancelled Session   Cancel Reasons Other   Additional Comments Pt now ordered/planned for 2nd toe amp tomorrow d/t osteomyelitis. No formal WBS in chart for L foot. Will hold & f/u post-op with formal weightbearing orders.       Stephani Phelan, OTR/L

## 2024-03-27 NOTE — PLAN OF CARE
Problem: PAIN - ADULT  Goal: Verbalizes/displays adequate comfort level or baseline comfort level  Description: Interventions:  - Encourage patient to monitor pain and request assistance  - Assess pain using appropriate pain scale  - Administer analgesics based on type and severity of pain and evaluate response  - Implement non-pharmacological measures as appropriate and evaluate response  - Consider cultural and social influences on pain and pain management  - Notify physician/advanced practitioner if interventions unsuccessful or patient reports new pain  Outcome: Progressing     Problem: SAFETY ADULT  Goal: Patient will remain free of falls  Description: INTERVENTIONS:  - Educate patient/family on patient safety including physical limitations  - Instruct patient to call for assistance with activity   - Consult OT/PT to assist with strengthening/mobility   - Keep Call bell within reach  - Keep bed low and locked with side rails adjusted as appropriate  - Keep care items and personal belongings within reach  - Initiate and maintain comfort rounds  - Make Fall Risk Sign visible to staff  - Offer Toileting every 2 Hours, in advance of need  - Initiate/Maintain 2alarm  - Obtain necessary fall risk management equipment: 2  - Apply yellow socks and bracelet for high fall risk patients  - Consider moving patient to room near nurses station  Outcome: Progressing     Problem: SAFETY ADULT  Goal: Maintains/Returns to pre admission functional level  Description: INTERVENTIONS:  - Perform AM-PAC 6 Click Basic Mobility/ Daily Activity assessment daily.  - Set and communicate daily mobility goal to care team and patient/family/caregiver.   - Collaborate with rehabilitation services on mobility goals if consulted  - Perform Range of Motion 2 times a day.  - Reposition patient every 1 hours.  - Dangle patient 2 times a day  - Stand patient 2 times a day  - Ambulate patient 2 times a day  - Out of bed to chair 2 times a day   -  Out of bed for meals 2 times a day  - Out of bed for toileting  - Record patient progress and toleration of activity level   Outcome: Progressing

## 2024-03-28 ENCOUNTER — APPOINTMENT (INPATIENT)
Dept: RADIOLOGY | Facility: HOSPITAL | Age: 49
DRG: 485 | End: 2024-03-28
Payer: COMMERCIAL

## 2024-03-28 ENCOUNTER — ANESTHESIA (INPATIENT)
Dept: PERIOP | Facility: HOSPITAL | Age: 49
DRG: 485 | End: 2024-03-28
Payer: COMMERCIAL

## 2024-03-28 PROBLEM — M86.172 ACUTE OSTEOMYELITIS OF LEFT FOOT (HCC): Status: ACTIVE | Noted: 2024-03-28

## 2024-03-28 LAB
ANION GAP SERPL CALCULATED.3IONS-SCNC: 6 MMOL/L (ref 4–13)
BACTERIA BLD CULT: ABNORMAL
BACTERIA BLD CULT: ABNORMAL
BASOPHILS # BLD MANUAL: 0 THOUSAND/UL (ref 0–0.1)
BASOPHILS NFR MAR MANUAL: 0 % (ref 0–1)
BUN SERPL-MCNC: 11 MG/DL (ref 5–25)
CALCIUM SERPL-MCNC: 8.3 MG/DL (ref 8.4–10.2)
CHLORIDE SERPL-SCNC: 108 MMOL/L (ref 96–108)
CO2 SERPL-SCNC: 25 MMOL/L (ref 21–32)
CREAT SERPL-MCNC: 0.82 MG/DL (ref 0.6–1.3)
EOSINOPHIL # BLD MANUAL: 0.33 THOUSAND/UL (ref 0–0.4)
EOSINOPHIL NFR BLD MANUAL: 4 % (ref 0–6)
ERYTHROCYTE [DISTWIDTH] IN BLOOD BY AUTOMATED COUNT: 13.2 % (ref 11.6–15.1)
GFR SERPL CREATININE-BSD FRML MDRD: 103 ML/MIN/1.73SQ M
GLUCOSE P FAST SERPL-MCNC: 128 MG/DL (ref 65–99)
GLUCOSE SERPL-MCNC: 100 MG/DL (ref 65–140)
GLUCOSE SERPL-MCNC: 109 MG/DL (ref 65–140)
GLUCOSE SERPL-MCNC: 110 MG/DL (ref 65–140)
GLUCOSE SERPL-MCNC: 115 MG/DL (ref 65–140)
GLUCOSE SERPL-MCNC: 122 MG/DL (ref 65–140)
GLUCOSE SERPL-MCNC: 124 MG/DL (ref 65–140)
GLUCOSE SERPL-MCNC: 133 MG/DL (ref 65–140)
GLUCOSE SERPL-MCNC: 146 MG/DL (ref 65–140)
GLUCOSE SERPL-MCNC: 185 MG/DL (ref 65–140)
GLUCOSE SERPL-MCNC: 200 MG/DL (ref 65–140)
GRAM STN SPEC: ABNORMAL
GRAM STN SPEC: ABNORMAL
HCT VFR BLD AUTO: 33.7 % (ref 36.5–49.3)
HGB BLD-MCNC: 11.6 G/DL (ref 12–17)
LYMPHOCYTES # BLD AUTO: 3.29 THOUSAND/UL (ref 0.6–4.47)
LYMPHOCYTES # BLD AUTO: 38 % (ref 14–44)
MCH RBC QN AUTO: 30.4 PG (ref 26.8–34.3)
MCHC RBC AUTO-ENTMCNC: 34.4 G/DL (ref 31.4–37.4)
MCV RBC AUTO: 89 FL (ref 82–98)
MONOCYTES # BLD AUTO: 0.25 THOUSAND/UL (ref 0–1.22)
MONOCYTES NFR BLD: 3 % (ref 4–12)
NEUTROPHILS # BLD MANUAL: 4.36 THOUSAND/UL (ref 1.85–7.62)
NEUTS SEG NFR BLD AUTO: 53 % (ref 43–75)
PLATELET # BLD AUTO: 332 THOUSANDS/UL (ref 149–390)
PLATELET BLD QL SMEAR: ADEQUATE
PMV BLD AUTO: 10.6 FL (ref 8.9–12.7)
POTASSIUM SERPL-SCNC: 2.8 MMOL/L (ref 3.5–5.3)
RBC # BLD AUTO: 3.81 MILLION/UL (ref 3.88–5.62)
RBC MORPH BLD: NORMAL
S AUREUS DNA BLD POS QL NAA+NON-PROBE: DETECTED
SODIUM SERPL-SCNC: 139 MMOL/L (ref 135–147)
VARIANT LYMPHS # BLD AUTO: 2 %
WBC # BLD AUTO: 8.22 THOUSAND/UL (ref 4.31–10.16)

## 2024-03-28 PROCEDURE — 82948 REAGENT STRIP/BLOOD GLUCOSE: CPT

## 2024-03-28 PROCEDURE — 85007 BL SMEAR W/DIFF WBC COUNT: CPT | Performed by: PHYSICIAN ASSISTANT

## 2024-03-28 PROCEDURE — 83519 RIA NONANTIBODY: CPT | Performed by: STUDENT IN AN ORGANIZED HEALTH CARE EDUCATION/TRAINING PROGRAM

## 2024-03-28 PROCEDURE — 80048 BASIC METABOLIC PNL TOTAL CA: CPT | Performed by: PHYSICIAN ASSISTANT

## 2024-03-28 PROCEDURE — NC001 PR NO CHARGE: Performed by: PODIATRIST

## 2024-03-28 PROCEDURE — 28825 PARTIAL AMPUTATION OF TOE: CPT | Performed by: PODIATRIST

## 2024-03-28 PROCEDURE — 86341 ISLET CELL ANTIBODY: CPT | Performed by: STUDENT IN AN ORGANIZED HEALTH CARE EDUCATION/TRAINING PROGRAM

## 2024-03-28 PROCEDURE — 85027 COMPLETE CBC AUTOMATED: CPT | Performed by: PHYSICIAN ASSISTANT

## 2024-03-28 PROCEDURE — 99232 SBSQ HOSP IP/OBS MODERATE 35: CPT | Performed by: PHYSICIAN ASSISTANT

## 2024-03-28 PROCEDURE — 99024 POSTOP FOLLOW-UP VISIT: CPT

## 2024-03-28 PROCEDURE — 82947 ASSAY GLUCOSE BLOOD QUANT: CPT | Performed by: STUDENT IN AN ORGANIZED HEALTH CARE EDUCATION/TRAINING PROGRAM

## 2024-03-28 PROCEDURE — 88305 TISSUE EXAM BY PATHOLOGIST: CPT | Performed by: PATHOLOGY

## 2024-03-28 PROCEDURE — 0Y6S0Z3 DETACHMENT AT LEFT 2ND TOE, LOW, OPEN APPROACH: ICD-10-PCS | Performed by: PODIATRIST

## 2024-03-28 PROCEDURE — 73630 X-RAY EXAM OF FOOT: CPT

## 2024-03-28 PROCEDURE — 84681 ASSAY OF C-PEPTIDE: CPT | Performed by: STUDENT IN AN ORGANIZED HEALTH CARE EDUCATION/TRAINING PROGRAM

## 2024-03-28 PROCEDURE — 88311 DECALCIFY TISSUE: CPT | Performed by: PATHOLOGY

## 2024-03-28 RX ORDER — PROPOFOL 10 MG/ML
INJECTION, EMULSION INTRAVENOUS AS NEEDED
Status: DISCONTINUED | OUTPATIENT
Start: 2024-03-28 | End: 2024-03-28

## 2024-03-28 RX ORDER — ONDANSETRON 2 MG/ML
INJECTION INTRAMUSCULAR; INTRAVENOUS AS NEEDED
Status: DISCONTINUED | OUTPATIENT
Start: 2024-03-28 | End: 2024-03-28

## 2024-03-28 RX ORDER — METHOCARBAMOL 500 MG/1
500 TABLET, FILM COATED ORAL EVERY 6 HOURS PRN
Status: DISCONTINUED | OUTPATIENT
Start: 2024-03-28 | End: 2024-04-03 | Stop reason: HOSPADM

## 2024-03-28 RX ORDER — PROPOFOL 10 MG/ML
INJECTION, EMULSION INTRAVENOUS CONTINUOUS PRN
Status: DISCONTINUED | OUTPATIENT
Start: 2024-03-28 | End: 2024-03-28

## 2024-03-28 RX ORDER — FENTANYL CITRATE 50 UG/ML
INJECTION, SOLUTION INTRAMUSCULAR; INTRAVENOUS AS NEEDED
Status: DISCONTINUED | OUTPATIENT
Start: 2024-03-28 | End: 2024-03-28

## 2024-03-28 RX ORDER — FENTANYL CITRATE/PF 50 MCG/ML
25 SYRINGE (ML) INJECTION
Status: DISCONTINUED | OUTPATIENT
Start: 2024-03-28 | End: 2024-03-28 | Stop reason: HOSPADM

## 2024-03-28 RX ORDER — MIDAZOLAM HYDROCHLORIDE 2 MG/2ML
INJECTION, SOLUTION INTRAMUSCULAR; INTRAVENOUS AS NEEDED
Status: DISCONTINUED | OUTPATIENT
Start: 2024-03-28 | End: 2024-03-28

## 2024-03-28 RX ORDER — SODIUM CHLORIDE, SODIUM LACTATE, POTASSIUM CHLORIDE, CALCIUM CHLORIDE 600; 310; 30; 20 MG/100ML; MG/100ML; MG/100ML; MG/100ML
INJECTION, SOLUTION INTRAVENOUS CONTINUOUS PRN
Status: DISCONTINUED | OUTPATIENT
Start: 2024-03-28 | End: 2024-03-28

## 2024-03-28 RX ORDER — ONDANSETRON 2 MG/ML
4 INJECTION INTRAMUSCULAR; INTRAVENOUS ONCE AS NEEDED
Status: DISCONTINUED | OUTPATIENT
Start: 2024-03-28 | End: 2024-03-28 | Stop reason: HOSPADM

## 2024-03-28 RX ORDER — HYDROMORPHONE HYDROCHLORIDE 2 MG/ML
INJECTION, SOLUTION INTRAMUSCULAR; INTRAVENOUS; SUBCUTANEOUS AS NEEDED
Status: DISCONTINUED | OUTPATIENT
Start: 2024-03-28 | End: 2024-03-28

## 2024-03-28 RX ORDER — INSULIN GLARGINE 100 [IU]/ML
30 INJECTION, SOLUTION SUBCUTANEOUS EVERY MORNING
Status: DISCONTINUED | OUTPATIENT
Start: 2024-03-29 | End: 2024-03-29

## 2024-03-28 RX ORDER — CEFAZOLIN SODIUM 1 G/3ML
INJECTION, POWDER, FOR SOLUTION INTRAMUSCULAR; INTRAVENOUS AS NEEDED
Status: DISCONTINUED | OUTPATIENT
Start: 2024-03-28 | End: 2024-03-28

## 2024-03-28 RX ADMIN — MIDAZOLAM 2 MG: 1 INJECTION INTRAMUSCULAR; INTRAVENOUS at 15:06

## 2024-03-28 RX ADMIN — PROPOFOL 50 MG: 10 INJECTION, EMULSION INTRAVENOUS at 15:13

## 2024-03-28 RX ADMIN — PANTOPRAZOLE SODIUM 20 MG: 20 TABLET, DELAYED RELEASE ORAL at 07:46

## 2024-03-28 RX ADMIN — NAPROXEN 500 MG: 500 TABLET ORAL at 07:46

## 2024-03-28 RX ADMIN — FENTANYL CITRATE 25 MCG: 50 INJECTION, SOLUTION INTRAMUSCULAR; INTRAVENOUS at 15:34

## 2024-03-28 RX ADMIN — ONDANSETRON 4 MG: 2 INJECTION INTRAMUSCULAR; INTRAVENOUS at 15:11

## 2024-03-28 RX ADMIN — SODIUM CHLORIDE, SODIUM LACTATE, POTASSIUM CHLORIDE, AND CALCIUM CHLORIDE: .6; .31; .03; .02 INJECTION, SOLUTION INTRAVENOUS at 14:56

## 2024-03-28 RX ADMIN — METOPROLOL SUCCINATE 25 MG: 25 TABLET, EXTENDED RELEASE ORAL at 07:45

## 2024-03-28 RX ADMIN — FENTANYL CITRATE 25 MCG: 50 INJECTION, SOLUTION INTRAMUSCULAR; INTRAVENOUS at 15:20

## 2024-03-28 RX ADMIN — CEFAZOLIN SODIUM 2000 MG: 2 SOLUTION INTRAVENOUS at 10:54

## 2024-03-28 RX ADMIN — FENTANYL CITRATE 25 MCG: 50 INJECTION, SOLUTION INTRAMUSCULAR; INTRAVENOUS at 15:15

## 2024-03-28 RX ADMIN — CEFAZOLIN 2000 MG: 1 INJECTION, POWDER, FOR SOLUTION INTRAMUSCULAR; INTRAVENOUS at 15:15

## 2024-03-28 RX ADMIN — INSULIN GLARGINE 35 UNITS: 100 INJECTION, SOLUTION SUBCUTANEOUS at 07:45

## 2024-03-28 RX ADMIN — HYDROMORPHONE HYDROCHLORIDE 2 MG: 2 INJECTION, SOLUTION INTRAMUSCULAR; INTRAVENOUS; SUBCUTANEOUS at 04:20

## 2024-03-28 RX ADMIN — POLYETHYLENE GLYCOL 3350 17 G: 17 POWDER, FOR SOLUTION ORAL at 07:44

## 2024-03-28 RX ADMIN — SODIUM CHLORIDE 6 UNITS/HR: 9 INJECTION, SOLUTION INTRAVENOUS at 23:14

## 2024-03-28 RX ADMIN — FENTANYL CITRATE 25 MCG: 50 INJECTION, SOLUTION INTRAMUSCULAR; INTRAVENOUS at 15:29

## 2024-03-28 RX ADMIN — HYDROMORPHONE HYDROCHLORIDE 2 MG: 2 INJECTION, SOLUTION INTRAMUSCULAR; INTRAVENOUS; SUBCUTANEOUS at 21:42

## 2024-03-28 RX ADMIN — PROPOFOL 100 MCG/KG/MIN: 10 INJECTION, EMULSION INTRAVENOUS at 15:11

## 2024-03-28 RX ADMIN — HYDROMORPHONE HYDROCHLORIDE 2 MG: 2 INJECTION, SOLUTION INTRAMUSCULAR; INTRAVENOUS; SUBCUTANEOUS at 10:54

## 2024-03-28 RX ADMIN — DULOXETINE HYDROCHLORIDE 20 MG: 20 CAPSULE, DELAYED RELEASE ORAL at 07:46

## 2024-03-28 RX ADMIN — CHLORHEXIDINE GLUCONATE 0.12% ORAL RINSE 15 ML: 1.2 LIQUID ORAL at 21:41

## 2024-03-28 RX ADMIN — PROPOFOL 100 MG: 10 INJECTION, EMULSION INTRAVENOUS at 15:11

## 2024-03-28 RX ADMIN — ONDANSETRON 4 MG: 2 INJECTION INTRAMUSCULAR; INTRAVENOUS at 19:44

## 2024-03-28 RX ADMIN — CEFAZOLIN SODIUM 2000 MG: 2 SOLUTION INTRAVENOUS at 04:00

## 2024-03-28 RX ADMIN — HYDROMORPHONE HYDROCHLORIDE 2 MG: 2 INJECTION, SOLUTION INTRAMUSCULAR; INTRAVENOUS; SUBCUTANEOUS at 18:33

## 2024-03-28 RX ADMIN — CHLORHEXIDINE GLUCONATE 0.12% ORAL RINSE 15 ML: 1.2 LIQUID ORAL at 07:44

## 2024-03-28 RX ADMIN — DOCUSATE SODIUM 200 MG: 100 CAPSULE, LIQUID FILLED ORAL at 21:42

## 2024-03-28 RX ADMIN — AMIODARONE HYDROCHLORIDE 400 MG: 200 TABLET ORAL at 07:45

## 2024-03-28 RX ADMIN — PANTOPRAZOLE SODIUM 20 MG: 20 TABLET, DELAYED RELEASE ORAL at 06:01

## 2024-03-28 RX ADMIN — NICOTINE 21 MG: 21 PATCH, EXTENDED RELEASE TRANSDERMAL at 21:42

## 2024-03-28 RX ADMIN — CLONAZEPAM 1 MG: 1 TABLET ORAL at 07:46

## 2024-03-28 RX ADMIN — ONDANSETRON 4 MG: 2 INJECTION INTRAMUSCULAR; INTRAVENOUS at 13:49

## 2024-03-28 RX ADMIN — HYDROMORPHONE HYDROCHLORIDE 2 MG: 2 INJECTION, SOLUTION INTRAMUSCULAR; INTRAVENOUS; SUBCUTANEOUS at 14:59

## 2024-03-28 RX ADMIN — RISPERIDONE 3 MG: 1 TABLET, FILM COATED ORAL at 07:45

## 2024-03-28 RX ADMIN — HYDROMORPHONE HYDROCHLORIDE 2 MG: 2 INJECTION, SOLUTION INTRAMUSCULAR; INTRAVENOUS; SUBCUTANEOUS at 07:44

## 2024-03-28 NOTE — PHYSICAL THERAPY NOTE
Physical Therapy Cancellation Note       03/28/24 1053   PT Last Visit   PT Visit Date 03/28/24   Note Type   Note type Cancelled Session   Cancel Reasons Patient to operating room   Additional Comments Will need weight bearing orders post op     Marion Lobo

## 2024-03-28 NOTE — PROGRESS NOTES
Armen Llanos  48 y.o.  male  1975  mrn [id number]    Assessment/Plan:  Sepsis/MSSA bacteremia/MSSA right TKR infxn/Left 2nd toe osteo/Fever/Leukocytosis/  Hypotension/JH: Pt presented with c/o right knee pain and swelling x 1 day. Pt does not remember if he fell one day PTA.     On admission, he did not have fever but WBC count was elevated at 17.8K. Creatinine was elevated at 1.59 c/w JH. Ortho aspirated the Pt's right knee on 3/23. Cell count showed 221,280 WBC's with poly predominance c/w septic arthritis in setting of TKR. No crystal were seen. He was placed on Vanco.      He went to the OR on 3/24 for washout of right knee with polyexchange. He developed hypotension post-op and fever to 103.5 c/w sepsis. He was transferred to ICU and required pressor support. Steroids were started.     Admission bld cx's grew MSSA. Initial right knee fluid cx also grew MSSA. Repeat bld cx's drawn on 3/23 grew MSSA. Right knee OR cx's grew MSSA. Repeat bld cx's were drawn on 3/25. ECHO is neg for valvular vegetation. He was extubated today. He is off pressor support. WBC count has decreased to 10.5K. Creatinine is down to 0.96 c/w resolution of JH.     3/26: No further fever and WBC has decreased to 10.5K. Admission bld cx's grew MSSA. Ortho aspirated the Pt's right knee on 3/23. Cell count showed 221,280 WBC's with poly predominance c/w septic arthritis in setting of TKR. Initial right knee fluid cx grew MSSA. Repeat bld cx's drawn on 3/23 and 3/25 also grew MSSA. ECHO is neg for valvular vegetation.      He went to the OR on 3/24 for washout of right knee with polyexchange. He developed hypotension post-op and fever to 103.5 c/w sepsis. Right knee OR cx's grew MSSA. Abx regimen was narrowed to Ancef on 3/25    3/27: Fevers have resolved and WBC count is down to 9.4K. Admission bld cx's grew MSSA. Ortho aspirated the Pt's right knee on 3/23. Cell count showed 221,280 WBC's with poly predominance c/w septic arthritis  in setting of TKR. Initial right knee fluid cx grew MSSA. Repeat bld cx's drawn on 3/23 and 3/25 also grew MSSA. Bld cx's drawn on 3/26 are neg so far. ECHO is neg for valvular vegetation.      He went to the OR on 3/24 for washout of right knee with polyexchange. He developed hypotension post-op and fever to 103.5 c/w sepsis. Right knee OR cx's   grew MSSA. Abx regimen was narrowed to Ancef on 3/25. Pt noted to have left 2nd toe wound. Podiatry ordered MRI of left foot which showed osteo of 2nd toe which could be the source of his MSSA bacteremia leading to right TKR Infxn. Podiatry is planning to resect left 2nd toe tomorrow     - Cont Ancef   - Awaiting results of repeat bld cx's   - Agree with plans for resection of left 2nd toe for tx of osteo  - When bld cx's are neg, Pt will need placement of PICC line so he can complete 6 week course of IV abx  - Cont local wound care as per Ortho  - Pt will need chronic suppressive abx tx after he finishes 6 week course of IV abx because right TKR was not removed.  - Will cont to monitor for the development of toxicity to current abx tx     Discussed case with Patti Sher PA-C    Subjective: Pt reports that right knee pain is not as severe. He is aware that left 2nd toe needs to be resected for tx of bone infxn. No further fever and WBC count is nml. No probs with current abx tx    Objective:  VSS, Tmax: 98.3  HEENT:  No scleral icterus  NECK: Supple  CARDIAC:  RRR, nml S1, S2  LUNGS:  Clear  ABDOMEN:  +BS soft, nontender  MUSCULOSKELETAL:  No calf tenderness  EXTREMITIES:  +Intact dressing on right leg  SKIN:  No rash      Labs:  CBC w/diff  Recent Labs     03/27/24  0209   WBC 9.41   HGB 11.5*   HCT 34.1*      NEUTOPHILPCT 84*   LYMPHOPCT 7*   MONOPCT 7   EOSPCT 0     BMP  Recent Labs     03/27/24  0209   K 3.3*      CO2 23   BUN 15   CREATININE 0.83   CALCIUM 8.7     CMP  Recent Labs     03/25/24  0449 03/26/24  0605 03/27/24  0209   K 3.6   < > 3.3*     "  < > 102   CO2 22   < > 23   BUN 18   < > 15   CREATININE 0.96   < > 0.83   CALCIUM 8.7   < > 8.7   ALKPHOS 50  --   --    ALT 16  --   --    AST 18  --   --     < > = values in this interval not displayed.       .[facility]    Cultures:  Lab Results   Component Value Date    BLOODCX No Growth at 24 hrs. 03/26/2024    BLOODCX No Growth at 24 hrs. 03/26/2024    BLOODCX Staphylococcus aureus (A) 03/25/2024    BLOODCX Staphylococcus aureus (A) 03/25/2024    BLOODCX Staphylococcus aureus (A) 03/23/2024    BLOODCX Staphylococcus aureus (A) 03/23/2024    BLOODCX Staphylococcus aureus (A) 03/22/2024    BLOODCX Staphylococcus aureus (A) 03/22/2024     No results found for: \"WOUNDCULT\"  No results found for: \"URINECX\"  No results found for: \"SPUTUMCULTUR\"    MED:  Ancef: #3  Abx: #5     Completed:  Vanco x 3 days on 3/25         Current Facility-Administered Medications:     acetaminophen (TYLENOL) tablet 650 mg, 650 mg, Oral, Q6H PRN, Patti Sher PA-C, 650 mg at 03/26/24 1453    amiodarone tablet 400 mg, 400 mg, Oral, BID With Meals, 400 mg at 03/27/24 1718 **FOLLOWED BY** [START ON 4/5/2024] amiodarone tablet 200 mg, 200 mg, Oral, Daily With Breakfast, Patti Sher PA-C    bupivacaine liposomal (EXPAREL) 1.3 % injection 20 mL, 20 mL, Infiltration, Once, Patti Sher PA-C    butalbital-acetaminophen-caffeine (FIORICET,ESGIC) -40 mg per tablet 1 tablet, 1 tablet, Oral, Q4H PRN, Patti Sher PA-C    ceFAZolin (ANCEF) IVPB (premix in dextrose) 2,000 mg 50 mL, 2,000 mg, Intravenous, Q8H, Patti Sher PA-C, Last Rate: 100 mL/hr at 03/27/24 2000, 2,000 mg at 03/27/24 2000    chlorhexidine (PERIDEX) 0.12 % oral rinse 15 mL, 15 mL, Mouth/Throat, Q12H DARIN, Patti Sher PA-C, 15 mL at 03/27/24 2035    clonazePAM (KlonoPIN) tablet 1 mg, 1 mg, Oral, Daily, Patti Sher PA-C, 1 mg at 03/27/24 0835    docusate sodium (COLACE) capsule 200 mg, 200 mg, Oral, HS, Patti Sher PA-C, 200 mg at 03/27/24 2125    DULoxetine (CYMBALTA) delayed release " capsule 20 mg, 20 mg, Oral, Daily, Patti Sher PA-C, 20 mg at 03/27/24 0835    fentaNYL (DURAGESIC) 75 mcg/hr TD 72 hr patch 1 patch, 1 patch, Transdermal, Q72H, Patti Sher PA-C, 1 patch at 03/26/24 0059    HYDROmorphone (DILAUDID) injection 2 mg, 2 mg, Intravenous, Q3H PRN, Patti Sher PA-C, 2 mg at 03/27/24 2045    insulin glargine (LANTUS) subcutaneous injection 35 Units 0.35 mL, 35 Units, Subcutaneous, QAM, Esha العراقي MD, 35 Units at 03/27/24 1013    insulin regular (HumuLIN R,NovoLIN R) 1 Units/mL in sodium chloride 0.9 % 100 mL infusion, 0.3-21 Units/hr, Intravenous, Titrated, Patti Sher PA-C, Last Rate: 3 mL/hr at 03/27/24 2126, 3 Units/hr at 03/27/24 2126    metoprolol (LOPRESSOR) injection 5 mg, 5 mg, Intravenous, Q6H PRN, Patti Sher PA-C    metoprolol succinate (TOPROL-XL) 24 hr tablet 25 mg, 25 mg, Oral, Daily, Patti Sher PA-C, 25 mg at 03/27/24 0835    morphine (MS CONTIN) ER tablet 60 mg, 60 mg, Oral, Q12H PRN, Patti Sher PA-C, 60 mg at 03/27/24 1155    naproxen (NAPROSYN) tablet 500 mg, 500 mg, Oral, BID With Meals, Patti Sher PA-C, 500 mg at 03/27/24 1718    nicotine (NICODERM CQ) 21 mg/24 hr TD 24 hr patch 21 mg, 21 mg, Transdermal, Daily, Patti Sher PA-C, 21 mg at 03/25/24 0818    ondansetron (ZOFRAN) injection 4 mg, 4 mg, Intravenous, Q4H PRN, Patti Sher PA-C, 4 mg at 03/26/24 0830    pantoprazole (PROTONIX) EC tablet 20 mg, 20 mg, Oral, BID AC, Patti Sher PA-C, 20 mg at 03/27/24 1718    polyethylene glycol (MIRALAX) packet 17 g, 17 g, Per NG Tube, Daily, Patti Sher PA-C, 17 g at 03/27/24 0834    pravastatin (PRAVACHOL) tablet 80 mg, 80 mg, Oral, Daily With Dinner, Patti Sher PA-C, 80 mg at 03/27/24 1718    risperiDONE (RisperDAL) tablet 3 mg, 3 mg, Oral, Daily, Patti Sher PA-C, 3 mg at 03/27/24 0835    sodium chloride 0.9 % infusion, 10 mL/hr, Intravenous, Continuous, Patti Sher PA-C, Last Rate: 10 mL/hr at 03/26/24 1600, 10 mL/hr at 03/26/24 1600    zolpidem (AMBIEN) tablet 5 mg, 5  mg, Oral, HS PRN, Bambi Marcelo PA-C, 5 mg at 03/27/24 2129    Principal Problem:    Type 2 diabetes mellitus with ketoacidosis without coma, without long-term current use of insulin (HCC)  Active Problems:    Acute on Chronic Right knee pain    Chronic, continuous use of opioids    Essential hypertension    Septic shock (HCC)    TBI (traumatic brain injury) ([facility])    Suicidal ideation    Hyponatremia    MSSA bacteremia    A-fib with RVR ([facility])    Cardiomyopathy ([facility])    Anxiety      Vee Walker MD

## 2024-03-28 NOTE — ANESTHESIA PREPROCEDURE EVALUATION
Procedure:  AMPUTATION TOE 2nd (Left: Toe)    Relevant Problems   ANESTHESIA (within normal limits)      CARDIO   (+) A-fib with RVR ([facility])   (+) Essential hypertension      ENDO   (+) Type 2 diabetes mellitus with ketoacidosis without coma, without long-term current use of insulin ([facility])      MUSCULOSKELETAL   (+) Osteoarthritis of knee      NEURO/PSYCH   (+) Anxiety   (+) Chronic pain disorder   (+) Chronic, continuous use of opioids      Neurology/Sleep   (+) TBI (traumatic brain injury) ([facility])      Surgery/Wound/Pain   (+) Septic shock ([facility])      Cardiovascular/Peripheral Vascular   (+) Cardiomyopathy ([facility])      Other   (+) Acute osteomyelitis of left foot ([facility])   (+) Hypokalemia      Physical Exam    Airway    Mallampati score: I  TM Distance: >3 FB  Neck ROM: full     Dental   No notable dental hx     Cardiovascular      Pulmonary      Other Findings      Lab Results   Component Value Date    WBC 8.22 03/28/2024    HGB 11.6 (L) 03/28/2024     03/28/2024     Lab Results   Component Value Date    SODIUM 139 03/28/2024    K 2.8 (L) 03/28/2024    BUN 11 03/28/2024    CREATININE 0.82 03/28/2024    EGFR 103 03/28/2024    GLUCOSE 219 (H) 03/24/2024     Lab Results   Component Value Date    PTT 56 (H) 03/25/2024      Lab Results   Component Value Date    INR 1.37 (H) 03/24/2024     Lab Results   Component Value Date    HGBA1C 12.1 (H) 03/22/2024     Anesthesia Plan  ASA Score- 3     Anesthesia Type- IV sedation with anesthesia with ASA Monitors.         Additional Monitors:     Airway Plan:     Comment: Pt did not receive his dilaudid dose at 2 - will give now  New IV placed as current non-functional, 2nd line for insulin.       Plan Factors-    Chart reviewed.   Existing labs reviewed. Patient summary reviewed.    Patient is not a current smoker.              Induction- intravenous.    Postoperative Plan-     Informed Consent- Anesthetic plan and risks discussed with patient.  I personally reviewed this patient  with the CRNA. Discussed and agreed on the Anesthesia Plan with the CRNA..

## 2024-03-28 NOTE — PLAN OF CARE
Problem: PAIN - ADULT  Goal: Verbalizes/displays adequate comfort level or baseline comfort level  Description: Interventions:  - Encourage patient to monitor pain and request assistance  - Assess pain using appropriate pain scale  - Administer analgesics based on type and severity of pain and evaluate response  - Implement non-pharmacological measures as appropriate and evaluate response  - Consider cultural and social influences on pain and pain management  - Notify physician/advanced practitioner if interventions unsuccessful or patient reports new pain  Outcome: Progressing     Problem: INFECTION - ADULT  Goal: Absence or prevention of progression during hospitalization  Description: INTERVENTIONS:  - Assess and monitor for signs and symptoms of infection  - Monitor lab/diagnostic results  - Monitor all insertion sites, i.e. indwelling lines, tubes, and drains  - Monitor endotracheal if appropriate and nasal secretions for changes in amount and color  - [place] appropriate cooling/warming therapies per order  - Administer medications as ordered  - Instruct and encourage patient and family to use good hand hygiene technique  - Identify and instruct in appropriate isolation precautions for identified infection/condition  Outcome: Progressing  Goal: Absence of fever/infection during neutropenic period  Description: INTERVENTIONS:  - Monitor WBC    Outcome: Progressing     Problem: SAFETY ADULT  Goal: Patient will remain free of falls  Description: INTERVENTIONS:  - Educate patient/family on patient safety including physical limitations  - Instruct patient to call for assistance with activity   - Consult OT/PT to assist with strengthening/mobility   - Keep Call bell within reach  - Keep bed low and locked with side rails adjusted as appropriate  - Keep care items and personal belongings within reach  - Initiate and maintain comfort rounds  - Make Fall Risk Sign visible to staff  - Offer Toileting every 2 Hours,  in advance of need  - Initiate/Maintain 2alarm  - Obtain necessary fall risk management equipment: 2  - Apply yellow socks and bracelet for high fall risk patients  - Consider moving patient to room near nurses station  Outcome: Progressing  Goal: Maintain or return to baseline ADL function  Description: INTERVENTIONS:  -  Assess patient's ability to carry out ADLs; assess patient's baseline for ADL function and identify physical deficits which impact ability to perform ADLs (bathing, care of mouth/teeth, toileting, grooming, dressing, etc.)  - Assess/evaluate cause of self-care deficits   - Assess range of motion  - Assess patient's mobility; develop plan if impaired  - Assess patient's need for assistive devices and provide as appropriate  - Encourage maximum independence but intervene and supervise when necessary  - Involve family in performance of ADLs  - Assess for home care needs following discharge   - Consider OT consult to assist with ADL evaluation and planning for discharge  - Provide patient education as appropriate  Outcome: Progressing  Goal: Maintains/Returns to pre admission functional level  Description: INTERVENTIONS:  - Perform AM-PAC 6 Click Basic Mobility/ Daily Activity assessment daily.  - Set and communicate daily mobility goal to care team and patient/family/caregiver.   - Collaborate with rehabilitation services on mobility goals if consulted  - Perform Range of Motion 2 times a day.  - Reposition patient every 1 hours.  - Dangle patient 2 times a day  - Stand patient 2 times a day  - Ambulate patient 2 times a day  - Out of bed to chair 2 times a day   - Out of bed for meals 2 times a day  - Out of bed for toileting  - Record patient progress and toleration of activity level   Outcome: Progressing     Problem: DISCHARGE PLANNING  Goal: Discharge to home or other facility with appropriate resources  Description: INTERVENTIONS:  - Identify barriers to discharge w/patient and caregiver  -  Arrange for needed discharge resources and transportation as appropriate  - Identify discharge learning needs (meds, wound care, etc.)  - Arrange for interpretive services to assist at discharge as needed  - Refer to Case Management Department for coordinating discharge planning if the patient needs post-hospital services based on physician/advanced practitioner order or complex needs related to functional status, cognitive ability, or social support system  Outcome: Progressing     Problem: Knowledge Deficit  Goal: Patient/family/caregiver demonstrates understanding of disease process, treatment plan, medications, and discharge instructions  Description: Complete learning assessment and assess knowledge base.  Interventions:  - Provide teaching at level of understanding  - Provide teaching via preferred learning methods  Outcome: Progressing     Problem: Prexisting or High Potential for Compromised Skin Integrity  Goal: Skin integrity is maintained or improved  Description: INTERVENTIONS:  - Identify patients at risk for skin breakdown  - Assess and monitor skin integrity  - Assess and monitor nutrition and hydration status  - Monitor labs   - Assess for incontinence   - Turn and reposition patient  - Assist with mobility/ambulation  - Relieve pressure over bony prominences  - Avoid friction and shearing  - Provide appropriate hygiene as needed including keeping skin clean and dry  - Evaluate need for skin moisturizer/barrier cream  - Collaborate with interdisciplinary team   - Patient/family teaching  - Consider wound care consult   Outcome: Progressing     Problem: NEUROSENSORY - ADULT  Goal: Achieves stable or improved neurological status  Description: INTERVENTIONS  - Monitor and report changes in neurological status  - Monitor vital signs such as temperature, blood pressure, glucose, and any other labs ordered   - Initiate measures to prevent increased intracranial pressure  - Monitor for seizure activity and  implement precautions if appropriate      Outcome: Progressing  Goal: Remains free of injury related to seizures activity  Description: INTERVENTIONS  - Maintain airway, patient safety  and administer oxygen as ordered  - Monitor patient for seizure activity, document and report duration and description of seizure to physician/advanced practitioner  - If seizure occurs,  ensure patient safety during seizure  - Reorient patient post seizure  - Seizure pads on all 4 side rails  - Instruct patient/family to notify RN of any seizure activity including if an aura is experienced  - Instruct patient/family to call for assistance with activity based on nursing assessment  - Administer anti-seizure medications if ordered    Outcome: Progressing  Goal: Achieves maximal functionality and self care  Description: INTERVENTIONS  - Monitor swallowing and airway patency with patient fatigue and changes in neurological status  - Encourage and assist patient to increase activity and self care.   - Encourage visually impaired, hearing impaired and aphasic patients to use assistive/communication devices  Outcome: Progressing     Problem: CARDIOVASCULAR - ADULT  Goal: Maintains optimal cardiac output and hemodynamic stability  Description: INTERVENTIONS:  - Monitor I/O, vital signs and rhythm  - Monitor for S/S and trends of decreased cardiac output  - Administer and titrate ordered vasoactive medications to optimize hemodynamic stability  - Assess quality of pulses, skin color and temperature  - Assess for signs of decreased coronary artery perfusion  - Instruct patient to report change in severity of symptoms  Outcome: Progressing  Goal: Absence of cardiac dysrhythmias or at baseline rhythm  Description: INTERVENTIONS:  - Continuous cardiac monitoring, vital signs, obtain 12 lead EKG if ordered  - Administer antiarrhythmic and heart rate control medications as ordered  - Monitor electrolytes and administer replacement therapy as  ordered  Outcome: Progressing     Problem: RESPIRATORY - ADULT  Goal: Achieves optimal ventilation and oxygenation  Description: INTERVENTIONS:  - Assess for changes in respiratory status  - Assess for changes in mentation and behavior  - Position to facilitate oxygenation and minimize respiratory effort  - Oxygen administered by appropriate delivery if ordered  - Initiate smoking cessation education as indicated  - Encourage broncho-pulmonary hygiene including cough, deep breathe, Incentive Spirometry  - Assess the need for suctioning and aspirate as needed  - Assess and instruct to report SOB or any respiratory difficulty  - Respiratory Therapy support as indicated  Outcome: Progressing     Problem: GASTROINTESTINAL - ADULT  Goal: Minimal or absence of nausea and/or vomiting  Description: INTERVENTIONS:  - Administer IV fluids if ordered to ensure adequate hydration  - Maintain NPO status until nausea and vomiting are resolved  - Nasogastric tube if ordered  - Administer ordered antiemetic medications as needed  - Provide nonpharmacologic comfort measures as appropriate  - Advance diet as tolerated, if ordered  - Consider nutrition services referral to assist patient with adequate nutrition and appropriate food choices  Outcome: Progressing  Goal: Maintains or returns to baseline bowel function  Description: INTERVENTIONS:  - Assess bowel function  - Encourage oral fluids to ensure adequate hydration  - Administer IV fluids if ordered to ensure adequate hydration  - Administer ordered medications as needed  - Encourage mobilization and activity  - Consider nutritional services referral to assist patient with adequate nutrition and appropriate food choices  Outcome: Progressing  Goal: Maintains adequate nutritional intake  Description: INTERVENTIONS:  - Monitor percentage of each meal consumed  - Identify factors contributing to decreased intake, treat as appropriate  - Assist with meals as needed  - Monitor I&O,  weight, and lab values if indicated  - Obtain nutrition services referral as needed  Outcome: Progressing  Goal: Establish and maintain optimal ostomy function  Description: INTERVENTIONS:  - Assess bowel function  - Encourage oral fluids to ensure adequate hydration  - Administer IV fluids if ordered to ensure adequate hydration   - Administer ordered medications as needed  - Encourage mobilization and activity  - Nutrition services referral to assist patient with appropriate food choices  - Assess stoma site  - Consider wound care consult   Outcome: Progressing  Goal: Oral mucous membranes remain intact  Description: INTERVENTIONS  - Assess oral mucosa and hygiene practices  - Implement preventative oral hygiene regimen  - Implement oral medicated treatments as ordered  - Initiate Nutrition services referral as needed  Outcome: Progressing     Problem: GENITOURINARY - ADULT  Goal: Maintains or returns to baseline urinary function  Description: INTERVENTIONS:  - Assess urinary function  - Encourage oral fluids to ensure adequate hydration if ordered  - Administer IV fluids as ordered to ensure adequate hydration  - Administer ordered medications as needed  - Offer frequent toileting  - Follow urinary retention protocol if ordered  Outcome: Progressing  Goal: Absence of urinary retention  Description: INTERVENTIONS:  - Assess patient’s ability to void and empty bladder  - Monitor I/O  - Bladder scan as needed  - Discuss with physician/AP medications to alleviate retention as needed  - Discuss catheterization for long term situations as appropriate  Outcome: Progressing  Goal: Urinary catheter remains patent  Description: INTERVENTIONS:  - Assess patency of urinary catheter  - If patient has a chronic campa, consider changing catheter if non-functioning  - Follow guidelines for intermittent irrigation of non-functioning urinary catheter  Outcome: Progressing     Problem: METABOLIC, FLUID AND ELECTROLYTES -  ADULT  Goal: Electrolytes maintained within normal limits  Description: INTERVENTIONS:  - Monitor labs and assess patient for signs and symptoms of electrolyte imbalances  - Administer electrolyte replacement as ordered  - Monitor response to electrolyte replacements, including repeat lab results as appropriate  - Instruct patient on fluid and nutrition as appropriate  Outcome: Progressing  Goal: Fluid balance maintained  Description: INTERVENTIONS:  - Monitor labs   - Monitor I/O and WT  - Instruct patient on fluid and nutrition as appropriate  - Assess for signs & symptoms of volume excess or deficit  Outcome: Progressing  Goal: Glucose maintained within target range  Description: INTERVENTIONS:  - Monitor Blood Glucose as ordered  - Assess for signs and symptoms of hyperglycemia and hypoglycemia  - Administer ordered medications to maintain glucose within target range  - Assess nutritional intake and initiate nutrition service referral as needed  Outcome: Progressing     Problem: SKIN/TISSUE INTEGRITY - ADULT  Goal: Skin Integrity remains intact(Skin Breakdown Prevention)  Description: Assess:  -Perform [place] assessment every ***  -Clean and moisturize skin every ***  -Inspect skin when repositioning, toileting, and assisting with ADLS  -Assess under medical devices such as *** every ***  -Assess extremities for adequate circulation and sensation     Bed Management:  -Have minimal linens on bed & keep smooth, unwrinkled  -Change linens as needed when moist or perspiring  -Avoid sitting or lying in one position for more than *** hours while in bed  -Keep HOB at ***degrees     Toileting:  -Offer bedside commode  -Assess for incontinence every ***  -Use incontinent care products after each incontinent episode such as ***    Activity:  -Mobilize patient *** times a day  -Encourage activity and walks on unit  -Encourage or provide ROM exercises   -Turn and reposition patient every *** Hours  -Use appropriate  equipment to lift or move patient in bed  -Instruct/ Assist with weight shifting every *** when out of bed in chair  -Consider limitation of chair time *** hour intervals    Skin Care:  -Avoid use of baby powder, tape, friction and shearing, hot water or constrictive clothing  -Relieve pressure over bony prominences using ***  -Do not massage red bony areas    Next Steps:  -Teach patient strategies to minimize risks such as ***   -Consider consults to  interdisciplinary teams such as ***  Outcome: Progressing  Goal: Incision(s), wounds(s) or drain site(s) healing without S/S of infection  Description: INTERVENTIONS  - Assess and document dressing, incision, wound bed, drain sites and surrounding tissue  - Provide patient and family education  - Perform skin care/dressing changes every ***  Outcome: Progressing  Goal: Pressure injury heals and does not worsen  Description: Interventions:  - Implement low air loss mattress or specialty surface (Criteria met)  - Apply silicone foam dressing  - Instruct/assist with weight shifting every *** minutes when in chair   - Limit chair time to *** hour intervals  - Use special pressure reducing interventions such as *** when in chair   - Apply fecal or urinary incontinence containment device   - Perform passive or active ROM every ***  - Turn and reposition patient & offload bony prominences every *** hours   - Utilize friction reducing device or surface for transfers   - Consider consults to  interdisciplinary teams such as ***  - Use incontinent care products after each incontinent episode such as ***  - Consider nutrition services referral as needed  Outcome: Progressing     Problem: MUSCULOSKELETAL - ADULT  Goal: Maintain or return mobility to safest level of function  Description: INTERVENTIONS:  - Assess patient's ability to carry out ADLs; assess patient's baseline for ADL function and identify physical deficits which impact ability to perform ADLs (bathing, care of  mouth/teeth, toileting, grooming, dressing, etc.)  - Assess/evaluate cause of self-care deficits   - Assess range of motion  - Assess patient's mobility  - Assess patient's need for assistive devices and provide as appropriate  - Encourage maximum independence but intervene and supervise when necessary  - Involve family in performance of ADLs  - Assess for home care needs following discharge   - Consider OT consult to assist with ADL evaluation and planning for discharge  - Provide patient education as appropriate  Outcome: Progressing  Goal: Maintain proper alignment of affected body part  Description: INTERVENTIONS:  - Support, maintain and protect limb and body alignment  - Provide patient/ family with appropriate education  Outcome: Progressing     Problem: Nutrition/Hydration-ADULT  Goal: Nutrient/Hydration intake appropriate for improving, restoring or maintaining nutritional needs  Description: Monitor and assess patient's nutrition/hydration status for malnutrition. Collaborate with interdisciplinary team and initiate plan and interventions as ordered.  Monitor patient's weight and dietary intake as ordered or per policy. Utilize nutrition screening tool and intervene as necessary. Determine patient's food preferences and provide high-protein, high-caloric foods as appropriate.     INTERVENTIONS:  - Monitor oral intake, urinary output, labs, and treatment plans  - Assess nutrition and hydration status and recommend course of action  - Evaluate amount of meals eaten  - Assist patient with eating if necessary   - Allow adequate time for meals  - Recommend/ encourage appropriate diets, oral nutritional supplements, and vitamin/mineral supplements  - Order, calculate, and assess calorie counts as needed  - Recommend, monitor, and adjust tube feedings and TPN/PPN based on assessed needs  - Assess need for intravenous fluids  - Provide specific nutrition/hydration education as appropriate  - Include  patient/family/caregiver in decisions related to nutrition  Outcome: Progressing

## 2024-03-28 NOTE — CASE MANAGEMENT
Case Management Progress Note    Patient name Armen Llanos  Location OR POOL/OR POOL MRN [id number]  : 1975 Date 3/28/2024       LOS (days): 6  Geometric Mean LOS (GMLOS) (days): 9.9  Days to GMLOS:3.8        OBJECTIVE:     Current admission status: Inpatient  Preferred Pharmacy:   [id number] - [place], [place] - [address]  [address] [address]  Phone: [phone] Fax: [phone]    Primary Care Provider: Deidre Andrea MD    Primary Insurance: [employer]  Secondary Insurance:     PROGRESS NOTE:    CM spoke to pt about acute rehab and SNF. Referrals have been sent to both and he was in agreement with referrals.

## 2024-03-28 NOTE — OCCUPATIONAL THERAPY NOTE
Occupational Therapy Cx Note     Patient Name: Armen Llanos  Today's Date: 3/28/2024  Problem List  Principal Problem:    Type 2 diabetes mellitus with ketoacidosis without coma, without long-term current use of insulin (HCC)  Active Problems:    Acute on Chronic Right knee pain    Chronic, continuous use of opioids    Essential hypertension    Septic shock (HCC)    TBI (traumatic brain injury) ([facility])    Suicidal ideation    Hyponatremia    MSSA bacteremia    A-fib with RVR (HCC)    Cardiomyopathy (HCC)    Anxiety              03/28/24 1129   OT Last Visit   OT Visit Date 03/28/24   Note Type   Note type Cancelled Session   Cancel Reasons Patient to operating room   Additional Comments Pt to OR for toe amp. Will need weightbearing orders once post-op. Will follow as able & appropriate s/p sx       Stephani Phelan OTR/L

## 2024-03-28 NOTE — PROGRESS NOTES
"Podiatry - Progress Note  Patient: Armen Llanos 48 y.o. male   MRN: [id number]  PCP: Deidre Andrea MD  Unit/Bed#: [facility] Encounter: [id number]  Date Of Visit: 24    ASSESSMENT:    Armen Llanos is a 48 y.o. 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 Toussaint.  Consent signed with surgeon prior to procedure.  Confirmed NPO status.  H&P, vitals, and current labs reviewed.  No acute changes noted.  Alternatives, risks, and complications discussed with patient.  All questions answered.  No guarantees given of outcome.  Rest of medical care per primary team.       SUBJECTIVE:     The patient was seen, evaluated, and assessed at bedside today. The patient was awake, alert, and in no acute distress. Patient confirmed NPO status. All questions and concerns regarding the surgical procedure addressed. Patient understands risks vs benefits of procedure and remains amenable with plan for surgery today. Patient denies N/V/F/chills/SOB/CP.      OBJECTIVE:     Vitals:   /84   Pulse 96   Temp 98.6 °F (37 °C) (Temporal)   Resp 20   Ht 6' 5\" (1.956 m)   Wt 100 kg (220 lb 7.4 oz)   SpO2 99%   BMI 25.48 kg/m²     Temp (24hrs), Av °F (36.7 °C), Min:97.6 °F (36.4 °C), Max:98.6 °F (37 °C)      Physical Exam:     General:  Alert, cooperative, and in no distress.  Lower extremity exam:  Cardiovascular status at baseline.  Neurological status at baseline.  Musculoskeletal status at baseline. No calf tenderness noted bilaterally. Dressing left intact to the Operating Room.     Additional Data:     Labs:    Results from last 7 days   Lab Units 24  1000 24  0209   WBC Thousand/uL 8.22 9.41   HEMOGLOBIN g/dL 11.6* 11.5*   HEMATOCRIT % 33.7* 34.1*   PLATELETS Thousands/uL 332 267   NEUTROS " PCT %  --  84*   LYMPHS PCT %  --  7*   LYMPHO PCT % 38  --    MONOS PCT %  --  7   MONO PCT % 3*  --    EOS PCT % 4 0     Results from last 7 days   Lab Units 03/28/24  1000 03/26/24  0605 03/25/24  0449 03/24/24  1047 03/24/24  0951   POTASSIUM mmol/L 2.8*   < > 3.6   < >  --    CHLORIDE mmol/L 108   < > 104   < >  --    CO2 mmol/L 25   < > 22   < >  --    CO2, I-STAT mmol/L  --   --   --   --  16*   BUN mg/dL 11   < > 18   < >  --    CREATININE mg/dL 0.82   < > 0.96   < >  --    CALCIUM mg/dL 8.3*   < > 8.7   < >  --    ALK PHOS U/L  --   --  50  --   --    ALT U/L  --   --  16  --   --    AST U/L  --   --  18  --   --    GLUCOSE, ISTAT mg/dl  --   --   --   --  219*    < > = values in this interval not displayed.     Results from last 7 days   Lab Units 03/24/24  1056   INR  1.37*       * I Have Reviewed All Lab Data Listed Above.    Recent Cultures (last 7 days):     Results from last 7 days   Lab Units 03/26/24  1427 03/26/24  1416 03/25/24  0521 03/24/24  0841 03/24/24  0840 03/24/24  0839 03/23/24  1245 03/23/24  1158 03/22/24  1448   BLOOD CULTURE  No Growth at 24 hrs. No Growth at 24 hrs. Staphylococcus aureus*  Staphylococcus aureus*  --   --   --  Staphylococcus aureus*  Staphylococcus aureus*  --  Staphylococcus aureus*  Staphylococcus aureus*   GRAM STAIN RESULT   --   --  Gram positive cocci in clusters*  Gram positive cocci in clusters* No Polys or Bacteria seen No Polys or Bacteria seen 1+ Disintegrating polys*  2+ Gram positive cocci in pairs* Gram positive cocci in clusters*  Gram positive cocci in clusters* 4+ Polys*  3+ Gram positive cocci in clusters* Gram positive cocci in clusters*  Gram positive cocci in clusters*   BODY FLUID CULTURE, STERILE   --   --   --   --   --   --   --  4+ Growth of Staphylococcus aureus*  --      Results from last 7 days   Lab Units 03/24/24  0841 03/24/24  0840 03/24/24  0839   ANAEROBIC CULTURE  No anaerobes isolated No anaerobes isolated No anaerobes  "isolated       Imaging: I have personally reviewed pertinent films in PACS  EKG, Pathology, and Other Studies: I have personally reviewed pertinent reports.    ** Please Note: Portions of the record may have been created with voice recognition software. Occasional wrong word or \"sound a like\" substitutions may have occurred due to the inherent limitations of voice recognition software. Read the chart carefully and recognize, using context, where substitutions have occurred. **      "

## 2024-03-28 NOTE — OP NOTE
OPERATIVE REPORT - Podiatry  PATIENT NAME: Armen Llanos    :  1975  MRN: [id number]  Pt Location: [facility] OR ROOM 04    SURGERY DATE: 3/28/2024    Surgeons and Role:     * Rossy Toussaint DPM - Primary     * Petros Ricketts DPM - Assisting    Pre-op Diagnosis:  Osteomyelitis of second toe of left foot (HCC) [M86.9]    Post-Op Diagnosis Codes:     * Osteomyelitis of second toe of left foot (HCC) [M86.9]    Procedure(s) (LRB):  AMPUTATION TOE 2nd (Left)    Specimen(s):  ID Type Source Tests Collected by Time Destination   1 : 2nd left toe Tissue Toe, Left TISSUE EXAM Rossy Toussaint DPM 3/28/2024 1528        Estimated Blood Loss:   Minimal    Drains:  * No LDAs found *    Anesthesia Type:   Choice with 10 ml of 1% Lidocaine and 0.5% Bupivacaine in a 1:1 mixture    Hemostasis:  Mechanical    Materials:  * No implants in log *  4-0 Nylon    Operative Findings:  Surgical cure believed to be achieved  Hard, white, healthy, viable cartilage noted to the head of the left second middle phalanx  Healthy, bleeding, viable soft tissue    Complications:   None    Procedure and Technique:     Under mild sedation, the patient was brought into the operating room and placed on the hospital stretcher in the supine position. IV sedation was achieved by anesthesia team and a universal timeout was performed where all parties are in agreement of correct patient, correct procedure and correct site.  A left second toe digital block was performed consisting of 10 ml of 1% Lidocaine and 0.5% Bupivacaine in a 1:1 mixture. The foot was then prepped and draped in the usual aseptic manner.  Antibiotics tourniquet was not placed on the left lower extremity for this procedure.    Attention was directed to the left 2nd toe digit where a modified elliptical type of incision was made. Utilizing a sterile 15 blade, this incision was carried deep straight to bone. Soft tissue structures were then reflected off the left second distal phalanx base  "and middle head to disarticulate the left second DIP joint. The severed digit was then passed off to the back table.  It was noted that all tissue margins were bleeding and viable. No deep sinus tracts or areas of purulence were visualized. The remaining bone on the proximal aspect of the cut was noted to be of hard and viable quality. Any remaining tendinous structures were identified and severed proximally to remove any nidus of infection.  The surgical site was then flushed with copious amounts of sterile saline.  Skin closure was achieved using 4-0 nylon thrown in simple fashion.  The incision site was then cleansed using hydroperoxide.  The incision site was then dressed using Betadine soaked Adaptic, 4 x 4 gauze, Kerlix, and Ace wrap.    The patient tolerated the procedure and anesthesia well without immediate complications and transferred to PACU with vital signs stable.     As with many limb salvage procedures, we contemplate the possibility of performing further stages to this procedure. Procedures may include debridements, delayed closure, plastic surgery techniques, or more proximal amputations. This procedure may be considered part of a multi-staged limb salvage treatment plan.     Dr. Toussaint was present during the entire procedure and participated in all key aspects.    SIGNATURE: Petros Ricketts DPM  DATE: March 28, 2024  TIME: 3:44 PM      Portions of the record may have been created with voice recognition software. Occasional wrong word or \"sound a like\" substitutions may have occurred due to the inherent limitations of voice recognition software. Read the chart carefully and recognize, using context, where substitutions have occurred.      "

## 2024-03-28 NOTE — PLAN OF CARE
Problem: PAIN - ADULT  Goal: Verbalizes/displays adequate comfort level or baseline comfort level  Description: Interventions:  - Encourage patient to monitor pain and request assistance  - Assess pain using appropriate pain scale  - Administer analgesics based on type and severity of pain and evaluate response  - Implement non-pharmacological measures as appropriate and evaluate response  - Consider cultural and social influences on pain and pain management  - Notify physician/advanced practitioner if interventions unsuccessful or patient reports new pain  Outcome: Progressing     Problem: INFECTION - ADULT  Goal: Absence or prevention of progression during hospitalization  Description: INTERVENTIONS:  - Assess and monitor for signs and symptoms of infection  - Monitor lab/diagnostic results  - Monitor all insertion sites, i.e. indwelling lines, tubes, and drains  - Monitor endotracheal if appropriate and nasal secretions for changes in amount and color  - [place] appropriate cooling/warming therapies per order  - Administer medications as ordered  - Instruct and encourage patient and family to use good hand hygiene technique  - Identify and instruct in appropriate isolation precautions for identified infection/condition  Outcome: Progressing  Goal: Absence of fever/infection during neutropenic period  Description: INTERVENTIONS:  - Monitor WBC    Outcome: Progressing     Problem: SAFETY ADULT  Goal: Patient will remain free of falls  Description: INTERVENTIONS:  - Educate patient/family on patient safety including physical limitations  - Instruct patient to call for assistance with activity   - Consult OT/PT to assist with strengthening/mobility   - Keep Call bell within reach  - Keep bed low and locked with side rails adjusted as appropriate  - Keep care items and personal belongings within reach  - Initiate and maintain comfort rounds  - Make Fall Risk Sign visible to staff  - Offer Toileting every 2 Hours,  in advance of need  - Initiate/Maintain 2alarm  - Obtain necessary fall risk management equipment: 2  - Apply yellow socks and bracelet for high fall risk patients  - Consider moving patient to room near nurses station  Outcome: Progressing  Goal: Maintain or return to baseline ADL function  Description: INTERVENTIONS:  -  Assess patient's ability to carry out ADLs; assess patient's baseline for ADL function and identify physical deficits which impact ability to perform ADLs (bathing, care of mouth/teeth, toileting, grooming, dressing, etc.)  - Assess/evaluate cause of self-care deficits   - Assess range of motion  - Assess patient's mobility; develop plan if impaired  - Assess patient's need for assistive devices and provide as appropriate  - Encourage maximum independence but intervene and supervise when necessary  - Involve family in performance of ADLs  - Assess for home care needs following discharge   - Consider OT consult to assist with ADL evaluation and planning for discharge  - Provide patient education as appropriate  Outcome: Progressing  Goal: Maintains/Returns to pre admission functional level  Description: INTERVENTIONS:  - Perform AM-PAC 6 Click Basic Mobility/ Daily Activity assessment daily.  - Set and communicate daily mobility goal to care team and patient/family/caregiver.   - Collaborate with rehabilitation services on mobility goals if consulted  - Perform Range of Motion 2 times a day.  - Reposition patient every 1 hours.  - Dangle patient 2 times a day  - Stand patient 2 times a day  - Ambulate patient 2 times a day  - Out of bed to chair 2 times a day   - Out of bed for meals 2 times a day  - Out of bed for toileting  - Record patient progress and toleration of activity level   Outcome: Progressing     Problem: DISCHARGE PLANNING  Goal: Discharge to home or other facility with appropriate resources  Description: INTERVENTIONS:  - Identify barriers to discharge w/patient and caregiver  -  Arrange for needed discharge resources and transportation as appropriate  - Identify discharge learning needs (meds, wound care, etc.)  - Arrange for interpretive services to assist at discharge as needed  - Refer to Case Management Department for coordinating discharge planning if the patient needs post-hospital services based on physician/advanced practitioner order or complex needs related to functional status, cognitive ability, or social support system  Outcome: Progressing     Problem: Knowledge Deficit  Goal: Patient/family/caregiver demonstrates understanding of disease process, treatment plan, medications, and discharge instructions  Description: Complete learning assessment and assess knowledge base.  Interventions:  - Provide teaching at level of understanding  - Provide teaching via preferred learning methods  Outcome: Progressing     Problem: Prexisting or High Potential for Compromised Skin Integrity  Goal: Skin integrity is maintained or improved  Description: INTERVENTIONS:  - Identify patients at risk for skin breakdown  - Assess and monitor skin integrity  - Assess and monitor nutrition and hydration status  - Monitor labs   - Assess for incontinence   - Turn and reposition patient  - Assist with mobility/ambulation  - Relieve pressure over bony prominences  - Avoid friction and shearing  - Provide appropriate hygiene as needed including keeping skin clean and dry  - Evaluate need for skin moisturizer/barrier cream  - Collaborate with interdisciplinary team   - Patient/family teaching  - Consider wound care consult   Outcome: Progressing     Problem: NEUROSENSORY - ADULT  Goal: Achieves stable or improved neurological status  Description: INTERVENTIONS  - Monitor and report changes in neurological status  - Monitor vital signs such as temperature, blood pressure, glucose, and any other labs ordered   - Initiate measures to prevent increased intracranial pressure  - Monitor for seizure activity and  implement precautions if appropriate      Outcome: Progressing  Goal: Remains free of injury related to seizures activity  Description: INTERVENTIONS  - Maintain airway, patient safety  and administer oxygen as ordered  - Monitor patient for seizure activity, document and report duration and description of seizure to physician/advanced practitioner  - If seizure occurs,  ensure patient safety during seizure  - Reorient patient post seizure  - Seizure pads on all 4 side rails  - Instruct patient/family to notify RN of any seizure activity including if an aura is experienced  - Instruct patient/family to call for assistance with activity based on nursing assessment  - Administer anti-seizure medications if ordered    Outcome: Progressing  Goal: Achieves maximal functionality and self care  Description: INTERVENTIONS  - Monitor swallowing and airway patency with patient fatigue and changes in neurological status  - Encourage and assist patient to increase activity and self care.   - Encourage visually impaired, hearing impaired and aphasic patients to use assistive/communication devices  Outcome: Progressing     Problem: CARDIOVASCULAR - ADULT  Goal: Maintains optimal cardiac output and hemodynamic stability  Description: INTERVENTIONS:  - Monitor I/O, vital signs and rhythm  - Monitor for S/S and trends of decreased cardiac output  - Administer and titrate ordered vasoactive medications to optimize hemodynamic stability  - Assess quality of pulses, skin color and temperature  - Assess for signs of decreased coronary artery perfusion  - Instruct patient to report change in severity of symptoms  Outcome: Progressing  Goal: Absence of cardiac dysrhythmias or at baseline rhythm  Description: INTERVENTIONS:  - Continuous cardiac monitoring, vital signs, obtain 12 lead EKG if ordered  - Administer antiarrhythmic and heart rate control medications as ordered  - Monitor electrolytes and administer replacement therapy as  ordered  Outcome: Progressing     Problem: RESPIRATORY - ADULT  Goal: Achieves optimal ventilation and oxygenation  Description: INTERVENTIONS:  - Assess for changes in respiratory status  - Assess for changes in mentation and behavior  - Position to facilitate oxygenation and minimize respiratory effort  - Oxygen administered by appropriate delivery if ordered  - Initiate smoking cessation education as indicated  - Encourage broncho-pulmonary hygiene including cough, deep breathe, Incentive Spirometry  - Assess the need for suctioning and aspirate as needed  - Assess and instruct to report SOB or any respiratory difficulty  - Respiratory Therapy support as indicated  Outcome: Progressing     Problem: GASTROINTESTINAL - ADULT  Goal: Minimal or absence of nausea and/or vomiting  Description: INTERVENTIONS:  - Administer IV fluids if ordered to ensure adequate hydration  - Maintain NPO status until nausea and vomiting are resolved  - Nasogastric tube if ordered  - Administer ordered antiemetic medications as needed  - Provide nonpharmacologic comfort measures as appropriate  - Advance diet as tolerated, if ordered  - Consider nutrition services referral to assist patient with adequate nutrition and appropriate food choices  Outcome: Progressing  Goal: Maintains or returns to baseline bowel function  Description: INTERVENTIONS:  - Assess bowel function  - Encourage oral fluids to ensure adequate hydration  - Administer IV fluids if ordered to ensure adequate hydration  - Administer ordered medications as needed  - Encourage mobilization and activity  - Consider nutritional services referral to assist patient with adequate nutrition and appropriate food choices  Outcome: Progressing  Goal: Maintains adequate nutritional intake  Description: INTERVENTIONS:  - Monitor percentage of each meal consumed  - Identify factors contributing to decreased intake, treat as appropriate  - Assist with meals as needed  - Monitor I&O,  weight, and lab values if indicated  - Obtain nutrition services referral as needed  Outcome: Progressing  Goal: Establish and maintain optimal ostomy function  Description: INTERVENTIONS:  - Assess bowel function  - Encourage oral fluids to ensure adequate hydration  - Administer IV fluids if ordered to ensure adequate hydration   - Administer ordered medications as needed  - Encourage mobilization and activity  - Nutrition services referral to assist patient with appropriate food choices  - Assess stoma site  - Consider wound care consult   Outcome: Progressing  Goal: Oral mucous membranes remain intact  Description: INTERVENTIONS  - Assess oral mucosa and hygiene practices  - Implement preventative oral hygiene regimen  - Implement oral medicated treatments as ordered  - Initiate Nutrition services referral as needed  Outcome: Progressing     Problem: GENITOURINARY - ADULT  Goal: Maintains or returns to baseline urinary function  Description: INTERVENTIONS:  - Assess urinary function  - Encourage oral fluids to ensure adequate hydration if ordered  - Administer IV fluids as ordered to ensure adequate hydration  - Administer ordered medications as needed  - Offer frequent toileting  - Follow urinary retention protocol if ordered  Outcome: Progressing  Goal: Absence of urinary retention  Description: INTERVENTIONS:  - Assess patient’s ability to void and empty bladder  - Monitor I/O  - Bladder scan as needed  - Discuss with physician/AP medications to alleviate retention as needed  - Discuss catheterization for long term situations as appropriate  Outcome: Progressing  Goal: Urinary catheter remains patent  Description: INTERVENTIONS:  - Assess patency of urinary catheter  - If patient has a chronic campa, consider changing catheter if non-functioning  - Follow guidelines for intermittent irrigation of non-functioning urinary catheter  Outcome: Progressing     Problem: METABOLIC, FLUID AND ELECTROLYTES -  ADULT  Goal: Electrolytes maintained within normal limits  Description: INTERVENTIONS:  - Monitor labs and assess patient for signs and symptoms of electrolyte imbalances  - Administer electrolyte replacement as ordered  - Monitor response to electrolyte replacements, including repeat lab results as appropriate  - Instruct patient on fluid and nutrition as appropriate  Outcome: Progressing  Goal: Fluid balance maintained  Description: INTERVENTIONS:  - Monitor labs   - Monitor I/O and WT  - Instruct patient on fluid and nutrition as appropriate  - Assess for signs & symptoms of volume excess or deficit  Outcome: Progressing  Goal: Glucose maintained within target range  Description: INTERVENTIONS:  - Monitor Blood Glucose as ordered  - Assess for signs and symptoms of hyperglycemia and hypoglycemia  - Administer ordered medications to maintain glucose within target range  - Assess nutritional intake and initiate nutrition service referral as needed  Outcome: Progressing     Problem: SKIN/TISSUE INTEGRITY - ADULT  Goal: Skin Integrity remains intact(Skin Breakdown Prevention)  Description: Assess:  -Perform [place] assessment every shift  -Clean and moisturize skin every shift  -Inspect skin when repositioning, toileting, and assisting with ADLS  -Assess under medical devices such as x every shift  -Assess extremities for adequate circulation and sensation     Bed Management:  -Have minimal linens on bed & keep smooth, unwrinkled  -Change linens as needed when moist or perspiring  -Avoid sitting or lying in one position for more than 2 hours while in bed  -Keep HOB at 30 degrees     Toileting:  -Offer bedside commode  -Assess for incontinence every 2 hours  -Use incontinent care products after each incontinent episode such as x    Activity:  -Mobilize patient 3 times a day  -Encourage activity and walks on unit  -Encourage or provide ROM exercises   -Turn and reposition patient every 2 Hours  -Use appropriate  equipment to lift or move patient in bed  -Instruct/ Assist with weight shifting every 30 minutes when out of bed in chair  -Consider limitation of chair time 2 hour intervals    Skin Care:  -Avoid use of baby powder, tape, friction and shearing, hot water or constrictive clothing  -Relieve pressure over bony prominences using x  -Do not massage red bony areas      Outcome: Progressing  Goal: Incision(s), wounds(s) or drain site(s) healing without S/S of infection  Description: INTERVENTIONS  - Assess and document dressing, incision, wound bed, drain sites and surrounding tissue  - Provide patient and family education  - Perform skin care/dressing changes every shift  Outcome: Progressing  Goal: Pressure injury heals and does not worsen  Description: Interventions:  - Implement low air loss mattress or specialty surface (Criteria met)  - Apply silicone foam dressing  - Instruct/assist with weight shifting every 30 minutes when in chair   - Limit chair time to 2 hour intervals  - Use special pressure reducing interventions such as cushion when in chair   - Apply fecal or urinary incontinence containment device   - Perform passive or active ROM every shift  - Turn and reposition patient & offload bony prominences every 2 hours   - Utilize friction reducing device or surface for transfers   - Consider consults to  interdisciplinary teams such as PT/OT  - Use incontinent care products after each incontinent episode such as x  - Consider nutrition services referral as needed  Outcome: Progressing     Problem: MUSCULOSKELETAL - ADULT  Goal: Maintain or return mobility to safest level of function  Description: INTERVENTIONS:  - Assess patient's ability to carry out ADLs; assess patient's baseline for ADL function and identify physical deficits which impact ability to perform ADLs (bathing, care of mouth/teeth, toileting, grooming, dressing, etc.)  - Assess/evaluate cause of self-care deficits   - Assess range of motion  -  Assess patient's mobility  - Assess patient's need for assistive devices and provide as appropriate  - Encourage maximum independence but intervene and supervise when necessary  - Involve family in performance of ADLs  - Assess for home care needs following discharge   - Consider OT consult to assist with ADL evaluation and planning for discharge  - Provide patient education as appropriate  Outcome: Progressing  Goal: Maintain proper alignment of affected body part  Description: INTERVENTIONS:  - Support, maintain and protect limb and body alignment  - Provide patient/ family with appropriate education  Outcome: Progressing     Problem: Nutrition/Hydration-ADULT  Goal: Nutrient/Hydration intake appropriate for improving, restoring or maintaining nutritional needs  Description: Monitor and assess patient's nutrition/hydration status for malnutrition. Collaborate with interdisciplinary team and initiate plan and interventions as ordered.  Monitor patient's weight and dietary intake as ordered or per policy. Utilize nutrition screening tool and intervene as necessary. Determine patient's food preferences and provide high-protein, high-caloric foods as appropriate.     INTERVENTIONS:  - Monitor oral intake, urinary output, labs, and treatment plans  - Assess nutrition and hydration status and recommend course of action  - Evaluate amount of meals eaten  - Assist patient with eating if necessary   - Allow adequate time for meals  - Recommend/ encourage appropriate diets, oral nutritional supplements, and vitamin/mineral supplements  - Order, calculate, and assess calorie counts as needed  - Recommend, monitor, and adjust tube feedings and TPN/PPN based on assessed needs  - Assess need for intravenous fluids  - Provide specific nutrition/hydration education as appropriate  - Include patient/family/caregiver in decisions related to nutrition  Outcome: Progressing

## 2024-03-28 NOTE — ANESTHESIA POSTPROCEDURE EVALUATION
Post-Op Assessment Note    CV Status:  Stable  Pain Score: 0    Pain management: adequate       Mental Status:  Arousable and sleepy   Hydration Status:  Stable   PONV Controlled:  Controlled   Airway Patency:  Patent     Post Op Vitals Reviewed: Yes    No anethesia notable event occurred.    Staff: CRNA               BP   102/63   Temp 97.6   Pulse 92   Resp 16   SpO2 99

## 2024-03-28 NOTE — PROGRESS NOTES
[facility]  Progress Note  Name: Armen Llanos I  MRN: [id number]  Unit/Bed#: -01 I Date of Admission: 3/22/2024   Date of Service: 3/28/2024 I Hospital Day: 6    Assessment/Plan   * Type 2 diabetes mellitus with ketoacidosis without coma, without long-term current use of insulin ([facility])  Assessment & Plan  Lab Results   Component Value Date    HGBA1C 12.1 (H) 03/22/2024       Recent Labs     03/26/24  0414 03/26/24  0628 03/26/24  0801 03/26/24  1022   POCGLU 207* 127 152* 238*         Blood Sugar Average: Last 72 hrs:  (P) [id number]    POA: Glucose > 600. BHB 1.41. pH 7.35.  Was previously fluid resuscitated and on insulin infusion  Home regimen: Metformin, Glimepiride  Plan:  Hold home regimen  Cont NCC insulin gtt for goal glucose 140-180  Endocrinology consulted to transition patient to SQ regimen  Anticipate transition to Lantus 30u daily plus 10u short acting meal coverage and SSI per Endocrinology recommendations  Diabetes education when appropriate   CHO controlled diet         Acute osteomyelitis of left foot (HCC)  Assessment & Plan  As evidenced by MRI  Podiatry consulted  Holding Eliquis, anticipate surgery 3/29    Anxiety  Assessment & Plan  Patient reports increased anxiety   Has received lorazepam IV x2 with intermittent relief   Reports taking Klonopin at home, but will hold given recent shock with vasopressor use   Add PRN Xanax   Restart home risperdal  Psych consulted    Cardiomyopathy ([facility])  Assessment & Plan  3/25 Echo -- EF 45%, mild global hypokinesis   Likely 2/2 septic shock, AF RVR   Appreciate Cardiology recc's   Cont on PO Amio   Eliquis on hold in anticipation of surgery  Continue metoprolol    A-fib with RVR ([facility])  Assessment & Plan  No previous history of Afib- Likely exacerbated due to critical illness, bacteremia, and stress from OR  3/24 AF RVR to the 190s intra-op, initially HD stable   Persistent AF RVR despite IVF bolus, 150mg  Amio bolus, esmolol bolus  Once in the ICU, 5 mg IV Metoprolol with improvement of HR to 150's  Initiated on Cardizem gtt with resultant hypotension, and Cardizem was thus d/c'ed   Amio bolus and gtt with eventual conversion to NS  Plan:  Transitioned to Amio PO by Cardiology   AC: Eliquis -on hold in anticipation of surgery  To f/u as OP with Cards   Rates stable, can   Currently NSR on tele   Cardiology to start Toprol XL    MSSA bacteremia  Assessment & Plan  + MSSA on BC x2, R knee aspirate as above  3/24 R knee I&D to bone, total knee arthroplasty revision, insertion of stimulan with Dr. Washington  Plan:  Continue Ancef as above   Appreciate ID input    Hyponatremia  Assessment & Plan  sNa 123 on admission.   Nephrology following- Hyponatremia likely secondary to physiologic SIADH, OP thiazide use with superimposed volume depletion in the setting of sepsis   Serum osmo- 269, Urine osmo 434, Urine sodium 40     Plan:  Appreciate Nephrology recc's  Improving on serial draws      Suicidal ideation  Assessment & Plan  Per wife patient has been mentioning and having suicidal ideation  Psych consulted -- per note 3/22 by Dr. Pineda, patient does not meet criteria for IP stay    TBI (traumatic brain injury) (HCC)  Assessment & Plan  History of TBI at 7 years old  Experiences memory issues at baseline  Home regimen: Ambien risperidone  Plan:  Hold home Ambien and risperidone while IP    Septic shock (HCC)  Assessment & Plan  SIRS: Tachycardia, leukocytosis  LA 1.4, procal 3.39  Source: R knee septic joint   BC x2 (3/22 and 3/23) + MSSA   3/23 R knee aspirate + MSSA  3/24 Intra-op R knee sample: + MSSA  3/24 R knee I&D to bone, total knee arthroplasty revision, insertion of stimulan with Dr. Washington  Intra-op became acute tachycardia with new AF RVR, remained intubated, received 1200mL IVF intra-op   Received additional 2L IVF in the ICU, with eventual need for levophed, vaso  3/24 RIJ TLC, L radial art line   ScVO2  60  3/24 Hydrocortisone added   Plan:  Continue Ancef  Discontinued steroids  ID following- appreciate recommendations  Trend fever curve  Remains off vasopressors    Essential hypertension  Assessment & Plan  Home regimen: Amlodipine, Lisinopril     Plan:  Can likely restart today     Chronic, continuous use of opioids  Assessment & Plan  Home fentanyl patch, motrin, morphine initially held on admission to ICU   Continue with Colace, start Miralax for bowel regimen     Plan:  PRN dilaudid for pain control   Overnight, home Fentanyl patch, PO morphine PRN restarted   Holding home flexeril as it may cause arrhythmias, and he has developed new AF RVR on this admission in the setting of sepsis     Acute on Chronic Right knee pain  Assessment & Plan  History of R. Knee arthoplasty with replacement at outside facility   Acute pain present 2-3 days prior to admission with erythema and tenderness, warmth at the site   3/22 R knee XR -- Total arthroplasty stable hardware and bony alignment without hardware complication.No acute fracture or dislocation. No joint effusion. No lytic or blastic osseous lesion. Unremarkable soft tissues.  S/p OR 3/24 as above   Home regimen: Fentanyl patch, Motrin, Morphine  Plan:  Ortho following- appreciate further recommendations   PRN dilaudid for pain control          VTE Pharmacologic Prophylaxis: VTE Score: 2 Low Risk (Score 0-2) - Encourage Ambulation.    Mobility:   Basic Mobility Inpatient Raw Score: 18  JH-HLM Goal: 6: Walk 10 steps or more  JH-HLM Achieved: 6: Walk 10 steps or more  JH-HLM Goal achieved. Continue to encourage appropriate mobility.    Patient Centered Rounds: I performed bedside rounds with nursing staff today.   Discussions with Specialists or Other Care Team Provider: Anticipate resection of left second toe for osteomyelitis.  Appreciate ongoing infectious disease and podiatry recommendations.  Discussed insulin regimen with endocrinology.    Education and  Discussions with Family / Patient: Patient declined call to .     Total Time Spent on Date of Encounter in care of patient: 35 mins. This time was spent on one or more of the following: performing physical exam; counseling and coordination of care; obtaining or reviewing history; documenting in the medical record; reviewing/ordering tests, medications or procedures; communicating with other healthcare professionals and discussing with patient's family/caregivers.    Current Length of Stay: 6 day(s)  Current Patient Status: Inpatient   Certification Statement: The patient will continue to require additional inpatient hospital stay due to Pending Surgical intervention with Podiatry  Discharge Plan: Anticipate discharge in >72 hrs to discharge location to be determined pending rehab evaluations.    Code Status: Level 1 - Full Code    Subjective:   She reports 10 out of 10 pain and is requesting Dilaudid.  Also expressed a plan to overdose on sugar with nursing and psych consult will be placed.    Objective:     Vitals:   Temp (24hrs), Av.8 °F (36.6 °C), Min:97.6 °F (36.4 °C), Max:97.9 °F (36.6 °C)    Temp:  [97.6 °F (36.4 °C)-97.9 °F (36.6 °C)] 97.9 °F (36.6 °C)  HR:  [86-95] 86  Resp:  [16-18] 18  BP: (121-136)/(82-88) 132/86  SpO2:  [96 %-97 %] 97 %  Body mass index is 25.48 kg/m².     Input and Output Summary (last 24 hours):     Intake/Output Summary (Last 24 hours) at 3/28/2024 1432  Last data filed at 3/28/2024 1109  Gross per 24 hour   Intake 1180 ml   Output 4905 ml   Net -3725 ml       Physical Exam:   Physical Exam  Vitals and nursing note reviewed.   Constitutional:       General: He is not in acute distress.     Appearance: Normal appearance. He is well-developed.   HENT:      Head: Normocephalic and atraumatic.   Eyes:      General: No scleral icterus.     Conjunctiva/sclera: Conjunctivae normal.   Cardiovascular:      Rate and Rhythm: Normal rate and regular rhythm.      Heart sounds: No  murmur heard.  Pulmonary:      Effort: Pulmonary effort is normal.      Breath sounds: No wheezing, rhonchi or rales.   Abdominal:      General: There is no distension.      Palpations: Abdomen is soft.   Feet:      Comments: L foot dressing clean, dry, intact  Skin:     General: Skin is warm and dry.   Neurological:      General: No focal deficit present.      Mental Status: He is alert.   Psychiatric:         Mood and Affect: Mood normal.        Additional Data:     Labs:  Results from last 7 days   Lab Units 03/28/24  1000 03/27/24  0209 03/26/24  0605 03/25/24  0449   WBC Thousand/uL 8.22 9.41   < > 10.54*   HEMOGLOBIN g/dL 11.6* 11.5*   < > 11.1*   HEMATOCRIT % 33.7* 34.1*   < > 31.3*   PLATELETS Thousands/uL 332 267   < > 155   BANDS PCT %  --   --   --  6   NEUTROS PCT %  --  84*   < >  --    LYMPHS PCT %  --  7*   < >  --    LYMPHO PCT % 38  --   --  3*   MONOS PCT %  --  7   < >  --    MONO PCT % 3*  --   --  4   EOS PCT % 4 0   < > 0    < > = values in this interval not displayed.     Results from last 7 days   Lab Units 03/28/24  1000 03/26/24  0605 03/25/24  0449   SODIUM mmol/L 139   < > 134*   POTASSIUM mmol/L 2.8*   < > 3.6   CHLORIDE mmol/L 108   < > 104   CO2 mmol/L 25   < > 22   BUN mg/dL 11   < > 18   CREATININE mg/dL 0.82   < > 0.96   ANION GAP mmol/L 6   < > 8   CALCIUM mg/dL 8.3*   < > 8.7   ALBUMIN g/dL  --   --  2.9*   TOTAL BILIRUBIN mg/dL  --   --  0.64   ALK PHOS U/L  --   --  50   ALT U/L  --   --  16   AST U/L  --   --  18   GLUCOSE RANDOM mg/dL 115   < > 170*    < > = values in this interval not displayed.     Results from last 7 days   Lab Units 03/24/24  1056   INR  1.37*     Results from last 7 days   Lab Units 03/28/24  1219 03/28/24  1008 03/28/24  0812 03/28/24  0601 03/28/24  0338 03/28/24  0205 03/27/24  2325 03/27/24  2106 03/27/24  1900 03/27/24  1644 03/27/24  1341 03/27/24  1142   POC GLUCOSE mg/dl 100 122 133 124 133 133 112 129 154* 144* 91 216*     Results from last 7  days   Lab Units 03/22/24  0948   HEMOGLOBIN A1C % 12.1*     Results from last 7 days   Lab Units 03/27/24  0209 03/26/24  0605 03/25/24  0449 03/24/24  1047 03/23/24  1517 03/22/24  1818 03/22/24  1432   LACTIC ACID mmol/L  --   --   --  1.4 0.7 1.6 3.1*   PROCALCITONIN ng/ml 1.55* 2.78* 5.42* 3.39*  --   --   --        Lines/Drains:  Invasive Devices       Peripheral Intravenous Line  Duration             Peripheral IV 03/26/24 Right;Upper;Ventral (anterior) Arm 2 days    Peripheral IV 03/27/24 Left Antecubital 1 day                      Telemetry:  Telemetry Orders (From admission, onward)               24 Hour Telemetry Monitoring  Continuous x 24 Hours (Telem)        Expiring   Question:  Reason for 24 Hour Telemetry  Answer:  Arrhythmias requiring acute medical intervention / PPM or ICD malfunction                     Telemetry Reviewed: Atrial fibrillation. HR averaging 90s  Indication for Continued Telemetry Use: Arrthymias requiring medical therapy             Imaging: Reviewed radiology reports from this admission including: ECHO    Recent Cultures (last 7 days):   Results from last 7 days   Lab Units 03/26/24  1427 03/26/24  1416 03/25/24  0521 03/24/24  0841 03/24/24  0840 03/24/24  0839 03/23/24  1245 03/23/24  1158 03/22/24  1448   BLOOD CULTURE  No Growth at 24 hrs. No Growth at 24 hrs. Staphylococcus aureus*  Staphylococcus aureus*  --   --   --  Staphylococcus aureus*  Staphylococcus aureus*  --  Staphylococcus aureus*  Staphylococcus aureus*   GRAM STAIN RESULT   --   --  Gram positive cocci in clusters*  Gram positive cocci in clusters* No Polys or Bacteria seen No Polys or Bacteria seen 1+ Disintegrating polys*  2+ Gram positive cocci in pairs* Gram positive cocci in clusters*  Gram positive cocci in clusters* 4+ Polys*  3+ Gram positive cocci in clusters* Gram positive cocci in clusters*  Gram positive cocci in clusters*   BODY FLUID CULTURE, STERILE   --   --   --   --   --   --   --   4+ Growth of Staphylococcus aureus*  --        Last 24 Hours Medication List:   Current Facility-Administered Medications   Medication Dose Route Frequency Provider Last Rate    acetaminophen  650 mg Oral Q6H PRN Patti Sher PA-C      amiodarone  400 mg Oral BID With Meals Patti Sher PA-C      Followed by    [START ON 4/5/2024] amiodarone  200 mg Oral Daily With Breakfast Patti Sher PA-C      bupivacaine liposomal  20 mL Infiltration Once Patti Sher PA-C      butalbital-acetaminophen-caffeine  1 tablet Oral Q4H PRN Patti Sher PA-C      cefazolin  2,000 mg Intravenous Q8H Patti Sher PA-C 2,000 mg (03/28/24 1054)    chlorhexidine  15 mL Mouth/Throat Q12H DARIN Patti Sher PA-C      clonazePAM  1 mg Oral Daily Patti Sher PA-C      docusate sodium  200 mg Oral HS Patti Sher PA-C      DULoxetine  20 mg Oral Daily Patti Sher PA-C      fentaNYL  1 patch Transdermal Q72H Patti Sher PA-C      HYDROmorphone  2 mg Intravenous Q3H PRN Patti Sher PA-C      [START ON 3/29/2024] insulin glargine  30 Units Subcutaneous QAMICHEAL العراقي MD      insulin regular (HumuLIN R,NovoLIN R) 1 Units/mL in sodium chloride 0.9 % 100 mL infusion  0.3-21 Units/hr Intravenous Titrated Patti Sher PA-C 1 Units/hr (03/28/24 1224)    metoprolol  5 mg Intravenous Q6H PRN Patti Sher PA-C      metoprolol succinate  25 mg Oral Daily Patti Sher PA-C      morphine  60 mg Oral Q12H PRN Patti Sher PA-C      naproxen  500 mg Oral BID With Meals Patti Sher PA-C      nicotine  21 mg Transdermal HS Bambi Marcelo PA-C      ondansetron  4 mg Intravenous Q4H PRN Patti Sher PA-C      pantoprazole  20 mg Oral BID AC Patti Sher PA-C      polyethylene glycol  17 g Per NG Tube Daily Patti Sher PA-C      pravastatin  80 mg Oral Daily With Dinner Patti Sher PA-C      risperiDONE  3 mg Oral Daily Patti Sher PA-C      sodium chloride  10 mL/hr Intravenous Continuous Patti Sher PA-C 10 mL/hr (03/26/24 1600)    zolpidem  5 mg Oral HS PRN Bambi ANDERSON  MILO Marcelo          Today, Patient Was Seen By: Patit Sher PA-C    **Please Note: This note may have been constructed using a voice recognition system.**

## 2024-03-29 LAB
ANION GAP SERPL CALCULATED.3IONS-SCNC: 9 MMOL/L (ref 4–13)
BUN SERPL-MCNC: 10 MG/DL (ref 5–25)
C PEPTIDE SERPL-MCNC: 0.4 NG/ML (ref 1.1–4.4)
CALCIUM SERPL-MCNC: 8 MG/DL (ref 8.4–10.2)
CHLORIDE SERPL-SCNC: 103 MMOL/L (ref 96–108)
CO2 SERPL-SCNC: 25 MMOL/L (ref 21–32)
CREAT SERPL-MCNC: 0.95 MG/DL (ref 0.6–1.3)
ERYTHROCYTE [DISTWIDTH] IN BLOOD BY AUTOMATED COUNT: 13.1 % (ref 11.6–15.1)
GFR SERPL CREATININE-BSD FRML MDRD: 93 ML/MIN/1.73SQ M
GLUCOSE SERPL-MCNC: 105 MG/DL (ref 65–140)
GLUCOSE SERPL-MCNC: 113 MG/DL (ref 65–140)
GLUCOSE SERPL-MCNC: 129 MG/DL (ref 65–140)
GLUCOSE SERPL-MCNC: 134 MG/DL (ref 65–140)
GLUCOSE SERPL-MCNC: 153 MG/DL (ref 65–140)
GLUCOSE SERPL-MCNC: 157 MG/DL (ref 65–140)
GLUCOSE SERPL-MCNC: 167 MG/DL (ref 65–140)
GLUCOSE SERPL-MCNC: 171 MG/DL (ref 65–140)
GLUCOSE SERPL-MCNC: 196 MG/DL (ref 65–140)
GLUCOSE SERPL-MCNC: 199 MG/DL (ref 65–140)
GLUCOSE SERPL-MCNC: 208 MG/DL (ref 65–140)
GLUCOSE SERPL-MCNC: 229 MG/DL (ref 65–140)
HCT VFR BLD AUTO: 32.7 % (ref 36.5–49.3)
HGB BLD-MCNC: 11.1 G/DL (ref 12–17)
MCH RBC QN AUTO: 30 PG (ref 26.8–34.3)
MCHC RBC AUTO-ENTMCNC: 33.9 G/DL (ref 31.4–37.4)
MCV RBC AUTO: 88 FL (ref 82–98)
PLATELET # BLD AUTO: 367 THOUSANDS/UL (ref 149–390)
PMV BLD AUTO: 9.8 FL (ref 8.9–12.7)
POTASSIUM SERPL-SCNC: 2.8 MMOL/L (ref 3.5–5.3)
RBC # BLD AUTO: 3.7 MILLION/UL (ref 3.88–5.62)
SODIUM SERPL-SCNC: 137 MMOL/L (ref 135–147)
WBC # BLD AUTO: 9.19 THOUSAND/UL (ref 4.31–10.16)

## 2024-03-29 PROCEDURE — 82948 REAGENT STRIP/BLOOD GLUCOSE: CPT

## 2024-03-29 PROCEDURE — 97164 PT RE-EVAL EST PLAN CARE: CPT

## 2024-03-29 PROCEDURE — 97168 OT RE-EVAL EST PLAN CARE: CPT

## 2024-03-29 PROCEDURE — 99024 POSTOP FOLLOW-UP VISIT: CPT | Performed by: STUDENT IN AN ORGANIZED HEALTH CARE EDUCATION/TRAINING PROGRAM

## 2024-03-29 PROCEDURE — 85027 COMPLETE CBC AUTOMATED: CPT | Performed by: PHYSICIAN ASSISTANT

## 2024-03-29 PROCEDURE — 99232 SBSQ HOSP IP/OBS MODERATE 35: CPT

## 2024-03-29 PROCEDURE — 97116 GAIT TRAINING THERAPY: CPT

## 2024-03-29 PROCEDURE — 80048 BASIC METABOLIC PNL TOTAL CA: CPT | Performed by: PHYSICIAN ASSISTANT

## 2024-03-29 PROCEDURE — 99232 SBSQ HOSP IP/OBS MODERATE 35: CPT | Performed by: PODIATRIST

## 2024-03-29 PROCEDURE — NC001 PR NO CHARGE: Performed by: RADIOLOGY

## 2024-03-29 RX ORDER — INSULIN GLARGINE 100 [IU]/ML
35 INJECTION, SOLUTION SUBCUTANEOUS EVERY MORNING
Status: DISCONTINUED | OUTPATIENT
Start: 2024-03-30 | End: 2024-03-30

## 2024-03-29 RX ORDER — POTASSIUM CHLORIDE 20 MEQ/1
40 TABLET, EXTENDED RELEASE ORAL ONCE
Status: COMPLETED | OUTPATIENT
Start: 2024-03-29 | End: 2024-03-29

## 2024-03-29 RX ORDER — MORPHINE SULFATE 30 MG/1
60 TABLET, FILM COATED, EXTENDED RELEASE ORAL EVERY 12 HOURS SCHEDULED
Status: DISCONTINUED | OUTPATIENT
Start: 2024-03-30 | End: 2024-04-03 | Stop reason: HOSPADM

## 2024-03-29 RX ORDER — INSULIN LISPRO 100 [IU]/ML
1-6 INJECTION, SOLUTION INTRAVENOUS; SUBCUTANEOUS
Status: DISCONTINUED | OUTPATIENT
Start: 2024-03-29 | End: 2024-04-03 | Stop reason: HOSPADM

## 2024-03-29 RX ORDER — LANOLIN ALCOHOL/MO/W.PET/CERES
3 CREAM (GRAM) TOPICAL
Status: DISCONTINUED | OUTPATIENT
Start: 2024-03-29 | End: 2024-04-03 | Stop reason: HOSPADM

## 2024-03-29 RX ORDER — HYDROMORPHONE HCL/PF 1 MG/ML
0.5 SYRINGE (ML) INJECTION EVERY 6 HOURS PRN
Status: DISCONTINUED | OUTPATIENT
Start: 2024-03-29 | End: 2024-03-30

## 2024-03-29 RX ORDER — AMLODIPINE BESYLATE 5 MG/1
5 TABLET ORAL DAILY
Status: DISCONTINUED | OUTPATIENT
Start: 2024-03-30 | End: 2024-04-03 | Stop reason: HOSPADM

## 2024-03-29 RX ORDER — HYDROMORPHONE HCL/PF 1 MG/ML
0.5 SYRINGE (ML) INJECTION EVERY 6 HOURS PRN
Status: DISCONTINUED | OUTPATIENT
Start: 2024-03-29 | End: 2024-03-29

## 2024-03-29 RX ORDER — POTASSIUM CHLORIDE 14.9 MG/ML
20 INJECTION INTRAVENOUS
Status: COMPLETED | OUTPATIENT
Start: 2024-03-29 | End: 2024-03-29

## 2024-03-29 RX ORDER — OXYCODONE HYDROCHLORIDE 5 MG/1
5 TABLET ORAL EVERY 6 HOURS PRN
Status: DISCONTINUED | OUTPATIENT
Start: 2024-03-29 | End: 2024-03-29

## 2024-03-29 RX ORDER — INSULIN LISPRO 100 [IU]/ML
10 INJECTION, SOLUTION INTRAVENOUS; SUBCUTANEOUS
Status: DISCONTINUED | OUTPATIENT
Start: 2024-03-29 | End: 2024-03-31

## 2024-03-29 RX ORDER — OXYCODONE HYDROCHLORIDE 5 MG/1
5 TABLET ORAL EVERY 4 HOURS PRN
Status: DISCONTINUED | OUTPATIENT
Start: 2024-03-29 | End: 2024-04-03 | Stop reason: HOSPADM

## 2024-03-29 RX ADMIN — CEFAZOLIN SODIUM 2000 MG: 2 SOLUTION INTRAVENOUS at 10:41

## 2024-03-29 RX ADMIN — HYDROMORPHONE HYDROCHLORIDE 2 MG: 2 INJECTION, SOLUTION INTRAMUSCULAR; INTRAVENOUS; SUBCUTANEOUS at 07:30

## 2024-03-29 RX ADMIN — DULOXETINE HYDROCHLORIDE 20 MG: 20 CAPSULE, DELAYED RELEASE ORAL at 09:02

## 2024-03-29 RX ADMIN — MORPHINE SULFATE 60 MG: 30 TABLET, EXTENDED RELEASE ORAL at 02:18

## 2024-03-29 RX ADMIN — APIXABAN 5 MG: 5 TABLET, FILM COATED ORAL at 18:08

## 2024-03-29 RX ADMIN — HYDROMORPHONE HYDROCHLORIDE 2 MG: 2 INJECTION, SOLUTION INTRAMUSCULAR; INTRAVENOUS; SUBCUTANEOUS at 00:49

## 2024-03-29 RX ADMIN — APIXABAN 5 MG: 5 TABLET, FILM COATED ORAL at 12:10

## 2024-03-29 RX ADMIN — CEFAZOLIN SODIUM 2000 MG: 2 SOLUTION INTRAVENOUS at 02:19

## 2024-03-29 RX ADMIN — FENTANYL 1 PATCH: 75 PATCH TRANSDERMAL at 00:03

## 2024-03-29 RX ADMIN — INSULIN LISPRO 10 UNITS: 100 INJECTION, SOLUTION INTRAVENOUS; SUBCUTANEOUS at 14:03

## 2024-03-29 RX ADMIN — ACETAMINOPHEN 650 MG: 325 TABLET, FILM COATED ORAL at 22:26

## 2024-03-29 RX ADMIN — POTASSIUM CHLORIDE 20 MEQ: 14.9 INJECTION, SOLUTION INTRAVENOUS at 06:26

## 2024-03-29 RX ADMIN — INSULIN LISPRO 2 UNITS: 100 INJECTION, SOLUTION INTRAVENOUS; SUBCUTANEOUS at 14:03

## 2024-03-29 RX ADMIN — PANTOPRAZOLE SODIUM 20 MG: 20 TABLET, DELAYED RELEASE ORAL at 06:29

## 2024-03-29 RX ADMIN — POTASSIUM CHLORIDE 20 MEQ: 14.9 INJECTION, SOLUTION INTRAVENOUS at 08:43

## 2024-03-29 RX ADMIN — METHOCARBAMOL 500 MG: 500 TABLET ORAL at 00:12

## 2024-03-29 RX ADMIN — NAPROXEN 500 MG: 500 TABLET ORAL at 18:04

## 2024-03-29 RX ADMIN — RISPERIDONE 3 MG: 1 TABLET, FILM COATED ORAL at 09:01

## 2024-03-29 RX ADMIN — DOCUSATE SODIUM 200 MG: 100 CAPSULE, LIQUID FILLED ORAL at 22:26

## 2024-03-29 RX ADMIN — PANTOPRAZOLE SODIUM 20 MG: 20 TABLET, DELAYED RELEASE ORAL at 18:05

## 2024-03-29 RX ADMIN — NAPROXEN 500 MG: 500 TABLET ORAL at 08:58

## 2024-03-29 RX ADMIN — CHLORHEXIDINE GLUCONATE 0.12% ORAL RINSE 15 ML: 1.2 LIQUID ORAL at 22:27

## 2024-03-29 RX ADMIN — CEFAZOLIN SODIUM 2000 MG: 2 SOLUTION INTRAVENOUS at 18:10

## 2024-03-29 RX ADMIN — ZOLPIDEM TARTRATE 5 MG: 5 TABLET ORAL at 02:41

## 2024-03-29 RX ADMIN — HYDROMORPHONE HYDROCHLORIDE 2 MG: 2 INJECTION, SOLUTION INTRAMUSCULAR; INTRAVENOUS; SUBCUTANEOUS at 10:41

## 2024-03-29 RX ADMIN — MORPHINE SULFATE 60 MG: 30 TABLET, EXTENDED RELEASE ORAL at 15:22

## 2024-03-29 RX ADMIN — PRAVASTATIN SODIUM 80 MG: 80 TABLET ORAL at 18:04

## 2024-03-29 RX ADMIN — HYDROMORPHONE HYDROCHLORIDE 2 MG: 2 INJECTION, SOLUTION INTRAMUSCULAR; INTRAVENOUS; SUBCUTANEOUS at 03:51

## 2024-03-29 RX ADMIN — AMIODARONE HYDROCHLORIDE 400 MG: 200 TABLET ORAL at 08:59

## 2024-03-29 RX ADMIN — METOPROLOL SUCCINATE 25 MG: 25 TABLET, EXTENDED RELEASE ORAL at 09:00

## 2024-03-29 RX ADMIN — INSULIN LISPRO 10 UNITS: 100 INJECTION, SOLUTION INTRAVENOUS; SUBCUTANEOUS at 18:08

## 2024-03-29 RX ADMIN — OXYCODONE HYDROCHLORIDE 5 MG: 5 TABLET ORAL at 18:08

## 2024-03-29 RX ADMIN — HYDROMORPHONE HYDROCHLORIDE 0.5 MG: 1 INJECTION, SOLUTION INTRAMUSCULAR; INTRAVENOUS; SUBCUTANEOUS at 18:52

## 2024-03-29 RX ADMIN — POTASSIUM CHLORIDE 40 MEQ: 1500 TABLET, EXTENDED RELEASE ORAL at 06:29

## 2024-03-29 RX ADMIN — INSULIN GLARGINE 30 UNITS: 100 INJECTION, SOLUTION SUBCUTANEOUS at 08:55

## 2024-03-29 RX ADMIN — POLYETHYLENE GLYCOL 3350 17 G: 17 POWDER, FOR SOLUTION ORAL at 09:08

## 2024-03-29 RX ADMIN — AMIODARONE HYDROCHLORIDE 400 MG: 200 TABLET ORAL at 18:04

## 2024-03-29 RX ADMIN — CHLORHEXIDINE GLUCONATE 0.12% ORAL RINSE 15 ML: 1.2 LIQUID ORAL at 09:08

## 2024-03-29 RX ADMIN — NICOTINE 21 MG: 21 PATCH, EXTENDED RELEASE TRANSDERMAL at 22:29

## 2024-03-29 NOTE — DISCHARGE INSTR - AVS FIRST PAGE
Follow-up with PCP within 1 week for posthospitalization follow-up  Recommend outpatient follow-up with cardiology, infectious disease, endocrinology and orthopedics  Return to the emergency department for further evaluation with any chest pain/palpitations, shortness of breath, nausea/vomiting, abdominal pain, fever/chills                Dr. Washington Knee Replacement    What to Expect/Activity  It is normal to have some discomfort in your knee for several days to weeks.  You are weight bearing as tolerated to your operative leg with assist devices.  Please use crutches/walker when ambulating until your follow-up  Swelling and discomfort in the knee is normal for several days after surgery. For the first 2-3 days, use ice around the knee to help. Use for 20-30 minutes every 1-2 hours for 48 hours, while awake. You may continue beyond 48 hours as needed.  Place one or two pillows underneath your calf, not your knee, to reduce swelling.  Physical therapy on your own at home should start as soon as possible (see below). Please perform heel slides and extension exercises on your own as well (see diagram).  Please use incentive spirometer 10 times per hour while awake (see diagram).    Dressing/Wound Care/Bathing  You may remove your toe-to-groin dressing 24 hours after surgery. There will be a surgical dressing over your incision that stays in place until follow-up unless water gets under the bandage and then it should be removed.   You may start showering 24 hours after surgery, the surgical dressing will remain in place. Please pat the dressing dry. If you notice the dressing appears saturated or is starting to come off, please replace with dry dressing.  You can keep the dressing in place until follow-up in the office.   Do not place any creams, ointments or gels on or around the incision.  No baths, swimming or submerging until cleared by Dr. Washington    Pain Management/Medications  You may resume your usual  medications.  Please take the following medications:  Anti-coagulation (blood clot prevention) - Eliquis  Pain medication:  Narcotic: Take as directed  NSAID/Anti-inflammatory: Take as directed  Tylenol 1000mg every 8 hours  Zofran (ondasetron) - 4mg every 8 hours as needed for nausea  Stool softeners (senna/colace) - take daily to prevent constipation as narcotic pain medication causes constipation  Antibiotic - take as directed if prescribed   If you have questions or pain concerns, please contact the office. Pain medication cannot remove all post-operative pain.    Follow up/Call if:  The findings of your surgery will be explained to you and your family immediately after surgery. However, in the post-operative period, during recovery from anesthesia you may not fully remember or fully understand what was said. This will be again gone over when you return for your post-op appointment.  Please contact Dr. Washington's office if you experience the following:  Excessive bleeding (bleeding through your dressing)  Fever greater than 101 degrees F after 48 hours (low grade fevers the day or two after surgery are normal)  Persistent nausea or vomiting  Decreased sensation or discoloration of the operative limb  Pain or swelling that is getting worse and not better with medication    Dr. Washington's Office Contact: [phone]

## 2024-03-29 NOTE — ASSESSMENT & PLAN NOTE
Lab Results   Component Value Date    HGBA1C 12.1 (H) 03/22/2024       Recent Labs     03/29/24  0758 03/29/24  1016 03/29/24  1152 03/29/24  1404   POCGLU 129 229* 199* 208*       Blood Sugar Average: Last 72 hrs:  (P) [id number]    POA: Glucose > 600. BHB 1.41. pH 7.35.  Was previously fluid resuscitated and on insulin infusion  Home regimen: Metformin, Glimepiride  Plan:  Hold home regimen  Cont NCC insulin gtt for goal glucose 140-180  Endocrinology consulted to transition patient to SQ regimen  Started on Lantus 35 units and Lispro 10u with meals   D/c insulin drip   Adjust dose as needed   F/u op with endocrine   Diabetes education when appropriate   CHO controlled diet

## 2024-03-29 NOTE — PLAN OF CARE
Problem: OCCUPATIONAL THERAPY ADULT  Goal: Performs self-care activities at highest level of function for planned discharge setting.  See evaluation for individualized goals.  Description: Treatment Interventions: ADL retraining, Functional transfer training, Patient/family training, Equipment evaluation/education, Compensatory technique education, Continued evaluation, Endurance training          See flowsheet documentation for full assessment, interventions and recommendations.   Outcome: Progressing  Note: Limitation: Decreased ADL status, Decreased self-care trans, Decreased high-level ADLs (Impaired balance)  Prognosis: Good  Assessment: Pt is a 48 y.o. male seen for OT re-evaluation at [facility], admitted 3/22/2024 w/ Type 2 diabetes mellitus with ketoacidosis without coma, without long-term current use of insulin (HCC). Pt had I&D of R knee joint resulting in intubation. Pt has since been extubated & doing well on RA. Pt is WBAT to RLE. Pt is now POD#1 of L 2nd toe amputation 2/2 osteomyelitis. Pt is hel touch Wbing with surgical shoe to L foot. On OT IE 3/26/24, pt required overall Min A for ADLs & Min A x1 for  bed mobility & functional txfers/mobility with poor endurance. Upon re-evaluation: Pt requires S for bed mobility, Min Ax2 for functional transfers, Min Ax1 for functional mobility, S for UB ADLs and Min-Max A for LB ADLS 2* the following deficits impacting occupational performance: weakness, decreased balance, decreased tolerance, increased pain, and orthopedic restrictions. However, pt with notable improvement in endurance since OT IE. Full objective findings from OT assessment regarding body systems outlined above. Pt to benefit from continued skilled OT tx while in the hospital to address deficits as defined above and maximize level of functional independence w/ ADL's and functional mobility. Occupational Performance areas to address include: bathing/shower, toilet hygiene,  dressing, functional mobility, community mobility, and clothing management. Based on findings, pt is of high complexity. The patient's raw score on the AM-PAC Daily Activity inpatient short form is 19, standardized score is 40.22, greater than 39.4. Patients at this level are likely to benefit from DC to home. However, please refer to therapist recommendation for discharge planning given other factors that may influence destination. At this time, OT recommendations at time of discharge are DC with Level I resources.     Rehab Resource Intensity Level, OT: I (Maximum Resource Intensity)

## 2024-03-29 NOTE — ASSESSMENT & PLAN NOTE
Home regimen: Metoprolol 25mg daily, amlodipine 5mg daily, lisinopril-hydrochlorothiazide 20-25mg     Amlodipine and lisinopril previously held in setting of JH. Restarted on amlodipine 3/29  Continue to monitor BP and restart lisinopril when appropriate

## 2024-03-29 NOTE — PLAN OF CARE
Problem: PHYSICAL THERAPY ADULT  Goal: Performs mobility at highest level of function for planned discharge setting.  See evaluation for individualized goals.  Description: Treatment/Interventions: Functional transfer training, LE strengthening/ROM, Elevations, Therapeutic exercise, Endurance training, Patient/family training, Equipment eval/education, Bed mobility, Gait training, Spoke to nursing, OT, Spoke to case management          See flowsheet documentation for full assessment, interventions and recommendations.  Note: Prognosis: Good  Problem List: Decreased strength, Decreased range of motion, Decreased endurance, Impaired balance, Decreased mobility, Decreased cognition, Pain, Orthopedic restrictions  Assessment: Patient now for re-evaluation POD 1 amp L 2nd toe due to osteomyelitis. Patient was received supine in bed and agreeable to session. Pleasant and motivated. He was educated on weight bearing status along with demonstration. Surgical shoes placed on patient. Patient required assistance of 1 for mobility. He ambulated a short distance before becoming fatigued. Needed repeated cues to heel weight bear. Also educated patient on importance of knee ROM for the RLE. Patient is deconditioned and is at risk for falls. He is not at his functional baseline. Level 1 is recommended. High intensity. The patient's AM-[facility] Basic Mobility Inpatient Short Form Raw Score is 17. A Raw score of less than or equal to 17 suggests the patient may benefit from discharge to post-acute rehabilitation services. Please also refer to the recommendation of the Physical Therapist for safe discharge planning.  Barriers to Discharge: Inaccessible home environment, Decreased caregiver support     Rehab Resource Intensity Level, PT: I (Maximum Resource Intensity)    See flowsheet documentation for full assessment.

## 2024-03-29 NOTE — PROGRESS NOTES
Orthopedics   Armen Llanos 48 y.o. male MRN: [id number]  Unit/Bed#: -01      Subjective:  48 y.o.male post operative day 5 right total knee revision arthroplasty with poly exchange, insertion of stimulant and debridement down to and including bone. Patient seen and examined this morning.  Sitting up in bed and watching TV.  Pain is well-controlled at this time.  He states he is very happy with how he is being treated in the hospital.  He is also happy that he is being educated on a better diet to help control his diabetes.  He denies any chest pain, shortness of breath, fevers or chills.    Labs:  0   Lab Value Date/Time    HCT 32.7 (L) 03/29/2024 0305    HCT 33.7 (L) 03/28/2024 1000    HCT 34.1 (L) 03/27/2024 0209    HGB 11.1 (L) 03/29/2024 0305    HGB 11.6 (L) 03/28/2024 1000    HGB 11.5 (L) 03/27/2024 0209    INR 1.37 (H) 03/24/2024 1056    WBC 9.19 03/29/2024 0305    WBC 8.22 03/28/2024 1000    WBC 9.41 03/27/2024 0209    ESR 25 (H) 03/22/2024 0948    CRP 70.5 (H) 03/22/2024 1049       Meds:    Current Facility-Administered Medications:     acetaminophen (TYLENOL) tablet 650 mg, 650 mg, Oral, Q6H PRN, Bambi Marcelo PA-C, 650 mg at 03/26/24 1453    amiodarone tablet 400 mg, 400 mg, Oral, BID With Meals, 400 mg at 03/28/24 0745 **FOLLOWED BY** [START ON 4/5/2024] amiodarone tablet 200 mg, 200 mg, Oral, Daily With Breakfast, Bambi Marcelo PA-C    bupivacaine liposomal (EXPAREL) 1.3 % injection 20 mL, 20 mL, Infiltration, Once, Bambi Marcelo PA-C    butalbital-acetaminophen-caffeine (FIORICET,ESGIC) -40 mg per tablet 1 tablet, 1 tablet, Oral, Q4H PRN, Bambi Marcelo PA-C    ceFAZolin (ANCEF) IVPB (premix in dextrose) 2,000 mg 50 mL, 2,000 mg, Intravenous, Q8H, Bambi Marcelo PA-C, Last Rate: 100 mL/hr at 03/29/24 0219, 2,000 mg at 03/29/24 0219    chlorhexidine (PERIDEX) 0.12 % oral rinse 15 mL, 15 mL, Mouth/Throat, Q12H DARIN, Bambi Marcelo PA-C, 15 mL at 03/28/24 9509     clonazePAM (KlonoPIN) tablet 1 mg, 1 mg, Oral, Daily, ANTOINETTE Deluna-C, 1 mg at 03/28/24 0746    docusate sodium (COLACE) capsule 200 mg, 200 mg, Oral, HS, Bambidelroy Marcelo PA-C, 200 mg at 03/28/24 2142    DULoxetine (CYMBALTA) delayed release capsule 20 mg, 20 mg, Oral, Daily, ANTOINETTE Deluna-C, 20 mg at 03/28/24 0746    fentaNYL (DURAGESIC) 75 mcg/hr TD 72 hr patch 1 patch, 1 patch, Transdermal, Q72H, ANTOINETTE Deluna-C, 1 patch at 03/29/24 0003    HYDROmorphone (DILAUDID) injection 2 mg, 2 mg, Intravenous, Q3H PRN, ANTOINETTE Deluna-C, 2 mg at 03/29/24 0351    insulin glargine (LANTUS) subcutaneous injection 30 Units 0.3 mL, 30 Units, Subcutaneous, QAM, ANTOINETTE Deluna-C    insulin regular (HumuLIN R,NovoLIN R) 1 Units/mL in sodium chloride 0.9 % 100 mL infusion, 0.3-21 Units/hr, Intravenous, Titrated, ANTOINETTE Deluna-C, Last Rate: 2 mL/hr at 03/29/24 0616, 2 Units/hr at 03/29/24 0616    methocarbamol (ROBAXIN) tablet 500 mg, 500 mg, Oral, Q6H PRN, ANTOINETTE Deluna-C, 500 mg at 03/29/24 0012    metoprolol (LOPRESSOR) injection 5 mg, 5 mg, Intravenous, Q6H PRN, Bambi Marcelo PA-C    metoprolol succinate (TOPROL-XL) 24 hr tablet 25 mg, 25 mg, Oral, Daily, ANTOINETTE Deluna-C, 25 mg at 03/28/24 0745    morphine (MS CONTIN) ER tablet 60 mg, 60 mg, Oral, Q12H PRN, Bambi Marcelo PA-C, 60 mg at 03/29/24 0218    naproxen (NAPROSYN) tablet 500 mg, 500 mg, Oral, BID With Meals, Bambi Marcelo PA-C, 500 mg at 03/28/24 0746    nicotine (NICODERM CQ) 21 mg/24 hr TD 24 hr patch 21 mg, 21 mg, Transdermal, HS, Bambi Marcelo PA-C, 21 mg at 03/28/24 2142    ondansetron (ZOFRAN) injection 4 mg, 4 mg, Intravenous, Q4H PRN, Bambi Marcelo PA-C, 4 mg at 03/28/24 1944    pantoprazole (PROTONIX) EC tablet 20 mg, 20 mg, Oral, BID AC, Bambi Marcelo PA-C, 20 mg at 03/29/24 0629    polyethylene glycol (MIRALAX) packet 17 g, 17 g, Per NG Tube, Daily, Bambi Marcelo  "PA-C, 17 g at 03/28/24 0744    potassium chloride 20 mEq IVPB (premix), 20 mEq, Intravenous, Q2H, Bambidelroy Marcelo PA-C, Last Rate: 50 mL/hr at 03/29/24 0626, 20 mEq at 03/29/24 0626    pravastatin (PRAVACHOL) tablet 80 mg, 80 mg, Oral, Daily With Dinner, Bambi L Marcelo, PA-C, 80 mg at 03/27/24 1718    risperiDONE (RisperDAL) tablet 3 mg, 3 mg, Oral, Daily, Bambi L Marcelo, PA-C, 3 mg at 03/28/24 0745    sodium chloride 0.9 % infusion, 10 mL/hr, Intravenous, Continuous, Bambi L Marcelo, PA-C, Stopped at 03/28/24 1900    zolpidem (AMBIEN) tablet 5 mg, 5 mg, Oral, HS PRN, Bambi L Marcelo, PA-C, 5 mg at 03/29/24 0241    Blood Culture:   Lab Results   Component Value Date    BLOODCX No Growth at 48 hrs. 03/26/2024       Wound Culture:   No results found for: \"WOUNDCULT\"    Ins and Outs:  I/O last 24 hours:  In: 900 [P.O.:600; I.V.:300]  Out: 3750 [Urine:3750]          Physical Exam:  Vitals:    03/29/24 0351   BP: 141/79   Pulse: 97   Resp:    Temp:    SpO2:      right Lower Extremity extremity:  Dressing intact without drainage  Minimal RLE swelling  TTP diffusely  Sensation intact distally  Motor intact to +FHL/EHL, +ankle dorsi/plantar flexion  2+ DP pulse, symmetric bilaterally  Digits warm and well perfused  Capillary refill < 2 seconds    Assessment: 48 y.o.male post operative day 5 right total knee revision arthroplasty with poly exchange, insertion of stimulant and debridement down to and including bone. OR cultures growing Staph aureus.     Plan:  WBAT to the RLE  ID recommendations for antibiotic management- IV ancef  Will likely need 6 weeks of IV abx then suppressive abx after that. PICC to be placed once blood cultures are negative  DVT prophylaxis- Eliquis  Medical management per primary team.  Analgesics  PT/OT  Dispo: Ortho signing off. Follow up as outpatient with Dr. Washington in one week.   Patient noted to have acute blood loss anemia due to a drop in Hbg of > 2.0g from preop levels, will " monitor vital signs and resuscitate with IV fluids as needed.  Hemoglobin 11.1 this AM.         Karli Cedillo PA-C

## 2024-03-29 NOTE — ASSESSMENT & PLAN NOTE
Home regimen: Morphine 60mg BID, fentanyl patch 75mcg/hr   Initially held in ICU, has now been restarted  Called patients PCP (Dr. Deidre Andrea) to verify outpatient regimen. States she has been recommending him to see pain management.     Morphine 60mg q12 hours scheduled. Continue fentanyl patch  He has been receiving his home regimen and dilaudid 2mg q3 hours for the last 5 days - will decrease to 0.5mg Q6 PRN breakthrough pain with plan to discontinue IV pain medications tomorrow. I did discuss with patient the significant amount of pain medication he is getting and he was receptive to decreasing IV meds.   Will order PRN oxy 5mg for moderate, oxy 7.5 for severe pain.   Continue to wean supplemental pain regimen as able

## 2024-03-29 NOTE — PLAN OF CARE
Problem: PAIN - ADULT  Goal: Verbalizes/displays adequate comfort level or baseline comfort level  Description: Interventions:  - Encourage patient to monitor pain and request assistance  - Assess pain using appropriate pain scale  - Administer analgesics based on type and severity of pain and evaluate response  - Implement non-pharmacological measures as appropriate and evaluate response  - Consider cultural and social influences on pain and pain management  - Notify physician/advanced practitioner if interventions unsuccessful or patient reports new pain  Outcome: Progressing     Problem: INFECTION - ADULT  Goal: Absence or prevention of progression during hospitalization  Description: INTERVENTIONS:  - Assess and monitor for signs and symptoms of infection  - Monitor lab/diagnostic results  - Monitor all insertion sites, i.e. indwelling lines, tubes, and drains  - Monitor endotracheal if appropriate and nasal secretions for changes in amount and color  - [place] appropriate cooling/warming therapies per order  - Administer medications as ordered  - Instruct and encourage patient and family to use good hand hygiene technique  - Identify and instruct in appropriate isolation precautions for identified infection/condition  Outcome: Progressing  Goal: Absence of fever/infection during neutropenic period  Description: INTERVENTIONS:  - Monitor WBC    Outcome: Progressing     Problem: SAFETY ADULT  Goal: Patient will remain free of falls  Description: INTERVENTIONS:  - Educate patient/family on patient safety including physical limitations  - Instruct patient to call for assistance with activity   - Consult OT/PT to assist with strengthening/mobility   - Keep Call bell within reach  - Keep bed low and locked with side rails adjusted as appropriate  - Keep care items and personal belongings within reach  - Initiate and maintain comfort rounds  - Make Fall Risk Sign visible to staff  - Offer Toileting every 2 Hours,  in advance of need  - Initiate/Maintain 2alarm  - Obtain necessary fall risk management equipment: 2  - Apply yellow socks and bracelet for high fall risk patients  - Consider moving patient to room near nurses station  Outcome: Progressing  Goal: Maintain or return to baseline ADL function  Description: INTERVENTIONS:  -  Assess patient's ability to carry out ADLs; assess patient's baseline for ADL function and identify physical deficits which impact ability to perform ADLs (bathing, care of mouth/teeth, toileting, grooming, dressing, etc.)  - Assess/evaluate cause of self-care deficits   - Assess range of motion  - Assess patient's mobility; develop plan if impaired  - Assess patient's need for assistive devices and provide as appropriate  - Encourage maximum independence but intervene and supervise when necessary  - Involve family in performance of ADLs  - Assess for home care needs following discharge   - Consider OT consult to assist with ADL evaluation and planning for discharge  - Provide patient education as appropriate  Outcome: Progressing  Goal: Maintains/Returns to pre admission functional level  Description: INTERVENTIONS:  - Perform AM-PAC 6 Click Basic Mobility/ Daily Activity assessment daily.  - Set and communicate daily mobility goal to care team and patient/family/caregiver.   - Collaborate with rehabilitation services on mobility goals if consulted  - Perform Range of Motion 2 times a day.  - Reposition patient every 1 hours.  - Dangle patient 2 times a day  - Stand patient 2 times a day  - Ambulate patient 2 times a day  - Out of bed to chair 2 times a day   - Out of bed for meals 2 times a day  - Out of bed for toileting  - Record patient progress and toleration of activity level   Outcome: Progressing     Problem: DISCHARGE PLANNING  Goal: Discharge to home or other facility with appropriate resources  Description: INTERVENTIONS:  - Identify barriers to discharge w/patient and caregiver  -  Arrange for needed discharge resources and transportation as appropriate  - Identify discharge learning needs (meds, wound care, etc.)  - Arrange for interpretive services to assist at discharge as needed  - Refer to Case Management Department for coordinating discharge planning if the patient needs post-hospital services based on physician/advanced practitioner order or complex needs related to functional status, cognitive ability, or social support system  Outcome: Progressing     Problem: Knowledge Deficit  Goal: Patient/family/caregiver demonstrates understanding of disease process, treatment plan, medications, and discharge instructions  Description: Complete learning assessment and assess knowledge base.  Interventions:  - Provide teaching at level of understanding  - Provide teaching via preferred learning methods  Outcome: Progressing     Problem: Prexisting or High Potential for Compromised Skin Integrity  Goal: Skin integrity is maintained or improved  Description: INTERVENTIONS:  - Identify patients at risk for skin breakdown  - Assess and monitor skin integrity  - Assess and monitor nutrition and hydration status  - Monitor labs   - Assess for incontinence   - Turn and reposition patient  - Assist with mobility/ambulation  - Relieve pressure over bony prominences  - Avoid friction and shearing  - Provide appropriate hygiene as needed including keeping skin clean and dry  - Evaluate need for skin moisturizer/barrier cream  - Collaborate with interdisciplinary team   - Patient/family teaching  - Consider wound care consult   Outcome: Progressing     Problem: NEUROSENSORY - ADULT  Goal: Achieves stable or improved neurological status  Description: INTERVENTIONS  - Monitor and report changes in neurological status  - Monitor vital signs such as temperature, blood pressure, glucose, and any other labs ordered   - Initiate measures to prevent increased intracranial pressure  - Monitor for seizure activity and  implement precautions if appropriate      Outcome: Progressing  Goal: Remains free of injury related to seizures activity  Description: INTERVENTIONS  - Maintain airway, patient safety  and administer oxygen as ordered  - Monitor patient for seizure activity, document and report duration and description of seizure to physician/advanced practitioner  - If seizure occurs,  ensure patient safety during seizure  - Reorient patient post seizure  - Seizure pads on all 4 side rails  - Instruct patient/family to notify RN of any seizure activity including if an aura is experienced  - Instruct patient/family to call for assistance with activity based on nursing assessment  - Administer anti-seizure medications if ordered    Outcome: Progressing  Goal: Achieves maximal functionality and self care  Description: INTERVENTIONS  - Monitor swallowing and airway patency with patient fatigue and changes in neurological status  - Encourage and assist patient to increase activity and self care.   - Encourage visually impaired, hearing impaired and aphasic patients to use assistive/communication devices  Outcome: Progressing     Problem: GENITOURINARY - ADULT  Goal: Maintains or returns to baseline urinary function  Description: INTERVENTIONS:  - Assess urinary function  - Encourage oral fluids to ensure adequate hydration if ordered  - Administer IV fluids as ordered to ensure adequate hydration  - Administer ordered medications as needed  - Offer frequent toileting  - Follow urinary retention protocol if ordered  Outcome: Progressing  Goal: Absence of urinary retention  Description: INTERVENTIONS:  - Assess patient’s ability to void and empty bladder  - Monitor I/O  - Bladder scan as needed  - Discuss with physician/AP medications to alleviate retention as needed  - Discuss catheterization for long term situations as appropriate  Outcome: Progressing  Goal: Urinary catheter remains patent  Description: INTERVENTIONS:  - Assess  patency of urinary catheter  - If patient has a chronic campa, consider changing catheter if non-functioning  - Follow guidelines for intermittent irrigation of non-functioning urinary catheter  Outcome: Progressing     Problem: METABOLIC, FLUID AND ELECTROLYTES - ADULT  Goal: Electrolytes maintained within normal limits  Description: INTERVENTIONS:  - Monitor labs and assess patient for signs and symptoms of electrolyte imbalances  - Administer electrolyte replacement as ordered  - Monitor response to electrolyte replacements, including repeat lab results as appropriate  - Instruct patient on fluid and nutrition as appropriate  Outcome: Progressing  Goal: Fluid balance maintained  Description: INTERVENTIONS:  - Monitor labs   - Monitor I/O and WT  - Instruct patient on fluid and nutrition as appropriate  - Assess for signs & symptoms of volume excess or deficit  Outcome: Progressing  Goal: Glucose maintained within target range  Description: INTERVENTIONS:  - Monitor Blood Glucose as ordered  - Assess for signs and symptoms of hyperglycemia and hypoglycemia  - Administer ordered medications to maintain glucose within target range  - Assess nutritional intake and initiate nutrition service referral as needed  Outcome: Progressing     Problem: SKIN/TISSUE INTEGRITY - ADULT  Goal: Skin Integrity remains intact(Skin Breakdown Prevention)  Description: Assess:  -Perform Girma assessment every 8  -Clean and moisturize skin every day  -Inspect skin when repositioning, toileting, and assisting with ADLS  -Assess extremities for adequate circulation and sensation     Bed Management:  -Have minimal linens on bed & keep smooth, unwrinkled  -Change linens as needed when moist or perspiring  -Avoid sitting or lying in one position for more than 2 hours while in bed  -Keep HOB at 30 degrees     Toileting:  -Offer bedside commode  -Assess for incontinence every 8 hours  -Use incontinent care products after each incontinent  episode such as Blue lotion    Activity:  -Mobilize patient 3 times a day  -Encourage activity and walks on unit  -Encourage or provide ROM exercises   -Turn and reposition patient every 3 Hours  -Use appropriate equipment to lift or move patient in bed  -Instruct/ Assist with weight shifting every 3 when out of bed in chair  -Consider limitation of chair time 3 hour intervals    Skin Care:  -Avoid use of baby powder, tape, friction and shearing, hot water or constrictive clothing  -Relieve pressure over bony prominences using pillows  -Do not massage red bony areas    Next Steps:  -Teach patient strategies to minimize risks such as staying immobile and not repositioning   Outcome: Progressing  Goal: Incision(s), wounds(s) or drain site(s) healing without S/S of infection  Description: INTERVENTIONS  - Assess and document dressing, incision, wound bed, drain sites and surrounding tissue  - Provide patient and family education  - Perform skin care/dressing changes whenever necessary  Outcome: Progressing  Goal: Pressure injury heals and does not worsen  Description: Interventions:  - Implement low air loss mattress or specialty surface (Criteria met)  - Apply silicone foam dressing  - Instruct/assist with weight shifting every 60 minutes when in chair   - Limit chair time to 3 hour intervals  - Use special pressure reducing interventions such as putting feet up when in chair   - Apply fecal or urinary incontinence containment device   - Perform passive or active ROM every 3-6hrs  - Turn and reposition patient & offload bony prominences every 3 hours   - Utilize friction reducing device or surface for transfers   - Use incontinent care products after each incontinent episode such as wipes and barrier cream  - Consider nutrition services referral as needed  Outcome: Progressing     Problem: MUSCULOSKELETAL - ADULT  Goal: Maintain or return mobility to safest level of function  Description: INTERVENTIONS:  - Assess  patient's ability to carry out ADLs; assess patient's baseline for ADL function and identify physical deficits which impact ability to perform ADLs (bathing, care of mouth/teeth, toileting, grooming, dressing, etc.)  - Assess/evaluate cause of self-care deficits   - Assess range of motion  - Assess patient's mobility  - Assess patient's need for assistive devices and provide as appropriate  - Encourage maximum independence but intervene and supervise when necessary  - Involve family in performance of ADLs  - Assess for home care needs following discharge   - Consider OT consult to assist with ADL evaluation and planning for discharge  - Provide patient education as appropriate  Outcome: Progressing  Goal: Maintain proper alignment of affected body part  Description: INTERVENTIONS:  - Support, maintain and protect limb and body alignment  - Provide patient/ family with appropriate education  Outcome: Progressing     Problem: Nutrition/Hydration-ADULT  Goal: Nutrient/Hydration intake appropriate for improving, restoring or maintaining nutritional needs  Description: Monitor and assess patient's nutrition/hydration status for malnutrition. Collaborate with interdisciplinary team and initiate plan and interventions as ordered.  Monitor patient's weight and dietary intake as ordered or per policy. Utilize nutrition screening tool and intervene as necessary. Determine patient's food preferences and provide high-protein, high-caloric foods as appropriate.     INTERVENTIONS:  - Monitor oral intake, urinary output, labs, and treatment plans  - Assess nutrition and hydration status and recommend course of action  - Evaluate amount of meals eaten  - Assist patient with eating if necessary   - Allow adequate time for meals  - Recommend/ encourage appropriate diets, oral nutritional supplements, and vitamin/mineral supplements  - Order, calculate, and assess calorie counts as needed  - Recommend, monitor, and adjust tube  feedings and TPN/PPN based on assessed needs  - Assess need for intravenous fluids  - Provide specific nutrition/hydration education as appropriate  - Include patient/family/caregiver in decisions related to nutrition  Outcome: Progressing

## 2024-03-29 NOTE — PHYSICAL THERAPY NOTE
"                                                                                  PHYSICAL THERAPY RE-EVAL/TX     03/29/24 0940   PT Last Visit   PT Visit Date 03/29/24   Note Type   Note type Re-Evaluation   Additional Comments Pt is POD#1 AMPUTATION TOE 2nd (Left)   Pain Assessment   Pain Assessment Tool 0-10   Pain Score 5   Pain Location/Orientation Orientation: Right;Location: Knee   Hospital Pain Intervention(s) Medication (See MAR);Ambulation/increased activity   Restrictions/Precautions   Weight Bearing Precautions Per Order Yes   RLE Weight Bearing Per Order WBAT   LLE Weight Bearing Per Order Other  (WBAT with surgical shoe)   Other Precautions Pain;Fall Risk;Multiple lines;WBS;Bed Alarm;Chair Alarm;Cognitive   Home Living   Additional Comments See initial eval   Prior Function   Comments See initial eval   Cognition   Overall Cognitive Status WFL   Arousal/Participation Alert   Attention Within functional limits   Orientation Level Oriented X4   Memory Decreased short term memory   Following Commands Follows all commands and directions without difficulty   Comments Pt reports STM at baseline   Subjective   Subjective \"I've already been up to the bathroom   RLE Assessment   RLE Assessment WFL  (Limited knee flexion)   LLE Assessment   LLE Assessment WFL   Bed Mobility   Supine to Sit 5  Supervision   Additional items HOB elevated;Bedrails;Verbal cues   Additional Comments Surgical shoe placed on B/L feet   Transfers   Sit to Stand 4  Minimal assistance   Additional items Assist x 1;Armrests;Increased time required;Verbal cues   Stand to Sit 4  Minimal assistance   Additional items Assist x 1;Armrests;Increased time required;Verbal cues   Ambulation/Elevation   Gait pattern Step to;Short stride;Antalgic  (Decreased R knee flexion)   Gait Assistance 4  Minimal assist   Additional items Assist x 1;Verbal cues;Tactile cues   Assistive Device Rolling walker   Distance 40ft   Ambulation/Elevation Additional " Comments Repeated verbal cues for heel weight bearing.   Balance   Static Sitting Normal   Dynamic Sitting Good   Static Standing Fair +   Dynamic Standing Fair   Ambulatory Fair   Endurance Deficit   Endurance Deficit Yes   Endurance Deficit Description Limited by pain   Activity Tolerance   Activity Tolerance Patient tolerated treatment well   Medical Staff Made Aware Stephani HARMON   Nurse Made Aware Vandana DE LA GARZA   Assessment   Prognosis Good   Problem List Decreased strength;Decreased range of motion;Decreased endurance;Impaired balance;Decreased mobility;Decreased cognition;Pain;Orthopedic restrictions   Assessment Patient now for re-evaluation POD 1 amp L 2nd toe due to osteomyelitis. Patient was received supine in bed and agreeable to session. Pleasant and motivated. He was educated on weight bearing status along with demonstration. Surgical shoes placed on patient. Patient required assistance of 1 for mobility. He ambulated a short distance before becoming fatigued. Needed repeated cues to heel weight bear. Also educated patient on importance of knee ROM for the RLE. Patient is deconditioned and is at risk for falls. He is not at his functional baseline. Level 1 is recommended. High intensity. The patient's -[facility] Basic Mobility Inpatient Short Form Raw Score is 17. A Raw score of less than or equal to 17 suggests the patient may benefit from discharge to post-acute rehabilitation services. Please also refer to the recommendation of the Physical Therapist for safe discharge planning.   Barriers to Discharge Inaccessible home environment;Decreased caregiver support   Goals   Patient Goals To get better and go home   STG Expiration Date 04/09/24   Short Term Goal #1 Continue with current goals   PT Treatment Day 2   Plan   Treatment/Interventions Functional transfer training;LE strengthening/ROM;Therapeutic exercise;Endurance training;Cognitive reorientation;Patient/family training;Equipment eval/education;Bed  mobility;Gait training;Spoke to nursing;OT   PT Frequency 3-5x/wk   Discharge Recommendation   Rehab Resource Intensity Level, PT I (Maximum Resource Intensity)   AM-PAC Basic Mobility Inpatient   Turning in Flat Bed Without Bedrails 4   Lying on Back to Sitting on Edge of Flat Bed Without Bedrails 3   Moving Bed to Chair 3   Standing Up From Chair Using Arms 3   Walk in Room 3   Climb 3-5 Stairs With Railing 1   Basic Mobility Inpatient Raw Score 17   Basic Mobility Standardized Score 39.67   [facility] Highest Level Of Mobility   -HL Goal 5: Stand one or more mins   -HLM Achieved 7: Walk 25 feet or more   End of Consult   Patient Position at End of Consult Bedside chair;Bed/Chair alarm activated;All needs within reach   Marion Lobo            Patient Name: Armen Llanos  Today's Date: 3/29/2024

## 2024-03-29 NOTE — QUICK NOTE
Patient not seen BG logs reviewed. Patient now on PO diet. Off steroids.     Recommend transition to MDI  Start lantus 35u and Lispro 10u with meals plus scale. Ok to d/c drip now since already has basal insulin on board  Adjust dose as needed  Discharge on MDI if discharged this weekend. Recommend OP endocrine follow up

## 2024-03-29 NOTE — ASSESSMENT & PLAN NOTE
No previous history of Afib- Likely exacerbated due to critical illness, bacteremia, and stress from OR  3/24 AF RVR to the 190s intra-op, initially HD stable   Persistent AF RVR despite IVF bolus, 150mg Amio bolus, esmolol bolus  Once in the ICU, 5 mg IV Metoprolol with improvement of HR to 150's  Initiated on Cardizem gtt with resultant hypotension, and Cardizem was thus d/c'ed   Amio bolus and gtt with eventual conversion to NS  Plan:  Transitioned to Amio PO by Cardiology   Okay to restart Eliquis per podiatry, and Ortho  To f/u as OP with Cards   Rates stable  Currently NSR on tele   Cardiology to start Toprol XL  Per cardiology note, patient recommended to start Jardiance, and Entresto which was price checked with pharmacy - appears it was ordered and discontinued. Will need clarification from cardiology on if these medications are to be resumed on discharge

## 2024-03-29 NOTE — OCCUPATIONAL THERAPY NOTE
"    Occupational Therapy Re-Evaluation     Patient Name: Armen Llanos  Today's Date: 3/29/2024  Problem List  Principal Problem:    Type 2 diabetes mellitus with ketoacidosis without coma, without long-term current use of insulin (HCC)  Active Problems:    Acute on Chronic Right knee pain    Chronic, continuous use of opioids    Essential hypertension    Septic shock (HCC)    TBI (traumatic brain injury) (HCC)    Suicidal ideation    Hyponatremia    MSSA bacteremia    A-fib with RVR (HCC)    Cardiomyopathy (HCC)    Anxiety    Acute osteomyelitis of left foot (HCC)    Past Medical History  Past Medical History:   Diagnosis Date    Diabetes mellitus (HCC)     Hypertension      Past Surgical History  Past Surgical History:   Procedure Laterality Date    KNEE SURGERY      MA REVJ TOTAL KNEE ARTHRP W/WO ALGRFT 1 COMPONENT Right 3/24/2024    Procedure: ARTHROPLASTY KNEE TOTAL REVISION, POLY EXCHANGE;  Surgeon: Tao Washington DO;  Location:  MAIN OR;  Service: Orthopedics           03/29/24 0937   OT Last Visit   OT Visit Date 03/29/24   Note Type   Note type Re-Evaluation   Additional Comments Pt is POD#1 AMPUTATION TOE 2nd (Left)   Pain Assessment   Pain Assessment Tool 0-10   Pain Score 5   Pain Location/Orientation Orientation: Right;Location: Knee   Patient's Stated Pain Goal No pain   Hospital Pain Intervention(s) Ambulation/increased activity;Repositioned;Elevated   Restrictions/Precautions   Weight Bearing Precautions Per Order Yes   RLE Weight Bearing Per Order WBAT   LLE Weight Bearing Per Order Other  (Heel touch WB with surgical shoe)   Other Precautions Pain;Fall Risk;Telemetry;Multiple lines;Bed Alarm;Chair Alarm   Home Living   Additional Comments See OT IE   Prior Function   Comments See OT IE   Subjective   Subjective \"I'm used to a 7/10 pain so it's like I'm on vacation right now\"   ADL   Eating Assistance 7  Independent   Grooming Assistance 7  Independent   UB Bathing Assistance 5  " Supervision/Setup   LB Bathing Assistance 4  Minimal Assistance   UB Dressing Assistance 5  Supervision/Setup   LB Dressing Assistance 3  Moderate Assistance   LB Dressing Deficit Don/doff R sock;Don/doff R shoe;Don/doff L shoe   Toileting Assistance  4  Minimal Assistance   Bed Mobility   Supine to Sit 5  Supervision   Additional items HOB elevated;Increased time required;Verbal cues   Transfers   Sit to Stand 4  Minimal assistance   Additional items Assist x 2;Increased time required;Verbal cues   Stand to Sit 4  Minimal assistance   Additional items Assist x 1;Armrests;Increased time required;Verbal cues   Functional Mobility   Functional Mobility 4  Minimal assistance   Additional Comments x1 - functional household distance. Moderate difficulty maintaining heel touch WBing   Additional items Rolling walker   Balance   Static Sitting Fair +   Dynamic Sitting Fair +   Static Standing Fair   Dynamic Standing Fair   Ambulatory Fair   Activity Tolerance   Activity Tolerance Patient tolerated treatment well   Medical Staff Made Aware PT Ruth Ann   Nurse Made Aware FREDERIC ALBARADO Assessment   RUE Assessment WFL   LUE Assessment   LUE Assessment WFL   Cognition   Overall Cognitive Status WFL   Arousal/Participation Alert   Attention Within functional limits   Orientation Level Oriented X4   Memory Decreased short term memory   Following Commands Follows all commands and directions without difficulty   Comments Pt reports STM at baseline   Assessment   Limitation Decreased ADL status;Decreased self-care trans;Decreased high-level ADLs  (Impaired balance)   Prognosis Good   Assessment Pt is a 48 y.o. male seen for OT re-evaluation at [facility], admitted 3/22/2024 w/ Type 2 diabetes mellitus with ketoacidosis without coma, without long-term current use of insulin (HCC). Pt had I&D of R knee joint resulting in intubation. Pt has since been extubated & doing well on RA. Pt is WBAT to RLE. Pt is now POD#1 of L 2nd toe  amputation 2/2 osteomyelitis. Pt is hel touch Wbing with surgical shoe to L foot. On OT IE 3/26/24, pt required overall Min A for ADLs & Min A x1 for  bed mobility & functional txfers/mobility with poor endurance. Upon re-evaluation: Pt requires S for bed mobility, Min Ax2 for functional transfers, Min Ax1 for functional mobility, S for UB ADLs and Min-Max A for LB ADLS 2* the following deficits impacting occupational performance: weakness, decreased balance, decreased tolerance, increased pain, and orthopedic restrictions. However, pt with notable improvement in endurance since OT IE. Full objective findings from OT assessment regarding body systems outlined above. Pt to benefit from continued skilled OT tx while in the hospital to address deficits as defined above and maximize level of functional independence w/ ADL's and functional mobility. Occupational Performance areas to address include: bathing/shower, toilet hygiene, dressing, functional mobility, community mobility, and clothing management. Based on findings, pt is of high complexity. The patient's raw score on the AM-PAC Daily Activity inpatient short form is 19, standardized score is 40.22, greater than 39.4. Patients at this level are likely to benefit from DC to home. However, please refer to therapist recommendation for discharge planning given other factors that may influence destination. At this time, OT recommendations at time of discharge are DC with Level I resources.   Goals   Patient Goals Pt wants to be more mobile & independent   Plan   Treatment Interventions ADL retraining;Functional transfer training;Patient/family training;Equipment evaluation/education;Compensatory technique education;Continued evaluation;Endurance training   Goal Expiration Date 04/08/24   OT Treatment Day 1   OT Frequency 3-5x/wk   Discharge Recommendation   Rehab Resource Intensity Level, OT I (Maximum Resource Intensity)   AM-PAC Daily Activity Inpatient   Lower Body  Dressing 2   Bathing 3   Toileting 3   Upper Body Dressing 3   Grooming 4   Eating 4   Daily Activity Raw Score 19   Daily Activity Standardized Score (Calc for Raw Score >=11) 40.22   AM-PAC Applied Cognition Inpatient   Following a Speech/Presentation 4   Understanding Ordinary Conversation 4   Taking Medications 4   Remembering Where Things Are Placed or Put Away 4   Remembering List of 4-5 Errands 4   Taking Care of Complicated Tasks 4   Applied Cognition Raw Score 24   Applied Cognition Standardized Score 62.21   End of Consult   Education Provided Yes   Patient Position at End of Consult Bedside chair;Bed/Chair alarm activated;All needs within reach   Nurse Communication Nurse aware of consult     Pt will achieve the following goals within 10 days.    *Pt will complete LB dressing with Min A & DME PRN.    *Pt will complete toileting w/ S w/ G hygiene/thoroughness using DME PRN    *Pt will perform functional transfers with on/off all surfaces with S using DME as needed w/ G balance/safety.    *Pt will improve functional mobility during ADL/IADL/leisure tasks to S using DME as needed w/ G balance/safety.      *Pt will demonstrate 100% carryover of precautions s/p review w/o cues w/ Mod I w/ G tolerance/participation t/o functional ADL/IADL/leisure tasks.    *Pt will verbalize and demonstrate good body mechanics and joint protection techniques during  ADLs/ IADLs with no verbal cues.    Stephani Phelan, OTR/L

## 2024-03-29 NOTE — ASSESSMENT & PLAN NOTE
SIRS: Tachycardia, leukocytosis  LA 1.4, procal 3.39  Source: R knee septic joint   BC x2 (3/22 and 3/23) + MSSA   3/23 R knee aspirate + MSSA  3/24 Intra-op R knee sample: + MSSA  3/24 R knee I&D to bone, total knee arthroplasty revision, insertion of stimulan with Dr. Washington  Intra-op became acute tachycardia with new AF RVR, remained intubated, received 1200mL IVF intra-op   Received additional 2L IVF in the ICU, with eventual need for levophed, vaso  3/24 RIJ TLC, L radial art line   ScVO2 60  3/24 Hydrocortisone added   Plan:  Continue Ancef  Discontinued steroids  ID following- appreciate recommendations  Repeat blood cultures negative x 48 hours  Will place order for PICC line to complete 6-week course of IV antibiotics  Trend fever curve  Remains off vasopressors

## 2024-03-29 NOTE — PROGRESS NOTES
"Progress Note - Podiatry  Armen Llanos 48 y.o. male MRN: [id number]  Unit/Bed#: -01 Encounter: [id number]    Assessment/Plan:  Diabetic ulceration left second toe with necrosis to bone (Joy 3)  POD #1 S/P Amp #2 Toe LFT  Sx dressing intact with no strikethrough drainage.  Plan for dressing change tomorrow  Continue with partial weightbearing and walker or crutches.     Uncontrolled type 2 diabetes with peripheral neuropathy  Encourage patient to be more vigilant with his diabetes management and how good blood sugar control can affect his feet with peripheral neuropathy and other diabetic complications.  Recommend follow-up for routine diabetic footcare every 3 to 4 months upon discharge.     History of traumatic brain injury, diabetic ketoacidosis with metabolic acidosis, electrolyte abnormality resolved, SIRS, JH, suicidal ideation, acute on chronic right knee pain and septic joint.  Essential hypertension, and chronic opioid use.  Managed per internal medicine, cardiology, and infectious disease    Subjective/Objective   Chief Complaint:   Chief Complaint   Patient presents with    Fall     Pt c/o right knee pain after pt unsure if he had a fall this morning. Pt denies thinners. Pt had a previous TBI and does not remember things currently. Pt denies cp/sob/n/v/d or fevers       Subjective: 48-year-old male seen today resting comfortably in bed with no distress.  Reports no significant pain from his foot.  States that all his pain is from his knees.    Blood pressure 128/81, pulse 86, temperature 98.6 °F (37 °C), resp. rate 16, height 6' 5\" (1.956 m), weight 103 kg (227 lb 1.2 oz), SpO2 93%.,Body mass index is 26.24 kg/m².    Invasive Devices       Peripheral Intravenous Line  Duration             Peripheral IV 03/26/24 Right;Upper;Ventral (anterior) Arm 2 days    Peripheral IV 03/28/24 Left;Lateral Forearm <1 day                    Physical Exam:   General appearance: alert, cooperative and no " distress  Neuro/Vascular: Palpable pedal pulses bilaterally with diminished epicritic sensation consistent with diabetic neuropathy.  Extremity: Surgical dressing intact status post amputation left second toe.  No strikethrough drainage noted.              Labs and other studies:   CBC w/diff  Results from last 7 days   Lab Units 03/29/24 0305 03/28/24  1000 03/27/24  0209   WBC Thousand/uL 9.19 8.22 9.41   HEMOGLOBIN g/dL 11.1* 11.6* 11.5*   HEMATOCRIT % 32.7* 33.7* 34.1*   PLATELETS Thousands/uL 367 332 267   NEUTROS PCT %  --   --  84*   LYMPHS PCT %  --   --  7*   LYMPHO PCT %  --  38  --    MONOS PCT %  --   --  7   MONO PCT %  --  3*  --    EOS PCT %  --  4 0     BMP  Results from last 7 days   Lab Units 03/29/24  0305 03/24/24  1047 03/24/24  0951   POTASSIUM mmol/L 2.8*   < >  --    CHLORIDE mmol/L 103   < >  --    CO2 mmol/L 25   < >  --    CO2, I-STAT mmol/L  --   --  16*   BUN mg/dL 10   < >  --    CREATININE mg/dL 0.95   < >  --    GLUCOSE, ISTAT mg/dl  --   --  219*   CALCIUM mg/dL 8.0*   < >  --     < > = values in this interval not displayed.     CMP  Results from last 7 days   Lab Units 03/29/24  0305 03/26/24  0605 03/25/24  0449 03/24/24  1047 03/24/24  0951   POTASSIUM mmol/L 2.8*   < > 3.6   < >  --    CHLORIDE mmol/L 103   < > 104   < >  --    CO2 mmol/L 25   < > 22   < >  --    CO2, I-STAT mmol/L  --   --   --   --  16*   BUN mg/dL 10   < > 18   < >  --    CREATININE mg/dL 0.95   < > 0.96   < >  --    CALCIUM mg/dL 8.0*   < > 8.7   < >  --    ALK PHOS U/L  --   --  50  --   --    ALT U/L  --   --  16  --   --    AST U/L  --   --  18  --   --    GLUCOSE, ISTAT mg/dl  --   --   --   --  219*    < > = values in this interval not displayed.       @Culture@  Lab Results   Component Value Date    BLOODCX No Growth at 48 hrs. 03/26/2024    BLOODCX No Growth at 48 hrs. 03/26/2024    BLOODCX Staphylococcus aureus (A) 03/25/2024    BLOODCX Staphylococcus aureus (A) 03/25/2024    BLOODCX Staphylococcus  "aureus (A) 03/23/2024    BLOODCX Staphylococcus aureus (A) 03/23/2024    BLOODCX Staphylococcus aureus (A) 03/22/2024    BLOODCX Staphylococcus aureus (A) 03/22/2024     No results found for: \"WOUNDCULT\"      Current Facility-Administered Medications:     acetaminophen (TYLENOL) tablet 650 mg, 650 mg, Oral, Q6H PRN, Bambi Marcelo PA-C, 650 mg at 03/26/24 1453    amiodarone tablet 400 mg, 400 mg, Oral, BID With Meals, 400 mg at 03/29/24 0859 **FOLLOWED BY** [START ON 4/5/2024] amiodarone tablet 200 mg, 200 mg, Oral, Daily With Breakfast, Bambi Marcelo PA-C    apixaban (ELIQUIS) tablet 5 mg, 5 mg, Oral, BID, Flaquita Murphy, PA-CLIFF, 5 mg at 03/29/24 1210    bupivacaine liposomal (EXPAREL) 1.3 % injection 20 mL, 20 mL, Infiltration, Once, Bambi Marcelo PA-C    butalbital-acetaminophen-caffeine (FIORICET,ESGIC) -40 mg per tablet 1 tablet, 1 tablet, Oral, Q4H PRN, Bambi Marcelo PA-C    ceFAZolin (ANCEF) IVPB (premix in dextrose) 2,000 mg 50 mL, 2,000 mg, Intravenous, Q8H, Bambi Marcelo PA-C, Last Rate: 100 mL/hr at 03/29/24 1041, 2,000 mg at 03/29/24 1041    chlorhexidine (PERIDEX) 0.12 % oral rinse 15 mL, 15 mL, Mouth/Throat, Q12H DARIN, Bambi Marcelo PA-C, 15 mL at 03/29/24 0908    clonazePAM (KlonoPIN) tablet 1 mg, 1 mg, Oral, Daily, Bambi Marcelo PA-C, 1 mg at 03/28/24 0746    docusate sodium (COLACE) capsule 200 mg, 200 mg, Oral, HS, Bambi Marcelo PA-C, 200 mg at 03/28/24 2142    DULoxetine (CYMBALTA) delayed release capsule 20 mg, 20 mg, Oral, Daily, Bambi Marcelo PA-C, 20 mg at 03/29/24 0902    fentaNYL (DURAGESIC) 75 mcg/hr TD 72 hr patch 1 patch, 1 patch, Transdermal, Q72H, Bambi Marcelo PA-C, 1 patch at 03/29/24 0003    HYDROmorphone (DILAUDID) injection 0.5 mg, 0.5 mg, Intravenous, Q6H PRN, Flaquita Murphy PA-C    [START ON 3/30/2024] insulin glargine (LANTUS) subcutaneous injection 35 Units 0.35 mL, 35 Units, Subcutaneous, QA, Esha العراقي MD    " insulin lispro (HumALOG/ADMELOG) 100 units/mL subcutaneous injection 1-6 Units, 1-6 Units, Subcutaneous, TID AC **AND** Fingerstick Glucose (POCT), , , TID AC, Esha العراقي MD    insulin lispro (HumALOG/ADMELOG) 100 units/mL subcutaneous injection 10 Units, 10 Units, Subcutaneous, TID With Meals, Esha العراقي MD    methocarbamol (ROBAXIN) tablet 500 mg, 500 mg, Oral, Q6H PRN, Bambi Marcelo PA-C, 500 mg at 03/29/24 0012    metoprolol (LOPRESSOR) injection 5 mg, 5 mg, Intravenous, Q6H PRN, ANTOINETTE Deluna-CLIFF    metoprolol succinate (TOPROL-XL) 24 hr tablet 25 mg, 25 mg, Oral, Daily, ANTOINETTE Deluna-C, 25 mg at 03/29/24 0900    morphine (MS CONTIN) ER tablet 60 mg, 60 mg, Oral, Q12H PRN, ANTOINETTE Deluna-C, 60 mg at 03/29/24 0218    naproxen (NAPROSYN) tablet 500 mg, 500 mg, Oral, BID With Meals, ANTOINETTE Deluna-C, 500 mg at 03/29/24 0858    nicotine (NICODERM CQ) 21 mg/24 hr TD 24 hr patch 21 mg, 21 mg, Transdermal, HS, Bambi Marcelo PA-C, 21 mg at 03/28/24 2142    ondansetron (ZOFRAN) injection 4 mg, 4 mg, Intravenous, Q4H PRN, Bambi Marcelo PA-C, 4 mg at 03/28/24 1944    pantoprazole (PROTONIX) EC tablet 20 mg, 20 mg, Oral, BID AC, ANTOINETTE Deluna-C, 20 mg at 03/29/24 0629    polyethylene glycol (MIRALAX) packet 17 g, 17 g, Per NG Tube, Daily, ANTOINETTE Deluna-C, 17 g at 03/29/24 0908    pravastatin (PRAVACHOL) tablet 80 mg, 80 mg, Oral, Daily With Dinner, ANTOINETTE Deluna-C, 80 mg at 03/27/24 1718    risperiDONE (RisperDAL) tablet 3 mg, 3 mg, Oral, Daily, Bambi Marcelo PA-C, 3 mg at 03/29/24 0901    sodium chloride 0.9 % infusion, 10 mL/hr, Intravenous, Continuous, Bambi Marcelo PA-C, Stopped at 03/28/24 1900    zolpidem (AMBIEN) tablet 5 mg, 5 mg, Oral, HS PRN, Bambi Marcelo PA-C, 5 mg at 03/29/24 0241    Imaging: I have personally reviewed pertinent films in PACS  EKG, Pathology, and Other Studies: I have personally reviewed pertinent  reports.      Catrachito Glaser DPM

## 2024-03-29 NOTE — PROGRESS NOTES
[facility]  Progress Note  Name: Armen Llanos I  MRN: [id number]  Unit/Bed#: -01 I Date of Admission: 3/22/2024   Date of Service: 3/29/2024 I Hospital Day: 7    Assessment/Plan   * Type 2 diabetes mellitus with ketoacidosis without coma, without long-term current use of insulin ([facility])  Assessment & Plan  Lab Results   Component Value Date    HGBA1C 12.1 (H) 03/22/2024       Recent Labs     03/29/24  0758 03/29/24  1016 03/29/24  1152 03/29/24  1404   POCGLU 129 229* 199* 208*       Blood Sugar Average: Last 72 hrs:  (P) [id number]    POA: Glucose > 600. BHB 1.41. pH 7.35.  Was previously fluid resuscitated and on insulin infusion  Home regimen: Metformin, Glimepiride  Plan:  Hold home regimen  Cont NCC insulin gtt for goal glucose 140-180  Endocrinology consulted to transition patient to SQ regimen  Started on Lantus 35 units and Lispro 10u with meals   D/c insulin drip   Adjust dose as needed   F/u op with endocrine   Diabetes education when appropriate   CHO controlled diet         Acute osteomyelitis of left foot (HCC)  Assessment & Plan  As evidenced by MRI  Podiatry consulted  Holding Eliquis, anticipate surgery 3/29    Anxiety  Assessment & Plan  Patient reports increased anxiety   Has received lorazepam IV x2 with intermittent relief   Reports taking Klonopin at home, but will hold given recent shock with vasopressor use   Add PRN Xanax   Restart home risperdal  Psych consulted    Cardiomyopathy ([facility])  Assessment & Plan  3/25 Echo -- EF 45%, mild global hypokinesis   Likely 2/2 septic shock, AF RVR   Appreciate Cardiology recc's   Cont on PO Amio   Restarted on eliquis   Continue metoprolol    A-fib with RVR ([facility])  Assessment & Plan  No previous history of Afib- Likely exacerbated due to critical illness, bacteremia, and stress from OR  3/24 AF RVR to the 190s intra-op, initially HD stable   Persistent AF RVR despite IVF bolus, 150mg Amio bolus, esmolol  bolus  Once in the ICU, 5 mg IV Metoprolol with improvement of HR to 150's  Initiated on Cardizem gtt with resultant hypotension, and Cardizem was thus d/c'ed   Amio bolus and gtt with eventual conversion to NS  Plan:  Transitioned to Amio PO by Cardiology   Okay to restart Eliquis per podiatry, and Ortho  To f/u as OP with Cards   Rates stable  Currently NSR on tele   Cardiology to start Toprol XL  Per cardiology note, patient recommended to start Jardiance, and Entresto which was price checked with pharmacy - appears it was ordered and discontinued. Will need clarification from cardiology on if these medications are to be resumed on discharge     MSSA bacteremia  Assessment & Plan  + MSSA on BC x2, R knee aspirate as above  3/24 R knee I&D to bone, total knee arthroplasty revision, insertion of stimulan with Dr. Washington  Plan:  Continue Ancef as above   IR consult for PICC placement as repeat blood cultures negative at 48 hours - discussed with ID   Appreciate ID input    Hyponatremia  Assessment & Plan  sNa 123 on admission.   Nephrology following- Hyponatremia likely secondary to physiologic SIADH, OP thiazide use with superimposed volume depletion in the setting of sepsis   Serum osmo- 269, Urine osmo 434, Urine sodium 40     Plan:  Appreciate Nephrology recc's  Improving on serial draws      Suicidal ideation  Assessment & Plan  Per wife patient has been mentioning and having suicidal ideation  Psych consulted -- per note 3/22 by Dr. Pineda, patient does not meet criteria for IP stay    TBI (traumatic brain injury) ([facility])  Assessment & Plan  History of TBI at 7 years old  Experiences memory issues at baseline  Home regimen: Ambien risperidone  Plan:  Hold home Ambien and risperidone while IP    Septic shock (HCC)  Assessment & Plan  SIRS: Tachycardia, leukocytosis  LA 1.4, procal 3.39  Source: R knee septic joint   BC x2 (3/22 and 3/23) + MSSA   3/23 R knee aspirate + MSSA  3/24 Intra-op R knee sample: +  MSSA  3/24 R knee I&D to bone, total knee arthroplasty revision, insertion of stimulan with Dr. Washington  Intra-op became acute tachycardia with new AF RVR, remained intubated, received 1200mL IVF intra-op   Received additional 2L IVF in the ICU, with eventual need for levophed, vaso  3/24 RIJ TLC, L radial art line   ScVO2 60  3/24 Hydrocortisone added   Plan:  Continue Ancef  Discontinued steroids  ID following- appreciate recommendations  Repeat blood cultures negative x 48 hours  Will place order for PICC line to complete 6-week course of IV antibiotics  Trend fever curve  Remains off vasopressors    Essential hypertension  Assessment & Plan  Home regimen: Metoprolol 25mg daily, amlodipine 5mg daily, lisinopril-hydrochlorothiazide 20-25mg     Amlodipine and lisinopril previously held in setting of JH. Restarted on amlodipine 3/29  Continue to monitor BP and restart lisinopril when appropriate     Chronic, continuous use of opioids  Assessment & Plan  Home regimen: Morphine 60mg BID, fentanyl patch 75mcg/hr   Initially held in ICU, has now been restarted  Called patients PCP (Dr. Deidre Andrea) to verify outpatient regimen. States she has been recommending him to see pain management.     Morphine 60mg q12 hours scheduled. Continue fentanyl patch  He has been receiving his home regimen and dilaudid 2mg q3 hours for the last 5 days - will decrease to 0.5mg Q6 PRN breakthrough pain with plan to discontinue IV pain medications tomorrow. I did discuss with patient the significant amount of pain medication he is getting and he was receptive to decreasing IV meds.   Will order PRN oxy 5mg for moderate, oxy 7.5 for severe pain.   Continue to wean supplemental pain regimen as able     Acute on Chronic Right knee pain  Assessment & Plan  History of R. Knee arthoplasty with replacement at outside facility   Acute pain present 2-3 days prior to admission with erythema and tenderness, warmth at the site   3/22 R knee XR --  Total arthroplasty stable hardware and bony alignment without hardware complication.No acute fracture or dislocation. No joint effusion. No lytic or blastic osseous lesion. Unremarkable soft tissues.  S/p OR 3/24 as above   Home regimen: Fentanyl patch, Motrin, Morphine  Plan:  Ortho following, appreciate recs - see below   WBAT RLE    JH (acute kidney injury) (HCC)-resolved as of 3/23/2024  Assessment & Plan  Creatinine on admission 1.59  Baseline appears to be around 0.8-1.0  Meeting KDIGO criteria  I's and O's  Avoid hypotension nephrotoxic agents and contrast  Hold lisinopril and hydrochlorothiazide  Urinary retention protocol  Bladder scans  Continue to trend  Also received Toradol yesterday      Metabolic acidosis-resolved as of 3/25/2024  Assessment & Plan  See management above  REsolved           VTE Pharmacologic Prophylaxis: VTE Score: 2 Moderate Risk (Score 3-4) - Pharmacological DVT Prophylaxis Ordered: apixaban (Eliquis).    Mobility:   Basic Mobility Inpatient Raw Score: 17  JH-HLM Goal: 5: Stand one or more mins  JH-HLM Achieved: 7: Walk 25 feet or more  JH-HLM Goal achieved. Continue to encourage appropriate mobility.    Patient Centered Rounds: I performed bedside rounds with nursing staff today.   Discussions with Specialists or Other Care Team Provider: None    Education and Discussions with Family / Patient:  Will update wife.     Total Time Spent on Date of Encounter in care of patient: This time was spent on one or more of the following: performing physical exam; counseling and coordination of care; obtaining or reviewing history; documenting in the medical record; reviewing/ordering tests, medications or procedures; communicating with other healthcare professionals and discussing with patient's family/caregivers.    Current Length of Stay: 7 day(s)  Current Patient Status: Inpatient   Certification Statement: The patient will continue to require additional inpatient hospital stay due to  pending rehab placement  Discharge Plan: Anticipate discharge in 24-48 hrs to rehab facility.    Code Status: Level 1 - Full Code    Subjective:   Seen and examined. No acute events overnight. States    Objective:     Vitals:   Temp (24hrs), Av.2 °F (36.8 °C), Min:97.5 °F (36.4 °C), Max:99.3 °F (37.4 °C)    Temp:  [97.5 °F (36.4 °C)-99.3 °F (37.4 °C)] 98.7 °F (37.1 °C)  HR:  [79-97] 79  Resp:  [16-21] 16  BP: (126-149)/(71-95) 126/71  SpO2:  [93 %-98 %] 95 %  Body mass index is 26.24 kg/m².     Input and Output Summary (last 24 hours):     Intake/Output Summary (Last 24 hours) at 3/29/2024 1656  Last data filed at 3/29/2024 1648  Gross per 24 hour   Intake 1200 ml   Output 5125 ml   Net -3925 ml       Physical Exam:   Physical Exam  Constitutional:       General: He is not in acute distress.     Appearance: He is not toxic-appearing.   HENT:      Mouth/Throat:      Mouth: Mucous membranes are moist.   Eyes:      Conjunctiva/sclera: Conjunctivae normal.   Cardiovascular:      Rate and Rhythm: Normal rate.      Heart sounds: No murmur heard.     No friction rub. No gallop.   Pulmonary:      Breath sounds: No wheezing, rhonchi or rales.   Abdominal:      Palpations: Abdomen is soft.   Musculoskeletal:         General: Deformity (L toe in appropraite dressing and R knee wrapped) present. No swelling.   Skin:     Findings: No erythema.   Neurological:      Mental Status: Mental status is at baseline.          Additional Data:     Labs:  Results from last 7 days   Lab Units 24  0305 24  1000 24  0209 24  0605 24  0449   WBC Thousand/uL 9.19 8.22 9.41   < > 10.54*   HEMOGLOBIN g/dL 11.1* 11.6* 11.5*   < > 11.1*   HEMATOCRIT % 32.7* 33.7* 34.1*   < > 31.3*   PLATELETS Thousands/uL 367 332 267   < > 155   BANDS PCT %  --   --   --   --  6   NEUTROS PCT %  --   --  84*   < >  --    LYMPHS PCT %  --   --  7*   < >  --    LYMPHO PCT %  --  38  --   --  3*   MONOS PCT %  --   --  7   < >  --     MONO PCT %  --  3*  --   --  4   EOS PCT %  --  4 0   < > 0    < > = values in this interval not displayed.     Results from last 7 days   Lab Units 03/29/24  0305 03/26/24  0605 03/25/24  0449   SODIUM mmol/L 137   < > 134*   POTASSIUM mmol/L 2.8*   < > 3.6   CHLORIDE mmol/L 103   < > 104   CO2 mmol/L 25   < > 22   BUN mg/dL 10   < > 18   CREATININE mg/dL 0.95   < > 0.96   ANION GAP mmol/L 9   < > 8   CALCIUM mg/dL 8.0*   < > 8.7   ALBUMIN g/dL  --   --  2.9*   TOTAL BILIRUBIN mg/dL  --   --  0.64   ALK PHOS U/L  --   --  50   ALT U/L  --   --  16   AST U/L  --   --  18   GLUCOSE RANDOM mg/dL 171*   < > 170*    < > = values in this interval not displayed.     Results from last 7 days   Lab Units 03/24/24  1056   INR  1.37*     Results from last 7 days   Lab Units 03/29/24  1633 03/29/24  1404 03/29/24  1152 03/29/24  1016 03/29/24  0758 03/29/24  0603 03/29/24  0357 03/29/24  0156 03/29/24  0004 03/28/24  2158 03/28/24  2008 03/28/24  1803   POC GLUCOSE mg/dl 157* 208* 199* 229* 129 134 167* 153* 196* 200* 185* 146*         Results from last 7 days   Lab Units 03/27/24  0209 03/26/24  0605 03/25/24  0449 03/24/24  1047 03/23/24  1517 03/22/24  1818   LACTIC ACID mmol/L  --   --   --  1.4 0.7 1.6   PROCALCITONIN ng/ml 1.55* 2.78* 5.42* 3.39*  --   --        Lines/Drains:  Invasive Devices       Peripheral Intravenous Line  Duration             Peripheral IV 03/26/24 Right;Upper;Ventral (anterior) Arm 3 days    Peripheral IV 03/28/24 Left;Lateral Forearm 1 day                      Telemetry:  Telemetry Orders (From admission, onward)               24 Hour Telemetry Monitoring  Continuous x 24 Hours (Telem)        Question:  Reason for 24 Hour Telemetry  Answer:  Arrhythmias requiring acute medical intervention / PPM or ICD malfunction                     Telemetry Reviewed: Normal Sinus Rhythm  Indication for Continued Telemetry Use: Arrthymias requiring medical therapy             Imaging: Reviewed radiology  reports from this admission including: xray(s)    Recent Cultures (last 7 days):   Results from last 7 days   Lab Units 03/26/24  1427 03/26/24  1416 03/25/24  0521 03/24/24  0841 03/24/24  0840 03/24/24  0839 03/23/24  1245 03/23/24  1158   BLOOD CULTURE  No Growth at 48 hrs. No Growth at 48 hrs. Staphylococcus aureus*  Staphylococcus aureus*  --   --   --  Staphylococcus aureus*  Staphylococcus aureus*  --    GRAM STAIN RESULT   --   --  Gram positive cocci in clusters*  Gram positive cocci in clusters* No Polys or Bacteria seen No Polys or Bacteria seen 1+ Disintegrating polys*  2+ Gram positive cocci in pairs* Gram positive cocci in clusters*  Gram positive cocci in clusters* 4+ Polys*  3+ Gram positive cocci in clusters*   BODY FLUID CULTURE, STERILE   --   --   --   --   --   --   --  4+ Growth of Staphylococcus aureus*       Last 24 Hours Medication List:   Current Facility-Administered Medications   Medication Dose Route Frequency Provider Last Rate    acetaminophen  650 mg Oral Q6H PRN Bambi Marcelo PA-C      amiodarone  400 mg Oral BID With Meals Bambi Marcelo PA-C      Followed by    [START ON 4/5/2024] amiodarone  200 mg Oral Daily With Breakfast Bambi Marcelo PA-C      [START ON 3/30/2024] amLODIPine  5 mg Oral Daily Flaquita Murphy PA-C      apixaban  5 mg Oral BID Flaquita Murphy PA-C      bupivacaine liposomal  20 mL Infiltration Once Bambi Marcelo PA-C      butalbital-acetaminophen-caffeine  1 tablet Oral Q4H PRN Bambi Marcelo PA-C      cefazolin  2,000 mg Intravenous Q8H Bambi Marcelo PA-C 2,000 mg (03/29/24 1041)    chlorhexidine  15 mL Mouth/Throat Q12H [facility] Bambi Marcelo PA-C      clonazePAM  1 mg Oral Daily Bambi Marcelo PA-C      docusate sodium  200 mg Oral HS Bambi Marcelo PA-C      DULoxetine  20 mg Oral Daily Bambi Marcelo PA-C      fentaNYL  1 patch Transdermal Q72H KYLAH [id number]      HYDROmorphone  0.5 mg Intravenous  Q6H PRN Flaquita Murphy PA-C      [START ON 3/30/2024] insulin glargine  35 Units Subcutaneous QAM Esha العراقي MD      insulin lispro  1-6 Units Subcutaneous TID AC Esha العراقي MD      insulin lispro  10 Units Subcutaneous TID With Meals Esha العراقي MD      methocarbamol  500 mg Oral Q6H PRN Bambi Marcelo PA-C      metoprolol  5 mg Intravenous Q6H PRN Bambi Marcelo PA-C      metoprolol succinate  25 mg Oral Daily Bambi Marcelo PA-C      [START ON 3/30/2024] morphine  60 mg Oral Q12H DARIN Flaquita Murphy PA-C      naproxen  500 mg Oral BID With Meals Bambi Marcelo PA-C      nicotine  21 mg Transdermal HS Bambi Marcelo PA-C      ondansetron  4 mg Intravenous Q4H PRN Bambi Marcelo PA-C      oxyCODONE  5 mg Oral Q4H PRN Flaquita Murphy PA-C      Or    oxyCODONE  7.5 mg Oral Q4H PRN Flaquita Murphy PA-C      pantoprazole  20 mg Oral BID AC Bambi Marcelo PA-C      polyethylene glycol  17 g Per NG Tube Daily Bambi Marcelo PA-C      pravastatin  80 mg Oral Daily With Dinner Bambi Marcelo PA-C      risperiDONE  3 mg Oral Daily Bambi Marcelo PA-C      zolpidem  5 mg Oral HS PRN Bambi Marcelo PA-C          Today, Patient Was Seen By: Flaqiuta Murphy PA-C    **Please Note: This note may have been constructed using a voice recognition system.**

## 2024-03-29 NOTE — PLAN OF CARE
Problem: PAIN - ADULT  Goal: Verbalizes/displays adequate comfort level or baseline comfort level  Description: Interventions:  - Encourage patient to monitor pain and request assistance  - Assess pain using appropriate pain scale  - Administer analgesics based on type and severity of pain and evaluate response  - Implement non-pharmacological measures as appropriate and evaluate response  - Consider cultural and social influences on pain and pain management  - Notify physician/advanced practitioner if interventions unsuccessful or patient reports new pain  Outcome: Progressing     Problem: INFECTION - ADULT  Goal: Absence or prevention of progression during hospitalization  Description: INTERVENTIONS:  - Assess and monitor for signs and symptoms of infection  - Monitor lab/diagnostic results  - Monitor all insertion sites, i.e. indwelling lines, tubes, and drains  - Monitor endotracheal if appropriate and nasal secretions for changes in amount and color  - [place] appropriate cooling/warming therapies per order  - Administer medications as ordered  - Instruct and encourage patient and family to use good hand hygiene technique  - Identify and instruct in appropriate isolation precautions for identified infection/condition  Outcome: Progressing  Goal: Absence of fever/infection during neutropenic period  Description: INTERVENTIONS:  - Monitor WBC    Outcome: Progressing     Problem: SAFETY ADULT  Goal: Patient will remain free of falls  Description: INTERVENTIONS:  - Educate patient/family on patient safety including physical limitations  - Instruct patient to call for assistance with activity   - Consult OT/PT to assist with strengthening/mobility   - Keep Call bell within reach  - Keep bed low and locked with side rails adjusted as appropriate  - Keep care items and personal belongings within reach  - Initiate and maintain comfort rounds  - Make Fall Risk Sign visible to staff  - Offer Toileting every 2 Hours,  in advance of need  - Initiate/Maintain 2alarm  - Obtain necessary fall risk management equipment: 2  - Apply yellow socks and bracelet for high fall risk patients  - Consider moving patient to room near nurses station  Outcome: Progressing  Goal: Maintain or return to baseline ADL function  Description: INTERVENTIONS:  -  Assess patient's ability to carry out ADLs; assess patient's baseline for ADL function and identify physical deficits which impact ability to perform ADLs (bathing, care of mouth/teeth, toileting, grooming, dressing, etc.)  - Assess/evaluate cause of self-care deficits   - Assess range of motion  - Assess patient's mobility; develop plan if impaired  - Assess patient's need for assistive devices and provide as appropriate  - Encourage maximum independence but intervene and supervise when necessary  - Involve family in performance of ADLs  - Assess for home care needs following discharge   - Consider OT consult to assist with ADL evaluation and planning for discharge  - Provide patient education as appropriate  Outcome: Progressing  Goal: Maintains/Returns to pre admission functional level  Description: INTERVENTIONS:  - Perform AM-PAC 6 Click Basic Mobility/ Daily Activity assessment daily.  - Set and communicate daily mobility goal to care team and patient/family/caregiver.   - Collaborate with rehabilitation services on mobility goals if consulted  - Perform Range of Motion 2 times a day.  - Reposition patient every 1 hours.  - Dangle patient 2 times a day  - Stand patient 2 times a day  - Ambulate patient 2 times a day  - Out of bed to chair 2 times a day   - Out of bed for meals 2 times a day  - Out of bed for toileting  - Record patient progress and toleration of activity level   Outcome: Progressing     Problem: DISCHARGE PLANNING  Goal: Discharge to home or other facility with appropriate resources  Description: INTERVENTIONS:  - Identify barriers to discharge w/patient and caregiver  -  Arrange for needed discharge resources and transportation as appropriate  - Identify discharge learning needs (meds, wound care, etc.)  - Arrange for interpretive services to assist at discharge as needed  - Refer to Case Management Department for coordinating discharge planning if the patient needs post-hospital services based on physician/advanced practitioner order or complex needs related to functional status, cognitive ability, or social support system  Outcome: Progressing     Problem: Knowledge Deficit  Goal: Patient/family/caregiver demonstrates understanding of disease process, treatment plan, medications, and discharge instructions  Description: Complete learning assessment and assess knowledge base.  Interventions:  - Provide teaching at level of understanding  - Provide teaching via preferred learning methods  Outcome: Progressing     Problem: Prexisting or High Potential for Compromised Skin Integrity  Goal: Skin integrity is maintained or improved  Description: INTERVENTIONS:  - Identify patients at risk for skin breakdown  - Assess and monitor skin integrity  - Assess and monitor nutrition and hydration status  - Monitor labs   - Assess for incontinence   - Turn and reposition patient  - Assist with mobility/ambulation  - Relieve pressure over bony prominences  - Avoid friction and shearing  - Provide appropriate hygiene as needed including keeping skin clean and dry  - Evaluate need for skin moisturizer/barrier cream  - Collaborate with interdisciplinary team   - Patient/family teaching  - Consider wound care consult   Outcome: Progressing     Problem: NEUROSENSORY - ADULT  Goal: Achieves stable or improved neurological status  Description: INTERVENTIONS  - Monitor and report changes in neurological status  - Monitor vital signs such as temperature, blood pressure, glucose, and any other labs ordered   - Initiate measures to prevent increased intracranial pressure  - Monitor for seizure activity and  implement precautions if appropriate      Outcome: Progressing  Goal: Remains free of injury related to seizures activity  Description: INTERVENTIONS  - Maintain airway, patient safety  and administer oxygen as ordered  - Monitor patient for seizure activity, document and report duration and description of seizure to physician/advanced practitioner  - If seizure occurs,  ensure patient safety during seizure  - Reorient patient post seizure  - Seizure pads on all 4 side rails  - Instruct patient/family to notify RN of any seizure activity including if an aura is experienced  - Instruct patient/family to call for assistance with activity based on nursing assessment  - Administer anti-seizure medications if ordered    Outcome: Progressing  Goal: Achieves maximal functionality and self care  Description: INTERVENTIONS  - Monitor swallowing and airway patency with patient fatigue and changes in neurological status  - Encourage and assist patient to increase activity and self care.   - Encourage visually impaired, hearing impaired and aphasic patients to use assistive/communication devices  Outcome: Progressing     Problem: CARDIOVASCULAR - ADULT  Goal: Maintains optimal cardiac output and hemodynamic stability  Description: INTERVENTIONS:  - Monitor I/O, vital signs and rhythm  - Monitor for S/S and trends of decreased cardiac output  - Administer and titrate ordered vasoactive medications to optimize hemodynamic stability  - Assess quality of pulses, skin color and temperature  - Assess for signs of decreased coronary artery perfusion  - Instruct patient to report change in severity of symptoms  Outcome: Progressing  Goal: Absence of cardiac dysrhythmias or at baseline rhythm  Description: INTERVENTIONS:  - Continuous cardiac monitoring, vital signs, obtain 12 lead EKG if ordered  - Administer antiarrhythmic and heart rate control medications as ordered  - Monitor electrolytes and administer replacement therapy as  ordered  Outcome: Progressing     Problem: RESPIRATORY - ADULT  Goal: Achieves optimal ventilation and oxygenation  Description: INTERVENTIONS:  - Assess for changes in respiratory status  - Assess for changes in mentation and behavior  - Position to facilitate oxygenation and minimize respiratory effort  - Oxygen administered by appropriate delivery if ordered  - Initiate smoking cessation education as indicated  - Encourage broncho-pulmonary hygiene including cough, deep breathe, Incentive Spirometry  - Assess the need for suctioning and aspirate as needed  - Assess and instruct to report SOB or any respiratory difficulty  - Respiratory Therapy support as indicated  Outcome: Progressing     Problem: GASTROINTESTINAL - ADULT  Goal: Minimal or absence of nausea and/or vomiting  Description: INTERVENTIONS:  - Administer IV fluids if ordered to ensure adequate hydration  - Maintain NPO status until nausea and vomiting are resolved  - Nasogastric tube if ordered  - Administer ordered antiemetic medications as needed  - Provide nonpharmacologic comfort measures as appropriate  - Advance diet as tolerated, if ordered  - Consider nutrition services referral to assist patient with adequate nutrition and appropriate food choices  Outcome: Progressing  Goal: Maintains or returns to baseline bowel function  Description: INTERVENTIONS:  - Assess bowel function  - Encourage oral fluids to ensure adequate hydration  - Administer IV fluids if ordered to ensure adequate hydration  - Administer ordered medications as needed  - Encourage mobilization and activity  - Consider nutritional services referral to assist patient with adequate nutrition and appropriate food choices  Outcome: Progressing  Goal: Maintains adequate nutritional intake  Description: INTERVENTIONS:  - Monitor percentage of each meal consumed  - Identify factors contributing to decreased intake, treat as appropriate  - Assist with meals as needed  - Monitor I&O,  weight, and lab values if indicated  - Obtain nutrition services referral as needed  Outcome: Progressing  Goal: Establish and maintain optimal ostomy function  Description: INTERVENTIONS:  - Assess bowel function  - Encourage oral fluids to ensure adequate hydration  - Administer IV fluids if ordered to ensure adequate hydration   - Administer ordered medications as needed  - Encourage mobilization and activity  - Nutrition services referral to assist patient with appropriate food choices  - Assess stoma site  - Consider wound care consult   Outcome: Progressing  Goal: Oral mucous membranes remain intact  Description: INTERVENTIONS  - Assess oral mucosa and hygiene practices  - Implement preventative oral hygiene regimen  - Implement oral medicated treatments as ordered  - Initiate Nutrition services referral as needed  Outcome: Progressing     Problem: GENITOURINARY - ADULT  Goal: Maintains or returns to baseline urinary function  Description: INTERVENTIONS:  - Assess urinary function  - Encourage oral fluids to ensure adequate hydration if ordered  - Administer IV fluids as ordered to ensure adequate hydration  - Administer ordered medications as needed  - Offer frequent toileting  - Follow urinary retention protocol if ordered  Outcome: Progressing  Goal: Absence of urinary retention  Description: INTERVENTIONS:  - Assess patient’s ability to void and empty bladder  - Monitor I/O  - Bladder scan as needed  - Discuss with physician/AP medications to alleviate retention as needed  - Discuss catheterization for long term situations as appropriate  Outcome: Progressing  Goal: Urinary catheter remains patent  Description: INTERVENTIONS:  - Assess patency of urinary catheter  - If patient has a chronic campa, consider changing catheter if non-functioning  - Follow guidelines for intermittent irrigation of non-functioning urinary catheter  Outcome: Progressing     Problem: METABOLIC, FLUID AND ELECTROLYTES -  ADULT  Goal: Electrolytes maintained within normal limits  Description: INTERVENTIONS:  - Monitor labs and assess patient for signs and symptoms of electrolyte imbalances  - Administer electrolyte replacement as ordered  - Monitor response to electrolyte replacements, including repeat lab results as appropriate  - Instruct patient on fluid and nutrition as appropriate  Outcome: Progressing  Goal: Fluid balance maintained  Description: INTERVENTIONS:  - Monitor labs   - Monitor I/O and WT  - Instruct patient on fluid and nutrition as appropriate  - Assess for signs & symptoms of volume excess or deficit  Outcome: Progressing  Goal: Glucose maintained within target range  Description: INTERVENTIONS:  - Monitor Blood Glucose as ordered  - Assess for signs and symptoms of hyperglycemia and hypoglycemia  - Administer ordered medications to maintain glucose within target range  - Assess nutritional intake and initiate nutrition service referral as needed  Outcome: Progressing     Problem: SKIN/TISSUE INTEGRITY - ADULT  Goal: Skin Integrity remains intact(Skin Breakdown Prevention)  Description: Assess:  -Perform Girma assessment every shift  -Clean and moisturize skin every shift  -Inspect skin when repositioning, toileting, and assisting with ADLS  -Assess extremities for adequate circulation and sensation     Bed Management:  -Have minimal linens on bed & keep smooth, unwrinkled  -Change linens as needed when moist or perspiring  -Avoid sitting or lying in one position for more than 2 hours while in bed  -Keep HOB at 30 degrees     Toileting:  -Offer bedside commode  -Assess for incontinence every 2 hours  -Use incontinent care products after each incontinent episode such as calazime cream    Activity:  -Mobilize patient 3 times a day  -Encourage activity and walks on unit  -Encourage or provide ROM exercises   -Turn and reposition patient every 2 Hours  -Use appropriate equipment to lift or move patient in  bed  -Instruct/ Assist with weight shifting every 15 min when out of bed in chair  -Consider limitation of chair time 2 hour intervals    Skin Care:  -Avoid use of baby powder, tape, friction and shearing, hot water or constrictive clothing  -Relieve pressure over bony prominences using pillows, wedges, and EHOB cushion  -Do not massage red bony areas    Next Steps:  -Teach patient strategies to minimize risks such as frequent weight shifting   -Consider consults to  interdisciplinary teams such as   Outcome: Progressing  Goal: Incision(s), wounds(s) or drain site(s) healing without S/S of infection  Description: INTERVENTIONS  - Assess and document dressing, incision, wound bed, drain sites and surrounding tissue  - Provide patient and family education  - Perform skin care/dressing changes as ordered  Outcome: Progressing  Goal: Pressure injury heals and does not worsen  Description: Interventions:  - Implement low air loss mattress or specialty surface (Criteria met)  - Apply silicone foam dressing  - Instruct/assist with weight shifting every 15 minutes when in chair   - Limit chair time to 2 hour intervals  - Use special pressure reducing interventions such as EHOB cushion when in chair   - Apply fecal or urinary incontinence containment device   - Perform passive or active ROM every 2 hours  - Turn and reposition patient & offload bony prominences every 2 hours hours   - Utilize friction reducing device or surface for transfers   - Consider consults to  interdisciplinary teams such as PT/OT and   - Consider nutrition services referral as needed  Outcome: Progressing     Problem: MUSCULOSKELETAL - ADULT  Goal: Maintain or return mobility to safest level of function  Description: INTERVENTIONS:  - Assess patient's ability to carry out ADLs; assess patient's baseline for ADL function and identify physical deficits which impact ability to perform ADLs (bathing, care of mouth/teeth, toileting, grooming,  dressing, etc.)  - Assess/evaluate cause of self-care deficits   - Assess range of motion  - Assess patient's mobility  - Assess patient's need for assistive devices and provide as appropriate  - Encourage maximum independence but intervene and supervise when necessary  - Involve family in performance of ADLs  - Assess for home care needs following discharge   - Consider OT consult to assist with ADL evaluation and planning for discharge  - Provide patient education as appropriate  Outcome: Progressing  Goal: Maintain proper alignment of affected body part  Description: INTERVENTIONS:  - Support, maintain and protect limb and body alignment  - Provide patient/ family with appropriate education  Outcome: Progressing     Problem: Nutrition/Hydration-ADULT  Goal: Nutrient/Hydration intake appropriate for improving, restoring or maintaining nutritional needs  Description: Monitor and assess patient's nutrition/hydration status for malnutrition. Collaborate with interdisciplinary team and initiate plan and interventions as ordered.  Monitor patient's weight and dietary intake as ordered or per policy. Utilize nutrition screening tool and intervene as necessary. Determine patient's food preferences and provide high-protein, high-caloric foods as appropriate.     INTERVENTIONS:  - Monitor oral intake, urinary output, labs, and treatment plans  - Assess nutrition and hydration status and recommend course of action  - Evaluate amount of meals eaten  - Assist patient with eating if necessary   - Allow adequate time for meals  - Recommend/ encourage appropriate diets, oral nutritional supplements, and vitamin/mineral supplements  - Order, calculate, and assess calorie counts as needed  - Recommend, monitor, and adjust tube feedings and TPN/PPN based on assessed needs  - Assess need for intravenous fluids  - Provide specific nutrition/hydration education as appropriate  - Include patient/family/caregiver in decisions related to  nutrition  Outcome: Progressing

## 2024-03-29 NOTE — ASSESSMENT & PLAN NOTE
+ MSSA on BC x2, R knee aspirate as above  3/24 R knee I&D to bone, total knee arthroplasty revision, insertion of stimulan with Dr. Washington  Plan:  Continue Ancef as above   IR consult for PICC placement as repeat blood cultures negative at 48 hours - discussed with ID   Appreciate ID input

## 2024-03-29 NOTE — ASSESSMENT & PLAN NOTE
3/25 Echo -- EF 45%, mild global hypokinesis   Likely 2/2 septic shock, AF RVR   Appreciate Cardiology recc's   Cont on PO Amio   Restarted on eliquis   Continue metoprolol

## 2024-03-29 NOTE — ASSESSMENT & PLAN NOTE
History of R. Knee arthoplasty with replacement at outside facility   Acute pain present 2-3 days prior to admission with erythema and tenderness, warmth at the site   3/22 R knee XR -- Total arthroplasty stable hardware and bony alignment without hardware complication.No acute fracture or dislocation. No joint effusion. No lytic or blastic osseous lesion. Unremarkable soft tissues.  S/p OR 3/24 as above   Home regimen: Fentanyl patch, Motrin, Morphine  Plan:  Ortho following, appreciate recs - see below   WBAT RLE

## 2024-03-30 PROBLEM — T84.53XA INFECTION OF PROSTHETIC RIGHT KNEE JOINT (HCC): Status: ACTIVE | Noted: 2022-11-15

## 2024-03-30 LAB
ANION GAP SERPL CALCULATED.3IONS-SCNC: 8 MMOL/L (ref 4–13)
ANISOCYTOSIS BLD QL SMEAR: PRESENT
BASOPHILS # BLD MANUAL: 0.1 THOUSAND/UL (ref 0–0.1)
BASOPHILS NFR MAR MANUAL: 1 % (ref 0–1)
BUN SERPL-MCNC: 10 MG/DL (ref 5–25)
CALCIUM SERPL-MCNC: 8.7 MG/DL (ref 8.4–10.2)
CHLORIDE SERPL-SCNC: 101 MMOL/L (ref 96–108)
CO2 SERPL-SCNC: 25 MMOL/L (ref 21–32)
CREAT SERPL-MCNC: 0.9 MG/DL (ref 0.6–1.3)
EOSINOPHIL # BLD MANUAL: 0.31 THOUSAND/UL (ref 0–0.4)
EOSINOPHIL NFR BLD MANUAL: 3 % (ref 0–6)
ERYTHROCYTE [DISTWIDTH] IN BLOOD BY AUTOMATED COUNT: 13.2 % (ref 11.6–15.1)
GFR SERPL CREATININE-BSD FRML MDRD: 99 ML/MIN/1.73SQ M
GLUCOSE SERPL-MCNC: 196 MG/DL (ref 65–140)
GLUCOSE SERPL-MCNC: 221 MG/DL (ref 65–140)
GLUCOSE SERPL-MCNC: 241 MG/DL (ref 65–140)
GLUCOSE SERPL-MCNC: 267 MG/DL (ref 65–140)
GLUCOSE SERPL-MCNC: 315 MG/DL (ref 65–140)
HCT VFR BLD AUTO: 34.2 % (ref 36.5–49.3)
HGB BLD-MCNC: 11.1 G/DL (ref 12–17)
LYMPHOCYTES # BLD AUTO: 1.95 THOUSAND/UL (ref 0.6–4.47)
LYMPHOCYTES # BLD AUTO: 19 % (ref 14–44)
MCH RBC QN AUTO: 29.3 PG (ref 26.8–34.3)
MCHC RBC AUTO-ENTMCNC: 32.5 G/DL (ref 31.4–37.4)
MCV RBC AUTO: 90 FL (ref 82–98)
METAMYELOCYTES NFR BLD MANUAL: 1 % (ref 0–1)
MONOCYTES # BLD AUTO: 1.03 THOUSAND/UL (ref 0–1.22)
MONOCYTES NFR BLD: 10 % (ref 4–12)
MYELOCYTES NFR BLD MANUAL: 1 % (ref 0–1)
NEUTROPHILS # BLD MANUAL: 6.66 THOUSAND/UL (ref 1.85–7.62)
NEUTS SEG NFR BLD AUTO: 65 % (ref 43–75)
PLATELET # BLD AUTO: 408 THOUSANDS/UL (ref 149–390)
PLATELET BLD QL SMEAR: ABNORMAL
PMV BLD AUTO: 9.9 FL (ref 8.9–12.7)
POLYCHROMASIA BLD QL SMEAR: PRESENT
POTASSIUM SERPL-SCNC: 3.8 MMOL/L (ref 3.5–5.3)
RBC # BLD AUTO: 3.79 MILLION/UL (ref 3.88–5.62)
RBC MORPH BLD: PRESENT
SODIUM SERPL-SCNC: 134 MMOL/L (ref 135–147)
WBC # BLD AUTO: 10.25 THOUSAND/UL (ref 4.31–10.16)

## 2024-03-30 PROCEDURE — 85007 BL SMEAR W/DIFF WBC COUNT: CPT | Performed by: INTERNAL MEDICINE

## 2024-03-30 PROCEDURE — 80048 BASIC METABOLIC PNL TOTAL CA: CPT | Performed by: INTERNAL MEDICINE

## 2024-03-30 PROCEDURE — 99231 SBSQ HOSP IP/OBS SF/LOW 25: CPT | Performed by: PODIATRIST

## 2024-03-30 PROCEDURE — 99232 SBSQ HOSP IP/OBS MODERATE 35: CPT | Performed by: PHYSICIAN ASSISTANT

## 2024-03-30 PROCEDURE — 85027 COMPLETE CBC AUTOMATED: CPT | Performed by: INTERNAL MEDICINE

## 2024-03-30 PROCEDURE — 82948 REAGENT STRIP/BLOOD GLUCOSE: CPT

## 2024-03-30 RX ORDER — INSULIN GLARGINE 100 [IU]/ML
38 INJECTION, SOLUTION SUBCUTANEOUS EVERY MORNING
Status: DISCONTINUED | OUTPATIENT
Start: 2024-03-31 | End: 2024-03-31

## 2024-03-30 RX ORDER — HYDROMORPHONE HCL IN WATER/PF 6 MG/30 ML
0.2 PATIENT CONTROLLED ANALGESIA SYRINGE INTRAVENOUS EVERY 6 HOURS PRN
Status: DISCONTINUED | OUTPATIENT
Start: 2024-03-30 | End: 2024-04-03 | Stop reason: HOSPADM

## 2024-03-30 RX ADMIN — CEFAZOLIN SODIUM 2000 MG: 2 SOLUTION INTRAVENOUS at 03:33

## 2024-03-30 RX ADMIN — NAPROXEN 500 MG: 500 TABLET ORAL at 08:48

## 2024-03-30 RX ADMIN — AMIODARONE HYDROCHLORIDE 400 MG: 200 TABLET ORAL at 08:48

## 2024-03-30 RX ADMIN — INSULIN GLARGINE 35 UNITS: 100 INJECTION, SOLUTION SUBCUTANEOUS at 08:47

## 2024-03-30 RX ADMIN — CEFAZOLIN SODIUM 2000 MG: 2 SOLUTION INTRAVENOUS at 12:27

## 2024-03-30 RX ADMIN — INSULIN LISPRO 2 UNITS: 100 INJECTION, SOLUTION INTRAVENOUS; SUBCUTANEOUS at 08:50

## 2024-03-30 RX ADMIN — HYDROMORPHONE HYDROCHLORIDE 0.5 MG: 1 INJECTION, SOLUTION INTRAMUSCULAR; INTRAVENOUS; SUBCUTANEOUS at 01:10

## 2024-03-30 RX ADMIN — DOCUSATE SODIUM 200 MG: 100 CAPSULE, LIQUID FILLED ORAL at 22:12

## 2024-03-30 RX ADMIN — INSULIN LISPRO 10 UNITS: 100 INJECTION, SOLUTION INTRAVENOUS; SUBCUTANEOUS at 17:36

## 2024-03-30 RX ADMIN — CLONAZEPAM 1 MG: 1 TABLET ORAL at 08:48

## 2024-03-30 RX ADMIN — APIXABAN 5 MG: 5 TABLET, FILM COATED ORAL at 17:35

## 2024-03-30 RX ADMIN — METHOCARBAMOL 500 MG: 500 TABLET ORAL at 20:30

## 2024-03-30 RX ADMIN — INSULIN LISPRO 5 UNITS: 100 INJECTION, SOLUTION INTRAVENOUS; SUBCUTANEOUS at 12:11

## 2024-03-30 RX ADMIN — DULOXETINE HYDROCHLORIDE 20 MG: 20 CAPSULE, DELAYED RELEASE ORAL at 08:48

## 2024-03-30 RX ADMIN — PRAVASTATIN SODIUM 80 MG: 80 TABLET ORAL at 17:35

## 2024-03-30 RX ADMIN — Medication 3 MG: at 00:15

## 2024-03-30 RX ADMIN — INSULIN LISPRO 10 UNITS: 100 INJECTION, SOLUTION INTRAVENOUS; SUBCUTANEOUS at 08:50

## 2024-03-30 RX ADMIN — MORPHINE SULFATE 60 MG: 30 TABLET, EXTENDED RELEASE ORAL at 22:13

## 2024-03-30 RX ADMIN — HYDROMORPHONE HYDROCHLORIDE 0.2 MG: 0.2 INJECTION, SOLUTION INTRAMUSCULAR; INTRAVENOUS; SUBCUTANEOUS at 15:02

## 2024-03-30 RX ADMIN — METOPROLOL SUCCINATE 25 MG: 25 TABLET, EXTENDED RELEASE ORAL at 08:48

## 2024-03-30 RX ADMIN — RISPERIDONE 3 MG: 1 TABLET, FILM COATED ORAL at 08:48

## 2024-03-30 RX ADMIN — MORPHINE SULFATE 60 MG: 30 TABLET, EXTENDED RELEASE ORAL at 08:48

## 2024-03-30 RX ADMIN — PANTOPRAZOLE SODIUM 20 MG: 20 TABLET, DELAYED RELEASE ORAL at 15:02

## 2024-03-30 RX ADMIN — NAPROXEN 500 MG: 500 TABLET ORAL at 17:35

## 2024-03-30 RX ADMIN — INSULIN LISPRO 2 UNITS: 100 INJECTION, SOLUTION INTRAVENOUS; SUBCUTANEOUS at 17:36

## 2024-03-30 RX ADMIN — APIXABAN 5 MG: 5 TABLET, FILM COATED ORAL at 08:48

## 2024-03-30 RX ADMIN — POLYETHYLENE GLYCOL 3350 17 G: 17 POWDER, FOR SOLUTION ORAL at 08:48

## 2024-03-30 RX ADMIN — PANTOPRAZOLE SODIUM 20 MG: 20 TABLET, DELAYED RELEASE ORAL at 08:48

## 2024-03-30 RX ADMIN — INSULIN LISPRO 10 UNITS: 100 INJECTION, SOLUTION INTRAVENOUS; SUBCUTANEOUS at 12:11

## 2024-03-30 RX ADMIN — HYDROMORPHONE HYDROCHLORIDE 0.5 MG: 1 INJECTION, SOLUTION INTRAMUSCULAR; INTRAVENOUS; SUBCUTANEOUS at 08:48

## 2024-03-30 RX ADMIN — AMLODIPINE BESYLATE 5 MG: 5 TABLET ORAL at 08:48

## 2024-03-30 RX ADMIN — NICOTINE 21 MG: 21 PATCH, EXTENDED RELEASE TRANSDERMAL at 22:15

## 2024-03-30 RX ADMIN — AMIODARONE HYDROCHLORIDE 400 MG: 200 TABLET ORAL at 17:35

## 2024-03-30 RX ADMIN — ZOLPIDEM TARTRATE 5 MG: 5 TABLET ORAL at 00:15

## 2024-03-30 RX ADMIN — CEFAZOLIN SODIUM 2000 MG: 2 SOLUTION INTRAVENOUS at 18:23

## 2024-03-30 NOTE — PLAN OF CARE
Problem: INFECTION - ADULT  Goal: Absence or prevention of progression during hospitalization  Description: INTERVENTIONS:  - Assess and monitor for signs and symptoms of infection  - Monitor lab/diagnostic results  - Monitor all insertion sites, i.e. indwelling lines, tubes, and drains  - Monitor endotracheal if appropriate and nasal secretions for changes in amount and color  - [place] appropriate cooling/warming therapies per order  - Administer medications as ordered  - Instruct and encourage patient and family to use good hand hygiene technique  - Identify and instruct in appropriate isolation precautions for identified infection/condition  3/30/2024 0232 by Aniyah Ascencio  Outcome: Progressing  3/30/2024 0232 by Aniyah Ascencio  Outcome: Not Progressing     Problem: INFECTION - ADULT  Goal: Absence of fever/infection during neutropenic period  Description: INTERVENTIONS:  - Monitor WBC    3/30/2024 0232 by Aniyah Ascencio  Outcome: Progressing  3/30/2024 0232 by Aniyah Ascencio  Outcome: Not Progressing     Problem: SAFETY ADULT  Goal: Patient will remain free of falls  Description: INTERVENTIONS:  - Educate patient/family on patient safety including physical limitations  - Instruct patient to call for assistance with activity   - Consult OT/PT to assist with strengthening/mobility   - Keep Call bell within reach  - Keep bed low and locked with side rails adjusted as appropriate  - Keep care items and personal belongings within reach  - Initiate and maintain comfort rounds  - Make Fall Risk Sign visible to staff  - Offer Toileting every 2 Hours, in advance of need  - Initiate/Maintain 2alarm  - Obtain necessary fall risk management equipment: 2  - Apply yellow socks and bracelet for high fall risk patients  - Consider moving patient to room near nurses station  3/30/2024 0232 by Aniyah Ascencio  Outcome: Progressing  3/30/2024 0232 by Aniyah Ascencio  Outcome: Not Progressing     Problem: SAFETY  ADULT  Goal: Maintain or return to baseline ADL function  Description: INTERVENTIONS:  -  Assess patient's ability to carry out ADLs; assess patient's baseline for ADL function and identify physical deficits which impact ability to perform ADLs (bathing, care of mouth/teeth, toileting, grooming, dressing, etc.)  - Assess/evaluate cause of self-care deficits   - Assess range of motion  - Assess patient's mobility; develop plan if impaired  - Assess patient's need for assistive devices and provide as appropriate  - Encourage maximum independence but intervene and supervise when necessary  - Involve family in performance of ADLs  - Assess for home care needs following discharge   - Consider OT consult to assist with ADL evaluation and planning for discharge  - Provide patient education as appropriate  3/30/2024 0232 by Aniyah Ascencio  Outcome: Progressing  3/30/2024 0232 by Aniyah Ascencio  Outcome: Not Progressing     Problem: SAFETY ADULT  Goal: Maintains/Returns to pre admission functional level  Description: INTERVENTIONS:  - Perform AM-PAC 6 Click Basic Mobility/ Daily Activity assessment daily.  - Set and communicate daily mobility goal to care team and patient/family/caregiver.   - Collaborate with rehabilitation services on mobility goals if consulted  - Perform Range of Motion 2 times a day.  - Reposition patient every 1 hours.  - Dangle patient 2 times a day  - Stand patient 2 times a day  - Ambulate patient 2 times a day  - Out of bed to chair 2 times a day   - Out of bed for meals 2 times a day  - Out of bed for toileting  - Record patient progress and toleration of activity level   3/30/2024 0232 by Aniyah Ascencio  Outcome: Progressing  3/30/2024 0232 by nAiyah Ascencio  Outcome: Not Progressing     Problem: Prexisting or High Potential for Compromised Skin Integrity  Goal: Skin integrity is maintained or improved  Description: INTERVENTIONS:  - Identify patients at risk for skin breakdown  - Assess  and monitor skin integrity  - Assess and monitor nutrition and hydration status  - Monitor labs   - Assess for incontinence   - Turn and reposition patient  - Assist with mobility/ambulation  - Relieve pressure over bony prominences  - Avoid friction and shearing  - Provide appropriate hygiene as needed including keeping skin clean and dry  - Evaluate need for skin moisturizer/barrier cream  - Collaborate with interdisciplinary team   - Patient/family teaching  - Consider wound care consult   3/30/2024 0232 by Aniyah Ascencio  Outcome: Progressing  3/30/2024 0232 by Aniyah Ascencio  Outcome: Not Progressing     Problem: NEUROSENSORY - ADULT  Goal: Achieves stable or improved neurological status  Description: INTERVENTIONS  - Monitor and report changes in neurological status  - Monitor vital signs such as temperature, blood pressure, glucose, and any other labs ordered   - Initiate measures to prevent increased intracranial pressure  - Monitor for seizure activity and implement precautions if appropriate      3/30/2024 0232 by Aniyah Ascencio  Outcome: Progressing  3/30/2024 0232 by Aniyah Ascencio  Outcome: Not Progressing     Problem: NEUROSENSORY - ADULT  Goal: Remains free of injury related to seizures activity  Description: INTERVENTIONS  - Maintain airway, patient safety  and administer oxygen as ordered  - Monitor patient for seizure activity, document and report duration and description of seizure to physician/advanced practitioner  - If seizure occurs,  ensure patient safety during seizure  - Reorient patient post seizure  - Seizure pads on all 4 side rails  - Instruct patient/family to notify RN of any seizure activity including if an aura is experienced  - Instruct patient/family to call for assistance with activity based on nursing assessment  - Administer anti-seizure medications if ordered    3/30/2024 0232 by Aniyah Ascencio  Outcome: Progressing  3/30/2024 0232 by Aniyah Ascencio  Outcome: Not  Progressing     Problem: NEUROSENSORY - ADULT  Goal: Achieves maximal functionality and self care  Description: INTERVENTIONS  - Monitor swallowing and airway patency with patient fatigue and changes in neurological status  - Encourage and assist patient to increase activity and self care.   - Encourage visually impaired, hearing impaired and aphasic patients to use assistive/communication devices  3/30/2024 0232 by Aniyah Ascencio  Outcome: Progressing  3/30/2024 0232 by Aniyah Ascencio  Outcome: Not Progressing     Problem: CARDIOVASCULAR - ADULT  Goal: Maintains optimal cardiac output and hemodynamic stability  Description: INTERVENTIONS:  - Monitor I/O, vital signs and rhythm  - Monitor for S/S and trends of decreased cardiac output  - Administer and titrate ordered vasoactive medications to optimize hemodynamic stability  - Assess quality of pulses, skin color and temperature  - Assess for signs of decreased coronary artery perfusion  - Instruct patient to report change in severity of symptoms  3/30/2024 0232 by Aniyah Ascencio  Outcome: Progressing  3/30/2024 0232 by Aniyah Ascencio  Outcome: Not Progressing     Problem: CARDIOVASCULAR - ADULT  Goal: Absence of cardiac dysrhythmias or at baseline rhythm  Description: INTERVENTIONS:  - Continuous cardiac monitoring, vital signs, obtain 12 lead EKG if ordered  - Administer antiarrhythmic and heart rate control medications as ordered  - Monitor electrolytes and administer replacement therapy as ordered  3/30/2024 0232 by Aniyah Ascencio  Outcome: Progressing  3/30/2024 0232 by Aniyah Ascencio  Outcome: Not Progressing

## 2024-03-30 NOTE — PROGRESS NOTES
Armen Llanos  48 y.o.  male  1975  mrn [id number]    Assessment/Plan:  Sepsis/MSSA bacteremia/MSSA right TKR infxn/Left 2nd toe osteo/Fever/Leukocytosis/  Hypotension/JH: Pt presented with c/o right knee pain and swelling x 1 day. Pt does not remember if he fell one day PTA.     On admission, he did not have fever but WBC count was elevated at 17.8K. Creatinine was elevated at 1.59 c/w JH. Ortho aspirated the Pt's right knee on 3/23. Cell count showed 221,280 WBC's with poly predominance c/w septic arthritis in setting of TKR. No crystal were seen. He was placed on Vanco.      He went to the OR on 3/24 for washout of right knee with polyexchange. He developed hypotension post-op and fever to 103.5 c/w sepsis. He was transferred to ICU and required pressor support. Steroids were started.     Admission bld cx's grew MSSA. Initial right knee fluid cx also grew MSSA. Repeat bld cx's drawn on 3/23 grew MSSA. Right knee OR cx's grew MSSA. Repeat bld cx's were drawn on 3/25. ECHO is neg for valvular vegetation. He was extubated today. He is off pressor support. WBC count has decreased to 10.5K. Creatinine is down to 0.96 c/w resolution of JH.     3/26: No further fever and WBC has decreased to 10.5K. Admission bld cx's grew MSSA. Ortho aspirated the Pt's right knee on 3/23. Cell count showed 221,280 WBC's with poly predominance c/w septic arthritis in setting of TKR. Initial right knee fluid cx grew MSSA. Repeat bld cx's drawn on 3/23 and 3/25 also grew MSSA. ECHO is neg for valvular vegetation.      He went to the OR on 3/24 for washout of right knee with polyexchange. He developed hypotension post-op and fever to 103.5 c/w sepsis. Right knee OR cx's grew MSSA. Abx regimen was narrowed to Ancef on 3/25     3/27: Fevers have resolved and WBC count is down to 9.4K. Admission bld cx's grew MSSA. Ortho aspirated the Pt's right knee on 3/23. Cell count showed 221,280 WBC's with poly predominance c/w septic  arthritis in setting of TKR. Initial right knee fluid cx grew MSSA. Repeat bld cx's drawn on 3/23 and 3/25 also grew MSSA. Bld cx's drawn on 3/26 are neg so far. ECHO is neg for valvular vegetation.      He went to the OR on 3/24 for washout of right knee with polyexchange. He developed hypotension post-op and fever to 103.5 c/w sepsis. Right knee OR cx's   grew MSSA. Abx regimen was narrowed to Ancef on 3/25. Pt noted to have left 2nd toe wound. Podiatry ordered MRI of left foot which showed osteo of 2nd toe which could be the source of his MSSA bacteremia leading to right TKR Infxn. Podiatry is planning to resect left 2nd toe tomorrow    3/29: No further fevers and WBC count has decreased to 9.1K. Pt underwent resection of left 2nd toe for tx of osteo after MRI of left foot showed presence of osteo - OR cx was not sent. His chronic left toe wound with underlying osteo could be the source of his MSSA bacteremia leading to right TKR infxn    He is on Ancef for tx of prolonged MSSA bacteremia and right TKR infxn - s/p washout with poly-exchange. Bld and right knee fluid cx's grew MSSA. ECHO was neg for vegetation. Repeat bld cx's drawn on 3/26 are neg     - Cont Ancef   - Awaiting results of repeat bld cx's   - Cont local care of left foot wound as per Podiatry  - OK to place PICC line so he can complete 6 week course of IV abx since repeat bld cx's are neg  - Cont local care of right knee as per Ortho  - Pt will need chronic suppressive abx tx after he finishes 6 week course of IV abx because right TKR was not removed.  - Will cont to monitor for the development of toxicity to current abx tx  - Will review Pt's EMR daily and re-eval the Pt on Monday but please contact the ID physician OC if any new probs arise over the weekend    Discussed case with Flaquita Murphy PA-C       Subjective: Pt is feeling better. He is having less right knee pain. He denies significant left foot pain. No fever and WBC count is nml. No  "probs with current abx tx    Objective:  VSS, Tmax: 99.3  HEENT:  No scleral icterus  NECK: Supple  CARDIAC:  RRR, nml S1, S2  LUNGS:  Clear  ABDOMEN: +BS, soft, nontender  MUSCULOSKELETAL:  No calf tenderness  EXTREMITIES:  +Intact dressing on right knee and left foot  SKIN:  No rash      Labs:  CBC w/diff  Recent Labs     03/27/24  0209 03/28/24  1000 03/29/24  0305   WBC 9.41 8.22 9.19   HGB 11.5* 11.6* 11.1*   HCT 34.1* 33.7* 32.7*    332 367   NEUTOPHILPCT 84*  --   --    LYMPHOPCT 7* 38  --    MONOPCT 7 3*  --    EOSPCT 0 4  --      BMP  Recent Labs     03/29/24  0305   K 2.8*      CO2 25   BUN 10   CREATININE 0.95   CALCIUM 8.0*     CMP  Recent Labs     03/29/24  0305   K 2.8*      CO2 25   BUN 10   CREATININE 0.95   CALCIUM 8.0*       .labrc    Cultures:  Lab Results   Component Value Date    BLOODCX No Growth at 72 hrs. 03/26/2024    BLOODCX No Growth at 72 hrs. 03/26/2024    BLOODCX Staphylococcus aureus (A) 03/25/2024    BLOODCX Staphylococcus aureus (A) 03/25/2024    BLOODCX Staphylococcus aureus (A) 03/23/2024    BLOODCX Staphylococcus aureus (A) 03/23/2024    BLOODCX Staphylococcus aureus (A) 03/22/2024    BLOODCX Staphylococcus aureus (A) 03/22/2024     No results found for: \"WOUNDCULT\"  No results found for: \"URINECX\"  No results found for: \"SPUTUMCULTUR\"    MED:  Ancef: #5  Abx: #7     Completed:  Vanco x 3 days on 3/25         Current Facility-Administered Medications:     acetaminophen (TYLENOL) tablet 650 mg, 650 mg, Oral, Q6H PRN, Bambi Marcelo PA-C, 650 mg at 03/26/24 1453    amiodarone tablet 400 mg, 400 mg, Oral, BID With Meals, 400 mg at 03/29/24 1804 **FOLLOWED BY** [START ON 4/5/2024] amiodarone tablet 200 mg, 200 mg, Oral, Daily With Breakfast, Bambi Marcelo PA-C    [START ON 3/30/2024] amLODIPine (NORVASC) tablet 5 mg, 5 mg, Oral, Daily, Flaquita Murphy PA-C    apixaban (ELIQUIS) tablet 5 mg, 5 mg, Oral, BID, Flaquita Murphy PA-C, 5 mg at 03/29/24 " 1808    bupivacaine liposomal (EXPAREL) 1.3 % injection 20 mL, 20 mL, Infiltration, Once, Bambi Marcelo PA-C    butalbital-acetaminophen-caffeine (FIORICET,ESGIC) -40 mg per tablet 1 tablet, 1 tablet, Oral, Q4H PRN, Bambi Marcelo PA-C    ceFAZolin (ANCEF) IVPB (premix in dextrose) 2,000 mg 50 mL, 2,000 mg, Intravenous, Q8H, Bambi Marcelo PA-C, Last Rate: 100 mL/hr at 03/29/24 1810, 2,000 mg at 03/29/24 1810    chlorhexidine (PERIDEX) 0.12 % oral rinse 15 mL, 15 mL, Mouth/Throat, Q12H DARIN, Bambi Marcelo PA-C, 15 mL at 03/29/24 0908    clonazePAM (KlonoPIN) tablet 1 mg, 1 mg, Oral, Daily, Bambi Marcelo PA-C, 1 mg at 03/28/24 0746    docusate sodium (COLACE) capsule 200 mg, 200 mg, Oral, HS, Bambi Marcelo PA-C, 200 mg at 03/28/24 2142    DULoxetine (CYMBALTA) delayed release capsule 20 mg, 20 mg, Oral, Daily, Bambi Marcelo PA-C, 20 mg at 03/29/24 0902    fentaNYL (DURAGESIC) 75 mcg/hr TD 72 hr patch 1 patch, 1 patch, Transdermal, Q72H, Bambi Marcelo PA-C, 1 patch at 03/29/24 0003    HYDROmorphone (DILAUDID) injection 0.5 mg, 0.5 mg, Intravenous, Q6H PRN, Flaquita Murphy PA-C, 0.5 mg at 03/29/24 1852    [START ON 3/30/2024] insulin glargine (LANTUS) subcutaneous injection 35 Units 0.35 mL, 35 Units, Subcutaneous, QAM, Esha العراقي MD    insulin lispro (HumALOG/ADMELOG) 100 units/mL subcutaneous injection 1-6 Units, 1-6 Units, Subcutaneous, TID AC, 2 Units at 03/29/24 1403 **AND** Fingerstick Glucose (POCT), , , TID AC, Esha العراقي MD    insulin lispro (HumALOG/ADMELOG) 100 units/mL subcutaneous injection 10 Units, 10 Units, Subcutaneous, TID With Meals, sEha العراقي MD, 10 Units at 03/29/24 1808    melatonin tablet 3 mg, 3 mg, Oral, HS, Flaquita Murphy PA-C    methocarbamol (ROBAXIN) tablet 500 mg, 500 mg, Oral, Q6H PRN, Bambi Marcelo PA-C, 500 mg at 03/29/24 0012    metoprolol (LOPRESSOR) injection 5 mg, 5 mg, Intravenous, Q6H PRN, Bambi Marcelo PA-C     metoprolol succinate (TOPROL-XL) 24 hr tablet 25 mg, 25 mg, Oral, Daily, Bambi Marcelo PA-C, 25 mg at 03/29/24 0900    [START ON 3/30/2024] morphine (MS CONTIN) ER tablet 60 mg, 60 mg, Oral, Q12H DARIN, Flaquita Murphy PA-C    naproxen (NAPROSYN) tablet 500 mg, 500 mg, Oral, BID With Meals, Bambi Marcelo PA-C, 500 mg at 03/29/24 1804    nicotine (NICODERM CQ) 21 mg/24 hr TD 24 hr patch 21 mg, 21 mg, Transdermal, HS, Bambi Marcelo PA-C, 21 mg at 03/28/24 2142    ondansetron (ZOFRAN) injection 4 mg, 4 mg, Intravenous, Q4H PRN, Bambi Marcelo PA-C, 4 mg at 03/28/24 1944    oxyCODONE (ROXICODONE) IR tablet 5 mg, 5 mg, Oral, Q4H PRN, 5 mg at 03/29/24 1808 **OR** oxyCODONE (ROXICODONE) split tablet 7.5 mg, 7.5 mg, Oral, Q4H PRN, Flaquita Murphy PA-C    pantoprazole (PROTONIX) EC tablet 20 mg, 20 mg, Oral, BID AC, Bambi Marcelo PA-C, 20 mg at 03/29/24 1805    polyethylene glycol (MIRALAX) packet 17 g, 17 g, Per NG Tube, Daily, Bambi Marcelo PA-C, 17 g at 03/29/24 0908    pravastatin (PRAVACHOL) tablet 80 mg, 80 mg, Oral, Daily With Dinner, Bambi Marcelo PA-C, 80 mg at 03/29/24 1804    risperiDONE (RisperDAL) tablet 3 mg, 3 mg, Oral, Daily, Bambi Marcelo PA-C, 3 mg at 03/29/24 0901    zolpidem (AMBIEN) tablet 5 mg, 5 mg, Oral, HS PRN, Bambi Marcelo PA-C, 5 mg at 03/29/24 0241    Principal Problem:    Type 2 diabetes mellitus with ketoacidosis without coma, without long-term current use of insulin ([facility])  Active Problems:    Acute on Chronic Right knee pain    Chronic, continuous use of opioids    Essential hypertension    Septic shock ([facility])    TBI (traumatic brain injury) ([facility])    Suicidal ideation    Hyponatremia    MSSA bacteremia    A-fib with RVR ([facility])    Cardiomyopathy ([facility])    Anxiety    Acute osteomyelitis of left foot ([facility])      Vee Walker MD

## 2024-03-30 NOTE — ASSESSMENT & PLAN NOTE
ECHO (03/25): EF 45% w/ mild global hypokinesis   Cardiology following  Continue Eliquis, Toprol-XL & amlodipine  Per cardiology note, pt recommended to start Jardiance & Entresto which was price checked but then d/c'd; clarification needed from cardiology if these are to be resumed at d/c

## 2024-03-30 NOTE — PLAN OF CARE
Problem: PAIN - ADULT  Goal: Verbalizes/displays adequate comfort level or baseline comfort level  Description: Interventions:  - Encourage patient to monitor pain and request assistance  - Assess pain using appropriate pain scale  - Administer analgesics based on type and severity of pain and evaluate response  - Implement non-pharmacological measures as appropriate and evaluate response  - Consider cultural and social influences on pain and pain management  - Notify physician/advanced practitioner if interventions unsuccessful or patient reports new pain  Outcome: Progressing     Problem: INFECTION - ADULT  Goal: Absence or prevention of progression during hospitalization  Description: INTERVENTIONS:  - Assess and monitor for signs and symptoms of infection  - Monitor lab/diagnostic results  - Monitor all insertion sites, i.e. indwelling lines, tubes, and drains  - Monitor endotracheal if appropriate and nasal secretions for changes in amount and color  - [place] appropriate cooling/warming therapies per order  - Administer medications as ordered  - Instruct and encourage patient and family to use good hand hygiene technique  - Identify and instruct in appropriate isolation precautions for identified infection/condition  Outcome: Progressing  Goal: Absence of fever/infection during neutropenic period  Description: INTERVENTIONS:  - Monitor WBC    Outcome: Progressing     Problem: SAFETY ADULT  Goal: Patient will remain free of falls  Description: INTERVENTIONS:  - Educate patient/family on patient safety including physical limitations  - Instruct patient to call for assistance with activity   - Consult OT/PT to assist with strengthening/mobility   - Keep Call bell within reach  - Keep bed low and locked with side rails adjusted as appropriate  - Keep care items and personal belongings within reach  - Initiate and maintain comfort rounds  - Make Fall Risk Sign visible to staff  - Offer Toileting every 2 Hours,  in advance of need  - Initiate/Maintain 2alarm  - Obtain necessary fall risk management equipment: 2  - Apply yellow socks and bracelet for high fall risk patients  - Consider moving patient to room near nurses station  Outcome: Progressing  Goal: Maintain or return to baseline ADL function  Description: INTERVENTIONS:  -  Assess patient's ability to carry out ADLs; assess patient's baseline for ADL function and identify physical deficits which impact ability to perform ADLs (bathing, care of mouth/teeth, toileting, grooming, dressing, etc.)  - Assess/evaluate cause of self-care deficits   - Assess range of motion  - Assess patient's mobility; develop plan if impaired  - Assess patient's need for assistive devices and provide as appropriate  - Encourage maximum independence but intervene and supervise when necessary  - Involve family in performance of ADLs  - Assess for home care needs following discharge   - Consider OT consult to assist with ADL evaluation and planning for discharge  - Provide patient education as appropriate  Outcome: Progressing  Goal: Maintains/Returns to pre admission functional level  Description: INTERVENTIONS:  - Perform AM-PAC 6 Click Basic Mobility/ Daily Activity assessment daily.  - Set and communicate daily mobility goal to care team and patient/family/caregiver.   - Collaborate with rehabilitation services on mobility goals if consulted  - Perform Range of Motion 2 times a day.  - Reposition patient every 1 hours.  - Dangle patient 2 times a day  - Stand patient 2 times a day  - Ambulate patient 2 times a day  - Out of bed to chair 2 times a day   - Out of bed for meals 2 times a day  - Out of bed for toileting  - Record patient progress and toleration of activity level   Outcome: Progressing     Problem: DISCHARGE PLANNING  Goal: Discharge to home or other facility with appropriate resources  Description: INTERVENTIONS:  - Identify barriers to discharge w/patient and caregiver  -  Arrange for needed discharge resources and transportation as appropriate  - Identify discharge learning needs (meds, wound care, etc.)  - Arrange for interpretive services to assist at discharge as needed  - Refer to Case Management Department for coordinating discharge planning if the patient needs post-hospital services based on physician/advanced practitioner order or complex needs related to functional status, cognitive ability, or social support system  Outcome: Progressing     Problem: Knowledge Deficit  Goal: Patient/family/caregiver demonstrates understanding of disease process, treatment plan, medications, and discharge instructions  Description: Complete learning assessment and assess knowledge base.  Interventions:  - Provide teaching at level of understanding  - Provide teaching via preferred learning methods  Outcome: Progressing     Problem: Prexisting or High Potential for Compromised Skin Integrity  Goal: Skin integrity is maintained or improved  Description: INTERVENTIONS:  - Identify patients at risk for skin breakdown  - Assess and monitor skin integrity  - Assess and monitor nutrition and hydration status  - Monitor labs   - Assess for incontinence   - Turn and reposition patient  - Assist with mobility/ambulation  - Relieve pressure over bony prominences  - Avoid friction and shearing  - Provide appropriate hygiene as needed including keeping skin clean and dry  - Evaluate need for skin moisturizer/barrier cream  - Collaborate with interdisciplinary team   - Patient/family teaching  - Consider wound care consult   Outcome: Progressing     Problem: NEUROSENSORY - ADULT  Goal: Achieves stable or improved neurological status  Description: INTERVENTIONS  - Monitor and report changes in neurological status  - Monitor vital signs such as temperature, blood pressure, glucose, and any other labs ordered   - Initiate measures to prevent increased intracranial pressure  - Monitor for seizure activity and  implement precautions if appropriate      Outcome: Progressing  Goal: Remains free of injury related to seizures activity  Description: INTERVENTIONS  - Maintain airway, patient safety  and administer oxygen as ordered  - Monitor patient for seizure activity, document and report duration and description of seizure to physician/advanced practitioner  - If seizure occurs,  ensure patient safety during seizure  - Reorient patient post seizure  - Seizure pads on all 4 side rails  - Instruct patient/family to notify RN of any seizure activity including if an aura is experienced  - Instruct patient/family to call for assistance with activity based on nursing assessment  - Administer anti-seizure medications if ordered    Outcome: Progressing  Goal: Achieves maximal functionality and self care  Description: INTERVENTIONS  - Monitor swallowing and airway patency with patient fatigue and changes in neurological status  - Encourage and assist patient to increase activity and self care.   - Encourage visually impaired, hearing impaired and aphasic patients to use assistive/communication devices  Outcome: Progressing     Problem: CARDIOVASCULAR - ADULT  Goal: Maintains optimal cardiac output and hemodynamic stability  Description: INTERVENTIONS:  - Monitor I/O, vital signs and rhythm  - Monitor for S/S and trends of decreased cardiac output  - Administer and titrate ordered vasoactive medications to optimize hemodynamic stability  - Assess quality of pulses, skin color and temperature  - Assess for signs of decreased coronary artery perfusion  - Instruct patient to report change in severity of symptoms  Outcome: Progressing  Goal: Absence of cardiac dysrhythmias or at baseline rhythm  Description: INTERVENTIONS:  - Continuous cardiac monitoring, vital signs, obtain 12 lead EKG if ordered  - Administer antiarrhythmic and heart rate control medications as ordered  - Monitor electrolytes and administer replacement therapy as  ordered  Outcome: Progressing     Problem: RESPIRATORY - ADULT  Goal: Achieves optimal ventilation and oxygenation  Description: INTERVENTIONS:  - Assess for changes in respiratory status  - Assess for changes in mentation and behavior  - Position to facilitate oxygenation and minimize respiratory effort  - Oxygen administered by appropriate delivery if ordered  - Initiate smoking cessation education as indicated  - Encourage broncho-pulmonary hygiene including cough, deep breathe, Incentive Spirometry  - Assess the need for suctioning and aspirate as needed  - Assess and instruct to report SOB or any respiratory difficulty  - Respiratory Therapy support as indicated  Outcome: Progressing     Problem: GASTROINTESTINAL - ADULT  Goal: Minimal or absence of nausea and/or vomiting  Description: INTERVENTIONS:  - Administer IV fluids if ordered to ensure adequate hydration  - Maintain NPO status until nausea and vomiting are resolved  - Nasogastric tube if ordered  - Administer ordered antiemetic medications as needed  - Provide nonpharmacologic comfort measures as appropriate  - Advance diet as tolerated, if ordered  - Consider nutrition services referral to assist patient with adequate nutrition and appropriate food choices  Outcome: Progressing  Goal: Maintains or returns to baseline bowel function  Description: INTERVENTIONS:  - Assess bowel function  - Encourage oral fluids to ensure adequate hydration  - Administer IV fluids if ordered to ensure adequate hydration  - Administer ordered medications as needed  - Encourage mobilization and activity  - Consider nutritional services referral to assist patient with adequate nutrition and appropriate food choices  Outcome: Progressing  Goal: Maintains adequate nutritional intake  Description: INTERVENTIONS:  - Monitor percentage of each meal consumed  - Identify factors contributing to decreased intake, treat as appropriate  - Assist with meals as needed  - Monitor I&O,  weight, and lab values if indicated  - Obtain nutrition services referral as needed  Outcome: Progressing  Goal: Establish and maintain optimal ostomy function  Description: INTERVENTIONS:  - Assess bowel function  - Encourage oral fluids to ensure adequate hydration  - Administer IV fluids if ordered to ensure adequate hydration   - Administer ordered medications as needed  - Encourage mobilization and activity  - Nutrition services referral to assist patient with appropriate food choices  - Assess stoma site  - Consider wound care consult   Outcome: Progressing  Goal: Oral mucous membranes remain intact  Description: INTERVENTIONS  - Assess oral mucosa and hygiene practices  - Implement preventative oral hygiene regimen  - Implement oral medicated treatments as ordered  - Initiate Nutrition services referral as needed  Outcome: Progressing     Problem: GENITOURINARY - ADULT  Goal: Maintains or returns to baseline urinary function  Description: INTERVENTIONS:  - Assess urinary function  - Encourage oral fluids to ensure adequate hydration if ordered  - Administer IV fluids as ordered to ensure adequate hydration  - Administer ordered medications as needed  - Offer frequent toileting  - Follow urinary retention protocol if ordered  Outcome: Progressing  Goal: Absence of urinary retention  Description: INTERVENTIONS:  - Assess patient’s ability to void and empty bladder  - Monitor I/O  - Bladder scan as needed  - Discuss with physician/AP medications to alleviate retention as needed  - Discuss catheterization for long term situations as appropriate  Outcome: Progressing  Goal: Urinary catheter remains patent  Description: INTERVENTIONS:  - Assess patency of urinary catheter  - If patient has a chronic campa, consider changing catheter if non-functioning  - Follow guidelines for intermittent irrigation of non-functioning urinary catheter  Outcome: Progressing     Problem: METABOLIC, FLUID AND ELECTROLYTES -  ADULT  Goal: Electrolytes maintained within normal limits  Description: INTERVENTIONS:  - Monitor labs and assess patient for signs and symptoms of electrolyte imbalances  - Administer electrolyte replacement as ordered  - Monitor response to electrolyte replacements, including repeat lab results as appropriate  - Instruct patient on fluid and nutrition as appropriate  Outcome: Progressing  Goal: Fluid balance maintained  Description: INTERVENTIONS:  - Monitor labs   - Monitor I/O and WT  - Instruct patient on fluid and nutrition as appropriate  - Assess for signs & symptoms of volume excess or deficit  Outcome: Progressing  Goal: Glucose maintained within target range  Description: INTERVENTIONS:  - Monitor Blood Glucose as ordered  - Assess for signs and symptoms of hyperglycemia and hypoglycemia  - Administer ordered medications to maintain glucose within target range  - Assess nutritional intake and initiate nutrition service referral as needed  Outcome: Progressing     Problem: SKIN/TISSUE INTEGRITY - ADULT  Goal: Skin Integrity remains intact(Skin Breakdown Prevention)  Description: Assess:  -Perform [place] assessment every   -Clean and moisturize skin every   -Inspect skin when repositioning, toileting, and assisting with ADLS  -Assess under medical devices such as  every   -Assess extremities for adequate circulation and sensation     Bed Management:  -Have minimal linens on bed & keep smooth, unwrinkled  -Change linens as needed when moist or perspiring  -Avoid sitting or lying in one position for more than  hours while in bed  -Keep HOB at degrees     Toileting:  -Offer bedside commode  -Assess for incontinence every   -Use incontinent care products after each incontinent episode such as     Activity:  -Mobilize patient  times a day  -Encourage activity and walks on unit  -Encourage or provide ROM exercises   -Turn and reposition patient every  Hours  -Use appropriate equipment to lift or move patient in  bed  -Instruct/ Assist with weight shifting every  when out of bed in chair  -Consider limitation of chair time  hour intervals    Skin Care:  -Avoid use of baby powder, tape, friction and shearing, hot water or constrictive clothing  -Relieve pressure over bony prominences using   -Do not massage red bony areas    Next Steps:  -Teach patient strategies to minimize risks such as    -Consider consults to  interdisciplinary teams such as   Outcome: Progressing  Goal: Incision(s), wounds(s) or drain site(s) healing without S/S of infection  Description: INTERVENTIONS  - Assess and document dressing, incision, wound bed, drain sites and surrounding tissue  - Provide patient and family education  - Perform skin care/dressing changes every   Outcome: Progressing  Goal: Pressure injury heals and does not worsen  Description: Interventions:  - Implement low air loss mattress or specialty surface (Criteria met)  - Apply silicone foam dressing  - Instruct/assist with weight shifting every  minutes when in chair   - Limit chair time to  hour intervals  - Use special pressure reducing interventions such as  when in chair   - Apply fecal or urinary incontinence containment device   - Perform passive or active ROM every   - Turn and reposition patient & offload bony prominences every  hours   - Utilize friction reducing device or surface for transfers   - Consider consults to  interdisciplinary teams such as   - Use incontinent care products after each incontinent episode such as   - Consider nutrition services referral as needed  Outcome: Progressing     Problem: MUSCULOSKELETAL - ADULT  Goal: Maintain or return mobility to safest level of function  Description: INTERVENTIONS:  - Assess patient's ability to carry out ADLs; assess patient's baseline for ADL function and identify physical deficits which impact ability to perform ADLs (bathing, care of mouth/teeth, toileting, grooming, dressing, etc.)  - Assess/evaluate cause of  self-care deficits   - Assess range of motion  - Assess patient's mobility  - Assess patient's need for assistive devices and provide as appropriate  - Encourage maximum independence but intervene and supervise when necessary  - Involve family in performance of ADLs  - Assess for home care needs following discharge   - Consider OT consult to assist with ADL evaluation and planning for discharge  - Provide patient education as appropriate  Outcome: Progressing  Goal: Maintain proper alignment of affected body part  Description: INTERVENTIONS:  - Support, maintain and protect limb and body alignment  - Provide patient/ family with appropriate education  Outcome: Progressing     Problem: Nutrition/Hydration-ADULT  Goal: Nutrient/Hydration intake appropriate for improving, restoring or maintaining nutritional needs  Description: Monitor and assess patient's nutrition/hydration status for malnutrition. Collaborate with interdisciplinary team and initiate plan and interventions as ordered.  Monitor patient's weight and dietary intake as ordered or per policy. Utilize nutrition screening tool and intervene as necessary. Determine patient's food preferences and provide high-protein, high-caloric foods as appropriate.     INTERVENTIONS:  - Monitor oral intake, urinary output, labs, and treatment plans  - Assess nutrition and hydration status and recommend course of action  - Evaluate amount of meals eaten  - Assist patient with eating if necessary   - Allow adequate time for meals  - Recommend/ encourage appropriate diets, oral nutritional supplements, and vitamin/mineral supplements  - Order, calculate, and assess calorie counts as needed  - Recommend, monitor, and adjust tube feedings and TPN/PPN based on assessed needs  - Assess need for intravenous fluids  - Provide specific nutrition/hydration education as appropriate  - Include patient/family/caregiver in decisions related to nutrition  Outcome: Progressing

## 2024-03-30 NOTE — ASSESSMENT & PLAN NOTE
Home regimen: Morphine 60mg BID & fentanyl patch 75mcg/hr   Today, pt claims he is taking 200 mg Morphine q day   Prior provider called pt's PCP (Dr. Deidre Andrea) to verify outpatient regimen  She recommended the pt to see pain management   Narcotics initially held in ICU w/ respiratory suppression/intubation  Continue   Morphine 60 mg q12 hrs  Fentanyl patch 75 mcg/hr   PRN Oxycodone   Decreased Dilaudid from 0.5 to 0.2 mg PRN breakthrough   Wean supplemental pain regimen as able

## 2024-03-30 NOTE — ASSESSMENT & PLAN NOTE
New onset A. Fib w/ RVR which presented intraoperatively  Likely exacerbated 2/2 critical illness, bacteremia, & stress from OR  Pt required amiodarone and Cardizem gtt's   Etiology consulted  Currently sinus; rate controlled  Continue Eliquis, amiodarone, & Toprol-XL  Ambulatory referral for cardiology at discharge

## 2024-03-30 NOTE — ASSESSMENT & PLAN NOTE
Recent Labs     03/28/24  1000 03/29/24  0305   SODIUM 139 137     Na+ 123 - POA   Nephrology following  Etiology likely 2/2 physiologic SIADH, OP thiazide use w/ superimposed volume depletion w/ sepsis   Trend

## 2024-03-30 NOTE — ASSESSMENT & PLAN NOTE
Lab Results   Component Value Date    HGBA1C 12.1 (H) 03/22/2024     Recent Labs     03/29/24  1805 03/29/24  2203 03/30/24  0800 03/30/24  1110   POCGLU 113 105 196* 315*     Blood Sugar Average: Last 72 hrs:  (P) [id number]     POA: Glucose > 600, BHB 1.4, & pH 7.35 -required IVF recitation & insulin gtt   Home regimen: Metformin & Glimepiride  Endocrinology consulted & recommended basal/mealtime insulin regimen  Continue Lispro 10 U w/ meals & SSI AC/QHS   Increase Lantus from 35 to 38 U q day   Diabetes education when appropriate   CHO controlled diet   Endocrinology ambulatory referral recommended at discharge

## 2024-03-30 NOTE — PLAN OF CARE
Problem: INFECTION - ADULT  Goal: Absence or prevention of progression during hospitalization  Description: INTERVENTIONS:  - Assess and monitor for signs and symptoms of infection  - Monitor lab/diagnostic results  - Monitor all insertion sites, i.e. indwelling lines, tubes, and drains  - Monitor endotracheal if appropriate and nasal secretions for changes in amount and color  - Chauvin appropriate cooling/warming therapies per order  - Administer medications as ordered  - Instruct and encourage patient and family to use good hand hygiene technique  - Identify and instruct in appropriate isolation precautions for identified infection/condition  Outcome: Progressing     Problem: INFECTION - ADULT  Goal: Absence of fever/infection during neutropenic period  Description: INTERVENTIONS:  - Monitor WBC    Outcome: Progressing     Problem: SAFETY ADULT  Goal: Patient will remain free of falls  Description: INTERVENTIONS:  - Educate patient/family on patient safety including physical limitations  - Instruct patient to call for assistance with activity   - Consult OT/PT to assist with strengthening/mobility   - Keep Call bell within reach  - Keep bed low and locked with side rails adjusted as appropriate  - Keep care items and personal belongings within reach  - Initiate and maintain comfort rounds  - Make Fall Risk Sign visible to staff  - Offer Toileting every 2 Hours, in advance of need  - Initiate/Maintain 2alarm  - Obtain necessary fall risk management equipment: 2  - Apply yellow socks and bracelet for high fall risk patients  - Consider moving patient to room near nurses station  Outcome: Progressing     Problem: SAFETY ADULT  Goal: Maintain or return to baseline ADL function  Description: INTERVENTIONS:  -  Assess patient's ability to carry out ADLs; assess patient's baseline for ADL function and identify physical deficits which impact ability to perform ADLs (bathing, care of mouth/teeth, toileting, grooming,  dressing, etc.)  - Assess/evaluate cause of self-care deficits   - Assess range of motion  - Assess patient's mobility; develop plan if impaired  - Assess patient's need for assistive devices and provide as appropriate  - Encourage maximum independence but intervene and supervise when necessary  - Involve family in performance of ADLs  - Assess for home care needs following discharge   - Consider OT consult to assist with ADL evaluation and planning for discharge  - Provide patient education as appropriate  Outcome: Progressing     Problem: SAFETY ADULT  Goal: Maintains/Returns to pre admission functional level  Description: INTERVENTIONS:  - Perform AM-PAC 6 Click Basic Mobility/ Daily Activity assessment daily.  - Set and communicate daily mobility goal to care team and patient/family/caregiver.   - Collaborate with rehabilitation services on mobility goals if consulted  - Perform Range of Motion 2 times a day.  - Reposition patient every 1 hours.  - Dangle patient 2 times a day  - Stand patient 2 times a day  - Ambulate patient 2 times a day  - Out of bed to chair 2 times a day   - Out of bed for meals 2 times a day  - Out of bed for toileting  - Record patient progress and toleration of activity level   Outcome: Progressing     Problem: Prexisting or High Potential for Compromised Skin Integrity  Goal: Skin integrity is maintained or improved  Description: INTERVENTIONS:  - Identify patients at risk for skin breakdown  - Assess and monitor skin integrity  - Assess and monitor nutrition and hydration status  - Monitor labs   - Assess for incontinence   - Turn and reposition patient  - Assist with mobility/ambulation  - Relieve pressure over bony prominences  - Avoid friction and shearing  - Provide appropriate hygiene as needed including keeping skin clean and dry  - Evaluate need for skin moisturizer/barrier cream  - Collaborate with interdisciplinary team   - Patient/family teaching  - Consider wound care  consult   Outcome: Progressing     Problem: CARDIOVASCULAR - ADULT  Goal: Maintains optimal cardiac output and hemodynamic stability  Description: INTERVENTIONS:  - Monitor I/O, vital signs and rhythm  - Monitor for S/S and trends of decreased cardiac output  - Administer and titrate ordered vasoactive medications to optimize hemodynamic stability  - Assess quality of pulses, skin color and temperature  - Assess for signs of decreased coronary artery perfusion  - Instruct patient to report change in severity of symptoms  Outcome: Progressing     Problem: CARDIOVASCULAR - ADULT  Goal: Absence of cardiac dysrhythmias or at baseline rhythm  Description: INTERVENTIONS:  - Continuous cardiac monitoring, vital signs, obtain 12 lead EKG if ordered  - Administer antiarrhythmic and heart rate control medications as ordered  - Monitor electrolytes and administer replacement therapy as ordered  Outcome: Progressing

## 2024-03-30 NOTE — ASSESSMENT & PLAN NOTE
Home regimen: Metoprolol 25mg daily, amlodipine 5mg daily, & lisinopril-hydrochlorothiazide 20-25mg   Amlodipine and lisinopril previously held 2/2 JH  Amlodipine restarted on 03/29  Restart lisinopril when appropriate   -140

## 2024-03-30 NOTE — ASSESSMENT & PLAN NOTE
Per wife patient has been mentioning & having suicidal ideation  No SI today per pt   Psych consulted  Per note on 03/22 by Dr. Pineda, patient does not meet criteria for IP stay  Continue Klonopin, Cymbalta, and risperidone

## 2024-03-30 NOTE — ASSESSMENT & PLAN NOTE
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 Washington  S/p left toe amputation  Continue Ancef; IR consulted for PICC placement; suspect at least 6 weeks of antibiotics  ID following; final recommendations on 04/01  Repeat Bcx w/o growth x 72 hrs

## 2024-03-30 NOTE — ASSESSMENT & PLAN NOTE
H/o R knee arthoplasty at an outside facility who presented w/ acute pain, trouble ambulating, warmth & erythema - found to have a right knee periprosthetic joint infection   Xray knee (03/22): Stable hardware/bone alignment; no other acute findings  S/p joint aspiration (03/23) w/ staph growth & gross WBCs   Bcx + w/ Staph aureus  S/p right knee I&D w/ polyethylene exchange for periprosthetic joint infection on 03/24   WBAT RLE  Orthopedics following; appreciate continued recommendations

## 2024-03-30 NOTE — PROGRESS NOTES
"Progress Note - Podiatry  Armen Llanos 48 y.o. 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  Plan for next dressing change Monday  Continue with once medically cleared partial weightbearing and walker or crutches.  Ok for D/C once medically cleared.     Uncontrolled type 2 diabetes with peripheral neuropathy  Encourage patient to be more vigilant with his diabetes management and how good blood sugar control can affect his feet with peripheral neuropathy and other diabetic complications.  Recommend follow-up for routine diabetic footcare every 3 to 4 months upon discharge.     History of traumatic brain injury, diabetic ketoacidosis with metabolic acidosis, electrolyte abnormality resolved, SIRS, JH, suicidal ideation, acute on chronic right knee pain and septic joint.  Essential hypertension, and chronic opioid use.  Plan for PICC line and 6wks IV antibiotics  for Septic Knee Joint  Managed per internal medicine, cardiology, and infectious disease    Subjective/Objective   Chief Complaint:   Chief Complaint   Patient presents with    Fall     Pt c/o right knee pain after pt unsure if he had a fall this morning. Pt denies thinners. Pt had a previous TBI and does not remember things currently. Pt denies cp/sob/n/v/d or fevers       Subjective: 48-year-old male seen today POD#2 S/P Amp Left #2(IPJ), resting comfortably in bed with no distress.  Reports no significant pain from foot surgery, only has pain in knees.    Blood pressure 146/91, pulse 89, temperature 98 °F (36.7 °C), temperature source Oral, resp. rate 17, height 6' 5\" (1.956 m), weight 93.7 kg (206 lb 8 oz), SpO2 96%.,Body mass index is 23.86 kg/m².    Invasive Devices       Peripheral Intravenous Line  Duration             Peripheral IV 03/30/24 Distal;Dorsal " (posterior);Left Forearm <1 day                    Physical Exam:   General appearance: alert, cooperative and no distress  Neuro/Vascular: Palpable pedal pulses bilaterally with diminished epicritic sensation consistent with diabetic neuropathy.  Extremity: Left second toe surgical incision coapting satisfactorily.  Good capillary filling of each respective flap noted.  No signs of infection or wound dehiscence noted.  No erythema or edema.  Satisfactory progress for postop day #2.          Labs and other studies:   CBC w/diff  Results from last 7 days   Lab Units 03/30/24  0446 03/28/24  1000 03/27/24  0209   WBC Thousand/uL 10.25*   < > 9.41   HEMOGLOBIN g/dL 11.1*   < > 11.5*   HEMATOCRIT % 34.2*   < > 34.1*   PLATELETS Thousands/uL 408*   < > 267   NEUTROS PCT %  --   --  84*   LYMPHS PCT %  --   --  7*   LYMPHO PCT % 19   < >  --    MONOS PCT %  --   --  7   MONO PCT % 10   < >  --    EOS PCT % 3   < > 0    < > = values in this interval not displayed.     BMP  Results from last 7 days   Lab Units 03/29/24  0305 03/24/24  1047 03/24/24  0951   POTASSIUM mmol/L 2.8*   < >  --    CHLORIDE mmol/L 103   < >  --    CO2 mmol/L 25   < >  --    CO2, I-STAT mmol/L  --   --  16*   BUN mg/dL 10   < >  --    CREATININE mg/dL 0.95   < >  --    GLUCOSE, ISTAT mg/dl  --   --  219*   CALCIUM mg/dL 8.0*   < >  --     < > = values in this interval not displayed.     CMP  Results from last 7 days   Lab Units 03/29/24  0305 03/26/24  0605 03/25/24  0449 03/24/24  1047 03/24/24  0951   POTASSIUM mmol/L 2.8*   < > 3.6   < >  --    CHLORIDE mmol/L 103   < > 104   < >  --    CO2 mmol/L 25   < > 22   < >  --    CO2, I-STAT mmol/L  --   --   --   --  16*   BUN mg/dL 10   < > 18   < >  --    CREATININE mg/dL 0.95   < > 0.96   < >  --    CALCIUM mg/dL 8.0*   < > 8.7   < >  --    ALK PHOS U/L  --   --  50  --   --    ALT U/L  --   --  16  --   --    AST U/L  --   --  18  --   --    GLUCOSE, ISTAT mg/dl  --   --   --   --  219*    < > =  "values in this interval not displayed.       @Culture@  Lab Results   Component Value Date    BLOODCX No Growth at 72 hrs. 03/26/2024    BLOODCX No Growth at 72 hrs. 03/26/2024    BLOODCX Staphylococcus aureus (A) 03/25/2024    BLOODCX Staphylococcus aureus (A) 03/25/2024    BLOODCX Staphylococcus aureus (A) 03/23/2024    BLOODCX Staphylococcus aureus (A) 03/23/2024    BLOODCX Staphylococcus aureus (A) 03/22/2024    BLOODCX Staphylococcus aureus (A) 03/22/2024     No results found for: \"WOUNDCULT\"      Current Facility-Administered Medications:     acetaminophen (TYLENOL) tablet 650 mg, 650 mg, Oral, Q6H PRN, Bambi Marcelo PA-C, 650 mg at 03/29/24 2226    amiodarone tablet 400 mg, 400 mg, Oral, BID With Meals, 400 mg at 03/30/24 0848 **FOLLOWED BY** [START ON 4/5/2024] amiodarone tablet 200 mg, 200 mg, Oral, Daily With Breakfast, Bambi Marcelo PA-C    amLODIPine (NORVASC) tablet 5 mg, 5 mg, Oral, Daily, Flaquita Murphy PA-C, 5 mg at 03/30/24 0848    apixaban (ELIQUIS) tablet 5 mg, 5 mg, Oral, BID, Flaquita Murphy PA-C, 5 mg at 03/30/24 0848    bupivacaine liposomal (EXPAREL) 1.3 % injection 20 mL, 20 mL, Infiltration, Once, Bambi Marcelo PA-C    butalbital-acetaminophen-caffeine (FIORICET,ESGIC) -40 mg per tablet 1 tablet, 1 tablet, Oral, Q4H PRN, Bambi Marcelo PA-C    ceFAZolin (ANCEF) IVPB (premix in dextrose) 2,000 mg 50 mL, 2,000 mg, Intravenous, Q8H, Bambi Marcelo PA-C, Last Rate: 100 mL/hr at 03/30/24 1227, 2,000 mg at 03/30/24 1227    chlorhexidine (PERIDEX) 0.12 % oral rinse 15 mL, 15 mL, Mouth/Throat, Q12H DARIN, Bambi Marcelo PA-C, 15 mL at 03/29/24 2227    clonazePAM (KlonoPIN) tablet 1 mg, 1 mg, Oral, Daily, Bambi Marcelo PA-C, 1 mg at 03/30/24 0848    docusate sodium (COLACE) capsule 200 mg, 200 mg, Oral, HS, Bambi Marcelo PA-C, 200 mg at 03/29/24 2226    DULoxetine (CYMBALTA) delayed release capsule 20 mg, 20 mg, Oral, Daily, Bambi Marcelo PA-C, 20 " mg at 03/30/24 0848    fentaNYL (DURAGESIC) 75 mcg/hr TD 72 hr patch 1 patch, 1 patch, Transdermal, Q72H, Bambi Marcelo PA-C, 1 patch at 03/29/24 0003    HYDROmorphone (DILAUDID) injection 0.5 mg, 0.5 mg, Intravenous, Q6H PRN, Flaquita Murphy PA-C, 0.5 mg at 03/30/24 0848    insulin glargine (LANTUS) subcutaneous injection 35 Units 0.35 mL, 35 Units, Subcutaneous, QAM, Esha العراقي MD, 35 Units at 03/30/24 0847    insulin lispro (HumALOG/ADMELOG) 100 units/mL subcutaneous injection 1-6 Units, 1-6 Units, Subcutaneous, TID AC, 5 Units at 03/30/24 1211 **AND** Fingerstick Glucose (POCT), , , TID AC, Esha العراقي MD    insulin lispro (HumALOG/ADMELOG) 100 units/mL subcutaneous injection 10 Units, 10 Units, Subcutaneous, TID With Meals, Esha العراقي MD, 10 Units at 03/30/24 1211    melatonin tablet 3 mg, 3 mg, Oral, HS, Flaquita Murphy PA-C, 3 mg at 03/30/24 0015    methocarbamol (ROBAXIN) tablet 500 mg, 500 mg, Oral, Q6H PRN, Bambi Marcelo PA-C, 500 mg at 03/29/24 0012    metoprolol (LOPRESSOR) injection 5 mg, 5 mg, Intravenous, Q6H PRN, Bambi Marcelo PA-C    metoprolol succinate (TOPROL-XL) 24 hr tablet 25 mg, 25 mg, Oral, Daily, Bambi Marcelo PA-C, 25 mg at 03/30/24 0848    morphine (MS CONTIN) ER tablet 60 mg, 60 mg, Oral, Q12H DARIN, Flaquita Murphy PA-C, 60 mg at 03/30/24 0848    naproxen (NAPROSYN) tablet 500 mg, 500 mg, Oral, BID With Meals, Bambi Marcelo PA-C, 500 mg at 03/30/24 0848    nicotine (NICODERM CQ) 21 mg/24 hr TD 24 hr patch 21 mg, 21 mg, Transdermal, HS, Bambi Marcelo PA-C, 21 mg at 03/29/24 2229    ondansetron (ZOFRAN) injection 4 mg, 4 mg, Intravenous, Q4H PRN, Bambi Marcelo PA-C, 4 mg at 03/28/24 1944    oxyCODONE (ROXICODONE) IR tablet 5 mg, 5 mg, Oral, Q4H PRN, 5 mg at 03/29/24 1808 **OR** oxyCODONE (ROXICODONE) split tablet 7.5 mg, 7.5 mg, Oral, Q4H PRN, Flaquita Murphy PA-C    pantoprazole (PROTONIX) EC tablet 20 mg, 20 mg, Oral, BID AC, Bambi L  MILO Marcelo, 20 mg at 03/30/24 0848    polyethylene glycol (MIRALAX) packet 17 g, 17 g, Per NG Tube, Daily, Bambi Marcelo PA-C, 17 g at 03/30/24 0848    pravastatin (PRAVACHOL) tablet 80 mg, 80 mg, Oral, Daily With Dinner, Bambi Marcelo PA-C, 80 mg at 03/29/24 1804    risperiDONE (RisperDAL) tablet 3 mg, 3 mg, Oral, Daily, Bambi Marcelo PA-C, 3 mg at 03/30/24 0848    zolpidem (AMBIEN) tablet 5 mg, 5 mg, Oral, HS PRN, Bambi Marcelo PA-C, 5 mg at 03/30/24 0015    Imaging: I have personally reviewed pertinent films in PACS  EKG, Pathology, and Other Studies: I have personally reviewed pertinent reports.      Catrachito Glaser DPM

## 2024-03-30 NOTE — PROGRESS NOTES
[facility]  Progress Note  Name: Armen Llanos I  MRN: [id number]  Unit/Bed#: -01 I Date of Admission: 3/22/2024   Date of Service: 3/30/2024 I Hospital Day: 8    Assessment/Plan   Septic shock (HCC)  Assessment & Plan  POA w/ tachycardia, leukocytosis (17), lactic acidosis (1.4), elevated procal (3.39), & elevated Cr. 1.6 2/2 MSSA bacteremia, MSSA right TKR infxn, & left 2nd toe osteo. On 03/24, pt developed postoperative fever (103.5 °F) & hypotension which required vasopressors & continued postop intubation.   03/22: BC x 2 w/ Staph aureus  03/22: R knee aspirate positive for MSSA & WBCs  03/24:   Intra-op R knee sample positive for MSSA   S/p R knee I&D to bone, total knee arthroplasty revision, insertion of stimulan with Dr. Washington  While intraoperative, pt became acutely tachycardia w/ new AF RVR & remained intubated; continued hypotension despite IVF resuscitation & required Levophed  Hydrocortisone added   03/29: S/p amputation left 2nd toe for osteo found on MRI   ID following; appreciate continued recommendations; final recommendations on 04/01  Continue Ancef  Likely will require 6-week course  IR consulted for PICC placement  BP stable off steroids  Repeat BCx negative x 72 hrs   Remains off vasopressors    MSSA bacteremia  Assessment & Plan  03/22: BC x 2 w/ Staph aureus  Etiology 2/2 prosthetic joint infection right knee & left toe osteomyelitis  S/p R knee I&D to bone, total knee arthroplasty revision, insertion of stimulan by Dr. 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)  Assessment & Plan  H/o R knee  arthoplasty at an outside facility who presented w/ acute pain, trouble ambulating, warmth & erythema - found to have a right knee periprosthetic joint infection   Xray knee (03/22): Stable hardware/bone alignment; no other acute findings  S/p joint aspiration (03/23) w/ staph growth & gross WBCs   Bcx + w/ Staph aureus  S/p right knee I&D w/ polyethylene exchange for periprosthetic joint infection on 03/24   WBAT RLE  Orthopedics following; appreciate continued recommendations    Cardiomyopathy (HCC)  Assessment & Plan  ECHO (03/25): EF 45% w/ mild global hypokinesis   Cardiology following  Continue Eliquis, Toprol-XL & amlodipine  Per cardiology note, pt recommended to start Jardiance & Entresto which was price checked but then d/c'd; clarification needed from cardiology if these are to be resumed at d/c     A-fib with RVR (HCC)  Assessment & Plan  New onset A. Fib w/ RVR which presented intraoperatively  Likely exacerbated 2/2 critical illness, bacteremia, & stress from OR  Pt required amiodarone and Cardizem gtt's   Etiology consulted  Currently sinus; rate controlled  Continue Eliquis, amiodarone, & Toprol-XL  Ambulatory referral for cardiology at discharge      Chronic, continuous use of opioids  Assessment & Plan  Home regimen: Morphine 60mg BID & fentanyl patch 75mcg/hr   Today, pt claims he is taking 200 mg Morphine q day   Prior provider called pt's PCP (Dr. Deidre Andrea) to verify outpatient regimen  She recommended the pt to see pain management   Narcotics initially held in ICU w/ respiratory suppression/intubation  Continue   Morphine 60 mg q12 hrs  Fentanyl patch 75 mcg/hr   PRN Oxycodone   Decreased Dilaudid from 0.5 to 0.2 mg PRN breakthrough   Wean supplemental pain regimen as able     * Type 2 diabetes mellitus with ketoacidosis without coma, without long-term current use of insulin ([facility])  Assessment & Plan  Lab Results   Component Value Date    HGBA1C 12.1 (H) 03/22/2024     Recent Labs      03/29/24  1805 03/29/24  2203 03/30/24  0800 03/30/24  1110   POCGLU 113 105 196* 315*     Blood Sugar Average: Last 72 hrs:  (P) [id number]     POA: Glucose > 600, BHB 1.4, & pH 7.35 -required IVF recitation & insulin gtt   Home regimen: Metformin & Glimepiride  Endocrinology consulted & recommended basal/mealtime insulin regimen  Continue Lispro 10 U w/ meals & SSI AC/QHS   Increase Lantus from 35 to 38 U q day   Diabetes education when appropriate   CHO controlled diet   Endocrinology ambulatory referral recommended at discharge      Hyponatremia  Assessment & Plan  Recent Labs     03/28/24  1000 03/29/24  0305   SODIUM 139 137     Na+ 123 - POA   Nephrology following  Etiology likely 2/2 physiologic SIADH, OP thiazide use w/ superimposed volume depletion w/ sepsis   Trend     Essential hypertension  Assessment & Plan  Home regimen: Metoprolol 25mg daily, amlodipine 5mg daily, & lisinopril-hydrochlorothiazide 20-25mg   Amlodipine and lisinopril previously held 2/2 JH  Amlodipine restarted on 03/29  Restart lisinopril when appropriate   -140     TBI (traumatic brain injury) ([facility])  Assessment & Plan  H/o TBI at 7 years old  Memory loss at baseline  Supportive care    Anxiety  Assessment & Plan  Anxiety managed today  Psychiatry consulted  Continue Klonopin, Cymbalta, and risperidone    Suicidal ideation  Assessment & Plan  Per wife patient has been mentioning & having suicidal ideation  No SI today per pt   Psych consulted  Per note on 03/22 by Dr. Pineda, patient does not meet criteria for IP stay  Continue Klonopin, Cymbalta, and risperidone         VTE Pharmacologic Prophylaxis: VTE Score: 2 Low Risk (Score 0-2) - Encourage Ambulation.  Patient is on Eliquis    Mobility:   Basic Mobility Inpatient Raw Score: 13  JH-HLM Goal: 4: Move to chair/commode  JH-HLM Achieved: 2: Bed activities/Dependent transfer  JH-HLM Goal NOT achieved. Continue with multidisciplinary rounding and encourage  appropriate mobility to improve upon [facility] goals.    Patient Centered Rounds: I performed bedside rounds with nursing staff today.   Discussions with Specialists or Other Care Team Provider: Case management; multiple specialist following    Education and Discussions with Family / Patient: Updated  (wife) via phone.    Total Time Spent on Date of Encounter in care of patient: 60 mins. This time was spent on one or more of the following: performing physical exam; counseling and coordination of care; obtaining or reviewing history; documenting in the medical record; reviewing/ordering tests, medications or procedures; communicating with other healthcare professionals and discussing with patient's family/caregivers.    Current Length of Stay: 8 day(s)  Current Patient Status: Inpatient   Certification Statement: The patient will continue to require additional inpatient hospital stay due to improving medical stability following septic shock, intubation, bacteremia, and multiple joint surgeries/amputation  Discharge Plan: Anticipate discharge in 48 hrs to rehab facility.    Code Status: Level 1 - Full Code    Subjective:   Patient is doing okay.  He is requesting to be prescribed 200 mg of morphine which he claims was his home dose in addition to his fentanyl patch.  He is tolerating p.o. intake.  His pain in his left toe and right knee are controlled.  He had a bowel movement 2 days ago.  He is without dysuria.    Objective:     Vitals:   Temp (24hrs), Av.3 °F (36.8 °C), Min:98 °F (36.7 °C), Max:98.7 °F (37.1 °C)    Temp:  [98 °F (36.7 °C)-98.7 °F (37.1 °C)] 98 °F (36.7 °C)  HR:  [79-94] 89  Resp:  [16-21] 17  BP: (126-146)/(71-91) 146/91  SpO2:  [95 %-96 %] 96 %  Body mass index is 23.86 kg/m².     Input and Output Summary (last 24 hours):     Intake/Output Summary (Last 24 hours) at 3/30/2024 1445  Last data filed at 3/30/2024 1316  Gross per 24 hour   Intake 1500 ml   Output 3710 ml   Net -2210 ml        Physical Exam:   Physical Exam  Constitutional:       Appearance: He is normal weight.   HENT:      Head: Normocephalic.      Right Ear: External ear normal.      Left Ear: External ear normal.      Nose: Nose normal.      Mouth/Throat:      Mouth: Mucous membranes are moist.      Pharynx: Oropharynx is clear.   Eyes:      Extraocular Movements: Extraocular movements intact.      Conjunctiva/sclera: Conjunctivae normal.   Cardiovascular:      Rate and Rhythm: Normal rate and regular rhythm.      Pulses: Normal pulses.      Heart sounds: Normal heart sounds.   Pulmonary:      Effort: Pulmonary effort is normal.      Breath sounds: Normal breath sounds.      Comments: Coarse but clear   Abdominal:      General: Abdomen is flat. Bowel sounds are normal. There is no distension.      Palpations: Abdomen is soft. There is no mass.      Tenderness: There is no abdominal tenderness.   Musculoskeletal:         General: Normal range of motion.      Cervical back: Normal range of motion.   Skin:     General: Skin is warm and dry.   Neurological:      General: No focal deficit present.      Mental Status: He is alert. Mental status is at baseline.   Psychiatric:      Comments: Odd, bizarre, flight of thoughts,pressured speech          Additional Data:     Labs:  Results from last 7 days   Lab Units 03/30/24  0446 03/28/24  1000 03/27/24  0209 03/26/24  0605 03/25/24  0449   WBC Thousand/uL 10.25*   < > 9.41   < > 10.54*   HEMOGLOBIN g/dL 11.1*   < > 11.5*   < > 11.1*   HEMATOCRIT % 34.2*   < > 34.1*   < > 31.3*   PLATELETS Thousands/uL 408*   < > 267   < > 155   BANDS PCT %  --   --   --   --  6   NEUTROS PCT %  --   --  84*   < >  --    LYMPHS PCT %  --   --  7*   < >  --    LYMPHO PCT % 19   < >  --   --  3*   MONOS PCT %  --   --  7   < >  --    MONO PCT % 10   < >  --   --  4   EOS PCT % 3   < > 0   < > 0    < > = values in this interval not displayed.     Results from last 7 days   Lab Units 03/29/24  0305  24  0605 24  0449   SODIUM mmol/L 137   < > 134*   POTASSIUM mmol/L 2.8*   < > 3.6   CHLORIDE mmol/L 103   < > 104   CO2 mmol/L 25   < > 22   BUN mg/dL 10   < > 18   CREATININE mg/dL 0.95   < > 0.96   ANION GAP mmol/L 9   < > 8   CALCIUM mg/dL 8.0*   < > 8.7   ALBUMIN g/dL  --   --  2.9*   TOTAL BILIRUBIN mg/dL  --   --  0.64   ALK PHOS U/L  --   --  50   ALT U/L  --   --  16   AST U/L  --   --  18   GLUCOSE RANDOM mg/dL 171*   < > 170*    < > = values in this interval not displayed.     Results from last 7 days   Lab Units 24  1056   INR  1.37*     Results from last 7 days   Lab Units 24  1110 24  0800 24  2203 24  1805 24  1633 24  1404 24  1152 24  1016 24  0758 24  0603 24  0357 24  0156   POC GLUCOSE mg/dl 315* 196* 105 113 157* 208* 199* 229* 129 134 167* 153*         Results from last 7 days   Lab Units 24  0209 24  0605 24  0449 24  1047 24  1517   LACTIC ACID mmol/L  --   --   --  1.4 0.7   PROCALCITONIN ng/ml 1.55* 2.78* 5.42* 3.39*  --        Lines/Drains:  Invasive Devices       Peripheral Intravenous Line  Duration             Peripheral IV 24 Distal;Dorsal (posterior);Left Forearm <1 day                      Telemetry:  Telemetry Orders (From admission, onward)               24 Hour Telemetry Monitoring  Continuous x 24 Hours (Telem)           Question:  Reason for 24 Hour Telemetry  Answer:  Arrhythmias requiring acute medical intervention / PPM or ICD malfunction                     Telemetry Reviewed: Normal Sinus Rhythm  Indication for Continued Telemetry Use: No indication for continued use. Will discontinue.              Imaging: Reviewed radiology reports from this admission including: X-ray foot, MRI of legs, x-ray of knee, portable chest x-ray    Recent Cultures (last 7 days):   Results from last 7 days   Lab Units 24  1427 24  1416 24  0578  03/24/24  0841 03/24/24  0840 03/24/24  0839   BLOOD CULTURE  No Growth at 72 hrs. No Growth at 72 hrs. Staphylococcus aureus*  Staphylococcus aureus*  --   --   --    GRAM STAIN RESULT   --   --  Gram positive cocci in clusters*  Gram positive cocci in clusters* No Polys or Bacteria seen No Polys or Bacteria seen 1+ Disintegrating polys*  2+ Gram positive cocci in pairs*       Last 24 Hours Medication List:   Current Facility-Administered Medications   Medication Dose Route Frequency Provider Last Rate    acetaminophen  650 mg Oral Q6H PRN Bambi Marcelo PA-C      amiodarone  400 mg Oral BID With Meals Bambi Marcelo PA-C      Followed by    [START ON 4/5/2024] amiodarone  200 mg Oral Daily With Breakfast Bambi Marcelo PA-C      amLODIPine  5 mg Oral Daily Flaquita Murphy PA-C      apixaban  5 mg Oral BID Flaquita Murphy PA-C      bupivacaine liposomal  20 mL Infiltration Once Bambi Marcelo PA-C      butalbital-acetaminophen-caffeine  1 tablet Oral Q4H PRN Bambi Marcelo PA-C      cefazolin  2,000 mg Intravenous Q8H Bambi Marcelo PA-C 2,000 mg (03/30/24 1227)    chlorhexidine  15 mL Mouth/Throat Q12H [facility] Bambi Marcelo PA-C      clonazePAM  1 mg Oral Daily Bambi Marcelo PA-C      docusate sodium  200 mg Oral HS Bambi Marcelo PA-C      DULoxetine  20 mg Oral Daily Bambi Marcelo PA-C      fentaNYL  1 patch Transdermal Q72H Bambi Marcelo PA-C      HYDROmorphone  0.2 mg Intravenous Q6H PRN Bambi Blackwood PA-C      [START ON 3/31/2024] insulin glargine  38 Units Subcutaneous QAM Bambi Blackwood PA-C      insulin lispro  1-6 Units Subcutaneous TID AC Esha العراقي MD      insulin lispro  10 Units Subcutaneous TID With Meals Esha العراقي MD      melatonin  3 mg Oral HS Flaquita Murphy PA-C      methocarbamol  500 mg Oral Q6H PRN Bambi Marcelo PA-C      metoprolol  5 mg Intravenous Q6H PRN Bambi Marcelo PA-C      metoprolol succinate  25 mg Oral Daily  Bambi Marcelo PA-C      morphine  60 mg Oral Q12H DARIN Flaquita Murphy PA-C      naproxen  500 mg Oral BID With Meals Bambi Marcelo PA-C      nicotine  21 mg Transdermal HS Bambi Marcelo PA-C      ondansetron  4 mg Intravenous Q4H PRN Bambi Marcelo PA-C      oxyCODONE  5 mg Oral Q4H PRN Flaquita Murphy PA-C      Or    oxyCODONE  7.5 mg Oral Q4H PRN Flaquita Murphy PA-C      pantoprazole  20 mg Oral BID AC Bambi Marcelo PA-C      polyethylene glycol  17 g Per NG Tube Daily Bambi Marcelo PA-C      pravastatin  80 mg Oral Daily With Dinner Bambi Marcelo PA-C      risperiDONE  3 mg Oral Daily Bambi Marcelo PA-C      zolpidem  5 mg Oral HS PRN Bambi Marcelo PA-C          Today, Patient Was Seen By: Bambi Blackwood PA-C    **Please Note: This note may have been constructed using a voice recognition system.**

## 2024-03-31 LAB
ALBUMIN SERPL BCP-MCNC: 2.7 G/DL (ref 3.5–5)
ALP SERPL-CCNC: 52 U/L (ref 34–104)
ALT SERPL W P-5'-P-CCNC: 5 U/L (ref 7–52)
ANION GAP SERPL CALCULATED.3IONS-SCNC: 9 MMOL/L (ref 4–13)
AST SERPL W P-5'-P-CCNC: 11 U/L (ref 13–39)
BACTERIA BLD CULT: NORMAL
BACTERIA BLD CULT: NORMAL
BILIRUB SERPL-MCNC: 0.48 MG/DL (ref 0.2–1)
BUN SERPL-MCNC: 10 MG/DL (ref 5–25)
CALCIUM ALBUM COR SERPL-MCNC: 9.2 MG/DL (ref 8.3–10.1)
CALCIUM SERPL-MCNC: 8.2 MG/DL (ref 8.4–10.2)
CHLORIDE SERPL-SCNC: 102 MMOL/L (ref 96–108)
CO2 SERPL-SCNC: 23 MMOL/L (ref 21–32)
CREAT SERPL-MCNC: 0.92 MG/DL (ref 0.6–1.3)
ERYTHROCYTE [DISTWIDTH] IN BLOOD BY AUTOMATED COUNT: 12.8 % (ref 11.6–15.1)
GFR SERPL CREATININE-BSD FRML MDRD: 97 ML/MIN/1.73SQ M
GLUCOSE SERPL-MCNC: 223 MG/DL (ref 65–140)
GLUCOSE SERPL-MCNC: 237 MG/DL (ref 65–140)
GLUCOSE SERPL-MCNC: 251 MG/DL (ref 65–140)
GLUCOSE SERPL-MCNC: 272 MG/DL (ref 65–140)
GLUCOSE SERPL-MCNC: 333 MG/DL (ref 65–140)
HCT VFR BLD AUTO: 32.8 % (ref 36.5–49.3)
HGB BLD-MCNC: 10.6 G/DL (ref 12–17)
MCH RBC QN AUTO: 29.3 PG (ref 26.8–34.3)
MCHC RBC AUTO-ENTMCNC: 32.3 G/DL (ref 31.4–37.4)
MCV RBC AUTO: 91 FL (ref 82–98)
PLATELET # BLD AUTO: 436 THOUSANDS/UL (ref 149–390)
PMV BLD AUTO: 9.7 FL (ref 8.9–12.7)
POTASSIUM SERPL-SCNC: 4.1 MMOL/L (ref 3.5–5.3)
PROT SERPL-MCNC: 6.2 G/DL (ref 6.4–8.4)
RBC # BLD AUTO: 3.62 MILLION/UL (ref 3.88–5.62)
SODIUM SERPL-SCNC: 134 MMOL/L (ref 135–147)
WBC # BLD AUTO: 9.13 THOUSAND/UL (ref 4.31–10.16)

## 2024-03-31 PROCEDURE — 82948 REAGENT STRIP/BLOOD GLUCOSE: CPT

## 2024-03-31 PROCEDURE — 80053 COMPREHEN METABOLIC PANEL: CPT | Performed by: INTERNAL MEDICINE

## 2024-03-31 PROCEDURE — 99232 SBSQ HOSP IP/OBS MODERATE 35: CPT | Performed by: INTERNAL MEDICINE

## 2024-03-31 PROCEDURE — 85027 COMPLETE CBC AUTOMATED: CPT | Performed by: INTERNAL MEDICINE

## 2024-03-31 RX ORDER — INSULIN LISPRO 100 [IU]/ML
13 INJECTION, SOLUTION INTRAVENOUS; SUBCUTANEOUS
Status: DISCONTINUED | OUTPATIENT
Start: 2024-03-31 | End: 2024-04-03 | Stop reason: HOSPADM

## 2024-03-31 RX ORDER — INSULIN GLARGINE 100 [IU]/ML
40 INJECTION, SOLUTION SUBCUTANEOUS EVERY MORNING
Status: DISCONTINUED | OUTPATIENT
Start: 2024-04-01 | End: 2024-04-01

## 2024-03-31 RX ADMIN — NAPROXEN 500 MG: 500 TABLET ORAL at 09:29

## 2024-03-31 RX ADMIN — CEFAZOLIN SODIUM 2000 MG: 2 SOLUTION INTRAVENOUS at 19:49

## 2024-03-31 RX ADMIN — Medication 3 MG: at 01:44

## 2024-03-31 RX ADMIN — CLONAZEPAM 1 MG: 1 TABLET ORAL at 09:28

## 2024-03-31 RX ADMIN — AMLODIPINE BESYLATE 5 MG: 5 TABLET ORAL at 09:47

## 2024-03-31 RX ADMIN — MORPHINE SULFATE 60 MG: 30 TABLET, EXTENDED RELEASE ORAL at 21:05

## 2024-03-31 RX ADMIN — NICOTINE 21 MG: 21 PATCH, EXTENDED RELEASE TRANSDERMAL at 21:05

## 2024-03-31 RX ADMIN — NAPROXEN 500 MG: 500 TABLET ORAL at 16:05

## 2024-03-31 RX ADMIN — CEFAZOLIN SODIUM 2000 MG: 2 SOLUTION INTRAVENOUS at 11:50

## 2024-03-31 RX ADMIN — HYDROMORPHONE HYDROCHLORIDE 0.2 MG: 0.2 INJECTION, SOLUTION INTRAMUSCULAR; INTRAVENOUS; SUBCUTANEOUS at 06:40

## 2024-03-31 RX ADMIN — INSULIN LISPRO 13 UNITS: 100 INJECTION, SOLUTION INTRAVENOUS; SUBCUTANEOUS at 17:24

## 2024-03-31 RX ADMIN — MORPHINE SULFATE 60 MG: 30 TABLET, EXTENDED RELEASE ORAL at 09:29

## 2024-03-31 RX ADMIN — AMIODARONE HYDROCHLORIDE 400 MG: 200 TABLET ORAL at 09:27

## 2024-03-31 RX ADMIN — CEFAZOLIN SODIUM 2000 MG: 2 SOLUTION INTRAVENOUS at 01:46

## 2024-03-31 RX ADMIN — INSULIN LISPRO 3 UNITS: 100 INJECTION, SOLUTION INTRAVENOUS; SUBCUTANEOUS at 09:25

## 2024-03-31 RX ADMIN — APIXABAN 5 MG: 5 TABLET, FILM COATED ORAL at 09:30

## 2024-03-31 RX ADMIN — POLYETHYLENE GLYCOL 3350 17 G: 17 POWDER, FOR SOLUTION ORAL at 09:41

## 2024-03-31 RX ADMIN — INSULIN LISPRO 5 UNITS: 100 INJECTION, SOLUTION INTRAVENOUS; SUBCUTANEOUS at 11:42

## 2024-03-31 RX ADMIN — DULOXETINE HYDROCHLORIDE 20 MG: 20 CAPSULE, DELAYED RELEASE ORAL at 09:30

## 2024-03-31 RX ADMIN — INSULIN LISPRO 10 UNITS: 100 INJECTION, SOLUTION INTRAVENOUS; SUBCUTANEOUS at 09:38

## 2024-03-31 RX ADMIN — PANTOPRAZOLE SODIUM 20 MG: 20 TABLET, DELAYED RELEASE ORAL at 16:04

## 2024-03-31 RX ADMIN — METOPROLOL SUCCINATE 25 MG: 25 TABLET, EXTENDED RELEASE ORAL at 09:47

## 2024-03-31 RX ADMIN — APIXABAN 5 MG: 5 TABLET, FILM COATED ORAL at 17:30

## 2024-03-31 RX ADMIN — PANTOPRAZOLE SODIUM 20 MG: 20 TABLET, DELAYED RELEASE ORAL at 05:00

## 2024-03-31 RX ADMIN — DOCUSATE SODIUM 200 MG: 100 CAPSULE, LIQUID FILLED ORAL at 21:05

## 2024-03-31 RX ADMIN — ZOLPIDEM TARTRATE 5 MG: 5 TABLET ORAL at 01:44

## 2024-03-31 RX ADMIN — INSULIN LISPRO 10 UNITS: 100 INJECTION, SOLUTION INTRAVENOUS; SUBCUTANEOUS at 12:37

## 2024-03-31 RX ADMIN — PRAVASTATIN SODIUM 80 MG: 80 TABLET ORAL at 16:04

## 2024-03-31 RX ADMIN — RISPERIDONE 3 MG: 1 TABLET, FILM COATED ORAL at 09:28

## 2024-03-31 RX ADMIN — AMIODARONE HYDROCHLORIDE 400 MG: 200 TABLET ORAL at 16:12

## 2024-03-31 RX ADMIN — INSULIN GLARGINE 38 UNITS: 100 INJECTION, SOLUTION SUBCUTANEOUS at 09:27

## 2024-03-31 RX ADMIN — INSULIN LISPRO 3 UNITS: 100 INJECTION, SOLUTION INTRAVENOUS; SUBCUTANEOUS at 17:24

## 2024-03-31 NOTE — ASSESSMENT & PLAN NOTE
Lab Results   Component Value Date    HGBA1C 12.1 (H) 03/22/2024     Recent Labs     03/30/24  1622 03/30/24  2055 03/31/24  0737 03/31/24  1121   POCGLU 221* 267* 251* 333*     Blood Sugar Average: Last 72 hrs:  (P) [id number]     POA: Glucose > 600, BHB 1.4, & pH 7.35 -required IVF recitation & insulin gtt   Home regimen: Metformin & Glimepiride  Endocrinology consulted & recommended basal/mealtime insulin regimen  Increase Lispro from 10 U to 13 U w/ meals & SSI AC/QHS   Increase Lantus from 38 to 40 U q day   Diabetes education when appropriate   CHO controlled diet   Endocrinology ambulatory referral recommended at discharge

## 2024-03-31 NOTE — ASSESSMENT & PLAN NOTE
POA w/ tachycardia, leukocytosis (17), lactic acidosis (1.4), elevated procal (3.39), & elevated Cr. 1.6 2/2 MSSA bacteremia, MSSA right TKR infxn, & left 2nd toe osteo. On 03/24, pt developed postoperative fever (103.5 °F) & hypotension which required vasopressors & continued postop intubation.   03/22: BC x 2 w/ Staph aureus  03/22: R knee aspirate positive for MSSA & WBCs  03/24:   Intra-op R knee sample positive for MSSA   S/p R knee I&D to bone, total knee arthroplasty revision, insertion of stimulan with Dr. Washington  While intraoperative, pt became acutely tachycardia w/ new AF RVR & remained intubated; continued hypotension despite IVF resuscitation & required Levophed  Hydrocortisone added   03/29: S/p amputation left 2nd toe for osteo found on MRI   ID following; appreciate continued recommendations; final recommendations on 04/01  Continue Ancef  Likely will require 6-week course  IR consulted for PICC placement  BP stable off steroids  Repeat BCx negative x 4 days  Remains off vasopressors

## 2024-03-31 NOTE — PROGRESS NOTES
[facility]  Progress Note  Name: Armen Llanos I  MRN: [id number]  Unit/Bed#: -01 I Date of Admission: 3/22/2024   Date of Service: 3/31/2024 I Hospital Day: 9    Assessment/Plan   Acute osteomyelitis of left foot (HCC)  Assessment & Plan  As evidenced by MRI on 03/26   Podiatry following   S/p left toe amputation on 03/29     Anxiety  Assessment & Plan  Anxiety managed today  Psychiatry consulted  Continue Klonopin, Cymbalta, and risperidone    Cardiomyopathy (HCC)  Assessment & Plan  ECHO (03/25): EF 45% w/ mild global hypokinesis   Cardiology following  Continue Eliquis, Toprol-XL & amlodipine  Per cardiology note, pt recommended to start Jardiance & Entresto which was price checked but then d/c'd; clarification needed from cardiology if these are to be resumed at d/c     A-fib with RVR (HCC)  Assessment & Plan  New onset A. Fib w/ RVR which presented intraoperatively  Likely exacerbated 2/2 critical illness, bacteremia, & stress from OR  Pt required amiodarone and Cardizem gtt's   Etiology consulted  Currently sinus; rate controlled  Continue Eliquis, amiodarone, & Toprol-XL  Ambulatory referral for cardiology at discharge      MSSA bacteremia  Assessment & Plan  03/22: BC x 2 w/ Staph aureus  Etiology 2/2 prosthetic joint infection right knee & left toe osteomyelitis  S/p R knee I&D to bone, total knee arthroplasty revision, insertion of stimulan by Dr. 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 Pineda, patient does not meet criteria for IP stay  Continue Klonopin, Cymbalta, and risperidone    TBI (traumatic brain injury) ([facility])  Assessment & Plan  H/o TBI at 7 years old  Memory loss at baseline  Supportive care    Septic shock (HCC)  Assessment & Plan  POA w/ tachycardia, leukocytosis (17), lactic acidosis (1.4), elevated procal (3.39), & elevated Cr. 1.6 2/2 MSSA bacteremia, MSSA right TKR infxn, & left 2nd toe osteo. On 03/24, pt developed postoperative fever (103.5 °F) & hypotension which required vasopressors & continued postop intubation.   03/22: BC x 2 w/ Staph aureus  03/22: R knee aspirate positive for MSSA & WBCs  03/24:   Intra-op R knee sample positive for MSSA   S/p R knee I&D to bone, total knee arthroplasty revision, insertion of stimulan with Dr. Washington  While intraoperative, pt became acutely tachycardia w/ new AF RVR & remained intubated; continued hypotension despite IVF resuscitation & required Levophed  Hydrocortisone added   03/29: S/p amputation left 2nd toe for osteo found on MRI   ID following; appreciate continued recommendations; final recommendations on 04/01  Continue Ancef  Likely will require 6-week course  IR consulted for PICC placement  BP stable off steroids  Repeat BCx negative x 4 days  Remains off vasopressors    Essential hypertension  Assessment & Plan  Home regimen: Metoprolol 25mg daily, amlodipine 5mg daily, & lisinopril-hydrochlorothiazide 20-25mg   Amlodipine and lisinopril previously held 2/2 JH  Amlodipine restarted on 03/29  Restart lisinopril when appropriate   -140     Chronic, continuous use of opioids  Assessment & Plan  Home regimen: Morphine 60mg BID & fentanyl patch 75mcg/hr   Today, pt claims he is taking 200 mg Morphine q day   Prior provider called pt's PCP (Dr. 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)  Assessment & Plan  H/o R knee arthoplasty at an outside facility who presented w/ acute pain, trouble ambulating, warmth & erythema - found to have a right knee periprosthetic joint infection   Xray knee (03/22): Stable hardware/bone alignment; no other acute findings  S/p joint aspiration (03/23) w/ staph growth & gross WBCs   Bcx + w/ Staph aureus  S/p right knee I&D w/ polyethylene exchange for periprosthetic joint infection on 03/24   WBAT RLE  Orthopedics following; appreciate continued recommendations    * Type 2 diabetes mellitus with ketoacidosis without coma, without long-term current use of insulin ([facility])  Assessment & Plan  Lab Results   Component Value Date    HGBA1C 12.1 (H) 03/22/2024     Recent Labs     03/30/24  1622 03/30/24  2055 03/31/24  0737 03/31/24  1121   POCGLU 221* 267* 251* 333*     Blood Sugar Average: Last 72 hrs:  (P) [id number]     POA: Glucose > 600, BHB 1.4, & pH 7.35 -required IVF recitation & insulin gtt   Home regimen: Metformin & Glimepiride  Endocrinology consulted & recommended basal/mealtime insulin regimen  Increase Lispro from 10 U to 13 U w/ meals & SSI AC/QHS   Increase Lantus from 38 to 40 U q day   Diabetes education when appropriate   CHO controlled diet   Endocrinology ambulatory referral recommended at discharge                 VTE Pharmacologic Prophylaxis: VTE Score: 2 Moderate Risk (Score 3-4) - Pharmacological DVT Prophylaxis Ordered: apixaban (Eliquis).    Mobility:   Basic Mobility Inpatient Raw Score: 14  -HLM Goal: 4: Move to chair/commode  -HLM Achieved: 7: Walk 25 feet or  more  JH-HLM Goal NOT achieved. Continue with multidisciplinary rounding and encourage appropriate mobility to improve upon JH-HLM goals.    Patient Centered Rounds: I performed bedside rounds with nursing staff today.   Discussions with Specialists or Other Care Team Provider:     Education and Discussions with Family / Patient:  Patient.     Total Time Spent on Date of Encounter in care of patient: 30 mins. This time was spent on one or more of the following: performing physical exam; counseling and coordination of care; obtaining or reviewing history; documenting in the medical record; reviewing/ordering tests, medications or procedures; communicating with other healthcare professionals and discussing with patient's family/caregivers.    Current Length of Stay: 9 day(s)  Current Patient Status: Inpatient   Certification Statement: The patient will continue to require additional inpatient hospital stay due to PICC placement  Discharge Plan: Anticipate discharge in 24-48 hrs to rehab facility.    Code Status: Level 1 - Full Code    Subjective:   Patient feels okay. no acute complaints    Objective:     Vitals:   Temp (24hrs), Av.4 °F (36.9 °C), Min:97.9 °F (36.6 °C), Max:99 °F (37.2 °C)    Temp:  [97.9 °F (36.6 °C)-99 °F (37.2 °C)] 98.2 °F (36.8 °C)  HR:  [72-96] 72  Resp:  [14-18] 14  BP: (132-150)/(72-92) 150/72  SpO2:  [97 %-98 %] 97 %  Body mass index is 23.62 kg/m².     Input and Output Summary (last 24 hours):     Intake/Output Summary (Last 24 hours) at 3/31/2024 1243  Last data filed at 3/31/2024 0636  Gross per 24 hour   Intake 900 ml   Output 2252 ml   Net -1352 ml       Physical Exam:   Physical Exam  Constitutional:       Appearance: He is normal weight.   HENT:      Head: Normocephalic.      Right Ear: External ear normal.      Left Ear: External ear normal.      Nose: Nose normal.      Mouth/Throat:      Mouth: Mucous membranes are moist.      Pharynx: Oropharynx is clear.   Eyes:      Extraocular  Movements: Extraocular movements intact.      Conjunctiva/sclera: Conjunctivae normal.   Cardiovascular:      Rate and Rhythm: Normal rate and regular rhythm.      Pulses: Normal pulses.      Heart sounds: Normal heart sounds.   Pulmonary:      Effort: Pulmonary effort is normal.      Breath sounds: Normal breath sounds.   Abdominal:      General: Abdomen is flat. Bowel sounds are normal. There is no distension.      Palpations: Abdomen is soft. There is no mass.      Tenderness: There is no abdominal tenderness.   Musculoskeletal:         General: Normal range of motion.      Cervical back: Normal range of motion.   Skin:     General: Skin is warm and dry.   Neurological:      General: No focal deficit present.      Mental Status: He is alert. Mental status is at baseline.          Additional Data:     Labs:  Results from last 7 days   Lab Units 03/31/24  0410 03/30/24  0446 03/28/24  1000 03/27/24  0209 03/26/24  0605 03/25/24  0449   WBC Thousand/uL 9.13 10.25*   < > 9.41   < > 10.54*   HEMOGLOBIN g/dL 10.6* 11.1*   < > 11.5*   < > 11.1*   HEMATOCRIT % 32.8* 34.2*   < > 34.1*   < > 31.3*   PLATELETS Thousands/uL 436* 408*   < > 267   < > 155   BANDS PCT %  --   --   --   --   --  6   NEUTROS PCT %  --   --   --  84*   < >  --    LYMPHS PCT %  --   --   --  7*   < >  --    LYMPHO PCT %  --  19   < >  --   --  3*   MONOS PCT %  --   --   --  7   < >  --    MONO PCT %  --  10   < >  --   --  4   EOS PCT %  --  3   < > 0   < > 0    < > = values in this interval not displayed.     Results from last 7 days   Lab Units 03/31/24  0410   SODIUM mmol/L 134*   POTASSIUM mmol/L 4.1   CHLORIDE mmol/L 102   CO2 mmol/L 23   BUN mg/dL 10   CREATININE mg/dL 0.92   ANION GAP mmol/L 9   CALCIUM mg/dL 8.2*   ALBUMIN g/dL 2.7*   TOTAL BILIRUBIN mg/dL 0.48   ALK PHOS U/L 52   ALT U/L 5*   AST U/L 11*   GLUCOSE RANDOM mg/dL 272*         Results from last 7 days   Lab Units 03/31/24  1121 03/31/24  0737 03/30/24  2055 03/30/24  1622  03/30/24  1110 03/30/24  0800 03/29/24  2203 03/29/24  1805 03/29/24  1633 03/29/24  1404 03/29/24  1152 03/29/24  1016   POC GLUCOSE mg/dl 333* 251* 267* 221* 315* 196* 105 113 157* 208* 199* 229*         Results from last 7 days   Lab Units 03/27/24  0209 03/26/24  0605 03/25/24  0449   PROCALCITONIN ng/ml 1.55* 2.78* 5.42*       Lines/Drains:  Invasive Devices       Peripheral Intravenous Line  Duration             Peripheral IV 03/30/24 Right;Ventral (anterior) Forearm <1 day                          Imaging: No pertinent imaging reviewed.    Recent Cultures (last 7 days):   Results from last 7 days   Lab Units 03/26/24  1427 03/26/24  1416 03/25/24  0521   BLOOD CULTURE  No Growth After 4 Days. No Growth After 4 Days. Staphylococcus aureus*  Staphylococcus aureus*   GRAM STAIN RESULT   --   --  Gram positive cocci in clusters*  Gram positive cocci in clusters*       Last 24 Hours Medication List:   Current Facility-Administered Medications   Medication Dose Route Frequency Provider Last Rate    acetaminophen  650 mg Oral Q6H PRN Bambi Marcelo PA-C      amiodarone  400 mg Oral BID With Meals Bambi Marcelo PA-C      Followed by    [START ON 4/5/2024] amiodarone  200 mg Oral Daily With Breakfast Bambi Marcelo PA-C      amLODIPine  5 mg Oral Daily Flaquita Murphy PA-C      apixaban  5 mg Oral BID Flaquita Murphy PA-C      bupivacaine liposomal  20 mL Infiltration Once Bambi Marcelo PA-C      butalbital-acetaminophen-caffeine  1 tablet Oral Q4H PRN Bambi Marcelo PA-C      cefazolin  2,000 mg Intravenous Q8H Bambi Marcelo PA-C 2,000 mg (03/31/24 1150)    clonazePAM  1 mg Oral Daily Bambi Marcelo PA-C      docusate sodium  200 mg Oral HS Bambi Marcelo PA-C      DULoxetine  20 mg Oral Daily Bambi Marcelo PA-C      fentaNYL  1 patch Transdermal Q72H Bambi Marcelo PA-C      HYDROmorphone  0.2 mg Intravenous Q6H PRN Bambi Blackwood PA-C      [START ON 4/1/2024]  insulin glargine  40 Units Subcutaneous QAM Radha Salazar MD      insulin lispro  1-6 Units Subcutaneous TID AC Esha العراقي MD      insulin lispro  13 Units Subcutaneous TID With Meals Radha Salazar MD      melatonin  3 mg Oral HS Flaquita Murphy PA-C      methocarbamol  500 mg Oral Q6H PRN Bambi Marcelo PA-C      metoprolol  5 mg Intravenous Q6H PRN Bambi Marcelo PA-C      metoprolol succinate  25 mg Oral Daily Bambi Marcelo PA-C      morphine  60 mg Oral Q12H DARIN Flaquita Murphy PA-C      naproxen  500 mg Oral BID With Meals Bambi Marcelo PA-C      nicotine  21 mg Transdermal HS Bambi Marcelo PA-C      ondansetron  4 mg Intravenous Q4H PRN Bambi Marcelo PA-C      oxyCODONE  5 mg Oral Q4H PRN Flaquita Murphy PA-C      Or    oxyCODONE  7.5 mg Oral Q4H PRN Flaquita Murphy PA-C      pantoprazole  20 mg Oral BID AC Bambi Marcelo PA-C      polyethylene glycol  17 g Per NG Tube Daily Bambi Marcelo PA-C      pravastatin  80 mg Oral Daily With Dinner Bambi Marcelo PA-C      risperiDONE  3 mg Oral Daily Bambi Marcelo PA-C      zolpidem  5 mg Oral HS PRN Bambi Marcelo PA-C          Today, Patient Was Seen By: Radha Salazar MD    **Please Note: This note may have been constructed using a voice recognition system.**

## 2024-03-31 NOTE — PLAN OF CARE
Problem: PAIN - ADULT  Goal: Verbalizes/displays adequate comfort level or baseline comfort level  Description: Interventions:  - Encourage patient to monitor pain and request assistance  - Assess pain using appropriate pain scale  - Administer analgesics based on type and severity of pain and evaluate response  - Implement non-pharmacological measures as appropriate and evaluate response  - Consider cultural and social influences on pain and pain management  - Notify physician/advanced practitioner if interventions unsuccessful or patient reports new pain  Outcome: Progressing     Problem: INFECTION - ADULT  Goal: Absence or prevention of progression during hospitalization  Description: INTERVENTIONS:  - Assess and monitor for signs and symptoms of infection  - Monitor lab/diagnostic results  - Monitor all insertion sites, i.e. indwelling lines, tubes, and drains  - Monitor endotracheal if appropriate and nasal secretions for changes in amount and color  - [place] appropriate cooling/warming therapies per order  - Administer medications as ordered  - Instruct and encourage patient and family to use good hand hygiene technique  - Identify and instruct in appropriate isolation precautions for identified infection/condition  Outcome: Progressing  Goal: Absence of fever/infection during neutropenic period  Description: INTERVENTIONS:  - Monitor WBC    Outcome: Progressing     Problem: SAFETY ADULT  Goal: Patient will remain free of falls  Description: INTERVENTIONS:  - Educate patient/family on patient safety including physical limitations  - Instruct patient to call for assistance with activity   - Consult OT/PT to assist with strengthening/mobility   - Keep Call bell within reach  - Keep bed low and locked with side rails adjusted as appropriate  - Keep care items and personal belongings within reach  - Initiate and maintain comfort rounds  - Make Fall Risk Sign visible to staff  - Offer Toileting every 2 Hours,  in advance of need  - Initiate/Maintain 2alarm  - Obtain necessary fall risk management equipment: 2  - Apply yellow socks and bracelet for high fall risk patients  - Consider moving patient to room near nurses station  Outcome: Progressing  Goal: Maintain or return to baseline ADL function  Description: INTERVENTIONS:  -  Assess patient's ability to carry out ADLs; assess patient's baseline for ADL function and identify physical deficits which impact ability to perform ADLs (bathing, care of mouth/teeth, toileting, grooming, dressing, etc.)  - Assess/evaluate cause of self-care deficits   - Assess range of motion  - Assess patient's mobility; develop plan if impaired  - Assess patient's need for assistive devices and provide as appropriate  - Encourage maximum independence but intervene and supervise when necessary  - Involve family in performance of ADLs  - Assess for home care needs following discharge   - Consider OT consult to assist with ADL evaluation and planning for discharge  - Provide patient education as appropriate  Outcome: Progressing  Goal: Maintains/Returns to pre admission functional level  Description: INTERVENTIONS:  - Perform AM-PAC 6 Click Basic Mobility/ Daily Activity assessment daily.  - Set and communicate daily mobility goal to care team and patient/family/caregiver.   - Collaborate with rehabilitation services on mobility goals if consulted  - Perform Range of Motion 2 times a day.  - Reposition patient every 1 hours.  - Dangle patient 2 times a day  - Stand patient 2 times a day  - Ambulate patient 2 times a day  - Out of bed to chair 2 times a day   - Out of bed for meals 2 times a day  - Out of bed for toileting  - Record patient progress and toleration of activity level   Outcome: Progressing     Problem: DISCHARGE PLANNING  Goal: Discharge to home or other facility with appropriate resources  Description: INTERVENTIONS:  - Identify barriers to discharge w/patient and caregiver  -  Arrange for needed discharge resources and transportation as appropriate  - Identify discharge learning needs (meds, wound care, etc.)  - Arrange for interpretive services to assist at discharge as needed  - Refer to Case Management Department for coordinating discharge planning if the patient needs post-hospital services based on physician/advanced practitioner order or complex needs related to functional status, cognitive ability, or social support system  Outcome: Progressing     Problem: Knowledge Deficit  Goal: Patient/family/caregiver demonstrates understanding of disease process, treatment plan, medications, and discharge instructions  Description: Complete learning assessment and assess knowledge base.  Interventions:  - Provide teaching at level of understanding  - Provide teaching via preferred learning methods  Outcome: Progressing     Problem: Prexisting or High Potential for Compromised Skin Integrity  Goal: Skin integrity is maintained or improved  Description: INTERVENTIONS:  - Identify patients at risk for skin breakdown  - Assess and monitor skin integrity  - Assess and monitor nutrition and hydration status  - Monitor labs   - Assess for incontinence   - Turn and reposition patient  - Assist with mobility/ambulation  - Relieve pressure over bony prominences  - Avoid friction and shearing  - Provide appropriate hygiene as needed including keeping skin clean and dry  - Evaluate need for skin moisturizer/barrier cream  - Collaborate with interdisciplinary team   - Patient/family teaching  - Consider wound care consult   Outcome: Progressing     Problem: NEUROSENSORY - ADULT  Goal: Achieves stable or improved neurological status  Description: INTERVENTIONS  - Monitor and report changes in neurological status  - Monitor vital signs such as temperature, blood pressure, glucose, and any other labs ordered   - Initiate measures to prevent increased intracranial pressure  - Monitor for seizure activity and  implement precautions if appropriate      Outcome: Progressing  Goal: Remains free of injury related to seizures activity  Description: INTERVENTIONS  - Maintain airway, patient safety  and administer oxygen as ordered  - Monitor patient for seizure activity, document and report duration and description of seizure to physician/advanced practitioner  - If seizure occurs,  ensure patient safety during seizure  - Reorient patient post seizure  - Seizure pads on all 4 side rails  - Instruct patient/family to notify RN of any seizure activity including if an aura is experienced  - Instruct patient/family to call for assistance with activity based on nursing assessment  - Administer anti-seizure medications if ordered    Outcome: Progressing  Goal: Achieves maximal functionality and self care  Description: INTERVENTIONS  - Monitor swallowing and airway patency with patient fatigue and changes in neurological status  - Encourage and assist patient to increase activity and self care.   - Encourage visually impaired, hearing impaired and aphasic patients to use assistive/communication devices  Outcome: Progressing     Problem: CARDIOVASCULAR - ADULT  Goal: Maintains optimal cardiac output and hemodynamic stability  Description: INTERVENTIONS:  - Monitor I/O, vital signs and rhythm  - Monitor for S/S and trends of decreased cardiac output  - Administer and titrate ordered vasoactive medications to optimize hemodynamic stability  - Assess quality of pulses, skin color and temperature  - Assess for signs of decreased coronary artery perfusion  - Instruct patient to report change in severity of symptoms  Outcome: Progressing  Goal: Absence of cardiac dysrhythmias or at baseline rhythm  Description: INTERVENTIONS:  - Continuous cardiac monitoring, vital signs, obtain 12 lead EKG if ordered  - Administer antiarrhythmic and heart rate control medications as ordered  - Monitor electrolytes and administer replacement therapy as  ordered  Outcome: Progressing     Problem: RESPIRATORY - ADULT  Goal: Achieves optimal ventilation and oxygenation  Description: INTERVENTIONS:  - Assess for changes in respiratory status  - Assess for changes in mentation and behavior  - Position to facilitate oxygenation and minimize respiratory effort  - Oxygen administered by appropriate delivery if ordered  - Initiate smoking cessation education as indicated  - Encourage broncho-pulmonary hygiene including cough, deep breathe, Incentive Spirometry  - Assess the need for suctioning and aspirate as needed  - Assess and instruct to report SOB or any respiratory difficulty  - Respiratory Therapy support as indicated  Outcome: Progressing     Problem: GASTROINTESTINAL - ADULT  Goal: Minimal or absence of nausea and/or vomiting  Description: INTERVENTIONS:  - Administer IV fluids if ordered to ensure adequate hydration  - Maintain NPO status until nausea and vomiting are resolved  - Nasogastric tube if ordered  - Administer ordered antiemetic medications as needed  - Provide nonpharmacologic comfort measures as appropriate  - Advance diet as tolerated, if ordered  - Consider nutrition services referral to assist patient with adequate nutrition and appropriate food choices  Outcome: Progressing  Goal: Maintains or returns to baseline bowel function  Description: INTERVENTIONS:  - Assess bowel function  - Encourage oral fluids to ensure adequate hydration  - Administer IV fluids if ordered to ensure adequate hydration  - Administer ordered medications as needed  - Encourage mobilization and activity  - Consider nutritional services referral to assist patient with adequate nutrition and appropriate food choices  Outcome: Progressing  Goal: Maintains adequate nutritional intake  Description: INTERVENTIONS:  - Monitor percentage of each meal consumed  - Identify factors contributing to decreased intake, treat as appropriate  - Assist with meals as needed  - Monitor I&O,  weight, and lab values if indicated  - Obtain nutrition services referral as needed  Outcome: Progressing  Goal: Establish and maintain optimal ostomy function  Description: INTERVENTIONS:  - Assess bowel function  - Encourage oral fluids to ensure adequate hydration  - Administer IV fluids if ordered to ensure adequate hydration   - Administer ordered medications as needed  - Encourage mobilization and activity  - Nutrition services referral to assist patient with appropriate food choices  - Assess stoma site  - Consider wound care consult   Outcome: Progressing  Goal: Oral mucous membranes remain intact  Description: INTERVENTIONS  - Assess oral mucosa and hygiene practices  - Implement preventative oral hygiene regimen  - Implement oral medicated treatments as ordered  - Initiate Nutrition services referral as needed  Outcome: Progressing     Problem: GENITOURINARY - ADULT  Goal: Maintains or returns to baseline urinary function  Description: INTERVENTIONS:  - Assess urinary function  - Encourage oral fluids to ensure adequate hydration if ordered  - Administer IV fluids as ordered to ensure adequate hydration  - Administer ordered medications as needed  - Offer frequent toileting  - Follow urinary retention protocol if ordered  Outcome: Progressing  Goal: Absence of urinary retention  Description: INTERVENTIONS:  - Assess patient’s ability to void and empty bladder  - Monitor I/O  - Bladder scan as needed  - Discuss with physician/AP medications to alleviate retention as needed  - Discuss catheterization for long term situations as appropriate  Outcome: Progressing  Goal: Urinary catheter remains patent  Description: INTERVENTIONS:  - Assess patency of urinary catheter  - If patient has a chronic campa, consider changing catheter if non-functioning  - Follow guidelines for intermittent irrigation of non-functioning urinary catheter  Outcome: Progressing     Problem: METABOLIC, FLUID AND ELECTROLYTES -  ADULT  Goal: Electrolytes maintained within normal limits  Description: INTERVENTIONS:  - Monitor labs and assess patient for signs and symptoms of electrolyte imbalances  - Administer electrolyte replacement as ordered  - Monitor response to electrolyte replacements, including repeat lab results as appropriate  - Instruct patient on fluid and nutrition as appropriate  Outcome: Progressing  Goal: Fluid balance maintained  Description: INTERVENTIONS:  - Monitor labs   - Monitor I/O and WT  - Instruct patient on fluid and nutrition as appropriate  - Assess for signs & symptoms of volume excess or deficit  Outcome: Progressing  Goal: Glucose maintained within target range  Description: INTERVENTIONS:  - Monitor Blood Glucose as ordered  - Assess for signs and symptoms of hyperglycemia and hypoglycemia  - Administer ordered medications to maintain glucose within target range  - Assess nutritional intake and initiate nutrition service referral as needed  Outcome: Progressing     Problem: SKIN/TISSUE INTEGRITY - ADULT  Goal: Skin Integrity remains intact(Skin Breakdown Prevention)  Description: Assess:  -Perform [place] assessment every shift  -Clean and moisturize skin every day  -Inspect skin when repositioning, toileting, and assisting with ADLS  -Assess extremities for adequate circulation and sensation     Bed Management:  -Have minimal linens on bed & keep smooth, unwrinkled  -Change linens as needed when moist or perspiring  -Avoid sitting or lying in one position for more than 2 hours while in bed  -Keep HOB at 30 degrees     Toileting:  -Offer bedside commode  -Assess for incontinence every shift  -Use incontinent care products after each incontinent episode such as foam    Activity:  -Mobilize patient 3 times a day  -Encourage activity and walks on unit  -Encourage or provide ROM exercises   -Turn and reposition patient every 2 Hours  -Use appropriate equipment to lift or move patient in bed  -Instruct/ Assist  with weight shifting every 120  when out of bed in chair  -Consider limitation of chair time 4 hour intervals    Skin Care:  -Avoid use of baby powder, tape, friction and shearing, hot water or constrictive clothing  -Relieve pressure over bony prominences using wedges  -Do not massage red bony areas    Next Steps:  -Teach patient strategies to minimize risks such as fall   -Consider consults to  interdisciplinary teams such as PT/OT  Outcome: Progressing  Goal: Incision(s), wounds(s) or drain site(s) healing without S/S of infection  Description: INTERVENTIONS  - Assess and document dressing, incision, wound bed, drain sites and surrounding tissue  - Provide patient and family education  - Perform skin care/dressing changes as ordered  Outcome: Progressing  Goal: Pressure injury heals and does not worsen  Description: Interventions:  - Implement low air loss mattress or specialty surface (Criteria met)  - Apply silicone foam dressing  - Instruct/assist with weight shifting every 120 minutes when in chair   - Limit chair time to 4 hour intervals  - Use special pressure reducing interventions such as pillows when in chair   - Apply fecal or urinary incontinence containment device   - Perform passive or active ROM every 8 hours  - Turn and reposition patient & offload bony prominences every 2 hours   - Utilize friction reducing device or surface for transfers   - Consider consults to  interdisciplinary teams such as PT/OT  - Use incontinent care products after each incontinent episode such as foam  - Consider nutrition services referral as needed  Outcome: Progressing     Problem: MUSCULOSKELETAL - ADULT  Goal: Maintain or return mobility to safest level of function  Description: INTERVENTIONS:  - Assess patient's ability to carry out ADLs; assess patient's baseline for ADL function and identify physical deficits which impact ability to perform ADLs (bathing, care of mouth/teeth, toileting, grooming, dressing,  etc.)  - Assess/evaluate cause of self-care deficits   - Assess range of motion  - Assess patient's mobility  - Assess patient's need for assistive devices and provide as appropriate  - Encourage maximum independence but intervene and supervise when necessary  - Involve family in performance of ADLs  - Assess for home care needs following discharge   - Consider OT consult to assist with ADL evaluation and planning for discharge  - Provide patient education as appropriate  Outcome: Progressing  Goal: Maintain proper alignment of affected body part  Description: INTERVENTIONS:  - Support, maintain and protect limb and body alignment  - Provide patient/ family with appropriate education  Outcome: Progressing     Problem: Nutrition/Hydration-ADULT  Goal: Nutrient/Hydration intake appropriate for improving, restoring or maintaining nutritional needs  Description: Monitor and assess patient's nutrition/hydration status for malnutrition. Collaborate with interdisciplinary team and initiate plan and interventions as ordered.  Monitor patient's weight and dietary intake as ordered or per policy. Utilize nutrition screening tool and intervene as necessary. Determine patient's food preferences and provide high-protein, high-caloric foods as appropriate.     INTERVENTIONS:  - Monitor oral intake, urinary output, labs, and treatment plans  - Assess nutrition and hydration status and recommend course of action  - Evaluate amount of meals eaten  - Assist patient with eating if necessary   - Allow adequate time for meals  - Recommend/ encourage appropriate diets, oral nutritional supplements, and vitamin/mineral supplements  - Order, calculate, and assess calorie counts as needed  - Recommend, monitor, and adjust tube feedings and TPN/PPN based on assessed needs  - Assess need for intravenous fluids  - Provide specific nutrition/hydration education as appropriate  - Include patient/family/caregiver in decisions related to  nutrition  Outcome: Progressing

## 2024-03-31 NOTE — PLAN OF CARE
Problem: PAIN - ADULT  Goal: Verbalizes/displays adequate comfort level or baseline comfort level  Description: Interventions:  - Encourage patient to monitor pain and request assistance  - Assess pain using appropriate pain scale  - Administer analgesics based on type and severity of pain and evaluate response  - Implement non-pharmacological measures as appropriate and evaluate response  - Consider cultural and social influences on pain and pain management  - Notify physician/advanced practitioner if interventions unsuccessful or patient reports new pain  Outcome: Progressing     Problem: INFECTION - ADULT  Goal: Absence or prevention of progression during hospitalization  Description: INTERVENTIONS:  - Assess and monitor for signs and symptoms of infection  - Monitor lab/diagnostic results  - Monitor all insertion sites, i.e. indwelling lines, tubes, and drains  - Monitor endotracheal if appropriate and nasal secretions for changes in amount and color  - [place] appropriate cooling/warming therapies per order  - Administer medications as ordered  - Instruct and encourage patient and family to use good hand hygiene technique  - Identify and instruct in appropriate isolation precautions for identified infection/condition  Outcome: Progressing  Goal: Absence of fever/infection during neutropenic period  Description: INTERVENTIONS:  - Monitor WBC    Outcome: Progressing     Problem: SAFETY ADULT  Goal: Patient will remain free of falls  Description: INTERVENTIONS:  - Educate patient/family on patient safety including physical limitations  - Instruct patient to call for assistance with activity   - Consult OT/PT to assist with strengthening/mobility   - Keep Call bell within reach  - Keep bed low and locked with side rails adjusted as appropriate  - Keep care items and personal belongings within reach  - Initiate and maintain comfort rounds  - Make Fall Risk Sign visible to staff  - Offer Toileting every 2 Hours,  in advance of need  - Initiate/Maintain 2alarm  - Obtain necessary fall risk management equipment: 2  - Apply yellow socks and bracelet for high fall risk patients  - Consider moving patient to room near nurses station  Outcome: Progressing  Goal: Maintain or return to baseline ADL function  Description: INTERVENTIONS:  -  Assess patient's ability to carry out ADLs; assess patient's baseline for ADL function and identify physical deficits which impact ability to perform ADLs (bathing, care of mouth/teeth, toileting, grooming, dressing, etc.)  - Assess/evaluate cause of self-care deficits   - Assess range of motion  - Assess patient's mobility; develop plan if impaired  - Assess patient's need for assistive devices and provide as appropriate  - Encourage maximum independence but intervene and supervise when necessary  - Involve family in performance of ADLs  - Assess for home care needs following discharge   - Consider OT consult to assist with ADL evaluation and planning for discharge  - Provide patient education as appropriate  Outcome: Progressing  Goal: Maintains/Returns to pre admission functional level  Description: INTERVENTIONS:  - Perform AM-PAC 6 Click Basic Mobility/ Daily Activity assessment daily.  - Set and communicate daily mobility goal to care team and patient/family/caregiver.   - Collaborate with rehabilitation services on mobility goals if consulted  - Perform Range of Motion 2 times a day.  - Reposition patient every 1 hours.  - Dangle patient 2 times a day  - Stand patient 2 times a day  - Ambulate patient 2 times a day  - Out of bed to chair 2 times a day   - Out of bed for meals 2 times a day  - Out of bed for toileting  - Record patient progress and toleration of activity level   Outcome: Progressing     Problem: DISCHARGE PLANNING  Goal: Discharge to home or other facility with appropriate resources  Description: INTERVENTIONS:  - Identify barriers to discharge w/patient and caregiver  -  Arrange for needed discharge resources and transportation as appropriate  - Identify discharge learning needs (meds, wound care, etc.)  - Arrange for interpretive services to assist at discharge as needed  - Refer to Case Management Department for coordinating discharge planning if the patient needs post-hospital services based on physician/advanced practitioner order or complex needs related to functional status, cognitive ability, or social support system  Outcome: Progressing     Problem: Nutrition/Hydration-ADULT  Goal: Nutrient/Hydration intake appropriate for improving, restoring or maintaining nutritional needs  Description: Monitor and assess patient's nutrition/hydration status for malnutrition. Collaborate with interdisciplinary team and initiate plan and interventions as ordered.  Monitor patient's weight and dietary intake as ordered or per policy. Utilize nutrition screening tool and intervene as necessary. Determine patient's food preferences and provide high-protein, high-caloric foods as appropriate.     INTERVENTIONS:  - Monitor oral intake, urinary output, labs, and treatment plans  - Assess nutrition and hydration status and recommend course of action  - Evaluate amount of meals eaten  - Assist patient with eating if necessary   - Allow adequate time for meals  - Recommend/ encourage appropriate diets, oral nutritional supplements, and vitamin/mineral supplements  - Order, calculate, and assess calorie counts as needed  - Recommend, monitor, and adjust tube feedings and TPN/PPN based on assessed needs  - Assess need for intravenous fluids  - Provide specific nutrition/hydration education as appropriate  - Include patient/family/caregiver in decisions related to nutrition  Outcome: Progressing     Problem: MUSCULOSKELETAL - ADULT  Goal: Maintain or return mobility to safest level of function  Description: INTERVENTIONS:  - Assess patient's ability to carry out ADLs; assess patient's baseline for  ADL function and identify physical deficits which impact ability to perform ADLs (bathing, care of mouth/teeth, toileting, grooming, dressing, etc.)  - Assess/evaluate cause of self-care deficits   - Assess range of motion  - Assess patient's mobility  - Assess patient's need for assistive devices and provide as appropriate  - Encourage maximum independence but intervene and supervise when necessary  - Involve family in performance of ADLs  - Assess for home care needs following discharge   - Consider OT consult to assist with ADL evaluation and planning for discharge  - Provide patient education as appropriate  Outcome: Progressing  Goal: Maintain proper alignment of affected body part  Description: INTERVENTIONS:  - Support, maintain and protect limb and body alignment  - Provide patient/ family with appropriate education  Outcome: Progressing     Problem: SKIN/TISSUE INTEGRITY - ADULT  Goal: Skin Integrity remains intact(Skin Breakdown Prevention)  Description: Assess:  -Perform [place] assessment every 8  -Clean and moisturize skin every 6-8  -Inspect skin when repositioning, toileting, and assisting with ADLS  -Assess extremities for adequate circulation and sensation     Bed Management:  -Have minimal linens on bed & keep smooth, unwrinkled  -Change linens as needed when moist or perspiring  -Avoid sitting or lying in one position for more than 2 hours while in bed  -Keep HOB at 30 degrees     Activity:  -Mobilize patient 3 times a day  -Encourage activity and walks on unit  -Encourage or provide ROM exercises   -Turn and reposition patient every 3 Hours  -Use appropriate equipment to lift or move patient in bed  -Instruct/ Assist with weight shifting every 3 when out of bed in chair  -Consider limitation of chair time 3 hour intervals    Skin Care:  -Avoid use of baby powder, tape, friction and shearing, hot water or constrictive clothing  -Relieve pressure over bony prominences using padding  -Do not massage  red bony areas    Next Steps:  -Teach patient strategies to minimize risks such as repositioning   Outcome: Progressing  Goal: Incision(s), wounds(s) or drain site(s) healing without S/S of infection  Description: INTERVENTIONS  - Assess and document dressing, incision, wound bed, drain sites and surrounding tissue  - Provide patient and family education  - Perform skin care/dressing changes whenever necessary  Outcome: Progressing  Goal: Pressure injury heals and does not worsen  Description: Interventions:  - Implement low air loss mattress or specialty surface (Criteria met)  - Apply silicone foam dressing  - Instruct/assist with weight shifting every 30 minutes when in chair   - Limit chair time to 3 hour intervals  - Use special pressure reducing interventions such as pads when in chair   - Apply fecal or urinary incontinence containment device   - Perform passive or active ROM every 4  - Turn and reposition patient & offload bony prominences every 2 hours   - Utilize friction reducing device or surface for transfers   - Use incontinent care products after each incontinent episode such as wipes and barrier creams  - Consider nutrition services referral as needed  Outcome: Progressing     Problem: METABOLIC, FLUID AND ELECTROLYTES - ADULT  Goal: Electrolytes maintained within normal limits  Description: INTERVENTIONS:  - Monitor labs and assess patient for signs and symptoms of electrolyte imbalances  - Administer electrolyte replacement as ordered  - Monitor response to electrolyte replacements, including repeat lab results as appropriate  - Instruct patient on fluid and nutrition as appropriate  Outcome: Progressing  Goal: Fluid balance maintained  Description: INTERVENTIONS:  - Monitor labs   - Monitor I/O and WT  - Instruct patient on fluid and nutrition as appropriate  - Assess for signs & symptoms of volume excess or deficit  Outcome: Progressing  Goal: Glucose maintained within target range  Description:  INTERVENTIONS:  - Monitor Blood Glucose as ordered  - Assess for signs and symptoms of hyperglycemia and hypoglycemia  - Administer ordered medications to maintain glucose within target range  - Assess nutritional intake and initiate nutrition service referral as needed  Outcome: Progressing     Problem: GENITOURINARY - ADULT  Goal: Maintains or returns to baseline urinary function  Description: INTERVENTIONS:  - Assess urinary function  - Encourage oral fluids to ensure adequate hydration if ordered  - Administer IV fluids as ordered to ensure adequate hydration  - Administer ordered medications as needed  - Offer frequent toileting  - Follow urinary retention protocol if ordered  Outcome: Progressing  Goal: Absence of urinary retention  Description: INTERVENTIONS:  - Assess patient’s ability to void and empty bladder  - Monitor I/O  - Bladder scan as needed  - Discuss with physician/AP medications to alleviate retention as needed  - Discuss catheterization for long term situations as appropriate  Outcome: Progressing  Goal: Urinary catheter remains patent  Description: INTERVENTIONS:  - Assess patency of urinary catheter  - If patient has a chronic campa, consider changing catheter if non-functioning  - Follow guidelines for intermittent irrigation of non-functioning urinary catheter  Outcome: Progressing     Problem: GASTROINTESTINAL - ADULT  Goal: Minimal or absence of nausea and/or vomiting  Description: INTERVENTIONS:  - Administer IV fluids if ordered to ensure adequate hydration  - Maintain NPO status until nausea and vomiting are resolved  - Nasogastric tube if ordered  - Administer ordered antiemetic medications as needed  - Provide nonpharmacologic comfort measures as appropriate  - Advance diet as tolerated, if ordered  - Consider nutrition services referral to assist patient with adequate nutrition and appropriate food choices  Outcome: Progressing  Goal: Maintains or returns to baseline bowel  function  Description: INTERVENTIONS:  - Assess bowel function  - Encourage oral fluids to ensure adequate hydration  - Administer IV fluids if ordered to ensure adequate hydration  - Administer ordered medications as needed  - Encourage mobilization and activity  - Consider nutritional services referral to assist patient with adequate nutrition and appropriate food choices  Outcome: Progressing  Goal: Maintains adequate nutritional intake  Description: INTERVENTIONS:  - Monitor percentage of each meal consumed  - Identify factors contributing to decreased intake, treat as appropriate  - Assist with meals as needed  - Monitor I&O, weight, and lab values if indicated  - Obtain nutrition services referral as needed  Outcome: Progressing  Goal: Establish and maintain optimal ostomy function  Description: INTERVENTIONS:  - Assess bowel function  - Encourage oral fluids to ensure adequate hydration  - Administer IV fluids if ordered to ensure adequate hydration   - Administer ordered medications as needed  - Encourage mobilization and activity  - Nutrition services referral to assist patient with appropriate food choices  - Assess stoma site  - Consider wound care consult   Outcome: Progressing  Goal: Oral mucous membranes remain intact  Description: INTERVENTIONS  - Assess oral mucosa and hygiene practices  - Implement preventative oral hygiene regimen  - Implement oral medicated treatments as ordered  - Initiate Nutrition services referral as needed  Outcome: Progressing     Problem: RESPIRATORY - ADULT  Goal: Achieves optimal ventilation and oxygenation  Description: INTERVENTIONS:  - Assess for changes in respiratory status  - Assess for changes in mentation and behavior  - Position to facilitate oxygenation and minimize respiratory effort  - Oxygen administered by appropriate delivery if ordered  - Initiate smoking cessation education as indicated  - Encourage broncho-pulmonary hygiene including cough, deep breathe,  Incentive Spirometry  - Assess the need for suctioning and aspirate as needed  - Assess and instruct to report SOB or any respiratory difficulty  - Respiratory Therapy support as indicated  Outcome: Progressing

## 2024-03-31 NOTE — ASSESSMENT & PLAN NOTE
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 Washington  S/p left toe amputation  Continue Ancef; IR consulted for PICC placement; suspect at least 6 weeks of antibiotics  ID following; final recommendations on 04/01  Repeat Bcx w/o growth x 4 days  Plan for PICC monday

## 2024-03-31 NOTE — ASSESSMENT & PLAN NOTE
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

## 2024-04-01 ENCOUNTER — APPOINTMENT (INPATIENT)
Dept: INTERVENTIONAL RADIOLOGY/VASCULAR | Facility: HOSPITAL | Age: 49
DRG: 485 | End: 2024-04-01
Payer: COMMERCIAL

## 2024-04-01 PROBLEM — A41.9 SEPTIC SHOCK (HCC): Status: RESOLVED | Noted: 2022-11-15 | Resolved: 2024-04-01

## 2024-04-01 PROBLEM — E87.1 HYPONATREMIA: Status: RESOLVED | Noted: 2024-03-23 | Resolved: 2024-04-01

## 2024-04-01 PROBLEM — R65.21 SEPTIC SHOCK (HCC): Status: RESOLVED | Noted: 2022-11-15 | Resolved: 2024-04-01

## 2024-04-01 LAB
ANION GAP SERPL CALCULATED.3IONS-SCNC: 8 MMOL/L (ref 4–13)
BASOPHILS # BLD MANUAL: 0.1 THOUSAND/UL (ref 0–0.1)
BASOPHILS NFR MAR MANUAL: 1 % (ref 0–1)
BUN SERPL-MCNC: 12 MG/DL (ref 5–25)
CALCIUM SERPL-MCNC: 8.5 MG/DL (ref 8.4–10.2)
CHLORIDE SERPL-SCNC: 103 MMOL/L (ref 96–108)
CO2 SERPL-SCNC: 23 MMOL/L (ref 21–32)
CREAT SERPL-MCNC: 1.06 MG/DL (ref 0.6–1.3)
EOSINOPHIL # BLD MANUAL: 0 THOUSAND/UL (ref 0–0.4)
EOSINOPHIL NFR BLD MANUAL: 0 % (ref 0–6)
ERYTHROCYTE [DISTWIDTH] IN BLOOD BY AUTOMATED COUNT: 12.7 % (ref 11.6–15.1)
GFR SERPL CREATININE-BSD FRML MDRD: 82 ML/MIN/1.73SQ M
GLUCOSE SERPL-MCNC: 148 MG/DL (ref 65–140)
GLUCOSE SERPL-MCNC: 172 MG/DL (ref 65–140)
GLUCOSE SERPL-MCNC: 206 MG/DL (ref 65–140)
GLUCOSE SERPL-MCNC: 224 MG/DL (ref 65–140)
GLUCOSE SERPL-MCNC: 233 MG/DL (ref 65–140)
HCT VFR BLD AUTO: 32.9 % (ref 36.5–49.3)
HGB BLD-MCNC: 10.7 G/DL (ref 12–17)
LYMPHOCYTES # BLD AUTO: 1.4 THOUSAND/UL (ref 0.6–4.47)
LYMPHOCYTES # BLD AUTO: 14 % (ref 14–44)
MCH RBC QN AUTO: 29.6 PG (ref 26.8–34.3)
MCHC RBC AUTO-ENTMCNC: 32.5 G/DL (ref 31.4–37.4)
MCV RBC AUTO: 91 FL (ref 82–98)
METAMYELOCYTES NFR BLD MANUAL: 2 % (ref 0–1)
MONOCYTES # BLD AUTO: 0.8 THOUSAND/UL (ref 0–1.22)
MONOCYTES NFR BLD: 8 % (ref 4–12)
MYELOCYTES NFR BLD MANUAL: 1 % (ref 0–1)
NEUTROPHILS # BLD MANUAL: 7.39 THOUSAND/UL (ref 1.85–7.62)
NEUTS BAND NFR BLD MANUAL: 1 % (ref 0–8)
NEUTS SEG NFR BLD AUTO: 73 % (ref 43–75)
PLATELET # BLD AUTO: 479 THOUSANDS/UL (ref 149–390)
PLATELET BLD QL SMEAR: ABNORMAL
PMV BLD AUTO: 9.8 FL (ref 8.9–12.7)
POTASSIUM SERPL-SCNC: 4.5 MMOL/L (ref 3.5–5.3)
RBC # BLD AUTO: 3.62 MILLION/UL (ref 3.88–5.62)
RBC MORPH BLD: NORMAL
SODIUM SERPL-SCNC: 134 MMOL/L (ref 135–147)
WBC # BLD AUTO: 9.99 THOUSAND/UL (ref 4.31–10.16)

## 2024-04-01 PROCEDURE — 85007 BL SMEAR W/DIFF WBC COUNT: CPT | Performed by: INTERNAL MEDICINE

## 2024-04-01 PROCEDURE — 99232 SBSQ HOSP IP/OBS MODERATE 35: CPT | Performed by: INTERNAL MEDICINE

## 2024-04-01 PROCEDURE — 97535 SELF CARE MNGMENT TRAINING: CPT

## 2024-04-01 PROCEDURE — 85027 COMPLETE CBC AUTOMATED: CPT | Performed by: INTERNAL MEDICINE

## 2024-04-01 PROCEDURE — 97116 GAIT TRAINING THERAPY: CPT

## 2024-04-01 PROCEDURE — C1751 CATH, INF, PER/CENT/MIDLINE: HCPCS

## 2024-04-01 PROCEDURE — 99232 SBSQ HOSP IP/OBS MODERATE 35: CPT | Performed by: PODIATRIST

## 2024-04-01 PROCEDURE — 82948 REAGENT STRIP/BLOOD GLUCOSE: CPT

## 2024-04-01 PROCEDURE — 97530 THERAPEUTIC ACTIVITIES: CPT

## 2024-04-01 PROCEDURE — 80048 BASIC METABOLIC PNL TOTAL CA: CPT | Performed by: INTERNAL MEDICINE

## 2024-04-01 PROCEDURE — 76937 US GUIDE VASCULAR ACCESS: CPT

## 2024-04-01 PROCEDURE — 05HY33Z INSERTION OF INFUSION DEVICE INTO UPPER VEIN, PERCUTANEOUS APPROACH: ICD-10-PCS | Performed by: INTERNAL MEDICINE

## 2024-04-01 RX ORDER — LANCETS 33 GAUGE
EACH MISCELLANEOUS
Qty: 200 EACH | Refills: 0 | Status: ON HOLD | OUTPATIENT
Start: 2024-04-01

## 2024-04-01 RX ORDER — PEN NEEDLE, DIABETIC 32GX 5/32"
NEEDLE, DISPOSABLE MISCELLANEOUS
Qty: 100 EACH | Refills: 0 | Status: ON HOLD | OUTPATIENT
Start: 2024-04-01

## 2024-04-01 RX ORDER — BLOOD-GLUCOSE METER
KIT MISCELLANEOUS
Qty: 1 KIT | Refills: 0 | Status: ON HOLD | OUTPATIENT
Start: 2024-04-01

## 2024-04-01 RX ORDER — CEFAZOLIN SODIUM 2 G/50ML
2000 SOLUTION INTRAVENOUS EVERY 8 HOURS
Qty: 108 EACH | Refills: 0 | Status: ON HOLD | OUTPATIENT
Start: 2024-04-02 | End: 2024-05-08

## 2024-04-01 RX ORDER — PEN NEEDLE, DIABETIC 32GX 5/32"
NEEDLE, DISPOSABLE MISCELLANEOUS
Qty: 100 EACH | Refills: 0 | Status: ON HOLD | OUTPATIENT
Start: 2024-04-01 | End: 2024-04-12 | Stop reason: ALTCHOICE

## 2024-04-01 RX ORDER — INSULIN GLARGINE 100 [IU]/ML
5 INJECTION, SOLUTION SUBCUTANEOUS ONCE
Status: DISCONTINUED | OUTPATIENT
Start: 2024-04-01 | End: 2024-04-03 | Stop reason: HOSPADM

## 2024-04-01 RX ORDER — INSULIN GLARGINE 100 [IU]/ML
45 INJECTION, SOLUTION SUBCUTANEOUS EVERY MORNING
Status: DISCONTINUED | OUTPATIENT
Start: 2024-04-02 | End: 2024-04-03 | Stop reason: HOSPADM

## 2024-04-01 RX ORDER — INSULIN GLARGINE 100 [IU]/ML
35 INJECTION, SOLUTION SUBCUTANEOUS
Qty: 10.5 ML | Refills: 2 | Status: SHIPPED | OUTPATIENT
Start: 2024-04-01 | End: 2024-04-01

## 2024-04-01 RX ORDER — BLOOD SUGAR DIAGNOSTIC
STRIP MISCELLANEOUS
Qty: 200 EACH | Refills: 0 | Status: ON HOLD | OUTPATIENT
Start: 2024-04-01

## 2024-04-01 RX ORDER — INSULIN GLARGINE 100 [IU]/ML
45 INJECTION, SOLUTION SUBCUTANEOUS
Qty: 10.5 ML | Refills: 2 | Status: ON HOLD | OUTPATIENT
Start: 2024-04-01

## 2024-04-01 RX ORDER — GLUCOSAMINE HCL/CHONDROITIN SU 500-400 MG
CAPSULE ORAL
Qty: 200 EACH | Refills: 0 | Status: ON HOLD | OUTPATIENT
Start: 2024-04-01

## 2024-04-01 RX ORDER — INSULIN LISPRO 100 [IU]/ML
13 INJECTION, SOLUTION INTRAVENOUS; SUBCUTANEOUS
Qty: 15 ML | Refills: 2 | Status: ON HOLD | OUTPATIENT
Start: 2024-04-01

## 2024-04-01 RX ORDER — INSULIN LISPRO 100 [IU]/ML
10 INJECTION, SOLUTION INTRAVENOUS; SUBCUTANEOUS
Qty: 15 ML | Refills: 2 | Status: SHIPPED | OUTPATIENT
Start: 2024-04-01 | End: 2024-04-01

## 2024-04-01 RX ORDER — LIDOCAINE HYDROCHLORIDE 10 MG/ML
INJECTION, SOLUTION EPIDURAL; INFILTRATION; INTRACAUDAL; PERINEURAL AS NEEDED
Status: COMPLETED | OUTPATIENT
Start: 2024-04-01 | End: 2024-04-01

## 2024-04-01 RX ADMIN — Medication 3 MG: at 00:52

## 2024-04-01 RX ADMIN — NAPROXEN 500 MG: 500 TABLET ORAL at 07:45

## 2024-04-01 RX ADMIN — HYDROMORPHONE HYDROCHLORIDE 0.2 MG: 0.2 INJECTION, SOLUTION INTRAMUSCULAR; INTRAVENOUS; SUBCUTANEOUS at 03:03

## 2024-04-01 RX ADMIN — CEFAZOLIN SODIUM 2000 MG: 2 SOLUTION INTRAVENOUS at 20:46

## 2024-04-01 RX ADMIN — PRAVASTATIN SODIUM 80 MG: 80 TABLET ORAL at 18:05

## 2024-04-01 RX ADMIN — POLYETHYLENE GLYCOL 3350 17 G: 17 POWDER, FOR SOLUTION ORAL at 08:19

## 2024-04-01 RX ADMIN — APIXABAN 5 MG: 5 TABLET, FILM COATED ORAL at 18:06

## 2024-04-01 RX ADMIN — METHOCARBAMOL 500 MG: 500 TABLET ORAL at 07:45

## 2024-04-01 RX ADMIN — MORPHINE SULFATE 60 MG: 30 TABLET, EXTENDED RELEASE ORAL at 08:20

## 2024-04-01 RX ADMIN — AMLODIPINE BESYLATE 5 MG: 5 TABLET ORAL at 08:20

## 2024-04-01 RX ADMIN — DOCUSATE SODIUM 200 MG: 100 CAPSULE, LIQUID FILLED ORAL at 20:51

## 2024-04-01 RX ADMIN — APIXABAN 5 MG: 5 TABLET, FILM COATED ORAL at 08:20

## 2024-04-01 RX ADMIN — INSULIN LISPRO 13 UNITS: 100 INJECTION, SOLUTION INTRAVENOUS; SUBCUTANEOUS at 08:17

## 2024-04-01 RX ADMIN — NICOTINE 21 MG: 21 PATCH, EXTENDED RELEASE TRANSDERMAL at 23:05

## 2024-04-01 RX ADMIN — CEFAZOLIN SODIUM 2000 MG: 2 SOLUTION INTRAVENOUS at 12:33

## 2024-04-01 RX ADMIN — PANTOPRAZOLE SODIUM 20 MG: 20 TABLET, DELAYED RELEASE ORAL at 18:05

## 2024-04-01 RX ADMIN — INSULIN LISPRO 13 UNITS: 100 INJECTION, SOLUTION INTRAVENOUS; SUBCUTANEOUS at 18:04

## 2024-04-01 RX ADMIN — RISPERIDONE 3 MG: 1 TABLET, FILM COATED ORAL at 08:20

## 2024-04-01 RX ADMIN — INSULIN LISPRO 13 UNITS: 100 INJECTION, SOLUTION INTRAVENOUS; SUBCUTANEOUS at 12:32

## 2024-04-01 RX ADMIN — AMIODARONE HYDROCHLORIDE 400 MG: 200 TABLET ORAL at 18:05

## 2024-04-01 RX ADMIN — MORPHINE SULFATE 60 MG: 30 TABLET, EXTENDED RELEASE ORAL at 20:51

## 2024-04-01 RX ADMIN — INSULIN LISPRO 2 UNITS: 100 INJECTION, SOLUTION INTRAVENOUS; SUBCUTANEOUS at 12:32

## 2024-04-01 RX ADMIN — INSULIN LISPRO 3 UNITS: 100 INJECTION, SOLUTION INTRAVENOUS; SUBCUTANEOUS at 08:15

## 2024-04-01 RX ADMIN — METOPROLOL SUCCINATE 25 MG: 25 TABLET, EXTENDED RELEASE ORAL at 08:20

## 2024-04-01 RX ADMIN — DULOXETINE HYDROCHLORIDE 20 MG: 20 CAPSULE, DELAYED RELEASE ORAL at 08:20

## 2024-04-01 RX ADMIN — LIDOCAINE HYDROCHLORIDE 5 ML: 10 INJECTION, SOLUTION EPIDURAL; INFILTRATION; INTRACAUDAL; PERINEURAL at 13:19

## 2024-04-01 RX ADMIN — PANTOPRAZOLE SODIUM 20 MG: 20 TABLET, DELAYED RELEASE ORAL at 07:45

## 2024-04-01 RX ADMIN — CEFAZOLIN SODIUM 2000 MG: 2 SOLUTION INTRAVENOUS at 02:22

## 2024-04-01 RX ADMIN — FENTANYL 1 PATCH: 75 PATCH TRANSDERMAL at 01:15

## 2024-04-01 RX ADMIN — INSULIN GLARGINE 40 UNITS: 100 INJECTION, SOLUTION SUBCUTANEOUS at 08:15

## 2024-04-01 RX ADMIN — AMIODARONE HYDROCHLORIDE 400 MG: 200 TABLET ORAL at 07:45

## 2024-04-01 RX ADMIN — NAPROXEN 500 MG: 500 TABLET ORAL at 18:09

## 2024-04-01 NOTE — PROGRESS NOTES
[facility]  Progress Note  Name: Armen Llanos I  MRN: [id number]  Unit/Bed#: -01 I Date of Admission: 3/22/2024   Date of Service: 4/1/2024 I Hospital Day: 10    Assessment/Plan   * MSSA bacteremia  Assessment & Plan  03/22: BC x 2 w/ Staph aureus  Etiology 2/2 prosthetic joint infection right knee & left toe osteomyelitis  S/p R knee I&D to bone, total knee arthroplasty revision, insertion of stimulan by Dr. Washington  S/p left toe amputation  Continue Ancef; IR consulted for PICC placement; suspect at least 6 weeks of antibiotics  Patient initially recommended for inpatient rehab however as of today PT/OT recommending HHS. Patient with Hx of TBI and chronic opioid use. Discussed with ID and CM. ID team also prefers patient to receive his IV Abx at SNF.       Acute osteomyelitis of left foot (HCC)  Assessment & Plan  As evidenced by MRI on 03/26   Podiatry following   S/p left toe amputation on 03/29   Abx as above.     Anxiety  Assessment & Plan  Anxiety managed today  Psychiatry consulted  Continue Klonopin, Cymbalta, and risperidone    Cardiomyopathy (HCC)  Assessment & Plan  ECHO (03/25): EF 45% w/ mild global hypokinesis   Cardiology following  Continue Eliquis, Toprol-XL & amlodipine  Per cardiology note, pt recommended to start Jardiance & Entresto which was price checked    A-fib with RVR ([facility])  Assessment & Plan  New onset A. Fib w/ RVR which presented intraoperatively  Likely exacerbated 2/2 critical illness, bacteremia, & stress from OR  Currently sinus; rate controlled  Continue Eliquis, amiodarone, & Toprol-XL        Essential hypertension  Assessment & Plan  Home regimen: Metoprolol 25mg daily, amlodipine 5mg daily, & lisinopril-hydrochlorothiazide 20-25mg   Amlodipine and lisinopril previously held 2/2 JH  Amlodipine restarted on 03/29  -140     Type 2 diabetes mellitus with ketoacidosis without coma, without long-term current use of insulin  ([facility])  Assessment & Plan  Lab Results   Component Value Date    HGBA1C 12.1 (H) 03/22/2024     Recent Labs     03/31/24  1706 03/31/24  2036 04/01/24  0744 04/01/24  1211   POCGLU 237* 223* 233* 206*     Blood Sugar Average: Last 72 hrs:  (P) [id number]     POA: Glucose > 600, BHB 1.4, & pH 7.35 -required IVF recitation & insulin gtt   Home regimen: Metformin & Glimepiride  Endocrinology consulted & recommended basal/mealtime insulin regimen  Discussed with endocrinology 4/1: Plan for Lantus 45U, Humalog 13U TID which will be continued on ddisachrge + resuming metformin. No need for Amaryl. Jardiance will be started as well on discharge as part of GDMT. Insulin price check sent. CM informed.       Infection of prosthetic right knee joint ([facility])  Assessment & Plan  H/o R knee arthoplasty at an outside facility who presented w/ acute pain, trouble ambulating, warmth & erythema - found to have a right knee periprosthetic joint infection   Xray knee (03/22): Stable hardware/bone alignment; no other acute findings  S/p joint aspiration (03/23) w/ staph growth & gross WBCs   Bcx + w/ Staph aureus  S/p right knee I&D w/ polyethylene exchange for periprosthetic joint infection on 03/24   WBAT RLE  Orthopedics following; appreciate continued recommendations  Abx per ID    Hyponatremia-resolved as of 4/1/2024  Assessment & Plan  Recent Labs     03/30/24  1824 03/31/24  0410 04/01/24  0114   SODIUM 134* 134* 134*       Na+ 123 - POA   Nephrology following  Etiology likely 2/2 physiologic SIADH, OP thiazide use w/ superimposed volume depletion w/ sepsis   Continue to monitor    Septic shock ([facility])-resolved as of 4/1/2024  Assessment & Plan  POA w/ tachycardia, leukocytosis (17), lactic acidosis (1.4), elevated procal (3.39), & elevated Cr. 1.6 2/2 MSSA bacteremia, MSSA right TKR infxn, & left 2nd toe osteo. On 03/24, pt developed postoperative fever (103.5 °F) & hypotension which required vasopressors & continued postop  intubation.   03/22: BC x 2 w/ Staph aureus  03/22: R knee aspirate positive for MSSA & WBCs  03/24:   Intra-op R knee sample positive for MSSA   S/p R knee I&D to bone, total knee arthroplasty revision, insertion of stimulan with Dr. Washington  While intraoperative, pt became acutely tachycardia w/ new AF RVR & remained intubated; continued hypotension despite IVF resuscitation & required Levophed  Hydrocortisone added   03/29: S/p amputation left 2nd toe for osteo found on MRI   ID following; appreciate continued recommendations; final recommendations on 04/01  Continue Ancef  Likely will require 6-week course  IR consulted for PICC placement  BP stable off steroids  Repeat BCx negative x 4 days  Remains off vasopressors             VTE Pharmacologic Prophylaxis: VTE Score: 2 Moderate Risk (Score 3-4) - Pharmacological DVT Prophylaxis Ordered: apixaban (Eliquis).    Mobility:   Basic Mobility Inpatient Raw Score: 24  JH-HLM Goal: 8: Walk 250 feet or more  JH-HLM Achieved: 8: Walk 250 feet ot more  JH-HLM Goal achieved. Continue to encourage appropriate mobility.    Patient Centered Rounds: I performed bedside rounds with nursing staff today.   Discussions with Specialists or Other Care Team Provider: CM    Education and Discussions with Family / Patient:  patient. .     Total Time Spent on Date of Encounter in care of patient: 35 mins. This time was spent on one or more of the following: performing physical exam; counseling and coordination of care; obtaining or reviewing history; documenting in the medical record; reviewing/ordering tests, medications or procedures; communicating with other healthcare professionals and discussing with patient's family/caregivers.    Current Length of Stay: 10 day(s)  Current Patient Status: Inpatient   Certification Statement:  discahrge planning   Discharge Plan: Anticipate discharge in 24-48 hrs to SNF vs [facility]    Code Status: Level 1 - Full Code    Subjective:   Doing well.  States  his pain is better     Objective:     Vitals:   Temp (24hrs), Av.5 °F (36.4 °C), Min:97.2 °F (36.2 °C), Max:98 °F (36.7 °C)    Temp:  [97.2 °F (36.2 °C)-98 °F (36.7 °C)] 97.2 °F (36.2 °C)  HR:  [71-85] 71  Resp:  [16-18] 16  BP: (117-141)/(72-82) 141/82  SpO2:  [97 %-98 %] 98 %  Body mass index is 23.66 kg/m².     Input and Output Summary (last 24 hours):     Intake/Output Summary (Last 24 hours) at 2024 1344  Last data filed at 2024 1214  Gross per 24 hour   Intake --   Output 976 ml   Net -976 ml       Physical Exam:   Physical Exam  Vitals and nursing note reviewed.   Constitutional:       General: He is not in acute distress.     Appearance: He is well-developed.   HENT:      Head: Normocephalic and atraumatic.   Eyes:      Conjunctiva/sclera: Conjunctivae normal.   Cardiovascular:      Rate and Rhythm: Normal rate and regular rhythm.      Heart sounds: No murmur heard.  Pulmonary:      Effort: Pulmonary effort is normal. No respiratory distress.      Breath sounds: Normal breath sounds.   Abdominal:      Palpations: Abdomen is soft.      Tenderness: There is no abdominal tenderness.   Musculoskeletal:         General: No swelling.      Cervical back: Neck supple.   Skin:     General: Skin is warm and dry.      Capillary Refill: Capillary refill takes less than 2 seconds.   Neurological:      Mental Status: He is alert.   Psychiatric:         Mood and Affect: Mood normal.          Additional Data:     Labs:  Results from last 7 days   Lab Units 24  0114 24  1000 24  0209   WBC Thousand/uL 9.99   < > 9.41   HEMOGLOBIN g/dL 10.7*   < > 11.5*   HEMATOCRIT % 32.9*   < > 34.1*   PLATELETS Thousands/uL 479*   < > 267   BANDS PCT % 1  --   --    NEUTROS PCT %  --   --  84*   LYMPHS PCT %  --   --  7*   LYMPHO PCT % 14   < >  --    MONOS PCT %  --   --  7   MONO PCT % 8   < >  --    EOS PCT % 0   < > 0    < > = values in this interval not displayed.     Results from last 7 days   Lab Units  04/01/24  0114 03/31/24  0410   SODIUM mmol/L 134* 134*   POTASSIUM mmol/L 4.5 4.1   CHLORIDE mmol/L 103 102   CO2 mmol/L 23 23   BUN mg/dL 12 10   CREATININE mg/dL 1.06 0.92   ANION GAP mmol/L 8 9   CALCIUM mg/dL 8.5 8.2*   ALBUMIN g/dL  --  2.7*   TOTAL BILIRUBIN mg/dL  --  0.48   ALK PHOS U/L  --  52   ALT U/L  --  5*   AST U/L  --  11*   GLUCOSE RANDOM mg/dL 224* 272*         Results from last 7 days   Lab Units 04/01/24  1211 04/01/24  0744 03/31/24  2036 03/31/24  1706 03/31/24  1121 03/31/24  0737 03/30/24  2055 03/30/24  1622 03/30/24  1110 03/30/24  0800 03/29/24  2203 03/29/24  1805   POC GLUCOSE mg/dl 206* 233* 223* 237* 333* 251* 267* 221* 315* 196* 105 113         Results from last 7 days   Lab Units 03/27/24  0209 03/26/24  0605   PROCALCITONIN ng/ml 1.55* 2.78*       Lines/Drains:  Invasive Devices       Peripherally Inserted Central Catheter Line  Duration             PICC Line 04/01/24 Right Basilic <1 day              Peripheral Intravenous Line  Duration             Peripheral IV 03/30/24 Right;Ventral (anterior) Forearm 1 day                    Central Line:  Goal for removal: Will discontinue when meds requiring line are completed.             Imaging: Reviewed radiology reports from this admission including: xray(s)    Recent Cultures (last 7 days):   Results from last 7 days   Lab Units 03/26/24  1427 03/26/24  1416   BLOOD CULTURE  No Growth After 5 Days. No Growth After 5 Days.       Last 24 Hours Medication List:   Current Facility-Administered Medications   Medication Dose Route Frequency Provider Last Rate    acetaminophen  650 mg Oral Q6H PRN Bambi Marcelo PA-C      amiodarone  400 mg Oral BID With Meals Bambi Marcelo PA-C      Followed by    [START ON 4/5/2024] amiodarone  200 mg Oral Daily With Breakfast Bambi Marcelo PA-C      amLODIPine  5 mg Oral Daily Flaquita Murphy PA-C      apixaban  5 mg Oral BID Flaquita Murphy PA-C      bupivacaine liposomal  20 mL  Infiltration Once Bambi Marcelo PA-C      butalbital-acetaminophen-caffeine  1 tablet Oral Q4H PRN Bambi Marcelo PA-C      cefazolin  2,000 mg Intravenous Q8H Bambi Marcelo PA-C 2,000 mg (04/01/24 1233)    clonazePAM  1 mg Oral Daily Bambi Marcelo PA-C      docusate sodium  200 mg Oral HS Bambi Marcelo PA-C      DULoxetine  20 mg Oral Daily Bambi Marcelo PA-C      fentaNYL  1 patch Transdermal Q72H Bambi Marcelo PA-C      HYDROmorphone  0.2 mg Intravenous Q6H PRN Bambi Blackwood PA-C      [START ON 4/2/2024] insulin glargine  45 Units Subcutaneous QAM Esha العراقي MD      insulin glargine  5 Units Subcutaneous Once Esha العراقي MD      insulin lispro  1-6 Units Subcutaneous TID AC Esha العراقي MD      insulin lispro  13 Units Subcutaneous TID With Meals Radha Salazar MD      melatonin  3 mg Oral HS Flaquita Murphy PA-C      methocarbamol  500 mg Oral Q6H PRN Bambi Marcelo PA-C      metoprolol  5 mg Intravenous Q6H PRN Bambi Marcelo PA-C      metoprolol succinate  25 mg Oral Daily Bambi Marcelo PA-C      morphine  60 mg Oral Q12H DARIN Flaquita Murphy PA-C      naproxen  500 mg Oral BID With Meals Bambi Marcelo PA-C      nicotine  21 mg Transdermal HS Bambi Marcelo PA-C      ondansetron  4 mg Intravenous Q4H PRN Bambi Marcelo PA-C      oxyCODONE  5 mg Oral Q4H PRN Flaquita Murphy PA-C      Or    oxyCODONE  7.5 mg Oral Q4H PRN Flaquita Murphy PA-C      pantoprazole  20 mg Oral BID AC Bambi Marcelo PA-C      polyethylene glycol  17 g Per NG Tube Daily Bambi Marcelo PA-C      pravastatin  80 mg Oral Daily With Dinner Bambi Marcelo PA-C      risperiDONE  3 mg Oral Daily Bambi Marcelo PA-C      zolpidem  5 mg Oral HS PRN Bambi Marcelo PA-C          Today, Patient Was Seen By: Uday Jean MD    **Please Note: This note may have been constructed using a voice recognition system.**

## 2024-04-01 NOTE — PLAN OF CARE
Problem: PAIN - ADULT  Goal: Verbalizes/displays adequate comfort level or baseline comfort level  Description: Interventions:  - Encourage patient to monitor pain and request assistance  - Assess pain using appropriate pain scale  - Administer analgesics based on type and severity of pain and evaluate response  - Implement non-pharmacological measures as appropriate and evaluate response  - Consider cultural and social influences on pain and pain management  - Notify physician/advanced practitioner if interventions unsuccessful or patient reports new pain  Outcome: Progressing     Problem: INFECTION - ADULT  Goal: Absence or prevention of progression during hospitalization  Description: INTERVENTIONS:  - Assess and monitor for signs and symptoms of infection  - Monitor lab/diagnostic results  - Monitor all insertion sites, i.e. indwelling lines, tubes, and drains  - Monitor endotracheal if appropriate and nasal secretions for changes in amount and color  - Parmele appropriate cooling/warming therapies per order  - Administer medications as ordered  - Instruct and encourage patient and family to use good hand hygiene technique  - Identify and instruct in appropriate isolation precautions for identified infection/condition  Outcome: Progressing  Goal: Absence of fever/infection during neutropenic period  Description: INTERVENTIONS:  - Monitor WBC    Outcome: Progressing

## 2024-04-01 NOTE — CASE MANAGEMENT
Case Management Discharge Planning Note    Patient name Armen Llanos  Location /-01 MRN [id number]  : 1975 Date 2024       Current Admission Date: 3/22/2024  Current Admission Diagnosis:MSSA bacteremia   Patient Active Problem List    Diagnosis Date Noted    Acute osteomyelitis of left foot ([facility]) 2024    Anxiety 2024    Cardiomyopathy ([facility]) 2024    Mononeuropathy 2024    Osteoarthritis of knee 2024    A-fib with RVR ([facility]) 2024    MSSA bacteremia 2024    Suicidal ideation 2024    Syncope 2023    Costochondral chest pain 2023    Adrenal nodule ([facility]) 2023    Infection of prosthetic right knee joint ([facility]) 11/15/2022    Chronic, continuous use of opioids 11/15/2022    Type 2 diabetes mellitus with ketoacidosis without coma, without long-term current use of insulin ([facility]) 11/15/2022    Essential hypertension 11/15/2022    Hypokalemia 11/15/2022    Hypocalcemia 11/15/2022    TBI (traumatic brain injury) ([facility]) 11/15/2022    Chronic pain disorder 2022      LOS (days): 10  Geometric Mean LOS (GMLOS) (days): 9.9  Days to GMLOS:-0.3     OBJECTIVE:  Risk of Unplanned Readmission Score: 26.34      Current admission status: Inpatient   Preferred Pharmacy:   [id number] - [place] - [address]  [address] [address]  Phone: [phone] Fax: [phone]    Primary Care Provider: Deidre Andrea MD    Primary Insurance: [employer]  Secondary Insurance:     DISCHARGE DETAILS:    Discharge planning discussed with:: pt     CM contacted family/caregiver?: Yes  Were Treatment Team discharge recommendations reviewed with patient/caregiver?: Yes  Did patient/caregiver verbalize understanding of patient care needs?: Yes  Were patient/caregiver advised of the risks associated with not following Treatment Team discharge recommendations?: Yes         Requested Home Health Care         Is the patient  interested in HHC at discharge?: Yes  Home Health Discipline requested:: Nursing, Occupational Therapy, Physical Therapy  Home Health Agency Name:: [facility]  HHA External Referral Reason (only applicable if external HHA name selected): Patient has established relationship with provider  Home Health Follow-Up Provider:: PCP  Home Health Services Needed:: Wound/Ostomy Care, Evaluate Functional Status and Safety, Strengthening/Theraputic Exercises to Improve Function, Gait/ADL Training, Diabetes Management  Homebound Criteria Met:: Uses an Assist Device (i.e. cane, walker, etc), Requires the Assistance of Another Person for Safe Ambulation or to Leave the Home  Supporting Clincal Findings:: Fatigues Easliy in Short Distances, Limited Endurance    DME Referral Provided  Referral made for DME?: No    Other Referral/Resources/Interventions Provided:  Interventions: HHC, Home Infusion    Treatment Team Recommendation: Home with Home Health Care  Discharge Destination Plan:: Home with Home Health Care  Transport at Discharge : Family     Additional Comments: Magdy spoke with pt regarding IV abx. He is now recommended home care from therapy. Pt's spouse will administer IV ABX. [facility] ([facility]) will deliver IV ANCef and RN to do teaching at 5 pm tomorrow. Reserved on [employer]. Cost is $47 a week. Once script is signed, cm will upload to [facility] for them to have. [facility]  will provide home RN for wound care and PT/OT.      CM called [facility] pharmacy several times to price check Eliquis and Insulin. Cm was unable to get through. Cm will try again in the morning.

## 2024-04-01 NOTE — ASSESSMENT & PLAN NOTE
As evidenced by MRI on 03/26   Podiatry following   S/p left toe amputation on 03/29   Abx as above.

## 2024-04-01 NOTE — PLAN OF CARE
Problem: PHYSICAL THERAPY ADULT  Goal: Performs mobility at highest level of function for planned discharge setting.  See evaluation for individualized goals.  Description: Treatment/Interventions: Functional transfer training, LE strengthening/ROM, Elevations, Therapeutic exercise, Endurance training, Patient/family training, Equipment eval/education, Bed mobility, Gait training, Spoke to nursing, OT, Spoke to case management     See flowsheet documentation for full assessment, interventions and recommendations.  Outcome: Completed  Note: Prognosis: Excellent  Problem List: Decreased strength, Decreased range of motion, Decreased mobility, Decreased skin integrity, Orthopedic restrictions, Pain  Assessment: Pt seen for PT treatment 4/1/2024 with focus on: gait training and elevations. Pt presents seated at EOB, is agreeable to and motivated for participation in session. Pt participates in gait training, with intervention focusing on goal of increased ambulation distance/tolerance/independence and completion of elevations training for access to all areas of his home. Pt demonstrates ability to complete all bed mobility, transfers, ambulation of 400ft with RW, and negotiation of 12 steps up/down at KEVIN level. Pt with no evidence of instability or LOB, no significant increase in pain with mobility. At end of session pt was left seated at EOB with all needs in reach. Overall pt displays significant improvement in functional mobility and displays functional capacity for return to home upon d/c. Pt continues to most appropriately benefit from Level III (minimal PT intensity) resources upon d/c,however no further skilled PT services indicated in the acute care setting. Will d/c from PT caseload at this time. Encourage frequent bouts of independent ambulation in hallways in order to combat hospital related deconditioning. Please re-consult if pt's functional status significantly declines.    Barriers to Discharge:  None     Rehab Resource Intensity Level, PT: III (Minimum Resource Intensity) (HHPT)    See flowsheet documentation for full assessment.

## 2024-04-01 NOTE — ASSESSMENT & PLAN NOTE
New onset A. Fib w/ RVR which presented intraoperatively  Likely exacerbated 2/2 critical illness, bacteremia, & stress from OR  Currently sinus; rate controlled  Continue Eliquis, amiodarone, & Toprol-XL

## 2024-04-01 NOTE — ASSESSMENT & PLAN NOTE
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 Washington  S/p left toe amputation  Continue Ancef; IR consulted for PICC placement; suspect at least 6 weeks of antibiotics  Patient initially recommended for inpatient rehab however as of today PT/OT recommending HHS. Patient with Hx of TBI and chronic opioid use. Discussed with ID and CM. ID team also prefers patient to receive his IV Abx at SNF.

## 2024-04-01 NOTE — QUICK NOTE
Insulin and Eliquis price checks requested. Notified CM.  Entresto and Jardiance were already price checked by cardiology.

## 2024-04-01 NOTE — ASSESSMENT & PLAN NOTE
H/o R knee arthoplasty at an outside facility who presented w/ acute pain, trouble ambulating, warmth & erythema - found to have a right knee periprosthetic joint infection   Xray knee (03/22): Stable hardware/bone alignment; no other acute findings  S/p joint aspiration (03/23) w/ staph growth & gross WBCs   Bcx + w/ Staph aureus  S/p right knee I&D w/ polyethylene exchange for periprosthetic joint infection on 03/24   WBAT RLE  Orthopedics following; appreciate continued recommendations  Abx per ID

## 2024-04-01 NOTE — PHYSICAL THERAPY NOTE
PHYSICAL THERAPY TREATMENT  DATE: 04/01/24  TIME: 1110-1134    NAME:  Armen Llanos  AGE:   48 y.o.  Mrn:   [id number]  Length Of Stay: 10    ADMIT DX:  Hyperglycemia [R73.9]  Acute pain of right knee [M25.561]  Unspecified multiple injuries, initial encounter [T07.XXXA]  Metabolic acidosis due to diabetes mellitus (HCC) [E11.10]    Past Medical History:   Diagnosis Date    Diabetes mellitus (HCC)     Hypertension      Past Surgical History:   Procedure Laterality Date    KNEE SURGERY      DC REVJ TOTAL KNEE ARTHRP W/WO ALGRFT 1 COMPONENT Right 3/24/2024    Procedure: ARTHROPLASTY KNEE TOTAL REVISION, POLY EXCHANGE;  Surgeon: Tao Washington DO;  Location:  MAIN OR;  Service: Orthopedics    TOE AMPUTATION Left 3/28/2024    Procedure: AMPUTATION TOE 2nd;  Surgeon: Rossy Toussaint DPM;  Location:  MAIN OR;  Service: Podiatry       Performed at least 2 patient identifiers during session: Name, ID bracelet, and Epic photo     04/01/24 1134   PT Last Visit   PT Visit Date 04/01/24   Note Type   Note Type Treatment for insurance authorization   Pain Assessment   Pain Assessment Tool 0-10   Pain Score 5   Pain Location/Orientation Orientation: Right;Location: Knee   Pain Radiating Towards non radiating   Pain Onset/Description Onset: Ongoing;Descriptor: Sore   Effect of Pain on Daily Activities limits knee flexion, however does not limit ADLs or mobility   Patient's Stated Pain Goal No pain   Hospital Pain Intervention(s) Repositioned;Ambulation/increased activity;Emotional support   Multiple Pain Sites No   Restrictions/Precautions   Weight Bearing Precautions Per Order Yes   RLE Weight Bearing Per Order WBAT  (s/p R TKA revision by Dr. Washington 3/24/24)   LLE Weight Bearing Per Order WBAT/HWB  (s/p L 2nd toe amputation by Dr. Toussaint 3/28/24, in flat surgical shoe)   Braces or Orthoses Other (Comment)  (L flat surgical shoe)   Other Precautions WBS;Cognitive;Pain   General   Chart Reviewed Yes   Additional  "Pertinent History Pt is a 48 yr old male initially admitted 3/22/24 with c/o R knee pain, unable to recall if fall. 3/23/24 aspiration of knee, concern for periprosthetic joint infection. 3/24/24 underwent R TKA revision and excisional debridement by Dr. Washington (WBAT R LE). Prolonged intubation post op d/t septic shock. 3/28/24 underwent amputation of L 2nd toe d/t osteomyelitis by Dr. Toussaint (WBAT in surgical shoe). H/o TBI and multiple orthopedic surgeries.   Response to Previous Treatment Patient with no complaints from previous session.   Family/Caregiver Present No   Cognition   Overall Cognitive Status WFL   Arousal/Participation Alert;Cooperative   Attention Within functional limits   Orientation Level Oriented X4   Memory Within functional limits   Following Commands Follows all commands and directions without difficulty   Subjective   Subjective \"Once I got the ok from the doctor I've really been pushing myself with walking and ranging the knee.\"   Bed Mobility   Additional Comments Bed mobility NT on this date as pt presented and was left seated at EOB with OT present. Per OT, pt completed bed mobility at KEVIN level, no difficulty maneuvering LEs over EOB.   Transfers   Sit to Stand 6  Modified independent   Additional items Other  (RW)   Stand to Sit 6  Modified independent   Additional items Other  (RW)   Stand pivot 6  Modified independent   Additional items Other  (RW)   Additional Comments Pt completing multiple STS transfers t/o session, from various height surfaces, all at KEVIN level.   Ambulation/Elevation   Gait pattern WNL;Antalgic  (decreased R knee flexion during swing phase)   Gait Assistance 6  Modified independent   Additional items Verbal cues   Assistive Device Rolling walker   Distance 400ft   Stair Management Assistance 6  Modified independent   Additional items Verbal cues;Increased time required  (cues for step patterning and UE support)   Stair Management Technique Other " "(Comment)  (use of RW for BUE support during completion of elevations training on portable curb step; ascending fwd and descending backwards)   Ambulation/Elevation Additional Comments For stairs: therapist and pt discussed options and mechanics for stair negotiation - \"up with the good and down with the bad\" - use of HR + SPC for UE support - recommend having family member close by first couple times negotiating steps to 2nd/3rd floor. Pt verbalizes good understanding of all.   Balance   Static Sitting Normal   Dynamic Sitting Normal   Static Standing Good  (w/ RW)   Dynamic Standing Good  (w/ RW)   Ambulatory Good  (w/ RW)   Endurance Deficit   Endurance Deficit No   Activity Tolerance   Activity Tolerance Patient tolerated treatment well   Medical Staff Made Aware Spoke with CM, OT, RN   Assessment   Prognosis Excellent   Problem List Decreased strength;Decreased range of motion;Decreased mobility;Decreased skin integrity;Orthopedic restrictions;Pain   Assessment Pt seen for PT treatment 4/1/2024 with focus on: gait training and elevations. Pt presents seated at EOB, is agreeable to and motivated for participation in session. Pt participates in gait training, with intervention focusing on goal of increased ambulation distance/tolerance/independence and completion of elevations training for access to all areas of his home. Pt demonstrates ability to complete all bed mobility, transfers, ambulation of 400ft with RW, and negotiation of 12 steps up/down at KEVIN level. Pt with no evidence of instability or LOB, no significant increase in pain with mobility. At end of session pt was left seated at EOB with all needs in reach. Overall pt displays significant improvement in functional mobility and displays functional capacity for return to home upon d/c. Pt continues to most appropriately benefit from Level III (minimal PT intensity) resources upon d/c,however no further skilled PT services indicated in the acute care " "setting. Will d/c from PT caseload at this time. Encourage frequent bouts of independent ambulation in hallways in order to combat hospital related deconditioning. Please re-consult if pt's functional status significantly declines.   Barriers to Discharge None   Goals   Patient Goals \"to be able to go home and rehab myself at home\"   [facility] Expiration Date 04/09/24   Short Term Goal #1 1. Perform supine<>sit with HOB flat without the use of bedrails ind 2. perform sit<>stand transfers mod I 3. Ambulate 100ft with a RW mod I level 4. Ascend/descend 9 steps with nonreciprocal pattern with use of railing supervision level.  (ALL PT GOALS MET AS OF 4/1/24)   PT Treatment Day 3   Plan   Progress Discontinue PT  (ALL ACUTE CARE PT GOALS MET AS OF 4/1/24, WILL D/C CURRENT PT ORDERS)   Discharge Recommendation   Rehab Resource Intensity Level, PT III (Minimum Resource Intensity)  (HHPT)   AM-PAC Basic Mobility Inpatient   Turning in Flat Bed Without Bedrails 4   Lying on Back to Sitting on Edge of Flat Bed Without Bedrails 4   Moving Bed to Chair 4   Standing Up From Chair Using Arms 4   Walk in Room 4   Climb 3-5 Stairs With Railing 4   Basic Mobility Inpatient Raw Score 24   Basic Mobility Standardized Score 57.68   [facility] Highest Level Of Mobility   -HLM Goal 8: Walk 250 feet or more   -HLM Achieved 8: Walk 250 feet ot more   Education   Education Provided Mobility training;Assistive device   Patient Demonstrates acceptance/verbal understanding;Explanation/teachback used   End of Consult   Patient Position at End of Consult Seated edge of bed;All needs within reach  (OT and pt's son in room)       Based on patient's [facility] Highest Level of Mobility scores today, patient currently has a goal of -[facility] Levels: 8: WALK 250 FEET OR MORE, to be completed with RN staffing each shift, in order to improve overall activity tolerance and mobility, combat hospital related deconditioning, and maximize outcomes for d/c " from the acute care setting.     The patient's AM-PAC Basic Mobility Inpatient Short Form Raw Score is 24. A Raw score of greater than 16 suggests the patient may benefit from discharge to home. Please also refer to the recommendation of the Physical Therapist for safe discharge planning.      Paula Ramos PT, DPT   Available via [employer] # [phone]  [place] License - [id number]  4/1/2024

## 2024-04-01 NOTE — DISCHARGE INSTRUCTIONS
Peripherally Inserted Central Catheter     WHAT YOU NEED TO KNOW:   A PICC is an IV placed into a large blood vessel near your heart. It is usually inserted through a blood vessel in your arm. Your PICC may have multiple ports. Ports are tubes where you can inject medicine. A PICC can stay in place for several weeks or months. You may need a PICC to get nutrition, medicine, or fluids. Blood samples can be removed from your PICC and sent to the lab for tests.   DISCHARGE INSTRUCTIONS:    [facility] [place],    Contact Interventional Radiology at [phone]   BRIGHT PATIENTS: Contact Interventional Radiology at [phone]   FANY PATIENTS: Contact Interventional Radiology at [phone] if:  Blood soaks through your bandage.    Your arm or leg feels warm, tender, and painful. It may look swollen and red.   You have trouble moving your arm.    Your catheter falls out.  You have a fever or swelling, redness, pain, or pus where the catheter was inserted.  Persistent nausea or vomiting.    You cannot flush your catheter, or you feel pain when you flush your catheter.    You see a hole or crack in the tubing of your catheter.    You see fluid leaking from the insertion site.    You run out of supplies to care for your catheter.    You have questions or concerns about your condition or care.

## 2024-04-01 NOTE — OCCUPATIONAL THERAPY NOTE
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)    Chronic, continuous use of opioids    Essential hypertension    Septic shock (HCC)    TBI (traumatic brain injury) (HCC)    Suicidal ideation    Hyponatremia    MSSA bacteremia    A-fib with RVR (HCC)    Cardiomyopathy (HCC)    Anxiety    Acute osteomyelitis of left foot (HCC)       04/01/24 1100   OT Last Visit   OT Visit Date 04/01/24   Note Type   Note Type Treatment for insurance authorization   Pain Assessment   Pain Assessment Tool 0-10   Pain Score 5   Restrictions/Precautions   Weight Bearing Precautions Per Order Yes   LUE Weight Bearing Per Order   (PWB, weight bearing on heel with surgical shoe)   RLE Weight Bearing Per Order WBAT   LLE Weight Bearing Per Order PWB   Braces or Orthoses   (surgical shoe L LE)   Other Precautions WBS;Cognitive;Fall Risk;Chair Alarm;Bed Alarm  (Surgical shoe LLE)   Lifestyle   Autonomy Independent with dressing, wife assists with bathing at baseline   Reciprocal Relationships Lives with very supportive wife   Service to Others Technology repair, self-employed   ADL   Where Assessed Edge of bed   LB Dressing Assistance 5  Supervision/Setup   LB Dressing Deficit Setup;Don/doff R sock;Don/doff R shoe;Supervision/safety   Transfers   Sit to Stand 5  Supervision   Additional items Assist x 1   Stand to Sit 5  Supervision   Additional items Assist x 1   Functional Mobility   Functional Mobility 5  Supervision  (Cues for maintaing partial weight bearing status LLE)   Additional Comments x1 - functional household distance. Moderate difficulty maintaining heel touch WBing   Additional items Rolling walker   Subjective   Subjective \"I would love to go home.... my wife " "has been bathin me for awhile. We've been talking about a shower chair for awhile, so I'll check in with my wife about that.\"   Cognition   Overall Cognitive Status WFL   Arousal/Participation Alert;Responsive;Cooperative   Attention Within functional limits   Orientation Level Oriented X4   Memory Within functional limits   Following Commands Follows all commands and directions without difficulty   Comments Pt reports memory difficulties at baseline   Vision   Vision Comments Pt reports his vision is worse than baseline. OT recommended pt seek opometry assessment post-DC given h/o diabetes and report of increased need for reading glasses during current hospital stay   Activity Tolerance   Activity Tolerance Patient tolerated treatment well   Medical Staff Made Aware PT Patti   Assessment   Assessment Patient participated in Skilled OT session this date with interventions consisting of safety awareness and fall prevention techniques, maintaining weight bearing restrictions,  therapeutic activities to: increase activity tolerance, and increase OOB/ sitting tolerance . Patient agreeable to OT treatment session, upon arrival patient was found supine in bed.  Treatment session as follows: Pt seen for partial co-tx with PT for transfer and functional mobility training, and ADL retraining with education re: weight bearing status and precautions throughout session.   Patient demonstrated ability to perform greater than household distance functional mobility with rolling walker in hallway with Supervision x 1. Pt additionally performed LB ADL's with Supervision x 1 seated EOB with doffing/donning shoe (with elastic laces), sock, and surgical shoe LLE. Patient shown image of tub transfer bench as OT recommended pt acquire one for home with a backrest (pt reporting vertigo when he closes his eyes in sitting).   The patient's raw score on the AM-PAC Daily Activity inpatient short form is 23, standardized score is 51.12, " "greater than 39.4. Patients at this level are likely to benefit from DC to home. From OT standpoint, recommendation at time of d/c would be level III.   Patient has met all inpatient OT therapy goals and will be discharged from caseload.   Pt seen as a co-tx with PT due to the patient's co-morbidities. Pt left with call bell in reach, tray table in reach, needs met.   Plan   Treatment Interventions Functional transfer training;Patient/family training;Equipment evaluation/education;Activityengagement   Goal Expiration Date 04/05/24   OT Treatment Day 1   OT Frequency Other (comment)  (Discharge from OT caseload)   Discharge Recommendation   Rehab Resource Intensity Level, OT III (Minimum Resource Intensity)   Equipment Recommended Shower/Tub chair with back ($)  (\"Tub transfer bench\" with backrest)   AM-PAC Daily Activity Inpatient   Lower Body Dressing 4   Bathing 3   Toileting 4   Upper Body Dressing 4   Grooming 4   Eating 4   Daily Activity Raw Score 23   Daily Activity Standardized Score (Calc for Raw Score >=11) 51.12   AM-PAC Applied Cognition Inpatient   Following a Speech/Presentation 4   Understanding Ordinary Conversation 4   Taking Medications 4   Remembering Where Things Are Placed or Put Away 4   Remembering List of 4-5 Errands 3   Taking Care of Complicated Tasks 4   Applied Cognition Raw Score 23   Applied Cognition Standardized Score 53.08   End of Consult   Education Provided Yes   Patient Position at End of Consult All needs within reach;Supine  (with HOB elevated and son Al seated in room)   Nurse Communication Nurse aware of consult       Goals:     *Pt will complete LB dressing with Min A & DME PRN. -Met     *Pt will complete toileting w/ S w/ G hygiene/thoroughness using DME PRN - Met     *Pt will perform functional transfers with on/off all surfaces with S using DME as needed w/ G balance/safety. - Met     *Pt will improve functional mobility during ADL/IADL/leisure tasks to S using DME as " needed w/ G balance/safety. - Met      *Pt will demonstrate 100% carryover of precautions s/p review w/o cues w/ Mod I w/ G tolerance/participation t/o functional ADL/IADL/leisure tasks.- Met    *Pt will verbalize and demonstrate good body mechanics and joint protection techniques during  ADLs/ IADLs with no verbal cues. - Met        Rola Mcgraw, OTR/L

## 2024-04-01 NOTE — PLAN OF CARE
"  Problem: OCCUPATIONAL THERAPY ADULT  Goal: Performs self-care activities at highest level of function for planned discharge setting.  See evaluation for individualized goals.  Description: Treatment Interventions: Functional transfer training, Patient/family training, Equipment evaluation/education, Activityengagement  Equipment Recommended: Shower/Tub chair with back ($) (\"Tub transfer bench\" with backrest)       See flowsheet documentation for full assessment, interventions and recommendations.   Outcome: Adequate for Discharge  Note: Limitation: Decreased ADL status, Decreased self-care trans, Decreased high-level ADLs (Impaired balance)  Prognosis: Good  Assessment: Patient participated in Skilled OT session this date with interventions consisting of safety awareness and fall prevention techniques, maintaining weight bearing restrictions,  therapeutic activities to: increase activity tolerance, and increase OOB/ sitting tolerance . Patient agreeable to OT treatment session, upon arrival patient was found supine in bed.  Treatment session as follows: Pt seen for partial co-tx with PT for transfer and functional mobility training, and ADL retraining with education re: weight bearing status and precautions throughout session.   Patient demonstrated ability to perform greater than household distance functional mobility with rolling walker in hallway with Supervision x 1. Pt additionally performed LB ADL's with Supervision x 1 seated EOB with doffing/donning shoe (with elastic laces), sock, and surgical shoe LLE. Patient shown image of tub transfer bench as OT recommended pt acquire one for home with a backrest (pt reporting vertigo when he closes his eyes in sitting).   The patient's raw score on the AM-PAC Daily Activity inpatient short form is 23, standardized score is 51.12, greater than 39.4. Patients at this level are likely to benefit from DC to home. From OT standpoint, recommendation at time of d/c would " be level III.   Patient has met all inpatient OT therapy goals and will be discharged from caseload.   Pt seen as a co-tx with PT due to the patient's co-morbidities. Pt left with call bell in reach, tray table in reach, needs met.     Rehab Resource Intensity Level, OT: III (Minimum Resource Intensity)

## 2024-04-01 NOTE — ASSESSMENT & PLAN NOTE
ECHO (03/25): EF 45% w/ mild global hypokinesis   Cardiology following  Continue Eliquis, Toprol-XL & amlodipine  Per cardiology note, pt recommended to start Jardiance & Entresto which was price checked

## 2024-04-01 NOTE — ASSESSMENT & PLAN NOTE
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

## 2024-04-01 NOTE — ASSESSMENT & PLAN NOTE
Home regimen: Metoprolol 25mg daily, amlodipine 5mg daily, & lisinopril-hydrochlorothiazide 20-25mg   Amlodipine and lisinopril previously held 2/2 JH  Amlodipine restarted on 03/29  -140

## 2024-04-01 NOTE — PROCEDURES
Venous Access Line Insertion    Date/Time: 4/1/2024 1:35 PM    Performed by: Joseph Lama  Authorized by: Uday Jean MD    Patient location:  IR  Consent:     Consent obtained:  Written    Consent given by:  Patient    Risks discussed:  Arterial puncture, bleeding, infection, incorrect placement, nerve damage and pneumothorax    Alternatives discussed:  Delayed treatment  Universal protocol:     Procedure explained and questions answered to patient or proxy's satisfaction: yes      Immediately prior to procedure, a time out was called: yes      Relevant documents present and verified: yes      Test results available and properly labeled: yes      Radiology Images displayed and confirmed.  If images not available, report reviewed: yes      Required blood products, implants, devices, and special equipment available: yes      Site/side marked: yes      Patient identity confirmed:  Verbally with patient  Pre-procedure details:     Hand hygiene: Hand hygiene performed prior to insertion      Sterile barrier technique: All elements of maximal sterile technique followed      Skin preparation:  ChloraPrep    Skin preparation agent: Skin preparation agent completely dried prior to procedure    Procedure details:     Complex Venous Access Line Type: PICC      Complex Venous Access Line Indications: long term antibiotics      Catheter tip vessel location: atriocaval junction      Orientation:  Right    Location:  Basilic    Catheter size:  4 Fr    Total catheter length (cm):  47    Catheter out on skin (cm):  0    Arm circumference:  31    Patient evaluated for contraindications to access (i.e. fistula, thrombosis, etc): Yes      Approach: percutaneous technique used      Patient position:  Flat    Ultrasound image availability:  Images available in PACS    Sterile ultrasound techniques: Sterile gel and sterile probe covers were used      Number of attempts:  1    Successful placement: yes      Landmarks identified:  yes    Anesthesia (see MAR for exact dosages):     Anesthesia method:  Local infiltration    Local anesthetic:  Lidocaine 1% w/o epi  Post-procedure details:     Post-procedure:  Securement device placed and dressing applied    Assessment:  Placement verified by x-ray    Post-procedure complications: none      Patient tolerance of procedure:  Tolerated well, no immediate complications    Observer: Yes      Observer name:  JACOBO Cruz RN

## 2024-04-01 NOTE — PROGRESS NOTES
"Progress Note - Podiatry  Armen Llanos 48 y.o. 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  Anticipate discharge tomorrow  Partial weightbearing with walker or crutches.  Ok for D/C once medically cleared.  Follow-up within 1 week with Dr. Toussaint or myself     Uncontrolled type 2 diabetes with peripheral neuropathy  Encourage patient to be more vigilant with his diabetes management and how good blood sugar control can affect his feet with peripheral neuropathy and other diabetic complications.  Recommend follow-up for routine diabetic footcare every 3 to 4 months upon discharge.     History of traumatic brain injury, diabetic ketoacidosis with metabolic acidosis, electrolyte abnormality resolved, SIRS, HJ, suicidal ideation, acute on chronic right knee pain and septic joint.  Essential hypertension, and chronic opioid use.  PICC line placed today for 6wks IV antibiotics for Septic Knee Joint  Managed per internal medicine, cardiology, and infectious disease        Subjective/Objective   Chief Complaint:   Chief Complaint   Patient presents with    Fall     Pt c/o right knee pain after pt unsure if he had a fall this morning. Pt denies thinners. Pt had a previous TBI and does not remember things currently. Pt denies cp/sob/n/v/d or fevers       Subjective: 48-year-old type II diabetic seen today resting comfortably in bed, reports no pain or discomfort from his toe.  Denies any new pain/discomfort.  Reports just having his PICC line placed.    Blood pressure 127/76, pulse 86, temperature 98.6 °F (37 °C), resp. rate 21, height 6' 5\" (1.956 m), weight 92.9 kg (204 lb 11.2 oz), SpO2 97%.,Body mass index is 23.66 kg/m².    Invasive Devices       Peripherally Inserted Central Catheter Line  Duration             PICC Line " 04/01/24 Right Basilic <1 day              Peripheral Intravenous Line  Duration             Peripheral IV 03/30/24 Right;Ventral (anterior) Forearm 2 days                    Physical Exam:   General appearance: alert, cooperative and no distress  Neuro/Vascular: Grossly intact with diminished sensation consistent with diabetic neuropathy.  Palpable pedal pulses bilaterally.  No edema.  Extremity: Left second toe surgical incision coapting satisfactorily, no active drainage, only spot bloody drainage within bandage.  No signs of cellulitis or deep infection.  Good capillary filling of the dorsal and plantar flaps.  No signs of wound dehiscence.            Labs and other studies:   CBC w/diff  Results from last 7 days   Lab Units 04/01/24  0114 03/28/24  1000 03/27/24  0209   WBC Thousand/uL 9.99   < > 9.41   HEMOGLOBIN g/dL 10.7*   < > 11.5*   HEMATOCRIT % 32.9*   < > 34.1*   PLATELETS Thousands/uL 479*   < > 267   NEUTROS PCT %  --   --  84*   LYMPHS PCT %  --   --  7*   LYMPHO PCT % 14   < >  --    MONOS PCT %  --   --  7   MONO PCT % 8   < >  --    EOS PCT % 0   < > 0    < > = values in this interval not displayed.     BMP  Results from last 7 days   Lab Units 04/01/24  0114   POTASSIUM mmol/L 4.5   CHLORIDE mmol/L 103   CO2 mmol/L 23   BUN mg/dL 12   CREATININE mg/dL 1.06   CALCIUM mg/dL 8.5     CMP  Results from last 7 days   Lab Units 04/01/24  0114 03/31/24  0410   POTASSIUM mmol/L 4.5 4.1   CHLORIDE mmol/L 103 102   CO2 mmol/L 23 23   BUN mg/dL 12 10   CREATININE mg/dL 1.06 0.92   CALCIUM mg/dL 8.5 8.2*   ALK PHOS U/L  --  52   ALT U/L  --  5*   AST U/L  --  11*       @Culture@  Lab Results   Component Value Date    BLOODCX No Growth After 5 Days. 03/26/2024    BLOODCX No Growth After 5 Days. 03/26/2024    BLOODCX Staphylococcus aureus (A) 03/25/2024    BLOODCX Staphylococcus aureus (A) 03/25/2024    BLOODCX Staphylococcus aureus (A) 03/23/2024    BLOODCX Staphylococcus aureus (A) 03/23/2024    BLOODCX  "Staphylococcus aureus (A) 03/22/2024    BLOODCX Staphylococcus aureus (A) 03/22/2024     No results found for: \"WOUNDCULT\"      Current Facility-Administered Medications:     acetaminophen (TYLENOL) tablet 650 mg, 650 mg, Oral, Q6H PRN, Bambi Marcelo PA-C, 650 mg at 03/29/24 2226    amiodarone tablet 400 mg, 400 mg, Oral, BID With Meals, 400 mg at 04/01/24 1805 **FOLLOWED BY** [START ON 4/5/2024] amiodarone tablet 200 mg, 200 mg, Oral, Daily With Breakfast, Bambi Marcelo PA-C    amLODIPine (NORVASC) tablet 5 mg, 5 mg, Oral, Daily, Flaquita Murphy PA-C, 5 mg at 04/01/24 0820    apixaban (ELIQUIS) tablet 5 mg, 5 mg, Oral, BID, Flaquita Murphy PA-C, 5 mg at 04/01/24 1806    bupivacaine liposomal (EXPAREL) 1.3 % injection 20 mL, 20 mL, Infiltration, Once, Bambi Marcelo PA-C    butalbital-acetaminophen-caffeine (FIORICET,ESGIC) -40 mg per tablet 1 tablet, 1 tablet, Oral, Q4H PRN, Bambi Marcelo PA-C    ceFAZolin (ANCEF) IVPB (premix in dextrose) 2,000 mg 50 mL, 2,000 mg, Intravenous, Q8H, Bambi Marcelo PA-C, Last Rate: 100 mL/hr at 04/01/24 1233, 2,000 mg at 04/01/24 1233    clonazePAM (KlonoPIN) tablet 1 mg, 1 mg, Oral, Daily, Bambi Marcelo PA-C, 1 mg at 03/31/24 0928    docusate sodium (COLACE) capsule 200 mg, 200 mg, Oral, HS, Bambi Marcelo PA-C, 200 mg at 03/31/24 2105    DULoxetine (CYMBALTA) delayed release capsule 20 mg, 20 mg, Oral, Daily, Bambi Marcelo PA-C, 20 mg at 04/01/24 0820    fentaNYL (DURAGESIC) 75 mcg/hr TD 72 hr patch 1 patch, 1 patch, Transdermal, Q72H, Bambi Marcelo PA-C, 1 patch at 04/01/24 0115    HYDROmorphone HCl (DILAUDID) injection 0.2 mg, 0.2 mg, Intravenous, Q6H PRN, Bambi Blackwood PA-C, 0.2 mg at 04/01/24 0303    [START ON 4/2/2024] insulin glargine (LANTUS) subcutaneous injection 45 Units 0.45 mL, 45 Units, Subcutaneous, QAM, Esha العراقي MD    insulin glargine (LANTUS) subcutaneous injection 5 Units 0.05 mL, 5 Units, Subcutaneous, " Once, Esha العراقي MD    insulin lispro (HumALOG/ADMELOG) 100 units/mL subcutaneous injection 1-6 Units, 1-6 Units, Subcutaneous, TID AC, 2 Units at 04/01/24 1232 **AND** Fingerstick Glucose (POCT), , , TID AC, Esha العراقي MD    insulin lispro (HumALOG/ADMELOG) 100 units/mL subcutaneous injection 13 Units, 13 Units, Subcutaneous, TID With Meals, Radha Salazar MD, 13 Units at 04/01/24 1804    melatonin tablet 3 mg, 3 mg, Oral, HS, Flaquita Murphy PA-C, 3 mg at 03/31/24 0144    methocarbamol (ROBAXIN) tablet 500 mg, 500 mg, Oral, Q6H PRN, Bambi Marcelo PA-C, 500 mg at 04/01/24 0745    metoprolol (LOPRESSOR) injection 5 mg, 5 mg, Intravenous, Q6H PRN, Bambi Marcelo PA-C    metoprolol succinate (TOPROL-XL) 24 hr tablet 25 mg, 25 mg, Oral, Daily, Bambi Marcelo PA-C, 25 mg at 04/01/24 0820    morphine (MS CONTIN) ER tablet 60 mg, 60 mg, Oral, Q12H DARIN, Flaquita Murphy PA-C, 60 mg at 04/01/24 0820    naproxen (NAPROSYN) tablet 500 mg, 500 mg, Oral, BID With Meals, Bambi Marcelo PA-C, 500 mg at 04/01/24 1809    nicotine (NICODERM CQ) 21 mg/24 hr TD 24 hr patch 21 mg, 21 mg, Transdermal, HS, Bambi Marcelo PA-C, 21 mg at 03/31/24 2105    ondansetron (ZOFRAN) injection 4 mg, 4 mg, Intravenous, Q4H PRN, Bambi Marcelo PA-C, 4 mg at 03/28/24 1944    oxyCODONE (ROXICODONE) IR tablet 5 mg, 5 mg, Oral, Q4H PRN, 5 mg at 03/29/24 1808 **OR** oxyCODONE (ROXICODONE) split tablet 7.5 mg, 7.5 mg, Oral, Q4H PRN, Flaquita Murphy PA-C    pantoprazole (PROTONIX) EC tablet 20 mg, 20 mg, Oral, BID AC, Bambi Marcelo PA-C, 20 mg at 04/01/24 1805    polyethylene glycol (MIRALAX) packet 17 g, 17 g, Per NG Tube, Daily, Bambi Marcelo PA-C, 17 g at 04/01/24 0819    pravastatin (PRAVACHOL) tablet 80 mg, 80 mg, Oral, Daily With Dinner, Bambi Marcelo PA-C, 80 mg at 04/01/24 1805    risperiDONE (RisperDAL) tablet 3 mg, 3 mg, Oral, Daily, Bambi Marcelo PA-C, 3 mg at 04/01/24 0820    zolpidem  (AMBIEN) tablet 5 mg, 5 mg, Oral, HS PRN, Bambi Marcelo PA-C, 5 mg at 03/31/24 0144    Imaging: I have personally reviewed pertinent films in PACS  EKG, Pathology, and Other Studies: I have personally reviewed pertinent reports.  VTE Pharmacologic Prophylaxis: Eliquis  VTE Mechanical Prophylaxis: sequential compression device    Catrachito Glaser DPM

## 2024-04-02 ENCOUNTER — HOME HEALTH ADMISSION (OUTPATIENT)
Dept: HOME HEALTH SERVICES | Facility: HOME HEALTHCARE | Age: 49
End: 2024-04-02
Payer: COMMERCIAL

## 2024-04-02 LAB
GLUCOSE SERPL-MCNC: 134 MG/DL (ref 65–140)
GLUCOSE SERPL-MCNC: 204 MG/DL (ref 65–140)
GLUCOSE SERPL-MCNC: 261 MG/DL (ref 65–140)
GLUCOSE SERPL-MCNC: 76 MG/DL (ref 65–140)

## 2024-04-02 PROCEDURE — 88305 TISSUE EXAM BY PATHOLOGIST: CPT | Performed by: PATHOLOGY

## 2024-04-02 PROCEDURE — 99232 SBSQ HOSP IP/OBS MODERATE 35: CPT | Performed by: PHYSICIAN ASSISTANT

## 2024-04-02 PROCEDURE — 88311 DECALCIFY TISSUE: CPT | Performed by: PATHOLOGY

## 2024-04-02 PROCEDURE — 82948 REAGENT STRIP/BLOOD GLUCOSE: CPT

## 2024-04-02 RX ADMIN — CEFAZOLIN SODIUM 2000 MG: 2 SOLUTION INTRAVENOUS at 04:48

## 2024-04-02 RX ADMIN — INSULIN LISPRO 13 UNITS: 100 INJECTION, SOLUTION INTRAVENOUS; SUBCUTANEOUS at 12:09

## 2024-04-02 RX ADMIN — NAPROXEN 500 MG: 500 TABLET ORAL at 08:33

## 2024-04-02 RX ADMIN — PRAVASTATIN SODIUM 80 MG: 80 TABLET ORAL at 18:10

## 2024-04-02 RX ADMIN — METHOCARBAMOL 500 MG: 500 TABLET ORAL at 21:11

## 2024-04-02 RX ADMIN — ZOLPIDEM TARTRATE 5 MG: 5 TABLET ORAL at 00:53

## 2024-04-02 RX ADMIN — MORPHINE SULFATE 60 MG: 30 TABLET, EXTENDED RELEASE ORAL at 08:32

## 2024-04-02 RX ADMIN — NICOTINE 21 MG: 21 PATCH, EXTENDED RELEASE TRANSDERMAL at 20:25

## 2024-04-02 RX ADMIN — AMIODARONE HYDROCHLORIDE 400 MG: 200 TABLET ORAL at 08:33

## 2024-04-02 RX ADMIN — PANTOPRAZOLE SODIUM 20 MG: 20 TABLET, DELAYED RELEASE ORAL at 04:51

## 2024-04-02 RX ADMIN — RISPERIDONE 3 MG: 1 TABLET, FILM COATED ORAL at 08:33

## 2024-04-02 RX ADMIN — POLYETHYLENE GLYCOL 3350 17 G: 17 POWDER, FOR SOLUTION ORAL at 08:26

## 2024-04-02 RX ADMIN — MORPHINE SULFATE 60 MG: 30 TABLET, EXTENDED RELEASE ORAL at 20:25

## 2024-04-02 RX ADMIN — METOPROLOL SUCCINATE 25 MG: 25 TABLET, EXTENDED RELEASE ORAL at 08:33

## 2024-04-02 RX ADMIN — CEFAZOLIN SODIUM 2000 MG: 2 SOLUTION INTRAVENOUS at 12:06

## 2024-04-02 RX ADMIN — PANTOPRAZOLE SODIUM 20 MG: 20 TABLET, DELAYED RELEASE ORAL at 18:11

## 2024-04-02 RX ADMIN — APIXABAN 5 MG: 5 TABLET, FILM COATED ORAL at 18:10

## 2024-04-02 RX ADMIN — INSULIN LISPRO 2 UNITS: 100 INJECTION, SOLUTION INTRAVENOUS; SUBCUTANEOUS at 08:27

## 2024-04-02 RX ADMIN — ONDANSETRON 4 MG: 2 INJECTION INTRAMUSCULAR; INTRAVENOUS at 12:02

## 2024-04-02 RX ADMIN — DULOXETINE HYDROCHLORIDE 20 MG: 20 CAPSULE, DELAYED RELEASE ORAL at 08:33

## 2024-04-02 RX ADMIN — AMIODARONE HYDROCHLORIDE 400 MG: 200 TABLET ORAL at 18:10

## 2024-04-02 RX ADMIN — DOCUSATE SODIUM 200 MG: 100 CAPSULE, LIQUID FILLED ORAL at 20:25

## 2024-04-02 RX ADMIN — CEFAZOLIN SODIUM 2000 MG: 2 SOLUTION INTRAVENOUS at 20:26

## 2024-04-02 RX ADMIN — INSULIN LISPRO 13 UNITS: 100 INJECTION, SOLUTION INTRAVENOUS; SUBCUTANEOUS at 08:26

## 2024-04-02 RX ADMIN — Medication 3 MG: at 20:25

## 2024-04-02 RX ADMIN — AMLODIPINE BESYLATE 5 MG: 5 TABLET ORAL at 08:33

## 2024-04-02 RX ADMIN — APIXABAN 5 MG: 5 TABLET, FILM COATED ORAL at 08:33

## 2024-04-02 RX ADMIN — BUTALBITAL, ACETAMINOPHEN, AND CAFFEINE 1 TABLET: 50; 325; 40 TABLET ORAL at 12:02

## 2024-04-02 RX ADMIN — INSULIN GLARGINE 45 UNITS: 100 INJECTION, SOLUTION SUBCUTANEOUS at 08:28

## 2024-04-02 RX ADMIN — NAPROXEN 500 MG: 500 TABLET ORAL at 18:10

## 2024-04-02 NOTE — PROGRESS NOTES
Armen Llanos  48 y.o.  male  1975  mrn [id number]    Assessment/Plan:  Sepsis/MSSA bacteremia/MSSA right TKR infxn/Left 2nd toe osteo/Fever/Leukocytosis/  Hypotension/HJ: Pt presented with c/o right knee pain and swelling x 1 day. Pt does not remember if he fell one day PTA.     On admission, he did not have fever but WBC count was elevated at 17.8K. Creatinine was elevated at 1.59 c/w JH. Ortho aspirated the Pt's right knee on 3/23. Cell count showed 221,280 WBC's with poly predominance c/w septic arthritis in setting of TKR. No crystal were seen. He was placed on Vanco.      He went to the OR on 3/24 for washout of right knee with polyexchange. He developed hypotension post-op and fever to 103.5 c/w sepsis. He was transferred to ICU and required pressor support. Steroids were started.     Admission bld cx's grew MSSA. Initial right knee fluid cx also grew MSSA. Repeat bld cx's drawn on 3/23 grew MSSA. Right knee OR cx's grew MSSA. Repeat bld cx's were drawn on 3/25. ECHO is neg for valvular vegetation. He was extubated today. He is off pressor support. WBC count has decreased to 10.5K. Creatinine is down to 0.96 c/w resolution of JH.     3/26: No further fever and WBC has decreased to 10.5K. Admission bld cx's grew MSSA. Ortho aspirated the Pt's right knee on 3/23. Cell count showed 221,280 WBC's with poly predominance c/w septic arthritis in setting of TKR. Initial right knee fluid cx grew MSSA. Repeat bld cx's drawn on 3/23 and 3/25 also grew MSSA. ECHO is neg for valvular vegetation.      He went to the OR on 3/24 for washout of right knee with polyexchange. He developed hypotension post-op and fever to 103.5 c/w sepsis. Right knee OR cx's grew MSSA. Abx regimen was narrowed to Ancef on 3/25     3/27: Fevers have resolved and WBC count is down to 9.4K. Admission bld cx's grew MSSA. Ortho aspirated the Pt's right knee on 3/23. Cell count showed 221,280 WBC's with poly predominance c/w septic  arthritis in setting of TKR. Initial right knee fluid cx grew MSSA. Repeat bld cx's drawn on 3/23 and 3/25 also grew MSSA. Bld cx's drawn on 3/26 are neg so far. ECHO is neg for valvular vegetation.      He went to the OR on 3/24 for washout of right knee with polyexchange. He developed hypotension post-op and fever to 103.5 c/w sepsis. Right knee OR cx's   grew MSSA. Abx regimen was narrowed to Ancef on 3/25. Pt noted to have left 2nd toe wound. Podiatry ordered MRI of left foot which showed osteo of 2nd toe which could be the source of his MSSA bacteremia leading to right TKR Infxn. Podiatry is planning to resect left 2nd toe tomorrow     3/29: No further fevers and WBC count has decreased to 9.1K. Pt underwent resection of left 2nd toe for tx of osteo after MRI of left foot showed presence of osteo - OR cx was not sent. His chronic left toe wound with underlying osteo could be the source of his MSSA bacteremia leading to right TKR infxn     He is on Ancef for tx of prolonged MSSA bacteremia and right TKR infxn - s/p washout with poly-exchange. Bld and right knee fluid cx's grew MSSA. ECHO was neg for vegetation. Repeat bld cx's drawn on 3/26 are neg    4/1: Fevers have resolved and WBC count is 9.1K. MRI of left foot showed osteo of 2nd toe which was resected - OR cx was not sent. His chronic left toe wound with underlying osteo could be the source of his MSSA bacteremia leading to right TKR infxn    He is on Ancef for tx of prolonged MSSA bacteremia and right TKR infxn - s/p washout with poly-exchange. Bld and right knee fluid cx's grew MSSA. ECHO was neg for vegetation. Repeat bld cx's drawn on 3/26 are neg. PICC line was placed. Pt has chronic opioid use but no h/o IVDA. He has short term memory issues because of previous TBI. His wife will be able to administer IV abx tx when he goes home     - Cont Ancef to complete 6 week course of IV abx  - Cont local care of left foot wound as per Podiatry  - Cont local  "care of right knee as per Ortho  - Pt will need chronic suppressive abx tx after he finishes 6 week course of IV abx because right TKR was not removed.  - Will cont to monitor for the development of toxicity to current abx tx  - OK to d/c Pt tomorrow after he receives his AM dose of Ancef  - Pt will f/u with me as an out-:Pt    Discussed case with Dr. Uday Jean    Subjective: Pt's right knee pain has decreased. He denies significant left foot pain. No fever and WBC count is nml. No probs with current abx tx    Objective:  VSS, Tmax: 98  HEENT:  No scleral icterus  NECK: Supple  CARDIAC:  RRR, nml S1, S2  LUNGS:  Clear  ABDOMEN:  +BS, soft, nontender  MUSCULOSKELETAL:  No calf tenderness  EXTREMITIES:  +Intact dressing on right leg and left foot  SKIN: No rash      Labs:  CBC w/diff  Recent Labs     04/01/24 0114   WBC 9.99   HGB 10.7*   HCT 32.9*   *   LYMPHOPCT 14   MONOPCT 8   EOSPCT 0     BMP  Recent Labs     04/01/24  0114   K 4.5      CO2 23   BUN 12   CREATININE 1.06   CALCIUM 8.5     CMP  Recent Labs     03/31/24  0410 04/01/24  0114   K 4.1 4.5    103   CO2 23 23   BUN 10 12   CREATININE 0.92 1.06   CALCIUM 8.2* 8.5   ALKPHOS 52  --    ALT 5*  --    AST 11*  --        .labrc    Cultures:  Lab Results   Component Value Date    BLOODCX No Growth After 5 Days. 03/26/2024    BLOODCX No Growth After 5 Days. 03/26/2024    BLOODCX Staphylococcus aureus (A) 03/25/2024    BLOODCX Staphylococcus aureus (A) 03/25/2024    BLOODCX Staphylococcus aureus (A) 03/23/2024    BLOODCX Staphylococcus aureus (A) 03/23/2024    BLOODCX Staphylococcus aureus (A) 03/22/2024    BLOODCX Staphylococcus aureus (A) 03/22/2024     No results found for: \"WOUNDCULT\"  No results found for: \"URINECX\"  No results found for: \"SPUTUMCULTUR\"    MED:  Ancef: #8  Abx: #10     Completed:  Vanco x 3 days on 3/25            Current Facility-Administered Medications:     acetaminophen (TYLENOL) tablet 650 mg, 650 mg, Oral, Q6H " PRN, Bambi Marcelo PA-C, 650 mg at 03/29/24 2226    amiodarone tablet 400 mg, 400 mg, Oral, BID With Meals, 400 mg at 04/01/24 1805 **FOLLOWED BY** [START ON 4/5/2024] amiodarone tablet 200 mg, 200 mg, Oral, Daily With Breakfast, Bambi Marcelo PA-C    amLODIPine (NORVASC) tablet 5 mg, 5 mg, Oral, Daily, Flaquita Murphy PA-C, 5 mg at 04/01/24 0820    apixaban (ELIQUIS) tablet 5 mg, 5 mg, Oral, BID, Flaquita Murphy PA-C, 5 mg at 04/01/24 1806    bupivacaine liposomal (EXPAREL) 1.3 % injection 20 mL, 20 mL, Infiltration, Once, Bambi Marcelo PA-C    butalbital-acetaminophen-caffeine (FIORICET,ESGIC) -40 mg per tablet 1 tablet, 1 tablet, Oral, Q4H PRN, Bambi Marcelo PA-C    ceFAZolin (ANCEF) IVPB (premix in dextrose) 2,000 mg 50 mL, 2,000 mg, Intravenous, Q8H, Bambi Marcelo PA-C, Last Rate: 100 mL/hr at 04/01/24 2046, 2,000 mg at 04/01/24 2046    clonazePAM (KlonoPIN) tablet 1 mg, 1 mg, Oral, Daily, Bambi Marcelo PA-C, 1 mg at 03/31/24 0928    docusate sodium (COLACE) capsule 200 mg, 200 mg, Oral, HS, Bambi Marcelo PA-C, 200 mg at 04/01/24 2051    DULoxetine (CYMBALTA) delayed release capsule 20 mg, 20 mg, Oral, Daily, Bambi Marcelo PA-C, 20 mg at 04/01/24 0820    fentaNYL (DURAGESIC) 75 mcg/hr TD 72 hr patch 1 patch, 1 patch, Transdermal, Q72H, Bambi Marcelo PA-C, 1 patch at 04/01/24 0115    HYDROmorphone HCl (DILAUDID) injection 0.2 mg, 0.2 mg, Intravenous, Q6H PRN, Bambi Blackwood PA-C, 0.2 mg at 04/01/24 0303    [START ON 4/2/2024] insulin glargine (LANTUS) subcutaneous injection 45 Units 0.45 mL, 45 Units, Subcutaneous, QAM, Esha العراقي MD    insulin glargine (LANTUS) subcutaneous injection 5 Units 0.05 mL, 5 Units, Subcutaneous, Once, Esha العراقي MD    insulin lispro (HumALOG/ADMELOG) 100 units/mL subcutaneous injection 1-6 Units, 1-6 Units, Subcutaneous, TID AC, 2 Units at 04/01/24 1232 **AND** Fingerstick Glucose (POCT), , , TID AC, Esha العراقي MD     insulin lispro (HumALOG/ADMELOG) 100 units/mL subcutaneous injection 13 Units, 13 Units, Subcutaneous, TID With Meals, Radha Salazar MD, 13 Units at 04/01/24 1804    melatonin tablet 3 mg, 3 mg, Oral, HS, Flaquita Murphy PA-C, 3 mg at 03/31/24 0144    methocarbamol (ROBAXIN) tablet 500 mg, 500 mg, Oral, Q6H PRN, Bambi Marcelo PA-C, 500 mg at 04/01/24 0745    metoprolol (LOPRESSOR) injection 5 mg, 5 mg, Intravenous, Q6H PRN, Bambi Marcelo PA-C    metoprolol succinate (TOPROL-XL) 24 hr tablet 25 mg, 25 mg, Oral, Daily, Bambi Marcelo PA-C, 25 mg at 04/01/24 0820    morphine (MS CONTIN) ER tablet 60 mg, 60 mg, Oral, Q12H DARIN, Flaquita Murphy PA-C, 60 mg at 04/01/24 2051    naproxen (NAPROSYN) tablet 500 mg, 500 mg, Oral, BID With Meals, Bambi Marcelo PA-C, 500 mg at 04/01/24 1809    nicotine (NICODERM CQ) 21 mg/24 hr TD 24 hr patch 21 mg, 21 mg, Transdermal, HS, Bambi Marcelo PA-C, 21 mg at 03/31/24 2105    ondansetron (ZOFRAN) injection 4 mg, 4 mg, Intravenous, Q4H PRN, Bambi Marcelo PA-C, 4 mg at 03/28/24 1944    oxyCODONE (ROXICODONE) IR tablet 5 mg, 5 mg, Oral, Q4H PRN, 5 mg at 03/29/24 1808 **OR** oxyCODONE (ROXICODONE) split tablet 7.5 mg, 7.5 mg, Oral, Q4H PRN, Flaquita Murphy PA-C    pantoprazole (PROTONIX) EC tablet 20 mg, 20 mg, Oral, BID AC, Bambi Marcelo PA-C, 20 mg at 04/01/24 1805    polyethylene glycol (MIRALAX) packet 17 g, 17 g, Per NG Tube, Daily, Bambi Marcelo PA-C, 17 g at 04/01/24 0819    pravastatin (PRAVACHOL) tablet 80 mg, 80 mg, Oral, Daily With Dinner, Bambi Marcelo PA-C, 80 mg at 04/01/24 1805    risperiDONE (RisperDAL) tablet 3 mg, 3 mg, Oral, Daily, Bambi Marcelo PA-C, 3 mg at 04/01/24 0820    zolpidem (AMBIEN) tablet 5 mg, 5 mg, Oral, HS PRN, Bambi Marcelo PA-C, 5 mg at 03/31/24 0144    Principal Problem:    MSSA bacteremia  Active Problems:    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

## 2024-04-02 NOTE — CASE MANAGEMENT
Case Management Progress Note    Patient name Armen Llanos  Location /-01 MRN [id number]  : 1975 Date 2024       LOS (days): 11  Geometric Mean LOS (GMLOS) (days): 9.9  Days to GMLOS:-1.3        OBJECTIVE:    Current admission status: Inpatient  Preferred Pharmacy:   [id number] - [place], [place] - [address]  [address] [address]  Phone: [phone] Fax: [phone]    Primary Care Provider: Deidre Andrea MD    Primary Insurance: [employer]  Secondary Insurance:     PROGRESS NOTE:    Pt and his spouse decided that they are not comfortable with pt going home with IV abx. CM explained the support provided from infusion and home RN. They still want to pursue SNF for IV Abx. [facility] reserved and they submitted for auth.

## 2024-04-02 NOTE — ASSESSMENT & PLAN NOTE
Lab Results   Component Value Date    HGBA1C 12.1 (H) 03/22/2024     Recent Labs     04/01/24  1636 04/01/24  2107 04/02/24  0757 04/02/24  1152   POCGLU 148* 172* 204* 134     Blood Sugar Average: Last 72 hrs:  (P) [id number]     POA: Glucose > 600, BHB 1.4, & pH 7.35 -required IVF recitation & insulin gtt   Home regimen: Metformin & Glimepiride  Endocrinology consulted & recommended basal/mealtime insulin regimen  Prior provider discussed with endocrinology 4/1: Plan for Lantus 45U, Humalog 13U TID which will be continued on ddisachrge + resuming metformin. No need for Amaryl. Jardiance will be started as well on discharge as part of GDMT. Insulin price check sent. CM informed.

## 2024-04-02 NOTE — PLAN OF CARE
Problem: PAIN - ADULT  Goal: Verbalizes/displays adequate comfort level or baseline comfort level  Description: Interventions:  - Encourage patient to monitor pain and request assistance  - Assess pain using appropriate pain scale  - Administer analgesics based on type and severity of pain and evaluate response  - Implement non-pharmacological measures as appropriate and evaluate response  - Consider cultural and social influences on pain and pain management  - Notify physician/advanced practitioner if interventions unsuccessful or patient reports new pain  Outcome: Progressing     Problem: INFECTION - ADULT  Goal: Absence or prevention of progression during hospitalization  Description: INTERVENTIONS:  - Assess and monitor for signs and symptoms of infection  - Monitor lab/diagnostic results  - Monitor all insertion sites, i.e. indwelling lines, tubes, and drains  - Monitor endotracheal if appropriate and nasal secretions for changes in amount and color  - [place] appropriate cooling/warming therapies per order  - Administer medications as ordered  - Instruct and encourage patient and family to use good hand hygiene technique  - Identify and instruct in appropriate isolation precautions for identified infection/condition  Outcome: Progressing  Goal: Absence of fever/infection during neutropenic period  Description: INTERVENTIONS:  - Monitor WBC    Outcome: Progressing     Problem: SAFETY ADULT  Goal: Patient will remain free of falls  Description: INTERVENTIONS:  - Educate patient/family on patient safety including physical limitations  - Instruct patient to call for assistance with activity   - Consult OT/PT to assist with strengthening/mobility   - Keep Call bell within reach  - Keep bed low and locked with side rails adjusted as appropriate  - Keep care items and personal belongings within reach  - Initiate and maintain comfort rounds  - Make Fall Risk Sign visible to staff  - Offer Toileting every 2 Hours,  in advance of need  - Initiate/Maintain 2alarm  - Obtain necessary fall risk management equipment: 2  - Apply yellow socks and bracelet for high fall risk patients  - Consider moving patient to room near nurses station  Outcome: Progressing  Goal: Maintain or return to baseline ADL function  Description: INTERVENTIONS:  -  Assess patient's ability to carry out ADLs; assess patient's baseline for ADL function and identify physical deficits which impact ability to perform ADLs (bathing, care of mouth/teeth, toileting, grooming, dressing, etc.)  - Assess/evaluate cause of self-care deficits   - Assess range of motion  - Assess patient's mobility; develop plan if impaired  - Assess patient's need for assistive devices and provide as appropriate  - Encourage maximum independence but intervene and supervise when necessary  - Involve family in performance of ADLs  - Assess for home care needs following discharge   - Consider OT consult to assist with ADL evaluation and planning for discharge  - Provide patient education as appropriate  Outcome: Progressing  Goal: Maintains/Returns to pre admission functional level  Description: INTERVENTIONS:  - Perform AM-PAC 6 Click Basic Mobility/ Daily Activity assessment daily.  - Set and communicate daily mobility goal to care team and patient/family/caregiver.   - Collaborate with rehabilitation services on mobility goals if consulted  - Perform Range of Motion 2 times a day.  - Reposition patient every 1 hours.  - Dangle patient 2 times a day  - Stand patient 2 times a day  - Ambulate patient 2 times a day  - Out of bed to chair 2 times a day   - Out of bed for meals 2 times a day  - Out of bed for toileting  - Record patient progress and toleration of activity level   Outcome: Progressing     Problem: DISCHARGE PLANNING  Goal: Discharge to home or other facility with appropriate resources  Description: INTERVENTIONS:  - Identify barriers to discharge w/patient and caregiver  -  Arrange for needed discharge resources and transportation as appropriate  - Identify discharge learning needs (meds, wound care, etc.)  - Arrange for interpretive services to assist at discharge as needed  - Refer to Case Management Department for coordinating discharge planning if the patient needs post-hospital services based on physician/advanced practitioner order or complex needs related to functional status, cognitive ability, or social support system  Outcome: Progressing     Problem: Knowledge Deficit  Goal: Patient/family/caregiver demonstrates understanding of disease process, treatment plan, medications, and discharge instructions  Description: Complete learning assessment and assess knowledge base.  Interventions:  - Provide teaching at level of understanding  - Provide teaching via preferred learning methods  Outcome: Progressing     Problem: Prexisting or High Potential for Compromised Skin Integrity  Goal: Skin integrity is maintained or improved  Description: INTERVENTIONS:  - Identify patients at risk for skin breakdown  - Assess and monitor skin integrity  - Assess and monitor nutrition and hydration status  - Monitor labs   - Assess for incontinence   - Turn and reposition patient  - Assist with mobility/ambulation  - Relieve pressure over bony prominences  - Avoid friction and shearing  - Provide appropriate hygiene as needed including keeping skin clean and dry  - Evaluate need for skin moisturizer/barrier cream  - Collaborate with interdisciplinary team   - Patient/family teaching  - Consider wound care consult   Outcome: Progressing     Problem: NEUROSENSORY - ADULT  Goal: Achieves stable or improved neurological status  Description: INTERVENTIONS  - Monitor and report changes in neurological status  - Monitor vital signs such as temperature, blood pressure, glucose, and any other labs ordered   - Initiate measures to prevent increased intracranial pressure  - Monitor for seizure activity and  implement precautions if appropriate      Outcome: Progressing  Goal: Remains free of injury related to seizures activity  Description: INTERVENTIONS  - Maintain airway, patient safety  and administer oxygen as ordered  - Monitor patient for seizure activity, document and report duration and description of seizure to physician/advanced practitioner  - If seizure occurs,  ensure patient safety during seizure  - Reorient patient post seizure  - Seizure pads on all 4 side rails  - Instruct patient/family to notify RN of any seizure activity including if an aura is experienced  - Instruct patient/family to call for assistance with activity based on nursing assessment  - Administer anti-seizure medications if ordered    Outcome: Progressing  Goal: Achieves maximal functionality and self care  Description: INTERVENTIONS  - Monitor swallowing and airway patency with patient fatigue and changes in neurological status  - Encourage and assist patient to increase activity and self care.   - Encourage visually impaired, hearing impaired and aphasic patients to use assistive/communication devices  Outcome: Progressing     Problem: CARDIOVASCULAR - ADULT  Goal: Maintains optimal cardiac output and hemodynamic stability  Description: INTERVENTIONS:  - Monitor I/O, vital signs and rhythm  - Monitor for S/S and trends of decreased cardiac output  - Administer and titrate ordered vasoactive medications to optimize hemodynamic stability  - Assess quality of pulses, skin color and temperature  - Assess for signs of decreased coronary artery perfusion  - Instruct patient to report change in severity of symptoms  Outcome: Progressing  Goal: Absence of cardiac dysrhythmias or at baseline rhythm  Description: INTERVENTIONS:  - Continuous cardiac monitoring, vital signs, obtain 12 lead EKG if ordered  - Administer antiarrhythmic and heart rate control medications as ordered  - Monitor electrolytes and administer replacement therapy as  ordered  Outcome: Progressing     Problem: RESPIRATORY - ADULT  Goal: Achieves optimal ventilation and oxygenation  Description: INTERVENTIONS:  - Assess for changes in respiratory status  - Assess for changes in mentation and behavior  - Position to facilitate oxygenation and minimize respiratory effort  - Oxygen administered by appropriate delivery if ordered  - Initiate smoking cessation education as indicated  - Encourage broncho-pulmonary hygiene including cough, deep breathe, Incentive Spirometry  - Assess the need for suctioning and aspirate as needed  - Assess and instruct to report SOB or any respiratory difficulty  - Respiratory Therapy support as indicated  Outcome: Progressing     Problem: GASTROINTESTINAL - ADULT  Goal: Minimal or absence of nausea and/or vomiting  Description: INTERVENTIONS:  - Administer IV fluids if ordered to ensure adequate hydration  - Maintain NPO status until nausea and vomiting are resolved  - Nasogastric tube if ordered  - Administer ordered antiemetic medications as needed  - Provide nonpharmacologic comfort measures as appropriate  - Advance diet as tolerated, if ordered  - Consider nutrition services referral to assist patient with adequate nutrition and appropriate food choices  Outcome: Progressing  Goal: Maintains or returns to baseline bowel function  Description: INTERVENTIONS:  - Assess bowel function  - Encourage oral fluids to ensure adequate hydration  - Administer IV fluids if ordered to ensure adequate hydration  - Administer ordered medications as needed  - Encourage mobilization and activity  - Consider nutritional services referral to assist patient with adequate nutrition and appropriate food choices  Outcome: Progressing  Goal: Maintains adequate nutritional intake  Description: INTERVENTIONS:  - Monitor percentage of each meal consumed  - Identify factors contributing to decreased intake, treat as appropriate  - Assist with meals as needed  - Monitor I&O,  weight, and lab values if indicated  - Obtain nutrition services referral as needed  Outcome: Progressing  Goal: Establish and maintain optimal ostomy function  Description: INTERVENTIONS:  - Assess bowel function  - Encourage oral fluids to ensure adequate hydration  - Administer IV fluids if ordered to ensure adequate hydration   - Administer ordered medications as needed  - Encourage mobilization and activity  - Nutrition services referral to assist patient with appropriate food choices  - Assess stoma site  - Consider wound care consult   Outcome: Progressing  Goal: Oral mucous membranes remain intact  Description: INTERVENTIONS  - Assess oral mucosa and hygiene practices  - Implement preventative oral hygiene regimen  - Implement oral medicated treatments as ordered  - Initiate Nutrition services referral as needed  Outcome: Progressing     Problem: GENITOURINARY - ADULT  Goal: Maintains or returns to baseline urinary function  Description: INTERVENTIONS:  - Assess urinary function  - Encourage oral fluids to ensure adequate hydration if ordered  - Administer IV fluids as ordered to ensure adequate hydration  - Administer ordered medications as needed  - Offer frequent toileting  - Follow urinary retention protocol if ordered  Outcome: Progressing  Goal: Absence of urinary retention  Description: INTERVENTIONS:  - Assess patient’s ability to void and empty bladder  - Monitor I/O  - Bladder scan as needed  - Discuss with physician/AP medications to alleviate retention as needed  - Discuss catheterization for long term situations as appropriate  Outcome: Progressing  Goal: Urinary catheter remains patent  Description: INTERVENTIONS:  - Assess patency of urinary catheter  - If patient has a chronic campa, consider changing catheter if non-functioning  - Follow guidelines for intermittent irrigation of non-functioning urinary catheter  Outcome: Progressing     Problem: METABOLIC, FLUID AND ELECTROLYTES -  ADULT  Goal: Electrolytes maintained within normal limits  Description: INTERVENTIONS:  - Monitor labs and assess patient for signs and symptoms of electrolyte imbalances  - Administer electrolyte replacement as ordered  - Monitor response to electrolyte replacements, including repeat lab results as appropriate  - Instruct patient on fluid and nutrition as appropriate  Outcome: Progressing  Goal: Fluid balance maintained  Description: INTERVENTIONS:  - Monitor labs   - Monitor I/O and WT  - Instruct patient on fluid and nutrition as appropriate  - Assess for signs & symptoms of volume excess or deficit  Outcome: Progressing  Goal: Glucose maintained within target range  Description: INTERVENTIONS:  - Monitor Blood Glucose as ordered  - Assess for signs and symptoms of hyperglycemia and hypoglycemia  - Administer ordered medications to maintain glucose within target range  - Assess nutritional intake and initiate nutrition service referral as needed  Outcome: Progressing     Problem: SKIN/TISSUE INTEGRITY - ADULT  Goal: Skin Integrity remains intact(Skin Breakdown Prevention)  Description: Assess:  -Perform [place] assessment every 2  -Clean and moisturize skin every 2  -Inspect skin when repositioning, toileting, and assisting with ADLS  -Assess under medical devices such as 2 every 2  -Assess extremities for adequate circulation and sensation     Bed Management:  -Have minimal linens on bed & keep smooth, unwrinkled  -Change linens as needed when moist or perspiring  -Avoid sitting or lying in one position for more than 2 hours while in bed  -Keep HOB at 2degrees     Toileting:  -Offer bedside commode  -Assess for incontinence every 2  -Use incontinent care products after each incontinent episode such as 2    Activity:  -Mobilize patient 2 times a day  -Encourage activity and walks on unit  -Encourage or provide ROM exercises   -Turn and reposition patient every 2 Hours  -Use appropriate equipment to lift or move  patient in bed  -Instruct/ Assist with weight shifting every 2 when out of bed in chair  -Consider limitation of chair time 2 hour intervals    Skin Care:  -Avoid use of baby powder, tape, friction and shearing, hot water or constrictive clothing  -Relieve pressure over bony prominences using 2  -Do not massage red bony areas    Next Steps:  -Teach patient strategies to minimize risks such as 2   -Consider consults to  interdisciplinary teams such as 2  Outcome: Progressing  Goal: Incision(s), wounds(s) or drain site(s) healing without S/S of infection  Description: INTERVENTIONS  - Assess and document dressing, incision, wound bed, drain sites and surrounding tissue  - Provide patient and family education  - Perform skin care/dressing changes every 2  Outcome: Progressing  Goal: Pressure injury heals and does not worsen  Description: Interventions:  - Implement low air loss mattress or specialty surface (Criteria met)  - Apply silicone foam dressing  - Instruct/assist with weight shifting every 2 minutes when in chair   - Limit chair time to 2 hour intervals  - Use special pressure reducing interventions such as 2 when in chair   - Apply fecal or urinary incontinence containment device   - Perform passive or active ROM every 2  - Turn and reposition patient & offload bony prominences every 2 hours   - Utilize friction reducing device or surface for transfers   - Consider consults to  interdisciplinary teams such as 2  - Use incontinent care products after each incontinent episode such as 2  - Consider nutrition services referral as needed  Outcome: Progressing     Problem: MUSCULOSKELETAL - ADULT  Goal: Maintain or return mobility to safest level of function  Description: INTERVENTIONS:  - Assess patient's ability to carry out ADLs; assess patient's baseline for ADL function and identify physical deficits which impact ability to perform ADLs (bathing, care of mouth/teeth, toileting, grooming, dressing, etc.)  -  Assess/evaluate cause of self-care deficits   - Assess range of motion  - Assess patient's mobility  - Assess patient's need for assistive devices and provide as appropriate  - Encourage maximum independence but intervene and supervise when necessary  - Involve family in performance of ADLs  - Assess for home care needs following discharge   - Consider OT consult to assist with ADL evaluation and planning for discharge  - Provide patient education as appropriate  Outcome: Progressing  Goal: Maintain proper alignment of affected body part  Description: INTERVENTIONS:  - Support, maintain and protect limb and body alignment  - Provide patient/ family with appropriate education  Outcome: Progressing     Problem: Nutrition/Hydration-ADULT  Goal: Nutrient/Hydration intake appropriate for improving, restoring or maintaining nutritional needs  Description: Monitor and assess patient's nutrition/hydration status for malnutrition. Collaborate with interdisciplinary team and initiate plan and interventions as ordered.  Monitor patient's weight and dietary intake as ordered or per policy. Utilize nutrition screening tool and intervene as necessary. Determine patient's food preferences and provide high-protein, high-caloric foods as appropriate.     INTERVENTIONS:  - Monitor oral intake, urinary output, labs, and treatment plans  - Assess nutrition and hydration status and recommend course of action  - Evaluate amount of meals eaten  - Assist patient with eating if necessary   - Allow adequate time for meals  - Recommend/ encourage appropriate diets, oral nutritional supplements, and vitamin/mineral supplements  - Order, calculate, and assess calorie counts as needed  - Recommend, monitor, and adjust tube feedings and TPN/PPN based on assessed needs  - Assess need for intravenous fluids  - Provide specific nutrition/hydration education as appropriate  - Include patient/family/caregiver in decisions related to  nutrition  Outcome: Progressing

## 2024-04-02 NOTE — ASSESSMENT & PLAN NOTE
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 Washington  S/p left toe amputation  Continue Ancef; IR consulted for PICC placement; suspect at least 6 weeks of antibiotics  Patient initially recommended for inpatient rehab however on repeat PT/OT eval, recommending [facility]. Patient with Hx of TBI and chronic opioid use. Initially plan for discharge home with VNA services however after further discussion with patient and wife, they express concern of him returning home as his wife works full time so he would be alone 10+ hrs/day.  CM following and will submit for SNF pending patient and wife decision regarding facility.

## 2024-04-02 NOTE — ASSESSMENT & PLAN NOTE
Home regimen: Metoprolol 25mg daily, amlodipine 5mg daily, & lisinopril-hydrochlorothiazide 20-25mg   Amlodipine and lisinopril previously held 2/2 JH  Amlodipine restarted on 03/29

## 2024-04-02 NOTE — PLAN OF CARE
Problem: PAIN - ADULT  Goal: Verbalizes/displays adequate comfort level or baseline comfort level  Description: Interventions:  - Encourage patient to monitor pain and request assistance  - Assess pain using appropriate pain scale  - Administer analgesics based on type and severity of pain and evaluate response  - Implement non-pharmacological measures as appropriate and evaluate response  - Consider cultural and social influences on pain and pain management  - Notify physician/advanced practitioner if interventions unsuccessful or patient reports new pain  4/2/2024 0557 by Aniyah Ascencio  Outcome: Progressing  4/2/2024 0557 by Aniyah Ascencio  Outcome: Progressing     Problem: INFECTION - ADULT  Goal: Absence or prevention of progression during hospitalization  Description: INTERVENTIONS:  - Assess and monitor for signs and symptoms of infection  - Monitor lab/diagnostic results  - Monitor all insertion sites, i.e. indwelling lines, tubes, and drains  - Monitor endotracheal if appropriate and nasal secretions for changes in amount and color  - [place] appropriate cooling/warming therapies per order  - Administer medications as ordered  - Instruct and encourage patient and family to use good hand hygiene technique  - Identify and instruct in appropriate isolation precautions for identified infection/condition  4/2/2024 0557 by Aniyah Asecncio  Outcome: Progressing  4/2/2024 0557 by Aniyah Ascencio  Outcome: Progressing  Goal: Absence of fever/infection during neutropenic period  Description: INTERVENTIONS:  - Monitor WBC    4/2/2024 0557 by Aniyah Ascencio  Outcome: Progressing  4/2/2024 0557 by Aniyah Ascencio  Outcome: Progressing     Problem: SAFETY ADULT  Goal: Patient will remain free of falls  Description: INTERVENTIONS:  - Educate patient/family on patient safety including physical limitations  - Instruct patient to call for assistance with activity   - Consult OT/PT to assist with  strengthening/mobility   - Keep Call bell within reach  - Keep bed low and locked with side rails adjusted as appropriate  - Keep care items and personal belongings within reach  - Initiate and maintain comfort rounds  - Make Fall Risk Sign visible to staff  - Offer Toileting every 2 Hours, in advance of need  - Initiate/Maintain 2alarm  - Obtain necessary fall risk management equipment: 2  - Apply yellow socks and bracelet for high fall risk patients  - Consider moving patient to room near nurses station  4/2/2024 0557 by Aniyah Ascencio  Outcome: Progressing  4/2/2024 0557 by Aniyah Ascencio  Outcome: Progressing  Goal: Maintain or return to baseline ADL function  Description: INTERVENTIONS:  -  Assess patient's ability to carry out ADLs; assess patient's baseline for ADL function and identify physical deficits which impact ability to perform ADLs (bathing, care of mouth/teeth, toileting, grooming, dressing, etc.)  - Assess/evaluate cause of self-care deficits   - Assess range of motion  - Assess patient's mobility; develop plan if impaired  - Assess patient's need for assistive devices and provide as appropriate  - Encourage maximum independence but intervene and supervise when necessary  - Involve family in performance of ADLs  - Assess for home care needs following discharge   - Consider OT consult to assist with ADL evaluation and planning for discharge  - Provide patient education as appropriate  4/2/2024 0557 by Aniyah Ascencio  Outcome: Progressing  4/2/2024 0557 by Aniyah Ascencio  Outcome: Progressing  Goal: Maintains/Returns to pre admission functional level  Description: INTERVENTIONS:  - Perform AM-PAC 6 Click Basic Mobility/ Daily Activity assessment daily.  - Set and communicate daily mobility goal to care team and patient/family/caregiver.   - Collaborate with rehabilitation services on mobility goals if consulted  - Perform Range of Motion 2 times a day.  - Reposition patient every 1 hours.  -  Dangle patient 2 times a day  - Stand patient 2 times a day  - Ambulate patient 2 times a day  - Out of bed to chair 2 times a day   - Out of bed for meals 2 times a day  - Out of bed for toileting  - Record patient progress and toleration of activity level   4/2/2024 0557 by Aniyah Ascencio  Outcome: Progressing  4/2/2024 0557 by Aniyah Ascencio  Outcome: Progressing     Problem: DISCHARGE PLANNING  Goal: Discharge to home or other facility with appropriate resources  Description: INTERVENTIONS:  - Identify barriers to discharge w/patient and caregiver  - Arrange for needed discharge resources and transportation as appropriate  - Identify discharge learning needs (meds, wound care, etc.)  - Arrange for interpretive services to assist at discharge as needed  - Refer to Case Management Department for coordinating discharge planning if the patient needs post-hospital services based on physician/advanced practitioner order or complex needs related to functional status, cognitive ability, or social support system  4/2/2024 0557 by Aniyah Ascencio  Outcome: Progressing  4/2/2024 0557 by Aniyah Ascencio  Outcome: Progressing     Problem: Knowledge Deficit  Goal: Patient/family/caregiver demonstrates understanding of disease process, treatment plan, medications, and discharge instructions  Description: Complete learning assessment and assess knowledge base.  Interventions:  - Provide teaching at level of understanding  - Provide teaching via preferred learning methods  4/2/2024 0557 by Aniyah Ascencio  Outcome: Progressing  4/2/2024 0557 by Aniyah Ascencio  Outcome: Progressing     Problem: Prexisting or High Potential for Compromised Skin Integrity  Goal: Skin integrity is maintained or improved  Description: INTERVENTIONS:  - Identify patients at risk for skin breakdown  - Assess and monitor skin integrity  - Assess and monitor nutrition and hydration status  - Monitor labs   - Assess for incontinence   - Turn and  reposition patient  - Assist with mobility/ambulation  - Relieve pressure over bony prominences  - Avoid friction and shearing  - Provide appropriate hygiene as needed including keeping skin clean and dry  - Evaluate need for skin moisturizer/barrier cream  - Collaborate with interdisciplinary team   - Patient/family teaching  - Consider wound care consult   4/2/2024 0557 by Aniyah Ascencio  Outcome: Progressing  4/2/2024 0557 by Aniyah Ascencio  Outcome: Progressing     Problem: NEUROSENSORY - ADULT  Goal: Achieves stable or improved neurological status  Description: INTERVENTIONS  - Monitor and report changes in neurological status  - Monitor vital signs such as temperature, blood pressure, glucose, and any other labs ordered   - Initiate measures to prevent increased intracranial pressure  - Monitor for seizure activity and implement precautions if appropriate      4/2/2024 0557 by Aniyah Ascencio  Outcome: Progressing  4/2/2024 0557 by Aniyah Ascencio  Outcome: Progressing  Goal: Remains free of injury related to seizures activity  Description: INTERVENTIONS  - Maintain airway, patient safety  and administer oxygen as ordered  - Monitor patient for seizure activity, document and report duration and description of seizure to physician/advanced practitioner  - If seizure occurs,  ensure patient safety during seizure  - Reorient patient post seizure  - Seizure pads on all 4 side rails  - Instruct patient/family to notify RN of any seizure activity including if an aura is experienced  - Instruct patient/family to call for assistance with activity based on nursing assessment  - Administer anti-seizure medications if ordered    4/2/2024 0557 by Aniyah Ascencio  Outcome: Progressing  4/2/2024 0557 by Aniyah Ascencio  Outcome: Progressing  Goal: Achieves maximal functionality and self care  Description: INTERVENTIONS  - Monitor swallowing and airway patency with patient fatigue and changes in neurological  status  - Encourage and assist patient to increase activity and self care.   - Encourage visually impaired, hearing impaired and aphasic patients to use assistive/communication devices  4/2/2024 0557 by Aniyah Ascencio  Outcome: Progressing  4/2/2024 0557 by Aniyah Ascencio  Outcome: Progressing     Problem: CARDIOVASCULAR - ADULT  Goal: Maintains optimal cardiac output and hemodynamic stability  Description: INTERVENTIONS:  - Monitor I/O, vital signs and rhythm  - Monitor for S/S and trends of decreased cardiac output  - Administer and titrate ordered vasoactive medications to optimize hemodynamic stability  - Assess quality of pulses, skin color and temperature  - Assess for signs of decreased coronary artery perfusion  - Instruct patient to report change in severity of symptoms  4/2/2024 0557 by Aniyah Ascencio  Outcome: Progressing  4/2/2024 0557 by Aniyah Ascencio  Outcome: Progressing  Goal: Absence of cardiac dysrhythmias or at baseline rhythm  Description: INTERVENTIONS:  - Continuous cardiac monitoring, vital signs, obtain 12 lead EKG if ordered  - Administer antiarrhythmic and heart rate control medications as ordered  - Monitor electrolytes and administer replacement therapy as ordered  4/2/2024 0557 by Aniyah Ascencio  Outcome: Progressing  4/2/2024 0557 by Aniyah Ascencio  Outcome: Progressing     Problem: RESPIRATORY - ADULT  Goal: Achieves optimal ventilation and oxygenation  Description: INTERVENTIONS:  - Assess for changes in respiratory status  - Assess for changes in mentation and behavior  - Position to facilitate oxygenation and minimize respiratory effort  - Oxygen administered by appropriate delivery if ordered  - Initiate smoking cessation education as indicated  - Encourage broncho-pulmonary hygiene including cough, deep breathe, Incentive Spirometry  - Assess the need for suctioning and aspirate as needed  - Assess and instruct to report SOB or any respiratory difficulty  -  Respiratory Therapy support as indicated  4/2/2024 0557 by Aniyah Ascencio  Outcome: Progressing  4/2/2024 0557 by Aniyah Ascencio  Outcome: Progressing     Problem: GASTROINTESTINAL - ADULT  Goal: Minimal or absence of nausea and/or vomiting  Description: INTERVENTIONS:  - Administer IV fluids if ordered to ensure adequate hydration  - Maintain NPO status until nausea and vomiting are resolved  - Nasogastric tube if ordered  - Administer ordered antiemetic medications as needed  - Provide nonpharmacologic comfort measures as appropriate  - Advance diet as tolerated, if ordered  - Consider nutrition services referral to assist patient with adequate nutrition and appropriate food choices  4/2/2024 0557 by Aniyah Ascencio  Outcome: Progressing  4/2/2024 0557 by Aniyah Ascencio  Outcome: Progressing  Goal: Maintains or returns to baseline bowel function  Description: INTERVENTIONS:  - Assess bowel function  - Encourage oral fluids to ensure adequate hydration  - Administer IV fluids if ordered to ensure adequate hydration  - Administer ordered medications as needed  - Encourage mobilization and activity  - Consider nutritional services referral to assist patient with adequate nutrition and appropriate food choices  4/2/2024 0557 by Aniyah Ascencio  Outcome: Progressing  4/2/2024 0557 by Aniyah Ascencio  Outcome: Progressing  Goal: Maintains adequate nutritional intake  Description: INTERVENTIONS:  - Monitor percentage of each meal consumed  - Identify factors contributing to decreased intake, treat as appropriate  - Assist with meals as needed  - Monitor I&O, weight, and lab values if indicated  - Obtain nutrition services referral as needed  4/2/2024 0557 by Aniyah Ascencio  Outcome: Progressing  4/2/2024 0557 by Aniyah Ascencio  Outcome: Progressing  Goal: Establish and maintain optimal ostomy function  Description: INTERVENTIONS:  - Assess bowel function  - Encourage oral fluids to ensure adequate  hydration  - Administer IV fluids if ordered to ensure adequate hydration   - Administer ordered medications as needed  - Encourage mobilization and activity  - Nutrition services referral to assist patient with appropriate food choices  - Assess stoma site  - Consider wound care consult   4/2/2024 0557 by Aniyah Ascencio  Outcome: Progressing  4/2/2024 0557 by Aniyah Ascencio  Outcome: Progressing  Goal: Oral mucous membranes remain intact  Description: INTERVENTIONS  - Assess oral mucosa and hygiene practices  - Implement preventative oral hygiene regimen  - Implement oral medicated treatments as ordered  - Initiate Nutrition services referral as needed  4/2/2024 0557 by Aniyah Ascencio  Outcome: Progressing  4/2/2024 0557 by Aniyah Ascencio  Outcome: Progressing     Problem: GENITOURINARY - ADULT  Goal: Maintains or returns to baseline urinary function  Description: INTERVENTIONS:  - Assess urinary function  - Encourage oral fluids to ensure adequate hydration if ordered  - Administer IV fluids as ordered to ensure adequate hydration  - Administer ordered medications as needed  - Offer frequent toileting  - Follow urinary retention protocol if ordered  4/2/2024 0557 by Aniyah Ascencio  Outcome: Progressing  4/2/2024 0557 by Aniyah Ascencio  Outcome: Progressing  Goal: Absence of urinary retention  Description: INTERVENTIONS:  - Assess patient’s ability to void and empty bladder  - Monitor I/O  - Bladder scan as needed  - Discuss with physician/AP medications to alleviate retention as needed  - Discuss catheterization for long term situations as appropriate  4/2/2024 0557 by Aniyah Ascencio  Outcome: Progressing  4/2/2024 0557 by Aniyah Ascencio  Outcome: Progressing  Goal: Urinary catheter remains patent  Description: INTERVENTIONS:  - Assess patency of urinary catheter  - If patient has a chronic campa, consider changing catheter if non-functioning  - Follow guidelines for intermittent irrigation of  non-functioning urinary catheter  4/2/2024 0557 by Aniyah Ascencio  Outcome: Progressing  4/2/2024 0557 by Aniyah Ascencio  Outcome: Progressing     Problem: METABOLIC, FLUID AND ELECTROLYTES - ADULT  Goal: Electrolytes maintained within normal limits  Description: INTERVENTIONS:  - Monitor labs and assess patient for signs and symptoms of electrolyte imbalances  - Administer electrolyte replacement as ordered  - Monitor response to electrolyte replacements, including repeat lab results as appropriate  - Instruct patient on fluid and nutrition as appropriate  4/2/2024 0557 by Aniyah Ascencio  Outcome: Progressing  4/2/2024 0557 by Aniyah Ascencio  Outcome: Progressing  Goal: Fluid balance maintained  Description: INTERVENTIONS:  - Monitor labs   - Monitor I/O and WT  - Instruct patient on fluid and nutrition as appropriate  - Assess for signs & symptoms of volume excess or deficit  4/2/2024 0557 by Aniyah Ascencio  Outcome: Progressing  4/2/2024 0557 by Aniyah Ascencio  Outcome: Progressing  Goal: Glucose maintained within target range  Description: INTERVENTIONS:  - Monitor Blood Glucose as ordered  - Assess for signs and symptoms of hyperglycemia and hypoglycemia  - Administer ordered medications to maintain glucose within target range  - Assess nutritional intake and initiate nutrition service referral as needed  4/2/2024 0557 by Aniyah Ascencio  Outcome: Progressing  4/2/2024 0557 by Aniyah Ascencio  Outcome: Progressing    Toileting:  -Offer bedside commode  -Assess for incontinence every shift  -Use incontinent care products after each incontinent episode such as foam    Activity:  -Mobilize patient 3 times a day  -Encourage activity and walks on unit  -Encourage or provide ROM exercises   -Turn and reposition patient every 2 Hours  -Use appropriate equipment to lift or move patient in bed  -Instruct/ Assist with weight shifting every 2 hours when out of bed in chair  -Consider limitation of chair time  4 hour intervals    Skin Care:  -Avoid use of baby powder, tape, friction and shearing, hot water or constrictive clothing  -Relieve pressure over bony prominences using wedges  -Do not massage red bony areas    Next Steps:  -Teach patient strategies to minimize risks such as falls   -Consider consults to  interdisciplinary teams such as PT/OT  4/2/2024 0557 by Aniyah Ascencio  Outcome: Progressing  4/2/2024 0557 by Aniyah Ascencio  Outcome: Progressing  Goal: Incision(s), wounds(s) or drain site(s) healing without S/S of infection  Description: INTERVENTIONS  - Assess and document dressing, incision, wound bed, drain sites and surrounding tissue  - Provide patient and family education  - Perform skin care/dressing changes as ordered  4/2/2024 0557 by Aniyah sAcencio  Outcome: Progressing  4/2/2024 0557 by Aniyah Ascencio  Outcome: Progressing  Goal: Pressure injury heals and does not worsen  Description: Interventions:  - Implement low air loss mattress or specialty surface (Criteria met)  - Apply silicone foam dressing  - Instruct/assist with weight shifting every 120 minutes when in chair   - Limit chair time to 2 hour intervals    4/2/2024 0557 by Aniyah Ascencio  Outcome: Progressing  4/2/2024 0557 by Aniyah Ascencio  Outcome: Progressing     Problem: MUSCULOSKELETAL - ADULT  Goal: Maintain or return mobility to safest level of function  Description: INTERVENTIONS:  - Assess patient's ability to carry out ADLs; assess patient's baseline for ADL function and identify physical deficits which impact ability to perform ADLs (bathing, care of mouth/teeth, toileting, grooming, dressing, etc.)  - Assess/evaluate cause of self-care deficits   - Assess range of motion  - Assess patient's mobility  - Assess patient's need for assistive devices and provide as appropriate  - Encourage maximum independence but intervene and supervise when necessary  - Involve family in performance of ADLs  - Assess for home care needs  following discharge   - Consider OT consult to assist with ADL evaluation and planning for discharge  - Provide patient education as appropriate  4/2/2024 0557 by Aniyah Ascencio  Outcome: Progressing  4/2/2024 0557 by Aniyah Ascencio  Outcome: Progressing  Goal: Maintain proper alignment of affected body part  Description: INTERVENTIONS:  - Support, maintain and protect limb and body alignment  - Provide patient/ family with appropriate education  4/2/2024 0557 by Aniyah Ascencio  Outcome: Progressing  4/2/2024 0557 by Aniyah Ascencio  Outcome: Progressing     Problem: Nutrition/Hydration-ADULT  Goal: Nutrient/Hydration intake appropriate for improving, restoring or maintaining nutritional needs  Description: Monitor and assess patient's nutrition/hydration status for malnutrition. Collaborate with interdisciplinary team and initiate plan and interventions as ordered.  Monitor patient's weight and dietary intake as ordered or per policy. Utilize nutrition screening tool and intervene as necessary. Determine patient's food preferences and provide high-protein, high-caloric foods as appropriate.     INTERVENTIONS:  - Monitor oral intake, urinary output, labs, and treatment plans  - Assess nutrition and hydration status and recommend course of action  - Evaluate amount of meals eaten  - Assist patient with eating if necessary   - Allow adequate time for meals  - Recommend/ encourage appropriate diets, oral nutritional supplements, and vitamin/mineral supplements  - Order, calculate, and assess calorie counts as needed  - Recommend, monitor, and adjust tube feedings and TPN/PPN based on assessed needs  - Assess need for intravenous fluids  - Provide specific nutrition/hydration education as appropriate  - Include patient/family/caregiver in decisions related to nutrition  4/2/2024 0557 by Aniyah Ascencio  Outcome: Progressing  4/2/2024 0557 by Aniyah Ascencio  Outcome: Progressing

## 2024-04-02 NOTE — PROGRESS NOTES
[facility]  Progress Note  Name: Armen Llanos I  MRN: [id number]  Unit/Bed#: -01 I Date of Admission: 3/22/2024   Date of Service: 4/2/2024 I Hospital Day: 11    Assessment/Plan   * MSSA bacteremia  Assessment & Plan  03/22: BC x 2 w/ Staph aureus  Etiology 2/2 prosthetic joint infection right knee & left toe osteomyelitis  S/p R knee I&D to bone, total knee arthroplasty revision, insertion of stimulan by Dr. Washington  S/p left toe amputation  Continue Ancef; IR consulted for PICC placement; suspect at least 6 weeks of antibiotics  Patient initially recommended for inpatient rehab however on repeat PT/OT eval, recommending HHS. Patient with Hx of TBI and chronic opioid use. Initially plan for discharge home with VNA services however after further discussion with patient and wife, they express concern of him returning home as his wife works full time so he would be alone 10+ hrs/day.  CM following and will submit for SNF pending patient and wife decision regarding facility.      Acute osteomyelitis of left foot (HCC)  Assessment & Plan  As evidenced by MRI on 03/26   Podiatry following   S/p left toe amputation on 03/29   Abx as above.     Anxiety  Assessment & Plan  Anxiety managed today  Psychiatry consulted  Continue Klonopin, Cymbalta, and risperidone    Cardiomyopathy (HCC)  Assessment & Plan  ECHO (03/25): EF 45% w/ mild global hypokinesis   Cardiology following  Continue Eliquis, Toprol-XL & amlodipine  Per cardiology note, pt recommended to start Jardiance & Entresto which was price checked    TBI (traumatic brain injury) (HCC)  Assessment & Plan  H/o TBI at 7 years old  Memory loss at baseline  Supportive care    Essential hypertension  Assessment & Plan  Home regimen: Metoprolol 25mg daily, amlodipine 5mg daily, & lisinopril-hydrochlorothiazide 20-25mg   Amlodipine and lisinopril previously held 2/2 JH  Amlodipine restarted on 03/29    Type 2 diabetes mellitus with  ketoacidosis without coma, without long-term current use of insulin (HCC)  Assessment & Plan  Lab Results   Component Value Date    HGBA1C 12.1 (H) 03/22/2024     Recent Labs     04/01/24  1636 04/01/24  2107 04/02/24  0757 04/02/24  1152   POCGLU 148* 172* 204* 134     Blood Sugar Average: Last 72 hrs:  (P) [id number]     POA: Glucose > 600, BHB 1.4, & pH 7.35 -required IVF recitation & insulin gtt   Home regimen: Metformin & Glimepiride  Endocrinology consulted & recommended basal/mealtime insulin regimen  Prior provider discussed with endocrinology 4/1: Plan for Lantus 45U, Humalog 13U TID which will be continued on ddisachrge + resuming metformin. No need for Amaryl. Jardiance will be started as well on discharge as part of GDMT. Insulin price check sent. CM informed.       Chronic, continuous use of opioids  Assessment & Plan  Home regimen: Morphine 60mg BID & fentanyl patch 75mcg/hr   Today, pt claims he is taking 200 mg Morphine q day   Prior provider called pt's PCP (Dr. 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    Mobility:   Basic Mobility Inpatient Raw Score: 22  JH-HLM Goal: 7: Walk 25 feet or more  JH-HLM Achieved: 6: Walk 10 steps or more  JH-HLM Goal NOT achieved. Continue with multidisciplinary rounding and encourage appropriate mobility to improve upon JH-HLM goals.    Patient Centered Rounds: I performed bedside rounds with nursing staff today.   Discussions with Specialists or Other Care Team Provider: PABLO    Education and Discussions with Family / Patient: Updated  (wife) via phone.    Total Time Spent on Date of Encounter in care of patient: 40 mins. This time was spent on one or more of the following: performing physical exam; counseling and coordination of care; obtaining or reviewing history; documenting in the medical record; reviewing/ordering tests, medications or procedures; communicating with other healthcare professionals and discussing with patient's family/caregivers.    Current Length of Stay: 11 day(s)  Current Patient Status: Inpatient   Certification Statement: The patient will continue to require additional inpatient hospital stay due to disposition planning  Discharge Plan: Anticipate discharge in 24-48 hrs to possible SNF    Code Status: Level 1 - Full Code    Subjective:   Patient overall feeling greatly improved from admission.  Pain well-controlled.  Denies chest pain/palpitations, shortness of breath, nausea/vomiting, abdominal pain.    Objective:     Vitals:   Temp (24hrs), Av.2 °F (36.8 °C), Min:97.7 °F (36.5 °C), Max:98.6 °F (37 °C)    Temp:  [97.7 °F (36.5 °C)-98.6 °F (37 °C)] 98.3 °F (36.8 °C)  HR:  [76-86] 76  Resp:  [14-21] 16  BP: (127-154)/(76-85) 154/85  SpO2:  [94 %-97 %] 94 %  Body mass index is 24.04 kg/m².     Input and Output Summary (last 24 hours):     Intake/Output Summary (Last 24 hours) at 2024 1425  Last data filed at 2024 0927  Gross per 24 hour   Intake 1740 ml   Output 2925 ml   Net -1185 ml       Physical Exam:   Physical  Exam  Vitals and nursing note reviewed.   Constitutional:       General: He is not in acute distress.     Appearance: He is well-developed.      Comments: No acute distress   HENT:      Head: Normocephalic and atraumatic.   Eyes:      General: No scleral icterus.     Extraocular Movements: Extraocular movements intact.      Conjunctiva/sclera: Conjunctivae normal.   Cardiovascular:      Rate and Rhythm: Normal rate and regular rhythm.      Heart sounds: No murmur heard.  Pulmonary:      Effort: Pulmonary effort is normal. No respiratory distress.      Breath sounds: Normal breath sounds. No wheezing, rhonchi or rales.   Abdominal:      General: Bowel sounds are normal.      Palpations: Abdomen is soft.      Tenderness: There is no abdominal tenderness. There is no guarding or rebound.   Musculoskeletal:         General: No swelling.      Cervical back: Normal range of motion.      Comments: Able to move upper/lower extremities bilaterally, no edema   Skin:     General: Skin is warm and dry.      Capillary Refill: Capillary refill takes less than 2 seconds.   Neurological:      Mental Status: He is alert and oriented to person, place, and time.   Psychiatric:         Mood and Affect: Mood normal.         Speech: Speech normal.         Behavior: Behavior normal.          Additional Data:     Labs:  Results from last 7 days   Lab Units 04/01/24  0114 03/28/24  1000 03/27/24  0209   WBC Thousand/uL 9.99   < > 9.41   HEMOGLOBIN g/dL 10.7*   < > 11.5*   HEMATOCRIT % 32.9*   < > 34.1*   PLATELETS Thousands/uL 479*   < > 267   BANDS PCT % 1  --   --    NEUTROS PCT %  --   --  84*   LYMPHS PCT %  --   --  7*   LYMPHO PCT % 14   < >  --    MONOS PCT %  --   --  7   MONO PCT % 8   < >  --    EOS PCT % 0   < > 0    < > = values in this interval not displayed.     Results from last 7 days   Lab Units 04/01/24  0114 03/31/24  0410   SODIUM mmol/L 134* 134*   POTASSIUM mmol/L 4.5 4.1   CHLORIDE mmol/L 103 102   CO2 mmol/L 23 23    BUN mg/dL 12 10   CREATININE mg/dL 1.06 0.92   ANION GAP mmol/L 8 9   CALCIUM mg/dL 8.5 8.2*   ALBUMIN g/dL  --  2.7*   TOTAL BILIRUBIN mg/dL  --  0.48   ALK PHOS U/L  --  52   ALT U/L  --  5*   AST U/L  --  11*   GLUCOSE RANDOM mg/dL 224* 272*         Results from last 7 days   Lab Units 04/02/24  1152 04/02/24  0757 04/01/24  2107 04/01/24  1636 04/01/24  1211 04/01/24  0744 03/31/24  2036 03/31/24  1706 03/31/24  1121 03/31/24  0737 03/30/24  2055 03/30/24  1622   POC GLUCOSE mg/dl 134 204* 172* 148* 206* 233* 223* 237* 333* 251* 267* 221*         Results from last 7 days   Lab Units 03/27/24  0209   PROCALCITONIN ng/ml 1.55*       Lines/Drains:  Invasive Devices       Peripherally Inserted Central Catheter Line  Duration             PICC Line 04/01/24 Right Basilic 1 day                    Central Line:  Goal for removal: N/A - Discharging with PICC for IV ABX/medications             Imaging: Reviewed radiology reports from this admission including: xray(s) and MRI foot    Recent Cultures (last 7 days):   Results from last 7 days   Lab Units 03/26/24  1427   BLOOD CULTURE  No Growth After 5 Days.       Last 24 Hours Medication List:   Current Facility-Administered Medications   Medication Dose Route Frequency Provider Last Rate    acetaminophen  650 mg Oral Q6H PRN Bambi Marcelo PA-C      amiodarone  400 mg Oral BID With Meals Bambi Marcelo PA-C      Followed by    [START ON 4/5/2024] amiodarone  200 mg Oral Daily With Breakfast Bambi Marcelo PA-C      amLODIPine  5 mg Oral Daily Flaquita Murphy PA-C      apixaban  5 mg Oral BID Flaquita Murphy PA-C      bupivacaine liposomal  20 mL Infiltration Once Bambi Marcelo PA-C      butalbital-acetaminophen-caffeine  1 tablet Oral Q4H PRN Bambi Marcelo PA-C      cefazolin  2,000 mg Intravenous Q8H Bambi Marcelo PA-C 2,000 mg (04/02/24 1206)    clonazePAM  1 mg Oral Daily Bambi Marcelo PA-C      docusate sodium  200 mg Oral HS  Bambi Marcelo PA-C      DULoxetine  20 mg Oral Daily Bambi Marcelo PA-C      fentaNYL  1 patch Transdermal Q72H Bambi Marcelo PA-C      HYDROmorphone  0.2 mg Intravenous Q6H PRN Bambi Blackwood PA-C      insulin glargine  45 Units Subcutaneous QAM Esha العراقي MD      insulin glargine  5 Units Subcutaneous Once Esha العراقي MD      insulin lispro  1-6 Units Subcutaneous TID AC Esha العراقي MD      insulin lispro  13 Units Subcutaneous TID With Meals Radha Salazar MD      melatonin  3 mg Oral HS Flaquita Murphy PA-C      methocarbamol  500 mg Oral Q6H PRN Bambi Marcelo PA-C      metoprolol  5 mg Intravenous Q6H PRN Bambi Marcelo PA-C      metoprolol succinate  25 mg Oral Daily Bambi Marcelo PA-C      morphine  60 mg Oral Q12H DARIN Flaquita Murphy PA-C      naproxen  500 mg Oral BID With Meals Bambi Marcelo PA-C      nicotine  21 mg Transdermal HS Bambi Marcelo PA-C      ondansetron  4 mg Intravenous Q4H PRN Bambi Marcelo PA-C      oxyCODONE  5 mg Oral Q4H PRN Flaquita Murphy PA-C      Or    oxyCODONE  7.5 mg Oral Q4H PRN Flaquita Murphy PA-C      pantoprazole  20 mg Oral BID AC Bambi Marcelo PA-C      polyethylene glycol  17 g Per NG Tube Daily Bambi Marcelo PA-C      pravastatin  80 mg Oral Daily With Dinner Bambi Marcelo PA-C      risperiDONE  3 mg Oral Daily Bambi Marcelo PA-C      zolpidem  5 mg Oral HS PRN Bambi Marcelo PA-C          Today, Patient Was Seen By: Elsy Herrera PA-C    **Please Note: This note may have been constructed using a voice recognition system.**

## 2024-04-02 NOTE — PLAN OF CARE
Problem: PAIN - ADULT  Goal: Verbalizes/displays adequate comfort level or baseline comfort level  Description: Interventions:  - Encourage patient to monitor pain and request assistance  - Assess pain using appropriate pain scale  - Administer analgesics based on type and severity of pain and evaluate response  - Implement non-pharmacological measures as appropriate and evaluate response  - Consider cultural and social influences on pain and pain management  - Notify physician/advanced practitioner if interventions unsuccessful or patient reports new pain  Outcome: Progressing     Problem: INFECTION - ADULT  Goal: Absence or prevention of progression during hospitalization  Description: INTERVENTIONS:  - Assess and monitor for signs and symptoms of infection  - Monitor lab/diagnostic results  - Monitor all insertion sites, i.e. indwelling lines, tubes, and drains  - Monitor endotracheal if appropriate and nasal secretions for changes in amount and color  - [place] appropriate cooling/warming therapies per order  - Administer medications as ordered  - Instruct and encourage patient and family to use good hand hygiene technique  - Identify and instruct in appropriate isolation precautions for identified infection/condition  Outcome: Progressing  Goal: Absence of fever/infection during neutropenic period  Description: INTERVENTIONS:  - Monitor WBC    Outcome: Progressing

## 2024-04-02 NOTE — ASSESSMENT & PLAN NOTE
H/o R knee arthoplasty at an outside facility who presented w/ acute pain, trouble ambulating, warmth & erythema - found to have a right knee periprosthetic joint infection   Xray knee (03/22): Stable hardware/bone alignment; no other acute findings  S/p joint aspiration (03/23) w/ staph growth & gross WBCs   Bcx + w/ Staph aureus  S/p right knee I&D w/ polyethylene exchange for periprosthetic joint infection on 03/24   WBAT RLE  Abx per ID

## 2024-04-03 VITALS
BODY MASS INDEX: 24.91 KG/M2 | DIASTOLIC BLOOD PRESSURE: 81 MMHG | TEMPERATURE: 98.2 F | HEIGHT: 77 IN | HEART RATE: 75 BPM | WEIGHT: 211 LBS | RESPIRATION RATE: 15 BRPM | OXYGEN SATURATION: 95 % | SYSTOLIC BLOOD PRESSURE: 153 MMHG

## 2024-04-03 PROBLEM — T84.53XA INFECTION OF PROSTHETIC RIGHT KNEE JOINT (HCC): Status: RESOLVED | Noted: 2022-11-15 | Resolved: 2024-04-03

## 2024-04-03 PROBLEM — M86.172 ACUTE OSTEOMYELITIS OF LEFT FOOT (HCC): Status: RESOLVED | Noted: 2024-03-28 | Resolved: 2024-04-03

## 2024-04-03 PROBLEM — R45.851 SUICIDAL IDEATION: Status: RESOLVED | Noted: 2024-03-22 | Resolved: 2024-04-03

## 2024-04-03 LAB
GLUCOSE SERPL-MCNC: 205 MG/DL (ref 65–140)
GLUCOSE SERPL-MCNC: 209 MG/DL (ref 65–140)

## 2024-04-03 PROCEDURE — 82948 REAGENT STRIP/BLOOD GLUCOSE: CPT

## 2024-04-03 PROCEDURE — 99239 HOSP IP/OBS DSCHRG MGMT >30: CPT | Performed by: PHYSICIAN ASSISTANT

## 2024-04-03 RX ORDER — DULOXETIN HYDROCHLORIDE 20 MG/1
20 CAPSULE, DELAYED RELEASE ORAL DAILY
Qty: 30 CAPSULE | Refills: 0 | Status: ON HOLD | OUTPATIENT
Start: 2024-04-04

## 2024-04-03 RX ORDER — AMIODARONE HYDROCHLORIDE 200 MG/1
TABLET ORAL 2 TIMES DAILY WITH MEALS
Qty: 28 TABLET | Refills: 0 | Status: ON HOLD | OUTPATIENT
Start: 2024-04-03 | End: 2024-04-25

## 2024-04-03 RX ORDER — METOPROLOL SUCCINATE 25 MG/1
25 TABLET, EXTENDED RELEASE ORAL DAILY
Qty: 30 TABLET | Refills: 0 | Status: SHIPPED | OUTPATIENT
Start: 2024-04-04 | End: 2024-04-05 | Stop reason: SDUPTHER

## 2024-04-03 RX ADMIN — INSULIN LISPRO 13 UNITS: 100 INJECTION, SOLUTION INTRAVENOUS; SUBCUTANEOUS at 12:30

## 2024-04-03 RX ADMIN — INSULIN GLARGINE 45 UNITS: 100 INJECTION, SOLUTION SUBCUTANEOUS at 08:42

## 2024-04-03 RX ADMIN — INSULIN LISPRO 2 UNITS: 100 INJECTION, SOLUTION INTRAVENOUS; SUBCUTANEOUS at 12:30

## 2024-04-03 RX ADMIN — PANTOPRAZOLE SODIUM 20 MG: 20 TABLET, DELAYED RELEASE ORAL at 06:20

## 2024-04-03 RX ADMIN — BUTALBITAL, ACETAMINOPHEN, AND CAFFEINE 1 TABLET: 50; 325; 40 TABLET ORAL at 04:16

## 2024-04-03 RX ADMIN — CEFAZOLIN SODIUM 2000 MG: 2 SOLUTION INTRAVENOUS at 12:08

## 2024-04-03 RX ADMIN — ZOLPIDEM TARTRATE 5 MG: 5 TABLET ORAL at 00:17

## 2024-04-03 RX ADMIN — NAPROXEN 500 MG: 500 TABLET ORAL at 08:40

## 2024-04-03 RX ADMIN — CLONAZEPAM 1 MG: 1 TABLET ORAL at 08:40

## 2024-04-03 RX ADMIN — AMLODIPINE BESYLATE 5 MG: 5 TABLET ORAL at 08:41

## 2024-04-03 RX ADMIN — INSULIN LISPRO 2 UNITS: 100 INJECTION, SOLUTION INTRAVENOUS; SUBCUTANEOUS at 08:43

## 2024-04-03 RX ADMIN — CEFAZOLIN SODIUM 2000 MG: 2 SOLUTION INTRAVENOUS at 03:05

## 2024-04-03 RX ADMIN — METOPROLOL SUCCINATE 25 MG: 25 TABLET, EXTENDED RELEASE ORAL at 08:41

## 2024-04-03 RX ADMIN — DULOXETINE HYDROCHLORIDE 20 MG: 20 CAPSULE, DELAYED RELEASE ORAL at 08:40

## 2024-04-03 RX ADMIN — ONDANSETRON 4 MG: 2 INJECTION INTRAMUSCULAR; INTRAVENOUS at 04:16

## 2024-04-03 RX ADMIN — POLYETHYLENE GLYCOL 3350 17 G: 17 POWDER, FOR SOLUTION ORAL at 08:45

## 2024-04-03 RX ADMIN — MORPHINE SULFATE 60 MG: 30 TABLET, EXTENDED RELEASE ORAL at 08:41

## 2024-04-03 RX ADMIN — APIXABAN 5 MG: 5 TABLET, FILM COATED ORAL at 08:41

## 2024-04-03 RX ADMIN — AMIODARONE HYDROCHLORIDE 400 MG: 200 TABLET ORAL at 08:39

## 2024-04-03 RX ADMIN — METHOCARBAMOL 500 MG: 500 TABLET ORAL at 04:16

## 2024-04-03 RX ADMIN — RISPERIDONE 3 MG: 1 TABLET, FILM COATED ORAL at 08:40

## 2024-04-03 RX ADMIN — INSULIN LISPRO 13 UNITS: 100 INJECTION, SOLUTION INTRAVENOUS; SUBCUTANEOUS at 08:45

## 2024-04-03 NOTE — DISCHARGE SUMMARY
[facility]  Discharge- Armen Llanos 1975, 48 y.o. male MRN: [id number]  Unit/Bed#: -01 Encounter: [id number]  Primary Care Provider: Deidre Andrea MD   Date and time admitted to hospital: 3/22/2024  8:46 AM    * MSSA bacteremia  Assessment & Plan  03/22: BC x 2 w/ Staph aureus  Etiology 2/2 prosthetic joint infection right knee & left toe osteomyelitis  S/p R knee I&D to bone, total knee arthroplasty revision, insertion of stimulan by Dr. Washington  S/p left toe amputation  Continue Ancef; IR consulted for PICC placement; suspect at least 6 weeks of antibiotics  Patient initially recommended for inpatient rehab however on repeat PT/OT eval, recommending HHS. Patient with Hx of TBI and chronic opioid use. Patient and wife initially requesting SNF placement for IV abx.  Insurance auth was obtained however after further discussion, per pt and wife elect to return home with VNA services/home infusion.       Anxiety  Assessment & Plan  Anxiety managed today  Psychiatry consulted  Continue Klonopin, Cymbalta, and risperidone    Cardiomyopathy (HCC)  Assessment & Plan  ECHO (03/25): EF 45% w/ mild global hypokinesis   Cardiology following  Continue Eliquis, Toprol-XL & amlodipine  Per cardiology note, pt recommended to start Jardiance & Entresto which was price checked    TBI (traumatic brain injury) (HCC)  Assessment & Plan  H/o TBI at 7 years old  Memory loss at baseline  Supportive care    Essential hypertension  Assessment & Plan  Home regimen: Metoprolol 25mg daily, amlodipine 5mg daily, & lisinopril-hydrochlorothiazide 20-25mg   Amlodipine and lisinopril previously held 2/2 JH  Amlodipine restarted on 03/29    Type 2 diabetes mellitus with ketoacidosis without coma, without long-term current use of insulin (HCC)  Assessment & Plan  Lab Results   Component Value Date    HGBA1C 12.1 (H) 03/22/2024     Recent Labs     04/02/24  1702 04/02/24  2103 04/03/24  0736  04/03/24  1127   POCGLU 76 261* 209* 205*     Blood Sugar Average: Last 72 hrs:  (P) [id number]     POA: Glucose > 600, BHB 1.4, & pH 7.35 -required IVF recitation & insulin gtt   Home regimen: Metformin & Glimepiride  Endocrinology consulted & recommended basal/mealtime insulin regimen  Prior provider discussed with endocrinology 4/1: Plan for Lantus 45U, Humalog 13U TID which will be continued on discharge + resuming metformin. No need for Amaryl. Jardiance will be started as well on discharge as part of GDMT. Insulin price check sent. CM informed.       Chronic, continuous use of opioids  Assessment & Plan  Home regimen: Morphine 60mg BID & fentanyl patch 75mcg/hr   Today, pt claims he is taking 200 mg Morphine q day   Prior provider called pt's PCP (Dr. Deidre Andrea) to verify outpatient regimen  She recommended the pt to see pain management   Narcotics initially held in ICU w/ respiratory suppression/intubation  Continue   Morphine 60 mg q12 hrs  Fentanyl patch 75 mcg/hr     Acute osteomyelitis of left foot (HCC)-resolved as of 4/3/2024  Assessment & Plan  As evidenced by MRI on 03/26   Podiatry following   S/p left toe amputation on 03/29   Abx as above.     Septic shock (HCC)-resolved as of 4/1/2024  Assessment & Plan  POA w/ tachycardia, leukocytosis (17), lactic acidosis (1.4), elevated procal (3.39), & elevated Cr. 1.6 2/2 MSSA bacteremia, MSSA right TKR infxn, & left 2nd toe osteo. On 03/24, pt developed postoperative fever (103.5 °F) & hypotension which required vasopressors & continued postop intubation.   03/22: BC x 2 w/ Staph aureus  03/22: R knee aspirate positive for MSSA & WBCs  03/24:   Intra-op R knee sample positive for MSSA   S/p R knee I&D to bone, total knee arthroplasty revision, insertion of stimulan with Dr. 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)-resolved as of 4/3/2024  Assessment & Plan  H/o R knee arthoplasty at an outside facility who presented w/ acute pain, trouble ambulating, warmth & erythema - found to have a right knee periprosthetic joint infection   Xray knee (03/22): Stable hardware/bone alignment; no other acute findings  S/p joint aspiration (03/23) w/ staph growth & gross WBCs   Bcx + w/ Staph aureus  S/p right knee I&D w/ polyethylene exchange for periprosthetic joint infection on 03/24   WBAT RLE  Abx per ID      Medical Problems       Resolved Problems  Date Reviewed: 4/3/2024            Resolved    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) 4/3/2024     Resolved by  Elsy Herrera PA-C    Overview Deleted 3/30/2024  1:56 PM by Bambi Blackwood PA-C            Septic shock (HCC) 4/1/2024     Resolved by  Uday Jean MD    Metabolic acidosis 3/25/2024     Resolved by  NATA Zhang    JH (acute kidney injury) (HCC) 3/23/2024     Resolved by  Penelope Ha MD    Suicidal ideation 4/3/2024     Resolved by  Elsy Herrera PA-C    Hyponatremia 4/1/2024     Resolved by  Uday Jean MD    Overview Deleted 3/23/2024 12:22 PM by Penelope Ha MD            Acute respiratory failure with hypoxia (HCC) 3/25/2024     Resolved by  NATA Zhang    Acute osteomyelitis of left foot (HCC) 4/3/2024     Resolved by  Elsy Herrera PA-C        Discharging Physician / Practitioner: Elsy Herrera PA-C  PCP: Deidre Andrea MD  Admission Date:   Admission Orders (From admission, onward)       Ordered        03/22/24 1201  INPATIENT ADMISSION  Once                          Discharge Date: 04/03/24    Consultations During Hospital  Stay:  Endocrinology  Psychiatry  Podiatry  Infectious disease  Cardiology  Orthopedics  Nephrology  Medical toxicology    Procedures Performed:   Right knee total arthroplasty 3/24  Amputation of second left toe 3/28    Significant Findings / Test Results:   XR right knee 3/22: No acute osseous abnormality  CXR 3/22: No acute cardiopulmonary disease  XR left foot: Limited study regarding second distal phalanx  MRI foot/forefoot toes left: Second ray distal skin ulceration with marrow changes second distal phalanx consistent with osteomyelitis. No abscess.   Blood cultures positive x 2 Staph aureus; eventually repeat blood cultures were negative after 5 days  Synovial fluid of right knee positive Staph aureus  Hemoglobin A1c 12.1    Incidental Findings:   None    Test Results Pending at Discharge (will require follow up):   None     Outpatient Tests Requested:  Outpatient labs per ID for monitoring while on IV antibiotics    Complications: Developed septic shock and required transfer to ICU    Reason for Admission: Hyperglycemia    Hospital Course:   Armen Llanos is a 48 y.o. male patient who originally presented to the hospital on 3/22/2024 due to right knee pain.    Past medical history significant for TBI, hypertension, chronic opioid use, chronic knee pain.  Presented to the emergency department with right knee pain and was found to have significant hyperglycemia with no evidence of DKA.  Patient was originally started on non-DKA insulin drip as well as IV fluids.  X-ray of the right knee was unremarkable on admission and orthopedics was consulted.  Ultimately aspiration of right knee was performed by orthopedics and concern for periprosthetic joint infection.  Patient went to the OR on 3/24 for right knee total arthroplasty.  Ultimately patient had clinical decline with criteria for septic shock and required transfer to the ICU intubated and on vasopressors.  Infectious disease had been consulted.   "Cardiology was consulted due to new onset A-fib RVR.  Ultimately was transitioned to amiodarone and metoprolol as well as Eliquis for anticoagulation.  Podiatry was consulted due to ulceration of second toe of left foot and given concern for osteomyelitis amputation was completed on 3/28.  Patient had been found to have Staph aureus bacteremia and antibiotics were eventually de-escalated to IV Ancef based on culture results.  Infectious disease recommended completion of 6 weeks of IV antibiotics.  Endocrinology had recommended combination of insulin and oral regimen on discharge.  Patient and wife elected to return home with VNA services/home infusion for IV antibiotics.  On day of discharge patient was afebrile, hemodynamically stable and verbalized understanding for requested outpatient follow-up.    Please see above list of diagnoses and related plan for additional information.     Condition at Discharge: stable    Discharge Day Visit / Exam:   Subjective:  Feels well, eager for discharge home.  Vitals: Blood Pressure: 153/81 (04/03/24 0625)  Pulse: 75 (04/03/24 0625)  Temperature: 98.2 °F (36.8 °C) (04/03/24 0625)  Temp Source: Oral (04/03/24 0625)  Respirations: 15 (04/03/24 0625)  Height: 6' 5\" (195.6 cm) (03/25/24 0805)  Weight - Scale: 95.7 kg (211 lb) (04/03/24 0558)  SpO2: 95 % (04/03/24 0625)  Exam:   Physical Exam  Vitals and nursing note reviewed.   Constitutional:       General: He is not in acute distress.     Appearance: He is well-developed.      Comments: No acute distress   HENT:      Head: Normocephalic and atraumatic.   Eyes:      General: No scleral icterus.     Extraocular Movements: Extraocular movements intact.      Conjunctiva/sclera: Conjunctivae normal.   Cardiovascular:      Rate and Rhythm: Normal rate and regular rhythm.      Heart sounds: No murmur heard.  Pulmonary:      Effort: Pulmonary effort is normal. No respiratory distress.      Breath sounds: Normal breath sounds. No " wheezing, rhonchi or rales.   Abdominal:      General: Bowel sounds are normal.      Palpations: Abdomen is soft.      Tenderness: There is no abdominal tenderness. There is no guarding or rebound.   Musculoskeletal:         General: No swelling.      Cervical back: Normal range of motion.      Comments: Able to move upper/lower extremities bilaterally, no edema   Skin:     General: Skin is warm and dry.      Capillary Refill: Capillary refill takes less than 2 seconds.   Neurological:      Mental Status: He is alert and oriented to person, place, and time.   Psychiatric:         Mood and Affect: Mood normal.         Speech: Speech normal.         Behavior: Behavior normal.          Discussion with Family: Patient declined call to .     Discharge instructions/Information to patient and family:   See after visit summary for information provided to patient and family.      Provisions for Follow-Up Care:  See after visit summary for information related to follow-up care and any pertinent home health orders.      Mobility at time of Discharge:   Basic Mobility Inpatient Raw Score: 22  JH-HLM Goal: 7: Walk 25 feet or more  JH-HLM Achieved: 7: Walk 25 feet or more  HLM Goal achieved. Continue to encourage appropriate mobility.     Disposition:   Home with VNA Services (Reminder: Complete face to face encounter)    Planned Readmission: None     Discharge Statement:  I spent 65 minutes discharging the patient. This time was spent on the day of discharge. I had direct contact with the patient on the day of discharge. Greater than 50% of the total time was spent examining patient, answering all patient questions, arranging and discussing plan of care with patient as well as directly providing post-discharge instructions.  Additional time then spent on discharge activities.    Discharge Medications:  See after visit summary for reconciled discharge medications provided to patient and/or family.      **Please  Note: This note may have been constructed using a voice recognition system**

## 2024-04-03 NOTE — CASE MANAGEMENT
Case Management Discharge Planning Note    Patient name Armen Llanos  Location /-01 MRN [id number]  : 1975 Date 4/3/2024       Current Admission Date: 3/22/2024  Current Admission Diagnosis:MSSA bacteremia   Patient Active Problem List    Diagnosis Date Noted    Anxiety 2024    Cardiomyopathy (formerly [facility]) 2024    Mononeuropathy 2024    Osteoarthritis of knee 2024    A-fib with RVR (formerly [facility]) 2024    MSSA bacteremia 2024    Syncope 2023    Costochondral chest pain 2023    Adrenal nodule (formerly [facility]) 2023    Chronic, continuous use of opioids 11/15/2022    Type 2 diabetes mellitus with ketoacidosis without coma, without long-term current use of insulin (formerly [facility]) 11/15/2022    Essential hypertension 11/15/2022    Hypokalemia 11/15/2022    Hypocalcemia 11/15/2022    TBI (traumatic brain injury) (formerly [facility]) 11/15/2022    Chronic pain disorder 2022      LOS (days): 12  Geometric Mean LOS (GMLOS) (days): 11.1  Days to GMLOS:-1     OBJECTIVE:  Risk of Unplanned Readmission Score: 26.72      Current admission status: Inpatient   Preferred Pharmacy:   [id number] - [place] - [address]  [address] [address]  Phone: [phone] Fax: [phone]    Primary Care Provider: Deidre Andrea MD    Primary Insurance: [id number] RE[employer]  Secondary Insurance:     DISCHARGE DETAILS:    Discharge planning discussed with:: pt     CM contacted family/caregiver?: No- see comments (pt is in communication with his spouse)  Were Treatment Team discharge recommendations reviewed with patient/caregiver?: Yes  Did patient/caregiver verbalize understanding of patient care needs?: Yes  Were patient/caregiver advised of the risks associated with not following Treatment Team discharge recommendations?: Yes    Requested Home Health Care         Is the patient interested in HHC at discharge?: Yes  Home Health Discipline requested:: Nursing, Occupational Therapy,  Physical Therapy  [facility]  HHA External Referral Reason (only applicable if external HHA name selected): Patient has established relationship with provider  Home Health Follow-Up Provider:: PCP  Home Health Services Needed:: Wound/Ostomy Care, Diabetes Management, Gait/ADL Training, Evaluate Functional Status and Safety, Post-Op Care and Assessment  Homebound Criteria Met:: Requires the Assistance of Another Person for Safe Ambulation or to Leave the Home, Uses an Assist Device (i.e. cane, walker, etc)  Supporting Clincal Findings:: Fatigues Easliy in Short Distances, Limited Endurance    DME Referral Provided  Referral made for DME?: No    Other Referral/Resources/Interventions Provided:  Interventions: [facility]    Treatment Team Recommendation: Home with Home Health Care  Discharge Destination Plan:: Home with Home Health Care  Transport at Discharge : Family     IMM Given (Date):: 04/03/24  IMM Given to:: Patient  Family notified:: Reviewed with pt. He is in agreement with d/c.  Additional Comments: Pt would now like to go home and not go to SNF for IV abx administration. Cm reached out to [facility] and they are able to send a RN out at 4 pm. Pt's spouse will pick him up at 3:30 pm. Cm spoke to [facility] and pt's Novolog and Insulin are both $35 and his ELiquis is $47. He was given a  Eliquis free trial card. He states that he will use his [employer] card instead of his insurance as he feels he will be able to get it cheaper then through his insurance. Pt will have [facility] for home PT, OT and RN.    Pt will purchase transfer bench independently. They are aware to call [facility] Nurse On Call Line at [phone] if they experience any issues with PICC line.

## 2024-04-03 NOTE — ASSESSMENT & PLAN NOTE
Lab Results   Component Value Date    HGBA1C 12.1 (H) 03/22/2024     Recent Labs     04/02/24  1702 04/02/24  2103 04/03/24  0736 04/03/24  1127   POCGLU 76 261* 209* 205*     Blood Sugar Average: Last 72 hrs:  (P) [id number]     POA: Glucose > 600, BHB 1.4, & pH 7.35 -required IVF recitation & insulin gtt   Home regimen: Metformin & Glimepiride  Endocrinology consulted & recommended basal/mealtime insulin regimen  Prior provider discussed with endocrinology 4/1: Plan for Lantus 45U, Humalog 13U TID which will be continued on discharge + resuming metformin. No need for Amaryl. Jardiance will be started as well on discharge as part of GDMT. Insulin price check sent. CM informed.

## 2024-04-03 NOTE — ASSESSMENT & PLAN NOTE
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 Washington  S/p left toe amputation  Continue Ancef; IR consulted for PICC placement; suspect at least 6 weeks of antibiotics  Patient initially recommended for inpatient rehab however on repeat PT/OT eval, recommending HHS. Patient with Hx of TBI and chronic opioid use. Patient and wife initially requesting SNF placement for IV abx.  Insurance auth was obtained however after further discussion, per pt and wife elect to return home with VNA services/home infusion.

## 2024-04-03 NOTE — PLAN OF CARE
Problem: PAIN - ADULT  Goal: Verbalizes/displays adequate comfort level or baseline comfort level  Description: Interventions:  - Encourage patient to monitor pain and request assistance  - Assess pain using appropriate pain scale  - Administer analgesics based on type and severity of pain and evaluate response  - Implement non-pharmacological measures as appropriate and evaluate response  - Consider cultural and social influences on pain and pain management  - Notify physician/advanced practitioner if interventions unsuccessful or patient reports new pain  Outcome: Progressing     Problem: INFECTION - ADULT  Goal: Absence or prevention of progression during hospitalization  Description: INTERVENTIONS:  - Assess and monitor for signs and symptoms of infection  - Monitor lab/diagnostic results  - Monitor all insertion sites, i.e. indwelling lines, tubes, and drains  - Monitor endotracheal if appropriate and nasal secretions for changes in amount and color  - [facility] appropriate cooling/warming therapies per order  - Administer medications as ordered  - Instruct and encourage patient and family to use good hand hygiene technique  - Identify and instruct in appropriate isolation precautions for identified infection/condition  Outcome: Progressing  Goal: Absence of fever/infection during neutropenic period  Description: INTERVENTIONS:  - Monitor WBC    Outcome: Progressing     Problem: NEUROSENSORY - ADULT  Goal: Achieves stable or improved neurological status  Description: INTERVENTIONS  - Monitor and report changes in neurological status  - Monitor vital signs such as temperature, blood pressure, glucose, and any other labs ordered   - Initiate measures to prevent increased intracranial pressure  - Monitor for seizure activity and implement precautions if appropriate      Outcome: Progressing  Goal: Remains free of injury related to seizures activity  Description: INTERVENTIONS  - Maintain airway, patient safety   and administer oxygen as ordered  - Monitor patient for seizure activity, document and report duration and description of seizure to physician/advanced practitioner  - If seizure occurs,  ensure patient safety during seizure  - Reorient patient post seizure  - Seizure pads on all 4 side rails  - Instruct patient/family to notify RN of any seizure activity including if an aura is experienced  - Instruct patient/family to call for assistance with activity based on nursing assessment  - Administer anti-seizure medications if ordered    Outcome: Progressing  Goal: Achieves maximal functionality and self care  Description: INTERVENTIONS  - Monitor swallowing and airway patency with patient fatigue and changes in neurological status  - Encourage and assist patient to increase activity and self care.   - Encourage visually impaired, hearing impaired and aphasic patients to use assistive/communication devices  Outcome: Progressing     Problem: CARDIOVASCULAR - ADULT  Goal: Maintains optimal cardiac output and hemodynamic stability  Description: INTERVENTIONS:  - Monitor I/O, vital signs and rhythm  - Monitor for S/S and trends of decreased cardiac output  - Administer and titrate ordered vasoactive medications to optimize hemodynamic stability  - Assess quality of pulses, skin color and temperature  - Assess for signs of decreased coronary artery perfusion  - Instruct patient to report change in severity of symptoms  Outcome: Progressing  Goal: Absence of cardiac dysrhythmias or at baseline rhythm  Description: INTERVENTIONS:  - Continuous cardiac monitoring, vital signs, obtain 12 lead EKG if ordered  - Administer antiarrhythmic and heart rate control medications as ordered  - Monitor electrolytes and administer replacement therapy as ordered  Outcome: Progressing     Problem: RESPIRATORY - ADULT  Goal: Achieves optimal ventilation and oxygenation  Description: INTERVENTIONS:  - Assess for changes in respiratory status  -  Assess for changes in mentation and behavior  - Position to facilitate oxygenation and minimize respiratory effort  - Oxygen administered by appropriate delivery if ordered  - Initiate smoking cessation education as indicated  - Encourage broncho-pulmonary hygiene including cough, deep breathe, Incentive Spirometry  - Assess the need for suctioning and aspirate as needed  - Assess and instruct to report SOB or any respiratory difficulty  - Respiratory Therapy support as indicated  Outcome: Progressing

## 2024-04-03 NOTE — NURSING NOTE
Gave pt discharge instructions. PICC line is in place because pt is going to be getting IV antibiotics at home.

## 2024-04-03 NOTE — QUICK NOTE
Discharge was placed on hold because Pt and his wife have decided that the Pt should go to SNF since Pt would be home alone for at least 10 hrs while his wife is at work. He remains afebrile and WBC count is nml. He is completing 6 week course of IV abx for tx of MSSA bacteremia with MSSA right TKR - source of bacteremia is probably left 2nd toe osteo which was resected.    - Cont Ancef to complete at least 6 weeks of IV abx  - Will follow inflammatory markers weekly  - Pt will be placed on chronic abx suppression after he completes his course of IV abx since prosthetic joint was not removed  - OK to d/c Pt when SNF bed is available  - Pt will f/u with me as an out-Pt  - Will sign off case, please re-consult if any new probs arise

## 2024-04-03 NOTE — ASSESSMENT & PLAN NOTE
Home regimen: Morphine 60mg BID & fentanyl patch 75mcg/hr   Today, pt claims he is taking 200 mg Morphine q day   Prior provider called pt's PCP (Dr. Deidre Andrea) to verify outpatient regimen  She recommended the pt to see pain management   Narcotics initially held in ICU w/ respiratory suppression/intubation  Continue   Morphine 60 mg q12 hrs  Fentanyl patch 75 mcg/hr

## 2024-04-03 NOTE — ASSESSMENT & PLAN NOTE
POA w/ tachycardia, leukocytosis (17), lactic acidosis (1.4), elevated procal (3.39), & elevated Cr. 1.6 2/2 MSSA bacteremia, MSSA right TKR infxn, & left 2nd toe osteo. On 03/24, pt developed postoperative fever (103.5 °F) & hypotension which required vasopressors & continued postop intubation.   03/22: BC x 2 w/ Staph aureus  03/22: R knee aspirate positive for MSSA & WBCs  03/24:   Intra-op R knee sample positive for MSSA   S/p R knee I&D to bone, total knee arthroplasty revision, insertion of stimulan with Dr. Washington  While intraoperative, pt became acutely tachycardia w/ new AF RVR & remained intubated; continued hypotension despite IVF resuscitation & required Levophed  Hydrocortisone added   03/29: S/p amputation left 2nd toe for osteo found on MRI   Continue Ancef  Will require 6-week course  IR consulted for PICC placement  Repeat BCx negative x 5 days

## 2024-04-04 ENCOUNTER — HOME CARE VISIT (OUTPATIENT)
Dept: HOME HEALTH SERVICES | Facility: HOME HEALTHCARE | Age: 49
End: 2024-04-04
Payer: COMMERCIAL

## 2024-04-04 VITALS
HEART RATE: 84 BPM | TEMPERATURE: 98 F | OXYGEN SATURATION: 98 % | HEIGHT: 78 IN | RESPIRATION RATE: 20 BRPM | BODY MASS INDEX: 24.41 KG/M2 | WEIGHT: 211 LBS | DIASTOLIC BLOOD PRESSURE: 70 MMHG | SYSTOLIC BLOOD PRESSURE: 138 MMHG

## 2024-04-04 LAB — ISLET CELL512 AB SER-ACNC: <7.5 U/ML

## 2024-04-04 PROCEDURE — 10330064 SPONGE, GAUZE 8PLY N/S 4"X4" (200/PK 20P

## 2024-04-04 PROCEDURE — G0299 HHS/HOSPICE OF RN EA 15 MIN: HCPCS

## 2024-04-04 PROCEDURE — 10330064 TAPE, ADHSV TRANSPORE WHT 2" (6RL/BX 10B

## 2024-04-04 PROCEDURE — 10330064 DRESSING, ADAPTIC 3"X3" (50/BX)

## 2024-04-04 PROCEDURE — 10330064 DRESSING, HYDROCOLLOID FILM-BCK STR SACR

## 2024-04-04 NOTE — UTILIZATION REVIEW
NOTIFICATION OF ADMISSION DISCHARGE   This is a Notification of Discharge from [facility]. Please be advised that this patient has been discharge from our facility. Below you will find the admission and discharge date and time including the patient’s disposition.   UTILIZATION REVIEW CONTACT:  Yasemin Knowles  Utilization   Network Utilization Review Department  Phone: [phone] x carefully listen to the prompts. All voicemails are confidential.  Email: [email or web address]     ADMISSION INFORMATION  PRESENTATION DATE: 3/22/2024  8:46 AM  OBERVATION ADMISSION DATE:   INPATIENT ADMISSION DATE: 3/22/24 12:01 PM   DISCHARGE DATE: 4/3/2024  3:05 PM   DISPOSITION:Home with Home Health Care    Network Utilization Review Department  ATTENTION: Please call with any questions or concerns to [phone] and carefully listen to the prompts so that you are directed to the right person. All voicemails are confidential.   For Discharge needs, contact Care Management DC Support Team at [phone] opt. 2  Send all requests for admission clinical reviews, approved or denied determinations and any other requests to dedicated fax number below belonging to the campus where the patient is receiving treatment. List of dedicated fax numbers for the Facilities:  FACILITY NAME UR FAX NUMBER   ADMISSION DENIALS (Administrative/Medical Necessity) [phone]   DISCHARGE SUPPORT TEAM ([facility]) [phone]   PARENT CHILD HEALTH (Maternity/NICU/Pediatrics) [phone]   [facility] [phone]   [facility] [phone]   [facility] [phone]   [facility] [phone]   [facility] [phone]   [facility] [phone]   [facility] [phone]   [facility]  [phone]   [facility] [phone]   [facility] [phone]   [facility] [phone]   [facility] [phone]

## 2024-04-05 ENCOUNTER — OFFICE VISIT (OUTPATIENT)
Dept: CARDIOLOGY CLINIC | Facility: CLINIC | Age: 49
End: 2024-04-05
Payer: COMMERCIAL

## 2024-04-05 ENCOUNTER — HOME CARE VISIT (OUTPATIENT)
Dept: HOME HEALTH SERVICES | Facility: HOME HEALTHCARE | Age: 49
End: 2024-04-05
Payer: COMMERCIAL

## 2024-04-05 VITALS
BODY MASS INDEX: 25.59 KG/M2 | HEIGHT: 78 IN | HEART RATE: 88 BPM | DIASTOLIC BLOOD PRESSURE: 80 MMHG | WEIGHT: 221.2 LBS | SYSTOLIC BLOOD PRESSURE: 158 MMHG

## 2024-04-05 VITALS — SYSTOLIC BLOOD PRESSURE: 142 MMHG | DIASTOLIC BLOOD PRESSURE: 74 MMHG

## 2024-04-05 DIAGNOSIS — I48.91 ATRIAL FIBRILLATION, UNSPECIFIED TYPE (HCC): Primary | ICD-10-CM

## 2024-04-05 DIAGNOSIS — R06.02 SHORTNESS OF BREATH: ICD-10-CM

## 2024-04-05 DIAGNOSIS — R94.39 ABNORMAL RESULT OF OTHER CARDIOVASCULAR FUNCTION STUDY: ICD-10-CM

## 2024-04-05 DIAGNOSIS — I42.0 DILATED CARDIOMYOPATHY (HCC): ICD-10-CM

## 2024-04-05 DIAGNOSIS — I48.91 A-FIB (HCC): ICD-10-CM

## 2024-04-05 PROCEDURE — 99214 OFFICE O/P EST MOD 30 MIN: CPT | Performed by: PHYSICIAN ASSISTANT

## 2024-04-05 PROCEDURE — G0151 HHCP-SERV OF PT,EA 15 MIN: HCPCS

## 2024-04-05 RX ORDER — MORPHINE SULFATE 60 MG/1
60 TABLET, FILM COATED, EXTENDED RELEASE ORAL EVERY 12 HOURS PRN
Status: ON HOLD | COMMUNITY
Start: 2024-03-01

## 2024-04-05 RX ORDER — METOPROLOL SUCCINATE 25 MG/1
25 TABLET, EXTENDED RELEASE ORAL DAILY
Qty: 90 TABLET | Refills: 3 | Status: ON HOLD | OUTPATIENT
Start: 2024-04-05

## 2024-04-05 RX ORDER — CHOLECALCIFEROL (VITAMIN D3) 125 MCG
30 CAPSULE ORAL
COMMUNITY
End: 2024-04-05

## 2024-04-05 NOTE — PROGRESS NOTES
Cardiology Office Follow Up  Armen Llanos  1975  [id number]      ASSESSMENT:  Paroxysmal atrial fibrillation  Dilated cardiomyopathy, EF 45% -- likely tachy vs stress related.   Hypertension    PLAN:  Etiology of his cardiomyopathy is likely tachycardia mediated versus stress-induced given admission for severe sepsis.    However we will rule out ischemic substrate with pharmacologic MPI (cannot exercise due to chronic back and recent knee surgery).   Continue with PO amiodarone in the short-term (x4 weeks).   Plan to recheck LFTs and TFTs in 4 weeks.   Haven't filled Toprol XL Rx yet. Rx re-sent to his home pharmacy.   Continue Entresto, Zocor, Norvasc, Eliquis  Repeat limite TTE in 3 months to assess for EF recovery.   RTO in 3 months or sooner if needed    Interval History/ HPI:   Armen Llanos is a 48-year-old male with hx of atrial fibrillation, hypertension, dm type 2, chornic opioid use 2/2 chronic back pain, TBI, chronic R knee pain presented to [facility] on 3/22/2024 due to right knee pain.  Initial workup in the ED showed significant hyper glycemia without evidence of DKA and started on insulin gtt and IVF.  Orthopedics was consulted for evaluation of right knee pain and concern for prosthetic knee replacement proceeded to the OR on 3/24 for right knee total arthroplasty.  During the procedure patient developed atrial fibrillation with RVR and hypotension requiring admission.  He was admitted to ICU level of care due to clinical decline and ultimately required to be intubated and placed on IV pressor support for short duration.  Blood cultures were positive for MSSA bacteremia, ID consulted and placed on 6 weeks of IV antibiotics.  He was placed on IV amiodarone and IV heparin and converted to sinus rhythm.  Inpatient TTE showed LVEF of 45% with global hypokinesis. He was switched to PO amiodarone load in addition to Toprol-XL and Eliquis for CVA prophylaxis in addition to  Jardiance and Entresto which was affordable for him in the short term.     Patient reports he has been having diaphoretic episodes with nausea/vomiting which she had developed secondary to being started on insulin.  He had stopped taking Jardiance on his own due concern regarding hypoglycemia. Set to follow up with an endocrinologist for better glucose control, has appt with Dr العراقي on 4/26. Had a episode yesterday and prior to coming in today. Happens when he doesn't eat right after taking insulin. Took a some glucose tabs and feeling much better now.     In office EKG showed sinus rhythm without acute interval changes, HR 88.  BP mildly elevated 158/80. Weight 221 LBS today (220 LBS at discharge).     Was discharged to acute rehab for his right knee. Reports he has been doing reasonably well CV wise without any chest pain, discomfort, palpitations, shortness of breath, orthopnea, PND or lower extremity edema. Taking most of his medications except Toprol XL which there was an issue at his pharmacy.     Vitals:  158/80  88  221lbs    Past Medical History:   Diagnosis Date    Diabetes mellitus (HCC)     Hypertension      Social History     Socioeconomic History    Marital status: /Civil Union     Spouse name: Not on file    Number of children: Not on file    Years of education: Not on file    Highest education level: Not on file   Occupational History    Not on file   Tobacco Use    Smoking status: Never    Smokeless tobacco: Never   Vaping Use    Vaping status: Some Days    Substances: THC, CBD, Flavoring   Substance and Sexual Activity    Alcohol use: Not Currently    Drug use: Yes     Types: Marijuana    Sexual activity: Not on file   Other Topics Concern    Not on file   Social History Narrative    Not on file     Social Determinants of Health     Financial Resource Strain: Not on file   Food Insecurity: No Food Insecurity (3/25/2024)    Hunger Vital Sign     Worried About Running Out of Food in the Last  Year: Never true     Ran Out of Food in the Last Year: Never true   Transportation Needs: No Transportation Needs (3/25/2024)    PRAPARE - Transportation     Lack of Transportation (Medical): No     Lack of Transportation (Non-Medical): No   Physical Activity: Not on file   Stress: Not on file   Social Connections: Not on file   Intimate Partner Violence: Not on file   Housing Stability: Low Risk  (3/25/2024)    Housing Stability Vital Sign     Unable to Pay for Housing in the Last Year: No     Number of Places Lived in the Last Year: 1     Unstable Housing in the Last Year: No      No family history on file.  Past Surgical History:   Procedure Laterality Date    IR PICC PLACEMENT SINGLE LUMEN  4/1/2024    KNEE SURGERY      WA REVJ TOTAL KNEE ARTHRP W/WO ALGRFT 1 COMPONENT Right 3/24/2024    Procedure: ARTHROPLASTY KNEE TOTAL REVISION, POLY EXCHANGE;  Surgeon: Tao Washington DO;  Location:  MAIN OR;  Service: Orthopedics    TOE AMPUTATION Left 3/28/2024    Procedure: AMPUTATION TOE 2nd;  Surgeon: Rossy Toussaint DPM;  Location:  MAIN OR;  Service: Podiatry       Current Outpatient Medications:     Alcohol Swabs 70 % PADS, May substitute brand based on insurance coverage. Check glucose ACHS., Disp: 200 each, Rfl: 0    amiodarone 200 mg tablet, Take 2 tablets (400 mg total) by mouth 2 (two) times a day with meals for 2 days, THEN 1 tablet (200 mg total) daily with breakfast for 20 days., Disp: 28 tablet, Rfl: 0    amLODIPine (NORVASC) 5 mg tablet, Take 5 mg by mouth daily, Disp: , Rfl:     apixaban (ELIQUIS) 5 mg, Take 1 tablet (5 mg total) by mouth 2 (two) times a day, Disp: 60 tablet, Rfl: 2    b complex vitamins capsule, Take 1 capsule by mouth daily, Disp: , Rfl:     Blood Glucose Monitoring Suppl (OneTouch Verio Reflect) w/Device KIT, May substitute brand based on insurance coverage. Check glucose ACHS., Disp: 1 kit, Rfl: 0    ceFAZolin (ANCEF) 2000 mg IVPB, Inject 2,000 mg into a catheter in a vein  over 30 minutes at 100 mL/hr every 8 (eight) hours Do not start before April 2, 2024., Disp: 108 each, Rfl: 0    cyclobenzaprine (FLEXERIL) 10 mg tablet, Take 10 mg by mouth 3 (three) times a day as needed, Disp: , Rfl:     DULoxetine (CYMBALTA) 20 mg capsule, Take 1 capsule (20 mg total) by mouth daily, Disp: 30 capsule, Rfl: 0    Empagliflozin (Jardiance) 10 MG TABS tablet, Take 1 tablet (10 mg total) by mouth every morning, Disp: 30 tablet, Rfl: 0    esomeprazole (NexIUM) 20 mg capsule, Take 20 mg by mouth 2 (two) times a day before meals (Patient not taking: Reported on 3/22/2024), Disp: , Rfl:     fentaNYL (DURAGESIC) 75 mcg/hr, Place 1 patch on the skin every third day, Disp: , Rfl:     glucose blood (OneTouch Verio) test strip, May substitute brand based on insurance coverage. Check glucose ACHS., Disp: 200 each, Rfl: 0    Insulin Glargine Solostar (Lantus SoloStar) 100 UNIT/ML SOPN, Inject 0.45 mL (45 Units total) under the skin daily at bedtime, Disp: 10.5 mL, Rfl: 2    insulin lispro (HumaLOG KwikPen) 100 units/mL injection pen, Inject 13 Units under the skin 3 (three) times a day with meals, Disp: 15 mL, Rfl: 2    Insulin Pen Needle (BD Pen Needle Carisa 2nd Gen) 32G X 4 MM MISC, For use with insulin pen. Pharmacy may dispense brand covered by insurance., Disp: 100 each, Rfl: 0    Insulin Pen Needle (BD Pen Needle Carisa 2nd Gen) 32G X 4 MM MISC, For use with insulin pen. Pharmacy may dispense brand covered by insurance., Disp: 100 each, Rfl: 0    metFORMIN (GLUCOPHAGE) 500 mg tablet, Take 500 mg by mouth 2 (two) times a day with meals, Disp: , Rfl:     metoprolol succinate (TOPROL-XL) 25 mg 24 hr tablet, Take 1 tablet (25 mg total) by mouth daily, Disp: 30 tablet, Rfl: 0    Morphine Sulfate ER 15 MG T12A, Take 60 mg by mouth every 12 (twelve) hours as needed, Disp: , Rfl:     ondansetron (ZOFRAN) 8 mg tablet, TAKE 1 TABLET (8 MG) BY MOUTH 3 TIMES PER DAY AS NEEDED, Disp: , Rfl:     OneTouch Delica Lancets  33G MISC, May substitute brand based on insurance coverage. Check glucose ACHS., Disp: 200 each, Rfl: 0    risperiDONE (RisperDAL) 3 mg tablet, Take 3 mg by mouth daily, Disp: , Rfl:     sacubitril-valsartan (Entresto) 24-26 MG TABS, Take 1 tablet by mouth 2 (two) times a day, Disp: 60 tablet, Rfl: 0    simvastatin (ZOCOR) 40 mg tablet, Take 40 mg by mouth daily, Disp: , Rfl:     SUMAtriptan (IMITREX) 100 mg tablet, Take 100 mg by mouth, Disp: , Rfl:     zolpidem (AMBIEN) 10 mg tablet, Take 10 mg by mouth, Disp: , Rfl:       Review of Systems:  Review of Systems   Constitutional:  Negative for appetite change, chills, diaphoresis, fatigue and fever.   Respiratory:  Negative for cough, chest tightness and shortness of breath.    Cardiovascular:  Negative for chest pain, palpitations and leg swelling.   Gastrointestinal:  Negative for diarrhea, nausea and vomiting.   Endocrine: Negative for cold intolerance and heat intolerance.   Genitourinary:  Negative for difficulty urinating, dysuria and enuresis.   Musculoskeletal:  Negative for arthralgias, back pain and gait problem.   Allergic/Immunologic: Negative for environmental allergies and food allergies.   Neurological:  Negative for dizziness, facial asymmetry and headaches.   Hematological:  Negative for adenopathy. Does not bruise/bleed easily.   Psychiatric/Behavioral:  Negative for agitation, behavioral problems and confusion.          Physical Exam:  Physical Exam  Constitutional:       Appearance: He is well-developed.   HENT:      Right Ear: External ear normal.      Left Ear: External ear normal.   Eyes:      Pupils: Pupils are equal, round, and reactive to light.   Cardiovascular:      Rate and Rhythm: Normal rate and regular rhythm.      Heart sounds: Normal heart sounds. No murmur heard.     No friction rub. No gallop.   Pulmonary:      Effort: Pulmonary effort is normal.      Breath sounds: Normal breath sounds.   Abdominal:      Palpations: Abdomen is  soft.   Musculoskeletal:         General: Normal range of motion.      Cervical back: Normal range of motion.   Skin:     General: Skin is warm and dry.   Neurological:      Mental Status: He is alert and oriented to person, place, and time.      Deep Tendon Reflexes: Reflexes are normal and symmetric.   Psychiatric:         Behavior: Behavior normal.         Thought Content: Thought content normal.         Judgment: Judgment normal.         This note was completed in part utilizing M-Modal Fluency Direct Software.  Grammatical errors, random word insertions, spelling mistakes, and incomplete sentences can be an occasional consequence of this system secondary to software limitations, ambient noise, and hardware issues.  If you have any questions or concerns about the content, text, or information contained within the body of this dictation, please contact the provider for clarification.

## 2024-04-06 ENCOUNTER — HOME CARE VISIT (OUTPATIENT)
Dept: HOME HEALTH SERVICES | Facility: HOME HEALTHCARE | Age: 49
End: 2024-04-06
Payer: COMMERCIAL

## 2024-04-06 VITALS
TEMPERATURE: 98.7 F | DIASTOLIC BLOOD PRESSURE: 80 MMHG | HEART RATE: 84 BPM | RESPIRATION RATE: 18 BRPM | SYSTOLIC BLOOD PRESSURE: 138 MMHG | OXYGEN SATURATION: 96 %

## 2024-04-06 LAB — GAD65 AB SER-ACNC: <5 U/ML (ref 0–5)

## 2024-04-06 PROCEDURE — G0299 HHS/HOSPICE OF RN EA 15 MIN: HCPCS

## 2024-04-07 ENCOUNTER — HOSPITAL ENCOUNTER (EMERGENCY)
Facility: HOSPITAL | Age: 49
Discharge: HOME | End: 2024-04-07
Attending: EMERGENCY MEDICINE | Admitting: EMERGENCY MEDICINE
Payer: COMMERCIAL

## 2024-04-07 ENCOUNTER — HOME CARE VISIT (OUTPATIENT)
Dept: HOME HEALTH SERVICES | Facility: HOME HEALTHCARE | Age: 49
End: 2024-04-07
Payer: COMMERCIAL

## 2024-04-07 VITALS
HEART RATE: 94 BPM | BODY MASS INDEX: 24.41 KG/M2 | OXYGEN SATURATION: 97 % | HEIGHT: 78 IN | DIASTOLIC BLOOD PRESSURE: 85 MMHG | RESPIRATION RATE: 18 BRPM | SYSTOLIC BLOOD PRESSURE: 164 MMHG | WEIGHT: 211 LBS | TEMPERATURE: 98.7 F

## 2024-04-07 DIAGNOSIS — L89.159 SACRAL DECUBITUS ULCER: ICD-10-CM

## 2024-04-07 DIAGNOSIS — Z51.89 VISIT FOR WOUND CHECK: Primary | ICD-10-CM

## 2024-04-07 PROCEDURE — 99283 EMERGENCY DEPT VISIT LOW MDM: CPT

## 2024-04-07 PROCEDURE — 99283 EMERGENCY DEPT VISIT LOW MDM: CPT | Performed by: EMERGENCY MEDICINE

## 2024-04-08 ENCOUNTER — HOME CARE VISIT (OUTPATIENT)
Dept: HOME HEALTH SERVICES | Facility: HOME HEALTHCARE | Age: 49
End: 2024-04-08
Payer: COMMERCIAL

## 2024-04-08 VITALS
SYSTOLIC BLOOD PRESSURE: 150 MMHG | HEART RATE: 75 BPM | DIASTOLIC BLOOD PRESSURE: 72 MMHG | TEMPERATURE: 98.1 F | OXYGEN SATURATION: 98 % | RESPIRATION RATE: 16 BRPM

## 2024-04-08 PROCEDURE — G0299 HHS/HOSPICE OF RN EA 15 MIN: HCPCS

## 2024-04-08 PROCEDURE — G0151 HHCP-SERV OF PT,EA 15 MIN: HCPCS

## 2024-04-08 NOTE — CASE COMMUNICATION
4.7.24   patient called earlier and spoke with another RN stating pressure ulcer  had not opened.   Patient called this evening to say pressure ulcer  has now opened and it is draining.   Within 72 hours of picture comparison this pressure ulcer has worsened and is now opened,   large amount of slough and drainage with all surrounding areas red.    Pt denies fever or chills.   Pt going to ER for evaluation.

## 2024-04-08 NOTE — ED PROVIDER NOTES
History  Chief Complaint   Patient presents with    Wound Check     Pt states that he was in the hospital/ pt states that in the last 3 days has increased in size. Pt states that the wound is on his butt     48 yom presents for evaluation of sacral decub ulcer. Recently discharged after sepsis, joint infection, currently undergoing IV abx. Noticed chronic sacral wound was weeping so wanted to get it checked. Denies pain, fever.         Prior to Admission Medications   Prescriptions Last Dose Informant Patient Reported? Taking?   Alcohol Swabs 70 % PADS   No No   Sig: May substitute brand based on insurance coverage. Check glucose ACHS.   Blood Glucose Monitoring Suppl (OneTouch Verio Reflect) w/Device KIT   No No   Sig: May substitute brand based on insurance coverage. Check glucose ACHS.   DULoxetine (CYMBALTA) 20 mg capsule   No No   Sig: Take 1 capsule (20 mg total) by mouth daily   Patient not taking: Reported on 4/5/2024   Empagliflozin (Jardiance) 10 MG TABS tablet   No No   Sig: Take 1 tablet (10 mg total) by mouth every morning   Patient not taking: Reported on 4/5/2024   Insulin Glargine Solostar (Lantus SoloStar) 100 UNIT/ML SOPN   No No   Sig: Inject 0.45 mL (45 Units total) under the skin daily at bedtime   Insulin Pen Needle (BD Pen Needle Carisa 2nd Gen) 32G X 4 MM MISC   No No   Sig: For use with insulin pen. Pharmacy may dispense brand covered by insurance.   Insulin Pen Needle (BD Pen Needle Carisa 2nd Gen) 32G X 4 MM MISC   No No   Sig: For use with insulin pen. Pharmacy may dispense brand covered by insurance.   Morphine Sulfate ER 15 MG T12A   Yes No   Sig: Take 60 mg by mouth every 12 (twelve) hours as needed   Patient not taking: Reported on 4/5/2024   OneTouch Delica Lancets 33G MISC   No No   Sig: May substitute brand based on insurance coverage. Check glucose ACHS.   SUMAtriptan (IMITREX) 100 mg tablet   Yes No   Sig: Take 100 mg by mouth Pt reports taking PRN   amLODIPine (NORVASC) 5 mg tablet    Yes No   Sig: Take 5 mg by mouth daily   amiodarone 200 mg tablet   No No   Sig: Take 2 tablets (400 mg total) by mouth 2 (two) times a day with meals for 2 days, THEN 1 tablet (200 mg total) daily with breakfast for 20 days.   apixaban (ELIQUIS) 5 mg   No No   Sig: Take 1 tablet (5 mg total) by mouth 2 (two) times a day   b complex vitamins capsule  Self Yes No   Sig: Take 1 capsule by mouth daily   ceFAZolin (ANCEF) 2000 mg IVPB   No No   Sig: Inject 2,000 mg into a catheter in a vein over 30 minutes at 100 mL/hr every 8 (eight) hours Do not start before 2024.   cyclobenzaprine (FLEXERIL) 10 mg tablet   Yes No   Sig: Take 10 mg by mouth 3 (three) times a day as needed   esomeprazole (NexIUM) 20 mg capsule  Self Yes No   Sig: Take 20 mg by mouth 2 (two) times a day before meals   Patient not taking: Reported on 3/22/2024   fentaNYL (DURAGESIC) 75 mcg/hr   Yes No   Sig: Place 1 patch on the skin every third day   glucose blood (OneTouch Verio) test strip   No No   Sig: May substitute brand based on insurance coverage. Check glucose ACHS.   insulin lispro (HumaLOG KwikPen) 100 units/mL injection pen   No No   Sig: Inject 13 Units under the skin 3 (three) times a day with meals   metFORMIN (GLUCOPHAGE) 500 mg tablet   Yes No   Sig: Take 500 mg by mouth 2 (two) times a day with meals   metoprolol succinate (TOPROL-XL) 25 mg 24 hr tablet   No No   Sig: Take 1 tablet (25 mg total) by mouth daily   morphine (MS CONTIN) 60 mg 12 hr tablet   Yes No   Si mg every 12 (twelve) hours as needed   ondansetron (ZOFRAN) 8 mg tablet   Yes No   Sig: TAKE 1 TABLET (8 MG) BY MOUTH 3 TIMES PER DAY AS NEEDED   risperiDONE (RisperDAL) 3 mg tablet   Yes No   Sig: Take 3 mg by mouth daily   sacubitril-valsartan (Entresto) 24-26 MG TABS   No No   Sig: Take 1 tablet by mouth 2 (two) times a day   simvastatin (ZOCOR) 40 mg tablet   Yes No   Sig: Take 40 mg by mouth daily   zolpidem (AMBIEN) 10 mg tablet   Yes No   Sig: Take 10  mg by mouth      Facility-Administered Medications: None       Past Medical History:   Diagnosis Date    Diabetes mellitus (HCC)     Hypertension        Past Surgical History:   Procedure Laterality Date    IR PICC PLACEMENT SINGLE LUMEN  4/1/2024    KNEE SURGERY      VT REVJ TOTAL KNEE ARTHRP W/WO ALGRFT 1 COMPONENT Right 3/24/2024    Procedure: ARTHROPLASTY KNEE TOTAL REVISION, POLY EXCHANGE;  Surgeon: Tao Washington DO;  Location:  MAIN OR;  Service: Orthopedics    TOE AMPUTATION Left 3/28/2024    Procedure: AMPUTATION TOE 2nd;  Surgeon: Rossy Toussaint DPM;  Location:  MAIN OR;  Service: Podiatry       History reviewed. No pertinent family history.  I have reviewed and agree with the history as documented.    E-Cigarette/Vaping    E-Cigarette Use Current Some Day User      E-Cigarette/Vaping Substances    Nicotine No     THC Yes     CBD Yes     Flavoring Yes     Other No     Unknown No      Social History     Tobacco Use    Smoking status: Never    Smokeless tobacco: Never   Vaping Use    Vaping status: Some Days    Substances: THC, CBD, Flavoring   Substance Use Topics    Alcohol use: Not Currently    Drug use: Yes     Types: Marijuana       Review of Systems   Skin:  Positive for wound.       Physical Exam  Physical Exam  Vitals and nursing note reviewed.   Constitutional:       General: He is not in acute distress.     Appearance: He is well-developed.   HENT:      Head: Normocephalic and atraumatic.      Right Ear: External ear normal.      Left Ear: External ear normal.      Nose: Nose normal.   Eyes:      General: No scleral icterus.  Pulmonary:      Effort: Pulmonary effort is normal. No respiratory distress.   Abdominal:      General: There is no distension.      Palpations: Abdomen is soft.   Genitourinary:     Comments: Sacral decub ulcer without evidence of acute infection. Picture below. Appears similar in appearance to recent picture during admission   Musculoskeletal:         General: No  deformity. Normal range of motion.      Cervical back: Normal range of motion and neck supple.   Skin:     General: Skin is warm.      Findings: No rash.   Neurological:      General: No focal deficit present.      Mental Status: He is alert.      Gait: Gait normal.   Psychiatric:         Mood and Affect: Mood normal.           Vital Signs  ED Triage Vitals [04/07/24 1957]   Temperature Pulse Respirations Blood Pressure SpO2   98.7 °F (37.1 °C) 94 18 164/85 97 %      Temp Source Heart Rate Source Patient Position - Orthostatic VS BP Location FiO2 (%)   Temporal Monitor -- -- --      Pain Score       --           Vitals:    04/07/24 1957   BP: 164/85   Pulse: 94         Visual Acuity      ED Medications  Medications - No data to display    Diagnostic Studies  Results Reviewed       None                   No orders to display              Procedures  Procedures         ED Course                                             Medical Decision Making  48 yom here for wound recheck. Not acutely infected. Wound care nurse coming tomorrow. Local wound care. RTED discussed.              Disposition  Final diagnoses:   Visit for wound check   Sacral decubitus ulcer     Time reflects when diagnosis was documented in both MDM as applicable and the Disposition within this note       Time User Action Codes Description Comment    4/7/2024  8:08 PM Glenn Greenwood [Z51.89] Visit for wound check     4/7/2024  8:08 PM Glenn Greenwood [L89.159] Sacral decubitus ulcer           ED Disposition       ED Disposition   Discharge    Condition   Stable    Date/Time   Sun Apr 7, 2024  8:08 PM    Comment   Armen Llanos discharge to home/self care.                   Follow-up Information       Follow up With Specialties Details Why Contact Info Additional Information    [facility] Wound Care   [address] [place] [address]  [phone]  WOUND CARE, [address] [place] [address],  [address]   [phone]     [facility] Emergency Department Emergency Medicine  If symptoms worsen [address] [facility] [address] [phone] [facility] Emergency Department, [address] [place], [place] [address]            Discharge Medication List as of 4/7/2024  8:09 PM        CONTINUE these medications which have NOT CHANGED    Details   Alcohol Swabs 70 % PADS May substitute brand based on insurance coverage. Check glucose ACHS., Normal      amiodarone 200 mg tablet Multiple Dosages:Starting Wed 4/3/2024, Until Thu 4/4/2024, THEN Starting Fri 4/5/2024, Until Wed 4/24/2024Take 2 tablets (400 mg total) by mouth 2 (two) times a day with meals for 2 days, THEN 1 tablet (200 mg total) daily with breakfast for 20 days. , Normal      amLODIPine (NORVASC) 5 mg tablet Take 5 mg by mouth daily, Historical Med      apixaban (ELIQUIS) 5 mg Take 1 tablet (5 mg total) by mouth 2 (two) times a day, Starting Mon 4/1/2024, Normal      b complex vitamins capsule Take 1 capsule by mouth daily, Historical Med      Blood Glucose Monitoring Suppl (OneTouch Verio Reflect) w/Device KIT May substitute brand based on insurance coverage. Check glucose ACHS., Normal      ceFAZolin (ANCEF) 2000 mg IVPB Inject 2,000 mg into a catheter in a vein over 30 minutes at 100 mL/hr every 8 (eight) hours Do not start before April 2, 2024., Starting Tue 4/2/2024, Until Wed 5/8/2024, Print      cyclobenzaprine (FLEXERIL) 10 mg tablet Take 10 mg by mouth 3 (three) times a day as needed, Historical Med      DULoxetine (CYMBALTA) 20 mg capsule Take 1 capsule (20 mg total) by mouth daily, Starting Thu 4/4/2024, Normal      Empagliflozin (Jardiance) 10 MG TABS tablet Take 1 tablet (10 mg total) by mouth every morning, Starting Tue 3/26/2024, Normal      esomeprazole (NexIUM) 20 mg capsule Take 20 mg by mouth 2 (two) times a day before meals, Historical Med      fentaNYL  (DURAGESIC) 75 mcg/hr Place 1 patch on the skin every third day, Historical Med      glucose blood (OneTouch Verio) test strip May substitute brand based on insurance coverage. Check glucose ACHS., Normal      Insulin Glargine Solostar (Lantus SoloStar) 100 UNIT/ML SOPN Inject 0.45 mL (45 Units total) under the skin daily at bedtime, Starting Mon 4/1/2024, Normal      insulin lispro (HumaLOG KwikPen) 100 units/mL injection pen Inject 13 Units under the skin 3 (three) times a day with meals, Starting Mon 4/1/2024, Normal      !! Insulin Pen Needle (BD Pen Needle Carisa 2nd Gen) 32G X 4 MM MISC For use with insulin pen. Pharmacy may dispense brand covered by insurance., Normal      !! Insulin Pen Needle (BD Pen Needle Carisa 2nd Gen) 32G X 4 MM MISC For use with insulin pen. Pharmacy may dispense brand covered by insurance., Normal      metFORMIN (GLUCOPHAGE) 500 mg tablet Take 500 mg by mouth 2 (two) times a day with meals, Historical Med      metoprolol succinate (TOPROL-XL) 25 mg 24 hr tablet Take 1 tablet (25 mg total) by mouth daily, Starting Fri 4/5/2024, Normal      morphine (MS CONTIN) 60 mg 12 hr tablet 60 mg every 12 (twelve) hours as needed, Starting Fri 3/1/2024, Historical Med      Morphine Sulfate ER 15 MG T12A Take 60 mg by mouth every 12 (twelve) hours as needed, Historical Med      ondansetron (ZOFRAN) 8 mg tablet TAKE 1 TABLET (8 MG) BY MOUTH 3 TIMES PER DAY AS NEEDED, Historical Med      OneTouch Delica Lancets 33G MISC May substitute brand based on insurance coverage. Check glucose ACHS., Normal      risperiDONE (RisperDAL) 3 mg tablet Take 3 mg by mouth daily, Historical Med      sacubitril-valsartan (Entresto) 24-26 MG TABS Take 1 tablet by mouth 2 (two) times a day, Starting Wed 3/27/2024, Normal      simvastatin (ZOCOR) 40 mg tablet Take 40 mg by mouth daily, Historical Med      SUMAtriptan (IMITREX) 100 mg tablet Take 100 mg by mouth Pt reports taking PRN, Historical Med      zolpidem (AMBIEN) 10  mg tablet Take 10 mg by mouth, Historical Med       !! - Potential duplicate medications found. Please discuss with provider.          No discharge procedures on file.    PDMP Review         Value Time User    PDMP Reviewed  Yes 4/3/2024  1:58 PM Elsy Herrera PA-C            ED Provider  Electronically Signed by             Glenn Greenwood DO  04/07/24 2059

## 2024-04-09 ENCOUNTER — OFFICE VISIT (OUTPATIENT)
Dept: PODIATRY | Facility: CLINIC | Age: 49
End: 2024-04-09
Payer: COMMERCIAL

## 2024-04-09 VITALS
HEART RATE: 84 BPM | DIASTOLIC BLOOD PRESSURE: 76 MMHG | SYSTOLIC BLOOD PRESSURE: 146 MMHG | WEIGHT: 212 LBS | HEIGHT: 78 IN | BODY MASS INDEX: 24.53 KG/M2

## 2024-04-09 DIAGNOSIS — Z89.422 HISTORY OF AMPUTATION OF LESSER TOE, LEFT (HCC): Primary | ICD-10-CM

## 2024-04-09 DIAGNOSIS — M86.172 ACUTE OSTEOMYELITIS OF LEFT FOOT (HCC): ICD-10-CM

## 2024-04-09 DIAGNOSIS — Z98.890 S/P FOOT SURGERY, LEFT: ICD-10-CM

## 2024-04-09 PROCEDURE — 99213 OFFICE O/P EST LOW 20 MIN: CPT | Performed by: PODIATRIST

## 2024-04-09 RX ORDER — TESTOSTERONE CYPIONATE 200 MG/ML
VIAL (ML) INTRAMUSCULAR
COMMUNITY
End: 2024-04-19

## 2024-04-10 ENCOUNTER — TELEPHONE (OUTPATIENT)
Age: 49
End: 2024-04-10

## 2024-04-10 ENCOUNTER — HOME CARE VISIT (OUTPATIENT)
Dept: HOME HEALTH SERVICES | Facility: HOME HEALTHCARE | Age: 49
End: 2024-04-10
Payer: COMMERCIAL

## 2024-04-10 VITALS — DIASTOLIC BLOOD PRESSURE: 74 MMHG | SYSTOLIC BLOOD PRESSURE: 142 MMHG

## 2024-04-10 PROCEDURE — G0151 HHCP-SERV OF PT,EA 15 MIN: HCPCS

## 2024-04-10 NOTE — TELEPHONE ENCOUNTER
Hello,  Please advise if the following patient can be forced onto the schedule:    Patient: Armen Llanos    : 1975    MRN: [id number]    Call back #: [phone]    Insurance: [facility]    Reason for appointment: Post-op, Right Knee Surg in ED 3/24 w/  Dr Washington    Requested doctor and/or location: Dr Washington      Thank you.

## 2024-04-11 ENCOUNTER — HOME CARE VISIT (OUTPATIENT)
Dept: HOME HEALTH SERVICES | Facility: HOME HEALTHCARE | Age: 49
End: 2024-04-11
Payer: COMMERCIAL

## 2024-04-11 ENCOUNTER — TELEPHONE (OUTPATIENT)
Dept: OTHER | Facility: OTHER | Age: 49
End: 2024-04-11

## 2024-04-11 ENCOUNTER — HOSPITAL ENCOUNTER (INPATIENT)
Facility: HOSPITAL | Age: 49
LOS: 7 days | Discharge: NURSING, REHAB OR LONG-TERM CARE | DRG: 682 | End: 2024-04-19
Attending: EMERGENCY MEDICINE | Admitting: INTERNAL MEDICINE
Payer: COMMERCIAL

## 2024-04-11 ENCOUNTER — APPOINTMENT (OUTPATIENT)
Dept: RADIOLOGY | Facility: HOSPITAL | Age: 49
DRG: 682 | End: 2024-04-11
Payer: COMMERCIAL

## 2024-04-11 VITALS — HEART RATE: 88 BPM | OXYGEN SATURATION: 97 % | SYSTOLIC BLOOD PRESSURE: 138 MMHG | DIASTOLIC BLOOD PRESSURE: 80 MMHG

## 2024-04-11 VITALS
SYSTOLIC BLOOD PRESSURE: 120 MMHG | HEART RATE: 88 BPM | DIASTOLIC BLOOD PRESSURE: 80 MMHG | TEMPERATURE: 98.2 F | RESPIRATION RATE: 16 BRPM | OXYGEN SATURATION: 97 %

## 2024-04-11 DIAGNOSIS — R31.9 HEMATURIA: ICD-10-CM

## 2024-04-11 DIAGNOSIS — R60.0 LOWER EXTREMITY EDEMA: ICD-10-CM

## 2024-04-11 DIAGNOSIS — I42.0 DILATED CARDIOMYOPATHY (HCC): ICD-10-CM

## 2024-04-11 DIAGNOSIS — N17.9 AKI (ACUTE KIDNEY INJURY) (HCC): Primary | ICD-10-CM

## 2024-04-11 DIAGNOSIS — I10 ESSENTIAL HYPERTENSION: ICD-10-CM

## 2024-04-11 DIAGNOSIS — B95.61 MSSA BACTEREMIA: ICD-10-CM

## 2024-04-11 DIAGNOSIS — G89.4 CHRONIC PAIN DISORDER: ICD-10-CM

## 2024-04-11 DIAGNOSIS — M79.89 SWELLING OF RIGHT LOWER EXTREMITY: ICD-10-CM

## 2024-04-11 DIAGNOSIS — J18.9 BILATERAL PNEUMONIA: ICD-10-CM

## 2024-04-11 DIAGNOSIS — I48.91 ATRIAL FIBRILLATION, UNSPECIFIED TYPE (HCC): ICD-10-CM

## 2024-04-11 DIAGNOSIS — M17.11 OSTEOARTHRITIS OF RIGHT KNEE, UNSPECIFIED OSTEOARTHRITIS TYPE: ICD-10-CM

## 2024-04-11 DIAGNOSIS — D80.4 IGM DEFICIENCY (HCC): ICD-10-CM

## 2024-04-11 DIAGNOSIS — S06.9XAD TRAUMATIC BRAIN INJURY, WITH UNKNOWN LOSS OF CONSCIOUSNESS STATUS, SUBSEQUENT ENCOUNTER: ICD-10-CM

## 2024-04-11 DIAGNOSIS — D47.2 MONOCLONAL GAMMOPATHY: ICD-10-CM

## 2024-04-11 DIAGNOSIS — E86.0 DEHYDRATION: ICD-10-CM

## 2024-04-11 DIAGNOSIS — R93.89 ABNORMAL CT OF THE CHEST: ICD-10-CM

## 2024-04-11 DIAGNOSIS — G89.18 POST-OPERATIVE PAIN: ICD-10-CM

## 2024-04-11 DIAGNOSIS — R78.81 MSSA BACTEREMIA: ICD-10-CM

## 2024-04-11 LAB
ALBUMIN SERPL BCP-MCNC: 2.6 G/DL (ref 3.5–5)
ALP SERPL-CCNC: 60 U/L (ref 34–104)
ALT SERPL W P-5'-P-CCNC: <3 U/L (ref 7–52)
ANION GAP SERPL CALCULATED.3IONS-SCNC: 6 MMOL/L (ref 4–13)
APTT PPP: 36 SECONDS (ref 23–37)
AST SERPL W P-5'-P-CCNC: 10 U/L (ref 13–39)
BASOPHILS # BLD AUTO: 0.09 THOUSANDS/ÂΜL (ref 0–0.1)
BASOPHILS NFR BLD AUTO: 1 % (ref 0–1)
BILIRUB SERPL-MCNC: 0.24 MG/DL (ref 0.2–1)
BUN SERPL-MCNC: 29 MG/DL (ref 5–25)
CALCIUM ALBUM COR SERPL-MCNC: 9.3 MG/DL (ref 8.3–10.1)
CALCIUM SERPL-MCNC: 8.2 MG/DL (ref 8.4–10.2)
CHLORIDE SERPL-SCNC: 109 MMOL/L (ref 96–108)
CO2 SERPL-SCNC: 22 MMOL/L (ref 21–32)
CREAT SERPL-MCNC: 2.05 MG/DL (ref 0.6–1.3)
EOSINOPHIL # BLD AUTO: 0.13 THOUSAND/ÂΜL (ref 0–0.61)
EOSINOPHIL NFR BLD AUTO: 2 % (ref 0–6)
ERYTHROCYTE [DISTWIDTH] IN BLOOD BY AUTOMATED COUNT: 13 % (ref 11.6–15.1)
GFR SERPL CREATININE-BSD FRML MDRD: 36 ML/MIN/1.73SQ M
GLUCOSE SERPL-MCNC: 207 MG/DL (ref 65–140)
HCT VFR BLD AUTO: 28.8 % (ref 36.5–49.3)
HGB BLD-MCNC: 9 G/DL (ref 12–17)
IMM GRANULOCYTES # BLD AUTO: 0.04 THOUSAND/UL (ref 0–0.2)
IMM GRANULOCYTES NFR BLD AUTO: 1 % (ref 0–2)
INR PPP: 1.17 (ref 0.84–1.19)
LACTATE SERPL-SCNC: 1.6 MMOL/L (ref 0.5–2)
LYMPHOCYTES # BLD AUTO: 2.34 THOUSANDS/ÂΜL (ref 0.6–4.47)
LYMPHOCYTES NFR BLD AUTO: 30 % (ref 14–44)
MCH RBC QN AUTO: 28.9 PG (ref 26.8–34.3)
MCHC RBC AUTO-ENTMCNC: 31.3 G/DL (ref 31.4–37.4)
MCV RBC AUTO: 93 FL (ref 82–98)
MONOCYTES # BLD AUTO: 0.49 THOUSAND/ÂΜL (ref 0.17–1.22)
MONOCYTES NFR BLD AUTO: 6 % (ref 4–12)
NEUTROPHILS # BLD AUTO: 4.61 THOUSANDS/ÂΜL (ref 1.85–7.62)
NEUTS SEG NFR BLD AUTO: 60 % (ref 43–75)
NRBC BLD AUTO-RTO: 0 /100 WBCS
PLATELET # BLD AUTO: 309 THOUSANDS/UL (ref 149–390)
PMV BLD AUTO: 10 FL (ref 8.9–12.7)
POTASSIUM SERPL-SCNC: 5.3 MMOL/L (ref 3.5–5.3)
PROCALCITONIN SERPL-MCNC: 0.12 NG/ML
PROT SERPL-MCNC: 6.4 G/DL (ref 6.4–8.4)
PROTHROMBIN TIME: 15.4 SECONDS (ref 11.6–14.5)
RBC # BLD AUTO: 3.11 MILLION/UL (ref 3.88–5.62)
SODIUM SERPL-SCNC: 137 MMOL/L (ref 135–147)
WBC # BLD AUTO: 7.7 THOUSAND/UL (ref 4.31–10.16)

## 2024-04-11 PROCEDURE — 85025 COMPLETE CBC W/AUTO DIFF WBC: CPT | Performed by: EMERGENCY MEDICINE

## 2024-04-11 PROCEDURE — 80053 COMPREHEN METABOLIC PANEL: CPT | Performed by: EMERGENCY MEDICINE

## 2024-04-11 PROCEDURE — 10330064 SPONGE, GAUZE 8PLY N/S 4"X4" (200/PK 20P

## 2024-04-11 PROCEDURE — 10330064 TAPE, ADHSV TRANSPORE WHT 2" (6RL/BX 10B

## 2024-04-11 PROCEDURE — 71046 X-RAY EXAM CHEST 2 VIEWS: CPT

## 2024-04-11 PROCEDURE — 96361 HYDRATE IV INFUSION ADD-ON: CPT

## 2024-04-11 PROCEDURE — 85610 PROTHROMBIN TIME: CPT | Performed by: EMERGENCY MEDICINE

## 2024-04-11 PROCEDURE — 85730 THROMBOPLASTIN TIME PARTIAL: CPT | Performed by: EMERGENCY MEDICINE

## 2024-04-11 PROCEDURE — 99285 EMERGENCY DEPT VISIT HI MDM: CPT

## 2024-04-11 PROCEDURE — 84145 PROCALCITONIN (PCT): CPT | Performed by: EMERGENCY MEDICINE

## 2024-04-11 PROCEDURE — 99285 EMERGENCY DEPT VISIT HI MDM: CPT | Performed by: EMERGENCY MEDICINE

## 2024-04-11 PROCEDURE — G0300 HHS/HOSPICE OF LPN EA 15 MIN: HCPCS

## 2024-04-11 PROCEDURE — 83605 ASSAY OF LACTIC ACID: CPT | Performed by: EMERGENCY MEDICINE

## 2024-04-11 PROCEDURE — 87040 BLOOD CULTURE FOR BACTERIA: CPT | Performed by: EMERGENCY MEDICINE

## 2024-04-11 PROCEDURE — 36415 COLL VENOUS BLD VENIPUNCTURE: CPT

## 2024-04-11 PROCEDURE — 10330064 CLEANSER, WND SEA-CLEANS 6OZ  COLPLT

## 2024-04-11 PROCEDURE — 10330064 FOAM, ADH SIL W/BORDER SACRAL 7"X7" (10/

## 2024-04-11 PROCEDURE — 93005 ELECTROCARDIOGRAM TRACING: CPT

## 2024-04-11 PROCEDURE — G0152 HHCP-SERV OF OT,EA 15 MIN: HCPCS

## 2024-04-11 RX ORDER — HYDROMORPHONE HCL/PF 1 MG/ML
1 SYRINGE (ML) INJECTION ONCE
Status: COMPLETED | OUTPATIENT
Start: 2024-04-12 | End: 2024-04-12

## 2024-04-11 RX ADMIN — SODIUM CHLORIDE 1000 ML: 0.9 INJECTION, SOLUTION INTRAVENOUS at 23:53

## 2024-04-11 NOTE — TELEPHONE ENCOUNTER
Good morning, Ivory. For some odd reason, I wasn't able to access Felix's [place] schedule to make this patient's post-op appt for 04/19 in [facility]. Can you help? I tried several attempts yesterday evening and our MR II also couldn't do it. Thanks!

## 2024-04-11 NOTE — CASE COMMUNICATION
Ship to  . Home     Branch:  Jo         All items are ordered by the each unless otherwise noted.  Orders should be for a 2 week period (unless noted by insurance)              Sub for Mepilex:    Silicone Foam sacral NOT STOCKED [id number]

## 2024-04-12 ENCOUNTER — APPOINTMENT (EMERGENCY)
Dept: CT IMAGING | Facility: HOSPITAL | Age: 49
DRG: 682 | End: 2024-04-12
Payer: COMMERCIAL

## 2024-04-12 ENCOUNTER — APPOINTMENT (INPATIENT)
Dept: NON INVASIVE DIAGNOSTICS | Facility: HOSPITAL | Age: 49
DRG: 682 | End: 2024-04-12
Payer: COMMERCIAL

## 2024-04-12 ENCOUNTER — APPOINTMENT (INPATIENT)
Dept: CT IMAGING | Facility: HOSPITAL | Age: 49
DRG: 682 | End: 2024-04-12
Payer: COMMERCIAL

## 2024-04-12 PROBLEM — M79.89 SWELLING OF RIGHT LOWER EXTREMITY: Status: ACTIVE | Noted: 2024-04-12

## 2024-04-12 PROBLEM — R80.8 OTHER PROTEINURIA: Status: ACTIVE | Noted: 2024-04-12

## 2024-04-12 PROBLEM — R93.89 ABNORMAL CT OF THE CHEST: Status: ACTIVE | Noted: 2024-04-12

## 2024-04-12 LAB
ALBUMIN SERPL BCP-MCNC: 2.3 G/DL (ref 3.5–5)
ALP SERPL-CCNC: 50 U/L (ref 34–104)
ALT SERPL W P-5'-P-CCNC: <3 U/L (ref 7–52)
ANION GAP SERPL CALCULATED.3IONS-SCNC: 6 MMOL/L (ref 4–13)
APTT PPP: 54 SECONDS (ref 23–37)
APTT PPP: 59 SECONDS (ref 23–37)
AST SERPL W P-5'-P-CCNC: 8 U/L (ref 13–39)
ATRIAL RATE: 90 BPM
BACTERIA BLD CULT: ABNORMAL
BACTERIA UR QL AUTO: ABNORMAL /HPF
BASOPHILS # BLD AUTO: 0.07 THOUSANDS/ÂΜL (ref 0–0.1)
BASOPHILS NFR BLD AUTO: 1 % (ref 0–1)
BILIRUB SERPL-MCNC: 0.28 MG/DL (ref 0.2–1)
BILIRUB UR QL STRIP: NEGATIVE
BUN SERPL-MCNC: 31 MG/DL (ref 5–25)
C3 SERPL-MCNC: 141 MG/DL (ref 87–200)
C4 SERPL-MCNC: 34 MG/DL (ref 19–52)
CALCIUM ALBUM COR SERPL-MCNC: 8.5 MG/DL (ref 8.3–10.1)
CALCIUM SERPL-MCNC: 7.1 MG/DL (ref 8.4–10.2)
CARDIAC TROPONIN I PNL SERPL HS: 11 NG/L (ref 8–18)
CHLORIDE SERPL-SCNC: 108 MMOL/L (ref 96–108)
CHOLEST SERPL-MCNC: 108 MG/DL
CLARITY UR: CLEAR
CO2 SERPL-SCNC: 21 MMOL/L (ref 21–32)
COLOR UR: YELLOW
CREAT SERPL-MCNC: 1.86 MG/DL (ref 0.6–1.3)
CREAT UR-MCNC: 74.7 MG/DL
CREAT UR-MCNC: <1 MG/DL
D DIMER PPP FEU-MCNC: 2.73 UG/ML FEU
EOSINOPHIL # BLD AUTO: 0.12 THOUSAND/ÂΜL (ref 0–0.61)
EOSINOPHIL NFR BLD AUTO: 2 % (ref 0–6)
ERYTHROCYTE [DISTWIDTH] IN BLOOD BY AUTOMATED COUNT: 13.1 % (ref 11.6–15.1)
FLUAV RNA RESP QL NAA+PROBE: NEGATIVE
FLUBV RNA RESP QL NAA+PROBE: NEGATIVE
GFR SERPL CREATININE-BSD FRML MDRD: 41 ML/MIN/1.73SQ M
GLUCOSE SERPL-MCNC: 189 MG/DL (ref 65–140)
GLUCOSE SERPL-MCNC: 217 MG/DL (ref 65–140)
GLUCOSE SERPL-MCNC: 260 MG/DL (ref 65–140)
GLUCOSE UR STRIP-MCNC: ABNORMAL MG/DL
GRAM STN SPEC: ABNORMAL
HCT VFR BLD AUTO: 24 % (ref 36.5–49.3)
HDLC SERPL-MCNC: 39 MG/DL
HGB BLD-MCNC: 7.8 G/DL (ref 12–17)
HGB UR QL STRIP.AUTO: ABNORMAL
IMM GRANULOCYTES # BLD AUTO: 0.06 THOUSAND/UL (ref 0–0.2)
IMM GRANULOCYTES NFR BLD AUTO: 1 % (ref 0–2)
KETONES UR STRIP-MCNC: NEGATIVE MG/DL
LDLC SERPL CALC-MCNC: 62 MG/DL (ref 0–100)
LEUKOCYTE ESTERASE UR QL STRIP: NEGATIVE
LYMPHOCYTES # BLD AUTO: 1.97 THOUSANDS/ÂΜL (ref 0.6–4.47)
LYMPHOCYTES NFR BLD AUTO: 25 % (ref 14–44)
MCH RBC QN AUTO: 29.8 PG (ref 26.8–34.3)
MCHC RBC AUTO-ENTMCNC: 32.5 G/DL (ref 31.4–37.4)
MCV RBC AUTO: 92 FL (ref 82–98)
MONOCYTES # BLD AUTO: 0.55 THOUSAND/ÂΜL (ref 0.17–1.22)
MONOCYTES NFR BLD AUTO: 7 % (ref 4–12)
NEUTROPHILS # BLD AUTO: 5.15 THOUSANDS/ÂΜL (ref 1.85–7.62)
NEUTS SEG NFR BLD AUTO: 64 % (ref 43–75)
NITRITE UR QL STRIP: NEGATIVE
NON-SQ EPI CELLS URNS QL MICRO: ABNORMAL /HPF
NRBC BLD AUTO-RTO: 0 /100 WBCS
P AXIS: 68 DEGREES
PH UR STRIP.AUTO: 6.5 [PH]
PLATELET # BLD AUTO: 250 THOUSANDS/UL (ref 149–390)
PMV BLD AUTO: 10.2 FL (ref 8.9–12.7)
POTASSIUM SERPL-SCNC: 4.8 MMOL/L (ref 3.5–5.3)
PR INTERVAL: 154 MS
PROCALCITONIN SERPL-MCNC: 0.11 NG/ML
PROT SERPL-MCNC: 5.8 G/DL (ref 6.4–8.4)
PROT UR STRIP-MCNC: ABNORMAL MG/DL
PROT UR-MCNC: 521 MG/DL
PROT UR-MCNC: <4 MG/DL
PROT/CREAT UR: 6.97 MG/G{CREAT} (ref 0–0.1)
QRS AXIS: 88 DEGREES
QRSD INTERVAL: 80 MS
QT INTERVAL: 368 MS
QTC INTERVAL: 450 MS
RBC # BLD AUTO: 2.62 MILLION/UL (ref 3.88–5.62)
RBC #/AREA URNS AUTO: ABNORMAL /HPF
RSV RNA RESP QL NAA+PROBE: NEGATIVE
SARS-COV-2 RNA RESP QL NAA+PROBE: NEGATIVE
SODIUM SERPL-SCNC: 135 MMOL/L (ref 135–147)
SP GR UR STRIP.AUTO: 1.02 (ref 1–1.03)
T WAVE AXIS: 21 DEGREES
TRIGL SERPL-MCNC: 37 MG/DL
UROBILINOGEN UR STRIP-ACNC: <2 MG/DL
VENTRICULAR RATE: 90 BPM
WBC # BLD AUTO: 7.92 THOUSAND/UL (ref 4.31–10.16)
WBC #/AREA URNS AUTO: ABNORMAL /HPF

## 2024-04-12 PROCEDURE — 84156 ASSAY OF PROTEIN URINE: CPT | Performed by: PHYSICIAN ASSISTANT

## 2024-04-12 PROCEDURE — 87449 NOS EACH ORGANISM AG IA: CPT

## 2024-04-12 PROCEDURE — 84166 PROTEIN E-PHORESIS/URINE/CSF: CPT | Performed by: PHYSICIAN ASSISTANT

## 2024-04-12 PROCEDURE — 71275 CT ANGIOGRAPHY CHEST: CPT

## 2024-04-12 PROCEDURE — 83520 IMMUNOASSAY QUANT NOS NONAB: CPT | Performed by: PHYSICIAN ASSISTANT

## 2024-04-12 PROCEDURE — 85379 FIBRIN DEGRADATION QUANT: CPT | Performed by: EMERGENCY MEDICINE

## 2024-04-12 PROCEDURE — 96365 THER/PROPH/DIAG IV INF INIT: CPT

## 2024-04-12 PROCEDURE — 86738 MYCOPLASMA ANTIBODY: CPT

## 2024-04-12 PROCEDURE — 85730 THROMBOPLASTIN TIME PARTIAL: CPT | Performed by: INTERNAL MEDICINE

## 2024-04-12 PROCEDURE — 84484 ASSAY OF TROPONIN QUANT: CPT | Performed by: PHYSICIAN ASSISTANT

## 2024-04-12 PROCEDURE — 86335 IMMUNFIX E-PHORSIS/URINE/CSF: CPT | Performed by: PHYSICIAN ASSISTANT

## 2024-04-12 PROCEDURE — 80053 COMPREHEN METABOLIC PANEL: CPT | Performed by: PHYSICIAN ASSISTANT

## 2024-04-12 PROCEDURE — 82570 ASSAY OF URINE CREATININE: CPT | Performed by: PHYSICIAN ASSISTANT

## 2024-04-12 PROCEDURE — 0241U HB NFCT DS VIR RESP RNA 4 TRGT: CPT | Performed by: EMERGENCY MEDICINE

## 2024-04-12 PROCEDURE — 99223 1ST HOSP IP/OBS HIGH 75: CPT | Performed by: INTERNAL MEDICINE

## 2024-04-12 PROCEDURE — 86160 COMPLEMENT ANTIGEN: CPT | Performed by: PHYSICIAN ASSISTANT

## 2024-04-12 PROCEDURE — 96361 HYDRATE IV INFUSION ADD-ON: CPT

## 2024-04-12 PROCEDURE — 73700 CT LOWER EXTREMITY W/O DYE: CPT

## 2024-04-12 PROCEDURE — 80061 LIPID PANEL: CPT | Performed by: PHYSICIAN ASSISTANT

## 2024-04-12 PROCEDURE — 93010 ELECTROCARDIOGRAM REPORT: CPT | Performed by: INTERNAL MEDICINE

## 2024-04-12 PROCEDURE — 82948 REAGENT STRIP/BLOOD GLUCOSE: CPT

## 2024-04-12 PROCEDURE — 86037 ANCA TITER EACH ANTIBODY: CPT | Performed by: PHYSICIAN ASSISTANT

## 2024-04-12 PROCEDURE — 93971 EXTREMITY STUDY: CPT

## 2024-04-12 PROCEDURE — 96376 TX/PRO/DX INJ SAME DRUG ADON: CPT

## 2024-04-12 PROCEDURE — 86038 ANTINUCLEAR ANTIBODIES: CPT | Performed by: PHYSICIAN ASSISTANT

## 2024-04-12 PROCEDURE — 96375 TX/PRO/DX INJ NEW DRUG ADDON: CPT

## 2024-04-12 PROCEDURE — 36415 COLL VENOUS BLD VENIPUNCTURE: CPT | Performed by: EMERGENCY MEDICINE

## 2024-04-12 PROCEDURE — 81001 URINALYSIS AUTO W/SCOPE: CPT | Performed by: EMERGENCY MEDICINE

## 2024-04-12 PROCEDURE — 99024 POSTOP FOLLOW-UP VISIT: CPT | Performed by: PHYSICIAN ASSISTANT

## 2024-04-12 PROCEDURE — 87205 SMEAR GRAM STAIN: CPT | Performed by: INTERNAL MEDICINE

## 2024-04-12 PROCEDURE — 85730 THROMBOPLASTIN TIME PARTIAL: CPT

## 2024-04-12 PROCEDURE — 85025 COMPLETE CBC W/AUTO DIFF WBC: CPT | Performed by: PHYSICIAN ASSISTANT

## 2024-04-12 PROCEDURE — 74176 CT ABD & PELVIS W/O CONTRAST: CPT

## 2024-04-12 PROCEDURE — 84145 PROCALCITONIN (PCT): CPT | Performed by: PHYSICIAN ASSISTANT

## 2024-04-12 RX ORDER — AMLODIPINE BESYLATE 5 MG/1
5 TABLET ORAL DAILY
Status: DISCONTINUED | OUTPATIENT
Start: 2024-04-12 | End: 2024-04-12

## 2024-04-12 RX ORDER — HYDROMORPHONE HCL/PF 1 MG/ML
1 SYRINGE (ML) INJECTION ONCE
Qty: 1 ML | Refills: 0 | Status: COMPLETED | OUTPATIENT
Start: 2024-04-12 | End: 2024-04-12

## 2024-04-12 RX ORDER — HYDROMORPHONE HCL/PF 1 MG/ML
1 SYRINGE (ML) INJECTION EVERY 6 HOURS PRN
Status: DISCONTINUED | OUTPATIENT
Start: 2024-04-12 | End: 2024-04-19 | Stop reason: HOSPADM

## 2024-04-12 RX ORDER — METOPROLOL SUCCINATE 25 MG/1
25 TABLET, EXTENDED RELEASE ORAL DAILY
Status: DISCONTINUED | OUTPATIENT
Start: 2024-04-12 | End: 2024-04-19 | Stop reason: HOSPADM

## 2024-04-12 RX ORDER — SODIUM CHLORIDE 9 MG/ML
75 INJECTION, SOLUTION INTRAVENOUS CONTINUOUS
Status: DISCONTINUED | OUTPATIENT
Start: 2024-04-12 | End: 2024-04-12

## 2024-04-12 RX ORDER — OXYCODONE HYDROCHLORIDE 5 MG/1
5 TABLET ORAL EVERY 6 HOURS PRN
Status: DISCONTINUED | OUTPATIENT
Start: 2024-04-12 | End: 2024-04-19 | Stop reason: HOSPADM

## 2024-04-12 RX ORDER — MORPHINE SULFATE 30 MG/1
60 TABLET, FILM COATED, EXTENDED RELEASE ORAL EVERY 12 HOURS PRN
Status: DISCONTINUED | OUTPATIENT
Start: 2024-04-12 | End: 2024-04-12

## 2024-04-12 RX ORDER — HYDROMORPHONE HCL/PF 1 MG/ML
1 SYRINGE (ML) INJECTION ONCE
Status: COMPLETED | OUTPATIENT
Start: 2024-04-12 | End: 2024-04-12

## 2024-04-12 RX ORDER — AMIODARONE HYDROCHLORIDE 200 MG/1
200 TABLET ORAL
Status: DISCONTINUED | OUTPATIENT
Start: 2024-04-12 | End: 2024-04-19 | Stop reason: HOSPADM

## 2024-04-12 RX ORDER — MORPHINE SULFATE 30 MG/1
60 TABLET, FILM COATED, EXTENDED RELEASE ORAL EVERY 12 HOURS SCHEDULED
Status: DISCONTINUED | OUTPATIENT
Start: 2024-04-12 | End: 2024-04-12

## 2024-04-12 RX ORDER — INSULIN LISPRO 100 [IU]/ML
1-5 INJECTION, SOLUTION INTRAVENOUS; SUBCUTANEOUS
Status: DISCONTINUED | OUTPATIENT
Start: 2024-04-12 | End: 2024-04-19 | Stop reason: HOSPADM

## 2024-04-12 RX ORDER — HEPARIN SODIUM 10000 [USP'U]/100ML
3-30 INJECTION, SOLUTION INTRAVENOUS
Status: DISCONTINUED | OUTPATIENT
Start: 2024-04-12 | End: 2024-04-13

## 2024-04-12 RX ORDER — HEPARIN SODIUM 1000 [USP'U]/ML
3800 INJECTION, SOLUTION INTRAVENOUS; SUBCUTANEOUS EVERY 6 HOURS PRN
Status: DISCONTINUED | OUTPATIENT
Start: 2024-04-12 | End: 2024-04-13

## 2024-04-12 RX ORDER — HEPARIN SODIUM 1000 [USP'U]/ML
7600 INJECTION, SOLUTION INTRAVENOUS; SUBCUTANEOUS ONCE
Status: COMPLETED | OUTPATIENT
Start: 2024-04-12 | End: 2024-04-12

## 2024-04-12 RX ORDER — INSULIN GLARGINE 100 [IU]/ML
35 INJECTION, SOLUTION SUBCUTANEOUS
Status: DISCONTINUED | OUTPATIENT
Start: 2024-04-12 | End: 2024-04-13

## 2024-04-12 RX ORDER — HYDROMORPHONE HCL/PF 1 MG/ML
0.5 SYRINGE (ML) INJECTION ONCE
Status: DISCONTINUED | OUTPATIENT
Start: 2024-04-12 | End: 2024-04-12 | Stop reason: SDUPTHER

## 2024-04-12 RX ORDER — LEVOFLOXACIN 5 MG/ML
750 INJECTION, SOLUTION INTRAVENOUS ONCE
Status: COMPLETED | OUTPATIENT
Start: 2024-04-12 | End: 2024-04-12

## 2024-04-12 RX ORDER — PRAVASTATIN SODIUM 80 MG/1
80 TABLET ORAL
Status: DISCONTINUED | OUTPATIENT
Start: 2024-04-12 | End: 2024-04-19 | Stop reason: HOSPADM

## 2024-04-12 RX ORDER — ACETAMINOPHEN 325 MG/1
975 TABLET ORAL EVERY 8 HOURS
Status: DISCONTINUED | OUTPATIENT
Start: 2024-04-12 | End: 2024-04-19 | Stop reason: HOSPADM

## 2024-04-12 RX ORDER — INSULIN LISPRO 100 [IU]/ML
13 INJECTION, SOLUTION INTRAVENOUS; SUBCUTANEOUS
Status: DISCONTINUED | OUTPATIENT
Start: 2024-04-12 | End: 2024-04-13

## 2024-04-12 RX ORDER — FENTANYL 75 UG/1
1 PATCH TRANSDERMAL
Status: DISCONTINUED | OUTPATIENT
Start: 2024-04-13 | End: 2024-04-19 | Stop reason: HOSPADM

## 2024-04-12 RX ORDER — AMLODIPINE BESYLATE 5 MG/1
5 TABLET ORAL DAILY
Status: DISCONTINUED | OUTPATIENT
Start: 2024-04-13 | End: 2024-04-15

## 2024-04-12 RX ORDER — CYCLOBENZAPRINE HCL 5 MG
5 TABLET ORAL 3 TIMES DAILY PRN
Status: DISCONTINUED | OUTPATIENT
Start: 2024-04-12 | End: 2024-04-19 | Stop reason: HOSPADM

## 2024-04-12 RX ORDER — HYDROMORPHONE HCL/PF 1 MG/ML
0.5 SYRINGE (ML) INJECTION ONCE
Status: COMPLETED | OUTPATIENT
Start: 2024-04-12 | End: 2024-04-12

## 2024-04-12 RX ORDER — HEPARIN SODIUM 1000 [USP'U]/ML
7600 INJECTION, SOLUTION INTRAVENOUS; SUBCUTANEOUS EVERY 6 HOURS PRN
Status: DISCONTINUED | OUTPATIENT
Start: 2024-04-12 | End: 2024-04-13

## 2024-04-12 RX ORDER — ZOLPIDEM TARTRATE 5 MG/1
10 TABLET ORAL
Status: DISCONTINUED | OUTPATIENT
Start: 2024-04-12 | End: 2024-04-19 | Stop reason: HOSPADM

## 2024-04-12 RX ORDER — ONDANSETRON 2 MG/ML
4 INJECTION INTRAMUSCULAR; INTRAVENOUS EVERY 6 HOURS PRN
Status: DISCONTINUED | OUTPATIENT
Start: 2024-04-12 | End: 2024-04-19 | Stop reason: HOSPADM

## 2024-04-12 RX ORDER — CEFAZOLIN SODIUM 2 G/50ML
2000 SOLUTION INTRAVENOUS EVERY 8 HOURS
Status: DISCONTINUED | OUTPATIENT
Start: 2024-04-12 | End: 2024-04-12

## 2024-04-12 RX ORDER — HYDROMORPHONE HCL IN WATER/PF 6 MG/30 ML
0.2 PATIENT CONTROLLED ANALGESIA SYRINGE INTRAVENOUS ONCE
Status: COMPLETED | OUTPATIENT
Start: 2024-04-12 | End: 2024-04-12

## 2024-04-12 RX ORDER — SUMATRIPTAN 50 MG/1
100 TABLET, FILM COATED ORAL ONCE AS NEEDED
Status: COMPLETED | OUTPATIENT
Start: 2024-04-12 | End: 2024-04-12

## 2024-04-12 RX ADMIN — MORPHINE SULFATE 60 MG: 30 TABLET, EXTENDED RELEASE ORAL at 05:22

## 2024-04-12 RX ADMIN — HEPARIN SODIUM 3800 UNITS: 1000 INJECTION INTRAVENOUS; SUBCUTANEOUS at 20:10

## 2024-04-12 RX ADMIN — CEFAZOLIN SODIUM 2000 MG: 2 SOLUTION INTRAVENOUS at 09:44

## 2024-04-12 RX ADMIN — METOPROLOL SUCCINATE 25 MG: 25 TABLET, EXTENDED RELEASE ORAL at 09:20

## 2024-04-12 RX ADMIN — HYDROMORPHONE HYDROCHLORIDE 1 MG: 1 INJECTION, SOLUTION INTRAMUSCULAR; INTRAVENOUS; SUBCUTANEOUS at 05:46

## 2024-04-12 RX ADMIN — HYDROMORPHONE HYDROCHLORIDE 1 MG: 1 INJECTION, SOLUTION INTRAMUSCULAR; INTRAVENOUS; SUBCUTANEOUS at 04:38

## 2024-04-12 RX ADMIN — SUMATRIPTAN SUCCINATE 100 MG: 50 TABLET ORAL at 20:52

## 2024-04-12 RX ADMIN — HYDROMORPHONE HYDROCHLORIDE 1 MG: 1 INJECTION, SOLUTION INTRAMUSCULAR; INTRAVENOUS; SUBCUTANEOUS at 11:51

## 2024-04-12 RX ADMIN — CEFAZOLIN SODIUM 2000 MG: 2 SOLUTION INTRAVENOUS at 17:24

## 2024-04-12 RX ADMIN — AMIODARONE HYDROCHLORIDE 200 MG: 200 TABLET ORAL at 09:20

## 2024-04-12 RX ADMIN — HEPARIN SODIUM 18 UNITS/KG/HR: 10000 INJECTION, SOLUTION INTRAVENOUS at 19:52

## 2024-04-12 RX ADMIN — HEPARIN SODIUM 7600 UNITS: 1000 INJECTION INTRAVENOUS; SUBCUTANEOUS at 04:47

## 2024-04-12 RX ADMIN — LEVOFLOXACIN 750 MG: 5 INJECTION, SOLUTION INTRAVENOUS at 01:43

## 2024-04-12 RX ADMIN — DAPTOMYCIN 575 MG: 500 INJECTION, POWDER, LYOPHILIZED, FOR SOLUTION INTRAVENOUS at 23:23

## 2024-04-12 RX ADMIN — HYDROMORPHONE HYDROCHLORIDE 1 MG: 1 INJECTION, SOLUTION INTRAMUSCULAR; INTRAVENOUS; SUBCUTANEOUS at 00:00

## 2024-04-12 RX ADMIN — INSULIN LISPRO 2 UNITS: 100 INJECTION, SOLUTION INTRAVENOUS; SUBCUTANEOUS at 16:02

## 2024-04-12 RX ADMIN — INSULIN LISPRO 2 UNITS: 100 INJECTION, SOLUTION INTRAVENOUS; SUBCUTANEOUS at 21:07

## 2024-04-12 RX ADMIN — HYDROMORPHONE HYDROCHLORIDE 1 MG: 1 INJECTION, SOLUTION INTRAMUSCULAR; INTRAVENOUS; SUBCUTANEOUS at 17:23

## 2024-04-12 RX ADMIN — ACETAMINOPHEN 975 MG: 325 TABLET, FILM COATED ORAL at 05:50

## 2024-04-12 RX ADMIN — OXYCODONE HYDROCHLORIDE 5 MG: 5 TABLET ORAL at 19:40

## 2024-04-12 RX ADMIN — HYDROMORPHONE HYDROCHLORIDE 0.5 MG: 1 INJECTION, SOLUTION INTRAMUSCULAR; INTRAVENOUS; SUBCUTANEOUS at 00:49

## 2024-04-12 RX ADMIN — SODIUM CHLORIDE 75 ML/HR: 0.9 INJECTION, SOLUTION INTRAVENOUS at 06:03

## 2024-04-12 RX ADMIN — AMLODIPINE BESYLATE 5 MG: 5 TABLET ORAL at 09:20

## 2024-04-12 RX ADMIN — ONDANSETRON 4 MG: 2 INJECTION INTRAMUSCULAR; INTRAVENOUS at 16:02

## 2024-04-12 RX ADMIN — PRAVASTATIN SODIUM 80 MG: 80 TABLET ORAL at 16:02

## 2024-04-12 RX ADMIN — HYDROMORPHONE HYDROCHLORIDE 0.2 MG: 0.2 INJECTION, SOLUTION INTRAMUSCULAR; INTRAVENOUS; SUBCUTANEOUS at 09:43

## 2024-04-12 RX ADMIN — INSULIN GLARGINE 35 UNITS: 100 INJECTION, SOLUTION SUBCUTANEOUS at 21:13

## 2024-04-12 RX ADMIN — HYDROMORPHONE HYDROCHLORIDE 1 MG: 1 INJECTION, SOLUTION INTRAMUSCULAR; INTRAVENOUS; SUBCUTANEOUS at 18:21

## 2024-04-12 RX ADMIN — CYCLOBENZAPRINE HYDROCHLORIDE 5 MG: 5 TABLET, FILM COATED ORAL at 20:52

## 2024-04-12 RX ADMIN — ACETAMINOPHEN 975 MG: 325 TABLET, FILM COATED ORAL at 20:51

## 2024-04-12 RX ADMIN — RISPERIDONE 3 MG: 2 TABLET, FILM COATED ORAL at 04:38

## 2024-04-12 RX ADMIN — ONDANSETRON 4 MG: 2 INJECTION INTRAMUSCULAR; INTRAVENOUS at 09:21

## 2024-04-12 RX ADMIN — IOHEXOL 85 ML: 350 INJECTION, SOLUTION INTRAVENOUS at 01:06

## 2024-04-12 RX ADMIN — HEPARIN SODIUM 18 UNITS/KG/HR: 10000 INJECTION, SOLUTION INTRAVENOUS at 04:47

## 2024-04-12 RX ADMIN — INSULIN LISPRO 13 UNITS: 100 INJECTION, SOLUTION INTRAVENOUS; SUBCUTANEOUS at 16:02

## 2024-04-12 RX ADMIN — ACETAMINOPHEN 975 MG: 325 TABLET, FILM COATED ORAL at 13:26

## 2024-04-12 NOTE — CASE MANAGEMENT
Case Management Assessment & Discharge Planning Note    Patient name Armen Llanos  Location /-01 MRN [id number]  : 1975 Date 2024       Current Admission Date: 2024  Current Admission Diagnosis:JH (acute kidney injury) ([facility])   Patient Active Problem List    Diagnosis Date Noted    Swelling of right lower extremity 2024    Abnormal CT of the chest 2024    Other proteinuria 2024    Anxiety 2024    Cardiomyopathy ([facility]) 2024    Mononeuropathy 2024    Osteoarthritis of knee 2024    A-fib with RVR ([facility]) 2024    MSSA bacteremia 2024    JH (acute kidney injury) ([facility]) 2024    Syncope 2023    Costochondral chest pain 2023    Adrenal nodule ([facility]) 2023    Chronic, continuous use of opioids 11/15/2022    Type 2 diabetes mellitus with ketoacidosis without coma, without long-term current use of insulin ([facility]) 11/15/2022    Essential hypertension 11/15/2022    Hypokalemia 11/15/2022    Hypocalcemia 11/15/2022    TBI (traumatic brain injury) ([facility]) 11/15/2022    Chronic pain disorder 2022      LOS (days): 0  Geometric Mean LOS (GMLOS) (days): 4.4  Days to GMLOS:3.9     OBJECTIVE:  PATIENT READMITTED TO HOSPITAL  Risk of Unplanned Readmission Score: 47.7         Current admission status: Inpatient       Preferred Pharmacy:   [id number] - [place] - [address]  [address] [address]  Phone: [phone] Fax: [phone]    Primary Care Provider: Deidre Andrea MD    Primary Insurance: [employer]  Secondary Insurance:     ASSESSMENT:  Active Health Care Proxies       Macie Zamudio Health Care Representative - Spouse   Primary Phone: [phone] (Mobile)                 Advance Directives  Does patient have a Health Care POA?: No  Was patient offered paperwork?: Yes (declined)  Does patient currently have a Health Care decision maker?: Yes, please see Health Care Proxy  section  Does patient have Advance Directives?: No  Was patient offered paperwork?: Yes (declined)  Primary Contact: Macie Zamudio         Readmission Root Cause  30 Day Readmission: Yes  Who directed you to return to the hospital?: Self, Family  Did you understand whom to contact if you had questions or problems?: Yes  Did you get your prescriptions before you left the hospital?: No  Reason:: Preference for own pharmacy  Were you able to get your prescriptions filled when you left the hospital?: Yes  Did you take your medications as prescribed?: Yes  Were you able to get to your follow-up appointments?: Yes  During previous admission, was a post-acute recommendation made?: Yes  What post-acute resources were offered?: [facility]  Patient was readmitted due to: Knee pain, JH, abnormal CT chest  Action Plan: Evalaute and treat. Cont IV abx whether that be at home or in a facility    Patient Information  Admitted from:: Home  Mental Status: Alert  During Assessment patient was accompanied by: Spouse  Assessment information provided by:: Spouse  Primary Caregiver: Self  Support Systems: Self, Son, Spouse/significant other  County of Residence: [place]  What city do you live in?: [place]  Home entry access options. Select all that apply.: Stairs  Number of steps to enter home.: 10  Do the steps have railings?: Yes  Type of Current Residence: 2 story home  Upon entering residence, is there a bedroom on the main floor (no further steps)?: No  A bedroom is located on the following floor levels of residence (select all that apply):: 2nd Floor  Upon entering residence, is there a bathroom on the main floor (no further steps)?: Yes  Number of steps to 2nd floor from main floor: 10  Living Arrangements: Lives w/ Spouse/significant other  Is patient a ?: No    Activities of Daily Living Prior to Admission  Functional Status: Independent  Completes ADLs independently?: Yes  Ambulates independently?: Yes  Does patient use  assisted devices?: Yes  Assisted Devices (DME) used: Straight Cane  Does patient currently own DME?: Yes  What DME does the patient currently own?: Straight Cane, Walker, Wheelchair  Does patient have a history of Outpatient Therapy (PT/OT)?: Yes  Does the patient have a history of Short-Term Rehab?: Yes  Does patient have a history of HHC?: Yes  Does patient currently have HHC?: Yes    Current Home Health Care  Type of Current Home Care Services: Home PT, Home OT, Nurse visit  Current Home Health Agency:: [facility]  Current Home Health Follow-Up Provider:: PCP    Patient Information Continued  Income Source: SSI/SSD  Does patient have prescription coverage?: Yes  Does patient receive dialysis treatments?: No  Does patient have a history of substance abuse?: No  Does patient have a history of Mental Health Diagnosis?: No         Means of Transportation  Means of Transport to Appts:: Drives Self      Social Determinants of Health (SDOH)      Flowsheet Row Most Recent Value   Housing Stability    In the last 12 months, was there a time when you were not able to pay the mortgage or rent on time? N   In the last 12 months, how many places have you lived? 1   In the last 12 months, was there a time when you did not have a steady place to sleep or slept in a shelter (including now)? N   Transportation Needs    In the past 12 months, has lack of transportation kept you from medical appointments or from getting medications? no   In the past 12 months, has lack of transportation kept you from meetings, work, or from getting things needed for daily living? No   Food Insecurity    Within the past 12 months, you worried that your food would run out before you got the money to buy more. Never true   Within the past 12 months, the food you bought just didn't last and you didn't have money to get more. Never true   Utilities    In the past 12 months has the electric, gas, oil, or water company threatened to shut off services  in your home? No            DISCHARGE DETAILS:    Discharge planning discussed with:: patient wife via phone  Freedom of Choice: Yes  Comments - Freedom of Choice: discussed dc planning and needs for dc  CM contacted family/caregiver?: Yes  Were Treatment Team discharge recommendations reviewed with patient/caregiver?: Yes  Did patient/caregiver verbalize understanding of patient care needs?: Yes       Contacts  Patient Contacts: Hannha Osei  Relationship to Patient:: Family  Contact Method: Phone  Phone Number: [phone]  Reason/Outcome: Continuity of Care, Discharge Planning    Requested Home Health Care         Is the patient interested in HHC at discharge?: Yes  Home Health Discipline requested:: Nursing, Occupational Therapy, Physical Therapy  [facility] Health Agency Name::  [facility]  HHA External Referral Reason (only applicable if external HHA name selected): Patient has established relationship with provider  Home Health Follow-Up Provider:: PCP  Home Health Services Needed:: Gait/ADL Training, Strengthening/Theraputic Exercises to Improve Function, Administration of IV, IM or SC Medications  Homebound Criteria Met:: Requires the Assistance of Another Person for Safe Ambulation or to Leave the Home, Uses an Assist Device (i.e. cane, walker, etc)  Supporting Clincal Findings:: Limited Endurance    DME Referral Provided  Referral made for DME?: No    Other Referral/Resources/Interventions Provided:  Interventions: HHC, Short Term Rehab  Referral Comments: Wife is interested in pt going to SNF for IV abx that are needed 3x dailiy and picc managment            Discharge Destination Plan:: Home with Home Health Care, Short Term Rehab  Transport at Discharge : Wheelchair van, BLS Ambulance, Family                                      Additional Comments: Cm spoke with pts wife via phone. Pt is a reamit from 3/22-4/3. He was dc'd with [facility] and [facility] for his IV abx at home. Pt also with picc line. pts wife  expressed that she would like for him to go to SNF for picc and IV abx managment. Cm will follow up on this. Pt is aware that this is what his wif eis askign for.  Pt returns with knee pain, JH and abnormal chest CT. He still lives with his wife in a 2sth with 10 kayla and 10 stairs to b/s. He has a walker, wc and cane for home use. Pt primarily uses his Cane. He is indpt with ADLs other than showering where he needs assistance. Pt is current with [facility] for abx and picc. He has a hx of STR at [facility] many years ago. He has also participated in OP PT before. Pt sees a Private practice PCP. He does work fulltime for himself and can typically drive. His wife will provide transport home if he returns home again.  Pt on eliquis PTA.

## 2024-04-12 NOTE — ASSESSMENT & PLAN NOTE
On amlodipine, metoprolol, Entresto, Jardiance  Can continue amlodipine and metoprolol  Will hold Entresto and Jardiance for now given JH

## 2024-04-12 NOTE — ASSESSMENT & PLAN NOTE
ECHO (03/25): EF 45% w/ mild global hypokinesis   On jardiance and Entresto, both on hold in setting of JH  Unilateral RLE swelling, otherwise appears euvolemic  Continue Toprol XL and amlodipine  Eliquis transitioned to heparin gtt

## 2024-04-12 NOTE — PLAN OF CARE
Problem: Potential for Falls  Goal: Patient will remain free of falls  Description: INTERVENTIONS:  - Educate patient/family on patient safety including physical limitations  - Instruct patient to call for assistance with activity   - Consult OT/PT to assist with strengthening/mobility   - Keep Call bell within reach  - Keep bed low and locked with side rails adjusted as appropriate  - Keep care items and personal belongings within reach  - Initiate and maintain comfort rounds  - Make Fall Risk Sign visible to staff  - Offer Toileting every x Hours, in advance of need  - Initiate/Maintain xalarm  - Obtain necessary fall risk management equipment: x  - Apply yellow socks and bracelet for high fall risk patients  - Consider moving patient to room near nurses station  Outcome: Progressing     Problem: PAIN - ADULT  Goal: Verbalizes/displays adequate comfort level or baseline comfort level  Description: Interventions:  - Encourage patient to monitor pain and request assistance  - Assess pain using appropriate pain scale  - Administer analgesics based on type and severity of pain and evaluate response  - Implement non-pharmacological measures as appropriate and evaluate response  - Consider cultural and social influences on pain and pain management  - Notify physician/advanced practitioner if interventions unsuccessful or patient reports new pain  Outcome: Progressing     Problem: INFECTION - ADULT  Goal: Absence or prevention of progression during hospitalization  Description: INTERVENTIONS:  - Assess and monitor for signs and symptoms of infection  - Monitor lab/diagnostic results  - Monitor all insertion sites, i.e. indwelling lines, tubes, and drains  - Monitor endotracheal if appropriate and nasal secretions for changes in amount and color  - Dexter appropriate cooling/warming therapies per order  - Administer medications as ordered  - Instruct and encourage patient and family to use good hand hygiene  technique  - Identify and instruct in appropriate isolation precautions for identified infection/condition  Outcome: Progressing  Goal: Absence of fever/infection during neutropenic period  Description: INTERVENTIONS:  - Monitor WBC    Outcome: Progressing     Problem: SAFETY ADULT  Goal: Patient will remain free of falls  Description: INTERVENTIONS:  - Educate patient/family on patient safety including physical limitations  - Instruct patient to call for assistance with activity   - Consult OT/PT to assist with strengthening/mobility   - Keep Call bell within reach  - Keep bed low and locked with side rails adjusted as appropriate  - Keep care items and personal belongings within reach  - Initiate and maintain comfort rounds  - Make Fall Risk Sign visible to staff  - Offer Toileting every x Hours, in advance of need  - Initiate/Maintain xalarm  - Obtain necessary fall risk management equipment: x  - Apply yellow socks and bracelet for high fall risk patients  - Consider moving patient to room near nurses station  Outcome: Progressing  Goal: Maintain or return to baseline ADL function  Description: INTERVENTIONS:  -  Assess patient's ability to carry out ADLs; assess patient's baseline for ADL function and identify physical deficits which impact ability to perform ADLs (bathing, care of mouth/teeth, toileting, grooming, dressing, etc.)  - Assess/evaluate cause of self-care deficits   - Assess range of motion  - Assess patient's mobility; develop plan if impaired  - Assess patient's need for assistive devices and provide as appropriate  - Encourage maximum independence but intervene and supervise when necessary  - Involve family in performance of ADLs  - Assess for home care needs following discharge   - Consider OT consult to assist with ADL evaluation and planning for discharge  - Provide patient education as appropriate  Outcome: Progressing  Goal: Maintains/Returns to pre admission functional level  Description:  INTERVENTIONS:  - Perform AM-PAC 6 Click Basic Mobility/ Daily Activity assessment daily.  - Set and communicate daily mobility goal to care team and patient/family/caregiver.   - Collaborate with rehabilitation services on mobility goals if consulted  - Perform Range of Motion x times a day.  - Reposition patient every x hours.  - Dangle patient x times a day  - Stand patient x times a day  - Ambulate patient x times a day  - Out of bed to chair x times a day   - Out of bed for meals x times a day  - Out of bed for toileting  - Record patient progress and toleration of activity level   Outcome: Progressing     Problem: DISCHARGE PLANNING  Goal: Discharge to home or other facility with appropriate resources  Description: INTERVENTIONS:  - Identify barriers to discharge w/patient and caregiver  - Arrange for needed discharge resources and transportation as appropriate  - Identify discharge learning needs (meds, wound care, etc.)  - Arrange for interpretive services to assist at discharge as needed  - Refer to Case Management Department for coordinating discharge planning if the patient needs post-hospital services based on physician/advanced practitioner order or complex needs related to functional status, cognitive ability, or social support system  Outcome: Progressing     Problem: Knowledge Deficit  Goal: Patient/family/caregiver demonstrates understanding of disease process, treatment plan, medications, and discharge instructions  Description: Complete learning assessment and assess knowledge base.  Interventions:  - Provide teaching at level of understanding  - Provide teaching via preferred learning methods  Outcome: Progressing     Problem: NEUROSENSORY - ADULT  Goal: Achieves stable or improved neurological status  Description: INTERVENTIONS  - Monitor and report changes in neurological status  - Monitor vital signs such as temperature, blood pressure, glucose, and any other labs ordered   - Initiate measures  to prevent increased intracranial pressure  - Monitor for seizure activity and implement precautions if appropriate      Outcome: Progressing  Goal: Remains free of injury related to seizures activity  Description: INTERVENTIONS  - Maintain airway, patient safety  and administer oxygen as ordered  - Monitor patient for seizure activity, document and report duration and description of seizure to physician/advanced practitioner  - If seizure occurs,  ensure patient safety during seizure  - Reorient patient post seizure  - Seizure pads on all 4 side rails  - Instruct patient/family to notify RN of any seizure activity including if an aura is experienced  - Instruct patient/family to call for assistance with activity based on nursing assessment  - Administer anti-seizure medications if ordered    Outcome: Progressing  Goal: Achieves maximal functionality and self care  Description: INTERVENTIONS  - Monitor swallowing and airway patency with patient fatigue and changes in neurological status  - Encourage and assist patient to increase activity and self care.   - Encourage visually impaired, hearing impaired and aphasic patients to use assistive/communication devices  Outcome: Progressing     Problem: CARDIOVASCULAR - ADULT  Goal: Maintains optimal cardiac output and hemodynamic stability  Description: INTERVENTIONS:  - Monitor I/O, vital signs and rhythm  - Monitor for S/S and trends of decreased cardiac output  - Administer and titrate ordered vasoactive medications to optimize hemodynamic stability  - Assess quality of pulses, skin color and temperature  - Assess for signs of decreased coronary artery perfusion  - Instruct patient to report change in severity of symptoms  Outcome: Progressing  Goal: Absence of cardiac dysrhythmias or at baseline rhythm  Description: INTERVENTIONS:  - Continuous cardiac monitoring, vital signs, obtain 12 lead EKG if ordered  - Administer antiarrhythmic and heart rate control medications  as ordered  - Monitor electrolytes and administer replacement therapy as ordered  Outcome: Progressing     Problem: RESPIRATORY - ADULT  Goal: Achieves optimal ventilation and oxygenation  Description: INTERVENTIONS:  - Assess for changes in respiratory status  - Assess for changes in mentation and behavior  - Position to facilitate oxygenation and minimize respiratory effort  - Oxygen administered by appropriate delivery if ordered  - Initiate smoking cessation education as indicated  - Encourage broncho-pulmonary hygiene including cough, deep breathe, Incentive Spirometry  - Assess the need for suctioning and aspirate as needed  - Assess and instruct to report SOB or any respiratory difficulty  - Respiratory Therapy support as indicated  Outcome: Progressing     Problem: GASTROINTESTINAL - ADULT  Goal: Minimal or absence of nausea and/or vomiting  Description: INTERVENTIONS:  - Administer IV fluids if ordered to ensure adequate hydration  - Maintain NPO status until nausea and vomiting are resolved  - Nasogastric tube if ordered  - Administer ordered antiemetic medications as needed  - Provide nonpharmacologic comfort measures as appropriate  - Advance diet as tolerated, if ordered  - Consider nutrition services referral to assist patient with adequate nutrition and appropriate food choices  Outcome: Progressing  Goal: Maintains or returns to baseline bowel function  Description: INTERVENTIONS:  - Assess bowel function  - Encourage oral fluids to ensure adequate hydration  - Administer IV fluids if ordered to ensure adequate hydration  - Administer ordered medications as needed  - Encourage mobilization and activity  - Consider nutritional services referral to assist patient with adequate nutrition and appropriate food choices  Outcome: Progressing  Goal: Maintains adequate nutritional intake  Description: INTERVENTIONS:  - Monitor percentage of each meal consumed  - Identify factors contributing to decreased  intake, treat as appropriate  - Assist with meals as needed  - Monitor I&O, weight, and lab values if indicated  - Obtain nutrition services referral as needed  Outcome: Progressing  Goal: Establish and maintain optimal ostomy function  Description: INTERVENTIONS:  - Assess bowel function  - Encourage oral fluids to ensure adequate hydration  - Administer IV fluids if ordered to ensure adequate hydration   - Administer ordered medications as needed  - Encourage mobilization and activity  - Nutrition services referral to assist patient with appropriate food choices  - Assess stoma site  - Consider wound care consult   Outcome: Progressing  Goal: Oral mucous membranes remain intact  Description: INTERVENTIONS  - Assess oral mucosa and hygiene practices  - Implement preventative oral hygiene regimen  - Implement oral medicated treatments as ordered  - Initiate Nutrition services referral as needed  Outcome: Progressing     Problem: GENITOURINARY - ADULT  Goal: Maintains or returns to baseline urinary function  Description: INTERVENTIONS:  - Assess urinary function  - Encourage oral fluids to ensure adequate hydration if ordered  - Administer IV fluids as ordered to ensure adequate hydration  - Administer ordered medications as needed  - Offer frequent toileting  - Follow urinary retention protocol if ordered  Outcome: Progressing  Goal: Absence of urinary retention  Description: INTERVENTIONS:  - Assess patient’s ability to void and empty bladder  - Monitor I/O  - Bladder scan as needed  - Discuss with physician/AP medications to alleviate retention as needed  - Discuss catheterization for long term situations as appropriate  Outcome: Progressing  Goal: Urinary catheter remains patent  Description: INTERVENTIONS:  - Assess patency of urinary catheter  - If patient has a chronic campa, consider changing catheter if non-functioning  - Follow guidelines for intermittent irrigation of non-functioning urinary  catheter  Outcome: Progressing     Problem: METABOLIC, FLUID AND ELECTROLYTES - ADULT  Goal: Electrolytes maintained within normal limits  Description: INTERVENTIONS:  - Monitor labs and assess patient for signs and symptoms of electrolyte imbalances  - Administer electrolyte replacement as ordered  - Monitor response to electrolyte replacements, including repeat lab results as appropriate  - Instruct patient on fluid and nutrition as appropriate  Outcome: Progressing  Goal: Fluid balance maintained  Description: INTERVENTIONS:  - Monitor labs   - Monitor I/O and WT  - Instruct patient on fluid and nutrition as appropriate  - Assess for signs & symptoms of volume excess or deficit  Outcome: Progressing  Goal: Glucose maintained within target range  Description: INTERVENTIONS:  - Monitor Blood Glucose as ordered  - Assess for signs and symptoms of hyperglycemia and hypoglycemia  - Administer ordered medications to maintain glucose within target range  - Assess nutritional intake and initiate nutrition service referral as needed  Outcome: Progressing     Problem: SKIN/TISSUE INTEGRITY - ADULT  Goal: Skin Integrity remains intact(Skin Breakdown Prevention)  Description: Assess:  -Perform Girma assessment every x  -Clean and moisturize skin every x  -Inspect skin when repositioning, toileting, and assisting with ADLS  -Assess under medical devices such as x every x  -Assess extremities for adequate circulation and sensation     Bed Management:  -Have minimal linens on bed & keep smooth, unwrinkled  -Change linens as needed when moist or perspiring  -Avoid sitting or lying in one position for more than x hours while in bed  -Keep HOB at xdegrees     Toileting:  -Offer bedside commode  -Assess for incontinence every x  -Use incontinent care products after each incontinent episode such as x    Activity:  -Mobilize patient x times a day  -Encourage activity and walks on unit  -Encourage or provide ROM exercises   -Turn and  reposition patient every x Hours  -Use appropriate equipment to lift or move patient in bed  -Instruct/ Assist with weight shifting every xxx when out of bed in chair  -Consider limitation of chair time x hour intervals    Skin Care:  -Avoid use of baby powder, tape, friction and shearing, hot water or constrictive clothing  -Relieve pressure over bony prominences using x  -Do not massage red bony areas    Next Steps:  -Teach patient strategies to minimize risks such as x   -Consider consults to  interdisciplinary teams such as x  Outcome: Progressing  Goal: Incision(s), wounds(s) or drain site(s) healing without S/S of infection  Description: INTERVENTIONS  - Assess and document dressing, incision, wound bed, drain sites and surrounding tissue  - Provide patient and family education  - Perform skin care/dressing changes every x  Outcome: Progressing  Goal: Pressure injury heals and does not worsen  Description: Interventions:  - Implement low air loss mattress or specialty surface (Criteria met)  - Apply silicone foam dressing  - Instruct/assist with weight shifting every x minutes when in chair   - Limit chair time to x hour intervals  - Use special pressure reducing interventions such as x when in chair   - Apply fecal or urinary incontinence containment device   - Perform passive or active ROM every x  - Turn and reposition patient & offload bony prominences every x hours   - Utilize friction reducing device or surface for transfers   - Consider consults to  interdisciplinary teams such as x  - Use incontinent care products after each incontinent episode such as x  - Consider nutrition services referral as needed  Outcome: Progressing     Problem: HEMATOLOGIC - ADULT  Goal: Maintains hematologic stability  Description: INTERVENTIONS  - Assess for signs and symptoms of bleeding or hemorrhage  - Monitor labs  - Administer supportive blood products/factors as ordered and appropriate  Outcome: Progressing      Problem: MUSCULOSKELETAL - ADULT  Goal: Maintain or return mobility to safest level of function  Description: INTERVENTIONS:  - Assess patient's ability to carry out ADLs; assess patient's baseline for ADL function and identify physical deficits which impact ability to perform ADLs (bathing, care of mouth/teeth, toileting, grooming, dressing, etc.)  - Assess/evaluate cause of self-care deficits   - Assess range of motion  - Assess patient's mobility  - Assess patient's need for assistive devices and provide as appropriate  - Encourage maximum independence but intervene and supervise when necessary  - Involve family in performance of ADLs  - Assess for home care needs following discharge   - Consider OT consult to assist with ADL evaluation and planning for discharge  - Provide patient education as appropriate  Outcome: Progressing  Goal: Maintain proper alignment of affected body part  Description: INTERVENTIONS:  - Support, maintain and protect limb and body alignment  - Provide patient/ family with appropriate education  Outcome: Progressing

## 2024-04-12 NOTE — CONSULTS
"Consultation - Pulmonary Medicine   Armen Llanos 49 y.o. male MRN: [id number]  Unit/Bed#: ED 09 Encounter: [id number]      Assessment/Plan:    Abnormal CT chest with patchy groundglass opacities SADIE and right apical  A-fib on Eliquis as outpatient, currently on heparin drip  JH  Recent R knee revision for septic joint    Patient stable on room air  CTA chest shows patchy groundglass opacities in left upper lobe and a few right apical opacities, suggestive of atypical pneumonia versus pneumonitis versus pulmonary hemorrhage  Serial procal negative, no leukocytosis, elevated ddimer  Check legionella and mycoplasma IgM to rule out atypical infection  Follow up LE venous duplex  Patient currently on heparin drip in the event patient would require a kidney biopsy  Recommend 6 week follow up CT chest   He will need outpatient pulmonary follow up    History of Present Illness   Physician Requesting Consult: Uday Jean MD  Reason for Consult / Principal Problem: Abnormal CT of the chest  Chief Complaint: \" I feel like there is a 600 pound elephant on my chest\"  HPI: Armen Llanos is a 49 y.o.  male with past medical history diabetes mellitus, hypertension, A-fib on Eliquis, recent revision of right knee for septic arthritis who presented to [facility] with complaints of right knee pain and swelling.  Upon assessment in the ED he had an elevated D-dimer so CTA chest was performed and incidentally showed diffuse groundglass opacities.    He reports some shortness of breath and difficulty taking a deep breath.  He states this started yesterday.  He also reports a mild nonproductive cough over the past few weeks.  Denies any hemoptysis.  Denies any fever or chills.  He denies any chronic work-related exposures to irritants.  He has 3 dogs at home, no birds.    He was recently admitted after a fall on 3/22/24 and had MSSA bacteremia following R knee I&D on 3/24/24. He had a PICC placed for " "6 week course of IV ancef and was discharged on 4/3/24.    Inpatient consult to Pulmonology  Consult performed by: NATA Ortiz  Consult ordered by: Inderjit Mckeon PA-C          Review of Systems   Constitutional:  Positive for chills. Negative for fever.   HENT:  Negative for congestion and rhinorrhea.    Respiratory:  Positive for cough, chest tightness and shortness of breath. Negative for wheezing.    Cardiovascular:  Positive for leg swelling. Negative for chest pain.        R leg   Gastrointestinal: Negative.    Genitourinary: Negative.    Musculoskeletal:  Positive for joint swelling.        R knee   Neurological: Negative.    Psychiatric/Behavioral: Negative.         Historical Information   Past Medical History:   Diagnosis Date    Bed sore, stage 1     Diabetes mellitus (HCC)     Hypertension      Past Surgical History:   Procedure Laterality Date    IR PICC PLACEMENT SINGLE LUMEN  4/1/2024    KNEE SURGERY      TN REVJ TOTAL KNEE ARTHRP W/WO ALGRFT 1 COMPONENT Right 3/24/2024    Procedure: ARTHROPLASTY KNEE TOTAL REVISION, POLY EXCHANGE;  Surgeon: Tao Washington DO;  Location:  MAIN OR;  Service: Orthopedics    TOE AMPUTATION Left 3/28/2024    Procedure: AMPUTATION TOE 2nd;  Surgeon: Rossy Toussaint DPM;  Location:  MAIN OR;  Service: Podiatry     Social History   Social History     Substance and Sexual Activity   Alcohol Use Not Currently     Social History     Substance and Sexual Activity   Drug Use Yes    Types: Marijuana     Social History     Tobacco Use   Smoking Status Never   Smokeless Tobacco Never     E-Cigarette/Vaping    E-Cigarette Use Current Some Day User      E-Cigarette/Vaping Substances    Nicotine No     THC Yes     CBD Yes     Flavoring Yes     Other No     Unknown No      Occupational History: Unclear, however patient states he \"works very part-time for himself\"    Family History: History reviewed. No pertinent family history.    Meds/Allergies   all " current active meds have been reviewed and current meds:   Current Facility-Administered Medications   Medication Dose Route Frequency    acetaminophen (TYLENOL) tablet 975 mg  975 mg Oral Q8H    amiodarone tablet 200 mg  200 mg Oral Daily With Breakfast    amLODIPine (NORVASC) tablet 5 mg  5 mg Oral Daily    cyclobenzaprine (FLEXERIL) tablet 5 mg  5 mg Oral TID PRN    [START ON 4/13/2024] fentaNYL (DURAGESIC) 75 mcg/hr TD 72 hr patch 1 patch  1 patch Transdermal Q72H    heparin (porcine) 25,000 units in 0.45% NaCl 250 mL infusion (premix)  3-30 Units/kg/hr (Order-Specific) Intravenous Titrated    heparin (porcine) injection 3,800 Units  3,800 Units Intravenous Q6H PRN    heparin (porcine) injection 7,600 Units  7,600 Units Intravenous Q6H PRN    insulin glargine (LANTUS) subcutaneous injection 35 Units 0.35 mL  35 Units Subcutaneous HS    insulin lispro (HumALOG/ADMELOG) 100 units/mL subcutaneous injection 1-5 Units  1-5 Units Subcutaneous 4x Daily (AC & HS)    insulin lispro (HumALOG/ADMELOG) 100 units/mL subcutaneous injection 13 Units  13 Units Subcutaneous TID With Meals    metoprolol succinate (TOPROL-XL) 24 hr tablet 25 mg  25 mg Oral Daily    morphine (MS CONTIN) ER tablet 60 mg  60 mg Oral Q12H PRN    ondansetron (ZOFRAN) injection 4 mg  4 mg Intravenous Q6H PRN    pravastatin (PRAVACHOL) tablet 80 mg  80 mg Oral Daily With Dinner    sodium chloride 0.9 % infusion  75 mL/hr Intravenous Continuous    SUMAtriptan (IMITREX) tablet 100 mg  100 mg Oral Once PRN    zolpidem (AMBIEN) tablet 10 mg  10 mg Oral HS PRN       Allergies   Allergen Reactions    Cephalosporins Hives    Penicillins Hives       Objective   Vitals: Blood pressure 138/90, pulse 88, temperature 98.9 °F (37.2 °C), temperature source Oral, resp. rate 18, SpO2 96%.RA,There is no height or weight on file to calculate BMI.    Intake/Output Summary (Last 24 hours) at 4/12/2024 0851  Last data filed at 4/12/2024 0309  Gross per 24 hour   Intake 1150  ml   Output --   Net 1150 ml     Invasive Devices       Peripherally Inserted Central Catheter Line  Duration             PICC Line 04/01/24 Right Basilic 10 days              Peripheral Intravenous Line  Duration             Peripheral IV 04/12/24 Left;Ventral (anterior) Forearm <1 day                    Physical Exam  Vitals and nursing note reviewed.   Constitutional:       Appearance: Normal appearance.   HENT:      Head: Normocephalic and atraumatic.      Nose: Nose normal.      Mouth/Throat:      Mouth: Mucous membranes are moist.   Eyes:      Conjunctiva/sclera: Conjunctivae normal.   Cardiovascular:      Rate and Rhythm: Normal rate and regular rhythm.      Heart sounds: Normal heart sounds.   Pulmonary:      Effort: Pulmonary effort is normal.      Breath sounds: Examination of the right-lower field reveals decreased breath sounds. Examination of the left-lower field reveals decreased breath sounds. Decreased breath sounds present.   Abdominal:      Palpations: Abdomen is soft.   Musculoskeletal:         General: Swelling present. Normal range of motion.      Cervical back: Normal range of motion and neck supple.      Comments: R leg   Skin:     General: Skin is warm and dry.      Capillary Refill: Capillary refill takes less than 2 seconds.   Neurological:      General: No focal deficit present.      Mental Status: He is alert and oriented to person, place, and time.   Psychiatric:         Mood and Affect: Mood normal.         Lab Results: I have personally reviewed pertinent lab results., CBC:   Lab Results   Component Value Date    WBC 7.92 04/12/2024    HGB 7.8 (L) 04/12/2024    HCT 24.0 (L) 04/12/2024    MCV 92 04/12/2024     04/12/2024    RBC 2.62 (L) 04/12/2024    MCH 29.8 04/12/2024    MCHC 32.5 04/12/2024    RDW 13.1 04/12/2024    MPV 10.2 04/12/2024    NRBC 0 04/12/2024   , CMP:   Lab Results   Component Value Date    SODIUM 135 04/12/2024    K 4.8 04/12/2024     04/12/2024    CO2 21  "04/12/2024    BUN 31 (H) 04/12/2024    CREATININE 1.86 (H) 04/12/2024    CALCIUM 7.1 (L) 04/12/2024    AST 8 (L) 04/12/2024    ALT <3 (L) 04/12/2024    ALKPHOS 50 04/12/2024    EGFR 41 04/12/2024       Lactic Acid: 1.6    Procalcitonin: 0.11, 0.12    Flu/COVID/RSV PCR: Negative    Culture Data: Blood cultures pending     D-Dimer: 2.73      Imaging Studies: I have personally reviewed pertinent reports.   and I have personally reviewed pertinent films in PACS     CTA chest pe study, 4/12/2024  1. Patchy groundglass opacities in the left upper lobe and a few right apical opacities suggesting atypical pneumonia,, pneumonitis or pulmonary hemorrhage.  2. Trace amount of pleural fluid bilaterally.  3. No evidence of acute pulmonary embolus.    EKG, Pathology, and Other Studies: I have personally reviewed pertinent reports.       Echo complete w/ contrast, 3/25/2024    Left Ventricle: Left ventricular cavity size is normal. Wall thickness is normal. The left ventricular ejection fraction is 45%. Systolic function is mildly reduced. There is mild global hypokinesis. Diastolic function is abnormal.    Right Ventricle: Right ventricular cavity size is normal. Systolic function is normal.    Left Atrium: The atrium is normal in size (16-34 mL/m2).    Right Atrium: The atrium is normal in size.    Mitral Valve: There is mild regurgitation.    Tricuspid Valve: There is mild regurgitation.    Prior TTE study available for comparison. Prior study date: 5/18/2023. Changes noted when compared to prior study. Changes include: LV systolic function is now mildly depressed.           Code Status: Prior      LEO Pollard RN FNP-BC  Nurse Practitioner  [facility]       Portions of the record may have been created with voice recognition software.  Occasional wrong word or \"sound a like\" substitutions may have occurred due to the inherent limitations of voice recognition software.  Read the chart " carefully and recognize, using context, where substitutions have occurred.

## 2024-04-12 NOTE — H&P
[facility]  H&P  Name: Armen Llanos 49 y.o. male I MRN: [id number]  Unit/Bed#: ED 04 I Date of Admission: 4/11/2024   Date of Service: 4/12/2024 I Hospital Day: 0      Assessment/Plan   * JH (acute kidney injury) (HCC)  Assessment & Plan  Lab Results   Component Value Date    CREATININE 2.05 (H) 04/11/2024    CREATININE 1.06 04/01/2024    CREATININE 0.92 03/31/2024     Baseline creatinine 0.9-1.1  Admission creatinine 2.05  CT renal: No hydronephrosis. Delayed excreted contrast material in the renal collecting systems suggesting renal impairment. Trace bilateral pleural effusions, trace volume of ascites, and moderate anasarca consistent with volume overload.  Patient reports Aleve use and poor water intake for his right knee pain and more recently swelling --> Consider prerenal/ NSAID nephropathy. Also on entresto (ARB may be contributing)  Hypoalbumenic + protein in urine + anasarca on CT imaging --> consider nephrotic syndrome especially in setting of diabetes  Unexplained RBC in urine + unclear lung findings on CTA --> consider good pasture syndrome  Of note, received contrast for CTA of the chest to r/o PE in setting of elevated ddimer    Plan:  - Unclear etiology at this point, but there are definitely some concerning factors including hematuria and proteinuria  - Stop NSAIDS, Entresto, Jardiance, metformin and other nephrotoxins  - Give IVF, monitor for worsening swelling  - Obtain prot/creat ratio and lipid panel  - Check C3, C4, ANCA, Anti-GBM  - Nephrology consult  - Trend BMP  - Transition Eliquis to heparin gtt in the event creatinine worsens and pt requires kidney biopsy    Swelling of right lower extremity  Assessment & Plan  Long hx of knee issues with multiple procedures including b/l TKA  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 Start heparin gtt.  Consult orthopedic surgery. Was supposed to be seen by them today as outpatient for suture removal.    Abnormal CT of the chest  Assessment & Plan  CTA chest:  1. Patchy groundglass opacities in the left upper lobe and a few right apical opacities suggesting atypical pneumonia,, pneumonitis or pulmonary hemorrhage.  2. Trace amount of pleural fluid bilaterally.  3. No evidence of acute pulmonary embolus.    Mild non productive cough. Otherwise asymptomatic, no trouble breathing. Denies any aspiration event.  Consult pulmonology for their input  Given possible pulmonary hemorrhage + RBC in urine with JH --> consider good pasture syndrome  Received levaquin in ED, hold off on further abx until evaluated by pulmonology    Cardiomyopathy ([facility])  Assessment & Plan  ECHO (03/25): EF 45% w/ mild global hypokinesis   On jardiance and Entresto, both on hold in setting of JH  Unilateral RLE swelling, otherwise appears euvolemic  Continue Toprol XL and amlodipine  Eliquis transitioned to heparin gtt    A-fib with RVR ([facility])  Assessment & Plan  Precipitated by septic shock secondary to septic arthritis  Rate control: Metoprolol XL 25 mg daily, amiodarone 200 mg daily  Anticoagulation: Eliquis.  Hold and place on heparin gtt. for now.    MSSA bacteremia  Assessment & Plan  Completed a course of IV cefazolin on 4/11/2024 for septic arthritis with MSSA    TBI (traumatic brain injury) ([facility])  Assessment & Plan  History of TBI at 7 years old    Essential hypertension  Assessment & Plan  On amlodipine, metoprolol, Entresto, Jardiance  Can continue amlodipine and metoprolol  Will hold Entresto and Jardiance for now  "given JH    Type 2 diabetes mellitus with ketoacidosis without coma, without long-term current use of insulin ([facility])  Assessment & Plan  Lab Results   Component Value Date    HGBA1C 12.1 (H) 03/22/2024       No results for input(s): \"POCGLU\" in the last 72 hours.    Blood Sugar Average: Last 72 hrs:  Home regimen: Jardiance, metformin, Lantus, Humalog  Hold Jardiance and metformin in setting of JH  Continue home insulin regimen  Add sliding scale insulin  Diabetic diet             VTE Pharmacologic Prophylaxis: VTE Score: 8 High Risk (Score >/= 5) - Pharmacological DVT Prophylaxis Ordered: heparin drip. Sequential Compression Devices Ordered.  Code Status: Prior level 1 full code    Anticipated Length of Stay: Patient will be admitted on an inpatient basis with an anticipated length of stay of greater than 2 midnights secondary to JH, swelling of the right lower extremity.    Total Time Spent on Date of Encounter in care of patient: 55 mins. This time was spent on one or more of the following: performing physical exam; counseling and coordination of care; obtaining or reviewing history; documenting in the medical record; reviewing/ordering tests, medications or procedures; communicating with other healthcare professionals and discussing with patient's family/caregivers.    Chief Complaint: Right lower extremity pain    History of Present Illness:  Armen Llanos is a 49 y.o. male with a PMH of T2DM, HTN, A-fib on Eliquis, long history of knee surgeries including bilateral TKA approximately 20 years ago with recent revision of right knee for septic arthritis who presents with acute onsets swelling and pain of right knee x 24 hours.  Patient completed IV course of IV Ancef yesterday.  States that his pain was well-controlled, and he was feeling like he was improving up until today.  On arrival to the ED, hemodynamically stable.  He does have some significant lab work abnormality including an JH with creatinine " 2.05, hypoalbuminemia, anemia with hemoglobin 9.0, significant proteinuria and hematuria.  CT renal showed no nephro or ureterolithiasis, did show delayed emptying of the renal pelvis.  D-dimer was elevated and CTA chest was obtained which showed no pulmonary embolism, however diffuse groundglass opacities of unclear significance.  Patient does report a mild nonproductive cough.  Denies any hemoptysis.  He will be admitted for further evaluation by nephrology for his JH, pulmonology for his diffuse groundglass opacities, orthopedics for his lower extremity swelling/pain postoperatively.    Review of Systems:  Review of Systems   Constitutional:  Negative for appetite change, chills, fatigue and fever.   HENT:  Negative for ear pain, sore throat and trouble swallowing.    Eyes:  Negative for visual disturbance.   Respiratory:  Positive for cough (Mild, nonproductive). Negative for chest tightness, shortness of breath and wheezing.    Cardiovascular:  Positive for leg swelling (Right lower extremity). Negative for chest pain and palpitations.   Gastrointestinal:  Negative for abdominal distention, abdominal pain, diarrhea, nausea and vomiting.   Endocrine: Negative.    Genitourinary:  Negative for dysuria, flank pain and hematuria.   Musculoskeletal:  Positive for arthralgias (Right knee pain). Negative for gait problem and myalgias.   Skin:  Negative for pallor.   Allergic/Immunologic: Negative for immunocompromised state.   Neurological:  Negative for dizziness, syncope, light-headedness, numbness and headaches.       Past Medical and Surgical History:   Past Medical History:   Diagnosis Date    Bed sore, stage 1     Diabetes mellitus (HCC)     Hypertension        Past Surgical History:   Procedure Laterality Date    IR PICC PLACEMENT SINGLE LUMEN  4/1/2024    KNEE SURGERY      NV REVJ TOTAL KNEE ARTHRP W/WO ALGRFT 1 COMPONENT Right 3/24/2024    Procedure: ARTHROPLASTY KNEE TOTAL REVISION, POLY EXCHANGE;  Surgeon:  Tao Washington DO;  Location:  MAIN OR;  Service: Orthopedics    TOE AMPUTATION Left 3/28/2024    Procedure: AMPUTATION TOE 2nd;  Surgeon: Rossy Toussaint DPM;  Location:  MAIN OR;  Service: Podiatry       Meds/Allergies:  Prior to Admission medications    Medication Sig Start Date End Date Taking? Authorizing Provider   Alcohol Swabs 70 % PADS May substitute brand based on insurance coverage. Check glucose ACHS. 4/1/24   Uday Jean MD   amiodarone 200 mg tablet Take 2 tablets (400 mg total) by mouth 2 (two) times a day with meals for 2 days, THEN 1 tablet (200 mg total) daily with breakfast for 20 days. 4/3/24 4/25/24  Elsy Herrera PA-C   amLODIPine (NORVASC) 5 mg tablet Take 5 mg by mouth daily    Historical Provider, MD   apixaban (ELIQUIS) 5 mg Take 1 tablet (5 mg total) by mouth 2 (two) times a day 4/1/24   Uday Jean MD   b complex vitamins capsule Take 1 capsule by mouth daily    Historical Provider, MD   Blood Glucose Monitoring Suppl (OneTouch Verio Reflect) w/Device KIT May substitute brand based on insurance coverage. Check glucose ACHS. 4/1/24   Uday Jean MD   ceFAZolin (ANCEF) 2000 mg IVPB Inject 2,000 mg into a catheter in a vein over 30 minutes at 100 mL/hr every 8 (eight) hours Do not start before April 2, 2024. 4/2/24 5/8/24  Vee Walker MD   cyclobenzaprine (FLEXERIL) 10 mg tablet Take 10 mg by mouth 3 (three) times a day as needed    Historical Provider, MD   DULoxetine (CYMBALTA) 20 mg capsule Take 1 capsule (20 mg total) by mouth daily  Patient not taking: Reported on 4/9/2024 4/4/24   Elsy Herrera PA-C   Empagliflozin (Jardiance) 10 MG TABS tablet Take 1 tablet (10 mg total) by mouth every morning  Patient not taking: Reported on 4/5/2024 3/26/24   Arturo Ferro PA-C   esomeprazole (NexIUM) 20 mg capsule Take 20 mg by mouth 2 (two) times a day before meals  Patient not taking: Reported on 3/22/2024    Historical Provider, MD   fentaNYL (DURAGESIC) 75 mcg/hr  Place 1 patch on the skin every third day    Historical Provider, MD   glucose blood (OneTouch Verio) test strip May substitute brand based on insurance coverage. Check glucose ACHS. 4/1/24   Uday Jean MD   Insulin Glargine Solostar (Lantus SoloStar) 100 UNIT/ML SOPN Inject 0.45 mL (45 Units total) under the skin daily at bedtime 4/1/24   Uday Jean MD   insulin lispro (HumaLOG KwikPen) 100 units/mL injection pen Inject 13 Units under the skin 3 (three) times a day with meals 4/1/24   Uday Jean MD   Insulin Pen Needle (BD Pen Needle Carisa 2nd Gen) 32G X 4 MM MISC For use with insulin pen. Pharmacy may dispense brand covered by insurance. 4/1/24   Uday Jean MD   Insulin Pen Needle (BD Pen Needle Carisa 2nd Gen) 32G X 4 MM MISC For use with insulin pen. Pharmacy may dispense brand covered by insurance.  Patient not taking: Reported on 4/9/2024 4/1/24   Uday Jean MD   metFORMIN (GLUCOPHAGE) 500 mg tablet Take 500 mg by mouth 2 (two) times a day with meals    Historical Provider, MD   metoprolol succinate (TOPROL-XL) 25 mg 24 hr tablet Take 1 tablet (25 mg total) by mouth daily 4/5/24   Arturo Ferro PA-C   morphine (MS CONTIN) 60 mg 12 hr tablet 60 mg every 12 (twelve) hours as needed 3/1/24   Historical Provider, MD   Morphine Sulfate ER 15 MG T12A Take 60 mg by mouth every 12 (twelve) hours as needed  Patient not taking: Reported on 4/9/2024    Historical Provider, MD   ondansetron (ZOFRAN) 8 mg tablet TAKE 1 TABLET (8 MG) BY MOUTH 3 TIMES PER DAY AS NEEDED 4/11/23   Historical Provider, MD Bermeo Delica Lancets 33G MISC May substitute brand based on insurance coverage. Check glucose ACHS. 4/1/24   Uday Jean MD   risperiDONE (RisperDAL) 3 mg tablet Take 3 mg by mouth daily    Historical Provider, MD   sacubitril-valsartan (Entresto) 24-26 MG TABS Take 1 tablet by mouth 2 (two) times a day 3/27/24   Arturo Ferro PA-C   simvastatin (ZOCOR) 40 mg tablet Take 40 mg by mouth daily     Historical Provider, MD   SUMAtriptan (IMITREX) 100 mg tablet Take 100 mg by mouth Pt reports taking PRN    Historical Provider, MD   Testosterone Cypionate 200 MG/ML SOLN Inject into a muscle    Historical Provider, MD   zolpidem (AMBIEN) 10 mg tablet Take 10 mg by mouth    Historical Provider, MD   clonazePAM (KlonoPIN) 1 mg tablet Take 1 mg by mouth 2 (two) times a day  11/15/22  Historical Provider, MD KAY have reviewed home medications with patient personally.    Allergies:   Allergies   Allergen Reactions    Cephalosporins Hives    Penicillins Hives       Social History:  Marital Status: /Civil Union   Occupation: Noncontributory  Patient Pre-hospital Living Situation: Home  Patient Pre-hospital Level of Mobility: walks  Patient Pre-hospital Diet Restrictions: Diabetic  Substance Use History:   Social History     Substance and Sexual Activity   Alcohol Use Not Currently     Social History     Tobacco Use   Smoking Status Never   Smokeless Tobacco Never     Social History     Substance and Sexual Activity   Drug Use Yes    Types: Marijuana       Family History:  History reviewed. No pertinent family history.    Physical Exam:     Vitals:   Blood Pressure: 155/80 (04/12/24 0445)  Pulse: 92 (04/12/24 0445)  Temperature: 98.9 °F (37.2 °C) (04/11/24 1932)  Temp Source: Oral (04/11/24 1932)  Respirations: 18 (04/12/24 0445)  SpO2: 93 % (04/12/24 0445)    Physical Exam  Vitals and nursing note reviewed.   Constitutional:       Appearance: Normal appearance.   HENT:      Head: Normocephalic and atraumatic.      Mouth/Throat:      Mouth: Mucous membranes are moist.      Pharynx: Oropharynx is clear. No oropharyngeal exudate.   Eyes:      Extraocular Movements: Extraocular movements intact.   Cardiovascular:      Rate and Rhythm: Normal rate and regular rhythm.      Pulses: Normal pulses.      Heart sounds: Normal heart sounds. No murmur heard.     No friction rub. No gallop.   Pulmonary:      Effort: Pulmonary  effort is normal. No respiratory distress.      Breath sounds: Normal breath sounds. No stridor. No wheezing or rales.   Abdominal:      General: Abdomen is flat. Bowel sounds are normal. There is no distension.      Palpations: Abdomen is soft.      Tenderness: There is no abdominal tenderness.   Musculoskeletal:      Right lower leg: Edema present.      Left lower leg: No edema.   Skin:     General: Skin is warm and dry.   Neurological:      General: No focal deficit present.      Mental Status: He is alert and oriented to person, place, and time.          Additional Data:     Lab Results:  Results from last 7 days   Lab Units 04/11/24 1940   WBC Thousand/uL 7.70   HEMOGLOBIN g/dL 9.0*   HEMATOCRIT % 28.8*   PLATELETS Thousands/uL 309   SEGS PCT % 60   LYMPHO PCT % 30   MONO PCT % 6   EOS PCT % 2     Results from last 7 days   Lab Units 04/11/24 1940   SODIUM mmol/L 137   POTASSIUM mmol/L 5.3   CHLORIDE mmol/L 109*   CO2 mmol/L 22   BUN mg/dL 29*   CREATININE mg/dL 2.05*   ANION GAP mmol/L 6   CALCIUM mg/dL 8.2*   ALBUMIN g/dL 2.6*   TOTAL BILIRUBIN mg/dL 0.24   ALK PHOS U/L 60   ALT U/L <3*   AST U/L 10*   GLUCOSE RANDOM mg/dL 207*     Results from last 7 days   Lab Units 04/11/24 1940   INR  1.17             Results from last 7 days   Lab Units 04/11/24 1940   LACTIC ACID mmol/L 1.6   PROCALCITONIN ng/ml 0.12       Lines/Drains:  Invasive Devices       Peripherally Inserted Central Catheter Line  Duration             PICC Line 04/01/24 Right Basilic 10 days              Peripheral Intravenous Line  Duration             Peripheral IV 04/12/24 Left;Ventral (anterior) Forearm <1 day                    Central Line:  Goal for removal:  PICC was used for IV antibiotics until yesterday.  Plan to remove prior to discharge if patient does not require further antibiotics.           Imaging: Reviewed radiology reports from this admission including: chest CT scan and CT renal  CT renal stone study abdomen pelvis  without contrast   Final Result by Ann Gerard MD (04/12 0339)      No hydronephrosis.      Delayed excreted contrast material in the renal collecting systems suggesting renal impairment.      Trace bilateral pleural effusions, trace volume of ascites, and moderate anasarca consistent with volume overload.      1.6 cm right adrenal adenoma.         Workstation performed: [id number]         CTA ED chest PE study   Final Result by Jovi Stock MD (04/12 0122)         1. Patchy groundglass opacities in the left upper lobe and a few right apical opacities suggesting atypical pneumonia,, pneumonitis or pulmonary hemorrhage.   2. Trace amount of pleural fluid bilaterally.   3. No evidence of acute pulmonary embolus.                  Workstation performed: [id number]         XR chest pa & lateral   ED Interpretation by Kaia Shaw MD (04/11 3092)   No acute pulmonary pathology      VAS VENOUS DUPLEX -LOWER LIMB UNILATERAL    (Results Pending)   CT lower extremity wo contrast right    (Results Pending)       EKG and Other Studies Reviewed on Admission:   EKG: NSR. HR 90.    ** Please Note: This note has been constructed using a voice recognition system. **

## 2024-04-12 NOTE — ASSESSMENT & PLAN NOTE
Lab Results   Component Value Date    CREATININE 2.05 (H) 04/11/2024    CREATININE 1.06 04/01/2024    CREATININE 0.92 03/31/2024     Baseline creatinine 0.9-1.1  Admission creatinine 2.05  CT renal: No hydronephrosis. Delayed excreted contrast material in the renal collecting systems suggesting renal impairment. Trace bilateral pleural effusions, trace volume of ascites, and moderate anasarca consistent with volume overload.  Patient reports Aleve use and poor water intake for his right knee pain and more recently swelling --> Consider prerenal/ NSAID nephropathy. Also on entresto (ARB may be contributing)  Hypoalbumenic + protein in urine + anasarca on CT imaging --> consider nephrotic syndrome especially in setting of diabetes  Unexplained RBC in urine + unclear lung findings on CTA --> consider good pasture syndrome  Of note, received contrast for CTA of the chest to r/o PE in setting of elevated ddimer    Plan:  - Unclear etiology at this point, but there are definitely some concerning factors including hematuria and proteinuria  - Stop NSAIDS, Entresto, Jardiance, metformin and other nephrotoxins  - Give IVF, monitor for worsening swelling  - Obtain prot/creat ratio and lipid panel  - Check C3, C4, ANCA, Anti-GBM  - Nephrology consult  - Trend BMP  - Transition Eliquis to heparin gtt in the event creatinine worsens and pt requires kidney biopsy

## 2024-04-12 NOTE — ASSESSMENT & PLAN NOTE
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 Start heparin gtt.  Consult orthopedic surgery. Was supposed to be seen by them today as outpatient for suture removal.

## 2024-04-12 NOTE — ASSESSMENT & PLAN NOTE
Precipitated by septic shock secondary to septic arthritis  Rate control: Metoprolol XL 25 mg daily, amiodarone 200 mg daily  Anticoagulation: Eliquis.  Hold and place on heparin gtt. for now.

## 2024-04-12 NOTE — ASSESSMENT & PLAN NOTE
"Lab Results   Component Value Date    HGBA1C 12.1 (H) 03/22/2024       No results for input(s): \"POCGLU\" in the last 72 hours.    Blood Sugar Average: Last 72 hrs:  Home regimen: Jardiance, metformin, Lantus, Humalog  Hold Jardiance and metformin in setting of JH  Continue home insulin regimen  Add sliding scale insulin  Diabetic diet    "

## 2024-04-12 NOTE — ED PROVIDER NOTES
History  Chief Complaint   Patient presents with    Knee Pain     Reports R knee pain that has gotten worse today.  Pt recently admitted for sepsis.  Denies fevers, n/v at home.      49 year old male presents for evaluation of worsening knee pain and swelling which began today.  Patient has been using a fentanyl patch for pain since leaving the hospital.  He has oral morphine, but has not been taking it.  No fevers or chills.  Mild nonproductive cough since his hospital stay.  Patient had been receiving antibiotics through a picc line for MSSA bacteremia which he completed yesterday.  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      Knee Pain      Prior to Admission Medications   Prescriptions Last Dose Informant Patient Reported? Taking?   Alcohol Swabs 70 % PADS   No No   Sig: May substitute brand based on insurance coverage. Check glucose ACHS.   Blood Glucose Monitoring Suppl (OneTouch Verio Reflect) w/Device KIT   No No   Sig: May substitute brand based on insurance coverage. Check glucose ACHS.   DULoxetine (CYMBALTA) 20 mg capsule   No No   Sig: Take 1 capsule (20 mg total) by mouth daily   Patient not taking: Reported on 4/9/2024   Empagliflozin (Jardiance) 10 MG TABS tablet   No No   Sig: Take 1 tablet (10 mg total) by mouth every morning   Patient not taking: Reported on 4/5/2024   Insulin Glargine Solostar (Lantus SoloStar) 100 UNIT/ML SOPN   No No   Sig: Inject 0.45 mL (45 Units total) under the skin daily at bedtime   Insulin Pen Needle (BD Pen Needle Carisa 2nd Gen) 32G X 4 MM MISC   No No   Sig: For use with insulin pen. Pharmacy may dispense brand covered by insurance.   Insulin Pen Needle (BD Pen Needle Carisa 2nd Gen) 32G X 4 MM MISC   No No   Sig: For use with insulin pen. Pharmacy may dispense brand covered by insurance.   Patient not taking: Reported on 4/9/2024   Morphine Sulfate ER 15 MG T12A   Yes No   Sig: Take 60 mg by mouth  every 12 (twelve) hours as needed   Patient not taking: Reported on 2024   OneTouch Delica Lancets 33G MISC   No No   Sig: May substitute brand based on insurance coverage. Check glucose ACHS.   SUMAtriptan (IMITREX) 100 mg tablet   Yes No   Sig: Take 100 mg by mouth Pt reports taking PRN   Testosterone Cypionate 200 MG/ML SOLN   Yes No   Sig: Inject into a muscle   amLODIPine (NORVASC) 5 mg tablet   Yes No   Sig: Take 5 mg by mouth daily   amiodarone 200 mg tablet   No No   Sig: Take 2 tablets (400 mg total) by mouth 2 (two) times a day with meals for 2 days, THEN 1 tablet (200 mg total) daily with breakfast for 20 days.   apixaban (ELIQUIS) 5 mg   No No   Sig: Take 1 tablet (5 mg total) by mouth 2 (two) times a day   b complex vitamins capsule  Self Yes No   Sig: Take 1 capsule by mouth daily   ceFAZolin (ANCEF) 2000 mg IVPB   No No   Sig: Inject 2,000 mg into a catheter in a vein over 30 minutes at 100 mL/hr every 8 (eight) hours Do not start before 2024.   cyclobenzaprine (FLEXERIL) 10 mg tablet   Yes No   Sig: Take 10 mg by mouth 3 (three) times a day as needed   esomeprazole (NexIUM) 20 mg capsule  Self Yes No   Sig: Take 20 mg by mouth 2 (two) times a day before meals   Patient not taking: Reported on 3/22/2024   fentaNYL (DURAGESIC) 75 mcg/hr   Yes No   Sig: Place 1 patch on the skin every third day   glucose blood (OneTouch Verio) test strip   No No   Sig: May substitute brand based on insurance coverage. Check glucose ACHS.   insulin lispro (HumaLOG KwikPen) 100 units/mL injection pen   No No   Sig: Inject 13 Units under the skin 3 (three) times a day with meals   metFORMIN (GLUCOPHAGE) 500 mg tablet   Yes No   Sig: Take 500 mg by mouth 2 (two) times a day with meals   metoprolol succinate (TOPROL-XL) 25 mg 24 hr tablet   No No   Sig: Take 1 tablet (25 mg total) by mouth daily   morphine (MS CONTIN) 60 mg 12 hr tablet   Yes No   Si mg every 12 (twelve) hours as needed   ondansetron  (ZOFRAN) 8 mg tablet   Yes No   Sig: TAKE 1 TABLET (8 MG) BY MOUTH 3 TIMES PER DAY AS NEEDED   risperiDONE (RisperDAL) 3 mg tablet   Yes No   Sig: Take 3 mg by mouth daily   sacubitril-valsartan (Entresto) 24-26 MG TABS   No No   Sig: Take 1 tablet by mouth 2 (two) times a day   simvastatin (ZOCOR) 40 mg tablet   Yes No   Sig: Take 40 mg by mouth daily   zolpidem (AMBIEN) 10 mg tablet   Yes No   Sig: Take 10 mg by mouth      Facility-Administered Medications: None       Past Medical History:   Diagnosis Date    Bed sore, stage 1     Diabetes mellitus (HCC)     Hypertension        Past Surgical History:   Procedure Laterality Date    IR PICC PLACEMENT SINGLE LUMEN  4/1/2024    KNEE SURGERY      GA REVJ TOTAL KNEE ARTHRP W/WO ALGRFT 1 COMPONENT Right 3/24/2024    Procedure: ARTHROPLASTY KNEE TOTAL REVISION, POLY EXCHANGE;  Surgeon: Tao Washington DO;  Location:  MAIN OR;  Service: Orthopedics    TOE AMPUTATION Left 3/28/2024    Procedure: AMPUTATION TOE 2nd;  Surgeon: Rossy Toussaint DPM;  Location:  MAIN OR;  Service: Podiatry       History reviewed. No pertinent family history.  I have reviewed and agree with the history as documented.    E-Cigarette/Vaping    E-Cigarette Use Current Some Day User      E-Cigarette/Vaping Substances    Nicotine No     THC Yes     CBD Yes     Flavoring Yes     Other No     Unknown No      Social History     Tobacco Use    Smoking status: Never    Smokeless tobacco: Never   Vaping Use    Vaping status: Some Days    Substances: THC, CBD, Flavoring   Substance Use Topics    Alcohol use: Not Currently    Drug use: Yes     Types: Marijuana       Review of Systems    Physical Exam  Physical Exam  Vitals and nursing note reviewed.   HENT:      Mouth/Throat:      Mouth: Mucous membranes are dry.   Cardiovascular:      Rate and Rhythm: Normal rate and regular rhythm.      Pulses: Normal pulses.   Pulmonary:      Effort: Pulmonary effort is normal. No respiratory distress.       Breath sounds: Normal breath sounds.   Abdominal:      General: There is no distension.      Palpations: Abdomen is soft.      Tenderness: There is no abdominal tenderness.   Musculoskeletal:      Right lower leg: Edema (1+) present.      Left lower leg: Edema (trace) present.   Skin:     General: Skin is warm and dry.   Neurological:      Mental Status: He is alert.         Vital Signs  ED Triage Vitals [04/11/24 1932]   Temperature Pulse Respirations Blood Pressure SpO2   98.9 °F (37.2 °C) 88 16 147/87 100 %      Temp Source Heart Rate Source Patient Position - Orthostatic VS BP Location FiO2 (%)   Oral Monitor -- Left arm --      Pain Score       10 - Worst Possible Pain           Vitals:    04/11/24 1932 04/12/24 0000 04/12/24 0145 04/12/24 0230   BP: 147/87 164/82 161/77 168/81   Pulse: 88 80 85 89         Visual Acuity      ED Medications  Medications   HYDROmorphone (DILAUDID) injection 0.5 mg (has no administration in time range)   heparin (VTE/PE) high (has no administration in time range)   sodium chloride 0.9 % bolus 1,000 mL (0 mL Intravenous Stopped 4/12/24 0140)   HYDROmorphone (DILAUDID) injection 1 mg (1 mg Intravenous Given 4/12/24 0000)   HYDROmorphone (DILAUDID) injection 0.5 mg (0.5 mg Intravenous Given 4/12/24 0049)   iohexol (OMNIPAQUE) 350 MG/ML injection (MULTI-DOSE) 85 mL (85 mL Intravenous Given 4/12/24 0106)   levofloxacin (LEVAQUIN) IVPB (premix in dextrose) 750 mg 150 mL (0 mg Intravenous Stopped 4/12/24 0309)       Diagnostic Studies  Results Reviewed       Procedure Component Value Units Date/Time    APTT [[id number]]     Lab Status: No result Specimen: Blood     Urine Microscopic [[id number]]  (Abnormal) Collected: 04/12/24 0054    Lab Status: Final result Specimen: Urine, Clean Catch Updated: 04/12/24 0111     RBC, UA 30-50 /hpf      WBC, UA 2-4 /hpf      Epithelial Cells None Seen /hpf      Bacteria, UA Occasional /hpf     UA (URINE) with reflex to Scope [[id number]]  (Abnormal)  Collected: 04/12/24 0054    Lab Status: Final result Specimen: Urine, Clean Catch Updated: 04/12/24 0110     Color, UA Yellow     Clarity, UA Clear     Specific Gravity, UA 1.020     pH, UA 6.5     Leukocytes, UA Negative     Nitrite, UA Negative     Protein,  (3+) mg/dl      Glucose, UA 1000 (1%) mg/dl      Ketones, UA Negative mg/dl      Urobilinogen, UA <2.0 mg/dl      Bilirubin, UA Negative     Occult Blood, UA Large    FLU/RSV/COVID - if FLU/RSV clinically relevant [[id number]]  (Normal) Collected: 04/12/24 0010    Lab Status: Final result Specimen: Nares from Nose Updated: 04/12/24 0051     SARS-CoV-2 Negative     INFLUENZA A PCR Negative     INFLUENZA B PCR Negative     RSV PCR Negative    Narrative:      FOR PEDIATRIC PATIENTS - copy/paste COVID Guidelines URL to browser: [email or web address]    SARS-CoV-2 assay is a Nucleic Acid Amplification assay intended for the  qualitative detection of nucleic acid from SARS-CoV-2 in nasopharyngeal  swabs. Results are for the presumptive identification of SARS-CoV-2 RNA.    Positive results are indicative of infection with SARS-CoV-2, the virus  causing COVID-19, but do not rule out bacterial infection or co-infection  with other viruses. Laboratories within the [place] and its  territories are required to report all positive results to the appropriate  public health authorities. Negative results do not preclude SARS-CoV-2  infection and should not be used as the sole basis for treatment or other  patient management decisions. Negative results must be combined with  clinical observations, patient history, and epidemiological information.  This test has not been FDA cleared or approved.    This test has been authorized by FDA under an Emergency Use Authorization  (EUA). This test is only authorized for the duration of time the  declaration that circumstances exist justifying the authorization of  the  emergency use of an in vitro diagnostic tests for detection of SARS-CoV-2  virus and/or diagnosis of COVID-19 infection under section 564(b)(1) of  the Act, 21 U.S.C. 360bbb-3(b)(1), unless the authorization is terminated  or revoked sooner. The test has been validated but independent review by FDA  and CLIA is pending.    Test performed using [employer] GeneXpert: This RT-PCR assay targets N2,  a region unique to SARS-CoV-2. A conserved region in the E-gene was chosen  for pan-Sarbecovirus detection which includes SARS-CoV-2.    According to CMS-2020-01-R, this platform meets the definition of high-throughput technology.    D-Dimer [[id number]]  (Abnormal) Collected: 04/12/24 0010    Lab Status: Final result Specimen: Blood from Arm, Left Updated: 04/12/24 0029     D-Dimer, Quant 2.73 ug/ml FEU     Narrative:      In the evaluation for possible pulmonary embolism, in the appropriate (Well's Score of 4 or less) patient, the age adjusted d-dimer cutoff for this patient can be calculated as:    Age x 0.01 (in ug/mL) for Age-adjusted D-dimer exclusion threshold for a patient over 50 years.    Blood culture #1 [[id number]] Collected: 04/11/24 2353    Lab Status: In process Specimen: Blood from Arm, Left Updated: 04/11/24 2357    APTT [[id number]]  (Normal) Collected: 04/11/24 1940    Lab Status: Final result Specimen: Blood from Arm, Left Updated: 04/11/24 2114     PTT 36 seconds     Protime-INR [[id number]]  (Abnormal) Collected: 04/11/24 1940    Lab Status: Final result Specimen: Blood from Arm, Left Updated: 04/11/24 2114     Protime 15.4 seconds      INR 1.17    Procalcitonin [[id number]]  (Normal) Collected: 04/11/24 1940    Lab Status: Final result Specimen: Blood from Arm, Left Updated: 04/11/24 2011     Procalcitonin 0.12 ng/ml     Comprehensive metabolic panel [[id number]]  (Abnormal) Collected: 04/11/24 1940    Lab Status: Final result Specimen: Blood from Hand, Left Updated: 04/11/24 2007     Sodium 137 mmol/L       Potassium 5.3 mmol/L      Chloride 109 mmol/L      CO2 22 mmol/L      ANION GAP 6 mmol/L      BUN 29 mg/dL      Creatinine 2.05 mg/dL      Glucose 207 mg/dL      Calcium 8.2 mg/dL      Corrected Calcium 9.3 mg/dL      AST 10 U/L      ALT <3 U/L      Alkaline Phosphatase 60 U/L      Total Protein 6.4 g/dL      Albumin 2.6 g/dL      Total Bilirubin 0.24 mg/dL      eGFR 36 ml/min/1.73sq m     Narrative:      National Kidney Disease Foundation guidelines for Chronic Kidney Disease (CKD):     Stage 1 with normal or high GFR (GFR > 90 mL/min/1.73 square meters)    Stage 2 Mild CKD (GFR = 60-89 mL/min/1.73 square meters)    Stage 3A Moderate CKD (GFR = 45-59 mL/min/1.73 square meters)    Stage 3B Moderate CKD (GFR = 30-44 mL/min/1.73 square meters)    Stage 4 Severe CKD (GFR = 15-29 mL/min/1.73 square meters)    Stage 5 End Stage CKD (GFR <15 mL/min/1.73 square meters)  Note: GFR calculation is accurate only with a steady state creatinine    Blood culture #2 [[id number]] Collected: 04/11/24 1940    Lab Status: In process Specimen: Blood from Hand, Left Updated: 04/11/24 2004    Lactic acid, plasma (w/reflex if result > 2.0) [[id number]]  (Normal) Collected: 04/11/24 1940    Lab Status: Final result Specimen: Blood from Hand, Left Updated: 04/11/24 2000     LACTIC ACID 1.6 mmol/L     Narrative:      Result may be elevated if tourniquet was used during collection.    CBC and differential [[id number]]  (Abnormal) Collected: 04/11/24 1940    Lab Status: Final result Specimen: Blood from Hand, Left Updated: 04/11/24 1948     WBC 7.70 Thousand/uL      RBC 3.11 Million/uL      Hemoglobin 9.0 g/dL      Hematocrit 28.8 %      MCV 93 fL      MCH 28.9 pg      MCHC 31.3 g/dL      RDW 13.0 %      MPV 10.0 fL      Platelets 309 Thousands/uL      nRBC 0 /100 WBCs      Segmented % 60 %      Immature Grans % 1 %      Lymphocytes % 30 %      Monocytes % 6 %      Eosinophils Relative 2 %      Basophils Relative 1 %      Absolute Neutrophils  4.61 Thousands/µL      Absolute Immature Grans 0.04 Thousand/uL      Absolute Lymphocytes 2.34 Thousands/µL      Absolute Monocytes 0.49 Thousand/µL      Eosinophils Absolute 0.13 Thousand/µL      Basophils Absolute 0.09 Thousands/µL                    CT renal stone study abdomen pelvis without contrast   Final Result by Ann Gerard MD (04/12 0339)      No hydronephrosis.      Delayed excreted contrast material in the renal collecting systems suggesting renal impairment.      Trace bilateral pleural effusions, trace volume of ascites, and moderate anasarca consistent with volume overload.      1.6 cm right adrenal adenoma.         Workstation performed: [id number]         CTA ED chest PE study   Final Result by Jovi Stock MD (04/12 0122)         1. Patchy groundglass opacities in the left upper lobe and a few right apical opacities suggesting atypical pneumonia,, pneumonitis or pulmonary hemorrhage.   2. Trace amount of pleural fluid bilaterally.   3. No evidence of acute pulmonary embolus.                  Workstation performed: [id number]         XR chest pa & lateral   ED Interpretation by Kaia Shaw MD (04/11 2342)   No acute pulmonary pathology                 Procedures  ECG 12 Lead Documentation Only    Date/Time: 4/11/2024 11:53 PM    Performed by: Kaia Shaw MD  Authorized by: Kaia Shaw MD    Indications / Diagnosis:  Leg swelling  ECG reviewed by me, the ED Provider: yes    Patient location:  ED  Previous ECG:     Previous ECG:  Unavailable  Interpretation:     Interpretation: normal    Rate:     ECG rate:  90    ECG rate assessment: normal    Rhythm:     Rhythm: sinus rhythm    Ectopy:     Ectopy: none    QRS:     QRS axis:  Normal    QRS intervals:  Normal  Conduction:     Conduction: normal    ST segments:     ST segments:  Normal  T waves:     T waves: inverted      Inverted:  III           ED Course  ED Course as of 04/12/24 0410   Thu Apr  11, 2024 2341 Creatinine(!): 2.05  1.06 ten days ago   2341 Hemoglobin(!): 9.0  10.7 ten days ago   Fri Apr 12, 2024   0037 D-Dimer, Quant(!): 2.73   0038 Patient still reporting severe pain on re-evaluation.  Additional 0.5 mg dilaudid ordered.   0137 RBC Urine(!): 30-50  Patient denies flank pain; however, wife states he had complained of severe back pain 2 days ago.  CT stone protocol ordered to r/o obstructing stone                                   Wells' Criteria for PE      Flowsheet Row Most Recent Value   Wells' Criteria for PE    Clinical signs and symptoms of DVT 3 Filed at: 04/12/2024 0408   PE is primary diagnosis or equally likely 0 Filed at: 04/12/2024 0408   HR >100 0 Filed at: 04/12/2024 0408   Immobilization at least 3 days or Surgery in the previous 4 weeks 1.5 Filed at: 04/12/2024 0408   Previous, objectively diagnosed PE or DVT 0 Filed at: 04/12/2024 0408   Hemoptysis 0 Filed at: 04/12/2024 0408   Malignancy with treatment within 6 months or palliative 0 Filed at: 04/12/2024 0408   Wells' Criteria Total 4.5 Filed at: 04/12/2024 0408          Wells' Criteria for DVT      Flowsheet Row Most Recent Value   Wells' Criteria for DVT    Active cancer Treatment or palliation within 6 months 0 Filed at: 04/12/2024 0408   Bedridden recently >3 days or major surgery within 12 weeks 0 Filed at: 04/12/2024 0408   Calf swelling >3 cm compared to the other leg 0 Filed at: 04/12/2024 0408   Entire leg swollen 1 Filed at: 04/12/2024 0408   Collateral (nonvaricose) superficial veins present 0 Filed at: 04/12/2024 0408   Localized tenderness along the deep venous system 0 Filed at: 04/12/2024 0408   Pitting edema, confined to symptomatic leg 0 Filed at: 04/12/2024 0408   Paralysis, paresis, or recent plaster immobilization of the lower extremity 0 Filed at: 04/12/2024 0408   Previously documented DVT 1 Filed at: 04/12/2024 0408   Alternative diagnosis to DVT as likely or more likely 2 Filed at: 04/12/2024 9435    Wells DVT Critera Score 0 Filed at: 04/12/2024 0408                Medical Decision Making  49 year old male presents for evaluation of worsening right knee pain and swelling.  No signs of cellulitis on exam.  Procal negative.  Only SIRS criteria is respiratory rate of 24.  Recently completed course of IV antibiotics.  Moderate risk Well's score for PE.  D-dimer positive.  CT PE study without PE; however, suggestive of bilateral atypical pneumonia.  Levaquin ordered due to antibiotic allergies.  JH on labs.  Hematuria on UA.  CT stone protocol added which was negative for obstructing stone.  Patient admitted for further evaluation and management.    Amount and/or Complexity of Data Reviewed  Labs: ordered. Decision-making details documented in ED Course.  Radiology: ordered and independent interpretation performed.    Risk  Prescription drug management.  Decision regarding hospitalization.             Disposition  Final diagnoses:   JH (acute kidney injury) (HCC)   Bilateral pneumonia   Post-operative pain   Lower extremity edema   Hematuria   Dehydration     Time reflects when diagnosis was documented in both MDM as applicable and the Disposition within this note       Time User Action Codes Description Comment    4/11/2024 11:55 PM Valeria, Kaia J Add [N17.9] JH (acute kidney injury) (HCC)     4/12/2024  1:30 AM Valeria, Kaia J Add [J18.9] Bilateral pneumonia     4/12/2024  1:33 AM Valeria, Kaia J Add [G89.18] Post-operative pain     4/12/2024  1:33 AM Valeria, Kaia J Add [R60.0] Lower extremity edema     4/12/2024  1:33 AM Valeria, Kaia J Add [R31.9] Hematuria     4/12/2024  1:33 AM Valeria, Kaia J Add [E86.0] Dehydration           ED Disposition       ED Disposition   Admit    Condition   Stable    Date/Time   Fri Apr 12, 2024 1723    Comment   Case was discussed with [place] and the patient's admission status was agreed to be Admission Status: inpatient status to the service of   [facility] .               Follow-up Information    None         Patient's Medications   Discharge Prescriptions    No medications on file       No discharge procedures on file.    PDMP Review         Value Time User    PDMP Reviewed  Yes 4/3/2024  1:58 PM Elsy Herrera PA-C            ED Provider  Electronically Signed by             Kaia Shaw MD  04/12/24 7160

## 2024-04-12 NOTE — TELEPHONE ENCOUNTER
Patient's spouse called, in  behalf of patient to inform that patient was taking antibiotics 3x a day. But patient's provider d/c antibiotics for a couple of day. This is the 2nd day w/o antibiotics and patient's knee reacted the same way before patient became septic. At the moment patient is  at the [facility] ED. Patient has an appointment, tomorrow but will not be able to make it.

## 2024-04-12 NOTE — CONSULTS
Consultation - Nephrology   Armen MARS Llanos 49 y.o. male MRN: [id number]  Unit/Bed#: ED 09 Encounter: [id number]    ASSESSMENT/PLAN:  JH (POA):   -Etiology: unclear currently.  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  Today's creatinine 1.86, trending down with fluid  -Peak creatinine 2.05 on 4/11/2024  -baseline creatinine 0.8-1.0  -workup: UA with + glucose, large blood, 3+ protein, 30-50 RBC, 2-5 WBC  -UPCR unable to calculate  -Imaging: CT with no hydronephrosis but with signs of volume overload  -nonoliguric  -Renal function stable and creatinine trending down with fluids  -Clinically, no acute indication for renal replacement therapy (dialysis)  -Check serologies given recent infectious process and UA with proteinuria and hematuria  -Hold Entresto and Jardiance.  No further NSAIDs although patient states he has not taken them since previous hospitalization.  -DC IVF for now  -Strict I/Os, daily weights  -Avoid nephrotoxins, NSAIDs, IV contrast if possible  -Avoid hypotension.  Maintain MAP >65  -Trend BMP  -Adjust meds to appropriate GFR  -Optimize hemodynamic status to avoid delay in renal recovery    Hypertension/Volume status:  -BP acceptable  -volume status mild hypervolemia clinically and on imaging  -Home medications: Amlodipine 5 mg daily, Toprol-XL 25 mg daily, Entresto 25-26 mg twice daily  -Current medications: Amlodipine 5 mg daily, Toprol-XL 25 mg daily  -Hold Entresto in the setting of JH  -Avoid ACE/ARB, diuretics  -Will DC IVF  -consider IV lasix dose  -Optimize hemodynamic status to avoid delay in renal recovery  -recommend hold parameters on antihypertensive's for SBP <130 mmHg.  -Avoid hypotension or fluctuations in blood pressure.  Maintain MAP >65  -low sodium (2 gm) diet  -continue to trend    Abnormal chest CT:  -Groundglass opacities of unclear etiology.  Cannot rule out pulmonary hemorrhage  -check serologies  -Pulmonary consulted  -Management per  primary team    Cardiomyopathy:  -Diagnosed during recent hospitalization with RVR and sepsis  -Echo 3/25/2024: EF 45% with abnormal diastolic function.  Mild MR, mild TR normal IVC size  -CT on admission with mild bilateral pleural effusions and anasarca  -Home diuretics: None  -On Entresto, jardiance and beta-blocker as outpatient.    -Hold Entresto and Jardiance due to JH  -Consider dose of IV Lasix x 1  -Continue fluid restriction, daily weights, low-sodium (2 g) diet  -Management per primary team    Anemia:  -Hgb 7.8, below goal.  Goal >10  -continue to trend  -Check serologies, SPEP, UPEP, FLCr  -Transfuse for Hgb <7.0  -management per primary team    Hx septic arthritis with MSSA bacteremia:  -In the setting of septic arthritis due to MSSA  -Completed course of IV cefazolin 4/11/2024  -Orthopedic surgery consulted  -Management per primary team    DM II:  -Uncontrolled  -HgbA1C 12.1  -On Jardiance and metformin as outpatient.  Continue to hold  -continue to optimize glycemic control   -management per primary team    Hx Atrial fibrillation with RVR:  -Precipitated by septic shock due to septic arthritis during last hospitalization  -Stable, rate controlled  -on beta-blockers and amiodarone  -On Eliquis at home.  Currently on heparin drip  -management per Cardiology    Hx TBI:  -hx TBI at age 7  -Management per primary team    HISTORY OF PRESENT ILLNESS:  Requesting Physician: Uday Jean MD  Reason for Consult: JH. JOSLYN Llanos is a 49 y.o. year old male with uncontrolled DM2, HTN, AF on Eliquis, s/p bilateral TKA 20 years ago with recent revision R TKA due to septic arthritis with associated MSSA bacteremia who was admitted to [facility] after presenting with acute onset of swelling and pain of right knee x 24 hours.  Patient completed course of IV cefazolin on 4/10/2024.  Admission creatinine 2.05 on 4/11/2024 with baseline creatinine 0.8-1.0.  Most recent creatinine 1.06 on hospital discharge on  4/1/2024.  Patient received NSS 1L bolus and is currently on 75 ml/hr with creatinine trending down to 1.86.  Patient underwent CTA chest upon admission with no evidence of PE but right apical opacities consistent with pneumonitis, pneumonia versus pulmonary hemorrhage.  Patient reports mild nonproductive cough without hemoptysis.  UA with persistent proteinuria and new microscopic hematuria.  Patient previously with significant NSAID use due to ongoing orthopedic issues but denies use of NSAIDs since last hospitalization.  Patient denies nausea, vomiting, diarrhea, chest pain but complains of orthopnea since discharge.  Patient denies gross hematuria or foamy urine.  Patient denies prior history of acute renal failure or need for hemodialysis.  A renal consultation is requested today for assistance in the management of JH.    PAST MEDICAL HISTORY:  Past Medical History:   Diagnosis Date    Bed sore, stage 1     Diabetes mellitus (HCC)     Hypertension        PAST SURGICAL HISTORY:  Past Surgical History:   Procedure Laterality Date    IR PICC PLACEMENT SINGLE LUMEN  4/1/2024    KNEE SURGERY      [place] REVJ TOTAL KNEE ARTHRP W/WO ALGRFT 1 COMPONENT Right 3/24/2024    Procedure: ARTHROPLASTY KNEE TOTAL REVISION, POLY EXCHANGE;  Surgeon: Tao Washington DO;  Location:  MAIN OR;  Service: Orthopedics    TOE AMPUTATION Left 3/28/2024    Procedure: AMPUTATION TOE 2nd;  Surgeon: Rossy Toussaint DPM;  Location:  MAIN OR;  Service: Podiatry       ALLERGIES:  Allergies   Allergen Reactions    Cephalosporins Hives    Penicillins Hives       SOCIAL HISTORY:  Social History     Substance and Sexual Activity   Alcohol Use Not Currently     Social History     Substance and Sexual Activity   Drug Use Yes    Types: Marijuana     Social History     Tobacco Use   Smoking Status Never   Smokeless Tobacco Never       FAMILY HISTORY:  History reviewed. No pertinent family history.    MEDICATIONS:    Current Facility-Administered  Medications:     acetaminophen (TYLENOL) tablet 975 mg, 975 mg, Oral, Q8H, Inderjit Mckeon PA-C, 975 mg at 04/12/24 0550    amiodarone tablet 200 mg, 200 mg, Oral, Daily With Breakfast, Inderjit Mckeon PA-C    amLODIPine (NORVASC) tablet 5 mg, 5 mg, Oral, Daily, Inderjit Mckeon PA-C    cyclobenzaprine (FLEXERIL) tablet 5 mg, 5 mg, Oral, TID PRN, Inderjit Mckeon PA-C    [START ON 4/13/2024] fentaNYL (DURAGESIC) 75 mcg/hr TD 72 hr patch 1 patch, 1 patch, Transdermal, Q72H, Inderjit Mckeon PA-C    heparin (porcine) 25,000 units in 0.45% NaCl 250 mL infusion (premix), 3-30 Units/kg/hr (Order-Specific), Intravenous, Titrated, Inderjit Mckeon PA-C, Last Rate: 17.1 mL/hr at 04/12/24 0447, 18 Units/kg/hr at 04/12/24 0447    heparin (porcine) injection 3,800 Units, 3,800 Units, Intravenous, Q6H PRN, Inderjit Mckeon PA-C    heparin (porcine) injection 7,600 Units, 7,600 Units, Intravenous, Q6H PRN, Inderjit Mckeon PA-C    insulin glargine (LANTUS) subcutaneous injection 35 Units 0.35 mL, 35 Units, Subcutaneous, HS, Inderjit Mckeon PA-C    insulin lispro (HumALOG/ADMELOG) 100 units/mL subcutaneous injection 1-5 Units, 1-5 Units, Subcutaneous, 4x Daily (AC & HS) **AND** Fingerstick Glucose (POCT), , , 4x Daily AC and at bedtime, Inderjit Mckeon PA-C    insulin lispro (HumALOG/ADMELOG) 100 units/mL subcutaneous injection 13 Units, 13 Units, Subcutaneous, TID With Meals, Inderjit Mckeon PA-C    metoprolol succinate (TOPROL-XL) 24 hr tablet 25 mg, 25 mg, Oral, Daily, Inderjit Mckeon PA-C    morphine (MS CONTIN) ER tablet 60 mg, 60 mg, Oral, Q12H PRN, Inderjit Mckeon PA-C, 60 mg at 04/12/24 0522    ondansetron (ZOFRAN) injection 4 mg, 4 mg, Intravenous, Q6H PRN, Gonzalez Davey MD    pravastatin (PRAVACHOL) tablet 80 mg, 80 mg, Oral, Daily With Dinner, Inderjit Mckeon PA-C    sodium chloride 0.9 % infusion, 75 mL/hr, Intravenous, Continuous, Inderjit Mckeon PA-C, Last  Rate: 75 mL/hr at 04/12/24 0603, 75 mL/hr at 04/12/24 0603    SUMAtriptan (IMITREX) tablet 100 mg, 100 mg, Oral, Once PRN, Inderjit Mckeon PA-C    zolpidem (AMBIEN) tablet 10 mg, 10 mg, Oral, HS PRN, Inderjit Mckeon PA-C    Current Outpatient Medications:     Alcohol Swabs 70 % PADS, May substitute brand based on insurance coverage. Check glucose ACHS., Disp: 200 each, Rfl: 0    amiodarone 200 mg tablet, Take 2 tablets (400 mg total) by mouth 2 (two) times a day with meals for 2 days, THEN 1 tablet (200 mg total) daily with breakfast for 20 days., Disp: 28 tablet, Rfl: 0    amLODIPine (NORVASC) 5 mg tablet, Take 5 mg by mouth daily, Disp: , Rfl:     apixaban (ELIQUIS) 5 mg, Take 1 tablet (5 mg total) by mouth 2 (two) times a day, Disp: 60 tablet, Rfl: 2    b complex vitamins capsule, Take 1 capsule by mouth daily, Disp: , Rfl:     Blood Glucose Monitoring Suppl (OneTouch Verio Reflect) w/Device KIT, May substitute brand based on insurance coverage. Check glucose ACHS., Disp: 1 kit, Rfl: 0    ceFAZolin (ANCEF) 2000 mg IVPB, Inject 2,000 mg into a catheter in a vein over 30 minutes at 100 mL/hr every 8 (eight) hours Do not start before April 2, 2024., Disp: 108 each, Rfl: 0    cyclobenzaprine (FLEXERIL) 10 mg tablet, Take 10 mg by mouth 3 (three) times a day as needed, Disp: , Rfl:     DULoxetine (CYMBALTA) 20 mg capsule, Take 1 capsule (20 mg total) by mouth daily (Patient not taking: Reported on 4/9/2024), Disp: 30 capsule, Rfl: 0    Empagliflozin (Jardiance) 10 MG TABS tablet, Take 1 tablet (10 mg total) by mouth every morning (Patient not taking: Reported on 4/5/2024), Disp: 30 tablet, Rfl: 0    esomeprazole (NexIUM) 20 mg capsule, Take 20 mg by mouth 2 (two) times a day before meals (Patient not taking: Reported on 3/22/2024), Disp: , Rfl:     fentaNYL (DURAGESIC) 75 mcg/hr, Place 1 patch on the skin every third day, Disp: , Rfl:     glucose blood (OneTouch Verio) test strip, May substitute brand based on  insurance coverage. Check glucose ACHS., Disp: 200 each, Rfl: 0    Insulin Glargine Solostar (Lantus SoloStar) 100 UNIT/ML SOPN, Inject 0.45 mL (45 Units total) under the skin daily at bedtime, Disp: 10.5 mL, Rfl: 2    insulin lispro (HumaLOG KwikPen) 100 units/mL injection pen, Inject 13 Units under the skin 3 (three) times a day with meals, Disp: 15 mL, Rfl: 2    Insulin Pen Needle (BD Pen Needle Carisa 2nd Gen) 32G X 4 MM MISC, For use with insulin pen. Pharmacy may dispense brand covered by insurance., Disp: 100 each, Rfl: 0    Insulin Pen Needle (BD Pen Needle Carisa 2nd Gen) 32G X 4 MM MISC, For use with insulin pen. Pharmacy may dispense brand covered by insurance. (Patient not taking: Reported on 4/9/2024), Disp: 100 each, Rfl: 0    metFORMIN (GLUCOPHAGE) 500 mg tablet, Take 500 mg by mouth 2 (two) times a day with meals, Disp: , Rfl:     metoprolol succinate (TOPROL-XL) 25 mg 24 hr tablet, Take 1 tablet (25 mg total) by mouth daily, Disp: 90 tablet, Rfl: 3    morphine (MS CONTIN) 60 mg 12 hr tablet, 60 mg every 12 (twelve) hours as needed, Disp: , Rfl:     Morphine Sulfate ER 15 MG T12A, Take 60 mg by mouth every 12 (twelve) hours as needed (Patient not taking: Reported on 4/9/2024), Disp: , Rfl:     ondansetron (ZOFRAN) 8 mg tablet, TAKE 1 TABLET (8 MG) BY MOUTH 3 TIMES PER DAY AS NEEDED, Disp: , Rfl:     OneTouch Delica Lancets 33G MISC, May substitute brand based on insurance coverage. Check glucose ACHS., Disp: 200 each, Rfl: 0    risperiDONE (RisperDAL) 3 mg tablet, Take 3 mg by mouth daily, Disp: , Rfl:     sacubitril-valsartan (Entresto) 24-26 MG TABS, Take 1 tablet by mouth 2 (two) times a day, Disp: 60 tablet, Rfl: 0    simvastatin (ZOCOR) 40 mg tablet, Take 40 mg by mouth daily, Disp: , Rfl:     SUMAtriptan (IMITREX) 100 mg tablet, Take 100 mg by mouth Pt reports taking PRN, Disp: , Rfl:     Testosterone Cypionate 200 MG/ML SOLN, Inject into a muscle, Disp: , Rfl:     zolpidem (AMBIEN) 10 mg tablet,  Take 10 mg by mouth, Disp: , Rfl:     REVIEW OF SYSTEMS:  A complete 10 point review of systems was performed and found to be negative unless otherwise noted in the history of present illness.  General: No fevers, chills.   Cardiovascular:  No chest pain, + leg edema.  Respiratory: No cough, sputum production,  + shortness of breath.  Gastrointestinal:  No nausea/vomiting,  No diarrhea,  No abdominal pain.  Genitourinary: No hematuria.  No foamy urine.  No dysuria    PHYSICAL EXAM:  Current Weight:    First Weight:    Vitals:    04/12/24 0145 04/12/24 0230 04/12/24 0445 04/12/24 0600   BP: 161/77 168/81 155/80 138/90   Pulse: 85 89 92 88   Resp: 18 18 18 18   Temp:       TempSrc:       SpO2: 95% 97% 93% 96%       Intake/Output Summary (Last 24 hours) at 4/12/2024 0913  Last data filed at 4/12/2024 0309  Gross per 24 hour   Intake 1150 ml   Output --   Net 1150 ml     General:  Awake, alert, appears comfortable and in no acute distress.  Nontoxic.  Skin:  No rash, warm, good skin turgor   Eyes:  PERRL, EOMI, sclerae nonicteric.  no periorbital edema   ENT:  Moist mucous membranes  Neck:  Trachea midline, symmetric.  No JVD.  No carotid bruits.  Chest:  + Bibasilar crackles  CVS:  Regular rate and rhythm without murmur, gallop or rub.  S1 and S2 identified and normal.  No S3, S4.   Abdomen:  Soft, nontender, nondistended without masses.  Normal bowel sounds x 4 quadrants.  No bruit.  Extremities:  Warm, pink, motor and sensory intact and well perfused.  No cyanosis, pallor.  1+ ankle edema.  Right knee incision C/D/I with Steri-Strips intact  Neuro:  Awake, alert, oriented x3.  Grossly intact  Psych:  Appropriate affect.  Mentating appropriately.  Normal mental status exam    Invasive Devices:  None       Lab Results:   Results from last 7 days   Lab Units 04/12/24  0557 04/11/24  1940   WBC Thousand/uL 7.92 7.70   HEMOGLOBIN g/dL 7.8* 9.0*   HEMATOCRIT % 24.0* 28.8*   PLATELETS Thousands/uL 250 309   SODIUM mmol/L  135 137   POTASSIUM mmol/L 4.8 5.3   CHLORIDE mmol/L 108 109*   CO2 mmol/L 21 22   BUN mg/dL 31* 29*   CREATININE mg/dL 1.86* 2.05*   CALCIUM mg/dL 7.1* 8.2*   ALK PHOS U/L 50 60   ALT U/L <3* <3*   AST U/L 8* 10*       Imaging Studies:  CTA chest/PE study 4/12/2024:  Imaging study and images personally reviewed.  No evidence of acute PE.  Patchy groundglass opacities left upper lobe and right apex.  1.8 cm right adrenal nodule.    CT abdomen pelvis without contrast renal stone protocol 4/12/2024:  Imaging study and images personally reviewed.  1.6 cm right adrenal nodule consistent with benign adenoma, no hydronephrosis or hydroureter.  Subcentimeter hypodensity left kidney too small to characterize, likely representing cysts.  Trace ascites, no pneumoperitoneum, moderate anasarca.  Delayed excreted contrast material bilateral renal collecting system suggestive of renal impairment.  Trace bilateral pleural effusions.    CXR 4/11/2024:  Imaging study and images personally reviewed.  Mild pulmonary venous congestion    EMR, including Epic and Care Everywhere, reviewed.  I have personally reviewed the blood work as stated above and in my note.  I have personally reviewed CTA chest, CT abdomen pelvis, CXR imaging studies.  I have personally reviewed internal Medicine, co-consultants and prior nephrology notes.

## 2024-04-12 NOTE — CONSULTS
Consultation - Orthopedics   Armen Llanos 49 y.o. male MRN: [id number]  Unit/Bed#: ED 09 Encounter: [id number]      Assessment/Plan     Assessment:  Status post right knee I&D with poly exchange for periprosthetic joint infection done by Dr. Washington on March 24, 2024.  Increased right lower extremity swelling    Plan:  Right knee incision is healing very well.  No increased erythema, warmth or pain in the right knee.  Recommend ID to be consulted during admission to determine when he can restart IV ancef again for prosthetic joint infection.  Sutures of the right knee were removed today.  Steri-Strips were applied  Continue range of motion exercises of the right knee  Weightbearing as tolerated right lower extremity  DVT prophylaxis: Heparin   Follow up as outpatient with Dr. Washington in 2 weeks.    History of Present Illness   Physician Requesting Consult: Uday Jean MD  Reason for Consult / Principal Problem: right leg swelling  HPI: Armen Llanos is a 49 y.o. year old male who presents with increased right lower extremity swelling that started 48 hours ago.  Patient denies any new injury or increased activities at that time.  He had a right knee I&D with poly exchange for periprosthetic joint infection by Dr. Washington on March 24, 2024.  Cultures grew MSSA and he was put on a 6-week regimen of IV Ancef.  Patient states that the IV Ancef was discontinued on April 10, 2024 by ID physician Dr. Walker due to elevated kidney functions.  He denies having any recent fevers, chills or sweats.  No significant increased pain in the right knee.  He was supposed to follow-up as outpatient with Dr. Washington in the office today.  He was on oral Eliquis at home for DVT prophylaxis.    Inpatient consult to Orthopedic Surgery  Consult performed by: Karli Cedillo PA-C  Consult ordered by: Inderjit Mckeon PA-C          Review of Systems   Constitutional: Negative.    HENT: Negative.     Eyes: Negative.    Respiratory:   Positive for cough.    Cardiovascular: Negative.    Gastrointestinal: Negative.    Endocrine: Negative.    Genitourinary: Negative.    Musculoskeletal:  Positive for arthralgias (right knee), gait problem and joint swelling (right LE).   Skin: Negative.    Allergic/Immunologic: Negative.    Hematological: Negative.    Psychiatric/Behavioral: Negative.         Historical Information   Past Medical History:   Diagnosis Date    Bed sore, stage 1     Diabetes mellitus (HCC)     Hypertension      Past Surgical History:   Procedure Laterality Date    IR PICC PLACEMENT SINGLE LUMEN  4/1/2024    KNEE SURGERY      [place] REVJ TOTAL KNEE ARTHRP W/WO ALGRFT 1 COMPONENT Right 3/24/2024    Procedure: ARTHROPLASTY KNEE TOTAL REVISION, POLY EXCHANGE;  Surgeon: Tao Washington DO;  Location:  MAIN OR;  Service: Orthopedics    TOE AMPUTATION Left 3/28/2024    Procedure: AMPUTATION TOE 2nd;  Surgeon: Rossy Toussaint DPM;  Location:  MAIN OR;  Service: Podiatry     Social History   Social History     Substance and Sexual Activity   Alcohol Use Not Currently     Social History     Substance and Sexual Activity   Drug Use Yes    Types: Marijuana     E-Cigarette/Vaping    E-Cigarette Use Current Some Day User      E-Cigarette/Vaping Substances    Nicotine No     THC Yes     CBD Yes     Flavoring Yes     Other No     Unknown No      Social History     Tobacco Use   Smoking Status Never   Smokeless Tobacco Never     Family History: non-contributory    Meds/Allergies   all current active meds have been reviewed  Allergies   Allergen Reactions    Cephalosporins Hives    Penicillins Hives       Objective   Vitals: Blood pressure 138/90, pulse 88, temperature 98.9 °F (37.2 °C), temperature source Oral, resp. rate 18, SpO2 96%.,There is no height or weight on file to calculate BMI.      Intake/Output Summary (Last 24 hours) at 4/12/2024 0758  Last data filed at 4/12/2024 0309  Gross per 24 hour   Intake 1150 ml   Output --   Net 1150 ml      I/O last 24 hours:  In: 1150 [IV Piggyback:1150]  Out: -     Invasive Devices       Peripherally Inserted Central Catheter Line  Duration             PICC Line 04/01/24 Right Basilic 10 days              Peripheral Intravenous Line  Duration             Peripheral IV 04/12/24 Left;Ventral (anterior) Forearm <1 day                    Physical Exam  Vitals and nursing note reviewed.   Constitutional:       General: He is not in acute distress.     Appearance: He is well-developed.   HENT:      Head: Normocephalic and atraumatic.   Eyes:      Conjunctiva/sclera: Conjunctivae normal.   Cardiovascular:      Rate and Rhythm: Normal rate and regular rhythm.      Heart sounds: No murmur heard.  Pulmonary:      Effort: Pulmonary effort is normal. No respiratory distress.      Breath sounds: Normal breath sounds.   Abdominal:      Palpations: Abdomen is soft.      Tenderness: There is no abdominal tenderness.   Musculoskeletal:         General: Swelling present.      Cervical back: Neck supple.      Right knee: Effusion (Grade 2) present.   Skin:     General: Skin is warm and dry.      Capillary Refill: Capillary refill takes less than 2 seconds.   Neurological:      Mental Status: He is alert and oriented to person, place, and time.   Psychiatric:         Mood and Affect: Mood normal.       Right Knee Exam     Range of Motion   Extension:  normal   Flexion:  70     Tests   Varus: negative Valgus: negative    Other   Erythema: absent  Scars: present (Well healing incision with no erythema, warmth or drainage. Sutures intact)  Sensation: normal  Pulse: present  Swelling: moderate  Effusion: effusion (Grade 2) present    Comments:  Edema throughout RLE  Thigh and calf compartments are soft, compressible  Actively moving ankle and toes              Lab Results: I have personally reviewed pertinent lab results.  Imaging Studies: I have personally reviewed pertinent films in PACS  CT RLE: status post knee arthroplasty with  prosthesis intact. Effusion present.

## 2024-04-12 NOTE — ASSESSMENT & PLAN NOTE
CTA chest:  1. Patchy groundglass opacities in the left upper lobe and a few right apical opacities suggesting atypical pneumonia,, pneumonitis or pulmonary hemorrhage.  2. Trace amount of pleural fluid bilaterally.  3. No evidence of acute pulmonary embolus.    Mild non productive cough. Otherwise asymptomatic, no trouble breathing. Denies any aspiration event.  Consult pulmonology for their input  Given possible pulmonary hemorrhage + RBC in urine with JH --> consider good pasture syndrome  Received levaquin in ED, hold off on further abx until evaluated by pulmonology

## 2024-04-13 ENCOUNTER — HOME CARE VISIT (OUTPATIENT)
Dept: HOME HEALTH SERVICES | Facility: HOME HEALTHCARE | Age: 49
End: 2024-04-13
Payer: COMMERCIAL

## 2024-04-13 PROBLEM — R26.2 AMBULATORY DYSFUNCTION: Status: ACTIVE | Noted: 2024-04-13

## 2024-04-13 PROBLEM — D64.9 ANEMIA: Status: ACTIVE | Noted: 2024-04-13

## 2024-04-13 PROBLEM — M79.89 SWELLING OF RIGHT LOWER EXTREMITY: Status: RESOLVED | Noted: 2024-04-12 | Resolved: 2024-04-13

## 2024-04-13 LAB
ANA SER QL IA: NEGATIVE
ANION GAP SERPL CALCULATED.3IONS-SCNC: 6 MMOL/L (ref 4–13)
APTT PPP: 61 SECONDS (ref 23–37)
APTT PPP: 80 SECONDS (ref 23–37)
BUN SERPL-MCNC: 27 MG/DL (ref 5–25)
CALCIUM SERPL-MCNC: 7.2 MG/DL (ref 8.4–10.2)
CHLORIDE SERPL-SCNC: 107 MMOL/L (ref 96–108)
CO2 SERPL-SCNC: 21 MMOL/L (ref 21–32)
CREAT SERPL-MCNC: 1.76 MG/DL (ref 0.6–1.3)
CRP SERPL QL: 50.1 MG/L
EOSINOPHIL NFR URNS MANUAL: 0 %
ERYTHROCYTE [DISTWIDTH] IN BLOOD BY AUTOMATED COUNT: 12.9 % (ref 11.6–15.1)
ERYTHROCYTE [SEDIMENTATION RATE] IN BLOOD: 38 MM/HOUR (ref 0–19)
FERRITIN SERPL-MCNC: 245 NG/ML (ref 24–336)
GFR SERPL CREATININE-BSD FRML MDRD: 44 ML/MIN/1.73SQ M
GLUCOSE SERPL-MCNC: 124 MG/DL (ref 65–140)
GLUCOSE SERPL-MCNC: 131 MG/DL (ref 65–140)
GLUCOSE SERPL-MCNC: 183 MG/DL (ref 65–140)
GLUCOSE SERPL-MCNC: 188 MG/DL (ref 65–140)
GLUCOSE SERPL-MCNC: 270 MG/DL (ref 65–140)
GLUCOSE SERPL-MCNC: 58 MG/DL (ref 65–140)
GLUCOSE SERPL-MCNC: 89 MG/DL (ref 65–140)
HCT VFR BLD AUTO: 23.7 % (ref 36.5–49.3)
HGB BLD-MCNC: 7.7 G/DL (ref 12–17)
IRON SATN MFR SERPL: 44 % (ref 15–50)
IRON SERPL-MCNC: 54 UG/DL (ref 50–212)
L PNEUMO1 AG UR QL IA.RAPID: NEGATIVE
MCH RBC QN AUTO: 29.6 PG (ref 26.8–34.3)
MCHC RBC AUTO-ENTMCNC: 32.5 G/DL (ref 31.4–37.4)
MCV RBC AUTO: 91 FL (ref 82–98)
PLATELET # BLD AUTO: 229 THOUSANDS/UL (ref 149–390)
PMV BLD AUTO: 10.6 FL (ref 8.9–12.7)
POTASSIUM SERPL-SCNC: 4.6 MMOL/L (ref 3.5–5.3)
RBC # BLD AUTO: 2.6 MILLION/UL (ref 3.88–5.62)
SODIUM SERPL-SCNC: 134 MMOL/L (ref 135–147)
TIBC SERPL-MCNC: 123 UG/DL (ref 250–450)
UIBC SERPL-MCNC: 69 UG/DL (ref 155–355)
WBC # BLD AUTO: 6.81 THOUSAND/UL (ref 4.31–10.16)

## 2024-04-13 PROCEDURE — 86140 C-REACTIVE PROTEIN: CPT | Performed by: INTERNAL MEDICINE

## 2024-04-13 PROCEDURE — 85730 THROMBOPLASTIN TIME PARTIAL: CPT | Performed by: INTERNAL MEDICINE

## 2024-04-13 PROCEDURE — 85652 RBC SED RATE AUTOMATED: CPT | Performed by: INTERNAL MEDICINE

## 2024-04-13 PROCEDURE — 86225 DNA ANTIBODY NATIVE: CPT | Performed by: INTERNAL MEDICINE

## 2024-04-13 PROCEDURE — 99233 SBSQ HOSP IP/OBS HIGH 50: CPT | Performed by: INTERNAL MEDICINE

## 2024-04-13 PROCEDURE — 93971 EXTREMITY STUDY: CPT | Performed by: SURGERY

## 2024-04-13 PROCEDURE — 99232 SBSQ HOSP IP/OBS MODERATE 35: CPT | Performed by: INTERNAL MEDICINE

## 2024-04-13 PROCEDURE — 86037 ANCA TITER EACH ANTIBODY: CPT | Performed by: INTERNAL MEDICINE

## 2024-04-13 PROCEDURE — 83550 IRON BINDING TEST: CPT | Performed by: INTERNAL MEDICINE

## 2024-04-13 PROCEDURE — 82948 REAGENT STRIP/BLOOD GLUCOSE: CPT

## 2024-04-13 PROCEDURE — 84165 PROTEIN E-PHORESIS SERUM: CPT | Performed by: INTERNAL MEDICINE

## 2024-04-13 PROCEDURE — 82728 ASSAY OF FERRITIN: CPT | Performed by: INTERNAL MEDICINE

## 2024-04-13 PROCEDURE — 83540 ASSAY OF IRON: CPT | Performed by: INTERNAL MEDICINE

## 2024-04-13 PROCEDURE — 80048 BASIC METABOLIC PNL TOTAL CA: CPT | Performed by: INTERNAL MEDICINE

## 2024-04-13 PROCEDURE — 83521 IG LIGHT CHAINS FREE EACH: CPT | Performed by: INTERNAL MEDICINE

## 2024-04-13 PROCEDURE — 83520 IMMUNOASSAY QUANT NOS NONAB: CPT | Performed by: INTERNAL MEDICINE

## 2024-04-13 PROCEDURE — 86334 IMMUNOFIX E-PHORESIS SERUM: CPT | Performed by: INTERNAL MEDICINE

## 2024-04-13 PROCEDURE — 85027 COMPLETE CBC AUTOMATED: CPT | Performed by: INTERNAL MEDICINE

## 2024-04-13 PROCEDURE — 86160 COMPLEMENT ANTIGEN: CPT | Performed by: INTERNAL MEDICINE

## 2024-04-13 RX ORDER — SUMATRIPTAN 50 MG/1
100 TABLET, FILM COATED ORAL ONCE AS NEEDED
Status: COMPLETED | OUTPATIENT
Start: 2024-04-13 | End: 2024-04-13

## 2024-04-13 RX ORDER — METOCLOPRAMIDE HYDROCHLORIDE 5 MG/ML
10 INJECTION INTRAMUSCULAR; INTRAVENOUS EVERY 6 HOURS PRN
Status: DISCONTINUED | OUTPATIENT
Start: 2024-04-13 | End: 2024-04-16

## 2024-04-13 RX ORDER — INSULIN GLARGINE 100 [IU]/ML
20 INJECTION, SOLUTION SUBCUTANEOUS
Status: DISCONTINUED | OUTPATIENT
Start: 2024-04-13 | End: 2024-04-19 | Stop reason: HOSPADM

## 2024-04-13 RX ORDER — CLONAZEPAM 0.5 MG/1
0.5 TABLET ORAL 2 TIMES DAILY PRN
Status: DISCONTINUED | OUTPATIENT
Start: 2024-04-13 | End: 2024-04-19 | Stop reason: HOSPADM

## 2024-04-13 RX ORDER — AMOXICILLIN 250 MG
1 CAPSULE ORAL 2 TIMES DAILY
Status: DISCONTINUED | OUTPATIENT
Start: 2024-04-13 | End: 2024-04-14

## 2024-04-13 RX ORDER — POLYETHYLENE GLYCOL 3350 17 G/17G
17 POWDER, FOR SOLUTION ORAL DAILY
Status: DISCONTINUED | OUTPATIENT
Start: 2024-04-13 | End: 2024-04-14

## 2024-04-13 RX ORDER — INSULIN LISPRO 100 [IU]/ML
5 INJECTION, SOLUTION INTRAVENOUS; SUBCUTANEOUS
Status: DISCONTINUED | OUTPATIENT
Start: 2024-04-13 | End: 2024-04-19 | Stop reason: HOSPADM

## 2024-04-13 RX ORDER — RISPERIDONE 1 MG/1
3 TABLET ORAL DAILY
Status: DISCONTINUED | OUTPATIENT
Start: 2024-04-13 | End: 2024-04-19 | Stop reason: HOSPADM

## 2024-04-13 RX ADMIN — ZOLPIDEM TARTRATE 10 MG: 5 TABLET ORAL at 02:02

## 2024-04-13 RX ADMIN — ACETAMINOPHEN 975 MG: 325 TABLET, FILM COATED ORAL at 21:31

## 2024-04-13 RX ADMIN — SUMATRIPTAN SUCCINATE 100 MG: 50 TABLET ORAL at 18:30

## 2024-04-13 RX ADMIN — POLYETHYLENE GLYCOL 3350 17 G: 17 POWDER, FOR SOLUTION ORAL at 10:34

## 2024-04-13 RX ADMIN — APIXABAN 5 MG: 5 TABLET, FILM COATED ORAL at 17:12

## 2024-04-13 RX ADMIN — ONDANSETRON 4 MG: 2 INJECTION INTRAMUSCULAR; INTRAVENOUS at 09:59

## 2024-04-13 RX ADMIN — METOPROLOL SUCCINATE 25 MG: 25 TABLET, EXTENDED RELEASE ORAL at 08:11

## 2024-04-13 RX ADMIN — PRAVASTATIN SODIUM 80 MG: 80 TABLET ORAL at 17:12

## 2024-04-13 RX ADMIN — AMIODARONE HYDROCHLORIDE 200 MG: 200 TABLET ORAL at 08:11

## 2024-04-13 RX ADMIN — OXYCODONE HYDROCHLORIDE 5 MG: 5 TABLET ORAL at 08:11

## 2024-04-13 RX ADMIN — ACETAMINOPHEN 975 MG: 325 TABLET, FILM COATED ORAL at 12:45

## 2024-04-13 RX ADMIN — DAPTOMYCIN 575 MG: 500 INJECTION, POWDER, LYOPHILIZED, FOR SOLUTION INTRAVENOUS at 22:46

## 2024-04-13 RX ADMIN — HYDROMORPHONE HYDROCHLORIDE 1 MG: 1 INJECTION, SOLUTION INTRAMUSCULAR; INTRAVENOUS; SUBCUTANEOUS at 18:36

## 2024-04-13 RX ADMIN — INSULIN LISPRO 3 UNITS: 100 INJECTION, SOLUTION INTRAVENOUS; SUBCUTANEOUS at 21:30

## 2024-04-13 RX ADMIN — INSULIN LISPRO 13 UNITS: 100 INJECTION, SOLUTION INTRAVENOUS; SUBCUTANEOUS at 08:26

## 2024-04-13 RX ADMIN — HYDROMORPHONE HYDROCHLORIDE 1 MG: 1 INJECTION, SOLUTION INTRAMUSCULAR; INTRAVENOUS; SUBCUTANEOUS at 02:24

## 2024-04-13 RX ADMIN — HEPARIN SODIUM 20 UNITS/KG/HR: 10000 INJECTION, SOLUTION INTRAVENOUS at 08:12

## 2024-04-13 RX ADMIN — SENNOSIDES AND DOCUSATE SODIUM 1 TABLET: 8.6; 5 TABLET ORAL at 17:12

## 2024-04-13 RX ADMIN — RISPERIDONE 3 MG: 1 TABLET, FILM COATED ORAL at 10:36

## 2024-04-13 RX ADMIN — INSULIN GLARGINE 20 UNITS: 100 INJECTION, SOLUTION SUBCUTANEOUS at 21:29

## 2024-04-13 RX ADMIN — ACETAMINOPHEN 975 MG: 325 TABLET, FILM COATED ORAL at 05:23

## 2024-04-13 RX ADMIN — FENTANYL 1 PATCH: 75 PATCH TRANSDERMAL at 17:12

## 2024-04-13 RX ADMIN — METOCLOPRAMIDE 10 MG: 5 INJECTION, SOLUTION INTRAMUSCULAR; INTRAVENOUS at 12:45

## 2024-04-13 RX ADMIN — INSULIN LISPRO 13 UNITS: 100 INJECTION, SOLUTION INTRAVENOUS; SUBCUTANEOUS at 11:50

## 2024-04-13 RX ADMIN — AMLODIPINE BESYLATE 5 MG: 5 TABLET ORAL at 08:11

## 2024-04-13 RX ADMIN — CLONAZEPAM 0.5 MG: 0.5 TABLET ORAL at 00:56

## 2024-04-13 RX ADMIN — SENNOSIDES AND DOCUSATE SODIUM 1 TABLET: 8.6; 5 TABLET ORAL at 10:35

## 2024-04-13 RX ADMIN — METOCLOPRAMIDE 10 MG: 5 INJECTION, SOLUTION INTRAMUSCULAR; INTRAVENOUS at 21:29

## 2024-04-13 RX ADMIN — OXYCODONE HYDROCHLORIDE 5 MG: 5 TABLET ORAL at 17:12

## 2024-04-13 RX ADMIN — APIXABAN 5 MG: 5 TABLET, FILM COATED ORAL at 10:35

## 2024-04-13 RX ADMIN — INSULIN LISPRO 1 UNITS: 100 INJECTION, SOLUTION INTRAVENOUS; SUBCUTANEOUS at 08:26

## 2024-04-13 RX ADMIN — Medication 4 G: at 14:12

## 2024-04-13 RX ADMIN — CYCLOBENZAPRINE HYDROCHLORIDE 5 MG: 5 TABLET, FILM COATED ORAL at 18:30

## 2024-04-13 RX ADMIN — HYDROMORPHONE HYDROCHLORIDE 1 MG: 1 INJECTION, SOLUTION INTRAMUSCULAR; INTRAVENOUS; SUBCUTANEOUS at 09:52

## 2024-04-13 NOTE — ASSESSMENT & PLAN NOTE
Long hx of knee issues with multiple procedures including b/l TKA  Recently underwent revision of R knee (3/24/24) for septic prosthetic joint, completed a course of IV cefazolin on 4/11/23  In the last 24 hours, developed significant swelling of the R lower extremity with significant pain at the knee  Consult orthopedic surgery. Was supposed to be seen by them today as outpatient for suture removal.  No dvt  Ortho consulted, no intervention needed.  Follow-up outpatient.

## 2024-04-13 NOTE — ASSESSMENT & PLAN NOTE
Completed a course of IV cefazolin on 4/11/2024 for septic arthritis with MSSA  Infection disease was consulted.  Ancef was discontinued given the patient's JH and suspicion for BEATRIZ  Patient on daptomycin currently

## 2024-04-13 NOTE — ASSESSMENT & PLAN NOTE
Continue oxycodone and Dilaudid as needed  CT showed significant amount of stool throughout the colon.    Start bowel regimen

## 2024-04-13 NOTE — QUICK NOTE
Pt BS 58. Per hypoglycemia protocol, 4g glucose tab given. Pt is symptomatic with moderate sweating and dizziness. Notified SLIM Dobrovolschi

## 2024-04-13 NOTE — PLAN OF CARE
Problem: PAIN - ADULT  Goal: Verbalizes/displays adequate comfort level or baseline comfort level  Description: Interventions:  - Encourage patient to monitor pain and request assistance  - Assess pain using appropriate pain scale  - Administer analgesics based on type and severity of pain and evaluate response  - Implement non-pharmacological measures as appropriate and evaluate response  - Consider cultural and social influences on pain and pain management  - Notify physician/advanced practitioner if interventions unsuccessful or patient reports new pain  Outcome: Progressing     Problem: INFECTION - ADULT  Goal: Absence or prevention of progression during hospitalization  Description: INTERVENTIONS:  - Assess and monitor for signs and symptoms of infection  - Monitor lab/diagnostic results  - Monitor all insertion sites, i.e. indwelling lines, tubes, and drains  - Monitor endotracheal if appropriate and nasal secretions for changes in amount and color  - Cook appropriate cooling/warming therapies per order  - Administer medications as ordered  - Instruct and encourage patient and family to use good hand hygiene technique  - Identify and instruct in appropriate isolation precautions for identified infection/condition  Outcome: Progressing     Problem: NEUROSENSORY - ADULT  Goal: Achieves stable or improved neurological status  Description: INTERVENTIONS  - Monitor and report changes in neurological status  - Monitor vital signs such as temperature, blood pressure, glucose, and any other labs ordered   - Initiate measures to prevent increased intracranial pressure  - Monitor for seizure activity and implement precautions if appropriate      Outcome: Progressing     Problem: Knowledge Deficit  Goal: Patient/family/caregiver demonstrates understanding of disease process, treatment plan, medications, and discharge instructions  Description: Complete learning assessment and assess knowledge  base.  Interventions:  - Provide teaching at level of understanding  - Provide teaching via preferred learning methods  Outcome: Progressing

## 2024-04-13 NOTE — PLAN OF CARE
Problem: PAIN - ADULT  Goal: Verbalizes/displays adequate comfort level or baseline comfort level  Description: Interventions:  - Encourage patient to monitor pain and request assistance  - Assess pain using appropriate pain scale  - Administer analgesics based on type and severity of pain and evaluate response  - Implement non-pharmacological measures as appropriate and evaluate response  - Consider cultural and social influences on pain and pain management  - Notify physician/advanced practitioner if interventions unsuccessful or patient reports new pain  4/13/2024 0119 by Nohemy Lopez  Outcome: Progressing  4/13/2024 0119 by Nohemy Lopez  Outcome: Progressing     Problem: INFECTION - ADULT  Goal: Absence or prevention of progression during hospitalization  Description: INTERVENTIONS:  - Assess and monitor for signs and symptoms of infection  - Monitor lab/diagnostic results  - Monitor all insertion sites, i.e. indwelling lines, tubes, and drains  - Monitor endotracheal if appropriate and nasal secretions for changes in amount and color  - [place] appropriate cooling/warming therapies per order  - Administer medications as ordered  - Instruct and encourage patient and family to use good hand hygiene technique  - Identify and instruct in appropriate isolation precautions for identified infection/condition  4/13/2024 0119 by Nohemy Lopez  Outcome: Progressing  4/13/2024 0119 by Nohemy Lopez  Outcome: Progressing  Goal: Absence of fever/infection during neutropenic period  Description: INTERVENTIONS:  - Monitor WBC    4/13/2024 0119 by Nohemy Lopez  Outcome: Progressing  4/13/2024 0119 by Nohemy Lopez  Outcome: Progressing

## 2024-04-13 NOTE — PROGRESS NOTES
NEPHROLOGY PROGRESS NOTE   Armen Llanos 49 y.o. male MRN: [id number]  Unit/Bed#: -01 Encounter: [id number]      HPI/24hr EVENTS:    -49-year-old male past medical history of DM 2, hypertension, A-fib on Eliquis, chronic pain, history of TBI.  With recent mission for septic arthritis and MSSA bacteremia.  Presented right knee pain and swelling.  Nephrology consulted for management of JH    -Improving renal function    ASSESSMENT/PLAN:    JH (POA)  -Baseline creatinine: 0.8-1.0  -Creatinine on admission 2.05, most recent creatinine 1.76  -Etiology: Undifferentiated at this point, possibly prerenal azotemia from osmotic diuresis with hyperglycemia with concurrent Entresto use versus AIN from Ancef use  -UA: Glucose, large blood, 3+ protein, 30-50 RBCs, 2-4 WBCs (he reports he took Jardiance prior to admission x 1 dose)  -CT abdomen pelvis with no hydronephrosis  -Urine eosinophils are pending, he has no peripheral eosinophilia  -IV Ancef has been discontinued in favor of IV daptomycin per ID  -Continue holding Entresto  -Patient received IV fluid early on admission and is now off all scheduled IV fluid  -Recent CT imaging showed trace bilateral pleural effusions, trace volume ascites and moderate anasarca concerning for volume overload.  If showing signs of volume overload will trial Lasix and albumin  -His creatinine has improved on admission with holding Entresto, IV fluid resuscitation.  His serologies are pending and Ancef has been discontinued.  Will continue close monitoring for now, await further lab work, I do not see a role for steroids right now  -Creatinine after receiving contrast on 4/12/2024  -His urine output is excellent at 2.8 L    Nephrotic range proteinuria  -Initial UPCR was not accurate, repeat showed 6.9 g protein  -Serologies were sent including: RAJENDRA, C3/C4, ANCA, anti-GBM, antidouble-stranded DNA, SPEP/FLC/UPEP  -C3/C4 within normal range, RAJENDRA negative  -Etiology is possibly due to  uncontrolled DM2 versus JH versus AIN?  -Entresto is on hold  -His serum albumin is 2.3 most recently, we have not checked any triglycerides, but possibly does have nephrotic syndrome.  He has no significant lower extremity edema on exam today, likely can hold diuresis for now but certainly can consider if he becomes volume overloaded    Hypertension  -Currently on Norvasc 5 mg daily, Toprol-XL 25 mg daily  -Recent blood pressures are acceptable    HFrEF  -3/25/2024 TTE: EF is 45%, abnormal diastolic function  -Follows with cardiology as outpatient  -Self discontinued Jardiance as outpatient, managed on Entresto and Toprol-XL  -If showing signs of volume overload can utilize Lasix 40 mg IV as needed    Hyponatremia  -Has a history of intermittent hyponatremia in the past, had a recent admission where he was noted to have hyponatremia.  Recent sodium is 134 glucose corrected to 135  -He is on multiple medications which can affect sodium level including risperidone, Cymbalta    Anemia  -Recent hemoglobin 7.7  -Paraproteinemia workup is pending    History of septic arthritis/MSSA bacteremia  -Had a recent admission with MSSA bacteremia from right TKR infection, was discharged on home Ancef  -ID and orthopedics consulted on admission, currently on daptomycin    Additional medical problems: DM2 last A1c 12.1, history of TBI, chronic pain, A-fib    SUBJECTIVE:  Patient reports he feels well, his pain is well-controlled currently his appetite is stable he denies any nausea/vomiting/diarrhea.  He reports his lower extremity edema has improved today he reports his right leg was extremely swollen the day prior.  His biggest complaint is some abdominal distention and swollen testicles    ROS:  Review of Systems   Constitutional:  Positive for fatigue.   Respiratory: Negative.     Cardiovascular:  Positive for leg swelling.   Gastrointestinal:  Positive for abdominal distention.   Genitourinary: Negative.    Neurological:  "Negative.       A complete 10 point review of systems was performed and found to be negative unless otherwise noted above or in the HPI.    OBJECTIVE:  Current Weight: Weight - Scale: 96.2 kg (212 lb)  Vitals:    04/12/24 1450 04/12/24 1629 04/12/24 2006 04/13/24 0724   BP: 147/83 147/83 145/90 150/91   BP Location: Left arm Left arm     Pulse: 82 80 79 86   Resp: 18 18 18    Temp: 97.7 °F (36.5 °C) 97.7 °F (36.5 °C) 98.4 °F (36.9 °C) 98.6 °F (37 °C)   TempSrc: Temporal Temporal     SpO2: 97%  98% 95%   Weight:  96.2 kg (212 lb)     Height:  6' 6\" (1.981 m)         Intake/Output Summary (Last 24 hours) at 4/13/2024 1058  Last data filed at 4/13/2024 0808  Gross per 24 hour   Intake 650 ml   Output 2800 ml   Net -2150 ml     Physical Exam  Vitals and nursing note reviewed.   Constitutional:       General: He is not in acute distress.     Appearance: He is obese. He is not toxic-appearing or diaphoretic.      Comments: Awake, sitting in bed   HENT:      Head: Normocephalic and atraumatic.      Nose: Nose normal.      Mouth/Throat:      Mouth: Mucous membranes are moist.   Eyes:      General: No scleral icterus.  Cardiovascular:      Rate and Rhythm: Normal rate and regular rhythm.      Pulses: Normal pulses.   Pulmonary:      Effort: Pulmonary effort is normal. No respiratory distress.      Breath sounds: No wheezing or rales.   Abdominal:      General: Abdomen is flat.   Musculoskeletal:      Cervical back: Neck supple.      Right lower leg: No edema.      Left lower leg: No edema.      Comments: Right knee incision   Skin:     General: Skin is warm and dry.      Capillary Refill: Capillary refill takes less than 2 seconds.   Neurological:      Mental Status: He is alert and oriented to person, place, and time.          Medications:    Current Facility-Administered Medications:     acetaminophen (TYLENOL) tablet 975 mg, 975 mg, Oral, Q8H, Inderjit Mckeon PA-C, 975 mg at 04/13/24 0523    amiodarone tablet 200 mg, " 200 mg, Oral, Daily With Breakfast, Inderjit Mckeon PA-C, 200 mg at 04/13/24 0811    amLODIPine (NORVASC) tablet 5 mg, 5 mg, Oral, Daily, Jill Bond PA-C, 5 mg at 04/13/24 0811    apixaban (ELIQUIS) tablet 5 mg, 5 mg, Oral, BID, Nancy Jc MD, 5 mg at 04/13/24 1035    clonazePAM (KlonoPIN) tablet 0.5 mg, 0.5 mg, Oral, BID PRN, Suzette Burton PA-C, 0.5 mg at 04/13/24 0056    cyclobenzaprine (FLEXERIL) tablet 5 mg, 5 mg, Oral, TID PRN, Inderjit Mckeon PA-C, 5 mg at 04/12/24 2052    DAPTOmycin (CUBICIN) 575 mg in sodium chloride 0.9 % 50 mL IVPB, 6 mg/kg, Intravenous, Q24H, Vee Walker MD, Last Rate: 100 mL/hr at 04/12/24 2323, 575 mg at 04/12/24 2323    fentaNYL (DURAGESIC) 75 mcg/hr TD 72 hr patch 1 patch, 1 patch, Transdermal, Q72H, Inderjit Mckeon PA-C    HYDROmorphone (DILAUDID) injection 1 mg, 1 mg, Intravenous, Q6H PRN, Uday Jean MD, 1 mg at 04/13/24 0952    insulin glargine (LANTUS) subcutaneous injection 35 Units 0.35 mL, 35 Units, Subcutaneous, HS, Inderjit Mckeon PA-C, 35 Units at 04/12/24 2113    insulin lispro (HumALOG/ADMELOG) 100 units/mL subcutaneous injection 1-5 Units, 1-5 Units, Subcutaneous, 4x Daily (AC & HS), 1 Units at 04/13/24 0826 **AND** Fingerstick Glucose (POCT), , , 4x Daily AC and at bedtime, Inderjit Mckeon PA-C    insulin lispro (HumALOG/ADMELOG) 100 units/mL subcutaneous injection 13 Units, 13 Units, Subcutaneous, TID With Meals, Inderjit Mckeon PA-C, 13 Units at 04/13/24 0826    metoprolol succinate (TOPROL-XL) 24 hr tablet 25 mg, 25 mg, Oral, Daily, Inderjit Mckeon PA-C, 25 mg at 04/13/24 0811    ondansetron (ZOFRAN) injection 4 mg, 4 mg, Intravenous, Q6H PRN, Gonzalez Davey MD, 4 mg at 04/13/24 0959    oxyCODONE (ROXICODONE) IR tablet 5 mg, 5 mg, Oral, Q6H PRN, Uday Jean MD, 5 mg at 04/13/24 0811    polyethylene glycol (MIRALAX) packet 17 g, 17 g, Oral, Daily, Nancy Jc MD, 17 g at 04/13/24 1034    pravastatin  (PRAVACHOL) tablet 80 mg, 80 mg, Oral, Daily With Dinner, Inderjit Mckeon PA-C, 80 mg at 04/12/24 1602    risperiDONE (RisperDAL) tablet 3 mg, 3 mg, Oral, Daily, Nancy Jc MD, 3 mg at 04/13/24 1036    senna-docusate sodium (SENOKOT S) 8.6-50 mg per tablet 1 tablet, 1 tablet, Oral, BID, Nancy Jc MD, 1 tablet at 04/13/24 1035    zolpidem (AMBIEN) tablet 10 mg, 10 mg, Oral, HS PRN, Inderjit Mckeon PA-C, 10 mg at 04/13/24 0202    Laboratory Results:  Results from last 7 days   Lab Units 04/13/24  0219 04/12/24  0557 04/11/24  1940   WBC Thousand/uL 6.81 7.92 7.70   HEMOGLOBIN g/dL 7.7* 7.8* 9.0*   HEMATOCRIT % 23.7* 24.0* 28.8*   PLATELETS Thousands/uL 229 250 309   POTASSIUM mmol/L 4.6 4.8 5.3   CHLORIDE mmol/L 107 108 109*   CO2 mmol/L 21 21 22   BUN mg/dL 27* 31* 29*   CREATININE mg/dL 1.76* 1.86* 2.05*   CALCIUM mg/dL 7.2* 7.1* 8.2*       I have personally reviewed the blood work as stated above and in my note.  I have personally reviewed CTA chest, CT abdomen pelvis imaging studies.  I have personally reviewed internal medicine, orthopedics, pulmonology, ID note.

## 2024-04-13 NOTE — ASSESSMENT & PLAN NOTE
CTA chest:  1. Patchy groundglass opacities in the left upper lobe and a few right apical opacities suggesting atypical pneumonia,, pneumonitis or pulmonary hemorrhage.  2. Trace amount of pleural fluid bilaterally.  3. No evidence of acute pulmonary embolus.    Mild non productive cough. Otherwise asymptomatic, no trouble breathing. Denies any aspiration event.  Pulmonology consulted, appreciate recommendations.  He Will follow-up with pulmonology outpatient and have a repeat CT chest in a few weeks

## 2024-04-13 NOTE — PROGRESS NOTES
[facility]  Progress Note  Name: Armen Llanos I  MRN: [id number]  Unit/Bed#: -01 I Date of Admission: 4/11/2024   Date of Service: 4/13/2024 I Hospital Day: 1    Assessment/Plan   * JH (acute kidney injury) (HCC)  Assessment & Plan  Lab Results   Component Value Date    CREATININE 1.76 (H) 04/13/2024    CREATININE 1.86 (H) 04/12/2024    CREATININE 2.05 (H) 04/11/2024     Baseline creatinine 0.9-1.1  Admission creatinine 2.05  CT renal: No hydronephrosis. Delayed excreted contrast material in the renal collecting systems suggesting renal impairment. Trace bilateral pleural effusions, trace volume of ascites, and moderate anasarca consistent with volume overload.  Patient reports Aleve use and poor water intake for his right knee pain and more recently swelling --> Consider prerenal/ NSAID nephropathy. Also on entresto (ARB may be contributing)  Hypoalbumenic + protein in urine + anasarca on CT imaging  Unexplained RBC in urine + unclear lung findings on CTA  Of note, received contrast for CTA of the chest to r/o PE in setting of elevated ddimer  Continue to hold NSAIDS, Entresto, Jardiance, metformin and other nephrotoxins  C3, C4 negative.  Protein electrophoresis, immunoglobulin, anti-DNA antibody, anti-glomerular membrane antibody pending  Creatinine slightly improving.  Nephrology on board, appreciate recommendations.    Ambulatory dysfunction  Assessment & Plan  PT OT evaluation  Wife prefers patient to be discharged in a rehab if appropriate.    Anemia  Assessment & Plan  Recent Labs     04/11/24  1940 04/12/24  0557 04/13/24  0219   HGB 9.0* 7.8* 7.7*      Check iron panel  Monitor     Abnormal CT of the chest  Assessment & Plan  CTA chest:  1. Patchy groundglass opacities in the left upper lobe and a few right apical opacities suggesting atypical pneumonia,, pneumonitis or pulmonary hemorrhage.  2. Trace amount of pleural fluid bilaterally.  3. No evidence of acute  pulmonary embolus.    Mild non productive cough. Otherwise asymptomatic, no trouble breathing. Denies any aspiration event.  Pulmonology consulted, appreciate recommendations.  He Will follow-up with pulmonology outpatient and have a repeat CT chest in a few weeks    Cardiomyopathy ([facility])  Assessment & Plan  ECHO (03/25): EF 45% w/ mild global hypokinesis   On jardiance and Entresto, both on hold in setting of JH  Unilateral RLE swelling, otherwise appears euvolemic  Continue Toprol XL and amlodipine  Discontinue heparin GGT that was started on admission.  Resume patient's Eliquis    MSSA bacteremia  Assessment & Plan  Completed a course of IV cefazolin on 4/11/2024 for septic arthritis with MSSA  Infection disease was consulted.  Ancef was discontinued given the patient's JH and suspicion for BEATRIZ  Patient on daptomycin currently    TBI (traumatic brain injury) ([facility])  Assessment & Plan  History of TBI at 7 years old    Essential hypertension  Assessment & Plan  On amlodipine, metoprolol, Entresto, Jardiance  Can continue amlodipine and metoprolol  Will hold Entresto and Jardiance for now given JH  Stable     Type 2 diabetes mellitus with ketoacidosis without coma, without long-term current use of insulin ([facility])  Assessment & Plan  Lab Results   Component Value Date    HGBA1C 12.1 (H) 03/22/2024       Recent Labs     04/12/24  2048 04/13/24  0808 04/13/24  1137 04/13/24  1408   POCGLU 217* 183* 124 58*       Blood Sugar Average: Last 72 hrs:  (P) 168.4Home regimen: Jardiance, metformin, Lantus, Humalog  Hold Jardiance and metformin in setting of JH  Patient was started on Lantus 35 units, Humalog 13 units 3 times daily with meals and sliding scale.  Today patient had hypoglycemia in the 50s and received treatment per protocol.  Decrease Lantus to 20 units daily, decreased Humalog to 5 units 3 times daily with meals and sliding scale insulin  Continue hypoglycemia protocol  diabetic diet      Chronic, continuous use  of opioids  Assessment & Plan  Continue oxycodone and Dilaudid as needed  CT showed significant amount of stool throughout the colon.    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 Was supposed to be seen by them today as outpatient for suture removal.  No dvt  Ortho consulted, no intervention needed.  Follow-up outpatient.           VTE Pharmacologic Prophylaxis: VTE Score: 8 High Risk (Score >/= 5) - Pharmacological DVT Prophylaxis Ordered: apixaban (Eliquis). Sequential Compression Devices Ordered.    Mobility:   Basic Mobility Inpatient Raw Score: 18  JH-HLM Goal: 6: Walk 10 steps or more  JH-HLM Achieved: 6: Walk 10 steps or more  JH-HLM Goal achieved. Continue to encourage appropriate mobility.    Patient Centered Rounds: I performed bedside rounds with nursing staff today.   Discussions with Specialists or Other Care Team Provider: cm, nephrology    Education and Discussions with Family / Patient: Updated  (wife) via phone.    Total Time Spent on Date of Encounter in care of patient: 45 mins. This time was spent on one or more of the following: performing physical exam; counseling and coordination of care; obtaining or reviewing history; documenting in the medical record; reviewing/ordering tests, medications or procedures; communicating with other healthcare professionals and discussing with patient's family/caregivers.    Current Length of Stay: 1 day(s)  Current Patient Status: Inpatient   Certification Statement: The patient will continue to require additional inpatient hospital stay due to fortunato  Discharge  Plan: Anticipate discharge in 48-72 hrs to tbd    Code Status: Level 1 - Full Code    Subjective:     Asked for his Risperdal to be given this morning as it was not given yesterday.  Said he has lower extremity edema is resolved but he still has abdominal distention.  Denies shortness of breath chest pain.  Had nausea today    Objective:     Vitals:   Temp (24hrs), Av.1 °F (36.7 °C), Min:97.7 °F (36.5 °C), Max:98.6 °F (37 °C)    Temp:  [97.7 °F (36.5 °C)-98.6 °F (37 °C)] 98.6 °F (37 °C)  HR:  [79-86] 86  Resp:  [18] 18  BP: (145-150)/(83-91) 150/91  SpO2:  [95 %-98 %] 95 %  Body mass index is 24.5 kg/m².     Input and Output Summary (last 24 hours):     Intake/Output Summary (Last 24 hours) at 2024 1438  Last data filed at 2024 0808  Gross per 24 hour   Intake 650 ml   Output 2800 ml   Net -2150 ml       Physical Exam:   Physical Exam  Vitals and nursing note reviewed.   Constitutional:       General: He is not in acute distress.     Appearance: He is not diaphoretic.   HENT:      Head: Normocephalic.   Eyes:      General:         Right eye: No discharge.         Left eye: No discharge.   Cardiovascular:      Rate and Rhythm: Normal rate and regular rhythm.   Pulmonary:      Effort: Pulmonary effort is normal. No respiratory distress.      Breath sounds: No wheezing or rhonchi.      Comments: Bilateral basal crackles   Abdominal:      General: There is no distension.      Palpations: Abdomen is soft.      Tenderness: There is no abdominal tenderness. There is no guarding or rebound.   Musculoskeletal:      Cervical back: Normal range of motion.      Right lower leg: No edema.      Left lower leg: No edema.   Skin:     General: Skin is warm.      Coloration: Skin is pale.   Neurological:      Mental Status: He is alert and oriented to person, place, and time.   Psychiatric:         Mood and Affect: Mood normal.         Behavior: Behavior normal.          Additional Data:     Labs:  Results from last 7  days   Lab Units 04/13/24  0219 04/12/24  0557   WBC Thousand/uL 6.81 7.92   HEMOGLOBIN g/dL 7.7* 7.8*   HEMATOCRIT % 23.7* 24.0*   PLATELETS Thousands/uL 229 250   SEGS PCT %  --  64   LYMPHO PCT %  --  25   MONO PCT %  --  7   EOS PCT %  --  2     Results from last 7 days   Lab Units 04/13/24  0219 04/12/24  0557   SODIUM mmol/L 134* 135   POTASSIUM mmol/L 4.6 4.8   CHLORIDE mmol/L 107 108   CO2 mmol/L 21 21   BUN mg/dL 27* 31*   CREATININE mg/dL 1.76* 1.86*   ANION GAP mmol/L 6 6   CALCIUM mg/dL 7.2* 7.1*   ALBUMIN g/dL  --  2.3*   TOTAL BILIRUBIN mg/dL  --  0.28   ALK PHOS U/L  --  50   ALT U/L  --  <3*   AST U/L  --  8*   GLUCOSE RANDOM mg/dL 188* 189*     Results from last 7 days   Lab Units 04/11/24  1940   INR  1.17     Results from last 7 days   Lab Units 04/13/24  1430 04/13/24  1408 04/13/24  1137 04/13/24  0808 04/12/24  2048 04/12/24  1601   POC GLUCOSE mg/dl 89 58* 124 183* 217* 260*         Results from last 7 days   Lab Units 04/12/24  0557 04/11/24 1940   LACTIC ACID mmol/L  --  1.6   PROCALCITONIN ng/ml 0.11 0.12       Lines/Drains:  Invasive Devices       Peripherally Inserted Central Catheter Line  Duration             PICC Line 04/01/24 Right Basilic 12 days              Peripheral Intravenous Line  Duration             Peripheral IV 04/12/24 Left;Ventral (anterior) Forearm 1 day                    Central Line:  Goal for removal: N/A - Discharging with PICC for IV ABX/medications             Imaging: Reviewed radiology reports from this admission including: chest xray, chest CT scan, abdominal/pelvic CT, and ultrasound(s)    Recent Cultures (last 7 days):   Results from last 7 days   Lab Units 04/12/24  1926 04/11/24  2353 04/11/24  1940   BLOOD CULTURE   --  No Growth at 24 hrs. No Growth at 24 hrs.   LEGIONELLA URINARY ANTIGEN  Negative  --   --        Last 24 Hours Medication List:   Current Facility-Administered Medications   Medication Dose Route Frequency Provider Last Rate     acetaminophen  975 mg Oral Q8H Inderjit Mckeon PA-C      amiodarone  200 mg Oral Daily With Breakfast Inderjit Mckeon PA-C      amLODIPine  5 mg Oral Daily Jill Bond PA-C      apixaban  5 mg Oral BID Nancy Jc MD      clonazePAM  0.5 mg Oral BID PRN Suzette Burton PA-C      cyclobenzaprine  5 mg Oral TID PRN Inderjit Mckeon PA-C      DAPTOmycin  6 mg/kg Intravenous Q24H Vee Walker  mg (04/12/24 1209)    fentaNYL  1 patch Transdermal Q72H Inderjit Mckeon PA-C      HYDROmorphone  1 mg Intravenous Q6H PRN Uday Jean MD      insulin glargine  20 Units Subcutaneous HS Nancy Jc MD      insulin lispro  1-5 Units Subcutaneous 4x Daily (AC & HS) Inderjit Mckeon PA-C      insulin lispro  5 Units Subcutaneous TID With Meals Nancy Jc MD      metoclopramide  10 mg Intravenous Q6H PRN Nancy Jc MD      metoprolol succinate  25 mg Oral Daily Inderjit Mckeon PA-C      ondansetron  4 mg Intravenous Q6H PRN Gonzalez Davey MD      oxyCODONE  5 mg Oral Q6H PRN Uday Jean MD      polyethylene glycol  17 g Oral Daily Nancy Jc MD      pravastatin  80 mg Oral Daily With Dinner Inderjit Mckeon PA-C      risperiDONE  3 mg Oral Daily Nancy Jc MD      senna-docusate sodium  1 tablet Oral BID Nancy Jc MD      zolpidem  10 mg Oral HS PRN Inderjit Mckeon PA-C          Today, Patient Was Seen By: Nanyc Jc MD    **Please Note: This note may have been constructed using a voice recognition system.**

## 2024-04-13 NOTE — QUICK NOTE
Pt was completing 6 week course of Ancef PTA for tx of MSSA bacteremia and right TKR infxn - s/p washout. Routine blood work done on 4/9 showed elevated creatinine of 1.9. Pt denied any change in his meds and was eating and drinking adequately.    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 He was supposed to have repeat BMP this AM by visiting nurses to see if creatinine decreased off Ancef    - Would stop Ancef  - Will start Daptomycin to cont tx for MSSA bacteremia and right TKR infxx  - Will follow creatinine off Ancef - Pt received two doses of Ancef today

## 2024-04-13 NOTE — CONSULTS
Consultation - Infectious Disease   Armen Llanos 49 y.o. male MRN: [id number]  Unit/Bed#: -01 Encounter: [id number]      Assessment/Plan     MSSA TKR infection/ARF/pulmonary infiltrates    Patient with recent MSSA bacteremia and R TKR infection s/p poly exchange home on IV Ancef, re-admitted for LE edema, ARF.  Etiology of ARF is unclear, renal on board, for now will d/c ancef and rx with Daptomycin given concerns of AIN.  Pulmonary infiltrates noted, no respiratory symptoms.  Infiltrates are somewhat nodular and peripheral may be septic emboli from his prolonged MSSA bacteremia, Daptomycin should be sufficient for this.      - d/c ancef  - start Daptomycin  - follow cbc, cmp and cpk to monitor for toxicities while on this medication   - f/u renal studies, follow cr  - follow pulmonary status  - follow wbc and fever curve as markers of clinical improvement or deterioration    D/W primary team    Dr. Walker to assume care of this patient on Monday.    History of Present Illness   Physician Requesting Consult: Nancy Jc MD  Reason for Consult / Principal Problem: MSSA TKR infection    HPI: Armen Llanos is a 49 y.o. year old male with TBI with memory issues, HTN, DM, chronic opioid use.  Patient admitted recently with MSSA bacteremia and R TKR infection.  Patient to OR on 3/24 for wash-out and poly-exchange.  Patient discharged on IV Ancef.  Patient reports increased LE edema R > L and  labs significant for increasing cr. Patient came to ER for further evaluation.   No fever or leukocytosis.  LE dopplers negative for clot.  CT chest no PE, but some ground glass infiltrates.   Patient has no ha, chest pains, abdominal pains, n/v/d.        Inpatient consult to Infectious Diseases  Consult performed by: Zay Gan MD  Consult ordered by: Uday Jean MD          ROS: 12 systems reviewed, remainder is neg.    Historical Information   Past Medical History:   Diagnosis Date    Bed sore, stage 1      Diabetes mellitus (HCC)     Hypertension      Past Surgical History:   Procedure Laterality Date    IR PICC PLACEMENT SINGLE LUMEN  4/1/2024    KNEE SURGERY      IL REVJ TOTAL KNEE ARTHRP W/WO ALGRFT 1 COMPONENT Right 3/24/2024    Procedure: ARTHROPLASTY KNEE TOTAL REVISION, POLY EXCHANGE;  Surgeon: Tao Washington DO;  Location:  MAIN OR;  Service: Orthopedics    TOE AMPUTATION Left 3/28/2024    Procedure: AMPUTATION TOE 2nd;  Surgeon: Rossy Toussaint DPM;  Location:  MAIN OR;  Service: Podiatry     Social History   Social History     Substance and Sexual Activity   Alcohol Use Not Currently     Social History     Substance and Sexual Activity   Drug Use Yes    Types: Marijuana     Social History     Tobacco Use   Smoking Status Never   Smokeless Tobacco Never     History reviewed. No pertinent family history.    Meds/Allergies   MEDS: reviewed       Current Facility-Administered Medications:     acetaminophen (TYLENOL) tablet 975 mg, 975 mg, Oral, Q8H, Inderjit Mckeon PA-C, 975 mg at 04/13/24 0523    amiodarone tablet 200 mg, 200 mg, Oral, Daily With Breakfast, Inderjit Mckeon PA-C, 200 mg at 04/13/24 0811    amLODIPine (NORVASC) tablet 5 mg, 5 mg, Oral, Daily, Jill Varun, PA-C, 5 mg at 04/13/24 0811    clonazePAM (KlonoPIN) tablet 0.5 mg, 0.5 mg, Oral, BID PRN, Suzette Burton PA-C, 0.5 mg at 04/13/24 0056    cyclobenzaprine (FLEXERIL) tablet 5 mg, 5 mg, Oral, TID PRN, Inderjit Mckeon PA-C, 5 mg at 04/12/24 2052    DAPTOmycin (CUBICIN) 575 mg in sodium chloride 0.9 % 50 mL IVPB, 6 mg/kg, Intravenous, Q24H, Vee Walker MD, Last Rate: 100 mL/hr at 04/12/24 2323, 575 mg at 04/12/24 2323    fentaNYL (DURAGESIC) 75 mcg/hr TD 72 hr patch 1 patch, 1 patch, Transdermal, Q72H, Inderjti Mckeon PA-C    heparin (porcine) 25,000 units in 0.45% NaCl 250 mL infusion (premix), 3-30 Units/kg/hr (Order-Specific), Intravenous, Titrated, Inderjit Mckeon PA-C, Last Rate: 19 mL/hr at 04/13/24 0812,  20 Units/kg/hr at 04/13/24 0812    heparin (porcine) injection 3,800 Units, 3,800 Units, Intravenous, Q6H PRN, Inderjit Mckeon PA-C, 3,800 Units at 04/12/24 2010    heparin (porcine) injection 7,600 Units, 7,600 Units, Intravenous, Q6H PRN, Inderjit Mckeon PA-C    HYDROmorphone (DILAUDID) injection 1 mg, 1 mg, Intravenous, Q6H PRN, Uday Jean MD, 1 mg at 04/13/24 0224    insulin glargine (LANTUS) subcutaneous injection 35 Units 0.35 mL, 35 Units, Subcutaneous, HS, Inderjit Mckeon PA-C, 35 Units at 04/12/24 2113    insulin lispro (HumALOG/ADMELOG) 100 units/mL subcutaneous injection 1-5 Units, 1-5 Units, Subcutaneous, 4x Daily (AC & HS), 1 Units at 04/13/24 0826 **AND** Fingerstick Glucose (POCT), , , 4x Daily AC and at bedtime, Inderjit Mckeon PA-C    insulin lispro (HumALOG/ADMELOG) 100 units/mL subcutaneous injection 13 Units, 13 Units, Subcutaneous, TID With Meals, Inderjit Mckeon PA-C, 13 Units at 04/13/24 0826    metoprolol succinate (TOPROL-XL) 24 hr tablet 25 mg, 25 mg, Oral, Daily, Inderjit Mckeon PA-C, 25 mg at 04/13/24 0811    ondansetron (ZOFRAN) injection 4 mg, 4 mg, Intravenous, Q6H PRN, Gonzalez Davey MD, 4 mg at 04/12/24 1602    oxyCODONE (ROXICODONE) IR tablet 5 mg, 5 mg, Oral, Q6H PRN, Uday Jean MD, 5 mg at 04/13/24 0811    pravastatin (PRAVACHOL) tablet 80 mg, 80 mg, Oral, Daily With Dinner, Inderjit Mckeon PA-C, 80 mg at 04/12/24 1602    zolpidem (AMBIEN) tablet 10 mg, 10 mg, Oral, HS PRN, Inderjit Mckeon PA-C, 10 mg at 04/13/24 0202    Allergies   Allergen Reactions    Cephalosporins Hives    Penicillins Hives         Intake/Output Summary (Last 24 hours) at 4/13/2024 0831  Last data filed at 4/13/2024 0808  Gross per 24 hour   Intake 650 ml   Output 3200 ml   Net -2550 ml       PE:  GEN: NAD  VSS, Tmax:98.6  HEENT: Anicteric, oral mucosa moist  NECK: Supple no adenopathy  CARDIAC: Regular rate rhythm S1-S2 no murmurs rubs or gallops  LUNGS: Clear to  auscultation bilaterally  ABDOMEN: Soft nontender nondistended positive bowel sounds  EXTREMITIES: Minimal lower extremity edema  SKIN: No rash  NEURO: Grossly nonfocal  MSK: Right knee no erythema or warmth, Steri-Strips in place, no incisional drainage    Invasive Devices:   PICC Line 04/01/24 Right Basilic (Active)   Reasons to continue PICC Long term antibiotics 04/12/24 1431   Goal for Removal D/C when no longer on IV medications 04/12/24 1431   Line Necessity Reviewed Yes, reviewed with provider 04/12/24 1431   Site Assessment WDL;Clean;Dry;Intact 04/13/24 0100   #1 Lumen Color/Status Flushed 04/13/24 0100   Dressing Type Chlorhexidine dressing 04/13/24 0100   Dressing Status Clean;Dry;Intact 04/13/24 0100   Dressing Change Due 04/18/24 04/12/24 1431       Peripheral IV 04/12/24 Left;Ventral (anterior) Forearm (Active)   Site Assessment WDL 04/13/24 0100   Dressing Type Transparent 04/13/24 0100   Line Status Flushed 04/13/24 0100   Dressing Status Clean;Dry;Intact 04/13/24 0100           Lab Results:   Admission on 04/11/2024   Component Date Value    PTT 04/11/2024 36     Protime 04/11/2024 15.4 (H)     INR 04/11/2024 1.17     Blood Culture 04/11/2024 No Growth at 24 hrs.     Blood Culture 04/11/2024 Received in Microbiology Lab. Culture in Progress.     WBC 04/11/2024 7.70     RBC 04/11/2024 3.11 (L)     Hemoglobin 04/11/2024 9.0 (L)     Hematocrit 04/11/2024 28.8 (L)     MCV 04/11/2024 93     MCH 04/11/2024 28.9     MCHC 04/11/2024 31.3 (L)     RDW 04/11/2024 13.0     MPV 04/11/2024 10.0     Platelets 04/11/2024 309     nRBC 04/11/2024 0     Segmented % 04/11/2024 60     Immature Grans % 04/11/2024 1     Lymphocytes % 04/11/2024 30     Monocytes % 04/11/2024 6     Eosinophils Relative 04/11/2024 2     Basophils Relative 04/11/2024 1     Absolute Neutrophils 04/11/2024 4.61     Absolute Immature Grans 04/11/2024 0.04     Absolute Lymphocytes 04/11/2024 2.34     Absolute Monocytes 04/11/2024 0.49      Eosinophils Absolute 04/11/2024 0.13     Basophils Absolute 04/11/2024 0.09     Sodium 04/11/2024 137     Potassium 04/11/2024 5.3     Chloride 04/11/2024 109 (H)     CO2 04/11/2024 22     ANION GAP 04/11/2024 6     BUN 04/11/2024 29 (H)     Creatinine 04/11/2024 2.05 (H)     Glucose 04/11/2024 207 (H)     Calcium 04/11/2024 8.2 (L)     Corrected Calcium 04/11/2024 9.3     AST 04/11/2024 10 (L)     ALT 04/11/2024 <3 (L)     Alkaline Phosphatase 04/11/2024 60     Total Protein 04/11/2024 6.4     Albumin 04/11/2024 2.6 (L)     Total Bilirubin 04/11/2024 0.24     eGFR 04/11/2024 36     LACTIC ACID 04/11/2024 1.6     Procalcitonin 04/11/2024 0.12     Ventricular Rate 04/11/2024 90     Atrial Rate 04/11/2024 90     MN Interval 04/11/2024 154     QRSD Interval 04/11/2024 80     QT Interval 04/11/2024 368     QTC Interval 04/11/2024 450     P Axis 04/11/2024 68     QRS [place] 04/11/2024 88     T Wave [place] 04/11/2024 21     D-Dimer, Quant 04/12/2024 2.73 (H)     SARS-CoV-2 04/12/2024 Negative     INFLUENZA A PCR 04/12/2024 Negative     INFLUENZA B PCR 04/12/2024 Negative     RSV PCR 04/12/2024 Negative     Color, UA 04/12/2024 Yellow     Clarity, UA 04/12/2024 Clear     Specific Gravity, UA 04/12/2024 1.020     pH, UA 04/12/2024 6.5     Leukocytes, UA 04/12/2024 Negative     Nitrite, UA 04/12/2024 Negative     Protein, UA 04/12/2024 300 (3+) (A)     Glucose, UA 04/12/2024 1000 (1%) (A)     Ketones, UA 04/12/2024 Negative     Urobilinogen, UA 04/12/2024 <2.0     Bilirubin, UA 04/12/2024 Negative     Occult Blood, UA 04/12/2024 Large (A)     RBC, UA 04/12/2024 30-50 (A)     WBC, UA 04/12/2024 2-4     Epithelial Cells 04/12/2024 None Seen     Bacteria, UA 04/12/2024 Occasional     HS TnI random 04/12/2024 11     Creatinine, Ur 04/12/2024 <1.0     Protein Urine Random 04/12/2024 <4     Prot/Creat Ratio, Ur 04/12/2024      Cholesterol 04/12/2024 108     Triglycerides 04/12/2024 37     HDL, Direct 04/12/2024 39 (L)     LDL  Calculated 04/12/2024 62     C3 Complement 04/12/2024 141     C4, COMPLEMENT 04/12/2024 34     Sodium 04/12/2024 135     Potassium 04/12/2024 4.8     Chloride 04/12/2024 108     CO2 04/12/2024 21     ANION GAP 04/12/2024 6     BUN 04/12/2024 31 (H)     Creatinine 04/12/2024 1.86 (H)     Glucose 04/12/2024 189 (H)     Calcium 04/12/2024 7.1 (L)     Corrected Calcium 04/12/2024 8.5     AST 04/12/2024 8 (L)     ALT 04/12/2024 <3 (L)     Alkaline Phosphatase 04/12/2024 50     Total Protein 04/12/2024 5.8 (L)     Albumin 04/12/2024 2.3 (L)     Total Bilirubin 04/12/2024 0.28     eGFR 04/12/2024 41     WBC 04/12/2024 7.92     RBC 04/12/2024 2.62 (L)     Hemoglobin 04/12/2024 7.8 (L)     Hematocrit 04/12/2024 24.0 (L)     MCV 04/12/2024 92     MCH 04/12/2024 29.8     MCHC 04/12/2024 32.5     RDW 04/12/2024 13.1     MPV 04/12/2024 10.2     Platelets 04/12/2024 250     nRBC 04/12/2024 0     Segmented % 04/12/2024 64     Immature Grans % 04/12/2024 1     Lymphocytes % 04/12/2024 25     Monocytes % 04/12/2024 7     Eosinophils Relative 04/12/2024 2     Basophils Relative 04/12/2024 1     Absolute Neutrophils 04/12/2024 5.15     Absolute Immature Grans 04/12/2024 0.06     Absolute Lymphocytes 04/12/2024 1.97     Absolute Monocytes 04/12/2024 0.55     Eosinophils Absolute 04/12/2024 0.12     Basophils Absolute 04/12/2024 0.07     Procalcitonin 04/12/2024 0.11     PTT 04/12/2024 54 (H)     RAJENDRA 04/12/2024 Negative     Creatinine, Ur 04/12/2024 74.7     Protein Urine Random 04/12/2024 521     Prot/Creat Ratio, Ur 04/12/2024 6.97 (H)     Legionella Urinary Antig* 04/12/2024 Negative     PTT 04/12/2024 59 (H)     POC Glucose 04/12/2024 260 (H)     POC Glucose 04/12/2024 217 (H)     PTT 04/13/2024 80 (H)     Sodium 04/13/2024 134 (L)     Potassium 04/13/2024 4.6     Chloride 04/13/2024 107     CO2 04/13/2024 21     ANION GAP 04/13/2024 6     BUN 04/13/2024 27 (H)     Creatinine 04/13/2024 1.76 (H)     Glucose 04/13/2024 188 (H)      "Calcium 04/13/2024 7.2 (L)     eGFR 04/13/2024 44     CRP 04/13/2024 50.1 (H)     WBC 04/13/2024 6.81     RBC 04/13/2024 2.60 (L)     Hemoglobin 04/13/2024 7.7 (L)     Hematocrit 04/13/2024 23.7 (L)     MCV 04/13/2024 91     MCH 04/13/2024 29.6     MCHC 04/13/2024 32.5     RDW 04/13/2024 12.9     Platelets 04/13/2024 229     MPV 04/13/2024 10.6     Sed Rate 04/13/2024 38 (H)     POC Glucose 04/13/2024 183 (H)      Imaging Studies: I have personally reviewed pertinent films in PACS  EKG, Pathology, and Other Studies: I have personally reviewed pertinent reports.      Culture  Lab Results   Component Value Date    BLOODCX No Growth at 24 hrs. 04/11/2024    BLOODCX Received in Microbiology Lab. Culture in Progress. 04/11/2024    BLOODCX No Growth After 5 Days. 03/26/2024    BLOODCX No Growth After 5 Days. 03/26/2024    BLOODCX Staphylococcus aureus (A) 03/25/2024    BLOODCX Staphylococcus aureus (A) 03/25/2024    BLOODCX Staphylococcus aureus (A) 03/23/2024    BLOODCX Staphylococcus aureus (A) 03/23/2024    BLOODCX Staphylococcus aureus (A) 03/22/2024    BLOODCX Staphylococcus aureus (A) 03/22/2024     No results found for: \"WOUNDCULT\"  No results found for: \"URINECX\"  No results found for: \"SPUTUMCULTUR\"    Principal Problem:    JH (acute kidney injury) ([facility])  Active Problems:    Type 2 diabetes mellitus with ketoacidosis without coma, without long-term current use of insulin ([facility])    Essential hypertension    TBI (traumatic brain injury) ([facility])    MSSA bacteremia    A-fib with RVR ([facility])    Cardiomyopathy ([facility])    Swelling of right lower extremity    Abnormal CT of the chest    Other proteinuria        " (1) Oriented to own ability

## 2024-04-13 NOTE — ASSESSMENT & PLAN NOTE
ECHO (03/25): EF 45% w/ mild global hypokinesis   On jardiance and Entresto, both on hold in setting of JH  Unilateral RLE swelling, otherwise appears euvolemic  Continue Toprol XL and amlodipine  Discontinue heparin GGT that was started on admission.  Resume patient's Eliquis

## 2024-04-13 NOTE — ASSESSMENT & PLAN NOTE
On amlodipine, metoprolol, Entresto, Jardiance  Can continue amlodipine and metoprolol  Will hold Entresto and Jardiance for now given JH  Stable

## 2024-04-13 NOTE — ASSESSMENT & PLAN NOTE
Lab Results   Component Value Date    CREATININE 1.76 (H) 04/13/2024    CREATININE 1.86 (H) 04/12/2024    CREATININE 2.05 (H) 04/11/2024     Baseline creatinine 0.9-1.1  Admission creatinine 2.05  CT renal: No hydronephrosis. Delayed excreted contrast material in the renal collecting systems suggesting renal impairment. Trace bilateral pleural effusions, trace volume of ascites, and moderate anasarca consistent with volume overload.  Patient reports Aleve use and poor water intake for his right knee pain and more recently swelling --> Consider prerenal/ NSAID nephropathy. Also on entresto (ARB may be contributing)  Hypoalbumenic + protein in urine + anasarca on CT imaging  Unexplained RBC in urine + unclear lung findings on CTA  Of note, received contrast for CTA of the chest to r/o PE in setting of elevated ddimer  Continue to hold NSAIDS, Entresto, Jardiance, metformin and other nephrotoxins  C3, C4 negative.  Protein electrophoresis, immunoglobulin, anti-DNA antibody, anti-glomerular membrane antibody pending  Creatinine slightly improving.  Nephrology on board, appreciate recommendations.

## 2024-04-13 NOTE — ASSESSMENT & PLAN NOTE
Lab Results   Component Value Date    HGBA1C 12.1 (H) 03/22/2024       Recent Labs     04/12/24  2048 04/13/24  0808 04/13/24  1137 04/13/24  1408   POCGLU 217* 183* 124 58*       Blood Sugar Average: Last 72 hrs:  (P) 168.4Home regimen: Jardiance, metformin, Lantus, Humalog  Hold Jardiance and metformin in setting of JH  Patient was started on Lantus 35 units, Humalog 13 units 3 times daily with meals and sliding scale.  Today patient had hypoglycemia in the 50s and received treatment per protocol.  Decrease Lantus to 20 units daily, decreased Humalog to 5 units 3 times daily with meals and sliding scale insulin  Continue hypoglycemia protocol  diabetic diet

## 2024-04-13 NOTE — ASSESSMENT & PLAN NOTE
Recent Labs     04/11/24  1940 04/12/24  0557 04/13/24  0219   HGB 9.0* 7.8* 7.7*      Check iron panel  Monitor

## 2024-04-14 ENCOUNTER — APPOINTMENT (INPATIENT)
Dept: RADIOLOGY | Facility: HOSPITAL | Age: 49
DRG: 682 | End: 2024-04-14
Payer: COMMERCIAL

## 2024-04-14 PROBLEM — N04.9 ANASARCA ASSOCIATED WITH DISORDER OF KIDNEY: Status: ACTIVE | Noted: 2024-04-14

## 2024-04-14 LAB
ANION GAP SERPL CALCULATED.3IONS-SCNC: 5 MMOL/L (ref 4–13)
BUN SERPL-MCNC: 21 MG/DL (ref 5–25)
C3 SERPL-MCNC: 141 MG/DL (ref 87–200)
C4 SERPL-MCNC: 37 MG/DL (ref 19–52)
CALCIUM SERPL-MCNC: 7.5 MG/DL (ref 8.4–10.2)
CHLORIDE SERPL-SCNC: 107 MMOL/L (ref 96–108)
CK SERPL-CCNC: 25 U/L (ref 39–308)
CO2 SERPL-SCNC: 22 MMOL/L (ref 21–32)
CREAT SERPL-MCNC: 1.51 MG/DL (ref 0.6–1.3)
ERYTHROCYTE [DISTWIDTH] IN BLOOD BY AUTOMATED COUNT: 12.9 % (ref 11.6–15.1)
GFR SERPL CREATININE-BSD FRML MDRD: 53 ML/MIN/1.73SQ M
GLUCOSE SERPL-MCNC: 117 MG/DL (ref 65–140)
GLUCOSE SERPL-MCNC: 158 MG/DL (ref 65–140)
GLUCOSE SERPL-MCNC: 179 MG/DL (ref 65–140)
GLUCOSE SERPL-MCNC: 179 MG/DL (ref 65–140)
GLUCOSE SERPL-MCNC: 196 MG/DL (ref 65–140)
GLUCOSE SERPL-MCNC: 214 MG/DL (ref 65–140)
HCT VFR BLD AUTO: 26.7 % (ref 36.5–49.3)
HGB BLD-MCNC: 8.5 G/DL (ref 12–17)
MCH RBC QN AUTO: 28.6 PG (ref 26.8–34.3)
MCHC RBC AUTO-ENTMCNC: 31.8 G/DL (ref 31.4–37.4)
MCV RBC AUTO: 90 FL (ref 82–98)
PLATELET # BLD AUTO: 251 THOUSANDS/UL (ref 149–390)
PMV BLD AUTO: 10.4 FL (ref 8.9–12.7)
POTASSIUM SERPL-SCNC: 4.5 MMOL/L (ref 3.5–5.3)
RBC # BLD AUTO: 2.97 MILLION/UL (ref 3.88–5.62)
SODIUM SERPL-SCNC: 134 MMOL/L (ref 135–147)
WBC # BLD AUTO: 7.63 THOUSAND/UL (ref 4.31–10.16)

## 2024-04-14 PROCEDURE — 85027 COMPLETE CBC AUTOMATED: CPT | Performed by: INTERNAL MEDICINE

## 2024-04-14 PROCEDURE — 82948 REAGENT STRIP/BLOOD GLUCOSE: CPT

## 2024-04-14 PROCEDURE — 99232 SBSQ HOSP IP/OBS MODERATE 35: CPT | Performed by: INTERNAL MEDICINE

## 2024-04-14 PROCEDURE — 74022 RADEX COMPL AQT ABD SERIES: CPT

## 2024-04-14 PROCEDURE — 80048 BASIC METABOLIC PNL TOTAL CA: CPT | Performed by: INTERNAL MEDICINE

## 2024-04-14 PROCEDURE — 99024 POSTOP FOLLOW-UP VISIT: CPT | Performed by: PHYSICIAN ASSISTANT

## 2024-04-14 PROCEDURE — 82550 ASSAY OF CK (CPK): CPT | Performed by: INTERNAL MEDICINE

## 2024-04-14 RX ORDER — ALBUMIN (HUMAN) 12.5 G/50ML
25 SOLUTION INTRAVENOUS EVERY 8 HOURS
Status: COMPLETED | OUTPATIENT
Start: 2024-04-14 | End: 2024-04-15

## 2024-04-14 RX ORDER — POLYETHYLENE GLYCOL 3350 17 G/17G
17 POWDER, FOR SOLUTION ORAL 2 TIMES DAILY
Status: DISCONTINUED | OUTPATIENT
Start: 2024-04-14 | End: 2024-04-19 | Stop reason: HOSPADM

## 2024-04-14 RX ORDER — AMOXICILLIN 250 MG
2 CAPSULE ORAL 2 TIMES DAILY
Status: DISCONTINUED | OUTPATIENT
Start: 2024-04-14 | End: 2024-04-19 | Stop reason: HOSPADM

## 2024-04-14 RX ORDER — BISACODYL 10 MG
10 SUPPOSITORY, RECTAL RECTAL ONCE
Status: COMPLETED | OUTPATIENT
Start: 2024-04-14 | End: 2024-04-14

## 2024-04-14 RX ADMIN — PRAVASTATIN SODIUM 80 MG: 80 TABLET ORAL at 17:29

## 2024-04-14 RX ADMIN — METOPROLOL SUCCINATE 25 MG: 25 TABLET, EXTENDED RELEASE ORAL at 09:02

## 2024-04-14 RX ADMIN — OXYCODONE HYDROCHLORIDE 5 MG: 5 TABLET ORAL at 00:56

## 2024-04-14 RX ADMIN — OXYCODONE HYDROCHLORIDE 5 MG: 5 TABLET ORAL at 09:30

## 2024-04-14 RX ADMIN — GLYCERIN 1 SUPPOSITORY: 2 SUPPOSITORY RECTAL at 11:25

## 2024-04-14 RX ADMIN — POLYETHYLENE GLYCOL 3350 17 G: 17 POWDER, FOR SOLUTION ORAL at 09:01

## 2024-04-14 RX ADMIN — APIXABAN 5 MG: 5 TABLET, FILM COATED ORAL at 09:02

## 2024-04-14 RX ADMIN — INSULIN GLARGINE 20 UNITS: 100 INJECTION, SOLUTION SUBCUTANEOUS at 22:40

## 2024-04-14 RX ADMIN — AMIODARONE HYDROCHLORIDE 200 MG: 200 TABLET ORAL at 09:02

## 2024-04-14 RX ADMIN — SENNOSIDES AND DOCUSATE SODIUM 2 TABLET: 8.6; 5 TABLET ORAL at 17:29

## 2024-04-14 RX ADMIN — INSULIN LISPRO 2 UNITS: 100 INJECTION, SOLUTION INTRAVENOUS; SUBCUTANEOUS at 22:33

## 2024-04-14 RX ADMIN — BISACODYL 10 MG: 10 SUPPOSITORY RECTAL at 14:31

## 2024-04-14 RX ADMIN — CLONAZEPAM 0.5 MG: 0.5 TABLET ORAL at 01:49

## 2024-04-14 RX ADMIN — ONDANSETRON 4 MG: 2 INJECTION INTRAMUSCULAR; INTRAVENOUS at 05:09

## 2024-04-14 RX ADMIN — INSULIN LISPRO 1 UNITS: 100 INJECTION, SOLUTION INTRAVENOUS; SUBCUTANEOUS at 11:44

## 2024-04-14 RX ADMIN — ACETAMINOPHEN 975 MG: 325 TABLET, FILM COATED ORAL at 12:16

## 2024-04-14 RX ADMIN — HYDROMORPHONE HYDROCHLORIDE 1 MG: 1 INJECTION, SOLUTION INTRAMUSCULAR; INTRAVENOUS; SUBCUTANEOUS at 01:53

## 2024-04-14 RX ADMIN — ACETAMINOPHEN 975 MG: 325 TABLET, FILM COATED ORAL at 05:08

## 2024-04-14 RX ADMIN — ONDANSETRON 4 MG: 2 INJECTION INTRAMUSCULAR; INTRAVENOUS at 19:16

## 2024-04-14 RX ADMIN — SENNOSIDES AND DOCUSATE SODIUM 1 TABLET: 8.6; 5 TABLET ORAL at 09:02

## 2024-04-14 RX ADMIN — ALBUMIN (HUMAN) 25 G: 0.25 INJECTION, SOLUTION INTRAVENOUS at 19:10

## 2024-04-14 RX ADMIN — OXYCODONE HYDROCHLORIDE 5 MG: 5 TABLET ORAL at 17:29

## 2024-04-14 RX ADMIN — HYDROMORPHONE HYDROCHLORIDE 1 MG: 1 INJECTION, SOLUTION INTRAMUSCULAR; INTRAVENOUS; SUBCUTANEOUS at 12:17

## 2024-04-14 RX ADMIN — POLYETHYLENE GLYCOL 3350 17 G: 17 POWDER, FOR SOLUTION ORAL at 17:29

## 2024-04-14 RX ADMIN — HYDROMORPHONE HYDROCHLORIDE 1 MG: 1 INJECTION, SOLUTION INTRAMUSCULAR; INTRAVENOUS; SUBCUTANEOUS at 19:06

## 2024-04-14 RX ADMIN — APIXABAN 5 MG: 5 TABLET, FILM COATED ORAL at 17:29

## 2024-04-14 RX ADMIN — INSULIN LISPRO 5 UNITS: 100 INJECTION, SOLUTION INTRAVENOUS; SUBCUTANEOUS at 11:45

## 2024-04-14 RX ADMIN — INSULIN LISPRO 1 UNITS: 100 INJECTION, SOLUTION INTRAVENOUS; SUBCUTANEOUS at 08:59

## 2024-04-14 RX ADMIN — INSULIN LISPRO 5 UNITS: 100 INJECTION, SOLUTION INTRAVENOUS; SUBCUTANEOUS at 09:00

## 2024-04-14 RX ADMIN — ZOLPIDEM TARTRATE 10 MG: 5 TABLET ORAL at 00:56

## 2024-04-14 RX ADMIN — ACETAMINOPHEN 975 MG: 325 TABLET, FILM COATED ORAL at 22:34

## 2024-04-14 RX ADMIN — DAPTOMYCIN 575 MG: 500 INJECTION, POWDER, LYOPHILIZED, FOR SOLUTION INTRAVENOUS at 23:06

## 2024-04-14 RX ADMIN — ALBUMIN (HUMAN) 25 G: 0.25 INJECTION, SOLUTION INTRAVENOUS at 11:45

## 2024-04-14 RX ADMIN — RISPERIDONE 3 MG: 1 TABLET, FILM COATED ORAL at 09:02

## 2024-04-14 RX ADMIN — AMLODIPINE BESYLATE 5 MG: 5 TABLET ORAL at 09:02

## 2024-04-14 NOTE — ASSESSMENT & PLAN NOTE
Precipitated by septic shock secondary to septic arthritis  Rate control: Metoprolol XL 25 mg daily, amiodarone 200 mg daily  Anticoagulation: Eliquis.

## 2024-04-14 NOTE — ASSESSMENT & PLAN NOTE
Continue oxycodone and Dilaudid as needed  CT showed significant amount of stool throughout the colon.    Started bowel regimen- no bm yet   Increased sennakot to 2 tb bid  Add dulcolax suppository   Patient reported abdominal pain- check XR

## 2024-04-14 NOTE — ASSESSMENT & PLAN NOTE
Lab Results   Component Value Date    CREATININE 1.51 (H) 04/14/2024    CREATININE 1.76 (H) 04/13/2024    CREATININE 1.86 (H) 04/12/2024     Baseline creatinine 0.9-1.1  Admission creatinine 2.05  CT renal: No hydronephrosis. Delayed excreted contrast material in the renal collecting systems suggesting renal impairment. Trace bilateral pleural effusions, trace volume of ascites, and moderate anasarca consistent with volume overload.  On entresto, spironolactone   Hypoalbumenic + protein in urine + anasarca on CT imaging  Unexplained RBC in urine + unclear lung findings on CTA  Of note, received contrast for CTA of the chest to r/o PE in setting of elevated ddimer  Continue to hold NSAIDS, Entresto, Jardiance, metformin and other nephrotoxins  C3, C4 negative.  Protein electrophoresis, immunoglobulin, anti-DNA antibody, anti-glomerular membrane antibody pending  Creatinine improving.  Nephrology on board, appreciate recommendations.

## 2024-04-14 NOTE — ASSESSMENT & PLAN NOTE
Recent Labs     04/12/24  0557 04/13/24  0219 04/14/24  0455   HGB 7.8* 7.7* 8.5*        Anemia of chronic disease   Monitor

## 2024-04-14 NOTE — ASSESSMENT & PLAN NOTE
ECHO (03/25): EF 45% w/ mild global hypokinesis   On jardiance and Entresto, both on hold in setting of JH  No LE edema, abdominal wall edema  Per nephrology, give albumin and monitor   No need for diuretics currently   Continue Toprol XL and amlodipine  continue Eliquis

## 2024-04-14 NOTE — PLAN OF CARE
Problem: Potential for Falls  Goal: Patient will remain free of falls  Description: INTERVENTIONS:  - Educate patient/family on patient safety including physical limitations  - Instruct patient to call for assistance with activity   - Consult OT/PT to assist with strengthening/mobility   - Keep Call bell within reach  - Keep bed low and locked with side rails adjusted as appropriate  - Keep care items and personal belongings within reach  - Initiate and maintain comfort rounds  - Make Fall Risk Sign visible to staff  - Offer Toileting every x Hours, in advance of need  - Initiate/Maintain xalarm  - Obtain necessary fall risk management equipment: x  - Apply yellow socks and bracelet for high fall risk patients  - Consider moving patient to room near nurses station  Outcome: Progressing     Problem: PAIN - ADULT  Goal: Verbalizes/displays adequate comfort level or baseline comfort level  Description: Interventions:  - Encourage patient to monitor pain and request assistance  - Assess pain using appropriate pain scale  - Administer analgesics based on type and severity of pain and evaluate response  - Implement non-pharmacological measures as appropriate and evaluate response  - Consider cultural and social influences on pain and pain management  - Notify physician/advanced practitioner if interventions unsuccessful or patient reports new pain  Outcome: Progressing     Problem: INFECTION - ADULT  Goal: Absence or prevention of progression during hospitalization  Description: INTERVENTIONS:  - Assess and monitor for signs and symptoms of infection  - Monitor lab/diagnostic results  - Monitor all insertion sites, i.e. indwelling lines, tubes, and drains  - Monitor endotracheal if appropriate and nasal secretions for changes in amount and color  - [place] appropriate cooling/warming therapies per order  - Administer medications as ordered  - Instruct and encourage patient and family to use good hand hygiene  technique  - Identify and instruct in appropriate isolation precautions for identified infection/condition  Outcome: Progressing  Goal: Absence of fever/infection during neutropenic period  Description: INTERVENTIONS:  - Monitor WBC    Outcome: Progressing     Problem: SAFETY ADULT  Goal: Patient will remain free of falls  Description: INTERVENTIONS:  - Educate patient/family on patient safety including physical limitations  - Instruct patient to call for assistance with activity   - Consult OT/PT to assist with strengthening/mobility   - Keep Call bell within reach  - Keep bed low and locked with side rails adjusted as appropriate  - Keep care items and personal belongings within reach  - Initiate and maintain comfort rounds  - Make Fall Risk Sign visible to staff  - Offer Toileting every x Hours, in advance of need  - Initiate/Maintain xalarm  - Obtain necessary fall risk management equipment: x  - Apply yellow socks and bracelet for high fall risk patients  - Consider moving patient to room near nurses station  Outcome: Progressing  Goal: Maintain or return to baseline ADL function  Description: INTERVENTIONS:  -  Assess patient's ability to carry out ADLs; assess patient's baseline for ADL function and identify physical deficits which impact ability to perform ADLs (bathing, care of mouth/teeth, toileting, grooming, dressing, etc.)  - Assess/evaluate cause of self-care deficits   - Assess range of motion  - Assess patient's mobility; develop plan if impaired  - Assess patient's need for assistive devices and provide as appropriate  - Encourage maximum independence but intervene and supervise when necessary  - Involve family in performance of ADLs  - Assess for home care needs following discharge   - Consider OT consult to assist with ADL evaluation and planning for discharge  - Provide patient education as appropriate  Outcome: Progressing  Goal: Maintains/Returns to pre admission functional level  Description:  INTERVENTIONS:  - Perform AM-PAC 6 Click Basic Mobility/ Daily Activity assessment daily.  - Set and communicate daily mobility goal to care team and patient/family/caregiver.   - Collaborate with rehabilitation services on mobility goals if consulted  - Perform Range of Motion x times a day.  - Reposition patient every x hours.  - Dangle patient x times a day  - Stand patient x times a day  - Ambulate patient x times a day  - Out of bed to chair x times a day   - Out of bed for meals x times a day  - Out of bed for toileting  - Record patient progress and toleration of activity level   Outcome: Progressing     Problem: DISCHARGE PLANNING  Goal: Discharge to home or other facility with appropriate resources  Description: INTERVENTIONS:  - Identify barriers to discharge w/patient and caregiver  - Arrange for needed discharge resources and transportation as appropriate  - Identify discharge learning needs (meds, wound care, etc.)  - Arrange for interpretive services to assist at discharge as needed  - Refer to Case Management Department for coordinating discharge planning if the patient needs post-hospital services based on physician/advanced practitioner order or complex needs related to functional status, cognitive ability, or social support system  Outcome: Progressing     Problem: Knowledge Deficit  Goal: Patient/family/caregiver demonstrates understanding of disease process, treatment plan, medications, and discharge instructions  Description: Complete learning assessment and assess knowledge base.  Interventions:  - Provide teaching at level of understanding  - Provide teaching via preferred learning methods  Outcome: Progressing     Problem: NEUROSENSORY - ADULT  Goal: Achieves stable or improved neurological status  Description: INTERVENTIONS  - Monitor and report changes in neurological status  - Monitor vital signs such as temperature, blood pressure, glucose, and any other labs ordered   - Initiate measures  to prevent increased intracranial pressure  - Monitor for seizure activity and implement precautions if appropriate      Outcome: Progressing  Goal: Remains free of injury related to seizures activity  Description: INTERVENTIONS  - Maintain airway, patient safety  and administer oxygen as ordered  - Monitor patient for seizure activity, document and report duration and description of seizure to physician/advanced practitioner  - If seizure occurs,  ensure patient safety during seizure  - Reorient patient post seizure  - Seizure pads on all 4 side rails  - Instruct patient/family to notify RN of any seizure activity including if an aura is experienced  - Instruct patient/family to call for assistance with activity based on nursing assessment  - Administer anti-seizure medications if ordered    Outcome: Progressing  Goal: Achieves maximal functionality and self care  Description: INTERVENTIONS  - Monitor swallowing and airway patency with patient fatigue and changes in neurological status  - Encourage and assist patient to increase activity and self care.   - Encourage visually impaired, hearing impaired and aphasic patients to use assistive/communication devices  Outcome: Progressing     Problem: CARDIOVASCULAR - ADULT  Goal: Maintains optimal cardiac output and hemodynamic stability  Description: INTERVENTIONS:  - Monitor I/O, vital signs and rhythm  - Monitor for S/S and trends of decreased cardiac output  - Administer and titrate ordered vasoactive medications to optimize hemodynamic stability  - Assess quality of pulses, skin color and temperature  - Assess for signs of decreased coronary artery perfusion  - Instruct patient to report change in severity of symptoms  Outcome: Progressing  Goal: Absence of cardiac dysrhythmias or at baseline rhythm  Description: INTERVENTIONS:  - Continuous cardiac monitoring, vital signs, obtain 12 lead EKG if ordered  - Administer antiarrhythmic and heart rate control medications  as ordered  - Monitor electrolytes and administer replacement therapy as ordered  Outcome: Progressing     Problem: RESPIRATORY - ADULT  Goal: Achieves optimal ventilation and oxygenation  Description: INTERVENTIONS:  - Assess for changes in respiratory status  - Assess for changes in mentation and behavior  - Position to facilitate oxygenation and minimize respiratory effort  - Oxygen administered by appropriate delivery if ordered  - Initiate smoking cessation education as indicated  - Encourage broncho-pulmonary hygiene including cough, deep breathe, Incentive Spirometry  - Assess the need for suctioning and aspirate as needed  - Assess and instruct to report SOB or any respiratory difficulty  - Respiratory Therapy support as indicated  Outcome: Progressing     Problem: GASTROINTESTINAL - ADULT  Goal: Minimal or absence of nausea and/or vomiting  Description: INTERVENTIONS:  - Administer IV fluids if ordered to ensure adequate hydration  - Maintain NPO status until nausea and vomiting are resolved  - Nasogastric tube if ordered  - Administer ordered antiemetic medications as needed  - Provide nonpharmacologic comfort measures as appropriate  - Advance diet as tolerated, if ordered  - Consider nutrition services referral to assist patient with adequate nutrition and appropriate food choices  Outcome: Progressing  Goal: Maintains or returns to baseline bowel function  Description: INTERVENTIONS:  - Assess bowel function  - Encourage oral fluids to ensure adequate hydration  - Administer IV fluids if ordered to ensure adequate hydration  - Administer ordered medications as needed  - Encourage mobilization and activity  - Consider nutritional services referral to assist patient with adequate nutrition and appropriate food choices  Outcome: Progressing  Goal: Maintains adequate nutritional intake  Description: INTERVENTIONS:  - Monitor percentage of each meal consumed  - Identify factors contributing to decreased  intake, treat as appropriate  - Assist with meals as needed  - Monitor I&O, weight, and lab values if indicated  - Obtain nutrition services referral as needed  Outcome: Progressing  Goal: Establish and maintain optimal ostomy function  Description: INTERVENTIONS:  - Assess bowel function  - Encourage oral fluids to ensure adequate hydration  - Administer IV fluids if ordered to ensure adequate hydration   - Administer ordered medications as needed  - Encourage mobilization and activity  - Nutrition services referral to assist patient with appropriate food choices  - Assess stoma site  - Consider wound care consult   Outcome: Progressing  Goal: Oral mucous membranes remain intact  Description: INTERVENTIONS  - Assess oral mucosa and hygiene practices  - Implement preventative oral hygiene regimen  - Implement oral medicated treatments as ordered  - Initiate Nutrition services referral as needed  Outcome: Progressing     Problem: GENITOURINARY - ADULT  Goal: Maintains or returns to baseline urinary function  Description: INTERVENTIONS:  - Assess urinary function  - Encourage oral fluids to ensure adequate hydration if ordered  - Administer IV fluids as ordered to ensure adequate hydration  - Administer ordered medications as needed  - Offer frequent toileting  - Follow urinary retention protocol if ordered  Outcome: Progressing  Goal: Absence of urinary retention  Description: INTERVENTIONS:  - Assess patient’s ability to void and empty bladder  - Monitor I/O  - Bladder scan as needed  - Discuss with physician/AP medications to alleviate retention as needed  - Discuss catheterization for long term situations as appropriate  Outcome: Progressing  Goal: Urinary catheter remains patent  Description: INTERVENTIONS:  - Assess patency of urinary catheter  - If patient has a chronic campa, consider changing catheter if non-functioning  - Follow guidelines for intermittent irrigation of non-functioning urinary  catheter  Outcome: Progressing     Problem: METABOLIC, FLUID AND ELECTROLYTES - ADULT  Goal: Electrolytes maintained within normal limits  Description: INTERVENTIONS:  - Monitor labs and assess patient for signs and symptoms of electrolyte imbalances  - Administer electrolyte replacement as ordered  - Monitor response to electrolyte replacements, including repeat lab results as appropriate  - Instruct patient on fluid and nutrition as appropriate  Outcome: Progressing  Goal: Fluid balance maintained  Description: INTERVENTIONS:  - Monitor labs   - Monitor I/O and WT  - Instruct patient on fluid and nutrition as appropriate  - Assess for signs & symptoms of volume excess or deficit  Outcome: Progressing  Goal: Glucose maintained within target range  Description: INTERVENTIONS:  - Monitor Blood Glucose as ordered  - Assess for signs and symptoms of hyperglycemia and hypoglycemia  - Administer ordered medications to maintain glucose within target range  - Assess nutritional intake and initiate nutrition service referral as needed  Outcome: Progressing     Problem: SKIN/TISSUE INTEGRITY - ADULT  Goal: Skin Integrity remains intact(Skin Breakdown Prevention)  Description: Assess:  -Perform [place] assessment every x  -Clean and moisturize skin every x  -Inspect skin when repositioning, toileting, and assisting with ADLS  -Assess under medical devices such as x every x  -Assess extremities for adequate circulation and sensation     Bed Management:  -Have minimal linens on bed & keep smooth, unwrinkled  -Change linens as needed when moist or perspiring  -Avoid sitting or lying in one position for more than xx hours while in bed  -Keep HOB at xdegrees     Toileting:  -Offer bedside commode  -Assess for incontinence every x  -Use incontinent care products after each incontinent episode such as x    Activity:  -Mobilize patient x times a day  -Encourage activity and walks on unit  -Encourage or provide ROM exercises   -Turn  and reposition patient every x Hours  -Use appropriate equipment to lift or move patient in bed  -Instruct/ Assist with weight shifting every x when out of bed in chair  -Consider limitation of chair time xxxx hour intervals    Skin Care:  -Avoid use of baby powder, tape, friction and shearing, hot water or constrictive clothing  -Relieve pressure over bony prominences using x  -Do not massage red bony areas    Next Steps:  -Teach patient strategies to minimize risks such as x   -Consider consults to  interdisciplinary teams such as x  Outcome: Progressing  Goal: Incision(s), wounds(s) or drain site(s) healing without S/S of infection  Description: INTERVENTIONS  - Assess and document dressing, incision, wound bed, drain sites and surrounding tissue  - Provide patient and family education  - Perform skin care/dressing changes every x  Outcome: Progressing  Goal: Pressure injury heals and does not worsen  Description: Interventions:  - Implement low air loss mattress or specialty surface (Criteria met)  - Apply silicone foam dressing  - Instruct/assist with weight shifting every x minutes when in chair   - Limit chair time to x hour intervals  - Use special pressure reducing interventions such as x when in chair   - Apply fecal or urinary incontinence containment device   - Perform passive or active ROM every x  - Turn and reposition patient & offload bony prominences every x hours   - Utilize friction reducing device or surface for transfers   - Consider consults to  interdisciplinary teams such as x  - Use incontinent care products after each incontinent episode such as x  - Consider nutrition services referral as needed  Outcome: Progressing     Problem: HEMATOLOGIC - ADULT  Goal: Maintains hematologic stability  Description: INTERVENTIONS  - Assess for signs and symptoms of bleeding or hemorrhage  - Monitor labs  - Administer supportive blood products/factors as ordered and appropriate  Outcome: Progressing      Problem: MUSCULOSKELETAL - ADULT  Goal: Maintain or return mobility to safest level of function  Description: INTERVENTIONS:  - Assess patient's ability to carry out ADLs; assess patient's baseline for ADL function and identify physical deficits which impact ability to perform ADLs (bathing, care of mouth/teeth, toileting, grooming, dressing, etc.)  - Assess/evaluate cause of self-care deficits   - Assess range of motion  - Assess patient's mobility  - Assess patient's need for assistive devices and provide as appropriate  - Encourage maximum independence but intervene and supervise when necessary  - Involve family in performance of ADLs  - Assess for home care needs following discharge   - Consider OT consult to assist with ADL evaluation and planning for discharge  - Provide patient education as appropriate  Outcome: Progressing  Goal: Maintain proper alignment of affected body part  Description: INTERVENTIONS:  - Support, maintain and protect limb and body alignment  - Provide patient/ family with appropriate education  Outcome: Progressing     Problem: Prexisting or High Potential for Compromised Skin Integrity  Goal: Skin integrity is maintained or improved  Description: INTERVENTIONS:  - Identify patients at risk for skin breakdown  - Assess and monitor skin integrity  - Assess and monitor nutrition and hydration status  - Monitor labs   - Assess for incontinence   - Turn and reposition patient  - Assist with mobility/ambulation  - Relieve pressure over bony prominences  - Avoid friction and shearing  - Provide appropriate hygiene as needed including keeping skin clean and dry  - Evaluate need for skin moisturizer/barrier cream  - Collaborate with interdisciplinary team   - Patient/family teaching  - Consider wound care consult   Outcome: Progressing

## 2024-04-14 NOTE — PROGRESS NOTES
[facility]  Progress Note  Name: Armen Llanos I  MRN: [id number]  Unit/Bed#: -01 I Date of Admission: 4/11/2024   Date of Service: 4/14/2024 I Hospital Day: 2    Assessment/Plan   * JH (acute kidney injury) (HCC)  Assessment & Plan  Lab Results   Component Value Date    CREATININE 1.51 (H) 04/14/2024    CREATININE 1.76 (H) 04/13/2024    CREATININE 1.86 (H) 04/12/2024     Baseline creatinine 0.9-1.1  Admission creatinine 2.05  CT renal: No hydronephrosis. Delayed excreted contrast material in the renal collecting systems suggesting renal impairment. Trace bilateral pleural effusions, trace volume of ascites, and moderate anasarca consistent with volume overload.  On entresto, spironolactone   Hypoalbumenic + protein in urine + anasarca on CT imaging  Unexplained RBC in urine + unclear lung findings on CTA  Of note, received contrast for CTA of the chest to r/o PE in setting of elevated ddimer  Continue to hold NSAIDS, Entresto, Jardiance, metformin and other nephrotoxins  C3, C4 negative.  Protein electrophoresis, immunoglobulin, anti-DNA antibody, anti-glomerular membrane antibody pending  Creatinine improving.  Nephrology on board, appreciate recommendations.    Anasarca associated with disorder of kidney  Assessment & Plan  With hypoalbuminemia   Albumin for 2 doses per nephro   Monitor     Ambulatory dysfunction  Assessment & Plan  PT OT evaluation  Wife prefers patient to be discharged in a rehab if appropriate.    Anemia  Assessment & Plan  Recent Labs     04/12/24  0557 04/13/24  0219 04/14/24  0455   HGB 7.8* 7.7* 8.5*        Anemia of chronic disease   Monitor     Abnormal CT of the chest  Assessment & Plan  CTA chest:  1. Patchy groundglass opacities in the left upper lobe and a few right apical opacities suggesting atypical pneumonia,, pneumonitis or pulmonary hemorrhage.  2. Trace amount of pleural fluid bilaterally.  3. No evidence of acute pulmonary  embolus.    Mild non productive cough. Otherwise asymptomatic, no trouble breathing. Denies any aspiration event.  Pulmonology consulted, appreciate recommendations.  He Will follow-up with pulmonology outpatient and have a repeat CT chest in a few weeks    Cardiomyopathy ([facility])  Assessment & Plan  ECHO (03/25): EF 45% w/ mild global hypokinesis   On jardiance and Entresto, both on hold in setting of JH  No LE edema, abdominal wall edema  Per nephrology, give albumin and monitor   No need for diuretics currently   Continue Toprol XL and amlodipine  continue Eliquis    A-fib with RVR ([facility])  Assessment & Plan  Precipitated by septic shock secondary to septic arthritis  Rate control: Metoprolol XL 25 mg daily, amiodarone 200 mg daily  Anticoagulation: Eliquis.      MSSA bacteremia  Assessment & Plan  Completed a course of IV cefazolin on 4/11/2024 for septic arthritis with MSSA  Infection disease was consulted.  Ancef was discontinued given the patient's JH and suspicion for BEATRIZ  Patient on daptomycin currently    TBI (traumatic brain injury) ([facility])  Assessment & Plan  History of TBI at 7 years old    Essential hypertension  Assessment & Plan  On amlodipine, metoprolol, Entresto, Jardiance  Can continue amlodipine and metoprolol  Will hold Entresto and Jardiance for now given JH  Stable     Type 2 diabetes mellitus with ketoacidosis without coma, without long-term current use of insulin ([facility])  Assessment & Plan  Lab Results   Component Value Date    HGBA1C 12.1 (H) 03/22/2024       Recent Labs     04/13/24  1617 04/13/24 2028 04/14/24  0756 04/14/24  1047   POCGLU 131 270* 179* 158*         Blood Sugar Average: Last 72 hrs:  (P) 166.9Home regimen: Jardiance, metformin, Lantus, Humalog  Hold Jardiance and metformin in setting of JH  Patient was started on Lantus 35 units, Humalog 13 units 3 times daily with meals and sliding scale.  4/13 patient had hypoglycemia in the 50s and received treatment per  protocol.  Decreased Lantus to 20 units daily, decreased Humalog to 5 units 3 times daily with meals and sliding scale insulin- continue.   Continue hypoglycemia protocol  diabetic diet      Chronic, continuous use of opioids  Assessment & Plan  Continue oxycodone and Dilaudid as needed  CT showed significant amount of stool throughout the colon.    Started bowel regimen- no bm yet   Increased sennakot to 2 tb bid  Add dulcolax suppository   Patient reported abdominal pain- check XR          VTE Pharmacologic Prophylaxis: VTE Score: 8 High Risk (Score >/= 5) - Pharmacological DVT Prophylaxis Ordered: apixaban (Eliquis). Sequential Compression Devices Ordered.    Mobility:   Basic Mobility Inpatient Raw Score: 18  JH-HLM Goal: 6: Walk 10 steps or more  JH-HLM Achieved: 6: Walk 10 steps or more  JH-HLM Goal achieved. Continue to encourage appropriate mobility.    Patient Centered Rounds: I performed bedside rounds with nursing staff today.   Discussions with Specialists or Other Care Team Provider:     Education and Discussions with Family / Patient: Updated  (wife) via phone.    Total Time Spent on Date of Encounter in care of patient: 40 mins. This time was spent on one or more of the following: performing physical exam; counseling and coordination of care; obtaining or reviewing history; documenting in the medical record; reviewing/ordering tests, medications or procedures; communicating with other healthcare professionals and discussing with patient's family/caregivers.    Current Length of Stay: 2 day(s)  Current Patient Status: Inpatient   Certification Statement: The patient will continue to require additional inpatient hospital stay due to ambulatory dysfunction, fortunato  Discharge Plan: Anticipate discharge in 48 hrs to tbd    Code Status: Level 1 - Full Code    Subjective:     Had intermittent abdomina pain this am and felt his abdomen is enlarged. No bowel movement yet. No nausea, vomiting , cp or  shortness of breath     Objective:     Vitals:   Temp (24hrs), Av.5 °F (36.9 °C), Min:97.9 °F (36.6 °C), Max:98.8 °F (37.1 °C)    Temp:  [97.9 °F (36.6 °C)-98.8 °F (37.1 °C)] 98.8 °F (37.1 °C)  HR:  [88-93] 93  Resp:  [18-19] 19  BP: (155-163)/(86-93) 163/90  SpO2:  [95 %-98 %] 95 %  Body mass index is 24.5 kg/m².     Input and Output Summary (last 24 hours):     Intake/Output Summary (Last 24 hours) at 2024 1452  Last data filed at 2024 0501  Gross per 24 hour   Intake 1060 ml   Output 1300 ml   Net -240 ml       Physical Exam:   Physical Exam  Vitals and nursing note reviewed.   Constitutional:       General: He is not in acute distress.     Appearance: He is not diaphoretic.   HENT:      Head: Normocephalic.   Eyes:      General:         Right eye: No discharge.         Left eye: No discharge.   Cardiovascular:      Rate and Rhythm: Normal rate and regular rhythm.   Pulmonary:      Effort: Pulmonary effort is normal. No respiratory distress.      Breath sounds: No wheezing, rhonchi or rales.   Abdominal:      General: There is distension.      Palpations: Abdomen is soft.      Tenderness: There is no abdominal tenderness. There is no guarding or rebound.      Comments: Wall edema    Musculoskeletal:      Cervical back: Normal range of motion.      Right lower leg: No edema.      Left lower leg: No edema.   Skin:     General: Skin is warm.   Neurological:      Mental Status: He is alert and oriented to person, place, and time.   Psychiatric:         Mood and Affect: Mood normal.         Behavior: Behavior normal.         Thought Content: Thought content normal.         Judgment: Judgment normal.          Additional Data:     Labs:  Results from last 7 days   Lab Units 24  0455 24  0219 24  0557   WBC Thousand/uL 7.63   < > 7.92   HEMOGLOBIN g/dL 8.5*   < > 7.8*   HEMATOCRIT % 26.7*   < > 24.0*   PLATELETS Thousands/uL 251   < > 250   SEGS PCT %  --   --  64   LYMPHO PCT %  --   --   25   MONO PCT %  --   --  7   EOS PCT %  --   --  2    < > = values in this interval not displayed.     Results from last 7 days   Lab Units 04/14/24  0455 04/13/24  0219 04/12/24  0557   SODIUM mmol/L 134*   < > 135   POTASSIUM mmol/L 4.5   < > 4.8   CHLORIDE mmol/L 107   < > 108   CO2 mmol/L 22   < > 21   BUN mg/dL 21   < > 31*   CREATININE mg/dL 1.51*   < > 1.86*   ANION GAP mmol/L 5   < > 6   CALCIUM mg/dL 7.5*   < > 7.1*   ALBUMIN g/dL  --   --  2.3*   TOTAL BILIRUBIN mg/dL  --   --  0.28   ALK PHOS U/L  --   --  50   ALT U/L  --   --  <3*   AST U/L  --   --  8*   GLUCOSE RANDOM mg/dL 179*   < > 189*    < > = values in this interval not displayed.     Results from last 7 days   Lab Units 04/11/24  1940   INR  1.17     Results from last 7 days   Lab Units 04/14/24  1047 04/14/24  0756 04/13/24  2028 04/13/24  1617 04/13/24  1430 04/13/24  1408 04/13/24  1137 04/13/24  0808 04/12/24  2048 04/12/24  1601   POC GLUCOSE mg/dl 158* 179* 270* 131 89 58* 124 183* 217* 260*         Results from last 7 days   Lab Units 04/12/24  0557 04/11/24  1940   LACTIC ACID mmol/L  --  1.6   PROCALCITONIN ng/ml 0.11 0.12       Lines/Drains:  Invasive Devices       Peripherally Inserted Central Catheter Line  Duration             PICC Line 04/01/24 Right Basilic 13 days              Peripheral Intravenous Line  Duration             Peripheral IV 04/12/24 Left;Ventral (anterior) Forearm 2 days                    Central Line:  Goal for removal: N/A - Discharging with PICC for IV ABX/medications             Imaging: No pertinent imaging reviewed.    Recent Cultures (last 7 days):   Results from last 7 days   Lab Units 04/12/24  1926 04/11/24  2353 04/11/24  1940   BLOOD CULTURE   --  No Growth at 48 hrs. No Growth at 48 hrs.   LEGIONELLA URINARY ANTIGEN  Negative  --   --        Last 24 Hours Medication List:   Current Facility-Administered Medications   Medication Dose Route Frequency Provider Last Rate    acetaminophen  975 mg Oral  Q8H Inderjit Mckeon PA-C      Albumin 25%  25 g Intravenous Q8H NATA Goodwin      amiodarone  200 mg Oral Daily With Breakfast Inderjit Mckeon PA-C      amLODIPine  5 mg Oral Daily Jill Bond PA-C      apixaban  5 mg Oral BID Nancy Jc MD      clonazePAM  0.5 mg Oral BID PRN Suzette Burton PA-C      cyclobenzaprine  5 mg Oral TID PRN Inderjit Mckeon PA-C      DAPTOmycin  6 mg/kg Intravenous Q24H Vee Walker  mg (04/13/24 2246)    fentaNYL  1 patch Transdermal Q72H Inderjit Mckeon PA-C      HYDROmorphone  1 mg Intravenous Q6H PRN Uday Jean MD      insulin glargine  20 Units Subcutaneous HS Nancy Jc MD      insulin lispro  1-5 Units Subcutaneous 4x Daily (AC & HS) Inderjit Mckeon PA-C      insulin lispro  5 Units Subcutaneous TID With Meals Nancy Jc MD      metoclopramide  10 mg Intravenous Q6H PRN Nancy Jc MD      metoprolol succinate  25 mg Oral Daily Inderjit Mckeon PA-C      ondansetron  4 mg Intravenous Q6H PRN Gonzalez Davey MD      oxyCODONE  5 mg Oral Q6H PRN Uday Jean MD      polyethylene glycol  17 g Oral BID Nancy Jc MD      pravastatin  80 mg Oral Daily With Dinner Inderjit Mckeon PA-C      risperiDONE  3 mg Oral Daily Nancy Jc MD      senna-docusate sodium  2 tablet Oral BID Nancy Jc MD      zolpidem  10 mg Oral HS PRN Inderjit Mckeon PA-C          Today, Patient Was Seen By: Nancy Jc MD    **Please Note: This note may have been constructed using a voice recognition system.**

## 2024-04-14 NOTE — UTILIZATION REVIEW
Initial Clinical Review    Admission: Date/Time/Statement:   Admission Orders (From admission, onward)       Ordered        04/12/24 0407  INPATIENT ADMISSION  Once                          Orders Placed This Encounter   Procedures    INPATIENT ADMISSION     Standing Status:   Standing     Number of Occurrences:   1     Order Specific Question:   Level of Care     Answer:   Med Surg [16]     Order Specific Question:   Estimated length of stay     Answer:   More than 2 Midnights     Order Specific Question:   Certification     Answer:   I certify that inpatient services are medically necessary for this patient for a duration of greater than two midnights. See H&P and MD Progress Notes for additional information about the patient's course of treatment.     ED Arrival Information       Expected   -    Arrival   4/11/2024 19:18    Acuity   Urgent              Means of arrival   Walk-In    Escorted by   Spouse    Service   Hospitalist    Admission type   Emergency              Arrival complaint   SOB, post op problem, knee pain             Chief Complaint   Patient presents with    Knee Pain     Reports R knee pain that has gotten worse today.  Pt recently admitted for sepsis.  Denies fevers, n/v at home.        Initial Presentation: 49 y.o. male  with a PMH of T2DM, HTN, A-fib on Eliquis, h/o knee surgeries including b/l TKA and recent revision of right knee for septic arthritis presented to the ED from home w/ cute onsets swelling and pain of right knee x 24 hours.    Pt reports that he completed IV course of IV Ancef yesterday, pain well controlled and feeling like he was improving up until today.  reports Aleve use and poor water intake for his right knee pain . Reports missed 1 dose of Eliquis recently.  In the ED, significant lab work abnormality including an JH with creatinine 2.05, baseline 0.9-1.1, hypoalbuminemia, anemia with hemoglobin 9.0, significant proteinuria and hematuria.   CT renal showed no nephro or  ureterolithiasis, did show delayed emptying of the renal pelvis.   D-dimer was elevated and CTA chest was obtained which showed no pulmonary embolism, however diffuse groundglass opacities of unclear significance.   On exam, pulmonary effort normal, RLE edema present.  Given IV Dilaudid x multiple doses 1L IVF bolus, IV Levaquin, IC Ancef, started on Hep gtt.     Admit as Inpatient for evaluation and treatment of JH, swelling of RLE, abnormal CT of chest.  Plan: Stop NSAIDS, Entresto, Jardiance, metformin and other nephrotoxins. Give IVF, monitor for worsening swelling. Obtain prot/creat ratio and lipid panel. - Check C3, C4, ANCA, Anti-GBM. Nephrology consult. Trend BMP. Transition Eliquis to heparin gtt. Obtain CT RLE to look for abscess (non contrast given JH). Obtain Venous Duplex. Consult orthopedic surgery. Consult pulmonology. hold off on further abx until evaluated by pulmonology.    Ortho Consult:Status post right knee I&D with poly exchange for periprosthetic joint infection done on March 24, 2024.  Increased right lower extremity swelling. On exam, Right knee incision is healing very well.  No increased erythema, warmth or pain in the right knee. Sutures of the right knee were removed today.   Plan: ID consult. Continue range of motion exercises of the right knee. Weightbearing as tolerated right lower extremity. DVT prophylaxis: Heparin.    Pulmonology Consult:Abnormal CT chest with patchy groundglass opacities SADIE and right apical:  On exam, Scattered left-sided rales, Right greater than left lower extremity pitting edema.  Plan: Check mycoplasma IgM antibodies. Check for Legionella. CT chest in approximately 6 weeks.     Nephrology Consult: JH, Admission creatinine 2.05 on 4/11/2024.  Today's creatinine 1.86. baseline creatinine 0.8-1.0.   UA with + glucose, large blood, 3+ protein, 30-50 RBC, 2-5 WBC. CT with no hydronephrosis but with signs of volume overload.   Renal function stable and  creatinine trending down with fluids   Plan: no acute indication for renal replacement therapy (dialysis). Check serologies. DC IVF for now. Strict I/Os, daily weightsHold Entresto and Jardiance. Trend BMP     Date: 04/13   Day 2:   ID Consult:Patient with recent MSSA bacteremia and R TKR infection s/p poly exchange home on IV Ancef, re-admitted for LE edema, ARF.   Plan: d/c ancef. start Daptomycin. follow cbc, cmp and cpk f/u renal studies, follow cr. follow pulmonary status. follow wbc and fever curve     IM Notes: Pt reports improvement in his lower extremity edema but still has abdominal distention.  Has nausea today.   Creatinine slightly improving. BS in the 50s today. Given glucose chewable tabs.  Protein electrophoresis, immunoglobulin, anti-DNA antibody, anti-glomerular membrane antibody pending. PT/LOT eval. Dc hep gtt. Resume Eliquis. Decrease Lantus to 20 units daily, decreased Humalog to 5 units 3 times daily with meals and sliding scale insulin. Continue hypoglycemia protocol. diabetic diet.    Date: 04/14  Day 3: Has surpassed a 2nd midnight with active treatments and services.  Pt reports poor appetite, fatigued d/t not sleeping well ovn. C/o testicular/scrotal swelling. has hypoalbuminemia, will give albumin x 2 doses today and monitor response. Cont to mon renal function. Cont Daptomycin. Pain control prn. Cont Eliquis. Mon BS.     ED Triage Vitals   Temperature Pulse Respirations Blood Pressure SpO2   04/11/24 1932 04/11/24 1932 04/11/24 1932 04/11/24 1932 04/11/24 1932   98.9 °F (37.2 °C) 88 16 147/87 100 %      Temp Source Heart Rate Source Patient Position - Orthostatic VS BP Location FiO2 (%)   04/11/24 1932 04/11/24 1932 04/12/24 0925 04/11/24 1932 --   Oral Monitor Lying Left arm       Pain Score       04/11/24 1932       10 - Worst Possible Pain          Wt Readings from Last 1 Encounters:   04/12/24 96.2 kg (212 lb)     Additional Vital Signs:   Date/Time Temp Pulse Resp BP MAP (mmHg)  SpO2 O2 Device Patient Position - Orthostatic VS   04/14/24 07:17:34 98.8 °F (37.1 °C) 93 19 163/90 114 95 % None (Room air) Lying   04/13/24 2300 -- -- -- -- -- -- None (Room air) --   04/13/24 19:53:49 98.8 °F (37.1 °C) 88 18 162/93 116 98 % -- --   04/13/24 15:02:06 97.9 °F (36.6 °C) 89 -- 155/86 109 96 % -- --   04/13/24 1007 -- -- -- -- -- -- None (Room air) --   04/13/24 07:24:03 98.6 °F (37 °C) 86 -- 150/91 111 95 % -- --   04/13/24 0100 -- -- -- -- -- -- None (Room air) --   04/12/24 20:06:28 98.4 °F (36.9 °C) 79 18 145/90 108 98 % -- --   04/12/24 1629 97.7 °F (36.5 °C) 80 18 147/83 -- -- -- --   04/12/24 14:50:40 97.7 °F (36.5 °C) 82 18 147/83 104 97 % None (Room air) Lying   04/12/24 14:20:36 97.5 °F (36.4 °C) 77 -- 146/84 105 98 % -- --   04/12/24 1311 -- 84 18 150/91 115 96 % -- Lying   04/12/24 1201 -- 92 -- 150/91 115 95 % -- --   04/12/24 0925 -- 76 18 152/82 95 98 % -- Lying   04/12/24 0600 -- 88 18 138/90 109 96 % -- --   04/12/24 0445 -- 92 18 155/80 110 93 % -- --   04/12/24 0230 -- 89 18 168/81 116 97 % -- --   04/12/24 0145 -- 85 18 161/77 111 95 % None (Room air) --       Pertinent Labs/Diagnostic Test Results:   VAS VENOUS DUPLEX -LOWER LIMB UNILATERAL   Final Result by Shawna Acosta MD (04/13 2892)      CT lower extremity wo contrast right   Final Result by Reece Rojas MD (04/12 0872)   Status post total right knee arthroplasty with intact orthopedic hardware. Complex fluid within the patellofemoral compartment including air-fluid levels likely reflecting recent intervention. Persistent septic arthropathy is not excluded based upon    imaging. Correlate with laboratory results.            Workstation performed: [id number]         CT renal stone study abdomen pelvis without contrast   Final Result by nAn Gerard MD (04/12 0259)      No hydronephrosis.      Delayed excreted contrast material in the renal collecting systems suggesting renal impairment.      Trace bilateral pleural  effusions, trace volume of ascites, and moderate anasarca consistent with volume overload.      1.6 cm right adrenal adenoma.         Workstation performed: [id number]         CTA ED chest PE study   Final Result by Jovi Stock MD (04/12 0122)         1. Patchy groundglass opacities in the left upper lobe and a few right apical opacities suggesting atypical pneumonia,, pneumonitis or pulmonary hemorrhage.   2. Trace amount of pleural fluid bilaterally.   3. No evidence of acute pulmonary embolus.                  Workstation performed: [id number]         XR chest pa & lateral   ED Interpretation by Kaia Shaw MD (04/11 2342)   No acute pulmonary pathology      Final Result by Brody Lainez MD (04/12 1111)      Subtle peripheral lung opacities, left more than right, also characterized on the same day CT. Findings may represent an infectious/inflammatory process. Probable superimposed mild interstitial edema.      Please refer to same day CT chest.         Workstation performed: [id number]         XR abdomen obstruction series    (Results Pending)     04/11 EKG result: NSR    Results from last 7 days   Lab Units 04/12/24  0010   SARS-COV-2  Negative     Results from last 7 days   Lab Units 04/14/24 0455 04/13/24 0219 04/12/24  0557 04/11/24  1940   WBC Thousand/uL 7.63 6.81 7.92 7.70   HEMOGLOBIN g/dL 8.5* 7.7* 7.8* 9.0*   HEMATOCRIT % 26.7* 23.7* 24.0* 28.8*   PLATELETS Thousands/uL 251 229 250 309   TOTAL NEUT ABS Thousands/µL  --   --  5.15 4.61         Results from last 7 days   Lab Units 04/14/24  0455 04/13/24 0219 04/12/24  0557 04/11/24  1940   SODIUM mmol/L 134* 134* 135 137   POTASSIUM mmol/L 4.5 4.6 4.8 5.3   CHLORIDE mmol/L 107 107 108 109*   CO2 mmol/L 22 21 21 22   ANION GAP mmol/L 5 6 6 6   BUN mg/dL 21 27* 31* 29*   CREATININE mg/dL 1.51* 1.76* 1.86* 2.05*   EGFR ml/min/1.73sq m 53 44 41 36   CALCIUM mg/dL 7.5* 7.2* 7.1* 8.2*     Results from last 7 days   Lab Units  04/12/24  0557 04/11/24  1940   AST U/L 8* 10*   ALT U/L <3* <3*   ALK PHOS U/L 50 60   TOTAL PROTEIN g/dL 5.8* 6.4   ALBUMIN g/dL 2.3* 2.6*   TOTAL BILIRUBIN mg/dL 0.28 0.24     Results from last 7 days   Lab Units 04/14/24  1047 04/14/24  0756 04/13/24 2028 04/13/24  1617 04/13/24  1430 04/13/24  1408 04/13/24  1137 04/13/24  0808 04/12/24  2048 04/12/24  1601   POC GLUCOSE mg/dl 158* 179* 270* 131 89 58* 124 183* 217* 260*     Results from last 7 days   Lab Units 04/14/24  0455 04/13/24  0219 04/12/24  0557 04/11/24  1940   GLUCOSE RANDOM mg/dL 179* 188* 189* 207*             Beta- Hydroxybutyrate   Date Value Ref Range Status   03/22/2024 1.31 (H) 0.02 - 0.27 mmol/L Final                  Results from last 7 days   Lab Units 04/14/24  0455   CK TOTAL U/L 25*         Results from last 7 days   Lab Units 04/12/24  0010   D-DIMER QUANTITATIVE ug/ml FEU 2.73*     Results from last 7 days   Lab Units 04/13/24  0811 04/13/24  0219 04/12/24  1926 04/12/24  1117 04/11/24  1940   PROTIME seconds  --   --   --   --  15.4*   INR   --   --   --   --  1.17   PTT seconds 61* 80* 59*   < > 36    < > = values in this interval not displayed.         Results from last 7 days   Lab Units 04/12/24  0557 04/11/24  1940   PROCALCITONIN ng/ml 0.11 0.12     Results from last 7 days   Lab Units 04/11/24  1940   LACTIC ACID mmol/L 1.6                 Results from last 7 days   Lab Units 04/13/24  0219   FERRITIN ng/mL 245   IRON SATURATION % 44   IRON ug/dL 54   TIBC ug/dL 123*                     Results from last 7 days   Lab Units 04/13/24  0219   CRP mg/L 50.1*   SED RATE mm/hour 38*             Results from last 7 days   Lab Units 04/12/24  1313 04/12/24  0054   CLARITY UA   --  Clear   COLOR UA   --  Yellow   SPEC GRAV UA   --  1.020   PH UA   --  6.5   GLUCOSE UA mg/dl  --  1000 (1%)*   KETONES UA mg/dl  --  Negative   BLOOD UA   --  Large*   PROTEIN UA mg/dl  --  300 (3+)*   NITRITE UA   --  Negative   BILIRUBIN UA   --   Negative   UROBILINOGEN UA (BE) mg/dl  --  <2.0   LEUKOCYTES UA   --  Negative   WBC UA /hpf  --  2-4   RBC UA /hpf  --  30-50*   BACTERIA UA /hpf  --  Occasional   EPITHELIAL CELLS WET PREP /hpf  --  None Seen   CREATININE UR mg/dL 74.7 <1.0   PROTEIN UR mg/dL 521 <4   PROT/CREAT RATIO UR  6.97*  --      Results from last 7 days   Lab Units 04/12/24  1926 04/12/24  0010   LEGIONELLA URINARY ANTIGEN  Negative  --    INFLUENZA A PCR   --  Negative   INFLUENZA B PCR   --  Negative   RSV PCR   --  Negative                             Results from last 7 days   Lab Units 04/11/24  2353 04/11/24  1940   BLOOD CULTURE  No Growth at 48 hrs. No Growth at 48 hrs.                   ED Treatment:   Medication Administration from 04/11/2024 1918 to 04/12/2024 1412         Date/Time Order Dose Route Action     04/12/2024 0140 EDT sodium chloride 0.9 % bolus 1,000 mL 0 mL Intravenous Stopped     04/11/2024 2353 EDT sodium chloride 0.9 % bolus 1,000 mL 1,000 mL Intravenous New Bag     04/12/2024 0000 EDT HYDROmorphone (DILAUDID) injection 1 mg 1 mg Intravenous Given     04/12/2024 0049 EDT HYDROmorphone (DILAUDID) injection 0.5 mg 0.5 mg Intravenous Given     04/12/2024 0106 EDT iohexol (OMNIPAQUE) 350 MG/ML injection (MULTI-DOSE) 85 mL 85 mL Intravenous Given     04/12/2024 0309 EDT levofloxacin (LEVAQUIN) IVPB (premix in dextrose) 750 mg 150 mL 0 mg Intravenous Stopped     04/12/2024 0143 EDT levofloxacin (LEVAQUIN) IVPB (premix in dextrose) 750 mg 150 mL 750 mg Intravenous New Bag     04/12/2024 0447 EDT heparin (porcine) injection 7,600 Units 7,600 Units Intravenous Given     04/12/2024 1100 EDT heparin (porcine) 25,000 units in 0.45% NaCl 250 mL infusion (premix) 12 Units/kg/hr Intravenous Handoff     04/12/2024 0447 EDT heparin (porcine) 25,000 units in 0.45% NaCl 250 mL infusion (premix) 18 Units/kg/hr Intravenous New Bag     04/12/2024 0438 EDT HYDROmorphone (DILAUDID) injection 1 mg 1 mg Intravenous Given     04/12/2024  0920 EDT amiodarone tablet 200 mg 200 mg Oral Given     04/12/2024 0920 EDT amLODIPine (NORVASC) tablet 5 mg 5 mg Oral Given     04/12/2024 0920 EDT metoprolol succinate (TOPROL-XL) 24 hr tablet 25 mg 25 mg Oral Given     04/12/2024 0522 EDT morphine (MS CONTIN) ER tablet 60 mg 60 mg Oral Given     04/12/2024 0438 EDT risperiDONE (RisperDAL) tablet 3 mg 3 mg Oral Given     04/12/2024 1110 EDT sodium chloride 0.9 % infusion 0 mL/hr Intravenous Stopped     04/12/2024 0603 EDT sodium chloride 0.9 % infusion 75 mL/hr Intravenous New Bag     04/12/2024 1326 EDT acetaminophen (TYLENOL) tablet 975 mg 975 mg Oral Given     04/12/2024 0550 EDT acetaminophen (TYLENOL) tablet 975 mg 975 mg Oral Given     04/12/2024 0546 EDT HYDROmorphone (DILAUDID) injection 1 mg 1 mg Intravenous Given     04/12/2024 0921 EDT ondansetron (ZOFRAN) injection 4 mg 4 mg Intravenous Given     04/12/2024 0943 EDT HYDROmorphone HCl (DILAUDID) injection 0.2 mg 0.2 mg Intravenous Given     04/12/2024 0944 EDT ceFAZolin (ANCEF) IVPB (premix in dextrose) 2,000 mg 50 mL 2,000 mg Intravenous New Bag     04/12/2024 1151 EDT HYDROmorphone (DILAUDID) injection 1 mg 1 mg Intravenous Given          Past Medical History:   Diagnosis Date    Bed sore, stage 1     Diabetes mellitus (HCC)     Hypertension      Present on Admission:   Type 2 diabetes mellitus with ketoacidosis without coma, without long-term current use of insulin (HCC)   TBI (traumatic brain injury) ([facility])   MSSA bacteremia   Essential hypertension   A-fib with RVR ([facility])   JH (acute kidney injury) ([facility])   Cardiomyopathy ([facility])   Chronic, continuous use of opioids      Admitting Diagnosis: Dehydration [E86.0]  Lower extremity edema [R60.0]  Knee pain [M25.569]  Post-operative pain [G89.18]  Abnormal CT of the chest [R93.89]  Hematuria [R31.9]  Bilateral pneumonia [J18.9]  JH (acute kidney injury) (HCC) [N17.9]  Age/Sex: 49 y.o. male  Admission Orders:  SCd  PT/OT      Scheduled  Medications:  acetaminophen, 975 mg, Oral, Q8H  Albumin 25%, 25 g, Intravenous, Q8H  amiodarone, 200 mg, Oral, Daily With Breakfast  amLODIPine, 5 mg, Oral, Daily  apixaban, 5 mg, Oral, BID  bisacodyl, 10 mg, Rectal, Once  DAPTOmycin, 6 mg/kg, Intravenous, Q24H  fentaNYL, 1 patch, Transdermal, Q72H  insulin glargine, 20 Units, Subcutaneous, HS  insulin lispro, 1-5 Units, Subcutaneous, 4x Daily (AC & HS)  insulin lispro, 5 Units, Subcutaneous, TID With Meals  metoprolol succinate, 25 mg, Oral, Daily  polyethylene glycol, 17 g, Oral, Daily  pravastatin, 80 mg, Oral, Daily With Dinner  risperiDONE, 3 mg, Oral, Daily  senna-docusate sodium, 2 tablet, Oral, BID      Continuous IV Infusions:  heparin (porcine) 25,000 units in 0.45% NaCl 250 mL infusion (premix)  Rate: 2.9-28.5 mL/hr Dose: 3-30 Units/kg/hr  Weight Dosing Info: 95 kg (Order-Specific)  Freq: Titrated Route: IV  Last Dose: Stopped (04/13/24 1031)  Start: 04/12/24 0415 End: 04/13/24 1019   sodium chloride 0.9 % infusion  Rate: 75 mL/hr Dose: 75 mL/hr  Freq: Continuous Route: IV  Last Dose: Stopped (04/12/24 1110)  Start: 04/12/24 0530 End: 04/12/24 1129        PRN Meds:  clonazePAM, 0.5 mg, Oral, BID PRN 04/13 x 1, 04/14 x 1  cyclobenzaprine, 5 mg, Oral, TID PRN 04/12 x 1, 04/13 x 1  HYDROmorphone, 1 mg, Intravenous, Q6H PRN 04/12 x 1, 04/13 x 3, 04/14 x 2  metoclopramide, 10 mg, Intravenous, Q6H PRN 04/13 x 2  ondansetron, 4 mg, Intravenous, Q6H PRN 04/12 x 1, 04/13 x 1, 04/14 x 1  oxyCODONE, 5 mg, Oral, Q6H PRN 04/12 x 1, 04/13 x 2, 04/14 x 2  zolpidem, 10 mg, Oral, HS PRN 04/13 x 1, 04/14 x 1        IP CONSULT TO PULMONOLOGY  IP CONSULT TO NEPHROLOGY  IP CONSULT TO ORTHOPEDIC SURGERY  IP CONSULT TO INFECTIOUS DISEASES    Network Utilization Review Department  ATTENTION: Please call with any questions or concerns to [phone] and carefully listen to the prompts so that you are directed to the right person. All voicemails are confidential.   For Discharge  needs, contact Care Management DC Support Team at [phone] opt. 2  Send all requests for admission clinical reviews, approved or denied determinations and any other requests to dedicated fax number below belonging to the campus where the patient is receiving treatment. List of dedicated fax numbers for the Facilities:  FACILITY NAME UR FAX NUMBER   ADMISSION DENIALS (Administrative/Medical Necessity) [phone]   DISCHARGE SUPPORT TEAM (NETWORK) [phone]   PARENT CHILD HEALTH (Maternity/NICU/Pediatrics) [phone]   [facility] [phone]   [facility] [phone]   [facility] [phone]   [facility] [phone]   [facility] [phone]   [facility] [phone]   [facility] [phone]   [facility] [phone]   [facility] [phone]   Critical access hospital [phone]   [facility] [phone]   [facility] [phone]

## 2024-04-14 NOTE — PROGRESS NOTES
NEPHROLOGY PROGRESS NOTE   Armen Llanos 49 y.o. male MRN: [id number]  Unit/Bed#: -01 Encounter: [id number]      HPI/24hr EVENTS:    -49-year-old male past medical history of DM 2, hypertension, A-fib on Eliquis, chronic pain, history of TBI.  With recent mission for septic arthritis and MSSA bacteremia.  Presented right knee pain and swelling.  Nephrology consulted for management of JH     -Improving renal function     ASSESSMENT/PLAN:    JH (POA)  -Baseline creatinine: 0.8-1.0  -Creatinine on admission 2.05, most recent creatinine 1.5  -Etiology: Undifferentiated at this point, possibly prerenal azotemia from osmotic diuresis with hyperglycemia with concurrent Entresto use versus AIN from Ancef use  -UA: Glucose, large blood, 3+ protein, 30-50 RBCs, 2-4 WBCs (he reports he took Jardiance prior to admission x 1 dose)  -CT abdomen pelvis with no hydronephrosis  -Urine eosinophils are 0%, he has no peripheral eosinophilia  -IV Ancef has been discontinued in favor of IV daptomycin per ID  -Continue holding Entresto  -Patient received IV fluid early on admission and is now off all scheduled IV fluid  -Recent CT imaging showed trace bilateral pleural effusions, trace volume ascites and moderate anasarca concerning for volume overload.  If showing signs of volume overload will trial Lasix and albumin  -His creatinine has improved on admission with holding Entresto, IV fluid resuscitation.  His serologies are pending and Ancef has been discontinued.  Will continue close monitoring for now, await further lab work, I do not see a role for steroids right now  -Creatinine after receiving contrast on 4/12/2024     Nephrotic range proteinuria  -Initial UPCR was not accurate, repeat showed 6.9 g protein  -Serologies were sent including: RAJENDRA, C3/C4, ANCA, anti-GBM, antidouble-stranded DNA, SPEP/FLC/UPEP  -C3/C4 within normal range, RAJENDRA negative  -Etiology is possibly due to uncontrolled DM2 versus JH versus  AIN?  -Entresto is on hold  -His serum albumin is 2.3 most recently, we have not checked any triglycerides, but possibly does have nephrotic syndrome.  He has no significant lower extremity edema on exam today, complaints of testicular/scrotal edema, will trial albumin as below.  I do not believe he needs diuresis at this moment     Hypertension  -Currently on Norvasc 5 mg daily, Toprol-XL 25 mg daily  -Recent blood pressures are mildly elevated but acceptable     HFrEF/anasarca  -3/25/2024 TTE: EF is 45%, abnormal diastolic function  -Follows with cardiology as outpatient  -Self discontinued Jardiance as outpatient, managed on Entresto and Toprol-XL  -He has no lower extremity edema but complains of scrotal edema  -He has hypoalbuminemia, will give albumin x 2 doses today and monitor response     Hyponatremia  -Has a history of intermittent hyponatremia in the past, had a recent admission where he was noted to have hyponatremia.  Recent sodium is 134 glucose corrected to 135  -He is on multiple medications which can affect sodium level including risperidone, Cymbalta     Anemia  -Recent hemoglobin 8.5  -Paraproteinemia workup is pending     History of septic arthritis/MSSA bacteremia  -Had a recent admission with MSSA bacteremia from right TKR infection, was discharged on home Ancef  -ID and orthopedics consulted on admission, currently on daptomycin     Additional medical problems: DM2 last A1c 12.1, history of TBI, chronic pain, A-fib    Discussed with patient and family bedside    SUBJECTIVE:  Breathing stable, appetite is reduced reports he has some fatigue from not sleeping well overnight, denies any lower extremity edema only complaint is testicular/scrotal swelling    ROS:  Review of Systems   Constitutional:  Positive for fatigue.   Respiratory: Negative.     Cardiovascular: Negative.    Gastrointestinal: Negative.    Genitourinary:  Positive for scrotal swelling.   Neurological:  Positive for weakness.       A complete 10 point review of systems was performed and found to be negative unless otherwise noted above or in the HPI.    OBJECTIVE:  Current Weight: Weight - Scale: 96.2 kg (212 lb)  Vitals:    04/13/24 0724 04/13/24 1502 04/13/24 1953 04/14/24 0717   BP: 150/91 155/86 162/93 163/90   BP Location:    Left arm   Pulse: 86 89 88 93   Resp:   18 19   Temp: 98.6 °F (37 °C) 97.9 °F (36.6 °C) 98.8 °F (37.1 °C) 98.8 °F (37.1 °C)   TempSrc:    Temporal   SpO2: 95% 96% 98% 95%   Weight:       Height:           Intake/Output Summary (Last 24 hours) at 4/14/2024 1143  Last data filed at 4/14/2024 0501  Gross per 24 hour   Intake 1060 ml   Output 1300 ml   Net -240 ml     Physical Exam  Vitals and nursing note reviewed.   Constitutional:       General: He is not in acute distress.     Appearance: He is obese. He is not toxic-appearing or diaphoretic.      Comments: Awake sitting in bed   HENT:      Head: Normocephalic and atraumatic.      Nose: Nose normal.      Mouth/Throat:      Mouth: Mucous membranes are dry.   Eyes:      General: No scleral icterus.  Cardiovascular:      Rate and Rhythm: Normal rate.      Pulses: Normal pulses.   Pulmonary:      Effort: Pulmonary effort is normal. No respiratory distress.      Breath sounds: No wheezing or rales.   Abdominal:      General: Abdomen is flat.   Musculoskeletal:      Cervical back: Neck supple.      Right lower leg: No edema.      Left lower leg: No edema.      Comments: Right knee surgical incision   Skin:     General: Skin is warm and dry.      Capillary Refill: Capillary refill takes less than 2 seconds.   Neurological:      Mental Status: He is alert and oriented to person, place, and time.          Medications:    Current Facility-Administered Medications:     acetaminophen (TYLENOL) tablet 975 mg, 975 mg, Oral, Q8H, Inderjit Mckeon PA-C, 975 mg at 04/14/24 0508    albumin human (FLEXBUMIN) 25 % injection 25 g, 25 g, Intravenous, Q8H, NATA Goodwin     amiodarone tablet 200 mg, 200 mg, Oral, Daily With Breakfast, Inderjit Mckeon PA-C, 200 mg at 04/14/24 0902    amLODIPine (NORVASC) tablet 5 mg, 5 mg, Oral, Daily, Jill Bond PA-C, 5 mg at 04/14/24 0902    apixaban (ELIQUIS) tablet 5 mg, 5 mg, Oral, BID, Nancy Jc MD, 5 mg at 04/14/24 0902    bisacodyl (DULCOLAX) rectal suppository 10 mg, 10 mg, Rectal, Once, Nancy Jc MD    clonazePAM (KlonoPIN) tablet 0.5 mg, 0.5 mg, Oral, BID PRN, Suzette Burton PA-C, 0.5 mg at 04/14/24 0149    cyclobenzaprine (FLEXERIL) tablet 5 mg, 5 mg, Oral, TID PRN, Inderjit Mckeon PA-C, 5 mg at 04/13/24 1830    DAPTOmycin (CUBICIN) 575 mg in sodium chloride 0.9 % 50 mL IVPB, 6 mg/kg, Intravenous, Q24H, Vee Walker MD, Last Rate: 100 mL/hr at 04/13/24 2246, 575 mg at 04/13/24 2246    fentaNYL (DURAGESIC) 75 mcg/hr TD 72 hr patch 1 patch, 1 patch, Transdermal, Q72H, Inderjit Mckeon PA-C, 1 patch at 04/13/24 1712    HYDROmorphone (DILAUDID) injection 1 mg, 1 mg, Intravenous, Q6H PRN, Uday Jean MD, 1 mg at 04/14/24 0153    insulin glargine (LANTUS) subcutaneous injection 20 Units 0.2 mL, 20 Units, Subcutaneous, HS, Nancy Jc MD, 20 Units at 04/13/24 2129    insulin lispro (HumALOG/ADMELOG) 100 units/mL subcutaneous injection 1-5 Units, 1-5 Units, Subcutaneous, 4x Daily (AC & HS), 1 Units at 04/14/24 0859 **AND** Fingerstick Glucose (POCT), , , 4x Daily AC and at bedtime, Inderjit Mckeon PA-C    insulin lispro (HumALOG/ADMELOG) 100 units/mL subcutaneous injection 5 Units, 5 Units, Subcutaneous, TID With Meals, Nancy Jc MD, 5 Units at 04/14/24 0900    metoclopramide (REGLAN) injection 10 mg, 10 mg, Intravenous, Q6H PRN, Nancy Jc MD, 10 mg at 04/13/24 2129    metoprolol succinate (TOPROL-XL) 24 hr tablet 25 mg, 25 mg, Oral, Daily, Inderjit Mckeon PA-C, 25 mg at 04/14/24 0902    ondansetron (ZOFRAN) injection 4 mg, 4 mg, Intravenous, Q6H PRN, Gonzalez Davey,  MD, 4 mg at 04/14/24 0509    oxyCODONE (ROXICODONE) IR tablet 5 mg, 5 mg, Oral, Q6H PRN, Uday Jean MD, 5 mg at 04/14/24 0930    polyethylene glycol (MIRALAX) packet 17 g, 17 g, Oral, Daily, Nancy Jc MD, 17 g at 04/14/24 0901    pravastatin (PRAVACHOL) tablet 80 mg, 80 mg, Oral, Daily With Dinner, Inderjit Mckeon PA-C, 80 mg at 04/13/24 1712    risperiDONE (RisperDAL) tablet 3 mg, 3 mg, Oral, Daily, Nancy Jc MD, 3 mg at 04/14/24 0902    senna-docusate sodium (SENOKOT S) 8.6-50 mg per tablet 2 tablet, 2 tablet, Oral, BID, Nancy Jc MD    zolpidem (AMBIEN) tablet 10 mg, 10 mg, Oral, HS PRN, Inderjit Mckeon PA-C, 10 mg at 04/14/24 0056    Laboratory Results:  Results from last 7 days   Lab Units 04/14/24  0455 04/13/24  0219 04/12/24  0557 04/11/24  1940   WBC Thousand/uL 7.63 6.81 7.92 7.70   HEMOGLOBIN g/dL 8.5* 7.7* 7.8* 9.0*   HEMATOCRIT % 26.7* 23.7* 24.0* 28.8*   PLATELETS Thousands/uL 251 229 250 309   POTASSIUM mmol/L 4.5 4.6 4.8 5.3   CHLORIDE mmol/L 107 107 108 109*   CO2 mmol/L 22 21 21 22   BUN mg/dL 21 27* 31* 29*   CREATININE mg/dL 1.51* 1.76* 1.86* 2.05*   CALCIUM mg/dL 7.5* 7.2* 7.1* 8.2*       I have personally reviewed the blood work as stated above and in my note.  I have personally reviewed internal medicine, ID note.

## 2024-04-14 NOTE — PROGRESS NOTES
"Progress Note - Orthopedics   Armen Llanos 49 y.o. male MRN: [id number]  Unit/Bed#: -01 Encounter: [id number]    Assessment:  Status post right knee I&D with poly exchange for periprosthetic joint infection done by Dr. Washington on March 24, 2024.  Increased right lower extremity swelling, improving.    Plan:  Right knee incision is healing very well.  No increased erythema, warmth or pain in the right knee.  Sutures were removed Friday and steri-strips in place.  No dehiscence or drainage from the wound.   Continue Daptomycin per ID for PJI.  Pain control PRN  Continue range of motion exercises of the right knee  Weightbearing as tolerated right lower extremity  DVT prophylaxis: Eliquis  Medical management per primary  Follow up as outpatient with Dr. Washington in 2 weeks.      Subjective: Patient seen and examined at the bedside this morning.  He is doing well overall this morning and is having minimal pain.  He states that the swelling in his leg is significantly better and almost back to normal.  He denies any distal numbness and tingling.  No acute overnight events. He states that he is doing well on the daptomycin.     Vitals: Blood pressure 163/90, pulse 93, temperature 98.8 °F (37.1 °C), temperature source Temporal, resp. rate 19, height 6' 6\" (1.981 m), weight 96.2 kg (212 lb), SpO2 95%.,Body mass index is 24.5 kg/m².      Intake/Output Summary (Last 24 hours) at 4/14/2024 0942  Last data filed at 4/14/2024 0501  Gross per 24 hour   Intake 1060 ml   Output 1300 ml   Net -240 ml       Invasive Devices       Peripherally Inserted Central Catheter Line  Duration             PICC Line 04/01/24 Right Basilic 12 days              Peripheral Intravenous Line  Duration             Peripheral IV 04/12/24 Left;Ventral (anterior) Forearm 2 days                    Ortho Exam:   Right knee:   Inspection: Incision is CDI without drainage or erythema.  Steri-strips intact.  +swelling, improving  Palpation: Minimal " TTP about the knee. Thigh soft and compressible.  +effusion  ROM: 0-70  sensation grossly intact L4, L5, S1, palpable pedal pulse, EHL/AT/GS intact      Lab, Imaging and other studies: CBC:   Lab Results   Component Value Date    WBC 7.63 04/14/2024    HGB 8.5 (L) 04/14/2024    HCT 26.7 (L) 04/14/2024    MCV 90 04/14/2024     04/14/2024    RBC 2.97 (L) 04/14/2024    MCH 28.6 04/14/2024    MCHC 31.8 04/14/2024    RDW 12.9 04/14/2024    MPV 10.4 04/14/2024     CMP:   Lab Results   Component Value Date    SODIUM 134 (L) 04/14/2024     04/14/2024    CO2 22 04/14/2024    BUN 21 04/14/2024    CREATININE 1.51 (H) 04/14/2024    CALCIUM 7.5 (L) 04/14/2024    EGFR 53 04/14/2024               Selam Fitzpatrick PA-C

## 2024-04-14 NOTE — ASSESSMENT & PLAN NOTE
Lab Results   Component Value Date    HGBA1C 12.1 (H) 03/22/2024       Recent Labs     04/13/24  1617 04/13/24 2028 04/14/24  0756 04/14/24  1047   POCGLU 131 270* 179* 158*         Blood Sugar Average: Last 72 hrs:  (P) 166.9Home regimen: Jardiance, metformin, Lantus, Humalog  Hold Jardiance and metformin in setting of JH  Patient was started on Lantus 35 units, Humalog 13 units 3 times daily with meals and sliding scale.  4/13 patient had hypoglycemia in the 50s and received treatment per protocol.  Decreased Lantus to 20 units daily, decreased Humalog to 5 units 3 times daily with meals and sliding scale insulin- continue.   Continue hypoglycemia protocol  diabetic diet

## 2024-04-14 NOTE — PLAN OF CARE
Problem: PAIN - ADULT  Goal: Verbalizes/displays adequate comfort level or baseline comfort level  Description: Interventions:  - Encourage patient to monitor pain and request assistance  - Assess pain using appropriate pain scale  - Administer analgesics based on type and severity of pain and evaluate response  - Implement non-pharmacological measures as appropriate and evaluate response  - Consider cultural and social influences on pain and pain management  - Notify physician/advanced practitioner if interventions unsuccessful or patient reports new pain  Outcome: Progressing     Problem: INFECTION - ADULT  Goal: Absence or prevention of progression during hospitalization  Description: INTERVENTIONS:  - Assess and monitor for signs and symptoms of infection  - Monitor lab/diagnostic results  - Monitor all insertion sites, i.e. indwelling lines, tubes, and drains  - Monitor endotracheal if appropriate and nasal secretions for changes in amount and color  - [place] appropriate cooling/warming therapies per order  - Administer medications as ordered  - Instruct and encourage patient and family to use good hand hygiene technique  - Identify and instruct in appropriate isolation precautions for identified infection/condition  Outcome: Progressing  Goal: Absence of fever/infection during neutropenic period  Description: INTERVENTIONS:  - Monitor WBC    Outcome: Progressing

## 2024-04-15 ENCOUNTER — APPOINTMENT (INPATIENT)
Dept: ULTRASOUND IMAGING | Facility: HOSPITAL | Age: 49
DRG: 682 | End: 2024-04-15
Payer: COMMERCIAL

## 2024-04-15 LAB
ALBUMIN SERPL BCP-MCNC: 2.3 G/DL (ref 3.5–5)
ALBUMIN SERPL ELPH-MCNC: 1.79 G/DL (ref 3.2–5.1)
ALBUMIN SERPL ELPH-MCNC: 38 % (ref 48–70)
ALBUMIN UR ELPH-MCNC: 67.2 %
ALP SERPL-CCNC: 55 U/L (ref 34–104)
ALPHA1 GLOB MFR UR ELPH: 11.8 %
ALPHA1 GLOB SERPL ELPH-MCNC: 0.38 G/DL (ref 0.15–0.47)
ALPHA1 GLOB SERPL ELPH-MCNC: 8 % (ref 1.8–7)
ALPHA2 GLOB MFR UR ELPH: 3.3 %
ALPHA2 GLOB SERPL ELPH-MCNC: 0.81 G/DL (ref 0.42–1.04)
ALPHA2 GLOB SERPL ELPH-MCNC: 17.3 % (ref 5.9–14.9)
ALT SERPL W P-5'-P-CCNC: 3 U/L (ref 7–52)
ANION GAP SERPL CALCULATED.3IONS-SCNC: 4 MMOL/L (ref 4–13)
AST SERPL W P-5'-P-CCNC: 9 U/L (ref 13–39)
B-GLOBULIN MFR UR ELPH: 5.7 %
BETA GLOB ABNORMAL SERPL ELPH-MCNC: 0.25 G/DL (ref 0.31–0.57)
BETA1 GLOB SERPL ELPH-MCNC: 5.3 % (ref 4.7–7.7)
BETA2 GLOB SERPL ELPH-MCNC: 10.7 % (ref 3.1–7.9)
BETA2+GAMMA GLOB SERPL ELPH-MCNC: 0.5 G/DL (ref 0.2–0.58)
BILIRUB SERPL-MCNC: 0.37 MG/DL (ref 0.2–1)
BUN SERPL-MCNC: 20 MG/DL (ref 5–25)
CALCIUM ALBUM COR SERPL-MCNC: 8.7 MG/DL (ref 8.3–10.1)
CALCIUM SERPL-MCNC: 7.3 MG/DL (ref 8.4–10.2)
CHLORIDE SERPL-SCNC: 106 MMOL/L (ref 96–108)
CO2 SERPL-SCNC: 24 MMOL/L (ref 21–32)
CREAT SERPL-MCNC: 1.49 MG/DL (ref 0.6–1.3)
DSDNA AB SER-ACNC: <1 IU/ML (ref 0–9)
ERYTHROCYTE [DISTWIDTH] IN BLOOD BY AUTOMATED COUNT: 13.1 % (ref 11.6–15.1)
GAMMA GLOB ABNORMAL SERPL ELPH-MCNC: 0.97 G/DL (ref 0.4–1.66)
GAMMA GLOB MFR UR ELPH: 12 %
GAMMA GLOB SERPL ELPH-MCNC: 20.7 % (ref 6.9–22.3)
GBM AB SER IA-ACNC: <0.2 UNITS (ref 0–0.9)
GFR SERPL CREATININE-BSD FRML MDRD: 53 ML/MIN/1.73SQ M
GLUCOSE SERPL-MCNC: 100 MG/DL (ref 65–140)
GLUCOSE SERPL-MCNC: 112 MG/DL (ref 65–140)
GLUCOSE SERPL-MCNC: 153 MG/DL (ref 65–140)
GLUCOSE SERPL-MCNC: 248 MG/DL (ref 65–140)
GLUCOSE SERPL-MCNC: 261 MG/DL (ref 65–140)
HCT VFR BLD AUTO: 23.9 % (ref 36.5–49.3)
HGB BLD-MCNC: 7.8 G/DL (ref 12–17)
IGG/ALB SER: 0.61 {RATIO} (ref 1.1–1.8)
INTERPRETATION UR IFE-IMP: NORMAL
INTERPRETATION UR IFE-IMP: NORMAL
M PROTEIN 1 MFR SERPL ELPH: 4 %
M PROTEIN 1 SERPL ELPH-MCNC: 0.19 G/DL
MCH RBC QN AUTO: 29.1 PG (ref 26.8–34.3)
MCHC RBC AUTO-ENTMCNC: 32.6 G/DL (ref 31.4–37.4)
MCV RBC AUTO: 89 FL (ref 82–98)
PLATELET # BLD AUTO: 230 THOUSANDS/UL (ref 149–390)
PMV BLD AUTO: 9.7 FL (ref 8.9–12.7)
POTASSIUM SERPL-SCNC: 4.6 MMOL/L (ref 3.5–5.3)
PROT PATTERN SERPL ELPH-IMP: ABNORMAL
PROT PATTERN UR ELPH-IMP: NORMAL
PROT SERPL-MCNC: 4.7 G/DL (ref 6.4–8.2)
PROT SERPL-MCNC: 5.1 G/DL (ref 6.4–8.4)
PROT UR-MCNC: 501.2 MG/DL
RBC # BLD AUTO: 2.68 MILLION/UL (ref 3.88–5.62)
SODIUM SERPL-SCNC: 134 MMOL/L (ref 135–147)
WBC # BLD AUTO: 7.44 THOUSAND/UL (ref 4.31–10.16)

## 2024-04-15 PROCEDURE — 97163 PT EVAL HIGH COMPLEX 45 MIN: CPT

## 2024-04-15 PROCEDURE — 80053 COMPREHEN METABOLIC PANEL: CPT | Performed by: INTERNAL MEDICINE

## 2024-04-15 PROCEDURE — 99233 SBSQ HOSP IP/OBS HIGH 50: CPT | Performed by: INTERNAL MEDICINE

## 2024-04-15 PROCEDURE — 99232 SBSQ HOSP IP/OBS MODERATE 35: CPT | Performed by: INTERNAL MEDICINE

## 2024-04-15 PROCEDURE — 84166 PROTEIN E-PHORESIS/URINE/CSF: CPT | Performed by: PATHOLOGY

## 2024-04-15 PROCEDURE — 86334 IMMUNOFIX E-PHORESIS SERUM: CPT | Performed by: PATHOLOGY

## 2024-04-15 PROCEDURE — 76705 ECHO EXAM OF ABDOMEN: CPT

## 2024-04-15 PROCEDURE — 97166 OT EVAL MOD COMPLEX 45 MIN: CPT

## 2024-04-15 PROCEDURE — 82948 REAGENT STRIP/BLOOD GLUCOSE: CPT

## 2024-04-15 PROCEDURE — 86335 IMMUNFIX E-PHORSIS/URINE/CSF: CPT | Performed by: PATHOLOGY

## 2024-04-15 PROCEDURE — 84165 PROTEIN E-PHORESIS SERUM: CPT | Performed by: PATHOLOGY

## 2024-04-15 PROCEDURE — 85027 COMPLETE CBC AUTOMATED: CPT | Performed by: INTERNAL MEDICINE

## 2024-04-15 RX ORDER — FUROSEMIDE 10 MG/ML
40 INJECTION INTRAMUSCULAR; INTRAVENOUS ONCE
Status: COMPLETED | OUTPATIENT
Start: 2024-04-15 | End: 2024-04-15

## 2024-04-15 RX ORDER — SUMATRIPTAN 50 MG/1
100 TABLET, FILM COATED ORAL DAILY PRN
Status: DISCONTINUED | OUTPATIENT
Start: 2024-04-15 | End: 2024-04-19 | Stop reason: HOSPADM

## 2024-04-15 RX ORDER — AMLODIPINE BESYLATE 5 MG/1
5 TABLET ORAL 2 TIMES DAILY
Status: DISCONTINUED | OUTPATIENT
Start: 2024-04-15 | End: 2024-04-19 | Stop reason: HOSPADM

## 2024-04-15 RX ADMIN — ONDANSETRON 4 MG: 2 INJECTION INTRAMUSCULAR; INTRAVENOUS at 20:59

## 2024-04-15 RX ADMIN — ACETAMINOPHEN 975 MG: 325 TABLET, FILM COATED ORAL at 21:01

## 2024-04-15 RX ADMIN — FUROSEMIDE 40 MG: 10 INJECTION, SOLUTION INTRAMUSCULAR; INTRAVENOUS at 12:15

## 2024-04-15 RX ADMIN — OXYCODONE HYDROCHLORIDE 5 MG: 5 TABLET ORAL at 15:59

## 2024-04-15 RX ADMIN — APIXABAN 5 MG: 5 TABLET, FILM COATED ORAL at 08:50

## 2024-04-15 RX ADMIN — SUMATRIPTAN SUCCINATE 100 MG: 50 TABLET ORAL at 20:36

## 2024-04-15 RX ADMIN — SENNOSIDES AND DOCUSATE SODIUM 2 TABLET: 8.6; 5 TABLET ORAL at 08:50

## 2024-04-15 RX ADMIN — RISPERIDONE 3 MG: 1 TABLET, FILM COATED ORAL at 08:50

## 2024-04-15 RX ADMIN — INSULIN LISPRO 5 UNITS: 100 INJECTION, SOLUTION INTRAVENOUS; SUBCUTANEOUS at 08:56

## 2024-04-15 RX ADMIN — SENNOSIDES AND DOCUSATE SODIUM 2 TABLET: 8.6; 5 TABLET ORAL at 17:15

## 2024-04-15 RX ADMIN — INSULIN LISPRO 2 UNITS: 100 INJECTION, SOLUTION INTRAVENOUS; SUBCUTANEOUS at 17:20

## 2024-04-15 RX ADMIN — HYDROMORPHONE HYDROCHLORIDE 1 MG: 1 INJECTION, SOLUTION INTRAMUSCULAR; INTRAVENOUS; SUBCUTANEOUS at 02:31

## 2024-04-15 RX ADMIN — ONDANSETRON 4 MG: 2 INJECTION INTRAMUSCULAR; INTRAVENOUS at 11:44

## 2024-04-15 RX ADMIN — OXYCODONE HYDROCHLORIDE 5 MG: 5 TABLET ORAL at 08:50

## 2024-04-15 RX ADMIN — ACETAMINOPHEN 975 MG: 325 TABLET, FILM COATED ORAL at 13:17

## 2024-04-15 RX ADMIN — ZOLPIDEM TARTRATE 10 MG: 5 TABLET ORAL at 01:12

## 2024-04-15 RX ADMIN — POLYETHYLENE GLYCOL 3350 17 G: 17 POWDER, FOR SOLUTION ORAL at 17:14

## 2024-04-15 RX ADMIN — INSULIN GLARGINE 20 UNITS: 100 INJECTION, SOLUTION SUBCUTANEOUS at 21:02

## 2024-04-15 RX ADMIN — METOPROLOL SUCCINATE 25 MG: 25 TABLET, EXTENDED RELEASE ORAL at 08:50

## 2024-04-15 RX ADMIN — INSULIN LISPRO 1 UNITS: 100 INJECTION, SOLUTION INTRAVENOUS; SUBCUTANEOUS at 12:15

## 2024-04-15 RX ADMIN — AMLODIPINE BESYLATE 5 MG: 5 TABLET ORAL at 17:15

## 2024-04-15 RX ADMIN — INSULIN LISPRO 2 UNITS: 100 INJECTION, SOLUTION INTRAVENOUS; SUBCUTANEOUS at 21:02

## 2024-04-15 RX ADMIN — OXYCODONE HYDROCHLORIDE 5 MG: 5 TABLET ORAL at 01:12

## 2024-04-15 RX ADMIN — INSULIN LISPRO 5 UNITS: 100 INJECTION, SOLUTION INTRAVENOUS; SUBCUTANEOUS at 17:21

## 2024-04-15 RX ADMIN — POLYETHYLENE GLYCOL 3350 17 G: 17 POWDER, FOR SOLUTION ORAL at 08:50

## 2024-04-15 RX ADMIN — CYCLOBENZAPRINE HYDROCHLORIDE 5 MG: 5 TABLET, FILM COATED ORAL at 20:37

## 2024-04-15 RX ADMIN — ACETAMINOPHEN 975 MG: 325 TABLET, FILM COATED ORAL at 05:37

## 2024-04-15 RX ADMIN — APIXABAN 5 MG: 5 TABLET, FILM COATED ORAL at 17:15

## 2024-04-15 RX ADMIN — HYDROMORPHONE HYDROCHLORIDE 1 MG: 1 INJECTION, SOLUTION INTRAMUSCULAR; INTRAVENOUS; SUBCUTANEOUS at 10:12

## 2024-04-15 RX ADMIN — PRAVASTATIN SODIUM 80 MG: 80 TABLET ORAL at 17:15

## 2024-04-15 RX ADMIN — AMIODARONE HYDROCHLORIDE 200 MG: 200 TABLET ORAL at 08:50

## 2024-04-15 RX ADMIN — AMLODIPINE BESYLATE 5 MG: 5 TABLET ORAL at 08:50

## 2024-04-15 RX ADMIN — HYDROMORPHONE HYDROCHLORIDE 1 MG: 1 INJECTION, SOLUTION INTRAMUSCULAR; INTRAVENOUS; SUBCUTANEOUS at 17:17

## 2024-04-15 NOTE — PHYSICAL THERAPY NOTE
PHYSICAL THERAPY EVAL  Physical Therapy Evaluation    Performed at least 2 patient identifiers during session:  Patient Active Problem List   Diagnosis    Chronic, continuous use of opioids    Type 2 diabetes mellitus with ketoacidosis without coma, without long-term current use of insulin (HCC)    Essential hypertension    Hypokalemia    Hypocalcemia    TBI (traumatic brain injury) (HCC)    Syncope    Costochondral chest pain    Adrenal nodule (HCC)    JH (acute kidney injury) (HCC)    MSSA bacteremia    Chronic pain disorder    Mononeuropathy    Osteoarthritis of knee    A-fib with RVR (HCC)    Cardiomyopathy (HCC)    Anxiety    Abnormal CT of the chest    Other proteinuria    Anemia    Ambulatory dysfunction    Anasarca associated with disorder of kidney       Past Medical History:   Diagnosis Date    Bed sore, stage 1     Diabetes mellitus (HCC)     Hypertension        Past Surgical History:   Procedure Laterality Date    IR PICC PLACEMENT SINGLE LUMEN  4/1/2024    KNEE SURGERY      MT REVJ TOTAL KNEE ARTHRP W/WO ALGRFT 1 COMPONENT Right 3/24/2024    Procedure: ARTHROPLASTY KNEE TOTAL REVISION, POLY EXCHANGE;  Surgeon: Tao Washington DO;  Location:  MAIN OR;  Service: Orthopedics    TOE AMPUTATION Left 3/28/2024    Procedure: AMPUTATION TOE 2nd;  Surgeon: Rossy Toussaint DPM;  Location:  MAIN OR;  Service: Podiatry          04/15/24 1101   PT Last Visit   PT Visit Date 04/15/24   Note Type   Note type Evaluation   Pain Assessment   Pain Assessment Tool 0-10   Pain Score 4   Pain Location/Orientation Location: Generalized   Hospital Pain Intervention(s) Ambulation/increased activity   Restrictions/Precautions   Weight Bearing Precautions Per Order No   Other Precautions Pain;Fall Risk   Home Living   Type of Home House   Home Layout Two level  (10STE)   Home Equipment Walker;Cane   Prior Function   Level of [place]  "Independent with functional mobility;Independent with IADLS;Independent with ADLs   Lives With Spouse   Receives Help From Family   IADLs Independent with driving;Independent with meal prep;Independent with medication management   Falls in the last 6 months 0   Vocational Full time employment   Comments +drives   General   Additional Pertinent History Multiple re-admissions   Family/Caregiver Present No   Cognition   Overall Cognitive Status WFL   Arousal/Participation Alert   Attention Within functional limits   Orientation Level Oriented X4   Memory Within functional limits   Following Commands Follows one step commands without difficulty   Comments Impulsive at times.   Subjective   Subjective \"I have been walking in the hallway with my wife.\"   RLE Assessment   RLE Assessment WFL   LLE Assessment   LLE Assessment WFL   Bed Mobility   Supine to Sit 7  Independent   Transfers   Sit to Stand 6  Modified independent   Stand to Sit 6  Modified independent   Ambulation/Elevation   Gait pattern Forward Flexion  (Step through pattern)   Gait Assistance 6  Modified independent   Assistive Device Rolling walker   Distance 300ft x 2   Stair Management Assistance 6  Modified independent   Stair Management Technique One rail R;With cane   Number of Stairs 14   Ambulation/Elevation Additional Comments While at the top of the steps- patient let go of the railing and was only using the SPC. Had a LOB but was able to self correct. Advised patient to use RW at all times.   Balance   Static Sitting Normal   Dynamic Sitting Normal   Static Standing Normal   Dynamic Standing Normal   Ambulatory Good   Endurance Deficit   Endurance Deficit No   Activity Tolerance   Activity Tolerance Patient tolerated treatment well   Medical Staff Made Aware Patti HARMON   Nurse Made Aware Nicolle DE LA GARZA   Assessment   Prognosis Good   Problem List Decreased range of motion;Impaired balance;Decreased safety awareness   Assessment Patient is a 48y/o M who " presents with JH, anemia. Recent admission- Status post right knee I&D with poly exchange for periprosthetic joint infection done by Dr. Washington on March 24, 2024.  Increased right lower extremity swelling, improving. Patient resides with spouse and is independent at baseline with a SPC and RW. Current medical status includes pain, impulsivity, fall risk, decreased balance. Patient performed all mobility at an ind/mod I level. He ambulated a community distance and completed a full flight of steps. Patient does have balance deficits when he does not use the RW- advised to use at all times. Recommending level 3 resources. No further inpatient P.T. needs. Will discharge orderes. The patient's AM-PAC Basic Mobility Inpatient Short Form Raw Score is 24. A Raw score of greater than 17 suggests the patient may benefit from discharge to home. Please also refer to the recommendation of the Physical Therapist for safe discharge planning.   Barriers to Discharge None   Goals   Patient Goals To get better   Plan   Treatment/Interventions   (D/C P.T.)   Discharge Recommendation   Rehab Resource Intensity Level, PT III (Minimum Resource Intensity)   Additional Comments Owns a RW and SPC   AM-PAC Basic Mobility Inpatient   Turning in Flat Bed Without Bedrails 4   Lying on Back to Sitting on Edge of Flat Bed Without Bedrails 4   Moving Bed to Chair 4   Standing Up From Chair Using Arms 4   Walk in Room 4   Climb 3-5 Stairs With Railing 4   Basic Mobility Inpatient Raw Score 24   Basic Mobility Standardized Score 57.68   [facility] Highest Level Of Mobility   JH-HLM Goal 8: Walk 250 feet or more   JH-HLM Achieved 8: Walk 250 feet ot more   End of Consult   Patient Position at End of Consult Supine;All needs within reach     Marion Lobo, PT             Patient Name: Armen Llanos  Today's Date: 4/15/2024

## 2024-04-15 NOTE — PROGRESS NOTES
[facility]  Name: Armen Llanos I  MRN: [id number]  Unit/Bed#: -01 I Date of Admission: 4/11/2024   Date of Service: 4/15/2024 I Hospital Day: 3    Assessment/Plan   Anasarca associated with disorder of kidney  Assessment & Plan  With hypoalbuminemia and elevated protein to creatinine ratio, nephrotic syndrome likely secondary to?  Diabetes  Albumin for 2 doses per nephro   Monitor     Ambulatory dysfunction  Assessment & Plan  PT OT evaluation  Wife prefers patient to be discharged in a rehab if appropriate.    Anemia  Assessment & Plan  Recent Labs     04/13/24  0219 04/14/24  0455 04/15/24  0541   HGB 7.7* 8.5* 7.8*        Anemia of chronic disease   Monitor     Abnormal CT of the chest  Assessment & Plan  CTA chest:  1. Patchy groundglass opacities in the left upper lobe and a few right apical opacities suggesting atypical pneumonia,, pneumonitis or pulmonary hemorrhage.  2. Trace amount of pleural fluid bilaterally.  3. No evidence of acute pulmonary embolus.    Mild non productive cough. Otherwise asymptomatic, no trouble breathing. Denies any aspiration event.  Pulmonology consulted, appreciate recommendations.  He Will follow-up with pulmonology outpatient and have a repeat CT chest in a few weeks    Cardiomyopathy (HCC)  Assessment & Plan  ECHO (03/25): EF 45% w/ mild global hypokinesis   On jardiance and Entresto, both on hold in setting of JH  No LE edema, abdominal wall edema  Per nephrology, give albumin and monitor   No need for diuretics currently   Continue Toprol XL and amlodipine  continue Eliquis    MSSA bacteremia  Assessment & Plan  Completed a course of IV cefazolin on 4/11/2024 for septic arthritis with MSSA  Infection disease was consulted.  Ancef was discontinued given the patient's JH and suspicion for BEATRIZ  Patient on daptomycin currently    TBI (traumatic brain injury) (HCC)  Assessment & Plan  History of TBI at 7 years  old    Essential hypertension  Assessment & Plan  On amlodipine, metoprolol, Entresto, Jardiance  Can continue amlodipine and metoprolol  Will hold Entresto and Jardiance for now given JH  Stable     Type 2 diabetes mellitus with ketoacidosis without coma, without long-term current use of insulin (HCC)  Assessment & Plan  Lab Results   Component Value Date    HGBA1C 12.1 (H) 03/22/2024       Recent Labs     04/14/24  1948 04/14/24  2232 04/15/24  0729 04/15/24  1110   POCGLU 196* 214* 100 153*         Blood Sugar Average: Last 72 hrs:  (P) [id number] regimen: Jardiance, metformin, Lantus, Humalog  Hold Jardiance and metformin in setting of JH  Patient was started on Lantus 35 units, Humalog 13 units 3 times daily with meals and sliding scale.  4/13 patient had hypoglycemia in the 50s and received treatment per protocol.  Decreased Lantus to 20 units daily, decreased Humalog to 5 units 3 times daily with meals and sliding scale insulin- continue.   Continue hypoglycemia protocol  diabetic diet      Chronic, continuous use of opioids  Assessment & Plan  Continue oxycodone and Dilaudid as needed  CT showed significant amount of stool throughout the colon.    Started bowel regimen- no bm yet   Increased sennakot to 2 tb bid  Add dulcolax suppository   Patient reported abdominal pain- check XR     * JH (acute kidney injury) (HCC)  Assessment & Plan  Lab Results   Component Value Date    CREATININE 1.49 (H) 04/15/2024    CREATININE 1.51 (H) 04/14/2024    CREATININE 1.76 (H) 04/13/2024     Baseline creatinine 0.9-1.1  Admission creatinine 2.05  CT renal: No hydronephrosis. Delayed excreted contrast material in the renal collecting systems suggesting renal impairment. Trace bilateral pleural effusions, trace volume of ascites, and moderate anasarca consistent with volume overload.  On entresto, spironolactone   Hypoalbumenic + protein in urine + anasarca on CT imaging  Unexplained RBC in urine + unclear  lung findings on CTA  Of note, received contrast for CTA of the chest to r/o PE in setting of elevated ddimer  Continue to hold NSAIDS, Entresto, Jardiance, metformin and other nephrotoxins  C3, C4 negative.  Protein electrophoresis, immunoglobulin, anti-DNA antibody, anti-glomerular membrane antibody pending  Creatinine improving.  Nephrology on board, appreciate recommendations.  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 Was supposed to be seen by them today as outpatient for suture removal.  No dvt  Ortho consulted, no intervention needed.  Follow-up outpatient.             VTE  Prophylaxis:   Pharmacologic: in place- eliquis  Mechanical VTE Prophylaxis in Place: Yes    Patient Centered Rounds: I have performed bedside rounds with nursing staff today.    Discussions with Specialists or Other Care Team Provider: case management    Education and Discussions with Family / Patient: patient    Mobility:   Basic Mobility Inpatient Raw Score: 24  JH-HLM Goal: 8: Walk 250 feet or more  JH-HLM Achieved: 8: Walk 250 feet ot more  JH-HLM Goal achieved. Continue to encourage appropriate mobility.    Total Time Spent on Date of Encounter in care of patient: 60 mins. This time was spent on one or more of the following: performing physical exam; counseling and coordination of care; obtaining or reviewing history; documenting in the medical record;  reviewing/ordering tests, medications or procedures; communicating with other healthcare professionals and discussing with patient's family/caregivers.      Current Length of Stay: 3 day(s)    Current Patient Status: Inpatient        Code Status: Level 1 - Full Code    Discharge Plan: Pt will require continued inpatient hospitalization.    Subjective:   Complains of scrotal, abdominal distention.  Stated feels belly is bloated.    Patient is seen and examined at bedside.  All other ROS are negative.    Objective:     Vitals:   Temp (24hrs), Av.5 °F (36.9 °C), Min:98.2 °F (36.8 °C), Max:98.6 °F (37 °C)    Temp:  [98.2 °F (36.8 °C)-98.6 °F (37 °C)] 98.6 °F (37 °C)  HR:  [85-95] 95  Resp:  [12-19] 12  BP: (152-155)/(87-90) 152/90  SpO2:  [91 %-94 %] 91 %  Body mass index is 24.5 kg/m².     Input and Output Summary (last 24 hours):       Intake/Output Summary (Last 24 hours) at 4/15/2024 1344  Last data filed at 4/15/2024 1328  Gross per 24 hour   Intake 860 ml   Output 2500 ml   Net -1640 ml       Physical Exam:       GEN: No acute distress, comfortable  HEEENT: No JVD, PERRLA, no scleral icterus  RESP: Lungs clear to auscultation bilaterally  CV: RRR, +s1/s2   ABD: SOFT NON TENDER, POSITIVE BOWEL SOUNDS, no fluid wave  Scrotum-swollen  PSYCH: CALM  NEURO: Mentation baseline, NO FOCAL DEFICITS  SKIN: NO RASH  EXTREM: NO EDEMA    Additional Data:     Labs:    Results from last 7 days   Lab Units 04/15/24  0541 24  0219 24  0557   WBC Thousand/uL 7.44   < > 7.92   HEMOGLOBIN g/dL 7.8*   < > 7.8*   HEMATOCRIT % 23.9*   < > 24.0*   PLATELETS Thousands/uL 230   < > 250   SEGS PCT %  --   --  64   LYMPHO PCT %  --   --  25   MONO PCT %  --   --  7   EOS PCT %  --   --  2    < > = values in this interval not displayed.     Results from last 7 days   Lab Units 04/15/24  0541   SODIUM mmol/L 134*   POTASSIUM mmol/L 4.6   CHLORIDE mmol/L 106   CO2 mmol/L 24   BUN mg/dL 20   CREATININE mg/dL 1.49*   ANION GAP  mmol/L 4   CALCIUM mg/dL 7.3*   ALBUMIN g/dL 2.3*   TOTAL BILIRUBIN mg/dL 0.37   ALK PHOS U/L 55   ALT U/L 3*   AST U/L 9*   GLUCOSE RANDOM mg/dL 112     Results from last 7 days   Lab Units 04/11/24  1940   INR  1.17     Results from last 7 days   Lab Units 04/15/24  1110 04/15/24  0729 04/14/24  2232 04/14/24  1948 04/14/24  1547 04/14/24  1047 04/14/24  0756 04/13/24  2028 04/13/24  1617 04/13/24  1430 04/13/24  1408 04/13/24  1137   POC GLUCOSE mg/dl 153* 100 214* 196* 117 158* 179* 270* 131 89 58* 124         Results from last 7 days   Lab Units 04/12/24  0557 04/11/24 1940   LACTIC ACID mmol/L  --  1.6   PROCALCITONIN ng/ml 0.11 0.12       Lines/Drains:  Invasive Devices       Peripherally Inserted Central Catheter Line  Duration             PICC Line 04/01/24 Right Basilic 14 days              Peripheral Intravenous Line  Duration             Peripheral IV 04/12/24 Left;Ventral (anterior) Forearm 3 days                    Telemetry:        * I Have Reviewed All Lab Data Listed Above.           Imaging:     Results for orders placed during the hospital encounter of 03/22/24    XR chest portable    Narrative  XR CHEST PORTABLE    INDICATION: SIRS.    COMPARISON: Chest radiograph 3/17/2023    FINDINGS:    Clear lungs. No pneumothorax or pleural effusion.    Normal cardiomediastinal silhouette.    Bones are unremarkable for age.    Gas-filled loops of bowel are seen immediately below the left hemidiaphragm.    Impression  No acute cardiopulmonary disease.        Workstation performed: [id number]    Results for orders placed during the hospital encounter of 04/11/24    XR chest pa & lateral    Narrative  XR CHEST PA & LATERAL    INDICATION: sob.    COMPARISON: Radiograph from March 24, 2024    FINDINGS: Right PICC tip overlying the caval atrial junction.    Subtle opacities in the periphery of the lungs, left more than right. Mild prominence of the interstitial markings adjacent to these opacities. No pneumothorax  or pleural effusion.    Normal cardiomediastinal silhouette.    Bones are unremarkable for age.    Normal upper abdomen.    Impression  Subtle peripheral lung opacities, left more than right, also characterized on the same day CT. Findings may represent an infectious/inflammatory process. Probable superimposed mild interstitial edema.    Please refer to same day CT chest.      Workstation performed: [id number]      *I have reviewed all imaging reports listed above      Recent Cultures (last 7 days):     Results from last 7 days   Lab Units 04/12/24  1926 04/11/24  2353 04/11/24  1940   BLOOD CULTURE   --  No Growth at 72 hrs. No Growth at 72 hrs.   LEGIONELLA URINARY ANTIGEN  Negative  --   --        Last 24 Hours Medication List:   Current Facility-Administered Medications   Medication Dose Route Frequency Provider Last Rate    acetaminophen  975 mg Oral Q8H Inderjit Mckeon PA-C      amiodarone  200 mg Oral Daily With Breakfast Inderjit Mckeon PA-C      amLODIPine  5 mg Oral Daily Jill Bond PA-C      apixaban  5 mg Oral BID Nancy Jc MD      clonazePAM  0.5 mg Oral BID PRN Suzette Burton PA-C      cyclobenzaprine  5 mg Oral TID PRN Inderjit Mckeon PA-C      DAPTOmycin  6 mg/kg Intravenous Q24H Vee Walker  mg (04/14/24 2306)    fentaNYL  1 patch Transdermal Q72H Inderjit Mckeon PA-C      HYDROmorphone  1 mg Intravenous Q6H PRN Uday Jean MD      insulin glargine  20 Units Subcutaneous HS Nancy Jc MD      insulin lispro  1-5 Units Subcutaneous 4x Daily (AC & HS) Inderjit Mckeon PA-C      insulin lispro  5 Units Subcutaneous TID With Meals Nancy Jc MD      metoclopramide  10 mg Intravenous Q6H PRN Nancy Jc MD      metoprolol succinate  25 mg Oral Daily Inderjit Mckeon PA-C      ondansetron  4 mg Intravenous Q6H PRN Gonzalez Davey MD      oxyCODONE  5 mg Oral Q6H PRN Uday Jean MD      polyethylene glycol  17 g Oral BID  Nancy Jc MD      pravastatin  80 mg Oral Daily With Dinner Inderjit Mckeon PA-C      risperiDONE  3 mg Oral Daily Nancy Jc MD      senna-docusate sodium  2 tablet Oral BID Nancy Jc MD      zolpidem  10 mg Oral HS PRN Inderjit Mckeon PA-C          Today, Patient Was Seen By: Penelope Ha MD    ** Please Note: Dictation voice to text software may have been used in the creation of this document. **

## 2024-04-15 NOTE — PLAN OF CARE
Problem: Potential for Falls  Goal: Patient will remain free of falls  Description: INTERVENTIONS:  - Educate patient/family on patient safety including physical limitations  - Instruct patient to call for assistance with activity   - Consult OT/PT to assist with strengthening/mobility   - Keep Call bell within reach  - Keep bed low and locked with side rails adjusted as appropriate  - Keep care items and personal belongings within reach  - Initiate and maintain comfort rounds  - Make Fall Risk Sign visible to staff  - Offer Toileting every 2 Hours, in advance of need  - Initiate/Maintain bed alarm  - Obtain necessary fall risk management equipment: yellow socks  - Apply yellow socks and bracelet for high fall risk patients  - Consider moving patient to room near nurses station  Outcome: Progressing     Problem: PAIN - ADULT  Goal: Verbalizes/displays adequate comfort level or baseline comfort level  Description: Interventions:  - Encourage patient to monitor pain and request assistance  - Assess pain using appropriate pain scale  - Administer analgesics based on type and severity of pain and evaluate response  - Implement non-pharmacological measures as appropriate and evaluate response  - Consider cultural and social influences on pain and pain management  - Notify physician/advanced practitioner if interventions unsuccessful or patient reports new pain  Outcome: Progressing     Problem: INFECTION - ADULT  Goal: Absence or prevention of progression during hospitalization  Description: INTERVENTIONS:  - Assess and monitor for signs and symptoms of infection  - Monitor lab/diagnostic results  - Monitor all insertion sites, i.e. indwelling lines, tubes, and drains  - Monitor endotracheal if appropriate and nasal secretions for changes in amount and color  - Lewis appropriate cooling/warming therapies per order  - Administer medications as ordered  - Instruct and encourage patient and family to use good hand  hygiene technique  - Identify and instruct in appropriate isolation precautions for identified infection/condition  Outcome: Progressing  Goal: Absence of fever/infection during neutropenic period  Description: INTERVENTIONS:  - Monitor WBC    Outcome: Progressing     Problem: SAFETY ADULT  Goal: Patient will remain free of falls  Description: INTERVENTIONS:  - Educate patient/family on patient safety including physical limitations  - Instruct patient to call for assistance with activity   - Consult OT/PT to assist with strengthening/mobility   - Keep Call bell within reach  - Keep bed low and locked with side rails adjusted as appropriate  - Keep care items and personal belongings within reach  - Initiate and maintain comfort rounds  - Make Fall Risk Sign visible to staff  - Offer Toileting every 2 Hours, in advance of need  - Initiate/Maintain bed alarm  - Obtain necessary fall risk management equipment: yellow socks  - Apply yellow socks and bracelet for high fall risk patients  - Consider moving patient to room near nurses station  Outcome: Progressing  Goal: Maintain or return to baseline ADL function  Description: INTERVENTIONS:  -  Assess patient's ability to carry out ADLs; assess patient's baseline for ADL function and identify physical deficits which impact ability to perform ADLs (bathing, care of mouth/teeth, toileting, grooming, dressing, etc.)  - Assess/evaluate cause of self-care deficits   - Assess range of motion  - Assess patient's mobility; develop plan if impaired  - Assess patient's need for assistive devices and provide as appropriate  - Encourage maximum independence but intervene and supervise when necessary  - Involve family in performance of ADLs  - Assess for home care needs following discharge   - Consider OT consult to assist with ADL evaluation and planning for discharge  - Provide patient education as appropriate  Outcome: Progressing  Goal: Maintains/Returns to pre admission  functional level  Description: INTERVENTIONS:  - Perform AM-PAC 6 Click Basic Mobility/ Daily Activity assessment daily.  - Set and communicate daily mobility goal to care team and patient/family/caregiver.   - Collaborate with rehabilitation services on mobility goals if consulted  - Perform Range of Motion 2 times a day.  - Reposition patient every 2 hours.  - Dangle patient 2 times a day  - Stand patient 2 times a day  - Ambulate patient 2 times a day  - Out of bed to chair 2 times a day   - Out of bed for meals 2 times a day  - Out of bed for toileting  - Record patient progress and toleration of activity level   Outcome: Progressing     Problem: DISCHARGE PLANNING  Goal: Discharge to home or other facility with appropriate resources  Description: INTERVENTIONS:  - Identify barriers to discharge w/patient and caregiver  - Arrange for needed discharge resources and transportation as appropriate  - Identify discharge learning needs (meds, wound care, etc.)  - Arrange for interpretive services to assist at discharge as needed  - Refer to Case Management Department for coordinating discharge planning if the patient needs post-hospital services based on physician/advanced practitioner order or complex needs related to functional status, cognitive ability, or social support system  Outcome: Progressing     Problem: Knowledge Deficit  Goal: Patient/family/caregiver demonstrates understanding of disease process, treatment plan, medications, and discharge instructions  Description: Complete learning assessment and assess knowledge base.  Interventions:  - Provide teaching at level of understanding  - Provide teaching via preferred learning methods  Outcome: Progressing     Problem: NEUROSENSORY - ADULT  Goal: Achieves stable or improved neurological status  Description: INTERVENTIONS  - Monitor and report changes in neurological status  - Monitor vital signs such as temperature, blood pressure, glucose, and any other  labs ordered   - Initiate measures to prevent increased intracranial pressure  - Monitor for seizure activity and implement precautions if appropriate      Outcome: Progressing  Goal: Remains free of injury related to seizures activity  Description: INTERVENTIONS  - Maintain airway, patient safety  and administer oxygen as ordered  - Monitor patient for seizure activity, document and report duration and description of seizure to physician/advanced practitioner  - If seizure occurs,  ensure patient safety during seizure  - Reorient patient post seizure  - Seizure pads on all 4 side rails  - Instruct patient/family to notify RN of any seizure activity including if an aura is experienced  - Instruct patient/family to call for assistance with activity based on nursing assessment  - Administer anti-seizure medications if ordered    Outcome: Progressing  Goal: Achieves maximal functionality and self care  Description: INTERVENTIONS  - Monitor swallowing and airway patency with patient fatigue and changes in neurological status  - Encourage and assist patient to increase activity and self care.   - Encourage visually impaired, hearing impaired and aphasic patients to use assistive/communication devices  Outcome: Progressing     Problem: CARDIOVASCULAR - ADULT  Goal: Maintains optimal cardiac output and hemodynamic stability  Description: INTERVENTIONS:  - Monitor I/O, vital signs and rhythm  - Monitor for S/S and trends of decreased cardiac output  - Administer and titrate ordered vasoactive medications to optimize hemodynamic stability  - Assess quality of pulses, skin color and temperature  - Assess for signs of decreased coronary artery perfusion  - Instruct patient to report change in severity of symptoms  Outcome: Progressing  Goal: Absence of cardiac dysrhythmias or at baseline rhythm  Description: INTERVENTIONS:  - Continuous cardiac monitoring, vital signs, obtain 12 lead EKG if ordered  - Administer antiarrhythmic  and heart rate control medications as ordered  - Monitor electrolytes and administer replacement therapy as ordered  Outcome: Progressing     Problem: RESPIRATORY - ADULT  Goal: Achieves optimal ventilation and oxygenation  Description: INTERVENTIONS:  - Assess for changes in respiratory status  - Assess for changes in mentation and behavior  - Position to facilitate oxygenation and minimize respiratory effort  - Oxygen administered by appropriate delivery if ordered  - Initiate smoking cessation education as indicated  - Encourage broncho-pulmonary hygiene including cough, deep breathe, Incentive Spirometry  - Assess the need for suctioning and aspirate as needed  - Assess and instruct to report SOB or any respiratory difficulty  - Respiratory Therapy support as indicated  Outcome: Progressing     Problem: GASTROINTESTINAL - ADULT  Goal: Minimal or absence of nausea and/or vomiting  Description: INTERVENTIONS:  - Administer IV fluids if ordered to ensure adequate hydration  - Maintain NPO status until nausea and vomiting are resolved  - Nasogastric tube if ordered  - Administer ordered antiemetic medications as needed  - Provide nonpharmacologic comfort measures as appropriate  - Advance diet as tolerated, if ordered  - Consider nutrition services referral to assist patient with adequate nutrition and appropriate food choices  Outcome: Progressing  Goal: Maintains or returns to baseline bowel function  Description: INTERVENTIONS:  - Assess bowel function  - Encourage oral fluids to ensure adequate hydration  - Administer IV fluids if ordered to ensure adequate hydration  - Administer ordered medications as needed  - Encourage mobilization and activity  - Consider nutritional services referral to assist patient with adequate nutrition and appropriate food choices  Outcome: Progressing  Goal: Maintains adequate nutritional intake  Description: INTERVENTIONS:  - Monitor percentage of each meal consumed  - Identify  factors contributing to decreased intake, treat as appropriate  - Assist with meals as needed  - Monitor I&O, weight, and lab values if indicated  - Obtain nutrition services referral as needed  Outcome: Progressing  Goal: Establish and maintain optimal ostomy function  Description: INTERVENTIONS:  - Assess bowel function  - Encourage oral fluids to ensure adequate hydration  - Administer IV fluids if ordered to ensure adequate hydration   - Administer ordered medications as needed  - Encourage mobilization and activity  - Nutrition services referral to assist patient with appropriate food choices  - Assess stoma site  - Consider wound care consult   Outcome: Progressing  Goal: Oral mucous membranes remain intact  Description: INTERVENTIONS  - Assess oral mucosa and hygiene practices  - Implement preventative oral hygiene regimen  - Implement oral medicated treatments as ordered  - Initiate Nutrition services referral as needed  Outcome: Progressing     Problem: GENITOURINARY - ADULT  Goal: Maintains or returns to baseline urinary function  Description: INTERVENTIONS:  - Assess urinary function  - Encourage oral fluids to ensure adequate hydration if ordered  - Administer IV fluids as ordered to ensure adequate hydration  - Administer ordered medications as needed  - Offer frequent toileting  - Follow urinary retention protocol if ordered  Outcome: Progressing  Goal: Absence of urinary retention  Description: INTERVENTIONS:  - Assess patient’s ability to void and empty bladder  - Monitor I/O  - Bladder scan as needed  - Discuss with physician/AP medications to alleviate retention as needed  - Discuss catheterization for long term situations as appropriate  Outcome: Progressing  Goal: Urinary catheter remains patent  Description: INTERVENTIONS:  - Assess patency of urinary catheter  - If patient has a chronic campa, consider changing catheter if non-functioning  - Follow guidelines for intermittent irrigation of  non-functioning urinary catheter  Outcome: Progressing     Problem: METABOLIC, FLUID AND ELECTROLYTES - ADULT  Goal: Electrolytes maintained within normal limits  Description: INTERVENTIONS:  - Monitor labs and assess patient for signs and symptoms of electrolyte imbalances  - Administer electrolyte replacement as ordered  - Monitor response to electrolyte replacements, including repeat lab results as appropriate  - Instruct patient on fluid and nutrition as appropriate  Outcome: Progressing  Goal: Fluid balance maintained  Description: INTERVENTIONS:  - Monitor labs   - Monitor I/O and WT  - Instruct patient on fluid and nutrition as appropriate  - Assess for signs & symptoms of volume excess or deficit  Outcome: Progressing  Goal: Glucose maintained within target range  Description: INTERVENTIONS:  - Monitor Blood Glucose as ordered  - Assess for signs and symptoms of hyperglycemia and hypoglycemia  - Administer ordered medications to maintain glucose within target range  - Assess nutritional intake and initiate nutrition service referral as needed  Outcome: Progressing

## 2024-04-15 NOTE — ASSESSMENT & PLAN NOTE
With hypoalbuminemia and elevated protein to creatinine ratio, nephrotic syndrome likely secondary to?  Diabetes  Albumin for 2 doses per nephro   Monitor

## 2024-04-15 NOTE — ASSESSMENT & PLAN NOTE
Lab Results   Component Value Date    HGBA1C 12.1 (H) 03/22/2024       Recent Labs     04/14/24  1948 04/14/24  2232 04/15/24  0729 04/15/24  1110   POCGLU 196* 214* 100 153*         Blood Sugar Average: Last 72 hrs:  (P) [id number] regimen: Jardiance, metformin, Lantus, Humalog  Hold Jardiance and metformin in setting of JH  Patient was started on Lantus 35 units, Humalog 13 units 3 times daily with meals and sliding scale.  4/13 patient had hypoglycemia in the 50s and received treatment per protocol.  Decreased Lantus to 20 units daily, decreased Humalog to 5 units 3 times daily with meals and sliding scale insulin- continue.   Continue hypoglycemia protocol  diabetic diet

## 2024-04-15 NOTE — OCCUPATIONAL THERAPY NOTE
Occupational Therapy Evaluation      [employer]    4/15/2024    Principal Problem:    JH (acute kidney injury) (HCC)  Active Problems:    Chronic, continuous use of opioids    Type 2 diabetes mellitus with ketoacidosis without coma, without long-term current use of insulin (HCC)    Essential hypertension    TBI (traumatic brain injury) (HCC)    MSSA bacteremia    A-fib with RVR (HCC)    Cardiomyopathy (HCC)    Abnormal CT of the chest    Anemia    Ambulatory dysfunction    Anasarca associated with disorder of kidney      Past Medical History:   Diagnosis Date    Bed sore, stage 1     Diabetes mellitus (HCC)     Hypertension        Past Surgical History:   Procedure Laterality Date    IR PICC PLACEMENT SINGLE LUMEN  4/1/2024    KNEE SURGERY      NE REVJ TOTAL KNEE ARTHRP W/WO ALGRFT 1 COMPONENT Right 3/24/2024    Procedure: ARTHROPLASTY KNEE TOTAL REVISION, POLY EXCHANGE;  Surgeon: Tao Washington DO;  Location:  MAIN OR;  Service: Orthopedics    TOE AMPUTATION Left 3/28/2024    Procedure: AMPUTATION TOE 2nd;  Surgeon: Rossy Toussaint DPM;  Location:  MAIN OR;  Service: Podiatry        04/15/24 1053   OT Last Visit   OT Visit Date 04/15/24   Note Type   Note type Evaluation   Pain Assessment   Pain Assessment Tool Mclean-Baker FACES   Mclean-Baker FACES Pain Rating 6   Pain Location/Orientation   (Testicles)   Hospital Pain Intervention(s) Repositioned;Ambulation/increased activity   Restrictions/Precautions   Weight Bearing Precautions Per Order No   Home Living   Type of Home House   Home Layout Two level  (10STE)   Home Equipment Walker;Cane;Wheelchair-manual  (currently using RW)   Prior Function   Level of [place] Independent with ADLs;Independent with functional mobility;Independent with IADLS  (wife assists with showers as needed)   Lives With Spouse   Receives Help From Family   IADLs Independent with driving;Independent with meal prep;Independent with medication management   Falls in the  last 6 months 0   Vocational Full time employment   Comments +   Subjective   Subjective I'm doing well!   ADL   Eating Assistance 7  Independent   Grooming Assistance 7  Independent   UB Bathing Assistance 6  Modified Independent   LB Bathing Assistance 6  Modified Independent   UB Dressing Assistance 7  Independent   LB Dressing Assistance 6  Modified independent   Toileting Assistance  6  Modified independent   Bed Mobility   Supine to Sit 7  Independent   Transfers   Sit to Stand 7  Independent   Stand to Sit 7  Independent   Stand pivot 6  Modified independent  (RW)   Functional Mobility   Functional Mobility 6  Modified independent   Additional items Rolling walker   Activity Tolerance   Activity Tolerance Patient tolerated treatment well   Medical Staff Made Aware PT Ruth Ann   Nurse Made Aware FREDERIC ALBARADO Assessment   RUE Assessment WFL   LUE Assessment   LUE Assessment WFL   Hand Function   Gross Motor Coordination Functional   Fine Motor Coordination Functional   Cognition   Overall Cognitive Status WFL   Arousal/Participation Alert;Cooperative   Attention Within functional limits   Orientation Level Oriented X4   Memory Within functional limits   Following Commands Follows one step commands without difficulty   Comments Impulsive at times. Hx TBI.   Assessment   Prognosis Good   Assessment Pt is a 49 y.o. male seen for OT evaluation at [facility], admitted 4/11/2024 w/ JH (acute kidney injury) (HCC).  OT completed expanded review of pt's medical and social history. Comorbidities affecting pt's functional performance at time of assessment include: DM type 2, HTN, TBI, JH, aFib, cardiomyopathy, ambulatory dysfunction, syncope, chronic pain, neuropathy, osteoarthritis, anxiety. Prior to admission, pt was living in 2SH, 10STE and was independent with ADL/light IADL. Upon evaluation, pt presents to OT close to functional baseline. The patient's raw score on the AM-PAC Daily Activity inpatient  short form is 24, standardized score is 57.54, greater than 39.4. Patients at this level are likely to benefit from DC to home. Based on findings, pt is of moderate complexity, due to medical comorbidities. Pt seen as a co-eval with PT due to the patient's co-morbidities, clinically unstable presentation, and present impairments which are a regression from the patient's baseline. At this time, OT recommendations at time of discharge are level 4. No further acute OT needs indicated at this time - Recommend pt continue to be OOB for meals, ambulation to/from BR, perform self care tasks, and mobility in hallway with nursing. D/C from OT caseload with above recommendations.   Goals   Patient Goals go home   Plan   OT Frequency Eval only   Discharge Recommendation   Rehab Resource Intensity Level, OT No post-acute rehabilitation needs   AM-PAC Daily Activity Inpatient   Lower Body Dressing 4   Bathing 4   Toileting 4   Upper Body Dressing 4   Grooming 4   Eating 4   Daily Activity Raw Score 24   Daily Activity Standardized Score (Calc for Raw Score >=11) 57.54     Mikayla Thapa MS, OTR/L

## 2024-04-15 NOTE — PROGRESS NOTES
Armen Llanos  49 y.o.  male  1975  mrn [id number]    Assessment/Plan:  MSSA bacteremia/MSSA right TKR infxn/JH:    Patient with recent MSSA bacteremia and R TKR infection s/p poly exchange home on IV Ancef, re-admitted for LE edema, ARF.  Etiology of ARF is unclear, renal on board, for now will d/c ancef and rx with Daptomycin given concerns of AIN.  Pulmonary infiltrates noted, no respiratory symptoms.  Infiltrates are somewhat nodular and peripheral may be septic emboli from his prolonged MSSA bacteremia, Daptomycin should be sufficient for this.      4/15: No fever and WBC count is 7.4K. Creatinine has decreased to 1.49. Etiology of recent JH is not entirely clear. Ancef was discontinued on 4/10 as possible source of AIN. Ancef was restarted on 4/12 on admission. Ancef was stopped and Dapto was started on 4/12 to cont tx for MSSA bacteremia with MSSA right TKR. Left 2nd toe osteo due to chronic wound could be the source of his MSSA bacteremia. The toe was resected during his last admission. ESR increased to 38 but CRP decreased to 50.1.     His RLE edema has improved. He c/o scrotal edema. Sutures were removed from his right knee incision on 4/12 and the incision is healing well - no redness or drainage. CK is 25 on Dapto     - Cont Daptomycin - he is completing 6 week course of IV abx and then will be placed on chronic abx suppression since hardware was not removed.  - Will follow creatinine off Ancef  - Will cont to monitor creatinine and re-adjust abx doses based on creatinine clearance  - Would try to elevate his scrotum if possible   - Will repeat CXR in AM to re-eval his subtle lung opacities  - Will monitor for the development of toxicity to current abx tx  - Pt will f/u with me as an out-Pt        Discussed case with Dr. Penelope Ha      Subjective: Pt c/o scrotal swelling. Right leg swelling has significantly improved. Creatinine conts to decrease. No fever and WBC count is nml. No probs  "with current abx tx    Objective:  VSS, Tmax: 98.8  HEENT:  No scleral icterus  NECK: Supple  CARDIAC:  RRR, nml S1, S2  LUNGS:  Clear anteriorly  ABDOMEN:  +BS, soft, nontender  MUSCULOSKELETAL:  Right knee incision is well-healed - no redness or drainage. Decreased ROM of right knee  EXTREMITIES: Decreased swelling of right leg - no redness or calf tenderness  SKIN:  No rash      Labs:  CBC w/diff  Recent Labs     04/15/24  0541   WBC 7.44   HGB 7.8*   HCT 23.9*        BMP  Recent Labs     04/15/24  0541   K 4.6      CO2 24   BUN 20   CREATININE 1.49*   CALCIUM 7.3*     CMP  Recent Labs     04/15/24  0541   K 4.6      CO2 24   BUN 20   CREATININE 1.49*   CALCIUM 7.3*   ALKPHOS 55   ALT 3*   AST 9*       .labrc    Cultures:  Lab Results   Component Value Date    BLOODCX No Growth at 72 hrs. 04/11/2024    BLOODCX No Growth at 72 hrs. 04/11/2024    BLOODCX No Growth After 5 Days. 03/26/2024    BLOODCX No Growth After 5 Days. 03/26/2024    BLOODCX Staphylococcus aureus (A) 03/25/2024    BLOODCX Staphylococcus aureus (A) 03/25/2024    BLOODCX Staphylococcus aureus (A) 03/23/2024    BLOODCX Staphylococcus aureus (A) 03/23/2024    BLOODCX Staphylococcus aureus (A) 03/22/2024    BLOODCX Staphylococcus aureus (A) 03/22/2024     No results found for: \"WOUNDCULT\"  No results found for: \"URINECX\"  No results found for: \"SPUTUMCULTUR\"    MED:  Dapto: #4  Abx: #23         Completed:  Vanco x 3 days on 3/25  Ancef x 17 days on 4/10  Ancef x 1 day on 4/12      Current Facility-Administered Medications:     acetaminophen (TYLENOL) tablet 975 mg, 975 mg, Oral, Q8H, Inderjit Mckeon PA-C, 975 mg at 04/15/24 0537    amiodarone tablet 200 mg, 200 mg, Oral, Daily With Breakfast, Inderjit Mckeon PA-C, 200 mg at 04/15/24 0850    amLODIPine (NORVASC) tablet 5 mg, 5 mg, Oral, Daily, Jill Bond PA-C, 5 mg at 04/15/24 0850    apixaban (ELIQUIS) tablet 5 mg, 5 mg, Oral, BID, Nancy Jc MD, 5 mg at " 04/15/24 0850    clonazePAM (KlonoPIN) tablet 0.5 mg, 0.5 mg, Oral, BID PRN, Suzette Burton PA-C, 0.5 mg at 04/14/24 0149    cyclobenzaprine (FLEXERIL) tablet 5 mg, 5 mg, Oral, TID PRN, Inderjit Mckeon PA-C, 5 mg at 04/13/24 1830    DAPTOmycin (CUBICIN) 575 mg in sodium chloride 0.9 % 50 mL IVPB, 6 mg/kg, Intravenous, Q24H, Vee Walker MD, Last Rate: 100 mL/hr at 04/14/24 2306, 575 mg at 04/14/24 2306    fentaNYL (DURAGESIC) 75 mcg/hr TD 72 hr patch 1 patch, 1 patch, Transdermal, Q72H, Inderjit Mckeon PA-C, 1 patch at 04/13/24 1712    HYDROmorphone (DILAUDID) injection 1 mg, 1 mg, Intravenous, Q6H PRN, Uday Jean MD, 1 mg at 04/15/24 1012    insulin glargine (LANTUS) subcutaneous injection 20 Units 0.2 mL, 20 Units, Subcutaneous, HS, Nancy Jc MD, 20 Units at 04/14/24 2240    insulin lispro (HumALOG/ADMELOG) 100 units/mL subcutaneous injection 1-5 Units, 1-5 Units, Subcutaneous, 4x Daily (AC & HS), 1 Units at 04/15/24 1215 **AND** Fingerstick Glucose (POCT), , , 4x Daily AC and at bedtime, Inderjit Mckeon PA-C    insulin lispro (HumALOG/ADMELOG) 100 units/mL subcutaneous injection 5 Units, 5 Units, Subcutaneous, TID With Meals, Nancy Jc MD, 5 Units at 04/15/24 0856    metoclopramide (REGLAN) injection 10 mg, 10 mg, Intravenous, Q6H PRN, Nancy Jc MD, 10 mg at 04/13/24 2129    metoprolol succinate (TOPROL-XL) 24 hr tablet 25 mg, 25 mg, Oral, Daily, Inderjit Mckeon PA-C, 25 mg at 04/15/24 0850    ondansetron (ZOFRAN) injection 4 mg, 4 mg, Intravenous, Q6H PRN, Gonzalez Davey MD, 4 mg at 04/15/24 1144    oxyCODONE (ROXICODONE) IR tablet 5 mg, 5 mg, Oral, Q6H PRN, Uday Jean MD, 5 mg at 04/15/24 0850    polyethylene glycol (MIRALAX) packet 17 g, 17 g, Oral, BID, Nancy Jc MD, 17 g at 04/15/24 0850    pravastatin (PRAVACHOL) tablet 80 mg, 80 mg, Oral, Daily With Dinner, Inderjit Mckeon PA-C, 80 mg at 04/14/24 1729    risperiDONE (RisperDAL) tablet  3 mg, 3 mg, Oral, Daily, Nancy Jc MD, 3 mg at 04/15/24 0850    senna-docusate sodium (SENOKOT S) 8.6-50 mg per tablet 2 tablet, 2 tablet, Oral, BID, Nancy Jc MD, 2 tablet at 04/15/24 0850    zolpidem (AMBIEN) tablet 10 mg, 10 mg, Oral, HS PRN, Inderjit Mckeon PA-C, 10 mg at 04/15/24 0112    Principal Problem:    JH (acute kidney injury) (HCC)  Active Problems:    Chronic, continuous use of opioids    Type 2 diabetes mellitus with ketoacidosis without coma, without long-term current use of insulin (HCC)    Essential hypertension    TBI (traumatic brain injury) (HCC)    MSSA bacteremia    A-fib with RVR (HCC)    Cardiomyopathy (HCC)    Abnormal CT of the chest    Anemia    Ambulatory dysfunction    Anasarca associated with disorder of kidney      Vee Walker MD

## 2024-04-15 NOTE — ASSESSMENT & PLAN NOTE
Lab Results   Component Value Date    CREATININE 1.49 (H) 04/15/2024    CREATININE 1.51 (H) 04/14/2024    CREATININE 1.76 (H) 04/13/2024     Baseline creatinine 0.9-1.1  Admission creatinine 2.05  CT renal: No hydronephrosis. Delayed excreted contrast material in the renal collecting systems suggesting renal impairment. Trace bilateral pleural effusions, trace volume of ascites, and moderate anasarca consistent with volume overload.  On entresto, spironolactone   Hypoalbumenic + protein in urine + anasarca on CT imaging  Unexplained RBC in urine + unclear lung findings on CTA  Of note, received contrast for CTA of the chest to r/o PE in setting of elevated ddimer  Continue to hold NSAIDS, Entresto, Jardiance, metformin and other nephrotoxins  C3, C4 negative.  Protein electrophoresis, immunoglobulin, anti-DNA antibody, anti-glomerular membrane antibody pending  Creatinine improving.  Nephrology on board, appreciate recommendations.  Will trial a dose of Lasix-patient  appears mildly volume overloaded with mild bilateral crackles, scrotal swelling  Check US- he reports Abdominal distension, trace ascites seen on CT scan from admission

## 2024-04-15 NOTE — ASSESSMENT & PLAN NOTE
Recent Labs     04/13/24  0219 04/14/24  0455 04/15/24  0541   HGB 7.7* 8.5* 7.8*        Anemia of chronic disease   Monitor

## 2024-04-15 NOTE — PROGRESS NOTES
NEPHROLOGY PROGRESS NOTE   Armen Llanos 49 y.o. male MRN: [id number]  Unit/Bed#: -01 Encounter: [id number]  Reason for Consult: JH (POA)    ASSESSMENT/PLAN:  JH (POA): Clear, possible prerenal in the setting of osmotic diuresis with hyperglycemia, also on Entresto versus AIN from Ancef.  -Baseline creatinine 0.8-1.0.  -Presented with creatinine of 2.05.  -Most recent creatinine 1.5.  -UA: Glucose, large blood, 3+ protein, 30-50 RBCs, 2-4 WBCs.  -CT negative for hydro.  -Urine eosinophils negative.  -Received IV fluids earlier in admission, remains off of IV fluids.  -Received one-time dose of Lasix this morning.  -Will provide 2 doses of IV albumin.  -Holding Entresto.  -Continue to avoid nephrotoxins, hypotension, IV contrast.    Hyponatremia: History of intermittent hyponatremia in the past.  -Will continue to monitor.  Empirically placed on fluid restriction.  On multiple medications including risperidone and Cymbalta which can contribute to hyponatremia.    Nephrotic range proteinuria: Suspected due to uncontrolled diabetes.  -GN workup so far negative.  -Will need outpatient follow-up.    Hypertension: Blood pressure is stable but remains slightly above goal.  -Current medications: Amlodipine 5 mg daily, metoprolol 25 mg daily.  -Recommend increasing amlodipine to 5 mg twice daily.  -Avoid hypotension or high fluctuation in blood pressure.    Volume status/cardiomyopathy: Recent echo with EF of 45% with mild global hypokinesis.  -Showed trace bilateral pleural effusions, trace volume ascites, moderate anasarca.  -Receiving IV albumin x 2 doses today.  -Monitoring daily for diuretic use.    Anasarca: In the setting of heart failure.  -Entresto is on hold.  -Continue to provide albumin with daily monitoring for diuretics.    MSSA bacteremia: Concern for septic arthritis.  Antibiotic management per infectious disease team.    Anemia:  -Immunofixation positive for IgM kappa.  Would recommend hematology  consult.  -Iron panel with appropriate iron and iron sat.  -Continue to monitor and transfuse as needed for hemoglobin less than 7.0.    Ambulatory dysfunction: PT/OT following.  Will need rehab at discharge.    Other: Diabetes, TBI.    Disposition: Requiring additional stay due to medical needs.    SUBJECTIVE:  Feels okay.  Reports improvement with his shortness of breath.  Denies nausea, vomiting, diarrhea.  Reports feeling hungry.    OBJECTIVE:  Current Weight: Weight - Scale: 96.2 kg (212 lb)  Vitals:    04/14/24 1300 04/14/24 1536 04/14/24 2058 04/15/24 0733   BP:  154/87 155/89 152/90   BP Location:  Left arm     Pulse:  85 88 95   Resp:  19  12   Temp:  98.2 °F (36.8 °C) 98.6 °F (37 °C) 98.6 °F (37 °C)   TempSrc:  Temporal     SpO2: 95% 94% 93% 91%   Weight:       Height:           Intake/Output Summary (Last 24 hours) at 4/15/2024 1430  Last data filed at 4/15/2024 1328  Gross per 24 hour   Intake 860 ml   Output 2500 ml   Net -1640 ml     General: NAD  Skin: warm, dry, intact, no rash  HEENT: Moist mucous membranes, sclera anicteric, normocephalic, atraumatic  Neck: No apparent JVD appreciated  Chest: lung sounds clear B/L, on O2  CVS:Regular rate and rhythm, no murmer   Abdomen: Soft, round, non-tender, +BS  Extremities: No B/L LE edema present  Neuro: alert and oriented  Psych: appropriate mood and affect     Medications:    Current Facility-Administered Medications:     acetaminophen (TYLENOL) tablet 975 mg, 975 mg, Oral, Q8H, Inderjit Mckeon PA-C, 975 mg at 04/15/24 1317    amiodarone tablet 200 mg, 200 mg, Oral, Daily With Breakfast, Inderjit Mckeon PA-C, 200 mg at 04/15/24 0850    amLODIPine (NORVASC) tablet 5 mg, 5 mg, Oral, Daily, Jill Bond PA-C, 5 mg at 04/15/24 0850    apixaban (ELIQUIS) tablet 5 mg, 5 mg, Oral, BID, Nancy Jc MD, 5 mg at 04/15/24 0827    clonazePAM (KlonoPIN) tablet 0.5 mg, 0.5 mg, Oral, BID PRN, Suzette Burton PA-C, 0.5 mg at 04/14/24 0143     cyclobenzaprine (FLEXERIL) tablet 5 mg, 5 mg, Oral, TID PRN, Inderjit Mckeon PA-C, 5 mg at 04/13/24 1830    DAPTOmycin (CUBICIN) 575 mg in sodium chloride 0.9 % 50 mL IVPB, 6 mg/kg, Intravenous, Q24H, Vee Walker MD, Last Rate: 100 mL/hr at 04/14/24 2306, 575 mg at 04/14/24 2306    fentaNYL (DURAGESIC) 75 mcg/hr TD 72 hr patch 1 patch, 1 patch, Transdermal, Q72H, Inderjit Mckeon PA-C, 1 patch at 04/13/24 1712    HYDROmorphone (DILAUDID) injection 1 mg, 1 mg, Intravenous, Q6H PRN, Uday Jean MD, 1 mg at 04/15/24 1012    insulin glargine (LANTUS) subcutaneous injection 20 Units 0.2 mL, 20 Units, Subcutaneous, HS, Nancy Jc MD, 20 Units at 04/14/24 2240    insulin lispro (HumALOG/ADMELOG) 100 units/mL subcutaneous injection 1-5 Units, 1-5 Units, Subcutaneous, 4x Daily (AC & HS), 1 Units at 04/15/24 1215 **AND** Fingerstick Glucose (POCT), , , 4x Daily AC and at bedtime, Inderjit Mckeon PA-C    insulin lispro (HumALOG/ADMELOG) 100 units/mL subcutaneous injection 5 Units, 5 Units, Subcutaneous, TID With Meals, Nancy Jc MD, 5 Units at 04/15/24 0856    metoclopramide (REGLAN) injection 10 mg, 10 mg, Intravenous, Q6H PRN, Nancy Jc MD, 10 mg at 04/13/24 2129    metoprolol succinate (TOPROL-XL) 24 hr tablet 25 mg, 25 mg, Oral, Daily, Inderjit Mckeon PA-C, 25 mg at 04/15/24 0850    ondansetron (ZOFRAN) injection 4 mg, 4 mg, Intravenous, Q6H PRN, Gonzalez Davey MD, 4 mg at 04/15/24 1144    oxyCODONE (ROXICODONE) IR tablet 5 mg, 5 mg, Oral, Q6H PRN, Uday Jean MD, 5 mg at 04/15/24 0850    polyethylene glycol (MIRALAX) packet 17 g, 17 g, Oral, BID, Nancy Jc MD, 17 g at 04/15/24 0850    pravastatin (PRAVACHOL) tablet 80 mg, 80 mg, Oral, Daily With Dinner, Inderjit Mckeon PA-C, 80 mg at 04/14/24 1729    risperiDONE (RisperDAL) tablet 3 mg, 3 mg, Oral, Daily, Nancy Jc MD, 3 mg at 04/15/24 0850    senna-docusate sodium (SENOKOT S) 8.6-50 mg per  tablet 2 tablet, 2 tablet, Oral, BID, Nancy Jc MD, 2 tablet at 04/15/24 0850    zolpidem (AMBIEN) tablet 10 mg, 10 mg, Oral, HS PRN, Inderjit Mckeon PA-C, 10 mg at 04/15/24 0112    Laboratory Results:  Results from last 7 days   Lab Units 04/15/24  0541 04/14/24  0455 04/13/24  0219 04/12/24  0557 04/11/24  1940   WBC Thousand/uL 7.44 7.63 6.81 7.92 7.70   HEMOGLOBIN g/dL 7.8* 8.5* 7.7* 7.8* 9.0*   HEMATOCRIT % 23.9* 26.7* 23.7* 24.0* 28.8*   PLATELETS Thousands/uL 230 251 229 250 309   SODIUM mmol/L 134* 134* 134* 135 137   POTASSIUM mmol/L 4.6 4.5 4.6 4.8 5.3   CHLORIDE mmol/L 106 107 107 108 109*   CO2 mmol/L 24 22 21 21 22   BUN mg/dL 20 21 27* 31* 29*   CREATININE mg/dL 1.49* 1.51* 1.76* 1.86* 2.05*   CALCIUM mg/dL 7.3* 7.5* 7.2* 7.1* 8.2*   ALK PHOS U/L 55  --   --  50 60   ALT U/L 3*  --   --  <3* <3*   AST U/L 9*  --   --  8* 10*

## 2024-04-16 ENCOUNTER — APPOINTMENT (INPATIENT)
Dept: ULTRASOUND IMAGING | Facility: HOSPITAL | Age: 49
DRG: 682 | End: 2024-04-16
Payer: COMMERCIAL

## 2024-04-16 ENCOUNTER — TELEPHONE (OUTPATIENT)
Age: 49
End: 2024-04-16

## 2024-04-16 ENCOUNTER — APPOINTMENT (INPATIENT)
Dept: RADIOLOGY | Facility: HOSPITAL | Age: 49
DRG: 682 | End: 2024-04-16
Payer: COMMERCIAL

## 2024-04-16 LAB
ANION GAP SERPL CALCULATED.3IONS-SCNC: 5 MMOL/L (ref 4–13)
BASOPHILS # BLD AUTO: 0.03 THOUSANDS/ÂΜL (ref 0–0.1)
BASOPHILS NFR BLD AUTO: 1 % (ref 0–1)
BUN SERPL-MCNC: 20 MG/DL (ref 5–25)
C-ANCA TITR SER IF: NORMAL TITER
C-ANCA TITR SER IF: NORMAL TITER
CALCIUM SERPL-MCNC: 7.7 MG/DL (ref 8.4–10.2)
CHLORIDE SERPL-SCNC: 106 MMOL/L (ref 96–108)
CO2 SERPL-SCNC: 22 MMOL/L (ref 21–32)
CREAT SERPL-MCNC: 1.52 MG/DL (ref 0.6–1.3)
EOSINOPHIL # BLD AUTO: 0.13 THOUSAND/ÂΜL (ref 0–0.61)
EOSINOPHIL NFR BLD AUTO: 2 % (ref 0–6)
ERYTHROCYTE [DISTWIDTH] IN BLOOD BY AUTOMATED COUNT: 13.2 % (ref 11.6–15.1)
GBM AB SER IA-ACNC: <0.2 UNITS (ref 0–0.9)
GFR SERPL CREATININE-BSD FRML MDRD: 52 ML/MIN/1.73SQ M
GLUCOSE SERPL-MCNC: 212 MG/DL (ref 65–140)
GLUCOSE SERPL-MCNC: 221 MG/DL (ref 65–140)
GLUCOSE SERPL-MCNC: 224 MG/DL (ref 65–140)
GLUCOSE SERPL-MCNC: 227 MG/DL (ref 65–140)
GLUCOSE SERPL-MCNC: 319 MG/DL (ref 65–140)
HCT VFR BLD AUTO: 25.2 % (ref 36.5–49.3)
HGB BLD-MCNC: 8.2 G/DL (ref 12–17)
IGA SERPL-MCNC: 473 MG/DL (ref 66–433)
IGG SERPL-MCNC: 943 MG/DL (ref 635–1741)
IGM SERPL-MCNC: 121 MG/DL (ref 45–281)
IMM GRANULOCYTES # BLD AUTO: 0.04 THOUSAND/UL (ref 0–0.2)
IMM GRANULOCYTES NFR BLD AUTO: 1 % (ref 0–2)
KAPPA LC FREE SER-MCNC: 144.1 MG/L (ref 3.3–19.4)
KAPPA LC FREE/LAMBDA FREE SER: 2.03 {RATIO} (ref 0.26–1.65)
LAMBDA LC FREE SERPL-MCNC: 71.1 MG/L (ref 5.7–26.3)
LDH SERPL-CCNC: 237 U/L (ref 140–271)
LYMPHOCYTES # BLD AUTO: 1.38 THOUSANDS/ÂΜL (ref 0.6–4.47)
LYMPHOCYTES NFR BLD AUTO: 21 % (ref 14–44)
M PNEUMO IGG SER IA-ACNC: 304 U/ML (ref 0–99)
M PNEUMO IGM SER IA-ACNC: <770 U/ML (ref 0–769)
MCH RBC QN AUTO: 29.3 PG (ref 26.8–34.3)
MCHC RBC AUTO-ENTMCNC: 32.5 G/DL (ref 31.4–37.4)
MCV RBC AUTO: 90 FL (ref 82–98)
MONOCYTES # BLD AUTO: 0.51 THOUSAND/ÂΜL (ref 0.17–1.22)
MONOCYTES NFR BLD AUTO: 8 % (ref 4–12)
MYELOPEROXIDASE AB SER IA-ACNC: <0.2 UNITS (ref 0–0.9)
MYELOPEROXIDASE AB SER IA-ACNC: <0.2 UNITS (ref 0–0.9)
NEUTROPHILS # BLD AUTO: 4.57 THOUSANDS/ÂΜL (ref 1.85–7.62)
NEUTS SEG NFR BLD AUTO: 67 % (ref 43–75)
NRBC BLD AUTO-RTO: 0 /100 WBCS
P-ANCA ATYPICAL TITR SER IF: NORMAL TITER
P-ANCA ATYPICAL TITR SER IF: NORMAL TITER
P-ANCA TITR SER IF: NORMAL TITER
P-ANCA TITR SER IF: NORMAL TITER
PLATELET # BLD AUTO: 242 THOUSANDS/UL (ref 149–390)
PMV BLD AUTO: 10 FL (ref 8.9–12.7)
POTASSIUM SERPL-SCNC: 4.6 MMOL/L (ref 3.5–5.3)
PROTEINASE3 AB SER IA-ACNC: <0.2 UNITS (ref 0–0.9)
PROTEINASE3 AB SER IA-ACNC: <0.2 UNITS (ref 0–0.9)
RBC # BLD AUTO: 2.8 MILLION/UL (ref 3.88–5.62)
SODIUM SERPL-SCNC: 133 MMOL/L (ref 135–147)
URATE SERPL-MCNC: 3.8 MG/DL (ref 3.5–8.5)
WBC # BLD AUTO: 6.66 THOUSAND/UL (ref 4.31–10.16)

## 2024-04-16 PROCEDURE — 83615 LACTATE (LD) (LDH) ENZYME: CPT | Performed by: NURSE PRACTITIONER

## 2024-04-16 PROCEDURE — 76870 US EXAM SCROTUM: CPT

## 2024-04-16 PROCEDURE — 80048 BASIC METABOLIC PNL TOTAL CA: CPT | Performed by: INTERNAL MEDICINE

## 2024-04-16 PROCEDURE — 84550 ASSAY OF BLOOD/URIC ACID: CPT | Performed by: NURSE PRACTITIONER

## 2024-04-16 PROCEDURE — 82784 ASSAY IGA/IGD/IGG/IGM EACH: CPT | Performed by: NURSE PRACTITIONER

## 2024-04-16 PROCEDURE — 71046 X-RAY EXAM CHEST 2 VIEWS: CPT

## 2024-04-16 PROCEDURE — 99223 1ST HOSP IP/OBS HIGH 75: CPT | Performed by: NURSE PRACTITIONER

## 2024-04-16 PROCEDURE — 82232 ASSAY OF BETA-2 PROTEIN: CPT | Performed by: NURSE PRACTITIONER

## 2024-04-16 PROCEDURE — 99232 SBSQ HOSP IP/OBS MODERATE 35: CPT | Performed by: HOSPITALIST

## 2024-04-16 PROCEDURE — 99232 SBSQ HOSP IP/OBS MODERATE 35: CPT | Performed by: INTERNAL MEDICINE

## 2024-04-16 PROCEDURE — 83521 IG LIGHT CHAINS FREE EACH: CPT | Performed by: NURSE PRACTITIONER

## 2024-04-16 PROCEDURE — 82948 REAGENT STRIP/BLOOD GLUCOSE: CPT

## 2024-04-16 PROCEDURE — 85025 COMPLETE CBC W/AUTO DIFF WBC: CPT | Performed by: INTERNAL MEDICINE

## 2024-04-16 RX ORDER — LANOLIN ALCOHOL/MO/W.PET/CERES
3 CREAM (GRAM) TOPICAL
Status: DISCONTINUED | OUTPATIENT
Start: 2024-04-16 | End: 2024-04-16

## 2024-04-16 RX ORDER — LANOLIN ALCOHOL/MO/W.PET/CERES
3 CREAM (GRAM) TOPICAL
Status: DISCONTINUED | OUTPATIENT
Start: 2024-04-16 | End: 2024-04-19 | Stop reason: HOSPADM

## 2024-04-16 RX ORDER — METOCLOPRAMIDE HYDROCHLORIDE 5 MG/ML
10 INJECTION INTRAMUSCULAR; INTRAVENOUS EVERY 6 HOURS PRN
Status: DISCONTINUED | OUTPATIENT
Start: 2024-04-16 | End: 2024-04-19 | Stop reason: HOSPADM

## 2024-04-16 RX ADMIN — INSULIN LISPRO 2 UNITS: 100 INJECTION, SOLUTION INTRAVENOUS; SUBCUTANEOUS at 12:10

## 2024-04-16 RX ADMIN — SENNOSIDES AND DOCUSATE SODIUM 2 TABLET: 8.6; 5 TABLET ORAL at 07:38

## 2024-04-16 RX ADMIN — INSULIN LISPRO 2 UNITS: 100 INJECTION, SOLUTION INTRAVENOUS; SUBCUTANEOUS at 21:58

## 2024-04-16 RX ADMIN — ONDANSETRON 4 MG: 2 INJECTION INTRAMUSCULAR; INTRAVENOUS at 11:52

## 2024-04-16 RX ADMIN — OXYCODONE HYDROCHLORIDE 5 MG: 5 TABLET ORAL at 07:39

## 2024-04-16 RX ADMIN — POLYETHYLENE GLYCOL 3350 17 G: 17 POWDER, FOR SOLUTION ORAL at 07:38

## 2024-04-16 RX ADMIN — POLYETHYLENE GLYCOL 3350 17 G: 17 POWDER, FOR SOLUTION ORAL at 17:29

## 2024-04-16 RX ADMIN — OXYCODONE HYDROCHLORIDE 5 MG: 5 TABLET ORAL at 17:26

## 2024-04-16 RX ADMIN — OXYCODONE HYDROCHLORIDE 5 MG: 5 TABLET ORAL at 00:01

## 2024-04-16 RX ADMIN — APIXABAN 5 MG: 5 TABLET, FILM COATED ORAL at 07:40

## 2024-04-16 RX ADMIN — RISPERIDONE 3 MG: 1 TABLET, FILM COATED ORAL at 07:39

## 2024-04-16 RX ADMIN — AMLODIPINE BESYLATE 5 MG: 5 TABLET ORAL at 17:26

## 2024-04-16 RX ADMIN — FENTANYL 1 PATCH: 75 PATCH TRANSDERMAL at 17:27

## 2024-04-16 RX ADMIN — INSULIN LISPRO 5 UNITS: 100 INJECTION, SOLUTION INTRAVENOUS; SUBCUTANEOUS at 17:43

## 2024-04-16 RX ADMIN — ACETAMINOPHEN 975 MG: 325 TABLET, FILM COATED ORAL at 05:46

## 2024-04-16 RX ADMIN — PRAVASTATIN SODIUM 80 MG: 80 TABLET ORAL at 17:26

## 2024-04-16 RX ADMIN — DAPTOMYCIN 575 MG: 500 INJECTION, POWDER, LYOPHILIZED, FOR SOLUTION INTRAVENOUS at 22:25

## 2024-04-16 RX ADMIN — INSULIN LISPRO 2 UNITS: 100 INJECTION, SOLUTION INTRAVENOUS; SUBCUTANEOUS at 17:43

## 2024-04-16 RX ADMIN — INSULIN LISPRO 2 UNITS: 100 INJECTION, SOLUTION INTRAVENOUS; SUBCUTANEOUS at 07:42

## 2024-04-16 RX ADMIN — HYDROMORPHONE HYDROCHLORIDE 1 MG: 1 INJECTION, SOLUTION INTRAMUSCULAR; INTRAVENOUS; SUBCUTANEOUS at 09:31

## 2024-04-16 RX ADMIN — DAPTOMYCIN 575 MG: 500 INJECTION, POWDER, LYOPHILIZED, FOR SOLUTION INTRAVENOUS at 00:01

## 2024-04-16 RX ADMIN — HYDROMORPHONE HYDROCHLORIDE 1 MG: 1 INJECTION, SOLUTION INTRAMUSCULAR; INTRAVENOUS; SUBCUTANEOUS at 01:03

## 2024-04-16 RX ADMIN — AMIODARONE HYDROCHLORIDE 200 MG: 200 TABLET ORAL at 07:39

## 2024-04-16 RX ADMIN — ZOLPIDEM TARTRATE 10 MG: 5 TABLET ORAL at 01:03

## 2024-04-16 RX ADMIN — ACETAMINOPHEN 975 MG: 325 TABLET, FILM COATED ORAL at 20:37

## 2024-04-16 RX ADMIN — AMLODIPINE BESYLATE 5 MG: 5 TABLET ORAL at 07:39

## 2024-04-16 RX ADMIN — APIXABAN 5 MG: 5 TABLET, FILM COATED ORAL at 17:29

## 2024-04-16 RX ADMIN — ACETAMINOPHEN 975 MG: 325 TABLET, FILM COATED ORAL at 12:08

## 2024-04-16 RX ADMIN — METOPROLOL SUCCINATE 25 MG: 25 TABLET, EXTENDED RELEASE ORAL at 07:39

## 2024-04-16 RX ADMIN — INSULIN LISPRO 5 UNITS: 100 INJECTION, SOLUTION INTRAVENOUS; SUBCUTANEOUS at 12:09

## 2024-04-16 RX ADMIN — HYDROMORPHONE HYDROCHLORIDE 1 MG: 1 INJECTION, SOLUTION INTRAMUSCULAR; INTRAVENOUS; SUBCUTANEOUS at 18:26

## 2024-04-16 RX ADMIN — INSULIN GLARGINE 20 UNITS: 100 INJECTION, SOLUTION SUBCUTANEOUS at 21:55

## 2024-04-16 RX ADMIN — INSULIN LISPRO 5 UNITS: 100 INJECTION, SOLUTION INTRAVENOUS; SUBCUTANEOUS at 07:42

## 2024-04-16 RX ADMIN — SENNOSIDES AND DOCUSATE SODIUM 2 TABLET: 8.6; 5 TABLET ORAL at 17:25

## 2024-04-16 NOTE — PROGRESS NOTES
[facility]  Progress Note  Name: Armen Llanos I  MRN: [id number]  Unit/Bed#: -01 I Date of Admission: 4/11/2024   Date of Service: 4/16/2024 I Hospital Day: 4    Assessment/Plan   Anasarca associated with disorder of kidney  Assessment & Plan  With hypoalbuminemia and elevated protein to creatinine ratio, nephrotic syndrome likely secondary to?  Diabetes  Albumin for 2 doses per nephro   Monitor     Ambulatory dysfunction  Assessment & Plan  PT OT evaluation  Wife prefers patient to be discharged in a rehab if appropriate.    Anemia  Assessment & Plan  Recent Labs     04/14/24  0455 04/15/24  0541 04/16/24  0347   HGB 8.5* 7.8* 8.2*        Anemia of chronic disease   Monitor     Abnormal CT of the chest  Assessment & Plan  CTA chest:  1. Patchy groundglass opacities in the left upper lobe and a few right apical opacities suggesting atypical pneumonia,, pneumonitis or pulmonary hemorrhage.  2. Trace amount of pleural fluid bilaterally.  3. No evidence of acute pulmonary embolus.    Mild non productive cough. Otherwise asymptomatic, no trouble breathing. Denies any aspiration event.  Pulmonology consulted, appreciate recommendations.  He Will follow-up with pulmonology outpatient and have a repeat CT chest in a few weeks    Cardiomyopathy (HCC)  Assessment & Plan  ECHO (03/25): EF 45% w/ mild global hypokinesis   On jardiance and Entresto, both on hold in setting of JH  No LE edema, abdominal wall edema  Per nephrology, give albumin and monitor   No need for diuretics currently   Continue Toprol XL and amlodipine  continue Eliquis    A-fib with RVR (HCC)  Assessment & Plan  Precipitated by septic shock secondary to septic arthritis  Rate control: Metoprolol XL 25 mg daily, amiodarone 200 mg daily  Anticoagulation: Eliquis.      MSSA bacteremia  Assessment & Plan  Completed a course of IV cefazolin on 4/11/2024 for septic arthritis with MSSA  Infection disease was consulted.   Ancef was discontinued given the patient's JH and suspicion for BEATRIZ  Patient on daptomycin currently    TBI (traumatic brain injury) ([facility])  Assessment & Plan  History of TBI at 7 years old    Essential hypertension  Assessment & Plan  On amlodipine, metoprolol, Entresto, Jardiance  Can continue amlodipine and metoprolol  Will hold Entresto and Jardiance for now given JH  Stable     Type 2 diabetes mellitus with ketoacidosis without coma, without long-term current use of insulin ([facility])  Assessment & Plan  Lab Results   Component Value Date    HGBA1C 12.1 (H) 03/22/2024       Recent Labs     04/15/24  1110 04/15/24  1621 04/15/24  2059 04/16/24  0741   POCGLU 153* 261* 248* 212*         Blood Sugar Average: Last 72 hrs:  (P) 168.3125Home regimen: Jardiance, metformin, Lantus, Humalog  Hold Jardiance and metformin in setting of JH  Patient was started on Lantus 35 units, Humalog 13 units 3 times daily with meals and sliding scale.  4/13 patient had hypoglycemia in the 50s and received treatment per protocol.  Decreased Lantus to 20 units daily, decreased Humalog to 5 units 3 times daily with meals and sliding scale insulin- continue.   Continue hypoglycemia protocol  diabetic diet      Chronic, continuous use of opioids  Assessment & Plan  Continue oxycodone and Dilaudid as needed  CT showed significant amount of stool throughout the colon.    Started bowel regimen- no bm yet   Increased sennakot to 2 tb bid  Add dulcolax suppository   Abd xr - non obstructive pattern. Pt did have BM.    * JH (acute kidney injury) ([facility])  Assessment & Plan  Lab Results   Component Value Date    CREATININE 1.52 (H) 04/16/2024    CREATININE 1.49 (H) 04/15/2024    CREATININE 1.51 (H) 04/14/2024     Baseline creatinine 0.9-1.1  Admission creatinine 2.05  CT renal: No hydronephrosis. Delayed excreted contrast material in the renal collecting systems suggesting renal impairment. Trace bilateral pleural effusions, trace volume of ascites,  and moderate anasarca consistent with volume overload.  On entresto, spironolactone   Hypoalbumenic + protein in urine + anasarca on CT imaging  Unexplained RBC in urine + unclear lung findings on CTA  Of note, received contrast for CTA of the chest to r/o PE in setting of elevated ddimer  Continue to hold NSAIDS, Entresto, Jardiance, metformin and other nephrotoxins  C3, C4 negative.  Protein electrophoresis, immunoglobulin, anti-DNA antibody, anti-glomerular membrane antibody pending  Creatinine improving.  Nephrology on board, appreciate recommendations.  IV lasix given with improvement. Further lasix per nephrology.   US abd only small ascites. Scrotal US pending.   Immunofixation positive for IgM kappa.  Heme onc c/s          VTE  Prophylaxis:   Pharmacologic: in place  Mechanical VTE Prophylaxis in Place: Yes    Patient Centered Rounds: I have performed bedside rounds with nursing staff today.    Discussions with Specialists or Other Care Team Provider: case management    Education and Discussions with Family / Patient: pt    Mobility:   Basic Mobility Inpatient Raw Score: 24  JH-HLM Goal: 8: Walk 250 feet or more  JH-HLM Achieved: 2: Bed activities/Dependent transfer  JH-HLM Goal achieved. Continue to encourage appropriate mobility.    Total Time Spent on Date of Encounter in care of patient: 35 mins. This time was spent on one or more of the following: performing physical exam; counseling and coordination of care; obtaining or reviewing history; documenting in the medical record; reviewing/ordering tests, medications or procedures; communicating with other healthcare professionals and discussing with patient's family/caregivers.      Current Length of Stay: 4 day(s)    Current Patient Status: Inpatient        Code Status: Level 1 - Full Code    Discharge Plan: Pt will require continued inpatient hospitalization.    Subjective:   Pt states slight improvement in abd edema. Reports scrotal edema but no acute  pain    Patient is seen and examined at bedside.  All other ROS are negative.    Objective:     Vitals:   Temp (24hrs), Av.6 °F (37 °C), Min:98.4 °F (36.9 °C), Max:98.8 °F (37.1 °C)    Temp:  [98.4 °F (36.9 °C)-98.8 °F (37.1 °C)] 98.8 °F (37.1 °C)  HR:  [92-94] 92  BP: (159-166)/(88-94) 166/94  SpO2:  [95 %-98 %] 95 %  Body mass index is 24.5 kg/m².     Input and Output Summary (last 24 hours):       Intake/Output Summary (Last 24 hours) at 2024 1112  Last data filed at 2024 0701  Gross per 24 hour   Intake 1030 ml   Output 4175 ml   Net -3145 ml       Physical Exam:       GEN: No acute distress, comfortable  HEEENT: No JVD, PERRLA, no scleral icterus  RESP: Lungs clear to auscultation bilaterally  CV: RRR, +s1/s2   ABD: SOFT NON TENDER, POSITIVE BOWEL SOUNDS, swelling 2/2 fluid  Gu: scrotal edema, moderate.   PSYCH: CALM  NEURO: Mentation baseline, NO FOCAL DEFICITS  SKIN: NO RASH  EXTREM: NO EDEMA    Additional Data:     Labs:    Results from last 7 days   Lab Units 24  0347   WBC Thousand/uL 6.66   HEMOGLOBIN g/dL 8.2*   HEMATOCRIT % 25.2*   PLATELETS Thousands/uL 242   SEGS PCT % 67   LYMPHO PCT % 21   MONO PCT % 8   EOS PCT % 2     Results from last 7 days   Lab Units 24  0347 04/15/24  0541   SODIUM mmol/L 133* 134*   POTASSIUM mmol/L 4.6 4.6   CHLORIDE mmol/L 106 106   CO2 mmol/L 22 24   BUN mg/dL 20 20   CREATININE mg/dL 1.52* 1.49*   ANION GAP mmol/L 5 4   CALCIUM mg/dL 7.7* 7.3*   ALBUMIN g/dL  --  2.3*   TOTAL BILIRUBIN mg/dL  --  0.37   ALK PHOS U/L  --  55   ALT U/L  --  3*   AST U/L  --  9*   GLUCOSE RANDOM mg/dL 319* 112     Results from last 7 days   Lab Units 24  1940   INR  1.17     Results from last 7 days   Lab Units 24  0741 04/15/24  2059 04/15/24  1621 04/15/24  1110 04/15/24  0729 24  2232 24  1948 24  1547 24  1047 24  0756 24  2028 24  1617   POC GLUCOSE mg/dl 212* 248* 261* 153* 100 214* 196* 117 158* 179* 270*  131         Results from last 7 days   Lab Units 04/12/24  0557 04/11/24 1940   LACTIC ACID mmol/L  --  1.6   PROCALCITONIN ng/ml 0.11 0.12       Lines/Drains:  Invasive Devices       Peripherally Inserted Central Catheter Line  Duration             PICC Line 04/01/24 Right Basilic 14 days                    Telemetry:        * I Have Reviewed All Lab Data Listed Above.           Imaging:     Results for orders placed during the hospital encounter of 03/22/24    XR chest portable    Narrative  XR CHEST PORTABLE    INDICATION: SIRS.    COMPARISON: Chest radiograph 3/17/2023    FINDINGS:    Clear lungs. No pneumothorax or pleural effusion.    Normal cardiomediastinal silhouette.    Bones are unremarkable for age.    Gas-filled loops of bowel are seen immediately below the left hemidiaphragm.    Impression  No acute cardiopulmonary disease.        Workstation performed: [id number]    Results for orders placed during the hospital encounter of 04/11/24    XR chest pa & lateral    Narrative  XR CHEST PA & LATERAL    INDICATION: sob.    COMPARISON: Radiograph from March 24, 2024    FINDINGS: Right PICC tip overlying the caval atrial junction.    Subtle opacities in the periphery of the lungs, left more than right. Mild prominence of the interstitial markings adjacent to these opacities. No pneumothorax or pleural effusion.    Normal cardiomediastinal silhouette.    Bones are unremarkable for age.    Normal upper abdomen.    Impression  Subtle peripheral lung opacities, left more than right, also characterized on the same day CT. Findings may represent an infectious/inflammatory process. Probable superimposed mild interstitial edema.    Please refer to same day CT chest.      Workstation performed: [id number]      *I have reviewed all imaging reports listed above      Recent Cultures (last 7 days):     Results from last 7 days   Lab Units 04/12/24  1926 04/11/24  2353 04/11/24 1940   BLOOD CULTURE   --  No Growth After 4  Days. No Growth After 4 Days.   LEGIONELLA URINARY ANTIGEN  Negative  --   --        Last 24 Hours Medication List:   Current Facility-Administered Medications   Medication Dose Route Frequency Provider Last Rate    acetaminophen  975 mg Oral Q8H Inderjit Mckeon PA-C      amiodarone  200 mg Oral Daily With Breakfast Inderjit Mckeon PA-C      amLODIPine  5 mg Oral BID NATA Graham      apixaban  5 mg Oral BID Nancy Jc MD      clonazePAM  0.5 mg Oral BID PRN Suzette Burton PA-C      cyclobenzaprine  5 mg Oral TID PRN Inderjit Mckeon PA-C      DAPTOmycin  6 mg/kg Intravenous Q24H Vee Walker  mg (04/16/24 0001)    fentaNYL  1 patch Transdermal Q72H Inderjit Mckeon PA-C      HYDROmorphone  1 mg Intravenous Q6H PRN Uday Jean MD      insulin glargine  20 Units Subcutaneous HS Nancy Jc MD      insulin lispro  1-5 Units Subcutaneous 4x Daily (AC & HS) Inderjit Mckeon PA-C      insulin lispro  5 Units Subcutaneous TID With Meals Nancy Jc MD      melatonin  3 mg Oral HS PRN Gonzalez Davey MD      metoclopramide  10 mg Intravenous Q6H PRN Nancy Jc MD      metoprolol succinate  25 mg Oral Daily Inderjit Mckeon PA-C      ondansetron  4 mg Intravenous Q6H PRN Gonzalez Davey MD      oxyCODONE  5 mg Oral Q6H PRN Uday Jean MD      polyethylene glycol  17 g Oral BID Nancy Jc MD      pravastatin  80 mg Oral Daily With Dinner Inderjit Mckeon PA-C      risperiDONE  3 mg Oral Daily Nancy Jc MD      senna-docusate sodium  2 tablet Oral BID Nancy Jc MD      SUMAtriptan  100 mg Oral Daily PRN Suzette Burton PA-C      zolpidem  10 mg Oral HS PRN Inderjit Mckeon PA-C          Today, Patient Was Seen By: Gonzalez Davey MD    ** Please Note: Dictation voice to text software may have been used in the creation of this document. **

## 2024-04-16 NOTE — ASSESSMENT & PLAN NOTE
Lab Results   Component Value Date    CREATININE 1.52 (H) 04/16/2024    CREATININE 1.49 (H) 04/15/2024    CREATININE 1.51 (H) 04/14/2024     Baseline creatinine 0.9-1.1  Admission creatinine 2.05  CT renal: No hydronephrosis. Delayed excreted contrast material in the renal collecting systems suggesting renal impairment. Trace bilateral pleural effusions, trace volume of ascites, and moderate anasarca consistent with volume overload.  On entresto, spironolactone   Hypoalbumenic + protein in urine + anasarca on CT imaging  Unexplained RBC in urine + unclear lung findings on CTA  Of note, received contrast for CTA of the chest to r/o PE in setting of elevated ddimer  Continue to hold NSAIDS, Entresto, Jardiance, metformin and other nephrotoxins  C3, C4 negative.  Protein electrophoresis, immunoglobulin, anti-DNA antibody, anti-glomerular membrane antibody pending  Creatinine improving.  Nephrology on board, appreciate recommendations.  IV lasix given with improvement. Further lasix per nephrology.   US abd only small ascites. Scrotal US pending.   Immunofixation positive for IgM kappa.  Heme onc c/s

## 2024-04-16 NOTE — PLAN OF CARE
Problem: Potential for Falls  Goal: Patient will remain free of falls  Description: INTERVENTIONS:  - Educate patient/family on patient safety including physical limitations  - Instruct patient to call for assistance with activity   - Consult OT/PT to assist with strengthening/mobility   - Keep Call bell within reach  - Keep bed low and locked with side rails adjusted as appropriate  - Keep care items and personal belongings within reach  - Initiate and maintain comfort rounds  - Make Fall Risk Sign visible to staff  - Offer Toileting every 2 Hours, in advance of need  - Initiate/Maintain call bell alarm  - Obtain necessary fall risk management equipment: call bell usage  - Apply yellow socks and bracelet for high fall risk patients  - Consider moving patient to room near nurses station  Outcome: Progressing     Problem: PAIN - ADULT  Goal: Verbalizes/displays adequate comfort level or baseline comfort level  Description: Interventions:  - Encourage patient to monitor pain and request assistance  - Assess pain using appropriate pain scale  - Administer analgesics based on type and severity of pain and evaluate response  - Implement non-pharmacological measures as appropriate and evaluate response  - Consider cultural and social influences on pain and pain management  - Notify physician/advanced practitioner if interventions unsuccessful or patient reports new pain  Outcome: Progressing     Problem: INFECTION - ADULT  Goal: Absence or prevention of progression during hospitalization  Description: INTERVENTIONS:  - Assess and monitor for signs and symptoms of infection  - Monitor lab/diagnostic results  - Monitor all insertion sites, i.e. indwelling lines, tubes, and drains  - Monitor endotracheal if appropriate and nasal secretions for changes in amount and color  - [place] appropriate cooling/warming therapies per order  - Administer medications as ordered  - Instruct and encourage patient and family to use good  hand hygiene technique  - Identify and instruct in appropriate isolation precautions for identified infection/condition  Outcome: Progressing  Goal: Absence of fever/infection during neutropenic period  Description: INTERVENTIONS:  - Monitor WBC    Outcome: Progressing

## 2024-04-16 NOTE — ASSESSMENT & PLAN NOTE
Lab Results   Component Value Date    HGBA1C 12.1 (H) 03/22/2024       Recent Labs     04/15/24  1110 04/15/24  1621 04/15/24  2059 04/16/24  0741   POCGLU 153* 261* 248* 212*         Blood Sugar Average: Last 72 hrs:  (P) 168.3125Home regimen: Jardiance, metformin, Lantus, Humalog  Hold Jardiance and metformin in setting of JH  Patient was started on Lantus 35 units, Humalog 13 units 3 times daily with meals and sliding scale.  4/13 patient had hypoglycemia in the 50s and received treatment per protocol.  Decreased Lantus to 20 units daily, decreased Humalog to 5 units 3 times daily with meals and sliding scale insulin- continue.   Continue hypoglycemia protocol  diabetic diet

## 2024-04-16 NOTE — CASE MANAGEMENT
Case Management Progress Note    Patient name Armen Llanos  Location /-01 MRN [id number]  : 1975 Date 2024       LOS (days): 4  Geometric Mean LOS (GMLOS) (days): 4.4  Days to GMLOS:-0.1        OBJECTIVE:    Current admission status: Inpatient  Preferred Pharmacy:   [id number] - [place] - [address]  [address] [address]  Phone: [phone] Fax: [phone]    Primary Care Provider: Deirde Andrea MD    Primary Insurance: [employer]  Secondary Insurance:     PROGRESS NOTE:    Pt states he was not able to manage his IV abx at home very well and when medically stable, he and his wife would like to look at whether his insurance would cover SNF for the skilled need for his IV abx. Referrals sent on Aidin.

## 2024-04-16 NOTE — ASSESSMENT & PLAN NOTE
Continue oxycodone and Dilaudid as needed  CT showed significant amount of stool throughout the colon.    Started bowel regimen- no bm yet   Increased sennakot to 2 tb bid  Add dulcolax suppository   Abd xr - non obstructive pattern. Pt did have BM.

## 2024-04-16 NOTE — CONSULTS
Medical Oncology/Hematology Consult Note  Armen Llanos, 49 y.o., 1975  /-[birth year], [id number]  04/16/24    Assessment and Plan:  Patient is a 49-year-old gentleman with a history of hypertension, uncontrolled diabetes, A-fib, recent admission for septic arthritis and MSSA bacteremia who presents with right knee pain and swelling and JH.  Workup for acute kidney injury included SPEP which shows an IgM kappa monoclonal gammopathy for which hematology is consulted    1.  Monoclonal gammopathy  4/13/24 Serum immunofixation shows monoclonal immunoglobulins identified as  IgM-kappa   M spike 0.19  Check serum free light chains, immunoglobulin levels    2. JH  Baseline creatinine 0.9-1  Creatinine 2 on admission trended down 1.52 today   Nephrotic range proteinuria  GN workup so far negative  Nephrology following     3. Anemia   No history of anemia  Hemoglobin 13.7 prior to undergoing TKA with revision for septic arthritis and MSSA bacteremia on 3/24/2024.  Postoperatively, hemoglobin dropped 10.1  Hemoglobin has been fluctuating 10-11  Hemoglobin 9 on admission with normocytic indices  9>7.8>7.7>8.5>7.8>8.2    Suspect anemia multifactorial and secondary to IV antibiotics, sepsis, dilutional component, JH    Suspect IgM MGUS.  His IgM serum M protein is < 3 g/dL.  He has other causes for anemia and renal insufficiency.  No evidence of lymphadenopathy, constitutional symptoms or findings of hepatosplenomegaly on imaging  IgM MGUS accounts for approximately 15% of MGUS cases.  It does have the potential to progress to smoldering or active Waldenström's macroglobulinemia. Infrequently, IgM MGUS can progress to IgM multiple myeloma or AL amyloidosis.  He does have a relatively newer diagnosis of cardiomyopathy-LVEF 45%, mild global hypokinesis.  This was suspected to be stress-induced in the setting of sepsis, shock, RVR  -Evaluation of CBC-D shows recent anemia following knee surgery and diagnosis and  treatment for MSSA bacteremia with IV Ancef.  White count, platelet count and differential are normal.  There is no evidence of hypercalcemia.    He does have JH and multiple etiologies are under consideration including possible prerenal in the setting of osmotic diuresis with hyperglycemia, also on Entresto versus AIN from Ancef   No findings of lytic lesions seen on any imaging    I reviewed labs with patient and discussed plasma cells are the most prolific antibody-producing cells and with a plasma cell disorder/malignancy, they overproduce single antibody in the blood stream, monoclonal protein (M Peak).       I explained when we notice the monoclonal protein, our workup would begin by ruling out plasma cell disorders/malignancies. These include:  monoclonal gammopathy (which is an asymptomatic premalignant plasma cell disorder); smoldering MM, MM, Waldenstrom's, Amyloidosis which is a condition that causes a build up of proteins in different organs and tissues.  In addition, we also look at FLC, Immunoglobulins and assess for CRAB criteria (hypercalcemia, Renal insufficiency, Anemia, lytic bone lesions) and symptoms.  We reviewed the natural history of MGUS highlighting the fact that it has the potential for malignant transformation in approximately 1% of patients per year    Recommend:  Full myeloma panel has been requested including serum free light chains, immunoglobulin levels, UPEP, beta-2, LDH, uric acid       Hematology will follow along and follow-up on results of workup.  I will also help  coordinate hospital follow-up for monitoring   Please do not hesitate to contact me if you have any questions or need additional information. Thank you for this consult.    Marley Muñoz MSN, CRNP, OCN  Hematology Oncology Nurse Practitioner      Reason for Consultation: Monoclonal gammopathy        History of present illness:   Armen Llanos is a 49 y.o. male with multiple medical conditions including  history of uncontrolled type 2 diabetes, hypertension, A-fib on Eliquis, cardiomyopathy with EF 45%, CKD, TBI, multiple knee surgeries including bilateral TKA with recent revision of right knee for septic arthritis with associated MSSA bacteremia requiring hospitalization last month.  He was recommended a 6 week course IV antibiotics, on IV Ancef.  He presented with acute onset of swelling and pain of his right knee.  He had findings of JH with an elevated serum creatinine above 2 on admission.  Ancef was discontinued on 410 as possible source of AIN.  He also had an elevated D-dimer so CTA of chest was performed and incidentally showed diffuse groundglass opacities.  He has anasarca with hypoalbuminemia and elevated protein to creatinine ratio.  Concern for nephrotic syndrome  He has been seen by ID, pulmonology.  Nephrology is closely following.    Antibiotic has been changed to daptomycin with recommendations for chronic antibiotic suppression since his hardware was not removed.  Workup for his acute kidney injury included SPEP with immunofixation.  This did result with IgM kappa.  M spike 0.19  He has evidence of anemia.  No hypercalcemia, no lytic lesions    Interval history:  Patient seen and examined.  Right leg swelling improved.  He does have some abdominal swelling and reported scrotal swelling.  He also feels little short of breath when taking a deep breath.  Denies any chest pain.      Review of Systems   Respiratory:  Positive for shortness of breath.    Genitourinary:  Positive for scrotal swelling.   Musculoskeletal:  Positive for arthralgias.   All other systems reviewed and are negative.    All other review of systems were negative.    Past medical history:   Past Medical History:   Diagnosis Date    Bed sore, stage 1     Diabetes mellitus (HCC)     Hypertension        Past surgical history:   Past Surgical History:   Procedure Laterality Date    IR PICC PLACEMENT SINGLE LUMEN  4/1/2024    KNEE  SURGERY      SD REVJ TOTAL KNEE ARTHRP W/WO ALGRFT 1 COMPONENT Right 3/24/2024    Procedure: ARTHROPLASTY KNEE TOTAL REVISION, POLY EXCHANGE;  Surgeon: Tao Washington DO;  Location:  MAIN OR;  Service: Orthopedics    TOE AMPUTATION Left 3/28/2024    Procedure: AMPUTATION TOE 2nd;  Surgeon: Rossy Toussaint DPM;  Location:  MAIN OR;  Service: Podiatry       Allergies:   Allergies   Allergen Reactions    Cephalosporins Hives    Penicillins Hives       Home medications:   Medications Prior to Admission   Medication    Alcohol Swabs 70 % PADS    amiodarone 200 mg tablet    amLODIPine (NORVASC) 5 mg tablet    apixaban (ELIQUIS) 5 mg    b complex vitamins capsule    Blood Glucose Monitoring Suppl (OneTouch Verio Reflect) w/Device KIT    ceFAZolin (ANCEF) 2000 mg IVPB    cyclobenzaprine (FLEXERIL) 10 mg tablet    DULoxetine (CYMBALTA) 20 mg capsule    esomeprazole (NexIUM) 20 mg capsule    fentaNYL (DURAGESIC) 75 mcg/hr    glucose blood (OneTouch Verio) test strip    Insulin Glargine Solostar (Lantus SoloStar) 100 UNIT/ML SOPN    insulin lispro (HumaLOG KwikPen) 100 units/mL injection pen    Insulin Pen Needle (BD Pen Needle Carisa 2nd Gen) 32G X 4 MM MISC    metoprolol succinate (TOPROL-XL) 25 mg 24 hr tablet    ondansetron (ZOFRAN) 8 mg tablet    OneTouch Delica Lancets 33G MISC    risperiDONE (RisperDAL) 3 mg tablet    simvastatin (ZOCOR) 40 mg tablet    SUMAtriptan (IMITREX) 100 mg tablet    Testosterone Cypionate 200 MG/ML SOLN    zolpidem (AMBIEN) 10 mg tablet    metFORMIN (GLUCOPHAGE) 500 mg tablet    morphine (MS CONTIN) 60 mg 12 hr tablet    Morphine Sulfate ER 15 MG T12A    sacubitril-valsartan (Entresto) 24-26 MG TABS       Hospital medications:   Current Facility-Administered Medications:     acetaminophen (TYLENOL) tablet 975 mg, 975 mg, Oral, Q8H, Inderjit Mckeon PA-C, 975 mg at 04/16/24 0546    amiodarone tablet 200 mg, 200 mg, Oral, Daily With Breakfast, Inderjit Mckeon PA-C, 200 mg at  04/16/24 0739    amLODIPine (NORVASC) tablet 5 mg, 5 mg, Oral, BID, NATA Graham, 5 mg at 04/16/24 0739    apixaban (ELIQUIS) tablet 5 mg, 5 mg, Oral, BID, Nancy Jc MD, 5 mg at 04/16/24 0740    clonazePAM (KlonoPIN) tablet 0.5 mg, 0.5 mg, Oral, BID PRN, Suzette Burton PA-C, 0.5 mg at 04/14/24 0149    cyclobenzaprine (FLEXERIL) tablet 5 mg, 5 mg, Oral, TID PRN, Inderjit Mckeon PA-C, 5 mg at 04/15/24 2037    DAPTOmycin (CUBICIN) 575 mg in sodium chloride 0.9 % 50 mL IVPB, 6 mg/kg, Intravenous, Q24H, Vee Walker MD, Last Rate: 100 mL/hr at 04/16/24 0001, 575 mg at 04/16/24 0001    fentaNYL (DURAGESIC) 75 mcg/hr TD 72 hr patch 1 patch, 1 patch, Transdermal, Q72H, Inderjit Mckeon PA-C, 1 patch at 04/13/24 1712    HYDROmorphone (DILAUDID) injection 1 mg, 1 mg, Intravenous, Q6H PRN, Uday Jean MD, 1 mg at 04/16/24 0931    insulin glargine (LANTUS) subcutaneous injection 20 Units 0.2 mL, 20 Units, Subcutaneous, HS, Nancy Jc MD, 20 Units at 04/15/24 2102    insulin lispro (HumALOG/ADMELOG) 100 units/mL subcutaneous injection 1-5 Units, 1-5 Units, Subcutaneous, 4x Daily (AC & HS), 2 Units at 04/16/24 0742 **AND** Fingerstick Glucose (POCT), , , 4x Daily AC and at bedtime, Inderjit Mckeon PA-C    insulin lispro (HumALOG/ADMELOG) 100 units/mL subcutaneous injection 5 Units, 5 Units, Subcutaneous, TID With Meals, Nancy Jc MD, 5 Units at 04/16/24 0742    melatonin tablet 3 mg, 3 mg, Oral, HS PRN, Gonzalez Davey MD    metoclopramide (REGLAN) injection 10 mg, 10 mg, Intravenous, Q6H PRN, Nancy Jc MD    metoprolol succinate (TOPROL-XL) 24 hr tablet 25 mg, 25 mg, Oral, Daily, Inderjit Mckeon PA-C, 25 mg at 04/16/24 0739    ondansetron (ZOFRAN) injection 4 mg, 4 mg, Intravenous, Q6H PRN, Gonzalez Davey MD, 4 mg at 04/16/24 1152    oxyCODONE (ROXICODONE) IR tablet 5 mg, 5 mg, Oral, Q6H PRN, Uday Jean MD, 5 mg at 04/16/24 0739    polyethylene glycol  (MIRALAX) packet 17 g, 17 g, Oral, BID, Nancy Jc MD, 17 g at 04/16/24 0738    pravastatin (PRAVACHOL) tablet 80 mg, 80 mg, Oral, Daily With Dinner, Inderjit Mckeon PA-C, 80 mg at 04/15/24 1715    risperiDONE (RisperDAL) tablet 3 mg, 3 mg, Oral, Daily, Nancy Jc MD, 3 mg at 04/16/24 0739    senna-docusate sodium (SENOKOT S) 8.6-50 mg per tablet 2 tablet, 2 tablet, Oral, BID, Nancy Jc MD, 2 tablet at 04/16/24 0738    SUMAtriptan (IMITREX) tablet 100 mg, 100 mg, Oral, Daily PRN, Suzette Burton PA-C, 100 mg at 04/15/24 2036    zolpidem (AMBIEN) tablet 10 mg, 10 mg, Oral, HS PRN, Inderjit Mckeon PA-C, 10 mg at 04/16/24 0103    Social history:   Social History     Tobacco Use    Smoking status: Never    Smokeless tobacco: Never   Vaping Use    Vaping status: Some Days    Substances: THC, CBD, Flavoring   Substance Use Topics    Alcohol use: Not Currently    Drug use: Yes     Types: Marijuana       Family history: History reviewed. No pertinent family history.    Vitals:  Vitals:    04/16/24 0728   BP: 166/94   Pulse: 92   Resp:    Temp: 98.8 °F (37.1 °C)   SpO2: 95%       Physical Exam  Constitutional:       General: He is not in acute distress.  HENT:      Head: Normocephalic and atraumatic.   Eyes:      General: No scleral icterus.  Cardiovascular:      Rate and Rhythm: Normal rate and regular rhythm.   Pulmonary:      Effort: Pulmonary effort is normal. No respiratory distress.   Abdominal:      General: There is distension.      Palpations: Abdomen is soft.   Musculoskeletal:         General: Swelling present.      Cervical back: Normal range of motion.      Comments: Right knee incision, C/D/I   Neurological:      General: No focal deficit present.      Mental Status: He is alert and oriented to person, place, and time.   Psychiatric:         Mood and Affect: Mood normal.         Behavior: Behavior normal.         Recent Results (from the past 48 hour(s))   Fingerstick Glucose  (POCT)    Collection Time: 04/14/24  3:47 PM   Result Value Ref Range    POC Glucose 117 65 - 140 mg/dl   Fingerstick Glucose (POCT)    Collection Time: 04/14/24  7:48 PM   Result Value Ref Range    POC Glucose 196 (H) 65 - 140 mg/dl   Fingerstick Glucose (POCT)    Collection Time: 04/14/24 10:32 PM   Result Value Ref Range    POC Glucose 214 (H) 65 - 140 mg/dl   Comprehensive metabolic panel    Collection Time: 04/15/24  5:41 AM   Result Value Ref Range    Sodium 134 (L) 135 - 147 mmol/L    Potassium 4.6 3.5 - 5.3 mmol/L    Chloride 106 96 - 108 mmol/L    CO2 24 21 - 32 mmol/L    ANION GAP 4 4 - 13 mmol/L    BUN 20 5 - 25 mg/dL    Creatinine 1.49 (H) 0.60 - 1.30 mg/dL    Glucose 112 65 - 140 mg/dL    Calcium 7.3 (L) 8.4 - 10.2 mg/dL    Corrected Calcium 8.7 8.3 - 10.1 mg/dL    AST 9 (L) 13 - 39 U/L    ALT 3 (L) 7 - 52 U/L    Alkaline Phosphatase 55 34 - 104 U/L    Total Protein 5.1 (L) 6.4 - 8.4 g/dL    Albumin 2.3 (L) 3.5 - 5.0 g/dL    Total Bilirubin 0.37 0.20 - 1.00 mg/dL    eGFR 53 ml/min/1.73sq m   CBC    Collection Time: 04/15/24  5:41 AM   Result Value Ref Range    WBC 7.44 4.31 - 10.16 Thousand/uL    RBC 2.68 (L) 3.88 - 5.62 Million/uL    Hemoglobin 7.8 (L) 12.0 - 17.0 g/dL    Hematocrit 23.9 (L) 36.5 - 49.3 %    MCV 89 82 - 98 fL    MCH 29.1 26.8 - 34.3 pg    MCHC 32.6 31.4 - 37.4 g/dL    RDW 13.1 11.6 - 15.1 %    Platelets 230 149 - 390 Thousands/uL    MPV 9.7 8.9 - 12.7 fL   Fingerstick Glucose (POCT)    Collection Time: 04/15/24  7:29 AM   Result Value Ref Range    POC Glucose 100 65 - 140 mg/dl   Fingerstick Glucose (POCT)    Collection Time: 04/15/24 11:10 AM   Result Value Ref Range    POC Glucose 153 (H) 65 - 140 mg/dl   Fingerstick Glucose (POCT)    Collection Time: 04/15/24  4:21 PM   Result Value Ref Range    POC Glucose 261 (H) 65 - 140 mg/dl   Fingerstick Glucose (POCT)    Collection Time: 04/15/24  8:59 PM   Result Value Ref Range    POC Glucose 248 (H) 65 - 140 mg/dl   Basic metabolic panel     Collection Time: 04/16/24  3:47 AM   Result Value Ref Range    Sodium 133 (L) 135 - 147 mmol/L    Potassium 4.6 3.5 - 5.3 mmol/L    Chloride 106 96 - 108 mmol/L    CO2 22 21 - 32 mmol/L    ANION GAP 5 4 - 13 mmol/L    BUN 20 5 - 25 mg/dL    Creatinine 1.52 (H) 0.60 - 1.30 mg/dL    Glucose 319 (H) 65 - 140 mg/dL    Calcium 7.7 (L) 8.4 - 10.2 mg/dL    eGFR 52 ml/min/1.73sq m   CBC and differential    Collection Time: 04/16/24  3:47 AM   Result Value Ref Range    WBC 6.66 4.31 - 10.16 Thousand/uL    RBC 2.80 (L) 3.88 - 5.62 Million/uL    Hemoglobin 8.2 (L) 12.0 - 17.0 g/dL    Hematocrit 25.2 (L) 36.5 - 49.3 %    MCV 90 82 - 98 fL    MCH 29.3 26.8 - 34.3 pg    MCHC 32.5 31.4 - 37.4 g/dL    RDW 13.2 11.6 - 15.1 %    MPV 10.0 8.9 - 12.7 fL    Platelets 242 149 - 390 Thousands/uL    nRBC 0 /100 WBCs    Segmented % 67 43 - 75 %    Immature Grans % 1 0 - 2 %    Lymphocytes % 21 14 - 44 %    Monocytes % 8 4 - 12 %    Eosinophils Relative 2 0 - 6 %    Basophils Relative 1 0 - 1 %    Absolute Neutrophils 4.57 1.85 - 7.62 Thousands/µL    Absolute Immature Grans 0.04 0.00 - 0.20 Thousand/uL    Absolute Lymphocytes 1.38 0.60 - 4.47 Thousands/µL    Absolute Monocytes 0.51 0.17 - 1.22 Thousand/µL    Eosinophils Absolute 0.13 0.00 - 0.61 Thousand/µL    Basophils Absolute 0.03 0.00 - 0.10 Thousands/µL   Fingerstick Glucose (POCT)    Collection Time: 04/16/24  7:41 AM   Result Value Ref Range    POC Glucose 212 (H) 65 - 140 mg/dl   Fingerstick Glucose (POCT)    Collection Time: 04/16/24 11:20 AM   Result Value Ref Range    POC Glucose 227 (H) 65 - 140 mg/dl       Imaging Studies:   US scrotum and testicles    Result Date: 4/16/2024  Narrative: SCROTAL ULTRASOUND INDICATION: pain, inc scrotal swelling. COMPARISON: None TECHNIQUE: Ultrasound the scrotal contents was performed with a high frequency linear transducer utilizing volumetric sweep imaging as well as standard still image techniques. Imaging performed in longitudinal and  transverse orientation. Color and spectral Doppler evaluation also performed bilaterally. FINDINGS: TESTES: Testes are symmetric and normal in size. RIGHT testis = 3.0 x 1.9 x 2.2 cm. Volume 6.8 mL Normal contour with homogeneous smooth echotexture. No intratesticular mass lesion or calcifications. LEFT testis = 2.5 x 1.8 x 2.2 cm. Volume 5.3 mL Normal contour with homogeneous smooth echotexture. No intratesticular mass lesion or calcifications. Doppler flow within both testes is present and symmetric. EPIDIDYMIDES: Normal size. Doppler ultrasound demonstrates normal blood flow. No epididymal lesions. HYDROCELE: No significant fluid present. VARICOCELE: None present. SCROTUM: There is severe scrotal edema without focal abnormality.     Impression: Marked scrotal edema. Normal testicles and epididymides. Workstation performed: [id number]     US abdomen limited    Result Date: 4/15/2024  Narrative: ABDOMEN ULTRASOUND, LIMITED, FOUR QUADRANT SURVEY INDICATION: R/o ascites. COMPARISON: CT renal stone study 10/12/2024. TECHNIQUE: Real-time ultrasound of the abdominal cavity was performed with a curvilinear transducer with standard grey scale imaging techniques. FINDINGS: Small quantity of right lower quadrant ascites.     Impression: Small quantity of right lower quadrant ascites. Workstation performed: [id number]     XR abdomen obstruction series    Result Date: 4/14/2024  Narrative: XR ABDOMEN OBSTRUCTION SERIES INDICATION: abdomina pain, no bm. COMPARISON: None FINDINGS: Nonobstructive bowel gas pattern. No pneumoperitoneum. No pathologic calcification or soft tissue mass. Bones are unremarkable for patient's age. Examination of the chest reveals clear lungs and a normal cardiomediastinal silhouette.     Impression: Nonobstructive bowel gas pattern. Workstation performed: [id number]     VAS VENOUS DUPLEX -LOWER LIMB UNILATERAL    Result Date: 4/13/2024  Narrative:  [facility] REPORT CLINICAL: Indications:  Patient presents with pain and swelling of his right leg s/p knee surgery 2 weeks ago.  He is currently on a blood thinner and has history of PE. Operative History: 2024-03-28 Left toe amputation   CONCLUSION:  Impression: RIGHT LOWER LIMB No evidence of acute or chronic deep vein thrombosis. No evidence of superficial thrombophlebitis noted. Doppler evaluation shows a normal response to augmentation maneuvers. Popliteal, posterior tibial and anterior tibial arterial Doppler waveform's are triphasic.  LEFT LOWER LIMB LIMITED Evaluation shows no evidence of thrombus in the common femoral vein. Doppler evaluation shows a normal response to augmentation maneuvers.  Technical findings were faxed to the patient's chart.  Technically difficult/limited study. Some segments may be poorly visualized on today's exam.  SIGNATURE: Electronically Signed by: RAN PIMENTEL MD on 2024-04-13 04:22:16 PM    XR chest pa & lateral    Result Date: 4/12/2024  Narrative: XR CHEST PA & LATERAL INDICATION: sob. COMPARISON: Radiograph from March 24, 2024 FINDINGS: Right PICC tip overlying the caval atrial junction. Subtle opacities in the periphery of the lungs, left more than right. Mild prominence of the interstitial markings adjacent to these opacities. No pneumothorax or pleural effusion. Normal cardiomediastinal silhouette. Bones are unremarkable for age. Normal upper abdomen.     Impression: Subtle peripheral lung opacities, left more than right, also characterized on the same day CT. Findings may represent an infectious/inflammatory process. Probable superimposed mild interstitial edema. Please refer to same day CT chest. Workstation performed: [id number]     CT lower extremity wo contrast right    Result Date: 4/12/2024  Narrative: CT right knee without IV contrast INDICATION: r/o abscess. Status post right knee arthroplasty and incision and drainage. COMPARISON: Plain film 3/22/2024. TECHNIQUE: CT examination of the above was  performed. This examination, like all CT scans performed in the [facility], was performed utilizing techniques to minimize radiation dose exposure, including the use of iterative reconstruction and automated exposure control software.  Multiplanar 2D reformatted images were created from the source data. Rad dose  530.72 mGy-cm FINDINGS: OSSEOUS STRUCTURES: Anatomic alignment demonstrated status post total right knee arthroplasty. Orthopedic hardware appears intact. There is complex joint fluid noted within the patellofemoral compartment including air-fluid level. Findings may reflect expected postoperative change and recent I&D intervention. Correlate with recent laboratory results. VISUALIZED MUSCULATURE:  Unremarkable. SOFT TISSUES:  Unremarkable. OTHER PERTINENT FINDINGS:  None.     Impression: Status post total right knee arthroplasty with intact orthopedic hardware. Complex fluid within the patellofemoral compartment including air-fluid levels likely reflecting recent intervention. Persistent septic arthropathy is not excluded based upon imaging. Correlate with laboratory results. Workstation performed: [id number]     CT renal stone study abdomen pelvis without contrast    Result Date: 4/12/2024  Narrative: CT ABDOMEN AND PELVIS WITHOUT IV CONTRAST - LOW DOSE RENAL STONE INDICATION: hematuria, fortunato. COMPARISON: None. TECHNIQUE: Low radiation dose thin section CT examination of the abdomen and pelvis was performed without intravenous or oral contrast according to a protocol specifically designed to evaluate for urinary tract calculus. Axial, sagittal, and coronal 2D reformatted images were created from the source data and submitted for interpretation. Evaluation for pathology in the abdomen and pelvis that is unrelated to urinary tract calculi is limited. Radiation dose length product (DLP) for this visit: 760.53 mGy-cm . This examination, like all CT scans performed in the [facility], was performed utilizing techniques to minimize radiation dose exposure, including the use of iterative reconstruction and automated exposure control. URINARY TRACT FINDINGS: RIGHT KIDNEY AND URETER: Residual contrast in the renal collecting system from contrast-enhanced CT performed earlier limits evaluation for small stones. No hydronephrosis or hydroureter. LEFT KIDNEY AND URETER: Residual contrast in the renal collecting system from contrast-enhanced CT performed earlier limits evaluation for small stones. No hydronephrosis or hydroureter. Subcentimeter hypodensity in the left kidney is too small to accurately characterize but is likely a cyst. URINARY BLADDER: Excreted contrast in the urinary bladder. ADDITIONAL FINDINGS: LOWER CHEST: Bibasilar atelectasis. Trace bilateral pleural effusions. SOLID VISCERA: Limited low radiation dose noncontrast CT evaluation demonstrates no clinically significant abnormality of the imaged portions of the liver, spleen, pancreas, or left adrenal gland. 1.6 cm right adrenal hypodense nodule consistent with benign adenoma. GALLBLADDER/BILIARY TREE: Cholelithiasis without findings of acute cholecystitis. No biliary dilation. STOMACH AND BOWEL: Abundant stool throughout the colon. No bowel obstruction. APPENDIX: Normal. ABDOMINOPELVIC CAVITY: Trace amount of ascites. No pneumoperitoneum. No lymphadenopathy. VESSELS: Atherosclerosis without abdominal aortic aneurysm. REPRODUCTIVE ORGANS: Unremarkable for patient's age. ABDOMINAL WALL/INGUINAL REGIONS: Moderate anasarca. BONES: No acute fracture or suspicious osseous lesion.     Impression: No hydronephrosis. Delayed excreted contrast material in the renal collecting systems suggesting renal impairment. Trace bilateral pleural effusions, trace volume of ascites, and moderate anasarca consistent with volume overload. 1.6 cm right adrenal adenoma. Workstation performed: [id number]     CTA ED chest PE study    Result Date:  4/12/2024  Narrative: CTA - CHEST WITH IV CONTRAST - PULMONARY ANGIOGRAM INDICATION: Persistent cough, leg swelling, positive dimer, missed dose of eliquis. COMPARISON: 5/17/2023. TECHNIQUE: CTA examination of the chest was performed using angiographic technique according to a protocol specifically tailored to evaluate for pulmonary embolism. Multiplanar 2D reformatted images were created from the source data. In addition, coronal  3D MIP postprocessing was performed on the acquisition scanner. Radiation dose length product (DLP) for this visit: 578.51 mGy-cm . This examination, like all CT scans performed in the [facility], was performed utilizing techniques to minimize radiation dose exposure, including the use of iterative reconstruction and automated exposure control. IV Contrast: 85 mL of iohexol (OMNIPAQUE) FINDINGS: PULMONARY ARTERIAL TREE: No filling defect in the visualized pulmonary arteries to indicate an acute embolus. LUNGS: Patchy groundglass opacities throughout the left upper lobe. Few small opacities in the right apical region. There is no tracheal or endobronchial lesion. PLEURA: Trace amount of pleural fluid bilaterally. HEART/GREAT VESSELS: Normal heart size. No thoracic aortic aneurysm or dissection. MEDIASTINUM AND DAVID: No lymphadenopathy. CHEST WALL AND LOWER NECK: Unremarkable. VISUALIZED STRUCTURES IN THE UPPER ABDOMEN: Cholelithiasis. Large amount of stool in the visualized portions of the colon could indicate constipation. Right adrenal 1.8 cm nodule, stable. OSSEOUS STRUCTURES: No acute fracture or destructive osseous lesion.     Impression: 1. Patchy groundglass opacities in the left upper lobe and a few right apical opacities suggesting atypical pneumonia,, pneumonitis or pulmonary hemorrhage. 2. Trace amount of pleural fluid bilaterally. 3. No evidence of acute pulmonary embolus. Workstation performed: [id number]     IR PICC line placement single lumen (preferred for  home antibiotics/medications)    Result Date: 4/2/2024  Narrative: Peripherally inserted central venous catheter INDICATION: Need for antibiotics FLUOROSCOPY TIME:  0.9 MINUTES IMAGES: 2 FINDINGS: Static US image of venous access was supplied. A fluoroscopic spot view of the chest was obtained demonstrating a right arm catheter overlying the cavoatrial junction. The procedure was performed by the interventional radiology staff.     Impression: PICC centrally positioned. Workstation performed: [id number]     XR foot left 3+ views    Result Date: 3/29/2024  Narrative: XR FOOT 3+ VW LEFT INDICATION: Post op left second toe amp. COMPARISON: 3/26/2024 FINDINGS: Interval amputation of second toe distal phalanx. Otherwise bones are intact. Alignment and joint spaces preserved. No lytic or blastic bone lesions. No new destructive change. Mild midfoot degenerative change. Talar beak. Expected second digit soft tissue surgical change.     Impression: Expected postoperative appearance of the foot. Workstation performed: [id number]     MRI foot/forefoot toes left wo contrast    Result Date: 3/27/2024  Narrative: MRI LEFT FOOT INDICATION:   R/O Osteo distal tip 2nd toe. COMPARISON: 3/26/2024, plain film. TECHNIQUE:  Multiplanar/multisequence MR of the left foot was performed. FINDINGS: SUBCUTANEOUS TISSUES: Second ray distal skin ulceration with subcutaneous edema indicating cellulitis BONES: T1 replacement signal with corresponding T2 signal changes throughout the second distal phalanx consistent with osteomyelitis. FIRST MTP JOINT:  Intact. SESAMOID BONES:  Intact. OTHER ARTICULAR SURFACE:  Normal. PLANTAR FASCIA:  Intact. LISFRANC LIGAMENT:  Intact. FOREFOOT TENDONS: Intact. INTERMETATARSAL REGIONS: No bursitis or Biswas's neuroma. MUSCULATURE: Fatty atrophic forefoot musculature consistent with diabetic history.     Impression: Second ray distal skin ulceration with marrow changes second distal phalanx consistent with  osteomyelitis. No abscess. Workstation performed: [id number]     XR foot 3+ vw left    Result Date: 3/26/2024  Narrative: XR FOOT 3+ VW LEFT INDICATION: evaluate distal phalanx for fracture/osteo 2nd toe. COMPARISON: None FINDINGS: Unfortunately, the second distal phalanx which is the region of interest is not well seen on this examination. On the oblique position image, there does appear to potentially be cortical discontinuity of the second distal phalanx which would suggest a fracture although whether this is a traumatic injury or a pathologic fracture secondary to osteomyelitis is uncertain. Consider obtaining dedicated views of the toe if this is considered necessary for further work-up. The remainder of the foot appears intact. Minor degenerative change at talonavicular joint. No lytic or blastic osseous lesion. The second toe appears swollen. No soft tissue gas or foreign body seen.     Impression: Limited study with regard to evaluation of the second distal phalanx. There does appear to be cortical discontinuity present in that region suggesting a fracture. Correlate for history of trauma versus signs or symptoms of infection as the cause. The study was marked in EPIC for immediate notification. Workstation performed: [id number]     Echo complete w/ contrast if indicated    Result Date: 3/25/2024  Narrative:   Left Ventricle: Left ventricular cavity size is normal. Wall thickness is normal. The left ventricular ejection fraction is 45%. Systolic function is mildly reduced. There is mild global hypokinesis. Diastolic function is abnormal.   Right Ventricle: Right ventricular cavity size is normal. Systolic function is normal.   Left Atrium: The atrium is normal in size (16-34 mL/m2).   Right Atrium: The atrium is normal in size.   Mitral Valve: There is mild regurgitation.   Tricuspid Valve: There is mild regurgitation.   Prior TTE study available for comparison. Prior study date: 5/18/2023. Changes noted  when compared to prior study. Changes include: LV systolic function is now mildly depressed.     US bedside procedure    Result Date: 3/24/2024  Narrative: [id number]    XR chest portable ICU    Result Date: 3/24/2024  Narrative: XR CHEST PORTABLE ICU INDICATION: Central line placement. COMPARISON: CXR 3/24/2024, chest CT 5/17/2023. FINDINGS: Right jugular catheter in mid SVC. NG tube at GE junction. ET tube 6 cm above the lori. Temperature probe over the superior mediastinum. Clear lungs. No pneumothorax or pleural effusion. Normal cardiomediastinal silhouette. Bones are unremarkable for age. Normal upper abdomen.     Impression: No acute cardiopulmonary disease. Right jugular catheter in mid SVC with no pneumothorax. Workstation performed: [id number]     XR chest portable ICU    Result Date: 3/24/2024  Narrative: XR CHEST PORTABLE ICU INDICATION: Hypoxia. COMPARISON: CXR 3/24/2024 and chest CT 5/17/2023. FINDINGS: ET tube 7 cm above the lori. NG tube in stomach. Right jugular catheter in mid SVC. Temperature probe projecting over the upper thoracic esophagus. Question mild pulmonary venous congestion. No pneumothorax or pleural effusion. Normal cardiomediastinal silhouette. Bones are unremarkable for age. Normal upper abdomen.     Impression: Question mild pulmonary venous congestion. Workstation performed: [id number]     STAT CXR ICU    Result Date: 3/24/2024  Narrative: XR CHEST PORTABLE ICU INDICATION: ETT/OGT placement confirmation. COMPARISON: 3/22/2024 FINDINGS: ET tube tip projects over the mid trachea in appropriate position. Nasogastric tube tip overlies the left upper quadrant within the stomach. Clear lungs with elevation right hemidiaphragm as before. No pneumothorax or pleural effusion. Normal cardiomediastinal silhouette. Bones are unremarkable for age. Normal upper abdomen.     Impression: Tubes and lines as described without pneumothorax or other complication. No  acute cardiopulmonary disease. Workstation performed: [id number]     XR knee 1 or 2 vw right    Result Date: 3/22/2024  Narrative: XR KNEE 1 OR 2 VW RIGHT INDICATION: R knee pain, possible fall, diffuse tenderness. COMPARISON: 4/14/2023 FINDINGS: Total arthroplasty stable hardware and bony alignment without hardware complication. No acute fracture or dislocation. No joint effusion. No lytic or blastic osseous lesion. Unremarkable soft tissues.     Impression: No acute osseous abnormality. Workstation performed: [id number]     XR chest portable    Result Date: 3/22/2024  Narrative: XR CHEST PORTABLE INDICATION: SIRS. COMPARISON: Chest radiograph 3/17/2023 FINDINGS: Clear lungs. No pneumothorax or pleural effusion. Normal cardiomediastinal silhouette. Bones are unremarkable for age. Gas-filled loops of bowel are seen immediately below the left hemidiaphragm.     Impression: No acute cardiopulmonary disease. Workstation performed: [id number]       Counseling / Coordination of Care  Total floor / unit time spent today 90 minutes. Greater than 50% of total time was spent with the patient and / or family counseling and / or coordination of care.     NATA Farris    Please note:  This report has been generated by voice recognition software system. Therefore, there may be syntax, spelling and/or grammatical errors.  Please call if you have any questions.

## 2024-04-16 NOTE — PROGRESS NOTES
NEPHROLOGY PROGRESS NOTE   Armen Llanos 49 y.o. male MRN: [id number]  Unit/Bed#: -01 Encounter: [id number]  Reason for Consult: JH (POA)    49-year-old male with history of hypertension, diabetes, A-fib, TBI, recent admission for septic arthritis and MSSA bacteremia, who presents with right knee pain and swelling.  Nephrology is following for management of acute kidney injury.    ASSESSMENT/PLAN:  JH (POA): unclear, possible prerenal in the setting of osmotic diuresis with hyperglycemia, also on Entresto versus AIN from [facility].  -Baseline creatinine 0.8-1.0.  -Presented with creatinine of 2.05.  -Most recent creatinine 1.5. Slightly increased from previous day. Could be from diuretic administration vs hyperglycemia.  -UA: Glucose, large blood, 3+ protein, 30-50 RBCs, 2-4 WBCs.  -CT negative for hydro.  -Urine eosinophils negative.  -Received IV fluids earlier in admission, remains off of IV fluids.  -IV albumin + Lasix 04/15.  -Holding Entresto.  -Continue to avoid nephrotoxins, hypotension, IV contrast.  -I/O, UOP 4.2 L.     Hyponatremia: History of intermittent hyponatremia in the past. Corrects to normal to glucose.  -Will continue to monitor on fluid restriction.   -On multiple medications including risperidone and Cymbalta which can contribute to hyponatremia.     Nephrotic range proteinuria: Suspected due to uncontrolled diabetes.  -GN workup so far negative.  -Will need outpatient follow-up.     Hypertension: Blood pressure is stable but remains slightly above goal.  -Current medications: Amlodipine 5 mg BID (increased 04/15), metoprolol 25 mg daily.  -if BP remains elevated, consider addition of hydralazine.   -Avoid hypotension or high fluctuation in blood pressure.     Volume status/cardiomyopathy: Recent echo with EF of 45% with mild global hypokinesis.  Continues to have scrotal edema.  -Showed trace bilateral pleural effusions, trace volume ascites, moderate anasarca.  -repeat chest x-ray is  pending.  -Checking ultrasound of scrotum.  -Monitoring daily for diuretic use.     Anasarca: In the setting of heart failure.  -Entresto is on hold.  -Continue to provide albumin as needed with daily monitoring for diuretics.     MSSA bacteremia: Concern for septic arthritis.  Antibiotic management per infectious disease team.     Anemia:  -Immunofixation positive for IgM kappa.  Would recommend hematology consult.  -Iron panel with appropriate iron and iron sat.  -Continue to monitor and transfuse as needed for hemoglobin less than 7.0.     Ambulatory dysfunction: PT/OT following.  Will need rehab at discharge.     Other: Diabetes, TBI.    Disposition:  Requiring additional stay due to medical needs.    SUBJECTIVE:  Feels well.  Denies chest pain or shortness of breath.  Denies nausea or vomiting.  Continues to have poor appetite due to abdominal fullness.  His scrotal edema is slightly improved.    OBJECTIVE:  Current Weight: Weight - Scale: 96.2 kg (212 lb)  Vitals:    04/15/24 0733 04/15/24 1553 04/16/24 0001 04/16/24 0728   BP: 152/90 159/88  166/94   BP Location:  Right arm     Pulse: 95 94  92   Resp: 12      Temp: 98.6 °F (37 °C) 98.4 °F (36.9 °C)  98.8 °F (37.1 °C)   TempSrc:  Temporal     SpO2: 91% 95% 98% 95%   Weight:       Height:           Intake/Output Summary (Last 24 hours) at 4/16/2024 0929  Last data filed at 4/16/2024 0701  Gross per 24 hour   Intake 1030 ml   Output 4175 ml   Net -3145 ml     General: NAD  Skin: warm, dry, intact, no rash  HEENT: Moist mucous membranes, sclera anicteric, normocephalic, atraumatic  Neck: No apparent JVD appreciated  Chest: lung sounds clear B/L, on RA   CVS:Regular rate and rhythm, no murmer   Abdomen: firm, round, non-tender, +BS, scrotal edema  Extremities: No B/L LE edema present  Neuro: alert and oriented  Psych: appropriate mood and affect     Medications:    Current Facility-Administered Medications:     acetaminophen (TYLENOL) tablet 975 mg, 975 mg, Oral,  Q8H, Inderjit Mckeon PA-C, 975 mg at 04/16/24 0546    amiodarone tablet 200 mg, 200 mg, Oral, Daily With Breakfast, Inderjit Mckeon PA-C, 200 mg at 04/16/24 0739    amLODIPine (NORVASC) tablet 5 mg, 5 mg, Oral, BID, NATA Graham, 5 mg at 04/16/24 0739    apixaban (ELIQUIS) tablet 5 mg, 5 mg, Oral, BID, Nancy Jc MD, 5 mg at 04/16/24 0740    clonazePAM (KlonoPIN) tablet 0.5 mg, 0.5 mg, Oral, BID PRN, Suzette Burton PA-C, 0.5 mg at 04/14/24 0149    cyclobenzaprine (FLEXERIL) tablet 5 mg, 5 mg, Oral, TID PRN, Inderjit Mckeon PA-C, 5 mg at 04/15/24 2037    DAPTOmycin (CUBICIN) 575 mg in sodium chloride 0.9 % 50 mL IVPB, 6 mg/kg, Intravenous, Q24H, Vee Walker MD, Last Rate: 100 mL/hr at 04/16/24 0001, 575 mg at 04/16/24 0001    fentaNYL (DURAGESIC) 75 mcg/hr TD 72 hr patch 1 patch, 1 patch, Transdermal, Q72H, Inderjit Mckeon PA-C, 1 patch at 04/13/24 1712    HYDROmorphone (DILAUDID) injection 1 mg, 1 mg, Intravenous, Q6H PRN, Uday Jean MD, 1 mg at 04/16/24 0103    insulin glargine (LANTUS) subcutaneous injection 20 Units 0.2 mL, 20 Units, Subcutaneous, HS, Nancy Jc MD, 20 Units at 04/15/24 2102    insulin lispro (HumALOG/ADMELOG) 100 units/mL subcutaneous injection 1-5 Units, 1-5 Units, Subcutaneous, 4x Daily (AC & HS), 2 Units at 04/16/24 0742 **AND** Fingerstick Glucose (POCT), , , 4x Daily AC and at bedtime, Inderjit Mckeon PA-C    insulin lispro (HumALOG/ADMELOG) 100 units/mL subcutaneous injection 5 Units, 5 Units, Subcutaneous, TID With Meals, Nancy Jc MD, 5 Units at 04/16/24 0742    melatonin tablet 3 mg, 3 mg, Oral, HS PRN, Gonzalez Davey MD    metoclopramide (REGLAN) injection 10 mg, 10 mg, Intravenous, Q6H PRN, Nancy Jc MD    metoprolol succinate (TOPROL-XL) 24 hr tablet 25 mg, 25 mg, Oral, Daily, Inderjit Mckeon PA-C, 25 mg at 04/16/24 0739    ondansetron (ZOFRAN) injection 4 mg, 4 mg, Intravenous, Q6H PRN, Gonzalez Garcia  MD Petar, 4 mg at 04/15/24 2059    oxyCODONE (ROXICODONE) IR tablet 5 mg, 5 mg, Oral, Q6H PRN, Uday Jean MD, 5 mg at 04/16/24 0739    polyethylene glycol (MIRALAX) packet 17 g, 17 g, Oral, BID, Nancy Jc MD, 17 g at 04/16/24 0738    pravastatin (PRAVACHOL) tablet 80 mg, 80 mg, Oral, Daily With Dinner, Inderjit Mckeon PA-C, 80 mg at 04/15/24 1715    risperiDONE (RisperDAL) tablet 3 mg, 3 mg, Oral, Daily, Nancy Jc MD, 3 mg at 04/16/24 0739    senna-docusate sodium (SENOKOT S) 8.6-50 mg per tablet 2 tablet, 2 tablet, Oral, BID, Nancy Jc MD, 2 tablet at 04/16/24 0738    SUMAtriptan (IMITREX) tablet 100 mg, 100 mg, Oral, Daily PRN, Suzette Burton PA-C, 100 mg at 04/15/24 2036    zolpidem (AMBIEN) tablet 10 mg, 10 mg, Oral, HS PRN, Inderjit Mckeon PA-C, 10 mg at 04/16/24 0103    Laboratory Results:  Results from last 7 days   Lab Units 04/16/24  0347 04/15/24  0541 04/14/24  0455 04/13/24  0219 04/12/24  0557 04/11/24  1940   WBC Thousand/uL 6.66 7.44 7.63   < > 7.92 7.70   HEMOGLOBIN g/dL 8.2* 7.8* 8.5*   < > 7.8* 9.0*   HEMATOCRIT % 25.2* 23.9* 26.7*   < > 24.0* 28.8*   PLATELETS Thousands/uL 242 230 251   < > 250 309   SODIUM mmol/L 133* 134* 134*   < > 135 137   POTASSIUM mmol/L 4.6 4.6 4.5   < > 4.8 5.3   CHLORIDE mmol/L 106 106 107   < > 108 109*   CO2 mmol/L 22 24 22   < > 21 22   BUN mg/dL 20 20 21   < > 31* 29*   CREATININE mg/dL 1.52* 1.49* 1.51*   < > 1.86* 2.05*   CALCIUM mg/dL 7.7* 7.3* 7.5*   < > 7.1* 8.2*   ALK PHOS U/L  --  55  --   --  50 60   ALT U/L  --  3*  --   --  <3* <3*   AST U/L  --  9*  --   --  8* 10*    < > = values in this interval not displayed.

## 2024-04-16 NOTE — ASSESSMENT & PLAN NOTE
Recent Labs     04/14/24  0455 04/15/24  0541 04/16/24  0347   HGB 8.5* 7.8* 8.2*        Anemia of chronic disease   Monitor

## 2024-04-16 NOTE — TELEPHONE ENCOUNTER
Caller: Patient     Doctor: Breana     Reason for call: Patient stating he has to cancel his post op on Friday he states he is in hospital he is asking if provider can come to his hospital room to take out his stitches ?   Please advise     Call back#: [phone]

## 2024-04-16 NOTE — PROGRESS NOTES
Armen Llanos  49 y.o.  male  1975  mrn [id number]    Assessment/Plan:  MSSA bacteremia/MSSA right TKR infxn/JH:     Patient with recent MSSA bacteremia and R TKR infection s/p poly exchange home on IV Ancef, re-admitted for LE edema, ARF.  Etiology of ARF is unclear, renal on board, for now will d/c ancef and rx with Daptomycin given concerns of AIN.  Pulmonary infiltrates noted, no respiratory symptoms.  Infiltrates are somewhat nodular and peripheral may be septic emboli from his prolonged MSSA bacteremia, Daptomycin should be sufficient for this.       4/15: No fever and WBC count is 7.4K. Creatinine has decreased to 1.49. Etiology of recent JH is not entirely clear. Ancef was discontinued on 4/10 as possible source of AIN. Ancef was restarted on 4/12 on admission. Ancef was stopped and Dapto was started on 4/12 to cont tx for MSSA bacteremia with MSSA right TKR. Left 2nd toe osteo due to chronic wound could be the source of his MSSA bacteremia. The toe was resected during his last admission. ESR increased to 38 but CRP decreased to 50.1.      His RLE edema has improved. He c/o scrotal edema. Sutures were removed from his right knee incision on 4/12 and the incision is healing well - no redness or drainage. CK is 25 on Dapto    4/16: Remains afebrile and WBC count is 6.6K. Creatine increased slightly to 1.52 after dose of Lasix. Etiology of recent JH is not entirely clear. Ancef was discontinued on 4/10 as possible source of AIN. Ancef was restarted on 4/12 on admission. Ancef was stopped and Dapto was started on 4/12 to cont tx for MSSA bacteremia with MSSA right TKR. Left 2nd toe osteo due to chronic wound was probably the source of his MSSA bacteremia. The toe was resected during his last admission. ESR increased to 38 but CRP decreased to 50.1.     His RLE edema has essentially resolved. He c/o scrotal edema. Sutures were removed from his right knee incision on 4/12 and the incision is healing  well - no redness or drainage. He has some right knee stiffness but ROM is increasing. CK is 25 on Dapto. I reviewed today's CXR which showed      - Cont Daptomycin - he is completing 6 week course of IV abx and then will be placed on chronic abx suppression  since hardware was not removed - tentative stop date is 5/7 but IV abx tx could be extended if ESR and CRP remain elvated  - Cont to follow creatinine off Ancef but at this point, I'm not convinced that Ancef was the etiology of his JH since he was taking other meds which can also cause JH  - Cont to monitor creatinine and re-adjust abx doses based on creatinine clearance  - Cont eval and tx of JH as per Renal  - Cont to monitor for the development of toxicity to current abx tx  - OK to d/c Pt when cleared medically - Pt reports that he may go to SNF  - Pt will f/u with me as an out-Pt  - Will sign off case, please re-consult if any new probs arise        Discussed case with Dr. Gonzalez Davey and Dallas Dumont LPN    Subjective: Pt's right leg swelling has resolved. Still has stiffness of his right knee but ROM is improving. Creatinine conts to be elevated. No fever and WBC count is nml. No probs with current abx tx    Objective:  VSS, Tmax: 98.8  HEENT:  No scleral icterus  NECK: Supple  CARDIAC:  RRR, nml S1, S2  LUNGS:  Clear anteriorly3  ABDOMEN: +BS, soft, nontender  MUSCULOSKELETAL:  Decreased ROM of right knee but incision is intact - no redness or drainage - sutures have been removed - no effusion but still has increased warmth  EXTREMITIES: No calf tenderness  SKIN: No rash      Labs:  CBC w/diff  Recent Labs     04/16/24 0347   WBC 6.66   HGB 8.2*   HCT 25.2*      NEUTOPHILPCT 67   LYMPHOPCT 21   MONOPCT 8   EOSPCT 2     BMP  Recent Labs     04/16/24  0347   K 4.6      CO2 22   BUN 20   CREATININE 1.52*   CALCIUM 7.7*     CMP  Recent Labs     04/15/24  0541 04/16/24  0347   K 4.6 4.6    106   CO2 24 22   BUN 20 20   CREATININE 1.49*  "1.52*   CALCIUM 7.3* 7.7*   ALKPHOS 55  --    ALT 3*  --    AST 9*  --        .labrc    Cultures:  Lab Results   Component Value Date    BLOODCX No Growth After 4 Days. 04/11/2024    BLOODCX No Growth After 4 Days. 04/11/2024    BLOODCX No Growth After 5 Days. 03/26/2024    BLOODCX No Growth After 5 Days. 03/26/2024    BLOODCX Staphylococcus aureus (A) 03/25/2024    BLOODCX Staphylococcus aureus (A) 03/25/2024    BLOODCX Staphylococcus aureus (A) 03/23/2024    BLOODCX Staphylococcus aureus (A) 03/23/2024    BLOODCX Staphylococcus aureus (A) 03/22/2024    BLOODCX Staphylococcus aureus (A) 03/22/2024     No results found for: \"WOUNDCULT\"  No results found for: \"URINECX\"  No results found for: \"SPUTUMCULTUR\"    MED:  Dapto: #5  Abx: #24           Completed:  Vanco x 3 days on 3/25  Ancef x 17 days on 4/10  Ancef x 1 day on 4/12         Current Facility-Administered Medications:     acetaminophen (TYLENOL) tablet 975 mg, 975 mg, Oral, Q8H, Inderjit Mckeon PA-C, 975 mg at 04/16/24 1208    amiodarone tablet 200 mg, 200 mg, Oral, Daily With Breakfast, Inderjit Mckeon PA-C, 200 mg at 04/16/24 0739    amLODIPine (NORVASC) tablet 5 mg, 5 mg, Oral, BID, NATA Graham, 5 mg at 04/16/24 0739    apixaban (ELIQUIS) tablet 5 mg, 5 mg, Oral, BID, Nancy Jc MD, 5 mg at 04/16/24 0740    clonazePAM (KlonoPIN) tablet 0.5 mg, 0.5 mg, Oral, BID PRN, Suzette Burton PA-C, 0.5 mg at 04/14/24 0149    cyclobenzaprine (FLEXERIL) tablet 5 mg, 5 mg, Oral, TID PRN, Inderjit Mckeon PA-C, 5 mg at 04/15/24 2037    DAPTOmycin (CUBICIN) 575 mg in sodium chloride 0.9 % 50 mL IVPB, 6 mg/kg, Intravenous, Q24H, Vee Walker MD, Last Rate: 100 mL/hr at 04/16/24 0001, 575 mg at 04/16/24 0001    fentaNYL (DURAGESIC) 75 mcg/hr TD 72 hr patch 1 patch, 1 patch, Transdermal, Q72H, Inderjit Mckeon PA-C, 1 patch at 04/13/24 1712    HYDROmorphone (DILAUDID) injection 1 mg, 1 mg, Intravenous, Q6H PRN, Uday Jean MD, 1 mg at 04/16/24 " 0931    insulin glargine (LANTUS) subcutaneous injection 20 Units 0.2 mL, 20 Units, Subcutaneous, HS, Nancy Jc MD, 20 Units at 04/15/24 2102    insulin lispro (HumALOG/ADMELOG) 100 units/mL subcutaneous injection 1-5 Units, 1-5 Units, Subcutaneous, 4x Daily (AC & HS), 2 Units at 04/16/24 1210 **AND** Fingerstick Glucose (POCT), , , 4x Daily AC and at bedtime, Inderjit Mckeon PA-C    insulin lispro (HumALOG/ADMELOG) 100 units/mL subcutaneous injection 5 Units, 5 Units, Subcutaneous, TID With Meals, Nancy Jc MD, 5 Units at 04/16/24 1209    melatonin tablet 3 mg, 3 mg, Oral, HS PRN, Gonzalez Davey MD    metoclopramide (REGLAN) injection 10 mg, 10 mg, Intravenous, Q6H PRN, Nancy Jc MD    metoprolol succinate (TOPROL-XL) 24 hr tablet 25 mg, 25 mg, Oral, Daily, Inderjit Mckeon PA-C, 25 mg at 04/16/24 0739    ondansetron (ZOFRAN) injection 4 mg, 4 mg, Intravenous, Q6H PRN, Gonzalez Davey MD, 4 mg at 04/16/24 1152    oxyCODONE (ROXICODONE) IR tablet 5 mg, 5 mg, Oral, Q6H PRN, Uday Jean MD, 5 mg at 04/16/24 0739    polyethylene glycol (MIRALAX) packet 17 g, 17 g, Oral, BID, Nancy Jc MD, 17 g at 04/16/24 0738    pravastatin (PRAVACHOL) tablet 80 mg, 80 mg, Oral, Daily With Dinner, Inderjit Mckeon PA-C, 80 mg at 04/15/24 1715    risperiDONE (RisperDAL) tablet 3 mg, 3 mg, Oral, Daily, Nancy Jc MD, 3 mg at 04/16/24 0739    senna-docusate sodium (SENOKOT S) 8.6-50 mg per tablet 2 tablet, 2 tablet, Oral, BID, Nancy Jc MD, 2 tablet at 04/16/24 0738    SUMAtriptan (IMITREX) tablet 100 mg, 100 mg, Oral, Daily PRN, Suzette Burton PA-C, 100 mg at 04/15/24 2036    zolpidem (AMBIEN) tablet 10 mg, 10 mg, Oral, HS PRN, Inderjit Mckeon PA-C, 10 mg at 04/16/24 0103    Principal Problem:    JH (acute kidney injury) (HCC)  Active Problems:    Chronic, continuous use of opioids    Type 2 diabetes mellitus with ketoacidosis without coma, without  long-term current use of insulin (HCC)    Essential hypertension    TBI (traumatic brain injury) (HCC)    MSSA bacteremia    A-fib with RVR (HCC)    Cardiomyopathy (HCC)    Abnormal CT of the chest    Anemia    Ambulatory dysfunction    Anasarca associated with disorder of kidney      Vee Walker MD

## 2024-04-16 NOTE — PLAN OF CARE
Problem: PAIN - ADULT  Goal: Verbalizes/displays adequate comfort level or baseline comfort level  Description: Interventions:  - Encourage patient to monitor pain and request assistance  - Assess pain using appropriate pain scale  - Administer analgesics based on type and severity of pain and evaluate response  - Implement non-pharmacological measures as appropriate and evaluate response  - Consider cultural and social influences on pain and pain management  - Notify physician/advanced practitioner if interventions unsuccessful or patient reports new pain  Outcome: Progressing     Problem: INFECTION - ADULT  Goal: Absence or prevention of progression during hospitalization  Description: INTERVENTIONS:  - Assess and monitor for signs and symptoms of infection  - Monitor lab/diagnostic results  - Monitor all insertion sites, i.e. indwelling lines, tubes, and drains  - Monitor endotracheal if appropriate and nasal secretions for changes in amount and color  - [place] appropriate cooling/warming therapies per order  - Administer medications as ordered  - Instruct and encourage patient and family to use good hand hygiene technique  - Identify and instruct in appropriate isolation precautions for identified infection/condition  Outcome: Progressing

## 2024-04-17 PROBLEM — I10 ESSENTIAL HYPERTENSION: Status: RESOLVED | Noted: 2022-11-15 | Resolved: 2024-04-17

## 2024-04-17 LAB
ALBUMIN SERPL BCP-MCNC: 2.3 G/DL (ref 3.5–5)
ALP SERPL-CCNC: 53 U/L (ref 34–104)
ALT SERPL W P-5'-P-CCNC: <3 U/L (ref 7–52)
ANION GAP SERPL CALCULATED.3IONS-SCNC: 6 MMOL/L (ref 4–13)
AST SERPL W P-5'-P-CCNC: 9 U/L (ref 13–39)
BACTERIA BLD CULT: NORMAL
BACTERIA BLD CULT: NORMAL
BASOPHILS # BLD AUTO: 0.04 THOUSANDS/ÂΜL (ref 0–0.1)
BASOPHILS NFR BLD AUTO: 1 % (ref 0–1)
BILIRUB SERPL-MCNC: 0.39 MG/DL (ref 0.2–1)
BUN SERPL-MCNC: 18 MG/DL (ref 5–25)
CALCIUM ALBUM COR SERPL-MCNC: 9 MG/DL (ref 8.3–10.1)
CALCIUM SERPL-MCNC: 7.6 MG/DL (ref 8.4–10.2)
CHLORIDE SERPL-SCNC: 104 MMOL/L (ref 96–108)
CO2 SERPL-SCNC: 22 MMOL/L (ref 21–32)
CREAT SERPL-MCNC: 1.4 MG/DL (ref 0.6–1.3)
CREAT UR-MCNC: 55.9 MG/DL
EOSINOPHIL # BLD AUTO: 0.14 THOUSAND/ÂΜL (ref 0–0.61)
EOSINOPHIL NFR BLD AUTO: 2 % (ref 0–6)
ERYTHROCYTE [DISTWIDTH] IN BLOOD BY AUTOMATED COUNT: 13.2 % (ref 11.6–15.1)
GFR SERPL CREATININE-BSD FRML MDRD: 58 ML/MIN/1.73SQ M
GLUCOSE SERPL-MCNC: 142 MG/DL (ref 65–140)
GLUCOSE SERPL-MCNC: 145 MG/DL (ref 65–140)
GLUCOSE SERPL-MCNC: 151 MG/DL (ref 65–140)
GLUCOSE SERPL-MCNC: 208 MG/DL (ref 65–140)
GLUCOSE SERPL-MCNC: 213 MG/DL (ref 65–140)
HCT VFR BLD AUTO: 25.5 % (ref 36.5–49.3)
HGB BLD-MCNC: 8.4 G/DL (ref 12–17)
IMM GRANULOCYTES # BLD AUTO: 0.05 THOUSAND/UL (ref 0–0.2)
IMM GRANULOCYTES NFR BLD AUTO: 1 % (ref 0–2)
LYMPHOCYTES # BLD AUTO: 1.29 THOUSANDS/ÂΜL (ref 0.6–4.47)
LYMPHOCYTES NFR BLD AUTO: 17 % (ref 14–44)
MCH RBC QN AUTO: 29.2 PG (ref 26.8–34.3)
MCHC RBC AUTO-ENTMCNC: 32.9 G/DL (ref 31.4–37.4)
MCV RBC AUTO: 89 FL (ref 82–98)
MONOCYTES # BLD AUTO: 0.59 THOUSAND/ÂΜL (ref 0.17–1.22)
MONOCYTES NFR BLD AUTO: 8 % (ref 4–12)
NEUTROPHILS # BLD AUTO: 5.56 THOUSANDS/ÂΜL (ref 1.85–7.62)
NEUTS SEG NFR BLD AUTO: 71 % (ref 43–75)
NRBC BLD AUTO-RTO: 0 /100 WBCS
PLATELET # BLD AUTO: 259 THOUSANDS/UL (ref 149–390)
PMV BLD AUTO: 9.7 FL (ref 8.9–12.7)
POTASSIUM SERPL-SCNC: 4.3 MMOL/L (ref 3.5–5.3)
PROT SERPL-MCNC: 5.9 G/DL (ref 6.4–8.4)
PROT UR-MCNC: 772 MG/DL
PROT/CREAT UR: 13.81 MG/G{CREAT} (ref 0–0.1)
RBC # BLD AUTO: 2.88 MILLION/UL (ref 3.88–5.62)
SODIUM SERPL-SCNC: 132 MMOL/L (ref 135–147)
WBC # BLD AUTO: 7.67 THOUSAND/UL (ref 4.31–10.16)

## 2024-04-17 PROCEDURE — 80053 COMPREHEN METABOLIC PANEL: CPT | Performed by: HOSPITALIST

## 2024-04-17 PROCEDURE — 99232 SBSQ HOSP IP/OBS MODERATE 35: CPT | Performed by: FAMILY MEDICINE

## 2024-04-17 PROCEDURE — 84156 ASSAY OF PROTEIN URINE: CPT | Performed by: HOSPITALIST

## 2024-04-17 PROCEDURE — 82570 ASSAY OF URINE CREATININE: CPT | Performed by: HOSPITALIST

## 2024-04-17 PROCEDURE — 85025 COMPLETE CBC W/AUTO DIFF WBC: CPT | Performed by: HOSPITALIST

## 2024-04-17 PROCEDURE — 99221 1ST HOSP IP/OBS SF/LOW 40: CPT | Performed by: PODIATRIST

## 2024-04-17 PROCEDURE — 99232 SBSQ HOSP IP/OBS MODERATE 35: CPT | Performed by: INTERNAL MEDICINE

## 2024-04-17 PROCEDURE — 82948 REAGENT STRIP/BLOOD GLUCOSE: CPT

## 2024-04-17 RX ORDER — ALBUMIN (HUMAN) 12.5 G/50ML
25 SOLUTION INTRAVENOUS 4 TIMES DAILY
Status: COMPLETED | OUTPATIENT
Start: 2024-04-17 | End: 2024-04-18

## 2024-04-17 RX ADMIN — SENNOSIDES AND DOCUSATE SODIUM 2 TABLET: 8.6; 5 TABLET ORAL at 07:54

## 2024-04-17 RX ADMIN — APIXABAN 5 MG: 5 TABLET, FILM COATED ORAL at 16:44

## 2024-04-17 RX ADMIN — INSULIN LISPRO 2 UNITS: 100 INJECTION, SOLUTION INTRAVENOUS; SUBCUTANEOUS at 11:37

## 2024-04-17 RX ADMIN — INSULIN LISPRO 1 UNITS: 100 INJECTION, SOLUTION INTRAVENOUS; SUBCUTANEOUS at 21:28

## 2024-04-17 RX ADMIN — INSULIN LISPRO 5 UNITS: 100 INJECTION, SOLUTION INTRAVENOUS; SUBCUTANEOUS at 11:37

## 2024-04-17 RX ADMIN — ALBUMIN (HUMAN) 25 G: 0.25 INJECTION, SOLUTION INTRAVENOUS at 14:37

## 2024-04-17 RX ADMIN — Medication 3 MG: at 01:26

## 2024-04-17 RX ADMIN — INSULIN LISPRO 1 UNITS: 100 INJECTION, SOLUTION INTRAVENOUS; SUBCUTANEOUS at 16:14

## 2024-04-17 RX ADMIN — ALBUMIN (HUMAN) 25 G: 0.25 INJECTION, SOLUTION INTRAVENOUS at 21:26

## 2024-04-17 RX ADMIN — ONDANSETRON 4 MG: 2 INJECTION INTRAMUSCULAR; INTRAVENOUS at 16:45

## 2024-04-17 RX ADMIN — AMLODIPINE BESYLATE 5 MG: 5 TABLET ORAL at 07:54

## 2024-04-17 RX ADMIN — POLYETHYLENE GLYCOL 3350 17 G: 17 POWDER, FOR SOLUTION ORAL at 07:53

## 2024-04-17 RX ADMIN — AMLODIPINE BESYLATE 5 MG: 5 TABLET ORAL at 16:44

## 2024-04-17 RX ADMIN — METOCLOPRAMIDE 10 MG: 5 INJECTION, SOLUTION INTRAMUSCULAR; INTRAVENOUS at 21:25

## 2024-04-17 RX ADMIN — HYDROMORPHONE HYDROCHLORIDE 1 MG: 1 INJECTION, SOLUTION INTRAMUSCULAR; INTRAVENOUS; SUBCUTANEOUS at 02:19

## 2024-04-17 RX ADMIN — INSULIN GLARGINE 20 UNITS: 100 INJECTION, SOLUTION SUBCUTANEOUS at 21:26

## 2024-04-17 RX ADMIN — INSULIN LISPRO 5 UNITS: 100 INJECTION, SOLUTION INTRAVENOUS; SUBCUTANEOUS at 07:55

## 2024-04-17 RX ADMIN — PRAVASTATIN SODIUM 80 MG: 80 TABLET ORAL at 16:13

## 2024-04-17 RX ADMIN — ZOLPIDEM TARTRATE 10 MG: 5 TABLET ORAL at 01:27

## 2024-04-17 RX ADMIN — RISPERIDONE 3 MG: 1 TABLET, FILM COATED ORAL at 07:54

## 2024-04-17 RX ADMIN — ALBUMIN (HUMAN) 25 G: 0.25 INJECTION, SOLUTION INTRAVENOUS at 16:44

## 2024-04-17 RX ADMIN — POLYETHYLENE GLYCOL 3350 17 G: 17 POWDER, FOR SOLUTION ORAL at 16:44

## 2024-04-17 RX ADMIN — AMIODARONE HYDROCHLORIDE 200 MG: 200 TABLET ORAL at 07:54

## 2024-04-17 RX ADMIN — APIXABAN 5 MG: 5 TABLET, FILM COATED ORAL at 07:54

## 2024-04-17 RX ADMIN — INSULIN LISPRO 1 UNITS: 100 INJECTION, SOLUTION INTRAVENOUS; SUBCUTANEOUS at 07:53

## 2024-04-17 RX ADMIN — HYDROMORPHONE HYDROCHLORIDE 1 MG: 1 INJECTION, SOLUTION INTRAMUSCULAR; INTRAVENOUS; SUBCUTANEOUS at 09:32

## 2024-04-17 RX ADMIN — ACETAMINOPHEN 975 MG: 325 TABLET, FILM COATED ORAL at 21:26

## 2024-04-17 RX ADMIN — ONDANSETRON 4 MG: 2 INJECTION INTRAMUSCULAR; INTRAVENOUS at 05:03

## 2024-04-17 RX ADMIN — OXYCODONE HYDROCHLORIDE 5 MG: 5 TABLET ORAL at 01:26

## 2024-04-17 RX ADMIN — ACETAMINOPHEN 975 MG: 325 TABLET, FILM COATED ORAL at 04:43

## 2024-04-17 RX ADMIN — SENNOSIDES AND DOCUSATE SODIUM 2 TABLET: 8.6; 5 TABLET ORAL at 16:44

## 2024-04-17 RX ADMIN — DAPTOMYCIN 575 MG: 500 INJECTION, POWDER, LYOPHILIZED, FOR SOLUTION INTRAVENOUS at 23:38

## 2024-04-17 RX ADMIN — HYDROMORPHONE HYDROCHLORIDE 1 MG: 1 INJECTION, SOLUTION INTRAMUSCULAR; INTRAVENOUS; SUBCUTANEOUS at 16:23

## 2024-04-17 RX ADMIN — METOPROLOL SUCCINATE 25 MG: 25 TABLET, EXTENDED RELEASE ORAL at 07:54

## 2024-04-17 NOTE — ASSESSMENT & PLAN NOTE
Completed a course of IV cefazolin on 4/11/2024 for septic arthritis with MSSA  Infection disease was consulted.  Ancef was discontinued given the patient's JH and suspicion for BEATRIZ  Patient on daptomycin currently-per ID note, patient is completing 6 weeks of antibiotic, after that patient will need suppressive therapy since patient has hardware which did not remove

## 2024-04-17 NOTE — PROGRESS NOTES
NEPHROLOGY PROGRESS NOTE   Armen Llanos 49 y.o. male MRN: [id number]  Unit/Bed#: -01 Encounter: [id number]      HPI/24hr EVENTS:    -49-year-old male past medical history of DM 2, hypertension, A-fib on Eliquis, chronic pain, history of TBI.  With recent mission for septic arthritis and MSSA bacteremia.  Presented right knee pain and swelling.  Nephrology consulted for management of JH     -Improving renal function    ASSESSMENT/PLAN:    JH (POA)  -Baseline creatinine: 0.8-1.0  -Creatinine on admission 2.05, most recent creatinine 1.4  -Etiology: Undifferentiated at this point, possibly prerenal azotemia from osmotic diuresis with hyperglycemia with concurrent Entresto use versus AIN from Ancef use  -UA: Glucose, large blood, 3+ protein, 30-50 RBCs, 2-4 WBCs (he reports he took Jardiance prior to admission x 1 dose)  -CT abdomen pelvis with no hydronephrosis  -Urine eosinophils are 0%, he has no peripheral eosinophilia  -IV Ancef has been discontinued in favor of IV daptomycin per ID  -Continue holding Entresto  -Patient received IV fluid early on admission and is now off all scheduled IV fluid  -His creatinine has improved on admission with holding Entresto, IV fluid resuscitation which has been discontinued.  He is now off Ancef and on daptomycin.  His serology studies are negative as below  -Creatinine after receiving contrast on 4/12/2024     Nephrotic range proteinuria  -Initial UPCR was not accurate, repeat showed 6.9 g protein, repeat showing 13.8 g of protein  -Serologies were sent including: RAJENDRA, C3/C4, ANCA, anti-GBM, antidouble-stranded DNA, SPEP/FLC/UPEP  -C3/C4 within normal range, RAJENDRA negative, UPEP/FLC are unremarkable, ANCA and anti-GBM and anti-double-stranded DNA are all negative  -SPEP was abnormal with an M spike IgM kappa, hematology/oncology was consulted  -Etiology is possibly due to uncontrolled DM2 versus JH  -Entresto is on hold  -His serum albumin is 2.3 most recently, he  received albumin earlier on admission     Hypertension  -Currently on Norvasc 5 mg daily, Toprol-XL 25 mg daily  -Recent blood pressures are mildly elevated but acceptable     HFrEF/anasarca  -3/25/2024 TTE: EF is 45%, abnormal diastolic function  -Follows with cardiology as outpatient  -Self discontinued Jardiance as outpatient, managed on Entresto and Toprol-XL  -He has no lower extremity edema but complains of scrotal edema  -He has hypoalbuminemia, was given albumin earlier on admission.  He reports he feels the scrotal edema is improving     Hyponatremia  -Has a history of intermittent hyponatremia in the past, had a recent admission where he was noted to have hyponatremia.  Recent sodium is 132/glucose corrected to 133  -He is on multiple medications which can affect sodium level including risperidone, Cymbalta  -He is currently on 1.8 L fluid restrictions but would like this increased     Anemia  -Recent hemoglobin 8.4  -SPEP was noted for IgM kappa M spike, heme-onc was consulted     History of septic arthritis/MSSA bacteremia  -Had a recent admission with MSSA bacteremia from right TKR infection, was discharged on home Ancef  -ID and orthopedics consulted on admission, currently on daptomycin     Additional medical problems: DM2 last A1c 12.1, history of TBI, chronic pain, A-fib    SUBJECTIVE:  Reports he wants to drink more water, reports his abdominal is improving and scrotal edema is improving.  Appetite is stable denies shortness of breath    ROS:  Review of Systems   Constitutional: Negative.    Respiratory: Negative.     Cardiovascular: Negative.    Gastrointestinal: Negative.    Genitourinary:  Positive for scrotal swelling.   Musculoskeletal: Negative.    Neurological: Negative.       A complete 10 point review of systems was performed and found to be negative unless otherwise noted above or in the HPI.    OBJECTIVE:  Current Weight: Weight - Scale: 96.2 kg (212 lb)  Vitals:    04/16/24 1732  04/16/24 2015 04/16/24 2116 04/17/24 0703   BP: 157/97 163/96  166/97   Pulse: 95   104   Resp:  17  20   Temp: 98.6 °F (37 °C) 98.8 °F (37.1 °C)  98.6 °F (37 °C)   TempSrc:       SpO2: 96%  98% 96%   Weight:       Height:           Intake/Output Summary (Last 24 hours) at 4/17/2024 1307  Last data filed at 4/17/2024 1303  Gross per 24 hour   Intake 1330 ml   Output 3000 ml   Net -1670 ml     Physical Exam  Vitals and nursing note reviewed.   Constitutional:       General: He is not in acute distress.     Appearance: He is obese. He is not toxic-appearing or diaphoretic.      Comments: Awake sitting in bed eating lunch   HENT:      Head: Normocephalic and atraumatic.      Nose: Nose normal.      Mouth/Throat:      Mouth: Mucous membranes are dry.   Eyes:      General: No scleral icterus.  Cardiovascular:      Rate and Rhythm: Normal rate and regular rhythm.      Pulses: Normal pulses.   Pulmonary:      Effort: Pulmonary effort is normal. No respiratory distress.      Breath sounds: No wheezing or rales.   Abdominal:      General: Abdomen is flat.   Musculoskeletal:      Cervical back: Neck supple.      Right lower leg: No edema.      Left lower leg: No edema.      Comments: Right knee incision   Skin:     General: Skin is warm and dry.      Capillary Refill: Capillary refill takes less than 2 seconds.   Neurological:      Mental Status: He is alert and oriented to person, place, and time.          Medications:    Current Facility-Administered Medications:     acetaminophen (TYLENOL) tablet 975 mg, 975 mg, Oral, Q8H, Inderjit Mckeon PA-C, 975 mg at 04/17/24 0443    amiodarone tablet 200 mg, 200 mg, Oral, Daily With Breakfast, Inderjit Mckeon PA-C, 200 mg at 04/17/24 0754    amLODIPine (NORVASC) tablet 5 mg, 5 mg, Oral, BID, NATA Graham, 5 mg at 04/17/24 0754    apixaban (ELIQUIS) tablet 5 mg, 5 mg, Oral, BID, Nancy Jc MD, 5 mg at 04/17/24 0754    clonazePAM (KlonoPIN) tablet 0.5 mg, 0.5  mg, Oral, BID PRN, Suzette Burton PA-C, 0.5 mg at 04/14/24 0149    cyclobenzaprine (FLEXERIL) tablet 5 mg, 5 mg, Oral, TID PRN, Inderjit Mckeon PA-C, 5 mg at 04/15/24 2037    DAPTOmycin (CUBICIN) 575 mg in sodium chloride 0.9 % 50 mL IVPB, 6 mg/kg, Intravenous, Q24H, Vee Walker MD, Last Rate: 100 mL/hr at 04/16/24 2225, 575 mg at 04/16/24 2225    fentaNYL (DURAGESIC) 75 mcg/hr TD 72 hr patch 1 patch, 1 patch, Transdermal, Q72H, Inderjit Mckeon PA-C, 1 patch at 04/16/24 1727    HYDROmorphone (DILAUDID) injection 1 mg, 1 mg, Intravenous, Q6H PRN, Uday Jean MD, 1 mg at 04/17/24 0932    insulin glargine (LANTUS) subcutaneous injection 20 Units 0.2 mL, 20 Units, Subcutaneous, HS, Nancy Jc MD, 20 Units at 04/16/24 2155    insulin lispro (HumALOG/ADMELOG) 100 units/mL subcutaneous injection 1-5 Units, 1-5 Units, Subcutaneous, 4x Daily (AC & HS), 2 Units at 04/17/24 1137 **AND** Fingerstick Glucose (POCT), , , 4x Daily AC and at bedtime, Inderjit Mckeon PA-C    insulin lispro (HumALOG/ADMELOG) 100 units/mL subcutaneous injection 5 Units, 5 Units, Subcutaneous, TID With Meals, Nancy Jc MD, 5 Units at 04/17/24 1137    melatonin tablet 3 mg, 3 mg, Oral, HS PRN, Gonzalez Davey MD, 3 mg at 04/17/24 0126    metoclopramide (REGLAN) injection 10 mg, 10 mg, Intravenous, Q6H PRN, Nancy Jc MD    metoprolol succinate (TOPROL-XL) 24 hr tablet 25 mg, 25 mg, Oral, Daily, Inderjit Mckeon PA-C, 25 mg at 04/17/24 0754    ondansetron (ZOFRAN) injection 4 mg, 4 mg, Intravenous, Q6H PRN, Gonzalez Davey MD, 4 mg at 04/17/24 0503    oxyCODONE (ROXICODONE) IR tablet 5 mg, 5 mg, Oral, Q6H PRN, Uday Jean MD, 5 mg at 04/17/24 0126    polyethylene glycol (MIRALAX) packet 17 g, 17 g, Oral, BID, Nancy Jc MD, 17 g at 04/17/24 0753    pravastatin (PRAVACHOL) tablet 80 mg, 80 mg, Oral, Daily With Dinner, Inderjit Mckeon PA-C, 80 mg at 04/16/24 1726    risperiDONE  (RisperDAL) tablet 3 mg, 3 mg, Oral, Daily, Nancy Jc MD, 3 mg at 04/17/24 0754    senna-docusate sodium (SENOKOT S) 8.6-50 mg per tablet 2 tablet, 2 tablet, Oral, BID, Nancy Jc MD, 2 tablet at 04/17/24 0754    SUMAtriptan (IMITREX) tablet 100 mg, 100 mg, Oral, Daily PRN, Suzette Burton PA-C, 100 mg at 04/15/24 2036    zolpidem (AMBIEN) tablet 10 mg, 10 mg, Oral, HS PRN, Inderjit Mckeon PA-C, 10 mg at 04/17/24 0127    Laboratory Results:  Results from last 7 days   Lab Units 04/17/24  0407 04/16/24  0347 04/15/24  0541 04/14/24  0455 04/13/24  0219 04/12/24  0557 04/11/24  1940   WBC Thousand/uL 7.67 6.66 7.44 7.63 6.81 7.92 7.70   HEMOGLOBIN g/dL 8.4* 8.2* 7.8* 8.5* 7.7* 7.8* 9.0*   HEMATOCRIT % 25.5* 25.2* 23.9* 26.7* 23.7* 24.0* 28.8*   PLATELETS Thousands/uL 259 242 230 251 229 250 309   POTASSIUM mmol/L 4.3 4.6 4.6 4.5 4.6 4.8 5.3   CHLORIDE mmol/L 104 106 106 107 107 108 109*   CO2 mmol/L 22 22 24 22 21 21 22   BUN mg/dL 18 20 20 21 27* 31* 29*   CREATININE mg/dL 1.40* 1.52* 1.49* 1.51* 1.76* 1.86* 2.05*   CALCIUM mg/dL 7.6* 7.7* 7.3* 7.5* 7.2* 7.1* 8.2*       I have personally reviewed the blood work as stated above and in my note.  I have personally reviewed internal medicine, medical oncology note.

## 2024-04-17 NOTE — ASSESSMENT & PLAN NOTE
Recent Labs     04/15/24  0541 04/16/24  0347 04/17/24  0407   HGB 7.8* 8.2* 8.4*        Anemia of chronic disease   Monitor   Evaluated by hematology oncology.

## 2024-04-17 NOTE — ASSESSMENT & PLAN NOTE
Lab Results   Component Value Date    HGBA1C 12.1 (H) 03/22/2024       Recent Labs     04/16/24  1120 04/16/24  1632 04/16/24  2104 04/17/24  0702   POCGLU 227* 221* 224* 151*         Blood Sugar Average: Last 72 hrs:  (P) [id number] regimen: Jardiance, metformin, Lantus, Humalog  Hold Jardiance and metformin in setting of JH  Patient was started on Lantus 35 units, Humalog 13 units 3 times daily with meals and sliding scale.  4/13 patient had hypoglycemia in the 50s and received treatment per protocol.  Decreased Lantus to 20 units daily, decreased Humalog to 5 units 3 times daily with meals and sliding scale insulin- continue.   Continue hypoglycemia protocol  diabetic diet

## 2024-04-17 NOTE — PLAN OF CARE
Problem: Potential for Falls  Goal: Patient will remain free of falls  Description: INTERVENTIONS:  - Educate patient/family on patient safety including physical limitations  - Instruct patient to call for assistance with activity   - Consult OT/PT to assist with strengthening/mobility   - Keep Call bell within reach  - Keep bed low and locked with side rails adjusted as appropriate  - Keep care items and personal belongings within reach  - Initiate and maintain comfort rounds  - Make Fall Risk Sign visible to staff  - Offer Toileting every 2 Hours, in advance of need  - Initiate/Maintain bed alarm  - Obtain necessary fall risk management equipment: non skid foot wear   - Apply yellow socks and bracelet for high fall risk patients  - Consider moving patient to room near nurses station  Outcome: Progressing     Problem: PAIN - ADULT  Goal: Verbalizes/displays adequate comfort level or baseline comfort level  Description: Interventions:  - Encourage patient to monitor pain and request assistance  - Assess pain using appropriate pain scale  - Administer analgesics based on type and severity of pain and evaluate response  - Implement non-pharmacological measures as appropriate and evaluate response  - Consider cultural and social influences on pain and pain management  - Notify physician/advanced practitioner if interventions unsuccessful or patient reports new pain  Outcome: Progressing     Problem: INFECTION - ADULT  Goal: Absence or prevention of progression during hospitalization  Description: INTERVENTIONS:  - Assess and monitor for signs and symptoms of infection  - Monitor lab/diagnostic results  - Monitor all insertion sites, i.e. indwelling lines, tubes, and drains  - Monitor endotracheal if appropriate and nasal secretions for changes in amount and color  - [place] appropriate cooling/warming therapies per order  - Administer medications as ordered  - Instruct and encourage patient and family to use good  hand hygiene technique  - Identify and instruct in appropriate isolation precautions for identified infection/condition  Outcome: Progressing  Goal: Absence of fever/infection during neutropenic period  Description: INTERVENTIONS:  - Monitor WBC    Outcome: Progressing     Problem: SAFETY ADULT  Goal: Patient will remain free of falls  Description: INTERVENTIONS:  - Educate patient/family on patient safety including physical limitations  - Instruct patient to call for assistance with activity   - Consult OT/PT to assist with strengthening/mobility   - Keep Call bell within reach  - Keep bed low and locked with side rails adjusted as appropriate  - Keep care items and personal belongings within reach  - Initiate and maintain comfort rounds  - Make Fall Risk Sign visible to staff  - Offer Toileting every 2  Hours, in advance of need  - Initiate/Maintain bed alarm  - Obtain necessary fall risk management equipment: non skid foot wear   - Apply yellow socks and bracelet for high fall risk patients  - Consider moving patient to room near nurses station  Outcome: Progressing  Goal: Maintain or return to baseline ADL function  Description: INTERVENTIONS:  -  Assess patient's ability to carry out ADLs; assess patient's baseline for ADL function and identify physical deficits which impact ability to perform ADLs (bathing, care of mouth/teeth, toileting, grooming, dressing, etc.)  - Assess/evaluate cause of self-care deficits   - Assess range of motion  - Assess patient's mobility; develop plan if impaired  - Assess patient's need for assistive devices and provide as appropriate  - Encourage maximum independence but intervene and supervise when necessary  - Involve family in performance of ADLs  - Assess for home care needs following discharge   - Consider OT consult to assist with ADL evaluation and planning for discharge  - Provide patient education as appropriate  Outcome: Progressing  Goal: Maintains/Returns to pre  admission functional level  Description: INTERVENTIONS:  - Perform AM-PAC 6 Click Basic Mobility/ Daily Activity assessment daily.  - Set and communicate daily mobility goal to care team and patient/family/caregiver.   - Collaborate with rehabilitation services on mobility goals if consulted  - Perform Range of Motion 2 times a day.  - Reposition patient every 2 hours.  - Dangle patient 2 times a day  - Stand patient 2 times a day  - Ambulate patient 2 times a day  - Out of bed to chair 2 times a day   - Out of bed for meals 2 times a day  - Out of bed for toileting  - Record patient progress and toleration of activity level   Outcome: Progressing     Problem: DISCHARGE PLANNING  Goal: Discharge to home or other facility with appropriate resources  Description: INTERVENTIONS:  - Identify barriers to discharge w/patient and caregiver  - Arrange for needed discharge resources and transportation as appropriate  - Identify discharge learning needs (meds, wound care, etc.)  - Arrange for interpretive services to assist at discharge as needed  - Refer to Case Management Department for coordinating discharge planning if the patient needs post-hospital services based on physician/advanced practitioner order or complex needs related to functional status, cognitive ability, or social support system  Outcome: Progressing     Problem: Knowledge Deficit  Goal: Patient/family/caregiver demonstrates understanding of disease process, treatment plan, medications, and discharge instructions  Description: Complete learning assessment and assess knowledge base.  Interventions:  - Provide teaching at level of understanding  - Provide teaching via preferred learning methods  Outcome: Progressing     Problem: NEUROSENSORY - ADULT  Goal: Achieves stable or improved neurological status  Description: INTERVENTIONS  - Monitor and report changes in neurological status  - Monitor vital signs such as temperature, blood pressure, glucose, and any  other labs ordered   - Initiate measures to prevent increased intracranial pressure  - Monitor for seizure activity and implement precautions if appropriate      Outcome: Progressing  Goal: Remains free of injury related to seizures activity  Description: INTERVENTIONS  - Maintain airway, patient safety  and administer oxygen as ordered  - Monitor patient for seizure activity, document and report duration and description of seizure to physician/advanced practitioner  - If seizure occurs,  ensure patient safety during seizure  - Reorient patient post seizure  - Seizure pads on all 4 side rails  - Instruct patient/family to notify RN of any seizure activity including if an aura is experienced  - Instruct patient/family to call for assistance with activity based on nursing assessment  - Administer anti-seizure medications if ordered    Outcome: Progressing  Goal: Achieves maximal functionality and self care  Description: INTERVENTIONS  - Monitor swallowing and airway patency with patient fatigue and changes in neurological status  - Encourage and assist patient to increase activity and self care.   - Encourage visually impaired, hearing impaired and aphasic patients to use assistive/communication devices  Outcome: Progressing     Problem: CARDIOVASCULAR - ADULT  Goal: Maintains optimal cardiac output and hemodynamic stability  Description: INTERVENTIONS:  - Monitor I/O, vital signs and rhythm  - Monitor for S/S and trends of decreased cardiac output  - Administer and titrate ordered vasoactive medications to optimize hemodynamic stability  - Assess quality of pulses, skin color and temperature  - Assess for signs of decreased coronary artery perfusion  - Instruct patient to report change in severity of symptoms  Outcome: Progressing  Goal: Absence of cardiac dysrhythmias or at baseline rhythm  Description: INTERVENTIONS:  - Continuous cardiac monitoring, vital signs, obtain 12 lead EKG if ordered  - Administer  antiarrhythmic and heart rate control medications as ordered  - Monitor electrolytes and administer replacement therapy as ordered  Outcome: Progressing     Problem: RESPIRATORY - ADULT  Goal: Achieves optimal ventilation and oxygenation  Description: INTERVENTIONS:  - Assess for changes in respiratory status  - Assess for changes in mentation and behavior  - Position to facilitate oxygenation and minimize respiratory effort  - Oxygen administered by appropriate delivery if ordered  - Initiate smoking cessation education as indicated  - Encourage broncho-pulmonary hygiene including cough, deep breathe, Incentive Spirometry  - Assess the need for suctioning and aspirate as needed  - Assess and instruct to report SOB or any respiratory difficulty  - Respiratory Therapy support as indicated  Outcome: Progressing     Problem: GASTROINTESTINAL - ADULT  Goal: Minimal or absence of nausea and/or vomiting  Description: INTERVENTIONS:  - Administer IV fluids if ordered to ensure adequate hydration  - Maintain NPO status until nausea and vomiting are resolved  - Nasogastric tube if ordered  - Administer ordered antiemetic medications as needed  - Provide nonpharmacologic comfort measures as appropriate  - Advance diet as tolerated, if ordered  - Consider nutrition services referral to assist patient with adequate nutrition and appropriate food choices  Outcome: Progressing  Goal: Maintains or returns to baseline bowel function  Description: INTERVENTIONS:  - Assess bowel function  - Encourage oral fluids to ensure adequate hydration  - Administer IV fluids if ordered to ensure adequate hydration  - Administer ordered medications as needed  - Encourage mobilization and activity  - Consider nutritional services referral to assist patient with adequate nutrition and appropriate food choices  Outcome: Progressing  Goal: Maintains adequate nutritional intake  Description: INTERVENTIONS:  - Monitor percentage of each meal  consumed  - Identify factors contributing to decreased intake, treat as appropriate  - Assist with meals as needed  - Monitor I&O, weight, and lab values if indicated  - Obtain nutrition services referral as needed  Outcome: Progressing  Goal: Establish and maintain optimal ostomy function  Description: INTERVENTIONS:  - Assess bowel function  - Encourage oral fluids to ensure adequate hydration  - Administer IV fluids if ordered to ensure adequate hydration   - Administer ordered medications as needed  - Encourage mobilization and activity  - Nutrition services referral to assist patient with appropriate food choices  - Assess stoma site  - Consider wound care consult   Outcome: Progressing  Goal: Oral mucous membranes remain intact  Description: INTERVENTIONS  - Assess oral mucosa and hygiene practices  - Implement preventative oral hygiene regimen  - Implement oral medicated treatments as ordered  - Initiate Nutrition services referral as needed  Outcome: Progressing     Problem: GENITOURINARY - ADULT  Goal: Maintains or returns to baseline urinary function  Description: INTERVENTIONS:  - Assess urinary function  - Encourage oral fluids to ensure adequate hydration if ordered  - Administer IV fluids as ordered to ensure adequate hydration  - Administer ordered medications as needed  - Offer frequent toileting  - Follow urinary retention protocol if ordered  Outcome: Progressing  Goal: Absence of urinary retention  Description: INTERVENTIONS:  - Assess patient’s ability to void and empty bladder  - Monitor I/O  - Bladder scan as needed  - Discuss with physician/AP medications to alleviate retention as needed  - Discuss catheterization for long term situations as appropriate  Outcome: Progressing  Goal: Urinary catheter remains patent  Description: INTERVENTIONS:  - Assess patency of urinary catheter  - If patient has a chronic campa, consider changing catheter if non-functioning  - Follow guidelines for  intermittent irrigation of non-functioning urinary catheter  Outcome: Progressing     Problem: METABOLIC, FLUID AND ELECTROLYTES - ADULT  Goal: Electrolytes maintained within normal limits  Description: INTERVENTIONS:  - Monitor labs and assess patient for signs and symptoms of electrolyte imbalances  - Administer electrolyte replacement as ordered  - Monitor response to electrolyte replacements, including repeat lab results as appropriate  - Instruct patient on fluid and nutrition as appropriate  Outcome: Progressing  Goal: Fluid balance maintained  Description: INTERVENTIONS:  - Monitor labs   - Monitor I/O and WT  - Instruct patient on fluid and nutrition as appropriate  - Assess for signs & symptoms of volume excess or deficit  Outcome: Progressing  Goal: Glucose maintained within target range  Description: INTERVENTIONS:  - Monitor Blood Glucose as ordered  - Assess for signs and symptoms of hyperglycemia and hypoglycemia  - Administer ordered medications to maintain glucose within target range  - Assess nutritional intake and initiate nutrition service referral as needed  Outcome: Progressing     Problem: SKIN/TISSUE INTEGRITY - ADULT  Goal: Skin Integrity remains intact(Skin Breakdown Prevention)  Description: Assess:  -Perform [place] assessment every shift   -Clean and moisturize skin every shift   -Inspect skin when repositioning, toileting, and assisting with ADLS  -Assess under medical devices such as alleyn every shift   -Assess extremities for adequate circulation and sensation     Bed Management:  -Have minimal linens on bed & keep smooth, unwrinkled  -Change linens as needed when moist or perspiring  -Avoid sitting or lying in one position for more than 2 hours while in bed  -Keep HOB at 30degrees     Toileting:  -Offer bedside commode  -Assess for incontinence every 2 hours   -Use incontinent care products after each incontinent episode such as blue carri     Activity:  -Mobilize patient 2 times a  day  -Encourage activity and walks on unit  -Encourage or provide ROM exercises   -Turn and reposition patient every 2 Hours  -Use appropriate equipment to lift or move patient in bed  -Instruct/ Assist with weight shifting every 2 when out of bed in chair  -Consider limitation of chair time 2 hour intervals    Skin Care:  -Avoid use of baby powder, tape, friction and shearing, hot water or constrictive clothing  -Relieve pressure over bony prominences using pillows  -Do not massage red bony areas    Next     Outcome: Progressing  Goal: Incision(s), wounds(s) or drain site(s) healing without S/S of infection  Description: INTERVENTIONS  - Assess and document dressing, incision, wound bed, drain sites and surrounding tissue  - Provide patient and family education  - Perform skin care/dressing changes every day  Outcome: Progressing  Goal: Pressure injury heals and does not worsen  Description: Interventions:  - Implement low air loss mattress or specialty surface (Criteria met)  - Apply silicone foam dressing  - Instruct/assist with weight shifting every 90  minutes when in chair   - Limit chair time to 2 hour intervals  - Use special pressure reducing interventions such as position change  when in chair   - Apply fecal or urinary incontinence containment device   - Perform passive or active ROM every 2 hours   - Turn and reposition patient & offload bony prominences every 2 hours   - Utilize friction reducing device or surface for transfers   - Consider consults to  interdisciplinary teams such as care management     - Consider nutrition services referral as needed  Outcome: Progressing     Problem: HEMATOLOGIC - ADULT  Goal: Maintains hematologic stability  Description: INTERVENTIONS  - Assess for signs and symptoms of bleeding or hemorrhage  - Monitor labs  - Administer supportive blood products/factors as ordered and appropriate  Outcome: Progressing     Problem: MUSCULOSKELETAL - ADULT  Goal: Maintain or return  mobility to safest level of function  Description: INTERVENTIONS:  - Assess patient's ability to carry out ADLs; assess patient's baseline for ADL function and identify physical deficits which impact ability to perform ADLs (bathing, care of mouth/teeth, toileting, grooming, dressing, etc.)  - Assess/evaluate cause of self-care deficits   - Assess range of motion  - Assess patient's mobility  - Assess patient's need for assistive devices and provide as appropriate  - Encourage maximum independence but intervene and supervise when necessary  - Involve family in performance of ADLs  - Assess for home care needs following discharge   - Consider OT consult to assist with ADL evaluation and planning for discharge  - Provide patient education as appropriate  Outcome: Progressing  Goal: Maintain proper alignment of affected body part  Description: INTERVENTIONS:  - Support, maintain and protect limb and body alignment  - Provide patient/ family with appropriate education  Outcome: Progressing     Problem: Prexisting or High Potential for Compromised Skin Integrity  Goal: Skin integrity is maintained or improved  Description: INTERVENTIONS:  - Identify patients at risk for skin breakdown  - Assess and monitor skin integrity  - Assess and monitor nutrition and hydration status  - Monitor labs   - Assess for incontinence   - Turn and reposition patient  - Assist with mobility/ambulation  - Relieve pressure over bony prominences  - Avoid friction and shearing  - Provide appropriate hygiene as needed including keeping skin clean and dry  - Evaluate need for skin moisturizer/barrier cream  - Collaborate with interdisciplinary team   - Patient/family teaching  - Consider wound care consult   Outcome: Progressing

## 2024-04-17 NOTE — ASSESSMENT & PLAN NOTE
Lab Results   Component Value Date    CREATININE 1.40 (H) 04/17/2024    CREATININE 1.52 (H) 04/16/2024    CREATININE 1.49 (H) 04/15/2024     Baseline creatinine 0.9-1.1  Admission creatinine 2.05-creatinine improving, today creatinine is 1.40.-Continue current treatment.  CT renal: No hydronephrosis. Delayed excreted contrast material in the renal collecting systems suggesting renal impairment. Trace bilateral pleural effusions, trace volume of ascites, and moderate anasarca consistent with volume overload.  On entresto, spironolactone   Hypoalbumenic + protein in urine + anasarca on CT imaging  Unexplained RBC in urine + unclear lung findings on CTA  Of note, received contrast for CTA of the chest to r/o PE in setting of elevated ddimer  Continue to hold NSAIDS, Entresto, Jardiance, metformin and other nephrotoxins  C3, C4 negative.  Protein electrophoresis, immunoglobulin, anti-DNA antibody, anti-glomerular membrane antibody pending  Creatinine improving.  Nephrology on board, appreciate recommendations.  IV lasix given with improvement. Further lasix per nephrology.   US abd only small ascites. Scrotal US pending.   Immunofixation positive for IgM kappa.  Heme onc c/s

## 2024-04-17 NOTE — CONSULTS
Consult - Podiatry   Armen Llanos 49 y.o. 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  May return to normal shoe gear with gradual increased activity.  Ok for D/C and follow-up with Dr. Toussaint within 1 month.    Type 2 diabetes with peripheral neuropathy  Encourage patient to be more vigilant with his diabetes management and how good blood sugar control can affect his feet with peripheral neuropathy and other diabetic complications.  Recommend follow-up for routine diabetic footcare every 3 to 4 months upon discharge.     MSSA bacteremia, JH, history of traumatic brain injury, diabetic ketoacidosis without coma, abnormal chest CT, cardiomyopathy, Hx right knee pain and septic joint.  Essential hypertension, A-fib, and chronic opioid use.  Managed per internal medicine, cardiology, and infectious disease    History of Present Illness     HPI:  Armen Llanos is a 49 y.o. male type II diabetic who presents with acute kidney injury with abnormal CT scan and worsening right knee pain.  Postop day #20 status post amputation left second toe.  Recent discharge from hospital receiving 6 weeks of IV antibiotics for MSSA bacteremia and associated prosthetic knee infection.  Patient was scheduled to follow-up with Dr. Toussaint last week but due to hospital admission he missed that appointment as he was scheduled to have his sutures removed.  Toe amputation during last hospital admission was most likely the source of the bacteremia.  Podiatry consult requested to evaluate left second toe.  Patient's chart reviewed noting all pertinent clinical laboratory data.    Inpatient consult to Podiatry  Consult performed by: Catrachito Glaser DPM  Consult ordered by:  Gonzalez Davey MD        Review of Systems   Constitutional: Negative.    HENT: Negative.    Eyes: Negative.    Respiratory: Negative.    Cardiovascular: History of cardiomyopathy, denies any shortness of breath  Gastrointestinal: Negative.    Musculoskeletal: Painful bilateral knees with septic arthritis of the right knee.  Skin: Surgical incision left second toe  Neurological: Diminished sensation secondary to diabetic neuropathy      Historical Information   Past Medical History:   Diagnosis Date    Bed sore, stage 1     Diabetes mellitus (HCC)     Hypertension      Past Surgical History:   Procedure Laterality Date    IR PICC PLACEMENT SINGLE LUMEN  4/1/2024    KNEE SURGERY      HI REVJ TOTAL KNEE ARTHRP W/WO ALGRFT 1 COMPONENT Right 3/24/2024    Procedure: ARTHROPLASTY KNEE TOTAL REVISION, POLY EXCHANGE;  Surgeon: Tao Washington DO;  Location:  MAIN OR;  Service: Orthopedics    TOE AMPUTATION Left 3/28/2024    Procedure: AMPUTATION TOE 2nd;  Surgeon: Rossy Toussaint DPM;  Location:  MAIN OR;  Service: Podiatry     Social History   Social History     Substance and Sexual Activity   Alcohol Use Not Currently     Social History     Substance and Sexual Activity   Drug Use Yes    Types: Marijuana     Social History     Tobacco Use   Smoking Status Never   Smokeless Tobacco Never     Family History: History reviewed. No pertinent family history.    Meds/Allergies   Medications Prior to Admission   Medication    Alcohol Swabs 70 % PADS    amiodarone 200 mg tablet    amLODIPine (NORVASC) 5 mg tablet    apixaban (ELIQUIS) 5 mg    b complex vitamins capsule    Blood Glucose Monitoring Suppl (OneTouch Verio Reflect) w/Device KIT    ceFAZolin (ANCEF) 2000 mg IVPB    cyclobenzaprine (FLEXERIL) 10 mg tablet    DULoxetine (CYMBALTA) 20 mg capsule    esomeprazole (NexIUM) 20 mg capsule    fentaNYL (DURAGESIC) 75 mcg/hr    glucose blood (OneTouch Verio) test strip    Insulin Glargine Solostar (Lantus SoloStar)  "100 UNIT/ML SOPN    insulin lispro (HumaLOG KwikPen) 100 units/mL injection pen    Insulin Pen Needle (BD Pen Needle Carisa 2nd Gen) 32G X 4 MM MISC    metoprolol succinate (TOPROL-XL) 25 mg 24 hr tablet    ondansetron (ZOFRAN) 8 mg tablet    OneTouch Delica Lancets 33G MISC    risperiDONE (RisperDAL) 3 mg tablet    simvastatin (ZOCOR) 40 mg tablet    SUMAtriptan (IMITREX) 100 mg tablet    Testosterone Cypionate 200 MG/ML SOLN    zolpidem (AMBIEN) 10 mg tablet    metFORMIN (GLUCOPHAGE) 500 mg tablet    morphine (MS CONTIN) 60 mg 12 hr tablet    Morphine Sulfate ER 15 MG T12A    sacubitril-valsartan (Entresto) 24-26 MG TABS     Allergies   Allergen Reactions    Cephalosporins Hives    Penicillins Hives       Objective   First Vitals:   Blood Pressure: 147/87 (04/11/24 1932)  Pulse: 88 (04/11/24 1932)  Temperature: 98.9 °F (37.2 °C) (04/11/24 1932)  Temp Source: Oral (04/11/24 1932)  Respirations: 16 (04/11/24 1932)  Height: 6' 6\" (198.1 cm) (04/12/24 1629)  Weight - Scale: 96.2 kg (212 lb) (04/12/24 1629)  SpO2: 100 % (04/11/24 1932)    Current Vitals:   Blood Pressure: 158/97 (04/17/24 1421)  Pulse: 103 (04/17/24 1421)  Temperature: 98.8 °F (37.1 °C) (04/17/24 1421)  Temp Source: Temporal (04/15/24 1553)  Respirations: 19 (04/17/24 1421)  Height: 6' 6\" (198.1 cm) (04/12/24 1629)  Weight - Scale: 96.2 kg (212 lb) (04/12/24 1629)  SpO2: 95 % (04/17/24 1421)        /97   Pulse 103   Temp 98.8 °F (37.1 °C)   Resp 19   Ht 6' 6\" (1.981 m)   Wt 96.2 kg (212 lb)   SpO2 95%   BMI 24.50 kg/m²     General Appearance:    Alert, cooperative, no distress, resting comfortably in bed   Head:    Normocephalic, without obvious abnormality, atraumatic   Eyes:    PERRL, conjunctiva/corneas clear, EOM's intact            Nose:   Moist mucous membranes, no drainage or sinus tenderness   Throat:   No tenderness, no exudates   Neck:   Supple, symmetrical, trachea midline, no JVD   Back:     Symmetric, no CVA tenderness   Lungs:  "    Respirations unlabored   Chest wall:    No tenderness or deformity   Heart:    Rate and rhythm noted on last vitals.    Abdomen:     Soft, non-tender, bowel sounds active all four quadrants,     no masses, no organomegaly       Lower Ext/Ortho: Status post amputation left second toe, postop day #20, surgical incision appears healed satisfactorily.  No signs of infection.  Sutures intact.     Neuro/Vasc: CNII-XII intact. Normal strength  Sensation and reflexes: Diminished epicritic sensation consistent with diabetic neuropathy  Pulses: Right: DP 1/4, PT 1/4, Left: DP 1/4, PT 1/4  Capillary Filling: 3 Sec, Edema: +1 bilateral     Skin: Texture, Tone and Turgor: Normal, Digital Hair: Present  Atrophic Changes: Minimal  Lesions: None  Nail Pathology: Deferred  Ulcers/Wounds: Surgical incision left second toe completely healed.                 Lab Results:   CBC w/diff  Results from last 7 days   Lab Units 04/17/24  0407   WBC Thousand/uL 7.67   HEMOGLOBIN g/dL 8.4*   HEMATOCRIT % 25.5*   PLATELETS Thousands/uL 259   SEGS PCT % 71   LYMPHO PCT % 17   MONO PCT % 8   EOS PCT % 2     BMP  Results from last 7 days   Lab Units 04/17/24  0407   POTASSIUM mmol/L 4.3   CHLORIDE mmol/L 104   CO2 mmol/L 22   BUN mg/dL 18   CREATININE mg/dL 1.40*   CALCIUM mg/dL 7.6*     CMP  Results from last 7 days   Lab Units 04/17/24  0407   POTASSIUM mmol/L 4.3   CHLORIDE mmol/L 104   CO2 mmol/L 22   BUN mg/dL 18   CREATININE mg/dL 1.40*   CALCIUM mg/dL 7.6*   ALK PHOS U/L 53   ALT U/L <3*   AST U/L 9*     @Culture@  Lab Results   Component Value Date    BLOODCX No Growth After 5 Days. 04/11/2024    BLOODCX No Growth After 5 Days. 04/11/2024    BLOODCX No Growth After 5 Days. 03/26/2024    BLOODCX No Growth After 5 Days. 03/26/2024    BLOODCX Staphylococcus aureus (A) 03/25/2024    BLOODCX Staphylococcus aureus (A) 03/25/2024    BLOODCX Staphylococcus aureus (A) 03/23/2024    BLOODCX Staphylococcus aureus (A) 03/23/2024    BLOODCX  "Staphylococcus aureus (A) 03/22/2024    BLOODCX Staphylococcus aureus (A) 03/22/2024     No results found for: \"WOUNDCULT\"      Imaging: I have personally reviewed pertinent films in PACS      EKG, Pathology, and Other Studies: I have personally reviewed pertinent reports.      Code Status: Level 1 - Full Code  Advance Directive and Living Will:      Power of :      Please Note: Voice to text dictation software was used in the creation of this document.       Catrachito Glaser DPM      "

## 2024-04-17 NOTE — PROGRESS NOTES
Count includes the [facility]  Progress Note  Name: Armen Llanos I  MRN: [id number]  Unit/Bed#: -01 I Date of Admission: 4/11/2024   Date of Service: 4/17/2024 I Hospital Day: 5    Assessment/Plan   MSSA bacteremia  Assessment & Plan  Completed a course of IV cefazolin on 4/11/2024 for septic arthritis with MSSA  Infection disease was consulted.  Ancef was discontinued given the patient's JH and suspicion for BEATRIZ  Patient on daptomycin currently-per ID note, patient is completing 6 weeks of antibiotic, after that patient will need suppressive therapy since patient has hardware which did not remove    * JH (acute kidney injury) (HCC)  Assessment & Plan  Lab Results   Component Value Date    CREATININE 1.40 (H) 04/17/2024    CREATININE 1.52 (H) 04/16/2024    CREATININE 1.49 (H) 04/15/2024     Baseline creatinine 0.9-1.1  Admission creatinine 2.05-creatinine improving, today creatinine is 1.40.-Continue current treatment.  CT renal: No hydronephrosis. Delayed excreted contrast material in the renal collecting systems suggesting renal impairment. Trace bilateral pleural effusions, trace volume of ascites, and moderate anasarca consistent with volume overload.  On entresto, spironolactone   Hypoalbumenic + protein in urine + anasarca on CT imaging  Unexplained RBC in urine + unclear lung findings on CTA  Of note, received contrast for CTA of the chest to r/o PE in setting of elevated ddimer  Continue to hold NSAIDS, Entresto, Jardiance, metformin and other nephrotoxins  C3, C4 negative.  Protein electrophoresis, immunoglobulin, anti-DNA antibody, anti-glomerular membrane antibody pending  Creatinine improving.  Nephrology on board, appreciate recommendations.  IV lasix given with improvement. Further lasix per nephrology.   US abd only small ascites. Scrotal US pending.   Immunofixation positive for IgM kappa.  Heme onc c/s    Anasarca associated with disorder of kidney  Assessment & Plan  With  hypoalbuminemia and elevated protein to creatinine ratio, nephrotic syndrome likely secondary to?  Diabetes  Albumin for 2 doses per nephro   Monitor     Ambulatory dysfunction  Assessment & Plan  PT OT evaluation  Wife prefers patient to be discharged in a rehab if appropriate.    Anemia  Assessment & Plan  Recent Labs     04/15/24  0541 04/16/24  0347 04/17/24  0407   HGB 7.8* 8.2* 8.4*        Anemia of chronic disease   Monitor   Evaluated by hematology oncology.    Abnormal CT of the chest  Assessment & Plan  CTA chest:  1. Patchy groundglass opacities in the left upper lobe and a few right apical opacities suggesting atypical pneumonia,, pneumonitis or pulmonary hemorrhage.  2. Trace amount of pleural fluid bilaterally.  3. No evidence of acute pulmonary embolus.    Mild non productive cough. Otherwise asymptomatic, no trouble breathing. Denies any aspiration event.  Pulmonology consulted, appreciate recommendations.  He Will follow-up with pulmonology outpatient and have a repeat CT chest in a few weeks    Cardiomyopathy ([facility])  Assessment & Plan  ECHO (03/25): EF 45% w/ mild global hypokinesis   On jardiance and Entresto, both on hold in setting of JH  No LE edema, abdominal wall edema  Per nephrology, give albumin and monitor   No need for diuretics currently   Continue Toprol XL and amlodipine  continue Eliquis    A-fib with RVR ([facility])  Assessment & Plan  Precipitated by septic shock secondary to septic arthritis  Rate control: Metoprolol XL 25 mg daily, amiodarone 200 mg daily  Anticoagulation: Eliquis.      TBI (traumatic brain injury) ([facility])  Assessment & Plan  History of TBI at 7 years old    Type 2 diabetes mellitus with ketoacidosis without coma, without long-term current use of insulin ([facility])  Assessment & Plan  Lab Results   Component Value Date    HGBA1C 12.1 (H) 03/22/2024       Recent Labs     04/16/24  1120 04/16/24  1632 04/16/24  2104 04/17/24  0702   POCGLU 227* 221* 224* 151*         Blood  Sugar Average: Last 72 hrs:  (P) [id number] regimen: Jardiance, metformin, Lantus, Humalog  Hold Jardiance and metformin in setting of JH  Patient was started on Lantus 35 units, Humalog 13 units 3 times daily with meals and sliding scale.  / patient had hypoglycemia in the 50s and received treatment per protocol.  Decreased Lantus to 20 units daily, decreased Humalog to 5 units 3 times daily with meals and sliding scale insulin- continue.   Continue hypoglycemia protocol  diabetic diet      Chronic, continuous use of opioids  Assessment & Plan  Continue oxycodone and Dilaudid as needed  CT showed significant amount of stool throughout the colon.    Started bowel regimen- no bm yet   Increased sennakot to 2 tb bid  Add dulcolax suppository   Abd xr - non obstructive pattern. Pt did have BM.           VTE Pharmacologic Prophylaxis: VTE Score: 8 High Risk (Score >/= 5) - Pharmacological DVT Prophylaxis Ordered: apixaban (Eliquis). Sequential Compression Devices Ordered.    Mobility:   Basic Mobility Inpatient Raw Score: 18  JH-HLM Goal: 6: Walk 10 steps or more  JH-HLM Achieved: 6: Walk 10 steps or more  JH-HLM Goal achieved. Continue to encourage appropriate mobility.    Patient Centered Rounds: I performed bedside rounds with nursing staff today.   Discussions with Specialists or Other Care Team Provider: Nephrology, ID,    Education and Discussions with Family / Patient:  with patient.       Current Length of Stay: 5 day(s)  Current Patient Status: Inpatient   Certification Statement: The patient will continue to require additional inpatient hospital stay due to monitor above conditions  Discharge Plan: Anticipate discharge in 48-72 hrs to to be determined    Code Status: Level 1 - Full Code    Subjective:   Seen and evaluated during rounds.  Resting comfortably.    Objective:     Vitals:   Temp (24hrs), Av.7 °F (37.1 °C), Min:98.6 °F (37 °C), Max:98.8 °F (37.1 °C)    Temp:  [98.6 °F (37  °C)-98.8 °F (37.1 °C)] 98.6 °F (37 °C)  HR:  [] 104  Resp:  [17-20] 20  BP: (157-166)/(96-97) 166/97  SpO2:  [96 %-98 %] 96 %  Body mass index is 24.5 kg/m².     Input and Output Summary (last 24 hours):     Intake/Output Summary (Last 24 hours) at 4/17/2024 0931  Last data filed at 4/17/2024 0501  Gross per 24 hour   Intake 1140 ml   Output 2150 ml   Net -1010 ml       Physical Exam:   Physical Exam  Vitals and nursing note reviewed.   Constitutional:       Appearance: Normal appearance. He is not ill-appearing or diaphoretic.   HENT:      Mouth/Throat:      Mouth: Mucous membranes are moist.   Eyes:      General: No scleral icterus.        Left eye: No discharge.      Extraocular Movements: Extraocular movements intact.      Conjunctiva/sclera: Conjunctivae normal.      Pupils: Pupils are equal, round, and reactive to light.   Cardiovascular:      Rate and Rhythm: Normal rate.      Heart sounds:      No friction rub. No gallop.   Pulmonary:      Effort: Pulmonary effort is normal. No respiratory distress.      Breath sounds: No stridor. No wheezing or rhonchi.   Abdominal:      General: Abdomen is flat. Bowel sounds are normal. There is no distension.      Palpations: There is no mass.      Tenderness: There is no abdominal tenderness.      Hernia: No hernia is present.   Musculoskeletal:         General: No tenderness.      Comments: Intact surgical incision noted on right knee area, with intact staples.   Neurological:      Mental Status: He is alert and oriented to person, place, and time.      Cranial Nerves: No cranial nerve deficit.      Sensory: No sensory deficit.      Motor: No weakness.      Coordination: Coordination normal.         Additional Data:     Labs:  Results from last 7 days   Lab Units 04/17/24  0407   WBC Thousand/uL 7.67   HEMOGLOBIN g/dL 8.4*   HEMATOCRIT % 25.5*   PLATELETS Thousands/uL 259   SEGS PCT % 71   LYMPHO PCT % 17   MONO PCT % 8   EOS PCT % 2     Results from last 7 days    Lab Units 04/17/24  0407   SODIUM mmol/L 132*   POTASSIUM mmol/L 4.3   CHLORIDE mmol/L 104   CO2 mmol/L 22   BUN mg/dL 18   CREATININE mg/dL 1.40*   ANION GAP mmol/L 6   CALCIUM mg/dL 7.6*   ALBUMIN g/dL 2.3*   TOTAL BILIRUBIN mg/dL 0.39   ALK PHOS U/L 53   ALT U/L <3*   AST U/L 9*   GLUCOSE RANDOM mg/dL 145*     Results from last 7 days   Lab Units 04/11/24  1940   INR  1.17     Results from last 7 days   Lab Units 04/17/24  0702 04/16/24  2104 04/16/24  1632 04/16/24  1120 04/16/24  0741 04/15/24  2059 04/15/24  1621 04/15/24  1110 04/15/24  0729 04/14/24  2232 04/14/24  1948 04/14/24  1547   POC GLUCOSE mg/dl 151* 224* 221* 227* 212* 248* 261* 153* 100 214* 196* 117         Results from last 7 days   Lab Units 04/12/24  0557 04/11/24 1940   LACTIC ACID mmol/L  --  1.6   PROCALCITONIN ng/ml 0.11 0.12       Lines/Drains:  Invasive Devices       Peripherally Inserted Central Catheter Line  Duration             PICC Line 04/01/24 Right Basilic 15 days                    Central Line:  Goal for removal:  continue             Imaging: No pertinent imaging reviewed.    Recent Cultures (last 7 days):   Results from last 7 days   Lab Units 04/12/24  1926 04/11/24  2353 04/11/24 1940   BLOOD CULTURE   --  No Growth After 5 Days. No Growth After 4 Days.   LEGIONELLA URINARY ANTIGEN  Negative  --   --        Last 24 Hours Medication List:   Current Facility-Administered Medications   Medication Dose Route Frequency Provider Last Rate    acetaminophen  975 mg Oral Q8H Inderjit Mckeon PA-C      amiodarone  200 mg Oral Daily With Breakfast Inderjit Mckeon PA-C      amLODIPine  5 mg Oral BID NATA Graham      apixaban  5 mg Oral BID Nancy Jc MD      clonazePAM  0.5 mg Oral BID PRN Suzette Burton PA-C      cyclobenzaprine  5 mg Oral TID PRN Inderjit Mckeon PA-C      DAPTOmycin  6 mg/kg Intravenous Q24H Vee Walker  mg (04/16/24 7773)    fentaNYL  1 patch Transdermal Q72H Inderjit Mckeon,  MILO      HYDROmorphone  1 mg Intravenous Q6H PRN Uday Jean MD      insulin glargine  20 Units Subcutaneous HS Nancy Jc MD      insulin lispro  1-5 Units Subcutaneous 4x Daily (AC & HS) Inderjit Mckeon PA-C      insulin lispro  5 Units Subcutaneous TID With Meals Nancy Jc MD      melatonin  3 mg Oral HS PRN Gonzalez Davey MD      metoclopramide  10 mg Intravenous Q6H PRN Nancy Jc MD      metoprolol succinate  25 mg Oral Daily Inderjit Mckeon PA-C      ondansetron  4 mg Intravenous Q6H PRN Gonzalez Davey MD      oxyCODONE  5 mg Oral Q6H PRN Uday Jean MD      polyethylene glycol  17 g Oral BID Nancy Jc MD      pravastatin  80 mg Oral Daily With Dinner Inderjit Mckeon PA-C      risperiDONE  3 mg Oral Daily Nancy Jc MD      senna-docusate sodium  2 tablet Oral BID Nancy Jc MD      SUMAtriptan  100 mg Oral Daily PRN Suzette Burton PA-C      zolpidem  10 mg Oral HS PRN Inderjit Mckeon PA-C          Today, Patient Was Seen By: Mirna Pacheco MD    **Please Note: This note may have been constructed using a voice recognition system.**

## 2024-04-17 NOTE — CASE MANAGEMENT
[facility] received request for authorization from Care Manager.  Authorization request for: SNF  [facility]ility Name: [facility]  NPI: [id number]  Facility MD:   Lalito Lyle  NPI: [id number]  Authorization initiated by contacting insurance:  [employer]  Via: [employer]   Clinicals submitted via: [employer] attachment   Pending Reference #: [id number]    Care Manager notified:  Amaya Avalos

## 2024-04-17 NOTE — CASE MANAGEMENT
Case Management Progress Note    Patient name Armen Llanos  Location /-01 MRN [id number]  : 1975 Date 2024       LOS (days): 5  Geometric Mean LOS (GMLOS) (days): 4.4  Days to GMLOS:-1.1        OBJECTIVE:        Current admission status: Inpatient  Preferred Pharmacy:   [id number] - [place], [place] - [address]  [address] [address]  Phone: [phone] Fax: [phone]    Primary Care Provider: Deidre Andrea MD    Primary Insurance: [place]  Secondary Insurance:     PROGRESS NOTE: Cm spoke with pt in room regarding DC planning. Pt is aware of plan for SNF for IV ABX. He is agreeable. He asked Cm to call his wife to discuss any arrangements. Cm spoke with pts wife via phone regarding options. [facility] is able to accept pts insurance and the medication that he will need. Pts wife in agreement with this plan. Auth requested for HVD with CM support staff. Cm following

## 2024-04-18 ENCOUNTER — TELEPHONE (OUTPATIENT)
Dept: HEMATOLOGY ONCOLOGY | Facility: CLINIC | Age: 49
End: 2024-04-18

## 2024-04-18 PROBLEM — D64.9 ANEMIA: Status: RESOLVED | Noted: 2024-04-13 | Resolved: 2024-04-18

## 2024-04-18 LAB
ANION GAP SERPL CALCULATED.3IONS-SCNC: 5 MMOL/L (ref 4–13)
BASOPHILS # BLD AUTO: 0.03 THOUSANDS/ÂΜL (ref 0–0.1)
BASOPHILS NFR BLD AUTO: 1 % (ref 0–1)
BUN SERPL-MCNC: 17 MG/DL (ref 5–25)
CALCIUM SERPL-MCNC: 8.1 MG/DL (ref 8.4–10.2)
CHLORIDE SERPL-SCNC: 105 MMOL/L (ref 96–108)
CO2 SERPL-SCNC: 21 MMOL/L (ref 21–32)
CREAT SERPL-MCNC: 1.44 MG/DL (ref 0.6–1.3)
EOSINOPHIL # BLD AUTO: 0.16 THOUSAND/ÂΜL (ref 0–0.61)
EOSINOPHIL NFR BLD AUTO: 3 % (ref 0–6)
ERYTHROCYTE [DISTWIDTH] IN BLOOD BY AUTOMATED COUNT: 13.1 % (ref 11.6–15.1)
GFR SERPL CREATININE-BSD FRML MDRD: 56 ML/MIN/1.73SQ M
GLUCOSE SERPL-MCNC: 157 MG/DL (ref 65–140)
GLUCOSE SERPL-MCNC: 198 MG/DL (ref 65–140)
GLUCOSE SERPL-MCNC: 230 MG/DL (ref 65–140)
GLUCOSE SERPL-MCNC: 231 MG/DL (ref 65–140)
GLUCOSE SERPL-MCNC: 242 MG/DL (ref 65–140)
HCT VFR BLD AUTO: 22.3 % (ref 36.5–49.3)
HGB BLD-MCNC: 7.4 G/DL (ref 12–17)
IMM GRANULOCYTES # BLD AUTO: 0.06 THOUSAND/UL (ref 0–0.2)
IMM GRANULOCYTES NFR BLD AUTO: 1 % (ref 0–2)
KAPPA LC FREE SER-MCNC: 144.2 MG/L (ref 3.3–19.4)
KAPPA LC FREE/LAMBDA FREE SER: 1.65 {RATIO} (ref 0.26–1.65)
LAMBDA LC FREE SERPL-MCNC: 87.2 MG/L (ref 5.7–26.3)
LYMPHOCYTES # BLD AUTO: 1.42 THOUSANDS/ÂΜL (ref 0.6–4.47)
LYMPHOCYTES NFR BLD AUTO: 23 % (ref 14–44)
MAGNESIUM SERPL-MCNC: 1.8 MG/DL (ref 1.9–2.7)
MCH RBC QN AUTO: 29.2 PG (ref 26.8–34.3)
MCHC RBC AUTO-ENTMCNC: 33.2 G/DL (ref 31.4–37.4)
MCV RBC AUTO: 88 FL (ref 82–98)
MONOCYTES # BLD AUTO: 0.53 THOUSAND/ÂΜL (ref 0.17–1.22)
MONOCYTES NFR BLD AUTO: 9 % (ref 4–12)
NEUTROPHILS # BLD AUTO: 4.01 THOUSANDS/ÂΜL (ref 1.85–7.62)
NEUTS SEG NFR BLD AUTO: 63 % (ref 43–75)
NRBC BLD AUTO-RTO: 0 /100 WBCS
PLATELET # BLD AUTO: 239 THOUSANDS/UL (ref 149–390)
PMV BLD AUTO: 9.3 FL (ref 8.9–12.7)
POTASSIUM SERPL-SCNC: 4.2 MMOL/L (ref 3.5–5.3)
RBC # BLD AUTO: 2.53 MILLION/UL (ref 3.88–5.62)
SODIUM SERPL-SCNC: 131 MMOL/L (ref 135–147)
WBC # BLD AUTO: 6.21 THOUSAND/UL (ref 4.31–10.16)

## 2024-04-18 PROCEDURE — 85025 COMPLETE CBC W/AUTO DIFF WBC: CPT | Performed by: FAMILY MEDICINE

## 2024-04-18 PROCEDURE — 99232 SBSQ HOSP IP/OBS MODERATE 35: CPT | Performed by: FAMILY MEDICINE

## 2024-04-18 PROCEDURE — 99024 POSTOP FOLLOW-UP VISIT: CPT

## 2024-04-18 PROCEDURE — 82948 REAGENT STRIP/BLOOD GLUCOSE: CPT

## 2024-04-18 PROCEDURE — 99232 SBSQ HOSP IP/OBS MODERATE 35: CPT | Performed by: INTERNAL MEDICINE

## 2024-04-18 PROCEDURE — 83735 ASSAY OF MAGNESIUM: CPT | Performed by: FAMILY MEDICINE

## 2024-04-18 PROCEDURE — 80048 BASIC METABOLIC PNL TOTAL CA: CPT | Performed by: FAMILY MEDICINE

## 2024-04-18 RX ORDER — MAGNESIUM SULFATE HEPTAHYDRATE 40 MG/ML
2 INJECTION, SOLUTION INTRAVENOUS ONCE
Qty: 50 ML | Refills: 0 | Status: COMPLETED | OUTPATIENT
Start: 2024-04-18 | End: 2024-04-18

## 2024-04-18 RX ADMIN — ONDANSETRON 4 MG: 2 INJECTION INTRAMUSCULAR; INTRAVENOUS at 11:44

## 2024-04-18 RX ADMIN — RISPERIDONE 3 MG: 1 TABLET, FILM COATED ORAL at 08:02

## 2024-04-18 RX ADMIN — SENNOSIDES AND DOCUSATE SODIUM 2 TABLET: 8.6; 5 TABLET ORAL at 08:02

## 2024-04-18 RX ADMIN — AMLODIPINE BESYLATE 5 MG: 5 TABLET ORAL at 16:37

## 2024-04-18 RX ADMIN — AMIODARONE HYDROCHLORIDE 200 MG: 200 TABLET ORAL at 08:02

## 2024-04-18 RX ADMIN — AMLODIPINE BESYLATE 5 MG: 5 TABLET ORAL at 08:02

## 2024-04-18 RX ADMIN — POLYETHYLENE GLYCOL 3350 17 G: 17 POWDER, FOR SOLUTION ORAL at 16:37

## 2024-04-18 RX ADMIN — POLYETHYLENE GLYCOL 3350 17 G: 17 POWDER, FOR SOLUTION ORAL at 08:01

## 2024-04-18 RX ADMIN — MAGNESIUM SULFATE HEPTAHYDRATE 2 G: 2 INJECTION, SOLUTION INTRAVENOUS at 10:52

## 2024-04-18 RX ADMIN — Medication 3 MG: at 01:25

## 2024-04-18 RX ADMIN — INSULIN LISPRO 2 UNITS: 100 INJECTION, SOLUTION INTRAVENOUS; SUBCUTANEOUS at 08:01

## 2024-04-18 RX ADMIN — DAPTOMYCIN 575 MG: 500 INJECTION, POWDER, LYOPHILIZED, FOR SOLUTION INTRAVENOUS at 22:31

## 2024-04-18 RX ADMIN — INSULIN LISPRO 5 UNITS: 100 INJECTION, SOLUTION INTRAVENOUS; SUBCUTANEOUS at 16:38

## 2024-04-18 RX ADMIN — INSULIN LISPRO 1 UNITS: 100 INJECTION, SOLUTION INTRAVENOUS; SUBCUTANEOUS at 10:58

## 2024-04-18 RX ADMIN — SENNOSIDES AND DOCUSATE SODIUM 2 TABLET: 8.6; 5 TABLET ORAL at 16:37

## 2024-04-18 RX ADMIN — ALBUMIN (HUMAN) 25 G: 0.25 INJECTION, SOLUTION INTRAVENOUS at 08:01

## 2024-04-18 RX ADMIN — OXYCODONE HYDROCHLORIDE 5 MG: 5 TABLET ORAL at 05:03

## 2024-04-18 RX ADMIN — ZOLPIDEM TARTRATE 10 MG: 5 TABLET ORAL at 01:25

## 2024-04-18 RX ADMIN — METOPROLOL SUCCINATE 25 MG: 25 TABLET, EXTENDED RELEASE ORAL at 08:02

## 2024-04-18 RX ADMIN — INSULIN LISPRO 5 UNITS: 100 INJECTION, SOLUTION INTRAVENOUS; SUBCUTANEOUS at 08:03

## 2024-04-18 RX ADMIN — OXYCODONE HYDROCHLORIDE 5 MG: 5 TABLET ORAL at 19:48

## 2024-04-18 RX ADMIN — HYDROMORPHONE HYDROCHLORIDE 1 MG: 1 INJECTION, SOLUTION INTRAMUSCULAR; INTRAVENOUS; SUBCUTANEOUS at 06:18

## 2024-04-18 RX ADMIN — INSULIN LISPRO 2 UNITS: 100 INJECTION, SOLUTION INTRAVENOUS; SUBCUTANEOUS at 16:37

## 2024-04-18 RX ADMIN — ACETAMINOPHEN 975 MG: 325 TABLET, FILM COATED ORAL at 20:56

## 2024-04-18 RX ADMIN — APIXABAN 5 MG: 5 TABLET, FILM COATED ORAL at 16:37

## 2024-04-18 RX ADMIN — CYCLOBENZAPRINE HYDROCHLORIDE 5 MG: 5 TABLET, FILM COATED ORAL at 20:40

## 2024-04-18 RX ADMIN — APIXABAN 5 MG: 5 TABLET, FILM COATED ORAL at 08:02

## 2024-04-18 RX ADMIN — INSULIN GLARGINE 20 UNITS: 100 INJECTION, SOLUTION SUBCUTANEOUS at 22:25

## 2024-04-18 RX ADMIN — INSULIN LISPRO 2 UNITS: 100 INJECTION, SOLUTION INTRAVENOUS; SUBCUTANEOUS at 22:26

## 2024-04-18 RX ADMIN — INSULIN LISPRO 5 UNITS: 100 INJECTION, SOLUTION INTRAVENOUS; SUBCUTANEOUS at 10:58

## 2024-04-18 RX ADMIN — PRAVASTATIN SODIUM 80 MG: 80 TABLET ORAL at 16:37

## 2024-04-18 RX ADMIN — ACETAMINOPHEN 975 MG: 325 TABLET, FILM COATED ORAL at 04:47

## 2024-04-18 RX ADMIN — HYDROMORPHONE HYDROCHLORIDE 1 MG: 1 INJECTION, SOLUTION INTRAMUSCULAR; INTRAVENOUS; SUBCUTANEOUS at 20:57

## 2024-04-18 NOTE — ASSESSMENT & PLAN NOTE
Completed a course of IV cefazolin on 4/11/2024 for septic arthritis with MSSA  Infection disease was consulted.  Ancef was discontinued given the patient's JH and suspicion for BEATRIZ  Patient on daptomycin currently-per ID note, patient is completing 6 weeks of antibiotic, after that patient will need suppressive therapy since patient has hardware which did not remove  Per ID recommendation, patient will need daptomycin till 5/7/2024-patient has PICC line.  Patient also need regular blood work which already ordered by infectious disease

## 2024-04-18 NOTE — TELEPHONE ENCOUNTER
"Soft Intake Form   Patient Details   Armen Llanos     1975     Reason For Appointment   Who is Calling? Birdie Garcia   If not patient, Name?  NA   DID YOU CONFIRM INSURANCE WITH PATIENT? E verified, Routed to finance   Who is the Referring Doctor? NATA Evans   What is the diagnosis? Monoclonal gammopathy, JH, Anemia   Has this diagnosis been confirmed by a biopsy/surgery?    If yes, what is the date it was done? No   Biopsy done at [facility]?  If not, where was it done? NA   Was imaging done, and was it done at [facility]?  If not, where was it done? Yes-[facility]   Have you been seen by another Oncologist?  If so, who and where (name of facility, city and state) No   For 2nd Opinions Only: Are you currently undergoing treatment, or are you scheduled to start treatment?  If yes, name of facility, city and state  NA   For \"History Of\" only: Have you completed treatment?  NA   Have you had Genetic Testing done in the past?  If so, advise to bring test results to their visit NA   Record Gathering Information   Did you advise to have records faxed to [phone]?  NA   Did you advise to have disks sent to the proper address with imaging? (\"History of\" Patients)  5 years of imaging for breast patients-Mammos, US etc NA   Scheduling Information   Did you send new patient paperwork?  Email or mail? NA   What is the best call back number?   (If the [facility] is calling, please use their number) NA   Miscellaneous Information      The patient is scheduled for his HFU appointment with Dr. Schofield on 5/10 at 0900 in the [facility].     "

## 2024-04-18 NOTE — ASSESSMENT & PLAN NOTE
Lab Results   Component Value Date    CREATININE 1.44 (H) 04/18/2024    CREATININE 1.40 (H) 04/17/2024    CREATININE 1.52 (H) 04/16/2024     Baseline creatinine 0.9-1.1  Admission creatinine 2.05-creatinine improving, today creatinine is 1.40.-Continue current treatment.  CT renal: No hydronephrosis. Delayed excreted contrast material in the renal collecting systems suggesting renal impairment. Trace bilateral pleural effusions, trace volume of ascites, and moderate anasarca consistent with volume overload.  On entresto, spironolactone   Hypoalbumenic + protein in urine + anasarca on CT imaging  Unexplained RBC in urine + unclear lung findings on CTA  Of note, received contrast for CTA of the chest to r/o PE in setting of elevated ddimer  Continue to hold NSAIDS, Entresto, Jardiance, metformin and other nephrotoxins  C3, C4 negative.  Protein electrophoresis, immunoglobulin, anti-DNA antibody, anti-glomerular membrane antibody pending  Creatinine improving.  Nephrology on board, appreciate recommendations.  IV lasix given with improvement. Further lasix per nephrology.   US abd only small ascites.  Scrotal ultrasound shows some swelling, no abscess.  Improving  Immunofixation positive for IgM kappa-patient evaluated by hematology oncologist, will need outpatient follow-up

## 2024-04-18 NOTE — PLAN OF CARE
Problem: Potential for Falls  Goal: Patient will remain free of falls  Description: INTERVENTIONS:  - Educate patient/family on patient safety including physical limitations  - Instruct patient to call for assistance with activity   - Consult OT/PT to assist with strengthening/mobility   - Keep Call bell within reach  - Keep bed low and locked with side rails adjusted as appropriate  - Keep care items and personal belongings within reach  - Initiate and maintain comfort rounds  - Make Fall Risk Sign visible to staff  - Offer Toileting every 2 Hours, in advance of need  - Initiate/Maintain bed alarm  - Obtain necessary fall risk management equipment  - Apply yellow socks and bracelet for high fall risk patients  - Consider moving patient to room near nurses station  Outcome: Progressing     Problem: PAIN - ADULT  Goal: Verbalizes/displays adequate comfort level or baseline comfort level  Description: Interventions:  - Encourage patient to monitor pain and request assistance  - Assess pain using appropriate pain scale  - Administer analgesics based on type and severity of pain and evaluate response  - Implement non-pharmacological measures as appropriate and evaluate response  - Consider cultural and social influences on pain and pain management  - Notify physician/advanced practitioner if interventions unsuccessful or patient reports new pain  Outcome: Progressing     Problem: INFECTION - ADULT  Goal: Absence or prevention of progression during hospitalization  Description: INTERVENTIONS:  - Assess and monitor for signs and symptoms of infection  - Monitor lab/diagnostic results  - Monitor all insertion sites, i.e. indwelling lines, tubes, and drains  - Monitor endotracheal if appropriate and nasal secretions for changes in amount and color  - Mountain appropriate cooling/warming therapies per order  - Administer medications as ordered  - Instruct and encourage patient and family to use good hand hygiene  technique  - Identify and instruct in appropriate isolation precautions for identified infection/condition  Outcome: Progressing  Goal: Absence of fever/infection during neutropenic period  Description: INTERVENTIONS:  - Monitor WBC    Outcome: Progressing     Problem: SAFETY ADULT  Goal: Patient will remain free of falls  Description: INTERVENTIONS:  - Educate patient/family on patient safety including physical limitations  - Instruct patient to call for assistance with activity   - Consult OT/PT to assist with strengthening/mobility   - Keep Call bell within reach  - Keep bed low and locked with side rails adjusted as appropriate  - Keep care items and personal belongings within reach  - Initiate and maintain comfort rounds  - Make Fall Risk Sign visible to staff  - Offer Toileting every 2 Hours, in advance of need  - Initiate/Maintain bed alarm  - Obtain necessary fall risk management equipment  - Apply yellow socks and bracelet for high fall risk patients  - Consider moving patient to room near nurses station  Outcome: Progressing  Goal: Maintain or return to baseline ADL function  Description: INTERVENTIONS:  -  Assess patient's ability to carry out ADLs; assess patient's baseline for ADL function and identify physical deficits which impact ability to perform ADLs (bathing, care of mouth/teeth, toileting, grooming, dressing, etc.)  - Assess/evaluate cause of self-care deficits   - Assess range of motion  - Assess patient's mobility; develop plan if impaired  - Assess patient's need for assistive devices and provide as appropriate  - Encourage maximum independence but intervene and supervise when necessary  - Involve family in performance of ADLs  - Assess for home care needs following discharge   - Consider OT consult to assist with ADL evaluation and planning for discharge  - Provide patient education as appropriate  Outcome: Progressing  Goal: Maintains/Returns to pre admission functional level  Description:  INTERVENTIONS:  - Perform AM-PAC 6 Click Basic Mobility/ Daily Activity assessment daily.  - Set and communicate daily mobility goal to care team and patient/family/caregiver.   - Collaborate with rehabilitation services on mobility goals if consulted  - Perform Range of Motion 3 times a day.  - Reposition patient every 2 hours.  - Dangle patient 3 times a day  - Stand patient 3 times a day  - Ambulate patient 3 times a day  - Out of bed to chair 3 times a day   - Out of bed for meals 3 times a day  - Out of bed for toileting  - Record patient progress and toleration of activity level   Outcome: Progressing     Problem: DISCHARGE PLANNING  Goal: Discharge to home or other facility with appropriate resources  Description: INTERVENTIONS:  - Identify barriers to discharge w/patient and caregiver  - Arrange for needed discharge resources and transportation as appropriate  - Identify discharge learning needs (meds, wound care, etc.)  - Arrange for interpretive services to assist at discharge as needed  - Refer to Case Management Department for coordinating discharge planning if the patient needs post-hospital services based on physician/advanced practitioner order or complex needs related to functional status, cognitive ability, or social support system  Outcome: Progressing     Problem: Knowledge Deficit  Goal: Patient/family/caregiver demonstrates understanding of disease process, treatment plan, medications, and discharge instructions  Description: Complete learning assessment and assess knowledge base.  Interventions:  - Provide teaching at level of understanding  - Provide teaching via preferred learning methods  Outcome: Progressing     Problem: NEUROSENSORY - ADULT  Goal: Achieves stable or improved neurological status  Description: INTERVENTIONS  - Monitor and report changes in neurological status  - Monitor vital signs such as temperature, blood pressure, glucose, and any other labs ordered   - Initiate measures  to prevent increased intracranial pressure  - Monitor for seizure activity and implement precautions if appropriate      Outcome: Progressing  Goal: Remains free of injury related to seizures activity  Description: INTERVENTIONS  - Maintain airway, patient safety  and administer oxygen as ordered  - Monitor patient for seizure activity, document and report duration and description of seizure to physician/advanced practitioner  - If seizure occurs,  ensure patient safety during seizure  - Reorient patient post seizure  - Seizure pads on all 4 side rails  - Instruct patient/family to notify RN of any seizure activity including if an aura is experienced  - Instruct patient/family to call for assistance with activity based on nursing assessment  - Administer anti-seizure medications if ordered    Outcome: Progressing  Goal: Achieves maximal functionality and self care  Description: INTERVENTIONS  - Monitor swallowing and airway patency with patient fatigue and changes in neurological status  - Encourage and assist patient to increase activity and self care.   - Encourage visually impaired, hearing impaired and aphasic patients to use assistive/communication devices  Outcome: Progressing     Problem: CARDIOVASCULAR - ADULT  Goal: Maintains optimal cardiac output and hemodynamic stability  Description: INTERVENTIONS:  - Monitor I/O, vital signs and rhythm  - Monitor for S/S and trends of decreased cardiac output  - Administer and titrate ordered vasoactive medications to optimize hemodynamic stability  - Assess quality of pulses, skin color and temperature  - Assess for signs of decreased coronary artery perfusion  - Instruct patient to report change in severity of symptoms  Outcome: Progressing  Goal: Absence of cardiac dysrhythmias or at baseline rhythm  Description: INTERVENTIONS:  - Continuous cardiac monitoring, vital signs, obtain 12 lead EKG if ordered  - Administer antiarrhythmic and heart rate control medications  as ordered  - Monitor electrolytes and administer replacement therapy as ordered  Outcome: Progressing     Problem: RESPIRATORY - ADULT  Goal: Achieves optimal ventilation and oxygenation  Description: INTERVENTIONS:  - Assess for changes in respiratory status  - Assess for changes in mentation and behavior  - Position to facilitate oxygenation and minimize respiratory effort  - Oxygen administered by appropriate delivery if ordered  - Initiate smoking cessation education as indicated  - Encourage broncho-pulmonary hygiene including cough, deep breathe, Incentive Spirometry  - Assess the need for suctioning and aspirate as needed  - Assess and instruct to report SOB or any respiratory difficulty  - Respiratory Therapy support as indicated  Outcome: Progressing     Problem: GASTROINTESTINAL - ADULT  Goal: Minimal or absence of nausea and/or vomiting  Description: INTERVENTIONS:  - Administer IV fluids if ordered to ensure adequate hydration  - Maintain NPO status until nausea and vomiting are resolved  - Nasogastric tube if ordered  - Administer ordered antiemetic medications as needed  - Provide nonpharmacologic comfort measures as appropriate  - Advance diet as tolerated, if ordered  - Consider nutrition services referral to assist patient with adequate nutrition and appropriate food choices  Outcome: Progressing  Goal: Maintains or returns to baseline bowel function  Description: INTERVENTIONS:  - Assess bowel function  - Encourage oral fluids to ensure adequate hydration  - Administer IV fluids if ordered to ensure adequate hydration  - Administer ordered medications as needed  - Encourage mobilization and activity  - Consider nutritional services referral to assist patient with adequate nutrition and appropriate food choices  Outcome: Progressing  Goal: Maintains adequate nutritional intake  Description: INTERVENTIONS:  - Monitor percentage of each meal consumed  - Identify factors contributing to decreased  intake, treat as appropriate  - Assist with meals as needed  - Monitor I&O, weight, and lab values if indicated  - Obtain nutrition services referral as needed  Outcome: Progressing  Goal: Establish and maintain optimal ostomy function  Description: INTERVENTIONS:  - Assess bowel function  - Encourage oral fluids to ensure adequate hydration  - Administer IV fluids if ordered to ensure adequate hydration   - Administer ordered medications as needed  - Encourage mobilization and activity  - Nutrition services referral to assist patient with appropriate food choices  - Assess stoma site  - Consider wound care consult   Outcome: Progressing  Goal: Oral mucous membranes remain intact  Description: INTERVENTIONS  - Assess oral mucosa and hygiene practices  - Implement preventative oral hygiene regimen  - Implement oral medicated treatments as ordered  - Initiate Nutrition services referral as needed  Outcome: Progressing     Problem: GENITOURINARY - ADULT  Goal: Maintains or returns to baseline urinary function  Description: INTERVENTIONS:  - Assess urinary function  - Encourage oral fluids to ensure adequate hydration if ordered  - Administer IV fluids as ordered to ensure adequate hydration  - Administer ordered medications as needed  - Offer frequent toileting  - Follow urinary retention protocol if ordered  Outcome: Progressing  Goal: Absence of urinary retention  Description: INTERVENTIONS:  - Assess patient’s ability to void and empty bladder  - Monitor I/O  - Bladder scan as needed  - Discuss with physician/AP medications to alleviate retention as needed  - Discuss catheterization for long term situations as appropriate  Outcome: Progressing  Goal: Urinary catheter remains patent  Description: INTERVENTIONS:  - Assess patency of urinary catheter  - If patient has a chronic campa, consider changing catheter if non-functioning  - Follow guidelines for intermittent irrigation of non-functioning urinary  catheter  Outcome: Progressing     Problem: METABOLIC, FLUID AND ELECTROLYTES - ADULT  Goal: Electrolytes maintained within normal limits  Description: INTERVENTIONS:  - Monitor labs and assess patient for signs and symptoms of electrolyte imbalances  - Administer electrolyte replacement as ordered  - Monitor response to electrolyte replacements, including repeat lab results as appropriate  - Instruct patient on fluid and nutrition as appropriate  Outcome: Progressing  Goal: Fluid balance maintained  Description: INTERVENTIONS:  - Monitor labs   - Monitor I/O and WT  - Instruct patient on fluid and nutrition as appropriate  - Assess for signs & symptoms of volume excess or deficit  Outcome: Progressing  Goal: Glucose maintained within target range  Description: INTERVENTIONS:  - Monitor Blood Glucose as ordered  - Assess for signs and symptoms of hyperglycemia and hypoglycemia  - Administer ordered medications to maintain glucose within target range  - Assess nutritional intake and initiate nutrition service referral as needed  Outcome: Progressing     Problem: HEMATOLOGIC - ADULT  Goal: Maintains hematologic stability  Description: INTERVENTIONS  - Assess for signs and symptoms of bleeding or hemorrhage  - Monitor labs  - Administer supportive blood products/factors as ordered and appropriate  Outcome: Progressing     Problem: MUSCULOSKELETAL - ADULT  Goal: Maintain or return mobility to safest level of function  Description: INTERVENTIONS:  - Assess patient's ability to carry out ADLs; assess patient's baseline for ADL function and identify physical deficits which impact ability to perform ADLs (bathing, care of mouth/teeth, toileting, grooming, dressing, etc.)  - Assess/evaluate cause of self-care deficits   - Assess range of motion  - Assess patient's mobility  - Assess patient's need for assistive devices and provide as appropriate  - Encourage maximum independence but intervene and supervise when necessary  -  Involve family in performance of ADLs  - Assess for home care needs following discharge   - Consider OT consult to assist with ADL evaluation and planning for discharge  - Provide patient education as appropriate  Outcome: Progressing  Goal: Maintain proper alignment of affected body part  Description: INTERVENTIONS:  - Support, maintain and protect limb and body alignment  - Provide patient/ family with appropriate education  Outcome: Progressing     Problem: Prexisting or High Potential for Compromised Skin Integrity  Goal: Skin integrity is maintained or improved  Description: INTERVENTIONS:  - Identify patients at risk for skin breakdown  - Assess and monitor skin integrity  - Assess and monitor nutrition and hydration status  - Monitor labs   - Assess for incontinence   - Turn and reposition patient  - Assist with mobility/ambulation  - Relieve pressure over bony prominences  - Avoid friction and shearing  - Provide appropriate hygiene as needed including keeping skin clean and dry  - Evaluate need for skin moisturizer/barrier cream  - Collaborate with interdisciplinary team   - Patient/family teaching  - Consider wound care consult   Outcome: Progressing      18

## 2024-04-18 NOTE — PROGRESS NOTES
[facility]  Progress Note  Name: Armen Llanos I  MRN: [id number]  Unit/Bed#: -01 I Date of Admission: 4/11/2024   Date of Service: 4/18/2024 I Hospital Day: 6    Assessment/Plan   MSSA bacteremia  Assessment & Plan  Completed a course of IV cefazolin on 4/11/2024 for septic arthritis with MSSA  Infection disease was consulted.  Ancef was discontinued given the patient's JH and suspicion for BEATRIZ  Patient on daptomycin currently-per ID note, patient is completing 6 weeks of antibiotic, after that patient will need suppressive therapy since patient has hardware which did not remove  Per ID recommendation, patient will need daptomycin till 5/7/2024-patient has PICC line.  Patient also need regular blood work which already ordered by infectious disease    * JH (acute kidney injury) (HCC)  Assessment & Plan  Lab Results   Component Value Date    CREATININE 1.44 (H) 04/18/2024    CREATININE 1.40 (H) 04/17/2024    CREATININE 1.52 (H) 04/16/2024     Baseline creatinine 0.9-1.1  Admission creatinine 2.05-creatinine improving, today creatinine is 1.40.-Continue current treatment.  CT renal: No hydronephrosis. Delayed excreted contrast material in the renal collecting systems suggesting renal impairment. Trace bilateral pleural effusions, trace volume of ascites, and moderate anasarca consistent with volume overload.  On entresto, spironolactone   Hypoalbumenic + protein in urine + anasarca on CT imaging  Unexplained RBC in urine + unclear lung findings on CTA  Of note, received contrast for CTA of the chest to r/o PE in setting of elevated ddimer  Continue to hold NSAIDS, Entresto, Jardiance, metformin and other nephrotoxins  C3, C4 negative.  Protein electrophoresis, immunoglobulin, anti-DNA antibody, anti-glomerular membrane antibody pending  Creatinine improving.  Nephrology on board, appreciate recommendations.  IV lasix given with improvement. Further lasix per nephrology.   US  abd only small ascites.  Scrotal ultrasound shows some swelling, no abscess.  Improving  Immunofixation positive for IgM kappa-patient evaluated by hematology oncologist, will need outpatient follow-up    Anasarca associated with disorder of kidney  Assessment & Plan  With hypoalbuminemia and elevated protein to creatinine ratio, nephrotic syndrome likely secondary to?  Diabetes  Albumin for 2 doses per nephro   Monitor     Ambulatory dysfunction  Assessment & Plan  PT OT evaluation  Wife prefers patient to be discharged in a rehab if appropriate.    Abnormal CT of the chest  Assessment & Plan  CTA chest:  1. Patchy groundglass opacities in the left upper lobe and a few right apical opacities suggesting atypical pneumonia,, pneumonitis or pulmonary hemorrhage.  2. Trace amount of pleural fluid bilaterally.  3. No evidence of acute pulmonary embolus.    Mild non productive cough. Otherwise asymptomatic, no trouble breathing. Denies any aspiration event.  Pulmonology consulted, appreciate recommendations.  He Will follow-up with pulmonology outpatient and have a repeat CT chest in a few weeks    Cardiomyopathy (HCC)  Assessment & Plan  ECHO (03/25): EF 45% w/ mild global hypokinesis   On jardiance and Entresto, both on hold in setting of JH  No LE edema, abdominal wall edema  Per nephrology, give albumin and monitor   No need for diuretics currently   Continue Toprol XL and amlodipine  continue Eliquis    TBI (traumatic brain injury) ([facility])  Assessment & Plan  History of TBI at 7 years old    Type 2 diabetes mellitus with ketoacidosis without coma, without long-term current use of insulin ([facility])  Assessment & Plan  Lab Results   Component Value Date    HGBA1C 12.1 (H) 03/22/2024       Recent Labs     04/17/24  1557 04/17/24 2015 04/18/24  0650 04/18/24  1055   POCGLU 142* 208* 230* 198*         Blood Sugar Average: Last 72 hrs:  (P) [id number] regimen: Jardiance, metformin, Lantus, Humalog  Hold  Jardiance and metformin in setting of JH  Patient was started on Lantus 35 units, Humalog 13 units 3 times daily with meals and sliding scale.  4/ patient had hypoglycemia in the 50s and received treatment per protocol.  Decreased Lantus to 20 units daily, decreased Humalog to 5 units 3 times daily with meals and sliding scale insulin- continue.   Continue hypoglycemia protocol  diabetic diet      Chronic, continuous use of opioids  Assessment & Plan  Continue oxycodone and Dilaudid as needed  CT showed significant amount of stool throughout the colon.    Started bowel regimen- no bm yet   Increased sennakot to 2 tb bid  Add dulcolax suppository   Abd xr - non obstructive pattern. Pt did have BM.             VTE Pharmacologic Prophylaxis: VTE Score: 8 High Risk (Score >/= 5) - Pharmacological DVT Prophylaxis Ordered: apixaban (Eliquis). Sequential Compression Devices Ordered.    Mobility:   Basic Mobility Inpatient Raw Score: 21  JH-HLM Goal: 6: Walk 10 steps or more  JH-HLM Achieved: 6: Walk 10 steps or more  JH-HLM Goal achieved. Continue to encourage appropriate mobility.    Patient Centered Rounds: I performed bedside rounds with nursing staff today.   Discussions with Specialists or Other Care Team Provider: Nephrology, ID    Education and Discussions with Family / Patient: Updated  (wife) via phone.      Current Length of Stay: 6 day(s)  Current Patient Status: Inpatient   Certification Statement: The patient will continue to require additional inpatient hospital stay due to to above conditions  Discharge Plan: Anticipate discharge in 24-48 hrs to rehab facility.    Code Status: Level 1 - Full Code    Subjective:   Seen and evaluated and examined.  Resting comfortably.  Denies any significant complaint.    Objective:     Vitals:   Temp (24hrs), Av.7 °F (37.1 °C), Min:98.2 °F (36.8 °C), Max:99 °F (37.2 °C)    Temp:  [98.2 °F (36.8 °C)-99 °F (37.2 °C)] 98.2 °F (36.8 °C)  HR:  []  96  Resp:  [17-19] 19  BP: (158-161)/(95-97) 161/95  SpO2:  [95 %-98 %] 98 %  Body mass index is 24.5 kg/m².     Input and Output Summary (last 24 hours):     Intake/Output Summary (Last 24 hours) at 4/18/2024 1414  Last data filed at 4/18/2024 1149  Gross per 24 hour   Intake 950 ml   Output 4300 ml   Net -3350 ml       Physical Exam:   Physical Exam  Vitals and nursing note reviewed.   Constitutional:       Appearance: Normal appearance. He is not ill-appearing or diaphoretic.   HENT:      Mouth/Throat:      Mouth: Mucous membranes are moist.      Pharynx: No oropharyngeal exudate.   Eyes:      General: No scleral icterus.        Left eye: No discharge.      Extraocular Movements: Extraocular movements intact.      Conjunctiva/sclera: Conjunctivae normal.      Pupils: Pupils are equal, round, and reactive to light.   Cardiovascular:      Rate and Rhythm: Normal rate.      Heart sounds: No murmur heard.     No friction rub. No gallop.   Pulmonary:      Effort: Pulmonary effort is normal. No respiratory distress.      Breath sounds: No stridor. No wheezing or rhonchi.   Abdominal:      General: Abdomen is flat. Bowel sounds are normal. There is no distension.      Palpations: There is no mass.      Tenderness: There is no abdominal tenderness.      Hernia: No hernia is present.   Musculoskeletal:         General: No tenderness.      Left lower leg: No edema.      Comments: Surgical incision on right knee looks healthy.  Patient to on the left foot covered with dressing without any discharge.   Skin:     Capillary Refill: Capillary refill takes less than 2 seconds.      Findings: No lesion or rash.   Neurological:      General: No focal deficit present.      Mental Status: He is alert and oriented to person, place, and time.      Cranial Nerves: No cranial nerve deficit.      Sensory: No sensory deficit.      Motor: No weakness.      Coordination: Coordination normal.          Additional Data:     Labs:  Results from  last 7 days   Lab Units 04/18/24  0447   WBC Thousand/uL 6.21   HEMOGLOBIN g/dL 7.4*   HEMATOCRIT % 22.3*   PLATELETS Thousands/uL 239   SEGS PCT % 63   LYMPHO PCT % 23   MONO PCT % 9   EOS PCT % 3     Results from last 7 days   Lab Units 04/18/24  0447 04/17/24  0407   SODIUM mmol/L 131* 132*   POTASSIUM mmol/L 4.2 4.3   CHLORIDE mmol/L 105 104   CO2 mmol/L 21 22   BUN mg/dL 17 18   CREATININE mg/dL 1.44* 1.40*   ANION GAP mmol/L 5 6   CALCIUM mg/dL 8.1* 7.6*   ALBUMIN g/dL  --  2.3*   TOTAL BILIRUBIN mg/dL  --  0.39   ALK PHOS U/L  --  53   ALT U/L  --  <3*   AST U/L  --  9*   GLUCOSE RANDOM mg/dL 157* 145*     Results from last 7 days   Lab Units 04/11/24  1940   INR  1.17     Results from last 7 days   Lab Units 04/18/24  1055 04/18/24  0650 04/17/24  2015 04/17/24  1557 04/17/24  1037 04/17/24  0702 04/16/24  2104 04/16/24  1632 04/16/24  1120 04/16/24  0741 04/15/24  2059 04/15/24  1621   POC GLUCOSE mg/dl 198* 230* 208* 142* 213* 151* 224* 221* 227* 212* 248* 261*         Results from last 7 days   Lab Units 04/12/24  0557 04/11/24  1940   LACTIC ACID mmol/L  --  1.6   PROCALCITONIN ng/ml 0.11 0.12       Lines/Drains:  Invasive Devices       Peripherally Inserted Central Catheter Line  Duration             PICC Line 04/01/24 Right Basilic 17 days                    Central Line:  Goal for removal: N/A - Discharging with PICC for IV ABX/medications             Imaging: No pertinent imaging reviewed.    Recent Cultures (last 7 days):   Results from last 7 days   Lab Units 04/12/24  1926 04/11/24  2353 04/11/24  1940   BLOOD CULTURE   --  No Growth After 5 Days. No Growth After 5 Days.   LEGIONELLA URINARY ANTIGEN  Negative  --   --        Last 24 Hours Medication List:   Current Facility-Administered Medications   Medication Dose Route Frequency Provider Last Rate    acetaminophen  975 mg Oral Q8H Inderjit Mckeon PA-C      amiodarone  200 mg Oral Daily With Breakfast Inderjit Mckeon PA-C      amLODIPine   5 mg Oral BID NATA Graham      apixaban  5 mg Oral BID Nancy Jc MD      clonazePAM  0.5 mg Oral BID PRN Suzette Burton PA-C      cyclobenzaprine  5 mg Oral TID PRN Inderjit Mckeon PA-C      DAPTOmycin  6 mg/kg Intravenous Q24H Vee Walker  mg (04/17/24 2338)    fentaNYL  1 patch Transdermal Q72H Inderjit Mckeon PA-C      HYDROmorphone  1 mg Intravenous Q6H PRN Uday Jean MD      insulin glargine  20 Units Subcutaneous HS Nancy Jc MD      insulin lispro  1-5 Units Subcutaneous 4x Daily (AC & HS) Inderjit Mckeon PA-C      insulin lispro  5 Units Subcutaneous TID With Meals Nancy Jc MD      melatonin  3 mg Oral HS PRN Gonzalez Davey MD      metoclopramide  10 mg Intravenous Q6H PRN Nancy Jc MD      metoprolol succinate  25 mg Oral Daily Inderjit Mckeon PA-C      ondansetron  4 mg Intravenous Q6H PRN Gonzalez Davey MD      oxyCODONE  5 mg Oral Q6H PRN Uday Jean MD      polyethylene glycol  17 g Oral BID Nancy Jc MD      pravastatin  80 mg Oral Daily With Dinner Inderjit Mckeon PA-C      risperiDONE  3 mg Oral Daily Nancy Jc MD      senna-docusate sodium  2 tablet Oral BID Nancy Jc MD      SUMAtriptan  100 mg Oral Daily PRN Suzette Burton PA-C      zolpidem  10 mg Oral HS PRN Inderjit Mckeon PA-C          Today, Patient Was Seen By: Mirna Pacheco MD    **Please Note: This note may have been constructed using a voice recognition system.**

## 2024-04-18 NOTE — PROGRESS NOTES
Examined and spoke with patient at bedside. Was called to remove sutures. Patient has no other complaints at this time and doing well.     Sutures were removed, skin intact without surrounding erythema or drainage      Physical Exam:  Skin intact, no surrounding erythema or ecchymosis  NTTP   1 suture surfaced     Alexandra MILO Hoff

## 2024-04-18 NOTE — ASSESSMENT & PLAN NOTE
Lab Results   Component Value Date    HGBA1C 12.1 (H) 03/22/2024       Recent Labs     04/17/24  1557 04/17/24 2015 04/18/24  0650 04/18/24  1055   POCGLU 142* 208* 230* 198*         Blood Sugar Average: Last 72 hrs:  (P) [id number] regimen: Jardiance, metformin, Lantus, Humalog  Hold Jardiance and metformin in setting of JH  Patient was started on Lantus 35 units, Humalog 13 units 3 times daily with meals and sliding scale.  4/13 patient had hypoglycemia in the 50s and received treatment per protocol.  Decreased Lantus to 20 units daily, decreased Humalog to 5 units 3 times daily with meals and sliding scale insulin- continue.   Continue hypoglycemia protocol  diabetic diet

## 2024-04-18 NOTE — CASE MANAGEMENT
Rec'd call from amanda @ [facility] requesting clinicals be refaxed. F: [phone]. Clinicals faxed with ref #: [id number]. CM notified: Selam Neri

## 2024-04-18 NOTE — PROGRESS NOTES
NEPHROLOGY PROGRESS NOTE   Armen Llanos 49 y.o. male MRN: [id number]  Unit/Bed#: -01 Encounter: [id number]      HPI/24hr EVENTS:    -49-year-old male past medical history of DM 2, hypertension, A-fib on Eliquis, chronic pain, history of TBI.  With recent mission for septic arthritis and MSSA bacteremia.  Presented right knee pain and swelling.  Nephrology consulted for management of JH     -No acute events overnight    ASSESSMENT/PLAN:    JH ([facility])  -Baseline creatinine: 0.8-1.0  -Creatinine on admission 2.05, most recent creatinine 1.44 from 1.40 day prior  -Etiology: Undifferentiated at this point, possibly prerenal azotemia from osmotic diuresis with hyperglycemia with concurrent Entresto use versus AIN from Ancef use (less likely)  -UA: Glucose, large blood, 3+ protein, 30-50 RBCs, 2-4 WBCs (he reports he took Jardiance prior to admission x 1 dose)  -CT abdomen pelvis with no hydronephrosis  -Urine eosinophils are 0%, he has no peripheral eosinophilia  -IV Ancef has been discontinued in favor of IV daptomycin per ID  -Continue holding Entresto  -Patient received IV fluid early on admission and is now off all scheduled IV fluid  -His creatinine has improved on admission with holding Entresto, IV fluid resuscitation which has been discontinued.  He is now off Ancef and on daptomycin.  His serology studies are negative as below  -His creatinine is stabilized at 1.4, this is above his baseline however he likely had progressed to ATN and will need to monitor his creatinine for 3 months to see where his settles at  -He does have nephrotic range proteinuria with negative serologies and will require close outpatient follow-up for evaluation and consideration of any renal biopsy however this is not currently indicated     Nephrotic range proteinuria  -Initial UPCR was not accurate, repeat showed 6.9 g protein, repeat showing 13.8 g of protein  -Serologies were sent including: RAJENDRA, C3/C4, ANCA, anti-GBM,  antidouble-stranded DNA, SPEP/FLC/UPEP  -C3/C4 within normal range, RAJENDRA negative, UPEP/FLC are unremarkable, ANCA and anti-GBM and anti-double-stranded DNA are all negative  -SPEP was abnormal with an M spike IgM kappa, hematology/oncology was consulted  -Etiology is possibly due to uncontrolled DM2 versus JH  -Entresto is on hold  -His serum albumin is 2.3 most recently, he received albumin earlier on admission     Hypertension  -Currently on Norvasc 5 mg daily, Toprol-XL 25 mg daily  -Recent blood pressures are elevated but acceptable, can consider initiation of Cardura 1 mg at bedtime     HFrEF/anasarca  -3/25/2024 TTE: EF is 45%, abnormal diastolic function  -Follows with cardiology as outpatient  -Self discontinued Jardiance as outpatient, managed on Entresto and Toprol-XL  -He has no lower extremity edema but complains of scrotal edema  -He has hypoalbuminemia, was given albumin earlier on admission.  He reports he feels the scrotal edema is improving     Hyponatremia  -Has a history of intermittent hyponatremia in the past, had a recent admission where he was noted to have hyponatremia.  Recent sodium is 132/glucose corrected to 131/132 glucose corrected  -He is on multiple medications which can affect sodium level including risperidone, Cymbalta  -He is currently on 2.0 fluid restrictions      Anemia  -Recent hemoglobin 7.4  -SPEP was noted for IgM kappa M spike, heme-onc was consulted     Hypomagnesemia  -Recent mag is 1.8, received 2 g IV x 1    History of septic arthritis/MSSA bacteremia  -Had a recent admission with MSSA bacteremia from right TKR infection, was discharged on home Ancef  -ID and orthopedics consulted on admission, currently on daptomycin     Additional medical problems: DM2 last A1c 12.1, history of TBI, chronic pain, A-fib    SUBJECTIVE:  No acute complaints, reports intermittent scrotal swelling which improves with elevation    ROS:  Review of Systems   Constitutional:  Positive for  fatigue.   Respiratory: Negative.     Cardiovascular: Negative.    Gastrointestinal: Negative.    Genitourinary:  Positive for scrotal swelling (Intermittent).      A complete 10 point review of systems was performed and found to be negative unless otherwise noted above or in the HPI.    OBJECTIVE:  Current Weight: Weight - Scale: 96.2 kg (212 lb)  Vitals:    04/17/24 0703 04/17/24 1421 04/17/24 1948 04/18/24 0650   BP: 166/97 158/97 161/97 161/95   Pulse: 104 103 100 96   Resp: 20 19 17 19   Temp: 98.6 °F (37 °C) 98.8 °F (37.1 °C) 99 °F (37.2 °C) 98.2 °F (36.8 °C)   TempSrc:       SpO2: 96% 95% 95% 98%   Weight:       Height:           Intake/Output Summary (Last 24 hours) at 4/18/2024 1156  Last data filed at 4/18/2024 1149  Gross per 24 hour   Intake 1300 ml   Output 4700 ml   Net -3400 ml     Physical Exam  Vitals and nursing note reviewed.   Constitutional:       General: He is not in acute distress.     Appearance: He is obese. He is not toxic-appearing or diaphoretic.      Comments: Awake sitting in bed eating lunch   HENT:      Head: Normocephalic and atraumatic.      Nose: Nose normal.      Mouth/Throat:      Mouth: Mucous membranes are dry.   Eyes:      General: No scleral icterus.  Cardiovascular:      Rate and Rhythm: Normal rate.      Pulses: Normal pulses.   Pulmonary:      Effort: Pulmonary effort is normal. No respiratory distress.      Breath sounds: No wheezing or rales.   Abdominal:      General: Abdomen is flat.   Musculoskeletal:      Cervical back: Neck supple.      Right lower leg: No edema.      Left lower leg: No edema.      Comments: Right knee incision   Skin:     General: Skin is warm and dry.      Capillary Refill: Capillary refill takes less than 2 seconds.   Neurological:      Mental Status: He is alert and oriented to person, place, and time.          Medications:    Current Facility-Administered Medications:     acetaminophen (TYLENOL) tablet 975 mg, 975 mg, Oral, Q8H, Inderjit STEELE  MILO Mckeon, 975 mg at 04/18/24 0447    amiodarone tablet 200 mg, 200 mg, Oral, Daily With Breakfast, Inderjit Mckeon PA-C, 200 mg at 04/18/24 0802    amLODIPine (NORVASC) tablet 5 mg, 5 mg, Oral, BID, NATA Graham, 5 mg at 04/18/24 0802    apixaban (ELIQUIS) tablet 5 mg, 5 mg, Oral, BID, Nancy Jc MD, 5 mg at 04/18/24 0802    clonazePAM (KlonoPIN) tablet 0.5 mg, 0.5 mg, Oral, BID PRN, Suzette Burton PA-C, 0.5 mg at 04/14/24 0149    cyclobenzaprine (FLEXERIL) tablet 5 mg, 5 mg, Oral, TID PRN, Inderjit Mckeon PA-C, 5 mg at 04/15/24 2037    DAPTOmycin (CUBICIN) 575 mg in sodium chloride 0.9 % 50 mL IVPB, 6 mg/kg, Intravenous, Q24H, Vee Walker MD, Last Rate: 100 mL/hr at 04/17/24 2338, 575 mg at 04/17/24 2338    fentaNYL (DURAGESIC) 75 mcg/hr TD 72 hr patch 1 patch, 1 patch, Transdermal, Q72H, Inderjit Mckeon PA-C, 1 patch at 04/16/24 1727    HYDROmorphone (DILAUDID) injection 1 mg, 1 mg, Intravenous, Q6H PRN, Uday Jean MD, 1 mg at 04/18/24 0618    insulin glargine (LANTUS) subcutaneous injection 20 Units 0.2 mL, 20 Units, Subcutaneous, HS, Nancy Jc MD, 20 Units at 04/17/24 2126    insulin lispro (HumALOG/ADMELOG) 100 units/mL subcutaneous injection 1-5 Units, 1-5 Units, Subcutaneous, 4x Daily (AC & HS), 1 Units at 04/18/24 1058 **AND** Fingerstick Glucose (POCT), , , 4x Daily AC and at bedtime, Christopher J Mike, PA-C    insulin lispro (HumALOG/ADMELOG) 100 units/mL subcutaneous injection 5 Units, 5 Units, Subcutaneous, TID With Meals, Nancy Jc MD, 5 Units at 04/18/24 1058    magnesium sulfate 2 g/50 mL IVPB (premix) 2 g, 2 g, Intravenous, Once, Mirna Pacheco MD, Last Rate: 25 mL/hr at 04/18/24 1052, 2 g at 04/18/24 1052    melatonin tablet 3 mg, 3 mg, Oral, HS PRN, Gonzalez Davey MD, 3 mg at 04/18/24 0125    metoclopramide (REGLAN) injection 10 mg, 10 mg, Intravenous, Q6H PRN, Nancy Jc MD, 10 mg at 04/17/24 2125    metoprolol succinate  (TOPROL-XL) 24 hr tablet 25 mg, 25 mg, Oral, Daily, Inderjit Mckeon PA-C, 25 mg at 04/18/24 0802    ondansetron (ZOFRAN) injection 4 mg, 4 mg, Intravenous, Q6H PRN, Gonzalez Davey MD, 4 mg at 04/18/24 1144    oxyCODONE (ROXICODONE) IR tablet 5 mg, 5 mg, Oral, Q6H PRN, Uday Jean MD, 5 mg at 04/18/24 0503    polyethylene glycol (MIRALAX) packet 17 g, 17 g, Oral, BID, Nancy Jc MD, 17 g at 04/18/24 0801    pravastatin (PRAVACHOL) tablet 80 mg, 80 mg, Oral, Daily With Dinner, Inderjit Mckeon PA-C, 80 mg at 04/17/24 1613    risperiDONE (RisperDAL) tablet 3 mg, 3 mg, Oral, Daily, Nancy Jc MD, 3 mg at 04/18/24 0802    senna-docusate sodium (SENOKOT S) 8.6-50 mg per tablet 2 tablet, 2 tablet, Oral, BID, Nancy Jc MD, 2 tablet at 04/18/24 0802    SUMAtriptan (IMITREX) tablet 100 mg, 100 mg, Oral, Daily PRN, Suzette Burton PA-C, 100 mg at 04/15/24 2036    zolpidem (AMBIEN) tablet 10 mg, 10 mg, Oral, HS PRN, Inderjit Mckeon PA-C, 10 mg at 04/18/24 0125    Laboratory Results:  Results from last 7 days   Lab Units 04/18/24  0447 04/17/24  0407 04/16/24  0347 04/15/24  0541 04/14/24  0455 04/13/24  0219 04/12/24  0557   WBC Thousand/uL 6.21 7.67 6.66 7.44 7.63 6.81 7.92   HEMOGLOBIN g/dL 7.4* 8.4* 8.2* 7.8* 8.5* 7.7* 7.8*   HEMATOCRIT % 22.3* 25.5* 25.2* 23.9* 26.7* 23.7* 24.0*   PLATELETS Thousands/uL 239 259 242 230 251 229 250   POTASSIUM mmol/L 4.2 4.3 4.6 4.6 4.5 4.6 4.8   CHLORIDE mmol/L 105 104 106 106 107 107 108   CO2 mmol/L 21 22 22 24 22 21 21   BUN mg/dL 17 18 20 20 21 27* 31*   CREATININE mg/dL 1.44* 1.40* 1.52* 1.49* 1.51* 1.76* 1.86*   CALCIUM mg/dL 8.1* 7.6* 7.7* 7.3* 7.5* 7.2* 7.1*   MAGNESIUM mg/dL 1.8*  --   --   --   --   --   --        I have personally reviewed the blood work as stated above and in my note.  I have personally reviewed internal medicine, case management, ID note.

## 2024-04-18 NOTE — CASE MANAGEMENT
Called [facility] ([phone]) to check status of authorization. Spoke to Noelle who stated auth was rec'd from [employer] and under review. Nothing further needed at this time.  notified: Selam Neri

## 2024-04-19 ENCOUNTER — TELEPHONE (OUTPATIENT)
Dept: NEPHROLOGY | Facility: HOSPITAL | Age: 49
End: 2024-04-19

## 2024-04-19 ENCOUNTER — HOME CARE VISIT (OUTPATIENT)
Dept: HOME HEALTH SERVICES | Facility: HOME HEALTHCARE | Age: 49
End: 2024-04-19
Payer: COMMERCIAL

## 2024-04-19 VITALS
SYSTOLIC BLOOD PRESSURE: 147 MMHG | BODY MASS INDEX: 24.53 KG/M2 | HEART RATE: 105 BPM | DIASTOLIC BLOOD PRESSURE: 94 MMHG | HEIGHT: 78 IN | TEMPERATURE: 98.4 F | OXYGEN SATURATION: 96 % | RESPIRATION RATE: 18 BRPM | WEIGHT: 212 LBS

## 2024-04-19 DIAGNOSIS — N17.9 AKI (ACUTE KIDNEY INJURY) (HCC): Primary | ICD-10-CM

## 2024-04-19 PROBLEM — E11.621 DIABETIC FOOT ULCER (HCC): Status: ACTIVE | Noted: 2024-04-19

## 2024-04-19 PROBLEM — L97.509 DIABETIC FOOT ULCER (HCC): Status: ACTIVE | Noted: 2024-04-19

## 2024-04-19 PROBLEM — D47.2 MONOCLONAL GAMMOPATHY: Status: ACTIVE | Noted: 2024-04-19

## 2024-04-19 LAB
ANION GAP SERPL CALCULATED.3IONS-SCNC: 4 MMOL/L (ref 4–13)
B2 MICROGLOB SERPL-MCNC: 4.8 MG/L (ref 0.6–2.4)
BASOPHILS # BLD AUTO: 0.03 THOUSANDS/ÂΜL (ref 0–0.1)
BASOPHILS NFR BLD AUTO: 1 % (ref 0–1)
BUN SERPL-MCNC: 19 MG/DL (ref 5–25)
CALCIUM SERPL-MCNC: 8 MG/DL (ref 8.4–10.2)
CHLORIDE SERPL-SCNC: 105 MMOL/L (ref 96–108)
CO2 SERPL-SCNC: 21 MMOL/L (ref 21–32)
CREAT SERPL-MCNC: 1.5 MG/DL (ref 0.6–1.3)
EOSINOPHIL # BLD AUTO: 0.25 THOUSAND/ÂΜL (ref 0–0.61)
EOSINOPHIL NFR BLD AUTO: 4 % (ref 0–6)
ERYTHROCYTE [DISTWIDTH] IN BLOOD BY AUTOMATED COUNT: 13.2 % (ref 11.6–15.1)
GFR SERPL CREATININE-BSD FRML MDRD: 53 ML/MIN/1.73SQ M
GLUCOSE SERPL-MCNC: 128 MG/DL (ref 65–140)
GLUCOSE SERPL-MCNC: 244 MG/DL (ref 65–140)
GLUCOSE SERPL-MCNC: 301 MG/DL (ref 65–140)
HCT VFR BLD AUTO: 24.6 % (ref 36.5–49.3)
HGB BLD-MCNC: 8 G/DL (ref 12–17)
IMM GRANULOCYTES # BLD AUTO: 0.05 THOUSAND/UL (ref 0–0.2)
IMM GRANULOCYTES NFR BLD AUTO: 1 % (ref 0–2)
LYMPHOCYTES # BLD AUTO: 1.35 THOUSANDS/ÂΜL (ref 0.6–4.47)
LYMPHOCYTES NFR BLD AUTO: 21 % (ref 14–44)
MAGNESIUM SERPL-MCNC: 2 MG/DL (ref 1.9–2.7)
MCH RBC QN AUTO: 28.6 PG (ref 26.8–34.3)
MCHC RBC AUTO-ENTMCNC: 32.5 G/DL (ref 31.4–37.4)
MCV RBC AUTO: 88 FL (ref 82–98)
MONOCYTES # BLD AUTO: 0.48 THOUSAND/ÂΜL (ref 0.17–1.22)
MONOCYTES NFR BLD AUTO: 7 % (ref 4–12)
NEUTROPHILS # BLD AUTO: 4.37 THOUSANDS/ÂΜL (ref 1.85–7.62)
NEUTS SEG NFR BLD AUTO: 66 % (ref 43–75)
NRBC BLD AUTO-RTO: 0 /100 WBCS
PLATELET # BLD AUTO: 299 THOUSANDS/UL (ref 149–390)
PMV BLD AUTO: 9.6 FL (ref 8.9–12.7)
POTASSIUM SERPL-SCNC: 4.3 MMOL/L (ref 3.5–5.3)
RBC # BLD AUTO: 2.8 MILLION/UL (ref 3.88–5.62)
SODIUM SERPL-SCNC: 130 MMOL/L (ref 135–147)
WBC # BLD AUTO: 6.53 THOUSAND/UL (ref 4.31–10.16)

## 2024-04-19 PROCEDURE — 83735 ASSAY OF MAGNESIUM: CPT | Performed by: INTERNAL MEDICINE

## 2024-04-19 PROCEDURE — 99239 HOSP IP/OBS DSCHRG MGMT >30: CPT | Performed by: FAMILY MEDICINE

## 2024-04-19 PROCEDURE — 85025 COMPLETE CBC W/AUTO DIFF WBC: CPT | Performed by: INTERNAL MEDICINE

## 2024-04-19 PROCEDURE — 80048 BASIC METABOLIC PNL TOTAL CA: CPT | Performed by: INTERNAL MEDICINE

## 2024-04-19 PROCEDURE — 82948 REAGENT STRIP/BLOOD GLUCOSE: CPT

## 2024-04-19 RX ORDER — ZOLPIDEM TARTRATE 10 MG/1
10 TABLET ORAL
Qty: 10 TABLET | Refills: 0 | Status: SHIPPED | OUTPATIENT
Start: 2024-04-19

## 2024-04-19 RX ORDER — METOCLOPRAMIDE HYDROCHLORIDE 5 MG/ML
10 INJECTION INTRAMUSCULAR; INTRAVENOUS EVERY 6 HOURS PRN
Qty: 2 ML | Refills: 0 | Status: SHIPPED | OUTPATIENT
Start: 2024-04-19

## 2024-04-19 RX ORDER — AMIODARONE HYDROCHLORIDE 200 MG/1
200 TABLET ORAL
Qty: 4 TABLET | Refills: 0 | Status: SHIPPED | OUTPATIENT
Start: 2024-04-20 | End: 2024-04-24

## 2024-04-19 RX ORDER — SACUBITRIL AND VALSARTAN 24; 26 MG/1; MG/1
1 TABLET, FILM COATED ORAL 2 TIMES DAILY
Qty: 60 TABLET | Refills: 0 | Status: SHIPPED | OUTPATIENT
Start: 2024-04-30

## 2024-04-19 RX ORDER — CYCLOBENZAPRINE HCL 10 MG
5 TABLET ORAL 3 TIMES DAILY PRN
Qty: 30 TABLET | Refills: 0 | Status: SHIPPED | OUTPATIENT
Start: 2024-04-19

## 2024-04-19 RX ORDER — FENTANYL 75 UG/1
1 PATCH TRANSDERMAL
Qty: 3 PATCH | Refills: 0 | Status: SHIPPED | OUTPATIENT
Start: 2024-04-19 | End: 2024-04-29

## 2024-04-19 RX ORDER — POLYETHYLENE GLYCOL 3350 17 G/17G
17 POWDER, FOR SOLUTION ORAL 2 TIMES DAILY
Qty: 20 EACH | Refills: 0 | Status: SHIPPED | OUTPATIENT
Start: 2024-04-19

## 2024-04-19 RX ORDER — HYDRALAZINE HYDROCHLORIDE 25 MG/1
25 TABLET, FILM COATED ORAL EVERY 8 HOURS SCHEDULED
Status: DISCONTINUED | OUTPATIENT
Start: 2024-04-19 | End: 2024-04-19 | Stop reason: HOSPADM

## 2024-04-19 RX ORDER — HYDRALAZINE HYDROCHLORIDE 25 MG/1
25 TABLET, FILM COATED ORAL EVERY 8 HOURS SCHEDULED
Qty: 30 TABLET | Refills: 0 | Status: SHIPPED | OUTPATIENT
Start: 2024-04-19

## 2024-04-19 RX ORDER — AMLODIPINE BESYLATE 5 MG/1
5 TABLET ORAL 2 TIMES DAILY
Qty: 60 TABLET | Refills: 0 | Status: SHIPPED | OUTPATIENT
Start: 2024-04-19 | End: 2024-05-19

## 2024-04-19 RX ORDER — AMOXICILLIN 250 MG
2 CAPSULE ORAL 2 TIMES DAILY
Qty: 30 TABLET | Refills: 0 | Status: SHIPPED | OUTPATIENT
Start: 2024-04-19

## 2024-04-19 RX ORDER — MINERAL OIL 100 G/100G
1 OIL RECTAL ONCE
Status: COMPLETED | OUTPATIENT
Start: 2024-04-19 | End: 2024-04-19

## 2024-04-19 RX ORDER — OXYCODONE HYDROCHLORIDE 5 MG/1
5 TABLET ORAL EVERY 6 HOURS PRN
Qty: 12 TABLET | Refills: 0 | Status: SHIPPED | OUTPATIENT
Start: 2024-04-19 | End: 2024-04-29

## 2024-04-19 RX ADMIN — ONDANSETRON 4 MG: 2 INJECTION INTRAMUSCULAR; INTRAVENOUS at 07:18

## 2024-04-19 RX ADMIN — HYDRALAZINE HYDROCHLORIDE 25 MG: 25 TABLET ORAL at 14:32

## 2024-04-19 RX ADMIN — MINERAL OIL 1 ENEMA: 100 ENEMA RECTAL at 14:32

## 2024-04-19 RX ADMIN — ZOLPIDEM TARTRATE 10 MG: 5 TABLET ORAL at 00:46

## 2024-04-19 RX ADMIN — SENNOSIDES AND DOCUSATE SODIUM 2 TABLET: 8.6; 5 TABLET ORAL at 09:38

## 2024-04-19 RX ADMIN — Medication 3 MG: at 00:47

## 2024-04-19 RX ADMIN — AMLODIPINE BESYLATE 5 MG: 5 TABLET ORAL at 09:38

## 2024-04-19 RX ADMIN — METOPROLOL SUCCINATE 25 MG: 25 TABLET, EXTENDED RELEASE ORAL at 09:37

## 2024-04-19 RX ADMIN — AMIODARONE HYDROCHLORIDE 200 MG: 200 TABLET ORAL at 07:37

## 2024-04-19 RX ADMIN — INSULIN LISPRO 5 UNITS: 100 INJECTION, SOLUTION INTRAVENOUS; SUBCUTANEOUS at 11:05

## 2024-04-19 RX ADMIN — POLYETHYLENE GLYCOL 3350 17 G: 17 POWDER, FOR SOLUTION ORAL at 09:37

## 2024-04-19 RX ADMIN — INSULIN LISPRO 5 UNITS: 100 INJECTION, SOLUTION INTRAVENOUS; SUBCUTANEOUS at 07:38

## 2024-04-19 RX ADMIN — ACETAMINOPHEN 975 MG: 325 TABLET, FILM COATED ORAL at 13:21

## 2024-04-19 RX ADMIN — HYDROMORPHONE HYDROCHLORIDE 1 MG: 1 INJECTION, SOLUTION INTRAMUSCULAR; INTRAVENOUS; SUBCUTANEOUS at 06:22

## 2024-04-19 RX ADMIN — ACETAMINOPHEN 975 MG: 325 TABLET, FILM COATED ORAL at 05:04

## 2024-04-19 RX ADMIN — OXYCODONE HYDROCHLORIDE 5 MG: 5 TABLET ORAL at 11:05

## 2024-04-19 RX ADMIN — INSULIN LISPRO 2 UNITS: 100 INJECTION, SOLUTION INTRAVENOUS; SUBCUTANEOUS at 07:38

## 2024-04-19 RX ADMIN — ONDANSETRON 4 MG: 2 INJECTION INTRAMUSCULAR; INTRAVENOUS at 01:10

## 2024-04-19 RX ADMIN — APIXABAN 5 MG: 5 TABLET, FILM COATED ORAL at 09:38

## 2024-04-19 RX ADMIN — METOCLOPRAMIDE 10 MG: 5 INJECTION, SOLUTION INTRAMUSCULAR; INTRAVENOUS at 12:04

## 2024-04-19 RX ADMIN — OXYCODONE HYDROCHLORIDE 5 MG: 5 TABLET ORAL at 05:21

## 2024-04-19 RX ADMIN — RISPERIDONE 3 MG: 1 TABLET, FILM COATED ORAL at 09:38

## 2024-04-19 NOTE — ASSESSMENT & PLAN NOTE
Continue oxycodone and Dilaudid as needed  CT showed significant amount of stool throughout the colon.    Started bowel regimen  Increased sennakot to 2 tb bid  Add dulcolax suppository   Abd xr - non obstructive pattern.

## 2024-04-19 NOTE — ASSESSMENT & PLAN NOTE
With hypoalbuminemia and elevated protein to creatinine ratio, nephrotic syndrome likely secondary to?  Diabetes, patient also has cardiomyopathy,  Fracture recommendation, patient received albumin therapy with improvement, holding diuretics due to JH.

## 2024-04-19 NOTE — PLAN OF CARE
Problem: PAIN - ADULT  Goal: Verbalizes/displays adequate comfort level or baseline comfort level  Description: Interventions:  - Encourage patient to monitor pain and request assistance  - Assess pain using appropriate pain scale  - Administer analgesics based on type and severity of pain and evaluate response  - Implement non-pharmacological measures as appropriate and evaluate response  - Consider cultural and social influences on pain and pain management  - Notify physician/advanced practitioner if interventions unsuccessful or patient reports new pain  Outcome: Progressing     Problem: INFECTION - ADULT  Goal: Absence or prevention of progression during hospitalization  Description: INTERVENTIONS:  - Assess and monitor for signs and symptoms of infection  - Monitor lab/diagnostic results  - Monitor all insertion sites, i.e. indwelling lines, tubes, and drains  - Monitor endotracheal if appropriate and nasal secretions for changes in amount and color  - [place] appropriate cooling/warming therapies per order  - Administer medications as ordered  - Instruct and encourage patient and family to use good hand hygiene technique  - Identify and instruct in appropriate isolation precautions for identified infection/condition  Outcome: Progressing

## 2024-04-19 NOTE — DISCHARGE SUMMARY
[facility]  Discharge- Armen Llanos 1975, 49 y.o. male MRN: [id number]  Unit/Bed#: [facility]-01 Encounter: [id number]  Primary Care Provider: Deidre Andrea MD   Date and time admitted to hospital: 4/11/2024 11:36 PM    MSSA bacteremia  Assessment & Plan  Completed a course of IV cefazolin on 4/11/2024 for septic arthritis with MSSA  Infection disease was consulted.  Ancef was discontinued given the patient's JH and suspicion for BEATRIZ  Patient on daptomycin currently-per ID note, patient is completing 6 weeks of antibiotic, after that patient will need suppressive therapy since patient has hardware which did not remove  Per ID recommendation, patient will need daptomycin till 5/7/2024-patient has PICC line.  Patient also need regular blood work which already ordered by infectious disease-the PICC line can be discontinue after completion of antibiotic unless any other necessity required to keep the PICC line.    * JH (acute kidney injury) (HCC)  Assessment & Plan  Lab Results   Component Value Date    CREATININE 1.50 (H) 04/19/2024    CREATININE 1.44 (H) 04/18/2024    CREATININE 1.40 (H) 04/17/2024     Baseline creatinine 0.9-1.1  Admission creatinine 2.05-creatinine improving, today creatinine is 1.40.-Continue current treatment.  CT renal: No hydronephrosis. Delayed excreted contrast material in the renal collecting systems suggesting renal impairment. Trace bilateral pleural effusions, trace volume of ascites, and moderate anasarca consistent with volume overload.  On entresto, spironolactone   Hypoalbumenic + protein in urine + anasarca on CT imaging  Unexplained RBC in urine + unclear lung findings on CTA  Of note, received contrast for CTA of the chest to r/o PE in setting of elevated ddimer  Continue to hold NSAIDS, Entresto, Jardiance, metformin and other nephrotoxins  C3, C4 negative.  Protein electrophoresis, immunoglobulin, anti-DNA antibody, anti-glomerular membrane  antibody pending  US abd only small ascites.  Scrotal ultrasound shows some swelling, no abscess.  Improving  Immunofixation positive for IgM kappa-patient evaluated by hematology oncologist, will need outpatient follow-up  Creatinine remained stable, per nephrology recommendation, patient will need outpatient follow-up with    Monoclonal gammopathy  Assessment & Plan  Evaluated by hematology/oncology will remain in the hospital, recommendation outpatient follow-up.  Follow-up provided.    Diabetic foot ulcer (HCC)  Assessment & Plan  Lab Results   Component Value Date    HGBA1C 12.1 (H) 03/22/2024       Recent Labs     04/18/24  1529 04/18/24  2111 04/19/24  0706 04/19/24  1058   POCGLU 231* 242* 244* 128       Blood Sugar Average: Last 72 hrs:  (P) 205.2443929997307048  Diabetic ulceration left second toe with necrosis to bone (Joy 3)  POD #20 - S/P Amp #2 Toe LFT, surgical incision completely healed  Sutures removed, Steri-Strips applied  DSD with gauze/Coban applied left second toe.  May return to normal shoe gear with gradual increased activity.  follow-up with Dr. Toussaint within 1 month.  Podiatry recommendation appreciated.    Anasarca associated with disorder of kidney  Assessment & Plan  With hypoalbuminemia and elevated protein to creatinine ratio, nephrotic syndrome likely secondary to?  Diabetes, patient also has cardiomyopathy,  Fracture recommendation, patient received albumin therapy with improvement, holding diuretics due to JH.    Ambulatory dysfunction  Assessment & Plan  PT OT evaluation  Patient is discharging [facility]     Abnormal CT of the chest  Assessment & Plan  CTA chest:  1. Patchy groundglass opacities in the left upper lobe and a few right apical opacities suggesting atypical pneumonia,, pneumonitis or pulmonary hemorrhage.  2. Trace amount of pleural fluid bilaterally.  3. No evidence of acute pulmonary embolus.    Mild non productive cough. Otherwise asymptomatic, no  trouble breathing. Denies any aspiration event.  Pulmonology consulted, appreciate recommendations.  He Will follow-up with pulmonology outpatient and have a repeat CT chest in a few weeks    Cardiomyopathy ([facility])  Assessment & Plan  ECHO (03/25): EF 45% w/ mild global hypokinesis   On jardiance and Entresto, both on hold in setting of JH  No LE edema, abdominal wall edema  Status post albumin therapy with improvement.  No need for diuretics currently   Continue Toprol XL and amlodipine  continue Eliquis    A-fib with RVR ([facility])  Assessment & Plan  Precipitated by septic shock secondary to septic arthritis  Rate control: Metoprolol XL 25 mg daily, amiodarone 200 mg daily  Anticoagulation: Eliquis.      TBI (traumatic brain injury) ([facility])  Assessment & Plan  History of TBI at 7 years old    Type 2 diabetes mellitus with ketoacidosis without coma, without long-term current use of insulin ([facility])  Assessment & Plan  Lab Results   Component Value Date    HGBA1C 12.1 (H) 03/22/2024       Recent Labs     04/18/24  1529 04/18/24  2111 04/19/24  0706 04/19/24  1058   POCGLU 231* 242* 244* 128         Blood Sugar Average: Last 72 hrs:  (P) 205.7626087549154539Tkgp regimen: Jardiance, metformin, Lantus, Humalog  Holding Jardiance and metformin due to JH,  Lantus to 20 units daily, decreased Humalog to 5 units 3 times daily with meals and sliding scale insulin- continue.         Chronic, continuous use of opioids  Assessment & Plan  Continue oxycodone and Dilaudid as needed  CT showed significant amount of stool throughout the colon.    Started bowel regimen  Increased sennakot to 2 tb bid  Add dulcolax suppository   Abd xr - non obstructive pattern.       Medical Problems       Resolved Problems  Date Reviewed: 4/16/2024            Resolved    Essential hypertension 4/17/2024     Resolved by  Mirna Pacheco MD    Swelling of right lower extremity 4/13/2024     Resolved by  Nancy Jc MD    Anemia 4/18/2024      Resolved by  Mirna Pacheco MD        Discharging Physician / Practitioner: Mirna Pacheco MD  PCP: Deidre Andrea MD  Admission Date:   Admission Orders (From admission, onward)       Ordered        04/12/24 0407  INPATIENT ADMISSION  Once                          Discharge Date: 04/19/24    Consultations During Hospital Stay:  Infectious disease, orthopedic, nephrology, pulmonary, hematology/oncology, PT/OT, podiatry    Procedures Performed:   US scrotum and testicles   Final Result by Agustin Wylie MD (04/16 1120)      Marked scrotal edema. Normal testicles and epididymides.      Workstation performed: [id number]         XR chest pa & lateral   Final Result by Agustin Wylie MD (04/16 1611)      Increased posterior pleural effusions. Subtle lung opacities without significant change.            Workstation performed: [id number]         US abdomen limited   Final Result by Maynor Broussard MD (04/15 1414)      Small quantity of right lower quadrant ascites.            Workstation performed: [id number]         XR abdomen obstruction series   Final Result by Reece Rojas MD (04/14 1648)      Nonobstructive bowel gas pattern.         Workstation performed: [id number]         VAS VENOUS DUPLEX -LOWER LIMB UNILATERAL   Final Result by Shawna Acosta MD (04/13 1622)      CT lower extremity wo contrast right   Final Result by Reece Rojas MD (04/12 0859)   Status post total right knee arthroplasty with intact orthopedic hardware. Complex fluid within the patellofemoral compartment including air-fluid levels likely reflecting recent intervention. Persistent septic arthropathy is not excluded based upon    imaging. Correlate with laboratory results.            Workstation performed: [id number]         CT renal stone study abdomen pelvis without contrast   Final Result by Ann Gerard MD (04/12 0332)      No hydronephrosis.      Delayed excreted contrast material in the renal collecting  systems suggesting renal impairment.      Trace bilateral pleural effusions, trace volume of ascites, and moderate anasarca consistent with volume overload.      1.6 cm right adrenal adenoma.         Workstation performed: [id number]         CTA ED chest PE study   Final Result by Jovi Stock MD (04/12 0122)         1. Patchy groundglass opacities in the left upper lobe and a few right apical opacities suggesting atypical pneumonia,, pneumonitis or pulmonary hemorrhage.   2. Trace amount of pleural fluid bilaterally.   3. No evidence of acute pulmonary embolus.                  Workstation performed: [id number]         XR chest pa & lateral   ED Interpretation by Kaia Shaw MD (04/11 2342)   No acute pulmonary pathology      Final Result by Brody Lainez MD (04/12 1111)      Subtle peripheral lung opacities, left more than right, also characterized on the same day CT. Findings may represent an infectious/inflammatory process. Probable superimposed mild interstitial edema.      Please refer to same day CT chest.         Workstation performed: [id number]         CT chest wo contrast    (Results Pending)         Significant Findings / Test Results:   Lab Results   Component Value Date    WBC 6.53 04/19/2024    HGB 8.0 (L) 04/19/2024    HCT 24.6 (L) 04/19/2024    MCV 88 04/19/2024     04/19/2024     Lab Results   Component Value Date    SODIUM 130 (L) 04/19/2024    K 4.3 04/19/2024     04/19/2024    CO2 21 04/19/2024    AGAP 4 04/19/2024    BUN 19 04/19/2024    CREATININE 1.50 (H) 04/19/2024    GLUC 301 (H) 04/19/2024    GLUF 128 (H) 03/28/2024    CALCIUM 8.0 (L) 04/19/2024    AST 9 (L) 04/17/2024    ALT <3 (L) 04/17/2024    ALKPHOS 53 04/17/2024    TP 5.9 (L) 04/17/2024    TBILI 0.39 04/17/2024    EGFR 53 04/19/2024     Collected Updated Procedure Result Status Patient Facility Result Comment    04/12/2024 1926 04/13/2024 0756 Legionella antigen, urine [[id number]]   Urine, Clean  Catch    Final result [facility]  Component Value   Legionella Urinary Antigen Negative          04/12/2024 0010 04/12/2024 0051 FLU/RSV/COVID - if FLU/RSV clinically relevant [[id number]]    Nares from Nose    Final result [facility] FOR PEDIATRIC PATIENTS - copy/paste COVID Guidelines URL to browser: [email or web address]   SARS-CoV-2 assay is a Nucleic Acid Amplification assay intended for the   qualitative detection of nucleic acid from SARS-CoV-2 in nasopharyngeal   swabs. Results are for the presumptive identification of SARS-CoV-2 RNA.   Positive results are indicative of infection with SARS-CoV-2, the virus   causing COVID-19, but do not rule out bacterial infection or co-infection   with other viruses. Laboratories within the [place] and its   territories are required to report all positive results to the appropriate   public health authorities. Negative results do not preclude SARS-CoV-2   infection and should not be used as the sole basis for treatment or other   patient management decisions. Negative results must be combined with   clinical observations, patient history, and epidemiological information.   This test has not been FDA cleared or approved.   This test has been authorized by FDA under an Emergency Use Authorization   (EUA). This test is only authorized for the duration of time the   declaration that circumstances exist justifying the authorization of the   emergency use of an in vitro diagnostic tests for detection of SARS-CoV-2   virus and/or diagnosis of COVID-19 infection under section 564(b)(1) of   the Act, 21 U.S.C. 360bbb-3(b)(1), unless the authorization is terminated   or revoked sooner. The test has been validated but independent review by FDA   and CLIA is pending.   Test performed using [employer]: This RT-PCR assay targets N2,   a region unique to  SARS-CoV-2. A conserved region in the E-gene was chosen   for pan-Sarbecovirus detection which includes SARS-CoV-2.   According to CMS-2020-01-R, this platform meets the definition of high-throughput technology.    Component Value   SARS-CoV-2 Negative   INFLUENZA A PCR Negative   INFLUENZA B PCR Negative   RSV PCR Negative          04/11/2024 2353 04/17/2024 0701 Blood culture #1 [[id number]]   Blood from Arm, Left    Final result [facility]  Component Value   Blood Culture No Growth After 5 Days.             04/11/2024 1940 04/17/2024 1001 Blood culture #2 [[id number]]   Blood from Hand, Left    Final result [facility]  Component Value   Blood Culture No Growth After 5 Days.          Incidental Findings:   As mentioned above  I reviewed the above mentioned incidental findings with the patient and/or family and they expressed understanding.    Test Results Pending at Discharge (will require follow up):   None     Outpatient Tests Requested:  Patient lab works on regular basis per ID recommendation-ID already placed the order and printed prescription  Patient also needs to follow-up with outpatient hematology, nephrology  Patient will need repeat CT scan of chest after 6 weeks with pulmonology    Complications: As mentioned in progress note    Reason for Admission: Right lower extremity pain.    Hospital Course:   Armen Llanos is a 49 y.o. male patient who originally presented to the hospital on 4/11/2024 due to right lower extremity pain.  Right lower extremity pain.  Patient admitted under diagnosis by JH, swelling of right lower extremity, abnormal CT of the chest, MSSA bacteremia.  It was assumed that patient was developing JH due to antibiotic use, patient evaluated by nephrology, infectious disease-Ancef discontinued, patient placed on daptomycin.  As per infectious disease, patient will need a total of 6 weeks of IV antibiotic during 5/7/2024.   "After that, patient PICC line can be removed.    Patient also evaluated by nephrology, due to JH, patient Jardiance, Entresto, spironolactone remain on hold.  Recommendation to continue metoprolol and amlodipine.  Patient blood pressure is running high, hydralazine added.    Due to diabetes, metformin remain on hold.  Patient used on insulin with range of blood sugar.    Patient also found monoclonal gammopathy, evaluated by nephrology, hematology/oncology, recommendation outpatient follow-up with hematologist.    And also has abnormal CT scan of chest, for which pulmonology consulted, recommendation outpatient follow-up and patient will need a repeat CT scan of chest in 6 weeks.    Patient has diabetic foot ulcer, evaluated by podiatry, appreciate recommendations.    Patient had recent right knee surgery, evaluated by orthopedic, staple removal.  Will need outpatient follow-up with orthopedic.    Patient also developed anasarca, suspected due to nephrotic range of proteinuria, cardiomyopathy per nephrology recommendation patient received albumin therapy with improvement.  Patient has some scrotal edema.    All lab results, imaging findings, treatment plan and option discussed in details with patient and family member over the phone.  Verbalized understanding agrees.    Please see above list of diagnoses and related plan for additional information.     Condition at Discharge: stable    Discharge Day Visit / Exam:   Subjective: Seen and evaluated during the event.  Resting comfortably.  Denies any significant complaint other than constipation.  Vitals: Blood Pressure: 147/94 (04/19/24 1433)  Pulse: 105 (04/19/24 1433)  Temperature: 98.4 °F (36.9 °C) (04/19/24 0705)  Temp Source: Temporal (04/15/24 1553)  Respirations: 18 (04/19/24 0705)  Height: 6' 6\" (198.1 cm) (04/12/24 1629)  Weight - Scale: 96.2 kg (212 lb) (04/12/24 1629)  SpO2: 96 % (04/19/24 1433)  Exam:   Physical Exam  Vitals and nursing note reviewed. "   Constitutional:       Appearance: Normal appearance. He is not ill-appearing or diaphoretic.   HENT:      Mouth/Throat:      Mouth: Mucous membranes are moist.   Eyes:      Extraocular Movements: Extraocular movements intact.      Conjunctiva/sclera: Conjunctivae normal.      Pupils: Pupils are equal, round, and reactive to light.   Cardiovascular:      Rate and Rhythm: Tachycardia present.      Heart sounds:      No friction rub. No gallop.   Pulmonary:      Effort: Pulmonary effort is normal. No respiratory distress.      Breath sounds: No stridor. No wheezing or rhonchi.   Abdominal:      General: Abdomen is flat. Bowel sounds are normal. There is no distension.      Palpations: There is no mass.      Tenderness: There is no abdominal tenderness. There is no rebound.      Hernia: No hernia is present.   Genitourinary:     Comments: Mild scrotal swelling  Musculoskeletal:         General: No tenderness.      Cervical back: Normal range of motion.      Comments: Incision site on right knee looks healthy.  Ulcer on the toe of the left foot, remained healthy.   Skin:     Findings: No erythema or rash.   Neurological:      Mental Status: He is alert and oriented to person, place, and time.      Cranial Nerves: No cranial nerve deficit.      Sensory: No sensory deficit.      Motor: No weakness.      Coordination: Coordination normal.          Discussion with Family: Updated  (wife) via phone.    Discharge instructions/Information to patient and family:   See after visit summary for information provided to patient and family.      Provisions for Follow-Up Care:  See after visit summary for information related to follow-up care and any pertinent home health orders.      Mobility at time of Discharge:   Basic Mobility Inpatient Raw Score: 21  JH-HLM Goal: 6: Walk 10 steps or more  JH-HLM Achieved: 6: Walk 10 steps or more  HLM Goal achieved. Continue to encourage appropriate mobility.     Disposition:    Acute Rehab at [facility]    Planned Readmission: Condition getting worse     Discharge Statement:  Greater than 50% of the total time was spent examining patient, answering all patient questions, arranging and discussing plan of care with patient as well as directly providing post-discharge instructions.  Additional time then spent on discharge activities.    Discharge Medications:  See after visit summary for reconciled discharge medications provided to patient and/or family.      **Please Note: This note may have been constructed using a voice recognition system**

## 2024-04-19 NOTE — CASE MANAGEMENT
Case Management Discharge Planning Note    Patient name Armen Llanos  Location /-01 MRN [id number]  : 1975 Date 2024       Current Admission Date: 2024  Current Admission Diagnosis:JH (acute kidney injury) ([facility])   Patient Active Problem List    Diagnosis Date Noted    Anasarca associated with disorder of kidney 2024    Ambulatory dysfunction 2024    Abnormal CT of the chest 2024    Other proteinuria 2024    Anxiety 2024    Cardiomyopathy ([facility]) 2024    Mononeuropathy 2024    Osteoarthritis of knee 2024    A-fib with RVR ([facility]) 2024    MSSA bacteremia 2024    JH (acute kidney injury) ([facility]) 2024    Syncope 2023    Costochondral chest pain 2023    Adrenal nodule ([facility]) 2023    Chronic, continuous use of opioids 11/15/2022    Type 2 diabetes mellitus with ketoacidosis without coma, without long-term current use of insulin ([facility]) 11/15/2022    Hypokalemia 11/15/2022    Hypocalcemia 11/15/2022    TBI (traumatic brain injury) ([facility]) 11/15/2022    Chronic pain disorder 2022      LOS (days): 7  Geometric Mean LOS (GMLOS) (days): 4.4  Days to GMLOS:-3     OBJECTIVE:  Risk of Unplanned Readmission Score: 45.62      Current admission status: Inpatient   Preferred Pharmacy:   [id number] - [place] - [address]  [address] [address]  Phone: [phone] Fax: [phone]    Primary Care Provider: Deidre Andrea MD    Primary Insurance: Sensitive Object  Secondary Insurance:     DISCHARGE DETAILS:    Discharge planning discussed with:: pt  Freedom of Choice: Yes     CM contacted family/caregiver?: No- see comments (he called his spouse)  Were Treatment Team discharge recommendations reviewed with patient/caregiver?: Yes  Did patient/caregiver verbalize understanding of patient care needs?: Yes  Were patient/caregiver advised of the risks associated with not following Treatment  Team discharge recommendations?: Yes    Treatment Team Recommendation: Short Term Rehab  Discharge Destination Plan:: Short Term Rehab  Transport at Discharge : Family     IMM Given (Date):: 04/19/24  IMM Given to:: Patient  Family notified:: Reviewed with pt. He is in agreement with d/c.  Additional Comments: Pt to d/c to [facility] today. His wife will transport him at 3pm. Report can be called to Phone: [phone]  Fax: [phone].

## 2024-04-19 NOTE — NURSING NOTE
Nurse called facility to give report twice, no answer the first time. The second attempt someone on the 2nd flr answered, when told I was calling to give report, she said she didn't think they were getting a new admission and asked nurse to hold. Nurse was then transferred to the admissions office where call when to voicemail. Nurse left voicemail for a call back for report.

## 2024-04-19 NOTE — ASSESSMENT & PLAN NOTE
ECHO (03/25): EF 45% w/ mild global hypokinesis   On jardiance and Entresto, both on hold in setting of JH  No LE edema, abdominal wall edema  Status post albumin therapy with improvement.  No need for diuretics currently   Continue Toprol XL and amlodipine  continue Eliquis

## 2024-04-19 NOTE — ASSESSMENT & PLAN NOTE
Completed a course of IV cefazolin on 4/11/2024 for septic arthritis with MSSA  Infection disease was consulted.  Ancef was discontinued given the patient's JH and suspicion for BEATRIZ  Patient on daptomycin currently-per ID note, patient is completing 6 weeks of antibiotic, after that patient will need suppressive therapy since patient has hardware which did not remove  Per ID recommendation, patient will need daptomycin till 5/7/2024-patient has PICC line.  Patient also need regular blood work which already ordered by infectious disease-the PICC line can be discontinue after completion of antibiotic unless any other necessity required to keep the PICC line.

## 2024-04-19 NOTE — ASSESSMENT & PLAN NOTE
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  May return to normal shoe gear with gradual increased activity.  follow-up with Dr. Toussaint within 1 month.  Podiatry recommendation appreciated.

## 2024-04-19 NOTE — ASSESSMENT & PLAN NOTE
Lab Results   Component Value Date    CREATININE 1.50 (H) 04/19/2024    CREATININE 1.44 (H) 04/18/2024    CREATININE 1.40 (H) 04/17/2024     Baseline creatinine 0.9-1.1  Admission creatinine 2.05-creatinine improving, today creatinine is 1.40.-Continue current treatment.  CT renal: No hydronephrosis. Delayed excreted contrast material in the renal collecting systems suggesting renal impairment. Trace bilateral pleural effusions, trace volume of ascites, and moderate anasarca consistent with volume overload.  On entresto, spironolactone   Hypoalbumenic + protein in urine + anasarca on CT imaging  Unexplained RBC in urine + unclear lung findings on CTA  Of note, received contrast for CTA of the chest to r/o PE in setting of elevated ddimer  Continue to hold NSAIDS, Entresto, Jardiance, metformin and other nephrotoxins  C3, C4 negative.  Protein electrophoresis, immunoglobulin, anti-DNA antibody, anti-glomerular membrane antibody pending  US abd only small ascites.  Scrotal ultrasound shows some swelling, no abscess.  Improving  Immunofixation positive for IgM kappa-patient evaluated by hematology oncologist, will need outpatient follow-up  Creatinine remained stable, per nephrology recommendation, patient will need outpatient follow-up with

## 2024-04-19 NOTE — ASSESSMENT & PLAN NOTE
Lab Results   Component Value Date    HGBA1C 12.1 (H) 03/22/2024       Recent Labs     04/18/24  1529 04/18/24  2111 04/19/24  0706 04/19/24  1058   POCGLU 231* 242* 244* 128         Blood Sugar Average: Last 72 hrs:  (P) [id number] regimen: Jardiance, metformin, Lantus, Humalog  Holding Jardiance and metformin due to JH,  Lantus to 20 units daily, decreased Humalog to 5 units 3 times daily with meals and sliding scale insulin- continue.

## 2024-04-19 NOTE — PLAN OF CARE
Problem: Potential for Falls  Goal: Patient will remain free of falls  Description: INTERVENTIONS:  - Educate patient/family on patient safety including physical limitations  - Instruct patient to call for assistance with activity   - Consult OT/PT to assist with strengthening/mobility   - Keep Call bell within reach  - Keep bed low and locked with side rails adjusted as appropriate  - Keep care items and personal belongings within reach  - Initiate and maintain comfort rounds  - Make Fall Risk Sign visible to staff  - Offer Toileting every 2 Hours, in advance of need  - Initiate/Maintain alarm  - Obtain necessary fall risk management equipment  - Apply yellow socks and bracelet for high fall risk patients  - Consider moving patient to room near nurses station  Outcome: Progressing     Problem: PAIN - ADULT  Goal: Verbalizes/displays adequate comfort level or baseline comfort level  Description: Interventions:  - Encourage patient to monitor pain and request assistance  - Assess pain using appropriate pain scale  - Administer analgesics based on type and severity of pain and evaluate response  - Implement non-pharmacological measures as appropriate and evaluate response  - Consider cultural and social influences on pain and pain management  - Notify physician/advanced practitioner if interventions unsuccessful or patient reports new pain  Outcome: Progressing     Problem: INFECTION - ADULT  Goal: Absence or prevention of progression during hospitalization  Description: INTERVENTIONS:  - Assess and monitor for signs and symptoms of infection  - Monitor lab/diagnostic results  - Monitor all insertion sites, i.e. indwelling lines, tubes, and drains  - Monitor endotracheal if appropriate and nasal secretions for changes in amount and color  - [place] appropriate cooling/warming therapies per order  - Administer medications as ordered  - Instruct and encourage patient and family to use good hand hygiene technique  -  Identify and instruct in appropriate isolation precautions for identified infection/condition  Outcome: Progressing  Goal: Absence of fever/infection during neutropenic period  Description: INTERVENTIONS:  - Monitor WBC    Outcome: Progressing     Problem: SAFETY ADULT  Goal: Patient will remain free of falls  Description: INTERVENTIONS:  - Educate patient/family on patient safety including physical limitations  - Instruct patient to call for assistance with activity   - Consult OT/PT to assist with strengthening/mobility   - Keep Call bell within reach  - Keep bed low and locked with side rails adjusted as appropriate  - Keep care items and personal belongings within reach  - Initiate and maintain comfort rounds  - Make Fall Risk Sign visible to staff  - Offer Toileting every 2 Hours, in advance of need  - Initiate/Maintain alarm  - Obtain necessary fall risk management equipment  - Apply yellow socks and bracelet for high fall risk patients  - Consider moving patient to room near nurses station  Outcome: Progressing  Goal: Maintain or return to baseline ADL function  Description: INTERVENTIONS:  -  Assess patient's ability to carry out ADLs; assess patient's baseline for ADL function and identify physical deficits which impact ability to perform ADLs (bathing, care of mouth/teeth, toileting, grooming, dressing, etc.)  - Assess/evaluate cause of self-care deficits   - Assess range of motion  - Assess patient's mobility; develop plan if impaired  - Assess patient's need for assistive devices and provide as appropriate  - Encourage maximum independence but intervene and supervise when necessary  - Involve family in performance of ADLs  - Assess for home care needs following discharge   - Consider OT consult to assist with ADL evaluation and planning for discharge  - Provide patient education as appropriate  Outcome: Progressing  Goal: Maintains/Returns to pre admission functional level  Description: INTERVENTIONS:  -  Perform AM-PAC 6 Click Basic Mobility/ Daily Activity assessment daily.  - Set and communicate daily mobility goal to care team and patient/family/caregiver.   - Collaborate with rehabilitation services on mobility goals if consulted  - Perform Range of Motion 3 times a day.  - Reposition patient every 3 hours.  - Dangle patient 3 times a day  - Stand patient 3 times a day  - Ambulate patient 3 times a day  - Out of bed to chair 3 times a day   - Out of bed for meals 3 times a day  - Out of bed for toileting  - Record patient progress and toleration of activity level   Outcome: Progressing     Problem: DISCHARGE PLANNING  Goal: Discharge to home or other facility with appropriate resources  Description: INTERVENTIONS:  - Identify barriers to discharge w/patient and caregiver  - Arrange for needed discharge resources and transportation as appropriate  - Identify discharge learning needs (meds, wound care, etc.)  - Arrange for interpretive services to assist at discharge as needed  - Refer to Case Management Department for coordinating discharge planning if the patient needs post-hospital services based on physician/advanced practitioner order or complex needs related to functional status, cognitive ability, or social support system  Outcome: Progressing     Problem: Knowledge Deficit  Goal: Patient/family/caregiver demonstrates understanding of disease process, treatment plan, medications, and discharge instructions  Description: Complete learning assessment and assess knowledge base.  Interventions:  - Provide teaching at level of understanding  - Provide teaching via preferred learning methods  Outcome: Progressing     Problem: NEUROSENSORY - ADULT  Goal: Achieves stable or improved neurological status  Description: INTERVENTIONS  - Monitor and report changes in neurological status  - Monitor vital signs such as temperature, blood pressure, glucose, and any other labs ordered   - Initiate measures to prevent  increased intracranial pressure  - Monitor for seizure activity and implement precautions if appropriate      Outcome: Progressing  Goal: Remains free of injury related to seizures activity  Description: INTERVENTIONS  - Maintain airway, patient safety  and administer oxygen as ordered  - Monitor patient for seizure activity, document and report duration and description of seizure to physician/advanced practitioner  - If seizure occurs,  ensure patient safety during seizure  - Reorient patient post seizure  - Seizure pads on all 4 side rails  - Instruct patient/family to notify RN of any seizure activity including if an aura is experienced  - Instruct patient/family to call for assistance with activity based on nursing assessment  - Administer anti-seizure medications if ordered    Outcome: Progressing  Goal: Achieves maximal functionality and self care  Description: INTERVENTIONS  - Monitor swallowing and airway patency with patient fatigue and changes in neurological status  - Encourage and assist patient to increase activity and self care.   - Encourage visually impaired, hearing impaired and aphasic patients to use assistive/communication devices  Outcome: Progressing     Problem: CARDIOVASCULAR - ADULT  Goal: Maintains optimal cardiac output and hemodynamic stability  Description: INTERVENTIONS:  - Monitor I/O, vital signs and rhythm  - Monitor for S/S and trends of decreased cardiac output  - Administer and titrate ordered vasoactive medications to optimize hemodynamic stability  - Assess quality of pulses, skin color and temperature  - Assess for signs of decreased coronary artery perfusion  - Instruct patient to report change in severity of symptoms  Outcome: Progressing  Goal: Absence of cardiac dysrhythmias or at baseline rhythm  Description: INTERVENTIONS:  - Continuous cardiac monitoring, vital signs, obtain 12 lead EKG if ordered  - Administer antiarrhythmic and heart rate control medications as  ordered  - Monitor electrolytes and administer replacement therapy as ordered  Outcome: Progressing     Problem: RESPIRATORY - ADULT  Goal: Achieves optimal ventilation and oxygenation  Description: INTERVENTIONS:  - Assess for changes in respiratory status  - Assess for changes in mentation and behavior  - Position to facilitate oxygenation and minimize respiratory effort  - Oxygen administered by appropriate delivery if ordered  - Initiate smoking cessation education as indicated  - Encourage broncho-pulmonary hygiene including cough, deep breathe, Incentive Spirometry  - Assess the need for suctioning and aspirate as needed  - Assess and instruct to report SOB or any respiratory difficulty  - Respiratory Therapy support as indicated  Outcome: Progressing     Problem: GASTROINTESTINAL - ADULT  Goal: Minimal or absence of nausea and/or vomiting  Description: INTERVENTIONS:  - Administer IV fluids if ordered to ensure adequate hydration  - Maintain NPO status until nausea and vomiting are resolved  - Nasogastric tube if ordered  - Administer ordered antiemetic medications as needed  - Provide nonpharmacologic comfort measures as appropriate  - Advance diet as tolerated, if ordered  - Consider nutrition services referral to assist patient with adequate nutrition and appropriate food choices  Outcome: Progressing  Goal: Maintains or returns to baseline bowel function  Description: INTERVENTIONS:  - Assess bowel function  - Encourage oral fluids to ensure adequate hydration  - Administer IV fluids if ordered to ensure adequate hydration  - Administer ordered medications as needed  - Encourage mobilization and activity  - Consider nutritional services referral to assist patient with adequate nutrition and appropriate food choices  Outcome: Progressing  Goal: Maintains adequate nutritional intake  Description: INTERVENTIONS:  - Monitor percentage of each meal consumed  - Identify factors contributing to decreased intake,  treat as appropriate  - Assist with meals as needed  - Monitor I&O, weight, and lab values if indicated  - Obtain nutrition services referral as needed  Outcome: Progressing  Goal: Establish and maintain optimal ostomy function  Description: INTERVENTIONS:  - Assess bowel function  - Encourage oral fluids to ensure adequate hydration  - Administer IV fluids if ordered to ensure adequate hydration   - Administer ordered medications as needed  - Encourage mobilization and activity  - Nutrition services referral to assist patient with appropriate food choices  - Assess stoma site  - Consider wound care consult   Outcome: Progressing  Goal: Oral mucous membranes remain intact  Description: INTERVENTIONS  - Assess oral mucosa and hygiene practices  - Implement preventative oral hygiene regimen  - Implement oral medicated treatments as ordered  - Initiate Nutrition services referral as needed  Outcome: Progressing     Problem: GENITOURINARY - ADULT  Goal: Maintains or returns to baseline urinary function  Description: INTERVENTIONS:  - Assess urinary function  - Encourage oral fluids to ensure adequate hydration if ordered  - Administer IV fluids as ordered to ensure adequate hydration  - Administer ordered medications as needed  - Offer frequent toileting  - Follow urinary retention protocol if ordered  Outcome: Progressing  Goal: Absence of urinary retention  Description: INTERVENTIONS:  - Assess patient’s ability to void and empty bladder  - Monitor I/O  - Bladder scan as needed  - Discuss with physician/AP medications to alleviate retention as needed  - Discuss catheterization for long term situations as appropriate  Outcome: Progressing  Goal: Urinary catheter remains patent  Description: INTERVENTIONS:  - Assess patency of urinary catheter  - If patient has a chronic campa, consider changing catheter if non-functioning  - Follow guidelines for intermittent irrigation of non-functioning urinary catheter  Outcome:  Progressing     Problem: METABOLIC, FLUID AND ELECTROLYTES - ADULT  Goal: Electrolytes maintained within normal limits  Description: INTERVENTIONS:  - Monitor labs and assess patient for signs and symptoms of electrolyte imbalances  - Administer electrolyte replacement as ordered  - Monitor response to electrolyte replacements, including repeat lab results as appropriate  - Instruct patient on fluid and nutrition as appropriate  Outcome: Progressing  Goal: Fluid balance maintained  Description: INTERVENTIONS:  - Monitor labs   - Monitor I/O and WT  - Instruct patient on fluid and nutrition as appropriate  - Assess for signs & symptoms of volume excess or deficit  Outcome: Progressing  Goal: Glucose maintained within target range  Description: INTERVENTIONS:  - Monitor Blood Glucose as ordered  - Assess for signs and symptoms of hyperglycemia and hypoglycemia  - Administer ordered medications to maintain glucose within target range  - Assess nutritional intake and initiate nutrition service referral as needed  Outcome: Progressing     Problem: SKIN/TISSUE INTEGRITY - ADULT  Goal: Skin Integrity remains intact(Skin Breakdown Prevention)  Description: Assess:  -Perform Girma assessment  -Clean and moisturize skin  -Inspect skin when repositioning, toileting, and assisting with ADLS  -Assess under medical devices    -Assess extremities for adequate circulation and sensation     Bed Management:  -Have minimal linens on bed & keep smooth, unwrinkled  -Change linens as needed when moist or perspiring  -Avoid sitting or lying in one position for more than 2 hours while in bed  -Keep HOB at 45degrees     Toileting:  -Offer bedside commode  -Assess for incontinence   -Use incontinent care products after each incontinent episode     Activity:  -Mobilize patient 3 times a day  -Encourage activity and walks on unit  -Encourage or provide ROM exercises   -Turn and reposition patient every 2 Hours  -Use appropriate equipment to  lift or move patient in bed  -Instruct/ Assist with weight shifting when out of bed in chair  -Consider limitation of chair time 2 hour intervals    Skin Care:  -Avoid use of baby powder, tape, friction and shearing, hot water or constrictive clothing  -Relieve pressure over bony prominences   -Do not massage red bony areas    Next Steps:  -Teach patient strategies to minimize risks    -Consider consults to  interdisciplinary teams   Outcome: Progressing  Goal: Incision(s), wounds(s) or drain site(s) healing without S/S of infection  Description: INTERVENTIONS  - Assess and document dressing, incision, wound bed, drain sites and surrounding tissue  - Provide patient and family education  - Perform skin care/dressing changes   Outcome: Progressing  Goal: Pressure injury heals and does not worsen  Description: Interventions:  - Implement low air loss mattress or specialty surface (Criteria met)  - Apply silicone foam dressing  - Instruct/assist with weight shifting every 30 minutes when in chair   - Limit chair time to 2 hour intervals  - Use special pressure reducing interventions when in chair   - Apply fecal or urinary incontinence containment device   - Perform passive or active ROM  - Turn and reposition patient & offload bony prominences every 2 hours   - Utilize friction reducing device or surface for transfers   - Consider consults to  interdisciplinary teams  - Use incontinent care products after each incontinent episode   - Consider nutrition services referral as needed  Outcome: Progressing     Problem: HEMATOLOGIC - ADULT  Goal: Maintains hematologic stability  Description: INTERVENTIONS  - Assess for signs and symptoms of bleeding or hemorrhage  - Monitor labs  - Administer supportive blood products/factors as ordered and appropriate  Outcome: Progressing     Problem: MUSCULOSKELETAL - ADULT  Goal: Maintain or return mobility to safest level of function  Description: INTERVENTIONS:  - Assess patient's  ability to carry out ADLs; assess patient's baseline for ADL function and identify physical deficits which impact ability to perform ADLs (bathing, care of mouth/teeth, toileting, grooming, dressing, etc.)  - Assess/evaluate cause of self-care deficits   - Assess range of motion  - Assess patient's mobility  - Assess patient's need for assistive devices and provide as appropriate  - Encourage maximum independence but intervene and supervise when necessary  - Involve family in performance of ADLs  - Assess for home care needs following discharge   - Consider OT consult to assist with ADL evaluation and planning for discharge  - Provide patient education as appropriate  Outcome: Progressing  Goal: Maintain proper alignment of affected body part  Description: INTERVENTIONS:  - Support, maintain and protect limb and body alignment  - Provide patient/ family with appropriate education  Outcome: Progressing     Problem: Prexisting or High Potential for Compromised Skin Integrity  Goal: Skin integrity is maintained or improved  Description: INTERVENTIONS:  - Identify patients at risk for skin breakdown  - Assess and monitor skin integrity  - Assess and monitor nutrition and hydration status  - Monitor labs   - Assess for incontinence   - Turn and reposition patient  - Assist with mobility/ambulation  - Relieve pressure over bony prominences  - Avoid friction and shearing  - Provide appropriate hygiene as needed including keeping skin clean and dry  - Evaluate need for skin moisturizer/barrier cream  - Collaborate with interdisciplinary team   - Patient/family teaching  - Consider wound care consult   Outcome: Progressing

## 2024-04-19 NOTE — TELEPHONE ENCOUNTER
----- Message from Roxanne Rosa DO sent at 4/19/2024  1:45 PM EDT -----  Regarding: JH hospital f/u appt  Patient to be discharged today from [facility] and placed on [facility] JH list. Please schedule hospital follow up for JH in 1-2 weeks with first available provider. Patient should have BMP prior to office visit which has already been ordered. Thanks.

## 2024-04-19 NOTE — CASE MANAGEMENT
Case Management Discharge Planning Note    Patient name Armen Llanos  Location /-01 MRN [id number]  : 1975 Date 2024       Current Admission Date: 2024  Current Admission Diagnosis:JH (acute kidney injury) (Formerly [facility])   Patient Active Problem List    Diagnosis Date Noted    Anasarca associated with disorder of kidney 2024    Ambulatory dysfunction 2024    Abnormal CT of the chest 2024    Other proteinuria 2024    Anxiety 2024    Cardiomyopathy (Formerly [facility]) 2024    Mononeuropathy 2024    Osteoarthritis of knee 2024    A-fib with RVR (Formerly [facility]) 2024    MSSA bacteremia 2024    JH (acute kidney injury) (Formerly [facility]) 2024    Syncope 2023    Costochondral chest pain 2023    Adrenal nodule (Formerly [facility]) 2023    Chronic, continuous use of opioids 11/15/2022    Type 2 diabetes mellitus with ketoacidosis without coma, without long-term current use of insulin (Formerly [facility]) 11/15/2022    Hypokalemia 11/15/2022    Hypocalcemia 11/15/2022    TBI (traumatic brain injury) (Formerly [facility]) 11/15/2022    Chronic pain disorder 2022      LOS (days): 7  Geometric Mean LOS (GMLOS) (days): 4.4  Days to GMLOS:-2.8     OBJECTIVE:  Risk of Unplanned Readmission Score: 45.55         Current admission status: Inpatient   Preferred Pharmacy:   [id number] - [place] - [address]  [address] [address]  Phone: [phone] Fax: [phone]    Primary Care Provider: Deidre Andrea MD    Primary Insurance: [employer]  Secondary Insurance:     DISCHARGE DETAILS:                                                                                                               Facility Insurance Auth Number: [id number]

## 2024-04-19 NOTE — ASSESSMENT & PLAN NOTE
Evaluated by hematology/oncology will remain in the hospital, recommendation outpatient follow-up.  Follow-up provided.

## 2024-04-19 NOTE — CASE MANAGEMENT
[facility] has received APPROVED authorization.  Insurance:   [employer]  Called in by Rep: Elsy P#  [phone]   Authorization received for: SNF  Facility: [facility]   Authorization #: [id number]   Start of Care: 4/18  Next Review Date: 4/22  Continued Stay Care Coordinator: none given  P#: [phone]   Submit next review to: [phone]      Care Manager notified: Ptati Honeycutt

## 2024-04-19 NOTE — QUICK NOTE
Ok for d/c from renal perspective. To be discharged at 3pm.   Renal function elevated from baseline but stable with excellent urine output.   Would benefit from nephrology follow up upon d/c. Message sent to office. Should have repeat BMP prior to office visit. Would benefit from glycemic control.

## 2024-04-22 ENCOUNTER — TELEPHONE (OUTPATIENT)
Dept: HEMATOLOGY ONCOLOGY | Facility: CLINIC | Age: 49
End: 2024-04-22

## 2024-04-22 NOTE — UTILIZATION REVIEW
NOTIFICATION OF ADMISSION DISCHARGE   This is a Notification of Discharge from [facility]. Please be advised that this patient has been discharge from our facility. Below you will find the admission and discharge date and time including the patient’s disposition.   UTILIZATION REVIEW CONTACT:  Nancy Lincoln  Utilization   Network Utilization Review Department  Phone: [phone] carefully listen to the prompts. All voicemails are confidential.  Email: [email or web address]     ADMISSION INFORMATION  PRESENTATION DATE: 4/11/2024 11:36 PM  OBERVATION ADMISSION DATE:   INPATIENT ADMISSION DATE: 4/12/24  4:07 AM   DISCHARGE DATE: 4/19/2024  4:24 PM   [facility]    Network Utilization Review Department  ATTENTION: Please call with any questions or concerns to [phone] and carefully listen to the prompts so that you are directed to the right person. All voicemails are confidential.   For Discharge needs, contact Care Management DC Support Team at [phone] opt. [phone]  Send all requests for admission clinical reviews, approved or denied determinations and any other requests to dedicated fax number below belonging to the campus where the patient is receiving treatment. List of dedicated fax numbers for the Facilities:  FACILITY NAME UR FAX NUMBER   ADMISSION DENIALS (Administrative/Medical Necessity) [phone]   DISCHARGE SUPPORT TEAM ([facility]) [phone]   PARENT CHILD HEALTH (Maternity/NICU/Pediatrics) [phone]   [facility] [phone]   [facility] [phone]   [facility] [phone]   [facility] [phone]   [facility] [phone]   [facility] [phone]   [facility] [phone]   [facility]  [phone]   [facility] [phone]   [facility] [phone]   [facility] [phone]   [facility] [phone]

## 2024-04-22 NOTE — TELEPHONE ENCOUNTER
I phoned the patient and introduced myself and indicated that I was calling from [facility] Hematology/Oncology to remind him of his upcoming appointment. We reviewed that the appointment is on 5/10 with Dr. Schofield and that I would be able to give him the appointment details if he would like to write them down.  He indicated that he was not in a place where he would be able to write this down, so I reviewed that the appointment details are located on his discharge paperwork from the hospital. I also indicated that he would be able to phone me back on my direct line in the event that he cannot locate the discharge papers. Armen expressed understanding and was appreciative of the call.

## 2024-04-23 NOTE — PROGRESS NOTES
Assessment/Plan:   POD#12 S/P Amp #2 Toe LFT (IP)  Hospital records reviewed  DSD with adaptic and Gauze  Continue PWB with PO Shoe  Antibiotics per infectious disease-PICC line  F/U with Dr Toussaint 1wk for suture removal       Diagnoses and all orders for this visit:    History of amputation of lesser toe, left (HCC)    Acute osteomyelitis of left foot (HCC)  -     Ambulatory Referral to Podiatry    S/P foot surgery, left    Other orders  -     Discontinue: Testosterone Cypionate 200 MG/ML SOLN; Inject into a muscle          Subjective:     Patient ID: Armen Llanos is a 49 y.o. male.    4/9/2024: 49-year-old male seen today for postop visit status post amputation left second toe on 3/28/2024.    Significant past medical history of type 2 diabetes with septic knee joint secondary to osteomyelitis which led to left second toe amputation.  Reports healing is noted.  Has PICC line and is receiving IV antibiotics.  .  3/26/2024: (Hospital Consult - DrS) Armen Llanos is a 48 y.o. male with past medical history of  TBI, hypertension, chronic opioid use, chronic knee pain who presented to ED 3/22/24 with right knee pain due to potentially falling  which was a septic joint requiring OR debridement, we have been consulted for care of 2nd toe left which he states wad initially been a callous and is now ulcerated.  He states because of his mallet toe he gets thick skin which he generally peels off, this time it bled wheight n he did that a few weeks back, he has never seen a podiatrist but is on his list.  He is an uncontrolled Type 2 Dm with most recent Hba1c of 12.1 on 3/22/24.        Review of Systems   Constitutional: Negative.    HENT: Negative.     Eyes: Negative.    Respiratory: Negative.     Cardiovascular: Negative.    Gastrointestinal: Negative.    Endocrine: Negative.    Genitourinary: Negative.    Musculoskeletal:         Recent amputation left second toe.   Skin:  Positive for wound.    Allergic/Immunologic: Negative.    Neurological: Negative.    Hematological: Negative.    Psychiatric/Behavioral: Negative.           Objective:     Physical Exam  Constitutional:       Appearance: Normal appearance.   HENT:      Head: Normocephalic.      Right Ear: Tympanic membrane normal.      Left Ear: Tympanic membrane normal.      Nose: No congestion.      Mouth/Throat:      Pharynx: No oropharyngeal exudate or posterior oropharyngeal erythema.   Eyes:      Conjunctiva/sclera: Conjunctivae normal.      Pupils: Pupils are equal, round, and reactive to light.   Cardiovascular:      Rate and Rhythm: Normal rate and regular rhythm.      Pulses:           Dorsalis pedis pulses are 1+ on the right side and 1+ on the left side.        Posterior tibial pulses are 1+ on the right side and 1+ on the left side.   Pulmonary:      Effort: Pulmonary effort is normal.   Musculoskeletal:         General: Tenderness and deformity present.      Comments: Left second toe: Status post amputation 3/28/2024, sutures intact, coapting satisfactorily, no signs of infection or significant wound dehiscence.   Feet:      Right foot:      Protective Sensation: 10 sites tested.  7 sites sensed.      Left foot:      Protective Sensation: 10 sites tested.  7 sites sensed.      Skin integrity: No fissure.   Skin:     General: Skin is warm and dry.      Capillary Refill: Capillary refill takes 2 to 3 seconds.      Coloration: Skin is not pale.      Findings: No bruising, erythema, lesion or rash.   Neurological:      Mental Status: He is alert.      Cranial Nerves: No cranial nerve deficit.      Sensory: No sensory deficit.      Motor: No weakness.      Gait: Gait normal.      Deep Tendon Reflexes: Reflexes normal.   Psychiatric:         Mood and Affect: Mood normal.         Behavior: Behavior normal.         Judgment: Judgment normal.

## 2024-04-24 ENCOUNTER — TELEPHONE (OUTPATIENT)
Dept: NEPHROLOGY | Facility: CLINIC | Age: 49
End: 2024-04-24

## 2024-04-24 NOTE — TELEPHONE ENCOUNTER
Placed call to pt and left message to schedule hospital follow up with any available provider in the QO, also reminded pt that there is a lab order to be done as well.  There is an ASAP referral to be linked once pt is scheduled for hospital follow up

## 2024-04-25 ENCOUNTER — TELEPHONE (OUTPATIENT)
Age: 49
End: 2024-04-25

## 2024-04-29 ENCOUNTER — TELEPHONE (OUTPATIENT)
Dept: NEPHROLOGY | Facility: CLINIC | Age: 49
End: 2024-04-29

## 2024-04-29 NOTE — TELEPHONE ENCOUNTER
Placed call to pt to schedule pt for a sooner appointment for a hsp fu, pt was scheduled as a consult and needed to be scheduled for a hospital fu and attach the referral to appointment, pt stated he should be able to drive soon so pt will be scheduled for a hsp fu with Alfred in the QO for 5/10/24 at 1pm

## 2024-05-09 PROBLEM — E11.29 TYPE 2 DIABETES MELLITUS WITH RENAL COMPLICATION (HCC): Status: ACTIVE | Noted: 2022-11-15

## 2024-05-09 NOTE — PROGRESS NOTES
Assessment and Plan:    Diagnoses and all orders for this visit:    JH (acute kidney injury) (HCC)  -     Ambulatory referral to Nephrology  -     Renal function panel; Future  -     Urinalysis with microscopic; Future  -     Protein / creatinine ratio, urine; Future    Other proteinuria  -     Renal function panel; Future  -     Urinalysis with microscopic; Future  -     Protein / creatinine ratio, urine; Future  -     Albumin / creatinine urine ratio; Future    Type 2 diabetes mellitus with other kidney complication, unspecified whether long term insulin use (HCC)    Monoclonal gammopathy  -     Ambulatory referral to Nephrology    Hyponatremia    Essential hypertension    Tachycardia  -     Transfer to other facility    Adrenal nodule (HCC)          JH (POA)  -Baseline creatinine: 0.8-1.0 on prior lab work  -Creatinine on admission 2.05, improved to 1.5 at time of discharge  -Has not obtained repeat labs after discharge (patient had lab work done 5/8 on his phone 1.26)  -Etiology: Undifferentiated at this point, possibly prerenal azotemia from osmotic diuresis with hyperglycemia with concurrent Entresto use versus AIN from Ancef use (less likely)  -CT abdomen pelvis with no hydronephrosis  -Urine eosinophils are 0%, he has no peripheral eosinophilia  -IV Ancef has been discontinued in favor of IV daptomycin per ID  -His serology studies are negative as below  -Repeat renal function panel with urine studies next week     Nephrotic range proteinuria  -Initial UPCR was not accurate, repeat showed 6.9 g protein, repeat showing 13.8 g of protein  -Serologies were sent including: RAJENDRA, C3/C4, ANCA, anti-GBM, antidouble-stranded DNA, SPEP/FLC/UPEP  -C3/C4 within normal range, RAJENDRA negative, UPEP/FLC are unremarkable, ANCA and anti-GBM and anti-double-stranded DNA are all negative  -SPEP was abnormal with an M spike IgM kappa, hematology/oncology was consulted  -Etiology is possibly due to uncontrolled DM2 versus  JH  -Repeat now, further consideration for renal biopsy if unresolving or worsening however suspect this is diabetes  -He is back on Entresto, repeat urine studies     Hypertension  -Currently on Norvasc 5 mg 2 twice daily, Toprol-XL 25 mg daily, entresto  -Office BP is 138/94, above goal  -He is tachycardic and has a history of atrial fibrillation, is auscultated and palpated heart rates have been between 140 and 160 during this appointment he was asymptomatic but I recommended he go to the ED for evaluation.  Will hold on adjusting his antihypertensive regimen for now given his tachycardia     HFrEF/anasarca  -3/25/2024 TTE: EF is 45%, abnormal diastolic function  -Follows with cardiology as outpatient  -Self discontinued Jardiance as outpatient, managed on Entresto and Toprol-XL  -Would benefit from eventual reintroduction of Jardiance     Hyponatremia  -Has a history of intermittent hyponatremia in the past, had a recent admission where he was noted to have hyponatremia.   -He is on multiple medications which can affect sodium level including risperidone, Cymbalta.  He reports he is no longer taking Cymbalta  -Recent sodium is 130 at time of discharge, he had lab work completed via [facility] on his phone and showed me that his sodium was 135 from 5/8/2024     Anemia/abnormal SPEP  -Recent hemoglobin 8.0 at time of discharge, recnelt 8.1 on repeat labs he showed me on his phone  -SPEP was noted for IgM kappa M spike, heme-onc was consulted during recent admission  -He missed his hematology oncology appointment this morning, encouraged him to follow-up with them     History of septic arthritis/MSSA bacteremia  -Had a recent admission with MSSA bacteremia from right TKR infection, was discharged on home Ancef  -ID and orthopedics consulted on admission, currently on daptomycin per ID, another three weeks per family     DM2   -last A1c 12.1  -Discussed importance of glucose control to prevent  CKD    Tachycardia/history of atrial fibrillation  -Heart rate was elevated in office today, via machine, auscultation and palpation patient was asymptomatic, he has not yet established care with outpatient cardiology  -I asked him to go to the emergency department for evaluation.  I offered calling an ambulance but his wife stated she will drive him there now.  He denies any chest pain/shortness of breath/palpitations  -An ADT order was placed and I sent a Tiger text message to the ED charge nurse at [facility]    Adrenal adenoma  -4/12/2024 CTAP noted with 1.6 cm right adrenal hypodense nodule consistent with benign adenoma    Additional medical problems: history of TBI, chronic pain    PATIENT INSTRUCTIONS  YOUR RENAL FUNCTION IS IMPROVED FROM DISCHARGE    PLEASE OBTAIN REPEAT LAB WORK IN THE NEXT WEEK WITH URINE STUDIES    YOUR HEART RATE IS ELEVATED IN  RANGE, I RECOMMEND YOU GO TO THE HOSPITAL FOR EVALUATION GIVEN YOUR HISTORY OF ATRIAL FIBRILLATION  Please call the office with any questions or concerns.    AVOID NSAIDS INCLUDING MOTRIN, IBUPROFEN, ADVIL, ALEVE, NAPROXEN      Reason for Visit: No chief complaint on file.    HPI: Armen Llanos is a 49 y.o. male who is here for follow up of, hyponatremia, nephrotic range proteinuria.  This is his first office appointment with nephrology team.  He was seen by nephrology during the hospitalization in April where he was noted to have JH, nephrotic range proteinuria, hyponatremia.  He is currently on IV antibiotics for a septic arthritis and MSSA bacteremia.,  He gets weekly lab work done via his PICC line and follows with ID as outpatient.  He reports he is felt well, his appetite is slowly improving, he reports his blood sugars are improving he denies any large spikes of low or high.  He denies any nausea/vomiting/diarrhea/lower extremity edema.  He denies any chest pain/shortness of breath/palpitations.  He denies any recent NSAID use.  He  was accompanied on today's visit by his wife.    ROS:   Review of Systems   Constitutional: Negative.    Respiratory: Negative.     Cardiovascular: Negative.    Gastrointestinal: Negative.         A complete 10 point review of systems was performed and found to be negative unless otherwise noted above or in the HPI.    Allergies:   Cephalosporins and Penicillins    Medications:     Current Outpatient Medications:     Alcohol Swabs 70 % PADS, May substitute brand based on insurance coverage. Check glucose ACHS., Disp: 200 each, Rfl: 0    amiodarone 200 mg tablet, Take 1 tablet (200 mg total) by mouth daily with breakfast for 4 doses, Disp: 4 tablet, Rfl: 0    amLODIPine (NORVASC) 5 mg tablet, Take 1 tablet (5 mg total) by mouth 2 (two) times a day, Disp: 60 tablet, Rfl: 0    apixaban (ELIQUIS) 5 mg, Take 1 tablet (5 mg total) by mouth 2 (two) times a day, Disp: 60 tablet, Rfl: 2    Blood Glucose Monitoring Suppl (OneTouch Verio Reflect) w/Device KIT, May substitute brand based on insurance coverage. Check glucose ACHS., Disp: 1 kit, Rfl: 0    cyclobenzaprine (FLEXERIL) 10 mg tablet, Take 0.5 tablets (5 mg total) by mouth 3 (three) times a day as needed for muscle spasms, Disp: 30 tablet, Rfl: 0    esomeprazole (NexIUM) 20 mg capsule, Take 20 mg by mouth 2 (two) times a day before meals, Disp: , Rfl:     glucose blood (OneTouch Verio) test strip, May substitute brand based on insurance coverage. Check glucose ACHS., Disp: 200 each, Rfl: 0    Insulin Glargine Solostar (Lantus SoloStar) 100 UNIT/ML SOPN, Inject 0.45 mL (45 Units total) under the skin daily at bedtime (Patient taking differently: Inject 35 Units under the skin daily at bedtime), Disp: 10.5 mL, Rfl: 2    insulin lispro (HumaLOG KwikPen) 100 units/mL injection pen, Inject 13 Units under the skin 3 (three) times a day with meals, Disp: 15 mL, Rfl: 2    Insulin Pen Needle (BD Pen Needle Carisa 2nd Gen) 32G X 4 MM MISC, For use with insulin pen. Pharmacy may  dispense brand covered by insurance., Disp: 100 each, Rfl: 0    metoprolol succinate (TOPROL-XL) 25 mg 24 hr tablet, Take 1 tablet (25 mg total) by mouth daily, Disp: 90 tablet, Rfl: 3    ondansetron (ZOFRAN) 8 mg tablet, TAKE 1 TABLET (8 MG) BY MOUTH 3 TIMES PER DAY AS NEEDED, Disp: , Rfl:     OneTouch Delica Lancets 33G MISC, May substitute brand based on insurance coverage. Check glucose ACHS., Disp: 200 each, Rfl: 0    polyethylene glycol (MIRALAX) 17 g packet, Take 17 g by mouth 2 (two) times a day, Disp: 20 each, Rfl: 0    risperiDONE (RisperDAL) 3 mg tablet, Take 3 mg by mouth daily, Disp: , Rfl:     sacubitril-valsartan (Entresto) 24-26 MG TABS, Take 1 tablet by mouth 2 (two) times a day This medication until your kidney function is normal or cleared by nephrology/cardiology Do not start before April 30, 2024., Disp: 60 tablet, Rfl: 0    simvastatin (ZOCOR) 40 mg tablet, Take 40 mg by mouth daily, Disp: , Rfl:     SUMAtriptan (IMITREX) 100 mg tablet, Take 100 mg by mouth Pt reports taking PRN, Disp: , Rfl:     zolpidem (AMBIEN) 10 mg tablet, Take 1 tablet (10 mg total) by mouth daily at bedtime as needed for sleep, Disp: 10 tablet, Rfl: 0    b complex vitamins capsule, Take 1 capsule by mouth daily (Patient not taking: Reported on 5/10/2024), Disp: , Rfl:     DULoxetine (CYMBALTA) 20 mg capsule, Take 1 capsule (20 mg total) by mouth daily (Patient not taking: Reported on 5/10/2024), Disp: 30 capsule, Rfl: 0    hydrALAZINE (APRESOLINE) 25 mg tablet, Take 1 tablet (25 mg total) by mouth every 8 (eight) hours (Patient not taking: Reported on 5/10/2024), Disp: 30 tablet, Rfl: 0    metoclopramide (REGLAN) 5 mg/mL, Inject 2 mL (10 mg total) into a catheter in a vein every 6 (six) hours as needed for nausea or vomiting (second line if no relief with zofran) (Patient not taking: Reported on 5/10/2024), Disp: 2 mL, Rfl: 0    senna-docusate sodium (SENOKOT S) 8.6-50 mg per tablet, Take 2 tablets by mouth 2 (two) times  "a day (Patient not taking: Reported on 5/10/2024), Disp: 30 tablet, Rfl: 0    Past Medical History:   Diagnosis Date    Bed sore, stage 1     Diabetes mellitus (HCC)     Hypertension      Past Surgical History:   Procedure Laterality Date    IR PICC PLACEMENT SINGLE LUMEN  4/1/2024    KNEE SURGERY      TN REVJ TOTAL KNEE ARTHRP W/WO ALGRFT 1 COMPONENT Right 3/24/2024    Procedure: ARTHROPLASTY KNEE TOTAL REVISION, POLY EXCHANGE;  Surgeon: Tao Washington DO;  Location:  MAIN OR;  Service: Orthopedics    TOE AMPUTATION Left 3/28/2024    Procedure: AMPUTATION TOE 2nd;  Surgeon: Rossy Toussaint DPM;  Location:  MAIN OR;  Service: Podiatry     No family history on file.   reports that he quit smoking about 12 years ago. His smoking use included cigarettes. He has never used smokeless tobacco. He reports that he does not currently use alcohol. He reports current drug use. Drug: Marijuana.    Physical Exam:   Vitals:    05/10/24 1311   BP: 138/94   Pulse: (!) 136   Weight: 89.5 kg (197 lb 6.4 oz)   Height: 6' 6\" (1.981 m)     Body mass index is 22.81 kg/m².    Physical Exam  Vitals reviewed.   Constitutional:       General: He is not in acute distress.     Appearance: He is not toxic-appearing or diaphoretic.   HENT:      Head: Normocephalic and atraumatic.      Nose: Nose normal.      Mouth/Throat:      Mouth: Mucous membranes are dry.   Eyes:      General: No scleral icterus.  Cardiovascular:      Rate and Rhythm: Tachycardia present.      Pulses: Normal pulses.   Pulmonary:      Effort: Pulmonary effort is normal. No respiratory distress.      Breath sounds: No wheezing or rales.   Abdominal:      General: Abdomen is flat.   Musculoskeletal:      Cervical back: Neck supple.      Right lower leg: No edema.      Left lower leg: No edema.   Skin:     General: Skin is warm and dry.      Capillary Refill: Capillary refill takes less than 2 seconds.   Neurological:      Mental Status: He is alert and oriented to " "person, place, and time.          Procedure:  No results found for this or any previous visit.    Lab Results   Component Value Date    GLUCOSE 219 (H) 03/24/2024    CALCIUM 8.0 (L) 04/19/2024    K 4.3 04/19/2024    CO2 21 04/19/2024     04/19/2024    BUN 19 04/19/2024    CREATININE 1.50 (H) 04/19/2024             Invalid input(s): \"ALBUMIN\"    I have personally reviewed the blood work as stated above and in my note.  I have personally reviewed CT AP imaging studies.  I have personally reviewed hospitalization note.   "

## 2024-05-10 ENCOUNTER — OFFICE VISIT (OUTPATIENT)
Dept: NEPHROLOGY | Facility: HOSPITAL | Age: 49
End: 2024-05-10
Attending: FAMILY MEDICINE
Payer: COMMERCIAL

## 2024-05-10 VITALS
WEIGHT: 197.4 LBS | BODY MASS INDEX: 22.84 KG/M2 | SYSTOLIC BLOOD PRESSURE: 138 MMHG | HEIGHT: 78 IN | DIASTOLIC BLOOD PRESSURE: 94 MMHG | HEART RATE: 136 BPM

## 2024-05-10 DIAGNOSIS — D47.2 MONOCLONAL GAMMOPATHY: ICD-10-CM

## 2024-05-10 DIAGNOSIS — I10 ESSENTIAL HYPERTENSION: ICD-10-CM

## 2024-05-10 DIAGNOSIS — E87.1 HYPONATREMIA: ICD-10-CM

## 2024-05-10 DIAGNOSIS — E27.8 ADRENAL NODULE (HCC): ICD-10-CM

## 2024-05-10 DIAGNOSIS — N17.9 AKI (ACUTE KIDNEY INJURY) (HCC): Primary | ICD-10-CM

## 2024-05-10 DIAGNOSIS — R80.8 OTHER PROTEINURIA: ICD-10-CM

## 2024-05-10 DIAGNOSIS — E11.29 TYPE 2 DIABETES MELLITUS WITH OTHER KIDNEY COMPLICATION, UNSPECIFIED WHETHER LONG TERM INSULIN USE (HCC): ICD-10-CM

## 2024-05-10 DIAGNOSIS — R00.0 TACHYCARDIA: ICD-10-CM

## 2024-05-10 PROCEDURE — 99214 OFFICE O/P EST MOD 30 MIN: CPT | Performed by: NURSE PRACTITIONER

## 2024-05-10 NOTE — PATIENT INSTRUCTIONS
YOUR RENAL FUNCTION IS IMPROVED FROM DISCHARGE    PLEASE OBTAIN REPEAT LAB WORK IN THE NEXT WEEK WITH URINE STUDIES    YOUR HEART RATE IS ELEVATED IN  RANGE, I RECOMMEND YOU GO TO THE HOSPITAL FOR EVALUATION GIVEN YOUR HISTORY OF ATRIAL FIBRILLATION

## 2024-05-16 NOTE — PROGRESS NOTES
Patient ID: Armen Llanos is a 49 y.o. male Date of Birth 1975       Chief Complaint   Patient presents with    Foot Problem     PO FU left toe amputation             Diagnosis:  1. History of amputation of lesser toe, left (HCC)  -     Diabetic Shoe  2. Diabetic foot (HCC)  3. Diabetic polyneuropathy associated with type 2 diabetes mellitus (HCC)  -     Diabetic Shoe    1. Annual  Diabetic Foot exam with socks and shoes removed bilaterally.  2. High risk  foot precautions reviewed with patient including the need to wear protective shoegear at all times when walking including in the home, the need to check feet all surfaces daily with a mirror and report and skin breaks to podiatry, the need to apply an emollient to skin of feet daily.  3. We discussed proper glycemic control and the risk of pedal complications with hyperglycemia.    4.  I personally reviewed patient's medical records, operative report, hospital stay from 3/20/2024, follow-up podiatry appointments with Dr. Glaser.  PCP visit note from 3/25/2024 and pertinent blood work.  5.  Toenails were both manually mechanically debrided x incident.  6.  Patient given a prescription for 1 pair diabetic shoes and 3 pair custom molded inserts to accommodate his rigid hammertoes.  7.  Patient understands and agrees with the plan and he will follow-up with Dr. Glaser in 4 months for at risk diabetic footcare, he is known to Dr. Glaser from hospitalization and requests to be closer to his home.          Subjective:   Armen presents today for annual diabetic foot examination, he is a type II diabetic who underwent a partial second toe amputation on 3/20/2024 at [facility], he is followed with Dr. Glaser for postoperative care, his sutures were removed on 4/17/2024.  Patient's most recent visit with PCP was on 3/25/2024, most recent HbA1c was 12.1 on 3/22/2024, he states has a history of TBI and loses sight of his diabetes at times, he  "states currently he is doing very well and adjusting to insulin and taking better care of himself.          The following portions of the patient's history were reviewed and updated as appropriate: allergies, current medications, past family history, past medical history, past social history, past surgical history, and problem list.        Objective:  BP 90/60 (BP Location: Left arm, Patient Position: Sitting, Cuff Size: Adult)   Pulse (!) 109   Ht 6' 6\" (1.981 m)   Wt 91.7 kg (202 lb 3.2 oz)   BMI 23.37 kg/m²     Review of Systems   Constitutional:  Negative for chills and fever.   HENT:  Negative for ear pain and sore throat.    Eyes:  Negative for pain and visual disturbance.   Respiratory:  Negative for cough and shortness of breath.    Cardiovascular:  Negative for chest pain and palpitations.   Gastrointestinal:  Negative for abdominal pain and vomiting.   Genitourinary:  Negative for dysuria and hematuria.   Musculoskeletal:  Negative for arthralgias and back pain.   Skin:  Negative for color change and rash.        Long toe nails   Neurological:  Positive for numbness. Negative for seizures and syncope.   All other systems reviewed and are negative.      Physical Exam  Constitutional:       Appearance: Normal appearance. He is normal weight.   HENT:      Head: Normocephalic and atraumatic.      Right Ear: External ear normal.      Left Ear: External ear normal.      Nose: Nose normal.      Mouth/Throat:      Mouth: Mucous membranes are moist.      Pharynx: Oropharynx is clear.   Eyes:      Conjunctiva/sclera: Conjunctivae normal.      Pupils: Pupils are equal, round, and reactive to light.   Cardiovascular:      Pulses: Normal pulses. no weak pulses.           Dorsalis pedis pulses are 2+ on the right side and 2+ on the left side.        Posterior tibial pulses are 2+ on the right side and 2+ on the left side.   Pulmonary:      Effort: Pulmonary effort is normal.   Musculoskeletal:      Cervical back: " Normal range of motion.      Right lower leg: No edema.      Left lower leg: No edema.      Right foot: Decreased range of motion. Deformity present.      Left foot: Decreased range of motion. Deformity present.   Feet:      Right foot:      Protective Sensation: 10 sites tested.  0 sites sensed.      Skin integrity: Skin integrity normal. No ulcer, skin breakdown, erythema, warmth, callus or dry skin.      Toenail Condition: Right toenails are abnormally thick and long. Fungal disease present.     Left foot: amputated     Protective Sensation: 10 sites tested.  0 sites sensed.      Skin integrity: Skin integrity normal. No ulcer, skin breakdown, erythema, warmth, callus or dry skin.      Toenail Condition: Left toenails are abnormally thick and long. Fungal disease present.     Comments: Prior partial 2nd toe amputation left foot   Mallet toes 2-5 BL    Skin:     General: Skin is warm and dry.      Capillary Refill: Capillary refill takes less than 2 seconds.   Neurological:      General: No focal deficit present.      Mental Status: He is alert and oriented to person, place, and time. Mental status is at baseline.      Sensory: Sensory deficit present.      Coordination: Coordination abnormal.      Gait: Gait abnormal.      Deep Tendon Reflexes: Reflexes abnormal.   Psychiatric:         Mood and Affect: Mood normal.         Behavior: Behavior normal.         Thought Content: Thought content normal.         Judgment: Judgment normal.       Diabetic Foot Exam    Patient's shoes and socks removed.    Right Foot/Ankle   Right Foot Inspection  Skin Exam: skin normal and skin intact. No dry skin, no warmth, no callus, no erythema, no maceration, no abnormal color, no pre-ulcer, no ulcer and no callus.     Toe Exam: ROM and strength within normal limits.     Sensory   Vibration: absent  Proprioception: absent  Monofilament testing: absent    Vascular  Capillary refills: < 3 seconds  The right DP pulse is 2+. The right PT  "pulse is 2+.     Left Foot/Ankle  Left Foot Inspection  Skin Exam: skin normal and skin intact. No dry skin, no warmth, no erythema, no maceration, normal color, no pre-ulcer, no ulcer and no callus. Amputation: amputation left foot (Comments: partial 2nd toe amputation left foot)    Toe Exam: ROM and strength within normal limits.     Sensory   Vibration: absent  Proprioception: absent  Monofilament testing: absent    Vascular  Capillary refills: < 3 seconds  The left DP pulse is 2+. The left PT pulse is 2+.     Assign Risk Category  Deformity present  Loss of protective sensation  No weak pulses  Risk: 3            No pertinent results found.      Rossy Toussaint, DPM, DPM, FACFAS    Portions of the record may have been created with voice recognition software. Occasional wrong word or \"sound a like\" substitutions may have occurred due to the inherent limitations of voice recognition software. Read the chart carefully and recognize, using context, where substitutions have occurred.  "

## 2024-05-17 ENCOUNTER — OFFICE VISIT (OUTPATIENT)
Dept: PODIATRY | Facility: CLINIC | Age: 49
End: 2024-05-17
Payer: COMMERCIAL

## 2024-05-17 VITALS
HEIGHT: 78 IN | SYSTOLIC BLOOD PRESSURE: 90 MMHG | HEART RATE: 109 BPM | DIASTOLIC BLOOD PRESSURE: 60 MMHG | WEIGHT: 202.2 LBS | BODY MASS INDEX: 23.4 KG/M2

## 2024-05-17 DIAGNOSIS — Z89.422 HISTORY OF AMPUTATION OF LESSER TOE, LEFT (HCC): Primary | ICD-10-CM

## 2024-05-17 DIAGNOSIS — E11.42 DIABETIC POLYNEUROPATHY ASSOCIATED WITH TYPE 2 DIABETES MELLITUS (HCC): ICD-10-CM

## 2024-05-17 DIAGNOSIS — E11.8 DIABETIC FOOT (HCC): ICD-10-CM

## 2024-05-17 PROCEDURE — 11721 DEBRIDE NAIL 6 OR MORE: CPT | Performed by: PODIATRIST

## 2024-05-17 PROCEDURE — 99213 OFFICE O/P EST LOW 20 MIN: CPT | Performed by: PODIATRIST

## 2024-05-17 RX ORDER — DAPTOMYCIN 50 MG/ML
INJECTION, POWDER, LYOPHILIZED, FOR SOLUTION INTRAVENOUS
COMMUNITY
Start: 2024-05-16

## 2024-05-17 RX ORDER — TESTOSTERONE CYPIONATE 200 MG/ML
INJECTION, SOLUTION INTRAMUSCULAR
COMMUNITY
Start: 2024-02-26

## 2024-05-17 RX ORDER — MORPHINE SULFATE 60 MG/1
TABLET, FILM COATED, EXTENDED RELEASE ORAL
COMMUNITY
Start: 2024-04-22

## 2024-05-17 RX ORDER — CLONAZEPAM 1 MG/1
TABLET ORAL
COMMUNITY
Start: 2024-04-24

## 2024-05-30 ENCOUNTER — TELEPHONE (OUTPATIENT)
Age: 49
End: 2024-05-30

## 2024-05-30 NOTE — TELEPHONE ENCOUNTER
angel,  Please advise if the following patient can be forced onto the schedule:     Patient: Armen Llanos     : 1975     MRN: [id number]     Call back #: [phone]     Insurance: [facility]     Reason for appointment: Post-op, Right Knee sx 3/24      Requested doctor and/or location: Dr Washington/Jo        Thank you.

## 2024-05-31 ENCOUNTER — HOSPITAL ENCOUNTER (EMERGENCY)
Facility: HOSPITAL | Age: 49
Discharge: HOME | End: 2024-05-31
Attending: EMERGENCY MEDICINE
Payer: COMMERCIAL

## 2024-05-31 ENCOUNTER — APPOINTMENT (EMERGENCY)
Dept: CT IMAGING | Facility: HOSPITAL | Age: 49
End: 2024-05-31
Payer: COMMERCIAL

## 2024-05-31 VITALS
TEMPERATURE: 97.9 F | SYSTOLIC BLOOD PRESSURE: 103 MMHG | RESPIRATION RATE: 18 BRPM | DIASTOLIC BLOOD PRESSURE: 66 MMHG | OXYGEN SATURATION: 98 % | HEART RATE: 80 BPM

## 2024-05-31 DIAGNOSIS — Z45.2 ENCOUNTER FOR CENTRAL LINE CARE: ICD-10-CM

## 2024-05-31 DIAGNOSIS — R53.1 GENERALIZED WEAKNESS: Primary | ICD-10-CM

## 2024-05-31 LAB
ALBUMIN SERPL BCP-MCNC: 2.8 G/DL (ref 3.5–5)
ALP SERPL-CCNC: 68 U/L (ref 34–104)
ALT SERPL W P-5'-P-CCNC: 7 U/L (ref 7–52)
ANION GAP SERPL CALCULATED.3IONS-SCNC: 7 MMOL/L (ref 4–13)
AST SERPL W P-5'-P-CCNC: 14 U/L (ref 13–39)
ATRIAL RATE: 95 BPM
BASOPHILS # BLD AUTO: 0.05 THOUSANDS/ÂΜL (ref 0–0.1)
BASOPHILS NFR BLD AUTO: 1 % (ref 0–1)
BILIRUB SERPL-MCNC: 0.18 MG/DL (ref 0.2–1)
BUN SERPL-MCNC: 23 MG/DL (ref 5–25)
CALCIUM ALBUM COR SERPL-MCNC: 10.1 MG/DL (ref 8.3–10.1)
CALCIUM SERPL-MCNC: 9.1 MG/DL (ref 8.4–10.2)
CHLORIDE SERPL-SCNC: 103 MMOL/L (ref 96–108)
CO2 SERPL-SCNC: 26 MMOL/L (ref 21–32)
CREAT SERPL-MCNC: 1.68 MG/DL (ref 0.6–1.3)
EOSINOPHIL # BLD AUTO: 0.09 THOUSAND/ÂΜL (ref 0–0.61)
EOSINOPHIL NFR BLD AUTO: 1 % (ref 0–6)
ERYTHROCYTE [DISTWIDTH] IN BLOOD BY AUTOMATED COUNT: 13.4 % (ref 11.6–15.1)
GFR SERPL CREATININE-BSD FRML MDRD: 46 ML/MIN/1.73SQ M
GLUCOSE SERPL-MCNC: 199 MG/DL (ref 65–140)
GLUCOSE SERPL-MCNC: 220 MG/DL (ref 65–140)
HCT VFR BLD AUTO: 31.5 % (ref 36.5–49.3)
HGB BLD-MCNC: 10.4 G/DL (ref 12–17)
IMM GRANULOCYTES # BLD AUTO: 0.04 THOUSAND/UL (ref 0–0.2)
IMM GRANULOCYTES NFR BLD AUTO: 1 % (ref 0–2)
LYMPHOCYTES # BLD AUTO: 2.13 THOUSANDS/ÂΜL (ref 0.6–4.47)
LYMPHOCYTES NFR BLD AUTO: 30 % (ref 14–44)
MCH RBC QN AUTO: 28.4 PG (ref 26.8–34.3)
MCHC RBC AUTO-ENTMCNC: 33 G/DL (ref 31.4–37.4)
MCV RBC AUTO: 86 FL (ref 82–98)
MONOCYTES # BLD AUTO: 0.32 THOUSAND/ÂΜL (ref 0.17–1.22)
MONOCYTES NFR BLD AUTO: 5 % (ref 4–12)
NEUTROPHILS # BLD AUTO: 4.42 THOUSANDS/ÂΜL (ref 1.85–7.62)
NEUTS SEG NFR BLD AUTO: 62 % (ref 43–75)
NRBC BLD AUTO-RTO: 0 /100 WBCS
P AXIS: 60 DEGREES
PLATELET # BLD AUTO: 387 THOUSANDS/UL (ref 149–390)
PMV BLD AUTO: 9.5 FL (ref 8.9–12.7)
POTASSIUM SERPL-SCNC: 3.8 MMOL/L (ref 3.5–5.3)
PR INTERVAL: 152 MS
PROT SERPL-MCNC: 6.8 G/DL (ref 6.4–8.4)
QRS AXIS: 6 DEGREES
QRSD INTERVAL: 84 MS
QT INTERVAL: 348 MS
QTC INTERVAL: 437 MS
RBC # BLD AUTO: 3.66 MILLION/UL (ref 3.88–5.62)
SODIUM SERPL-SCNC: 136 MMOL/L (ref 135–147)
T WAVE AXIS: 10 DEGREES
TSH SERPL DL<=0.05 MIU/L-ACNC: 0.32 UIU/ML (ref 0.45–4.5)
VENTRICULAR RATE: 95 BPM
WBC # BLD AUTO: 7.05 THOUSAND/UL (ref 4.31–10.16)

## 2024-05-31 PROCEDURE — 80053 COMPREHEN METABOLIC PANEL: CPT | Performed by: EMERGENCY MEDICINE

## 2024-05-31 PROCEDURE — 96365 THER/PROPH/DIAG IV INF INIT: CPT

## 2024-05-31 PROCEDURE — 93005 ELECTROCARDIOGRAM TRACING: CPT

## 2024-05-31 PROCEDURE — 84443 ASSAY THYROID STIM HORMONE: CPT | Performed by: EMERGENCY MEDICINE

## 2024-05-31 PROCEDURE — 99284 EMERGENCY DEPT VISIT MOD MDM: CPT | Performed by: EMERGENCY MEDICINE

## 2024-05-31 PROCEDURE — 36415 COLL VENOUS BLD VENIPUNCTURE: CPT | Performed by: EMERGENCY MEDICINE

## 2024-05-31 PROCEDURE — 82948 REAGENT STRIP/BLOOD GLUCOSE: CPT

## 2024-05-31 PROCEDURE — 85025 COMPLETE CBC W/AUTO DIFF WBC: CPT | Performed by: EMERGENCY MEDICINE

## 2024-05-31 PROCEDURE — 96361 HYDRATE IV INFUSION ADD-ON: CPT

## 2024-05-31 PROCEDURE — 70450 CT HEAD/BRAIN W/O DYE: CPT

## 2024-05-31 PROCEDURE — 99284 EMERGENCY DEPT VISIT MOD MDM: CPT

## 2024-05-31 RX ADMIN — DAPTOMYCIN 500 MG: 500 INJECTION, POWDER, LYOPHILIZED, FOR SOLUTION INTRAVENOUS at 20:22

## 2024-05-31 RX ADMIN — SODIUM CHLORIDE 1000 ML: 0.9 INJECTION, SOLUTION INTRAVENOUS at 18:16

## 2024-06-03 LAB
ATRIAL RATE: 95 BPM
P AXIS: 60 DEGREES
PR INTERVAL: 152 MS
QRS AXIS: 6 DEGREES
QRSD INTERVAL: 84 MS
QT INTERVAL: 348 MS
QTC INTERVAL: 437 MS
T WAVE AXIS: 10 DEGREES
VENTRICULAR RATE: 95 BPM

## 2024-06-03 PROCEDURE — 93010 ELECTROCARDIOGRAM REPORT: CPT | Performed by: INTERNAL MEDICINE

## 2024-06-06 NOTE — ED PROVIDER NOTES
"History  Chief Complaint   Patient presents with    Medical Problem     Pt reports \"My PICC line got pull out partially. I think when I was sleeping I must have pulled it some\" Pt is taking abxs for a septic knee. Denies recent fevers.      Patient is a 49-year-old male with history of PICC line insertion for IV antibiotics related to a septic joint that presents after PICC line partially came out.  Patient says that he accidentally pulled on the PICC line and it is partially out at this time.  This is his only acute complaint.  His wife does mention to me that he is more fatigued and seems more sleepy than normal.  He does get like this sometimes, he does have history of TBI.  Otherwise no fevers nausea or vomiting.  Eating and drinking normally.  No chest pain dyspnea or abdominal pain.        Prior to Admission Medications   Prescriptions Last Dose Informant Patient Reported? Taking?   Alcohol Swabs 70 % PADS  Self No No   Sig: May substitute brand based on insurance coverage. Check glucose ACHS.   Blood Glucose Monitoring Suppl (OneTouch Verio Reflect) w/Device KIT  Self No No   Sig: May substitute brand based on insurance coverage. Check glucose ACHS.   DAPTOmycin (CUBICIN) 500 mg   Yes No   DULoxetine (CYMBALTA) 20 mg capsule  Self No No   Sig: Take 1 capsule (20 mg total) by mouth daily   Patient not taking: Reported on 5/10/2024   Insulin Glargine Solostar (Lantus SoloStar) 100 UNIT/ML SOPN  Self No No   Sig: Inject 0.45 mL (45 Units total) under the skin daily at bedtime   Patient taking differently: Inject 35 Units under the skin daily at bedtime   Insulin Pen Needle (BD Pen Needle Carisa 2nd Gen) 32G X 4 MM MISC  Self No No   Sig: For use with insulin pen. Pharmacy may dispense brand covered by insurance.   OneTouch Delica Lancets 33G MISC  Self No No   Sig: May substitute brand based on insurance coverage. Check glucose ACHS.   SUMAtriptan (IMITREX) 100 mg tablet  Self Yes No   Sig: Take 100 mg by mouth Pt " reports taking PRN   amLODIPine (NORVASC) 5 mg tablet  Self No No   Sig: Take 1 tablet (5 mg total) by mouth 2 (two) times a day   amiodarone 200 mg tablet  Self No No   Sig: Take 1 tablet (200 mg total) by mouth daily with breakfast for 4 doses   apixaban (ELIQUIS) 5 mg  Self No No   Sig: Take 1 tablet (5 mg total) by mouth 2 (two) times a day   b complex vitamins capsule  Self Yes No   Sig: Take 1 capsule by mouth daily   Patient not taking: Reported on 5/10/2024   cholecalciferol (VITAMIN D3) 250 MCG (97781 UT) capsule   Yes No   Sig: Vitamin D3   clonazePAM (KlonoPIN) 1 mg tablet   Yes No   cyclobenzaprine (FLEXERIL) 10 mg tablet  Self No No   Sig: Take 0.5 tablets (5 mg total) by mouth 3 (three) times a day as needed for muscle spasms   esomeprazole (NexIUM) 20 mg capsule  Self Yes No   Sig: Take 20 mg by mouth 2 (two) times a day before meals   glucose blood (OneTouch Verio) test strip  Self No No   Sig: May substitute brand based on insurance coverage. Check glucose ACHS.   hydrALAZINE (APRESOLINE) 25 mg tablet  Self No No   Sig: Take 1 tablet (25 mg total) by mouth every 8 (eight) hours   Patient not taking: Reported on 5/10/2024   insulin lispro (HumaLOG KwikPen) 100 units/mL injection pen  Self No No   Sig: Inject 13 Units under the skin 3 (three) times a day with meals   metoclopramide (REGLAN) 5 mg/mL  Self No No   Sig: Inject 2 mL (10 mg total) into a catheter in a vein every 6 (six) hours as needed for nausea or vomiting (second line if no relief with zofran)   Patient not taking: Reported on 5/10/2024   metoprolol succinate (TOPROL-XL) 25 mg 24 hr tablet  Self No No   Sig: Take 1 tablet (25 mg total) by mouth daily   morphine (MS CONTIN) 60 mg 12 hr tablet   Yes No   ondansetron (ZOFRAN) 8 mg tablet  Self Yes No   Sig: TAKE 1 TABLET (8 MG) BY MOUTH 3 TIMES PER DAY AS NEEDED   polyethylene glycol (MIRALAX) 17 g packet  Self No No   Sig: Take 17 g by mouth 2 (two) times a day   risperiDONE (RisperDAL) 3  mg tablet  Self Yes No   Sig: Take 3 mg by mouth daily   sacubitril-valsartan (Entresto) 24-26 MG TABS  Self No No   Sig: Take 1 tablet by mouth 2 (two) times a day This medication until your kidney function is normal or cleared by nephrology/cardiology Do not start before 2024.   senna-docusate sodium (SENOKOT S) 8.6-50 mg per tablet  Self No No   Sig: Take 2 tablets by mouth 2 (two) times a day   Patient not taking: Reported on 5/10/2024   simvastatin (ZOCOR) 40 mg tablet  Self Yes No   Sig: Take 40 mg by mouth daily   testosterone cypionate (DEPO-TESTOSTERONE) 200 mg/mL SOLN   Yes No   Sig: use as directed INJECT 2 MLS WEEKLY   zolpidem (AMBIEN) 10 mg tablet  Self No No   Sig: Take 1 tablet (10 mg total) by mouth daily at bedtime as needed for sleep      Facility-Administered Medications: None       Past Medical History:   Diagnosis Date    Bed sore, stage 1     Diabetes mellitus (HCC)     Hypertension        Past Surgical History:   Procedure Laterality Date    IR PICC PLACEMENT SINGLE LUMEN  2024    KNEE SURGERY      NC REVJ TOTAL KNEE ARTHRP W/WO ALGRFT 1 COMPONENT Right 3/24/2024    Procedure: ARTHROPLASTY KNEE TOTAL REVISION, POLY EXCHANGE;  Surgeon: Tao Washington DO;  Location:  MAIN OR;  Service: Orthopedics    TOE AMPUTATION Left 3/28/2024    Procedure: AMPUTATION TOE 2nd;  Surgeon: Rossy Toussaint DPM;  Location:  MAIN OR;  Service: Podiatry       History reviewed. No pertinent family history.  I have reviewed and agree with the history as documented.    E-Cigarette/Vaping    E-Cigarette Use Current Some Day User      E-Cigarette/Vaping Substances    Nicotine No     THC Yes     CBD Yes     Flavoring Yes     Other No     Unknown No      Social History     Tobacco Use    Smoking status: Former     Current packs/day: 0.00     Types: Cigarettes     Quit date: 2011     Years since quittin.5    Smokeless tobacco: Never   Vaping Use    Vaping status: Some Days    Substances:  THC, CBD, Flavoring   Substance Use Topics    Alcohol use: Not Currently    Drug use: Yes     Types: Marijuana       Review of Systems   Constitutional:  Positive for fatigue. Negative for fever.   HENT:  Negative for sore throat.    Eyes:  Negative for photophobia.   Respiratory:  Negative for shortness of breath.    Cardiovascular:  Negative for chest pain.   Gastrointestinal:  Negative for abdominal pain.   Genitourinary:  Negative for dysuria.   Musculoskeletal:  Negative for back pain.   Skin:  Negative for rash.   Neurological:  Negative for light-headedness.   Hematological:  Negative for adenopathy.   Psychiatric/Behavioral:  Negative for agitation.    All other systems reviewed and are negative.      Physical Exam  Physical Exam  Vitals reviewed.   Constitutional:       General: He is not in acute distress.     Appearance: He is well-developed.   HENT:      Head: Normocephalic.   Eyes:      Pupils: Pupils are equal, round, and reactive to light.   Cardiovascular:      Rate and Rhythm: Normal rate and regular rhythm.      Heart sounds: Normal heart sounds. No murmur heard.     No friction rub. No gallop.   Pulmonary:      Effort: Pulmonary effort is normal.      Breath sounds: Normal breath sounds.   Abdominal:      General: Bowel sounds are normal. There is no distension.      Palpations: Abdomen is soft.      Tenderness: There is no abdominal tenderness. There is no guarding.   Musculoskeletal:         General: Normal range of motion.      Cervical back: Normal range of motion and neck supple.   Skin:     Capillary Refill: Capillary refill takes less than 2 seconds.   Neurological:      Mental Status: He is alert and oriented to person, place, and time.      Cranial Nerves: No cranial nerve deficit.      Sensory: No sensory deficit.      Motor: No abnormal muscle tone.   Psychiatric:         Behavior: Behavior normal.         Thought Content: Thought content normal.         Judgment: Judgment normal.          Vital Signs  ED Triage Vitals [05/31/24 1748]   Temperature Pulse Respirations Blood Pressure SpO2   97.9 °F (36.6 °C) 99 20 100/65 98 %      Temp Source Heart Rate Source Patient Position - Orthostatic VS BP Location FiO2 (%)   Temporal Monitor Sitting Left arm --      Pain Score       5           Vitals:    05/31/24 1900 05/31/24 2000 05/31/24 2030 05/31/24 2100   BP: 97/63 102/62 101/59 103/66   Pulse: 81 83 81 80   Patient Position - Orthostatic VS: Sitting Sitting Sitting Sitting         Visual Acuity      ED Medications  Medications   sodium chloride 0.9 % bolus 1,000 mL (0 mL Intravenous Stopped 5/31/24 2016)   DAPTOmycin (CUBICIN) 500 mg in sodium chloride 0.9 % 50 mL IVPB (0 mg Intravenous Stopped 5/31/24 2052)       Diagnostic Studies  Results Reviewed       Procedure Component Value Units Date/Time    TSH, 3rd generation with Free T4 reflex [[id number]]  (Abnormal) Collected: 05/31/24 1814    Lab Status: Final result Specimen: Blood from Arm, Left Updated: 05/31/24 1850     TSH 3RD GENERATON 0.323 uIU/mL     Comprehensive metabolic panel [[id number]]  (Abnormal) Collected: 05/31/24 1814    Lab Status: Final result Specimen: Blood from Arm, Left Updated: 05/31/24 1834     Sodium 136 mmol/L      Potassium 3.8 mmol/L      Chloride 103 mmol/L      CO2 26 mmol/L      ANION GAP 7 mmol/L      BUN 23 mg/dL      Creatinine 1.68 mg/dL      Glucose 199 mg/dL      Calcium 9.1 mg/dL      Corrected Calcium 10.1 mg/dL      AST 14 U/L      ALT 7 U/L      Alkaline Phosphatase 68 U/L      Total Protein 6.8 g/dL      Albumin 2.8 g/dL      Total Bilirubin 0.18 mg/dL      eGFR 46 ml/min/1.73sq m     Narrative:      National Kid[employer] guidelines for Chronic Kidney Disease (CKD):     Stage 1 with normal or high GFR (GFR > 90 mL/min/1.73 square meters)    Stage 2 Mild CKD (GFR = 60-89 mL/min/1.73 square meters)    Stage 3A Moderate CKD (GFR = 45-59 mL/min/1.73 square meters)    Stage 3B Moderate CKD (GFR =  30-44 mL/min/1.73 square meters)    Stage 4 Severe CKD (GFR = 15-29 mL/min/1.73 square meters)    Stage 5 End Stage CKD (GFR <15 mL/min/1.73 square meters)  Note: GFR calculation is accurate only with a steady state creatinine    CBC and differential [[id number]]  (Abnormal) Collected: 05/31/24 1814    Lab Status: Final result Specimen: Blood from Arm, Left Updated: 05/31/24 1819     WBC 7.05 Thousand/uL      RBC 3.66 Million/uL      Hemoglobin 10.4 g/dL      Hematocrit 31.5 %      MCV 86 fL      MCH 28.4 pg      MCHC 33.0 g/dL      RDW 13.4 %      MPV 9.5 fL      Platelets 387 Thousands/uL      nRBC 0 /100 WBCs      Segmented % 62 %      Immature Grans % 1 %      Lymphocytes % 30 %      Monocytes % 5 %      Eosinophils Relative 1 %      Basophils Relative 1 %      Absolute Neutrophils 4.42 Thousands/µL      Absolute Immature Grans 0.04 Thousand/uL      Absolute Lymphocytes 2.13 Thousands/µL      Absolute Monocytes 0.32 Thousand/µL      Eosinophils Absolute 0.09 Thousand/µL      Basophils Absolute 0.05 Thousands/µL     Fingerstick Glucose (POCT) [[id number]]  (Abnormal) Collected: 05/31/24 1801    Lab Status: Final result Specimen: Blood Updated: 05/31/24 1802     POC Glucose 220 mg/dl                    CT head without contrast   Final Result by Sedrick Small MD (05/31 1917)      No acute intracranial abnormality.                  Workstation performed: [id number]                    Procedures  ECG 12 Lead Documentation Only    Date/Time: 6/6/2024 2:51 PM    Performed by: Tobi Obando MD  Authorized by: Tobi Obando MD    ECG reviewed by me, the ED Provider: yes    Patient location:  ED  Previous ECG:     Previous ECG:  Unavailable    Comparison to cardiac monitor: Yes    Interpretation:     Interpretation: normal    Rate:     ECG rate assessment: normal    Rhythm:     Rhythm: sinus rhythm    Ectopy:     Ectopy: none    QRS:     QRS axis:  Normal    QRS intervals:  Normal  Conduction:     Conduction: normal     ST segments:     ST segments:  Normal  T waves:     T waves: normal             ED Course  ED Course as of 06/06/24 1449   Fri May 31, 2024   1850 Creatinine(!): 1.68  Baseline 1.4-1.5                                             Medical Decision Making  Patient is a 49-year-old male who presents for evaluation after PICC line was partially removed.  I removed the rest of the PICC line.  Discussed with infectious disease who agrees the patient can be started on p.o. antibiotics tomorrow.  He was due to have his last dose of IV antibiotics tomorrow.  Otherwise workup for fatigue is unremarkable including EKG blood work.  Advised outpatient follow-up with ID and strict return precautions.    Amount and/or Complexity of Data Reviewed  Labs: ordered. Decision-making details documented in ED Course.  Radiology: ordered.             Disposition  Final diagnoses:   Generalized weakness   Encounter for central line care     Time reflects when diagnosis was documented in both MDM as applicable and the Disposition within this note       Time User Action Codes Description Comment    5/31/2024  8:57 PM Tobi Obando Add [R53.1] Generalized weakness     5/31/2024  8:58 PM Tobi Obando Add [Z45.2] Encounter for central line care           ED Disposition       ED Disposition   Discharge    Condition   Stable    Date/Time   Fri May 31, 2024  8:57 PM    Comment   Armen Llanos discharge to home/self care.                   Follow-up Information       Follow up With Specialties Details Why Contact Info Additional Information     [facility] Emergency Department Emergency Medicine  If symptoms worsen [address] [facility] [address] [phone] [facility] Emergency Department, [address] [place], [place] [address]            Discharge Medication List as of 5/31/2024  8:58 PM        CONTINUE these medications which have NOT CHANGED     Details   Alcohol Swabs 70 % PADS May substitute brand based on insurance coverage. Check glucose ACHS., Normal      amiodarone 200 mg tablet Take 1 tablet (200 mg total) by mouth daily with breakfast for 4 doses, Starting Sat 4/20/2024, Until Fri 5/10/2024, Normal      amLODIPine (NORVASC) 5 mg tablet Take 1 tablet (5 mg total) by mouth 2 (two) times a day, Starting Fri 4/19/2024, Until Sun 5/19/2024, Normal      apixaban (ELIQUIS) 5 mg Take 1 tablet (5 mg total) by mouth 2 (two) times a day, Starting Mon 4/1/2024, Normal      b complex vitamins capsule Take 1 capsule by mouth daily, Historical Med      Blood Glucose Monitoring Suppl (OneTouch Verio Reflect) w/Device KIT May substitute brand based on insurance coverage. Check glucose ACHS., Normal      cholecalciferol (VITAMIN D3) 250 MCG (42413 UT) capsule Vitamin D3, Historical Med      clonazePAM (KlonoPIN) 1 mg tablet Historical Med      cyclobenzaprine (FLEXERIL) 10 mg tablet Take 0.5 tablets (5 mg total) by mouth 3 (three) times a day as needed for muscle spasms, Starting Fri 4/19/2024, Normal      DAPTOmycin (CUBICIN) 500 mg Historical Med      DULoxetine (CYMBALTA) 20 mg capsule Take 1 capsule (20 mg total) by mouth daily, Starting Thu 4/4/2024, Normal      esomeprazole (NexIUM) 20 mg capsule Take 20 mg by mouth 2 (two) times a day before meals, Historical Med      glucose blood (OneTouch Verio) test strip May substitute brand based on insurance coverage. Check glucose ACHS., Normal      hydrALAZINE (APRESOLINE) 25 mg tablet Take 1 tablet (25 mg total) by mouth every 8 (eight) hours, Starting Fri 4/19/2024, Normal      Insulin Glargine Solostar (Lantus SoloStar) 100 UNIT/ML SOPN Inject 0.45 mL (45 Units total) under the skin daily at bedtime, Starting Mon 4/1/2024, Normal      insulin lispro (HumaLOG KwikPen) 100 units/mL injection pen Inject 13 Units under the skin 3 (three) times a day with meals, Starting Mon 4/1/2024, Normal      Insulin Pen Needle (BD  Pen Needle Carisa 2nd Gen) 32G X 4 MM MISC For use with insulin pen. Pharmacy may dispense brand covered by insurance., Normal      metoclopramide (REGLAN) 5 mg/mL Inject 2 mL (10 mg total) into a catheter in a vein every 6 (six) hours as needed for nausea or vomiting (second line if no relief with zofran), Starting Fri 4/19/2024, Normal      metoprolol succinate (TOPROL-XL) 25 mg 24 hr tablet Take 1 tablet (25 mg total) by mouth daily, Starting Fri 4/5/2024, Normal      morphine (MS CONTIN) 60 mg 12 hr tablet Historical Med      ondansetron (ZOFRAN) 8 mg tablet TAKE 1 TABLET (8 MG) BY MOUTH 3 TIMES PER DAY AS NEEDED, Historical Med      OneTouch Delica Lancets 33G MISC May substitute brand based on insurance coverage. Check glucose ACHS., Normal      polyethylene glycol (MIRALAX) 17 g packet Take 17 g by mouth 2 (two) times a day, Starting Fri 4/19/2024, Normal      risperiDONE (RisperDAL) 3 mg tablet Take 3 mg by mouth daily, Historical Med      sacubitril-valsartan (Entresto) 24-26 MG TABS Take 1 tablet by mouth 2 (two) times a day This medication until your kidney function is normal or cleared by nephrology/cardiology Do not start before April 30, 2024., Starting Tue 4/30/2024, Normal      senna-docusate sodium (SENOKOT S) 8.6-50 mg per tablet Take 2 tablets by mouth 2 (two) times a day, Starting Fri 4/19/2024, Normal      simvastatin (ZOCOR) 40 mg tablet Take 40 mg by mouth daily, Historical Med      SUMAtriptan (IMITREX) 100 mg tablet Take 100 mg by mouth Pt reports taking PRN, Historical Med      testosterone cypionate (DEPO-TESTOSTERONE) 200 mg/mL SOLN use as directed INJECT 2 MLS WEEKLY, Historical Med      zolpidem (AMBIEN) 10 mg tablet Take 1 tablet (10 mg total) by mouth daily at bedtime as needed for sleep, Starting Fri 4/19/2024, Print             No discharge procedures on file.    PDMP Review         Value Time User    PDMP Reviewed  Yes 4/19/2024  1:55 PM Mirna Pacheco MD            ED  Provider  Electronically Signed by             Tobi Obando MD  06/06/24 5628

## 2024-06-14 ENCOUNTER — OFFICE VISIT (OUTPATIENT)
Dept: OBGYN CLINIC | Facility: CLINIC | Age: 49
End: 2024-06-14

## 2024-06-14 VITALS
DIASTOLIC BLOOD PRESSURE: 80 MMHG | WEIGHT: 202 LBS | BODY MASS INDEX: 23.37 KG/M2 | HEART RATE: 105 BPM | SYSTOLIC BLOOD PRESSURE: 123 MMHG | HEIGHT: 78 IN

## 2024-06-14 DIAGNOSIS — Z96.651 STATUS POST RIGHT KNEE REPLACEMENT: Primary | ICD-10-CM

## 2024-06-14 PROCEDURE — 99024 POSTOP FOLLOW-UP VISIT: CPT | Performed by: PHYSICIAN ASSISTANT

## 2024-06-14 NOTE — PROGRESS NOTES
Subjective: Patient seen and examined. He is 3 months s/p Right TKA poly swap and I&D for acute PJI.  He states that he has increases in his pain that comes and goes.  He is on chronic pain medication with his pain doctor.  He notes swelling in the right knee that comes and goes.  He uses his compression sleeve for swelling.  He is seeing infectious disease and was told everything looks good.  He is chronically on oral antibiotics for life, currently on daptomycin.  Progressing well, ambulating with an SPC. Incision without drainage. Denies fevers or chills    Physical Exam:  Incision: Well healed surgical incision, +swelling  ROM: 5-105 with minimal pain  +effusion  5/5 IP/Q/HS/TA/GS, 2+ DP/PT, SILT DP/SP/S/S/TN      Assessment/Plan:  48 y/o male 3 months s/p Right TKA revision (poly swap) and I&D for acute PJI on 3/24/24.    - continue multi-modal pain control   - Weight bearing status: WBAT RLE  - DVT ppx: On Eliquis  - Incision care: No restrictions  - PT/OT, script provided today  - Continue IV antibiotics per ID.  Follow up with ID as scheduled.  - Continue with compression sleeve for swelling  - F/U 2 months with Dr. Washington, call sooner if increased pain.        Selam Fitzpatrick PA-C

## 2024-06-17 ENCOUNTER — TELEPHONE (OUTPATIENT)
Dept: OBGYN CLINIC | Facility: HOSPITAL | Age: 49
End: 2024-06-17

## 2024-06-17 DIAGNOSIS — Z96.651 STATUS POST RIGHT KNEE REPLACEMENT: Primary | ICD-10-CM

## 2024-06-17 NOTE — TELEPHONE ENCOUNTER
"Called and spoke with patient.     He reports increased pain and swelling over the last few days. He denies redness. He denies fevers. He states that the incision is clean, healed, and intact. He states that the area is warmer to the touch. He states \"they just did blood work before to see if it was infected, can we just do blood work\" I requested if patient was able to upload an image via my chart, patient does not know how. I will forward this to surgeon. Please advise.   "

## 2024-06-17 NOTE — TELEPHONE ENCOUNTER
Caller: Patient    Doctor: Felix    Reason for call: Patient believes his right knee (replaced 3/24/24) might be infected again. He called infectious disease doctor and they advised to contact ortho. It is swollen and pain level is 8 out of 10. He recently went from IV antibiotics to oral antibiotic 2xday. Please call patient. Thank you.    Call back#: [phone]

## 2024-06-18 NOTE — TELEPHONE ENCOUNTER
Called and spoke with patient relaying information. Patient states that he will go today or tomorrow to get the blood work done.

## 2024-06-20 NOTE — TELEPHONE ENCOUNTER
Caller: Patient    Doctor: Felix    Reason for call:     Patient is at the lab [facility] to have blood work completed for his right knee.  Can the blood lab scripts be faxed to [phone].  He is there now, can this be faxed shortly.    Call back#: phone # [phone]

## 2024-06-22 LAB
CRP SERPL-MCNC: 12 MG/L (ref 0–10)
ERYTHROCYTE [SEDIMENTATION RATE] IN BLOOD BY WESTERGREN METHOD: 34 MM/HR (ref 0–30)

## 2024-07-09 ENCOUNTER — APPOINTMENT (EMERGENCY)
Dept: RADIOLOGY | Facility: HOSPITAL | Age: 49
End: 2024-07-09
Payer: COMMERCIAL

## 2024-07-09 ENCOUNTER — HOSPITAL ENCOUNTER (EMERGENCY)
Facility: HOSPITAL | Age: 49
Discharge: HOME | End: 2024-07-09
Attending: EMERGENCY MEDICINE
Payer: COMMERCIAL

## 2024-07-09 ENCOUNTER — TELEPHONE (OUTPATIENT)
Dept: OBGYN CLINIC | Facility: HOSPITAL | Age: 49
End: 2024-07-09

## 2024-07-09 VITALS
HEART RATE: 93 BPM | SYSTOLIC BLOOD PRESSURE: 128 MMHG | TEMPERATURE: 98.7 F | RESPIRATION RATE: 18 BRPM | DIASTOLIC BLOOD PRESSURE: 88 MMHG | OXYGEN SATURATION: 95 %

## 2024-07-09 DIAGNOSIS — M25.561 RIGHT KNEE PAIN: Primary | ICD-10-CM

## 2024-07-09 LAB
ALBUMIN SERPL BCG-MCNC: 3.1 G/DL (ref 3.5–5)
ALP SERPL-CCNC: 91 U/L (ref 34–104)
ALT SERPL W P-5'-P-CCNC: 55 U/L (ref 7–52)
ANION GAP SERPL CALCULATED.3IONS-SCNC: 8 MMOL/L (ref 4–13)
AST SERPL W P-5'-P-CCNC: 20 U/L (ref 13–39)
BASOPHILS # BLD AUTO: 0.05 THOUSANDS/ÂΜL (ref 0–0.1)
BASOPHILS NFR BLD AUTO: 1 % (ref 0–1)
BILIRUB SERPL-MCNC: 0.26 MG/DL (ref 0.2–1)
BUN SERPL-MCNC: 13 MG/DL (ref 5–25)
CALCIUM ALBUM COR SERPL-MCNC: 10 MG/DL (ref 8.3–10.1)
CALCIUM SERPL-MCNC: 9.3 MG/DL (ref 8.4–10.2)
CHLORIDE SERPL-SCNC: 101 MMOL/L (ref 96–108)
CO2 SERPL-SCNC: 23 MMOL/L (ref 21–32)
CREAT SERPL-MCNC: 1.05 MG/DL (ref 0.6–1.3)
CRP SERPL QL: 30.6 MG/L
EOSINOPHIL # BLD AUTO: 0.2 THOUSAND/ÂΜL (ref 0–0.61)
EOSINOPHIL NFR BLD AUTO: 3 % (ref 0–6)
ERYTHROCYTE [DISTWIDTH] IN BLOOD BY AUTOMATED COUNT: 12.7 % (ref 11.6–15.1)
ERYTHROCYTE [SEDIMENTATION RATE] IN BLOOD: 71 MM/HOUR (ref 0–19)
GFR SERPL CREATININE-BSD FRML MDRD: 82 ML/MIN/1.73SQ M
GLUCOSE SERPL-MCNC: 287 MG/DL (ref 65–140)
HCT VFR BLD AUTO: 36.1 % (ref 36.5–49.3)
HGB BLD-MCNC: 12.4 G/DL (ref 12–17)
IMM GRANULOCYTES # BLD AUTO: 0.05 THOUSAND/UL (ref 0–0.2)
IMM GRANULOCYTES NFR BLD AUTO: 1 % (ref 0–2)
LACTATE SERPL-SCNC: 1.1 MMOL/L (ref 0.5–2)
LYMPHOCYTES # BLD AUTO: 2.21 THOUSANDS/ÂΜL (ref 0.6–4.47)
LYMPHOCYTES NFR BLD AUTO: 30 % (ref 14–44)
MCH RBC QN AUTO: 28.2 PG (ref 26.8–34.3)
MCHC RBC AUTO-ENTMCNC: 34.3 G/DL (ref 31.4–37.4)
MCV RBC AUTO: 82 FL (ref 82–98)
MONOCYTES # BLD AUTO: 0.41 THOUSAND/ÂΜL (ref 0.17–1.22)
MONOCYTES NFR BLD AUTO: 6 % (ref 4–12)
NEUTROPHILS # BLD AUTO: 4.34 THOUSANDS/ÂΜL (ref 1.85–7.62)
NEUTS SEG NFR BLD AUTO: 59 % (ref 43–75)
NRBC BLD AUTO-RTO: 0 /100 WBCS
PLATELET # BLD AUTO: 384 THOUSANDS/UL (ref 149–390)
PMV BLD AUTO: 10.1 FL (ref 8.9–12.7)
POTASSIUM SERPL-SCNC: 4.1 MMOL/L (ref 3.5–5.3)
PROCALCITONIN SERPL-MCNC: <0.05 NG/ML
PROT SERPL-MCNC: 7.3 G/DL (ref 6.4–8.4)
RBC # BLD AUTO: 4.39 MILLION/UL (ref 3.88–5.62)
SODIUM SERPL-SCNC: 132 MMOL/L (ref 135–147)
WBC # BLD AUTO: 7.26 THOUSAND/UL (ref 4.31–10.16)

## 2024-07-09 PROCEDURE — 80053 COMPREHEN METABOLIC PANEL: CPT | Performed by: EMERGENCY MEDICINE

## 2024-07-09 PROCEDURE — 87040 BLOOD CULTURE FOR BACTERIA: CPT | Performed by: EMERGENCY MEDICINE

## 2024-07-09 PROCEDURE — 73564 X-RAY EXAM KNEE 4 OR MORE: CPT

## 2024-07-09 PROCEDURE — 86140 C-REACTIVE PROTEIN: CPT | Performed by: EMERGENCY MEDICINE

## 2024-07-09 PROCEDURE — 83605 ASSAY OF LACTIC ACID: CPT | Performed by: EMERGENCY MEDICINE

## 2024-07-09 PROCEDURE — 84145 PROCALCITONIN (PCT): CPT | Performed by: EMERGENCY MEDICINE

## 2024-07-09 PROCEDURE — 96376 TX/PRO/DX INJ SAME DRUG ADON: CPT

## 2024-07-09 PROCEDURE — 99284 EMERGENCY DEPT VISIT MOD MDM: CPT

## 2024-07-09 PROCEDURE — 85652 RBC SED RATE AUTOMATED: CPT | Performed by: EMERGENCY MEDICINE

## 2024-07-09 PROCEDURE — 96374 THER/PROPH/DIAG INJ IV PUSH: CPT

## 2024-07-09 PROCEDURE — 36415 COLL VENOUS BLD VENIPUNCTURE: CPT | Performed by: EMERGENCY MEDICINE

## 2024-07-09 PROCEDURE — 96375 TX/PRO/DX INJ NEW DRUG ADDON: CPT

## 2024-07-09 PROCEDURE — 99285 EMERGENCY DEPT VISIT HI MDM: CPT | Performed by: EMERGENCY MEDICINE

## 2024-07-09 PROCEDURE — 85025 COMPLETE CBC W/AUTO DIFF WBC: CPT | Performed by: EMERGENCY MEDICINE

## 2024-07-09 RX ORDER — HYDROMORPHONE HCL/PF 1 MG/ML
1 SYRINGE (ML) INJECTION ONCE
Status: COMPLETED | OUTPATIENT
Start: 2024-07-09 | End: 2024-07-09

## 2024-07-09 RX ORDER — LORAZEPAM 2 MG/ML
1 INJECTION INTRAMUSCULAR ONCE
Status: COMPLETED | OUTPATIENT
Start: 2024-07-09 | End: 2024-07-09

## 2024-07-09 RX ADMIN — LORAZEPAM 1 MG: 2 INJECTION INTRAMUSCULAR; INTRAVENOUS at 17:47

## 2024-07-09 RX ADMIN — HYDROMORPHONE HYDROCHLORIDE 1 MG: 1 INJECTION, SOLUTION INTRAMUSCULAR; INTRAVENOUS; SUBCUTANEOUS at 16:56

## 2024-07-09 RX ADMIN — HYDROMORPHONE HYDROCHLORIDE 1 MG: 1 INJECTION, SOLUTION INTRAMUSCULAR; INTRAVENOUS; SUBCUTANEOUS at 19:36

## 2024-07-09 NOTE — TELEPHONE ENCOUNTER
911 initiated at this time due to patients suggesting suicidal ideation on the phone. Surgeon made aware

## 2024-07-09 NOTE — TELEPHONE ENCOUNTER
"Patient called in with c/o 10/10 pain. Patient is in hysterics, crying over the phone and states \"I can't even move, I can't stand up\" patient states \"I'm in so much pain I am having suicidal thoughts\" patient advised to call 911 at this time. I informed patient that I will message the surgeon and he states \"ill just wait to hear back from you\"   "

## 2024-07-09 NOTE — ED PROVIDER NOTES
"History  Chief Complaint   Patient presents with    Joint Swelling     Patient reports to ED c/o right knee pain and swelling. Reports extensive hx of knee surgeries and most recently septic joint in right knee. Reports \"overdoing it today\" and believes that is what caused this episode. Wearing fentanyl patch on left chest at this time. Patient tearful in triage and seen grasping knee. Patient expressing passive SI d/t pain. Patient states \"I would be better off if I was dead\".     Patient is a 49-year-old male.  Past medical history is significant for hypertension, diabetes, diabetic polyneuropathy, A-fib, cardiomyopathy, MSSA bacteremia, and TBI.  He has a history of a right knee replacement.  He has had problems with that knee replacement and had to have a revision.  Last surgery was 3 months ago.  He has had a septic knee before.  Patient presents to the emergency room with a 1 day history of right knee pain and swelling.  Denies new trauma.  Denies any fever or chills.  Patient is on p.o. morphine and fentanyl patch for chronic pain.        Prior to Admission Medications   Prescriptions Last Dose Informant Patient Reported? Taking?   Alcohol Swabs 70 % PADS  Self No No   Sig: May substitute brand based on insurance coverage. Check glucose ACHS.   Blood Glucose Monitoring Suppl (OneTouch Verio Reflect) w/Device KIT  Self No No   Sig: May substitute brand based on insurance coverage. Check glucose ACHS.   DAPTOmycin (CUBICIN) 500 mg   Yes No   DULoxetine (CYMBALTA) 20 mg capsule  Self No No   Sig: Take 1 capsule (20 mg total) by mouth daily   Patient not taking: Reported on 5/10/2024   Insulin Glargine Solostar (Lantus SoloStar) 100 UNIT/ML SOPN  Self No No   Sig: Inject 0.45 mL (45 Units total) under the skin daily at bedtime   Patient taking differently: Inject 35 Units under the skin daily at bedtime   Insulin Pen Needle (BD Pen Needle Carisa 2nd Gen) 32G X 4 MM MISC  Self No No   Sig: For use with insulin pen. " Pharmacy may dispense brand covered by insurance.   OneTouch Delica Lancets 33G MISC  Self No No   Sig: May substitute brand based on insurance coverage. Check glucose ACHS.   SUMAtriptan (IMITREX) 100 mg tablet  Self Yes No   Sig: Take 100 mg by mouth Pt reports taking PRN   amLODIPine (NORVASC) 5 mg tablet  Self No No   Sig: Take 1 tablet (5 mg total) by mouth 2 (two) times a day   amiodarone 200 mg tablet  Self No No   Sig: Take 1 tablet (200 mg total) by mouth daily with breakfast for 4 doses   apixaban (ELIQUIS) 5 mg  Self No No   Sig: Take 1 tablet (5 mg total) by mouth 2 (two) times a day   b complex vitamins capsule  Self Yes No   Sig: Take 1 capsule by mouth daily   Patient not taking: Reported on 5/10/2024   cholecalciferol (VITAMIN D3) 250 MCG (30301 UT) capsule   Yes No   Sig: Vitamin D3   clonazePAM (KlonoPIN) 1 mg tablet   Yes No   cyclobenzaprine (FLEXERIL) 10 mg tablet  Self No No   Sig: Take 0.5 tablets (5 mg total) by mouth 3 (three) times a day as needed for muscle spasms   esomeprazole (NexIUM) 20 mg capsule  Self Yes No   Sig: Take 20 mg by mouth 2 (two) times a day before meals   glucose blood (OneTouch Verio) test strip  Self No No   Sig: May substitute brand based on insurance coverage. Check glucose ACHS.   hydrALAZINE (APRESOLINE) 25 mg tablet  Self No No   Sig: Take 1 tablet (25 mg total) by mouth every 8 (eight) hours   Patient not taking: Reported on 5/10/2024   insulin lispro (HumaLOG KwikPen) 100 units/mL injection pen  Self No No   Sig: Inject 13 Units under the skin 3 (three) times a day with meals   metoclopramide (REGLAN) 5 mg/mL  Self No No   Sig: Inject 2 mL (10 mg total) into a catheter in a vein every 6 (six) hours as needed for nausea or vomiting (second line if no relief with zofran)   Patient not taking: Reported on 5/10/2024   metoprolol succinate (TOPROL-XL) 25 mg 24 hr tablet  Self No No   Sig: Take 1 tablet (25 mg total) by mouth daily   morphine (MS CONTIN) 60 mg 12 hr  tablet   Yes No   ondansetron (ZOFRAN) 8 mg tablet  Self Yes No   Sig: TAKE 1 TABLET (8 MG) BY MOUTH 3 TIMES PER DAY AS NEEDED   polyethylene glycol (MIRALAX) 17 g packet  Self No No   Sig: Take 17 g by mouth 2 (two) times a day   risperiDONE (RisperDAL) 3 mg tablet  Self Yes No   Sig: Take 3 mg by mouth daily   sacubitril-valsartan (Entresto) 24-26 MG TABS  Self No No   Sig: Take 1 tablet by mouth 2 (two) times a day This medication until your kidney function is normal or cleared by nephrology/cardiology Do not start before April 30, 2024.   senna-docusate sodium (SENOKOT S) 8.6-50 mg per tablet  Self No No   Sig: Take 2 tablets by mouth 2 (two) times a day   Patient not taking: Reported on 5/10/2024   simvastatin (ZOCOR) 40 mg tablet  Self Yes No   Sig: Take 40 mg by mouth daily   testosterone cypionate (DEPO-TESTOSTERONE) 200 mg/mL SOLN   Yes No   Sig: use as directed INJECT 2 MLS WEEKLY   zolpidem (AMBIEN) 10 mg tablet  Self No No   Sig: Take 1 tablet (10 mg total) by mouth daily at bedtime as needed for sleep      Facility-Administered Medications: None       Past Medical History:   Diagnosis Date    Bed sore, stage 1     Diabetes mellitus (HCC)     Hypertension        Past Surgical History:   Procedure Laterality Date    IR PICC PLACEMENT SINGLE LUMEN  4/1/2024    KNEE SURGERY      MI REVJ TOTAL KNEE ARTHRP W/WO ALGRFT 1 COMPONENT Right 3/24/2024    Procedure: ARTHROPLASTY KNEE TOTAL REVISION, POLY EXCHANGE;  Surgeon: Tao Washington DO;  Location:  MAIN OR;  Service: Orthopedics    TOE AMPUTATION Left 3/28/2024    Procedure: AMPUTATION TOE 2nd;  Surgeon: Rossy Toussaint DPM;  Location:  MAIN OR;  Service: Podiatry       History reviewed. No pertinent family history.  I have reviewed and agree with the history as documented.    E-Cigarette/Vaping    E-Cigarette Use Current Some Day User      E-Cigarette/Vaping Substances    Nicotine Yes     THC Yes     CBD Yes     Flavoring Yes     Other No      Unknown No      Social History     Tobacco Use    Smoking status: Former     Current packs/day: 0.00     Types: Cigarettes     Quit date: 2011     Years since quittin.6    Smokeless tobacco: Never   Vaping Use    Vaping status: Some Days    Substances: Nicotine, THC, CBD, Flavoring   Substance Use Topics    Alcohol use: Not Currently    Drug use: Yes     Types: Marijuana       Review of Systems   Constitutional:  Negative for chills and fever.       Physical Exam  Physical Exam  Vitals reviewed.   Constitutional:       General: He is not in acute distress.  HENT:      Head: Normocephalic and atraumatic.      Nose: Nose normal.      Mouth/Throat:      Mouth: Mucous membranes are moist.   Eyes:      General: No scleral icterus.        Right eye: No discharge.         Left eye: No discharge.      Extraocular Movements: Extraocular movements intact.      Conjunctiva/sclera: Conjunctivae normal.      Pupils: Pupils are equal, round, and reactive to light.   Cardiovascular:      Rate and Rhythm: Regular rhythm. Tachycardia present.      Pulses: Normal pulses.      Heart sounds: Normal heart sounds. No murmur heard.     No friction rub. No gallop.   Pulmonary:      Effort: Pulmonary effort is normal. No respiratory distress.      Breath sounds: Normal breath sounds. No stridor. No wheezing, rhonchi or rales.   Abdominal:      General: Bowel sounds are normal. There is no distension.      Palpations: Abdomen is soft.      Tenderness: There is no abdominal tenderness.   Musculoskeletal:      Cervical back: Neck supple. No rigidity.      Comments: There is swelling to the right knee.  The knee is diffusely tender.  Pain with range of motion.  No erythema or warmth.  Neurovascular exam is normal.   Skin:     General: Skin is warm and dry.      Findings: No erythema.   Neurological:      General: No focal deficit present.      Mental Status: He is alert and oriented to person, place, and time.   Psychiatric:          Mood and Affect: Mood is anxious.         Vital Signs  ED Triage Vitals   Temperature Pulse Respirations Blood Pressure SpO2   07/09/24 1605 07/09/24 1605 07/09/24 1606 07/09/24 1605 07/09/24 1605   98.7 °F (37.1 °C) (!) 145 22 147/85 97 %      Temp Source Heart Rate Source Patient Position - Orthostatic VS BP Location FiO2 (%)   07/09/24 1605 07/09/24 1605 07/09/24 1605 07/09/24 1605 --   Temporal Monitor Sitting Right arm       Pain Score       07/09/24 1605       10 - Worst Possible Pain           Vitals:    07/09/24 1715 07/09/24 1730 07/09/24 1800 07/09/24 1830   BP: 131/89 124/84 120/76 125/90   Pulse: (!) 121 101 95 103   Patient Position - Orthostatic VS:             Visual Acuity      ED Medications  Medications   HYDROmorphone (DILAUDID) injection 1 mg (has no administration in time range)   HYDROmorphone (DILAUDID) injection 1 mg (1 mg Intravenous Given 7/9/24 1656)   LORazepam (ATIVAN) injection 1 mg (1 mg Intravenous Given 7/9/24 1747)       Diagnostic Studies  Results Reviewed       Procedure Component Value Units Date/Time    Sedimentation rate, automated [[id number]]  (Abnormal) Collected: 07/09/24 1705    Lab Status: Final result Specimen: Blood from Arm, Right Updated: 07/09/24 1823     Sed Rate 71 mm/hour     Procalcitonin [[id number]]  (Normal) Collected: 07/09/24 1705    Lab Status: Final result Specimen: Blood from Arm, Right Updated: 07/09/24 1746     Procalcitonin <0.05 ng/ml     Comprehensive metabolic panel [[id number]]  (Abnormal) Collected: 07/09/24 1705    Lab Status: Final result Specimen: Blood from Arm, Right Updated: 07/09/24 1736     Sodium 132 mmol/L      Potassium 4.1 mmol/L      Chloride 101 mmol/L      CO2 23 mmol/L      ANION GAP 8 mmol/L      BUN 13 mg/dL      Creatinine 1.05 mg/dL      Glucose 287 mg/dL      Calcium 9.3 mg/dL      Corrected Calcium 10.0 mg/dL      AST 20 U/L      ALT 55 U/L      Alkaline Phosphatase 91 U/L      Total Protein 7.3 g/dL      Albumin 3.1 g/dL       Total Bilirubin 0.26 mg/dL      eGFR 82 ml/min/1.73sq m     Narrative:      [employer] Kid[employer] guidelines for Chronic Kidney Disease (CKD):     Stage 1 with normal or high GFR (GFR > 90 mL/min/1.73 square meters)    Stage 2 Mild CKD (GFR = 60-89 mL/min/1.73 square meters)    Stage 3A Moderate CKD (GFR = 45-59 mL/min/1.73 square meters)    Stage 3B Moderate CKD (GFR = 30-44 mL/min/1.73 square meters)    Stage 4 Severe CKD (GFR = 15-29 mL/min/1.73 square meters)    Stage 5 End Stage CKD (GFR <15 mL/min/1.73 square meters)  Note: GFR calculation is accurate only with a steady state creatinine    C-reactive protein [[id number]]  (Abnormal) Collected: 07/09/24 1705    Lab Status: Final result Specimen: Blood from Arm, Right Updated: 07/09/24 1736     CRP 30.6 mg/L     Lactic acid, plasma (w/reflex if result > 2.0) [[id number]]  (Normal) Collected: 07/09/24 1705    Lab Status: Final result Specimen: Blood from Arm, Right Updated: 07/09/24 1735     LACTIC ACID 1.1 mmol/L     Narrative:      Result may be elevated if tourniquet was used during collection.    Blood culture #1 [[id number]] Collected: 07/09/24 1705    Lab Status: In process Specimen: Blood from Hand, Right Updated: 07/09/24 1715    Blood culture #2 [[id number]] Collected: 07/09/24 1705    Lab Status: In process Specimen: Blood from Arm, Right Updated: 07/09/24 1715    CBC and differential [[id number]]  (Abnormal) Collected: 07/09/24 1705    Lab Status: Final result Specimen: Blood from Arm, Right Updated: 07/09/24 1713     WBC 7.26 Thousand/uL      RBC 4.39 Million/uL      Hemoglobin 12.4 g/dL      Hematocrit 36.1 %      MCV 82 fL      MCH 28.2 pg      MCHC 34.3 g/dL      RDW 12.7 %      MPV 10.1 fL      Platelets 384 Thousands/uL      nRBC 0 /100 WBCs      Segmented % 59 %      Immature Grans % 1 %      Lymphocytes % 30 %      Monocytes % 6 %      Eosinophils Relative 3 %      Basophils Relative 1 %      Absolute Neutrophils 4.34 Thousands/µL       Absolute Immature Grans 0.05 Thousand/uL      Absolute Lymphocytes 2.21 Thousands/µL      Absolute Monocytes 0.41 Thousand/µL      Eosinophils Absolute 0.20 Thousand/µL      Basophils Absolute 0.05 Thousands/µL                    XR knee 4+ views Right injury   ED Interpretation by Messi Schwartz MD (07/09 1653)   No fracture or dislocation.                 Procedures  Procedures         ED Course                                 SBIRT 22yo+      Flowsheet Row Most Recent Value   Initial Alcohol Screen: US AUDIT-C     1. How often do you have a drink containing alcohol? 0 Filed at: 07/09/2024 1604   2. How many drinks containing alcohol do you have on a typical day you are drinking?  0 Filed at: 07/09/2024 1604   3a. Male UNDER 65: How often do you have five or more drinks on one occasion? 0 Filed at: 07/09/2024 1604   3b. FEMALE Any Age, or MALE 65+: How often do you have 4 or more drinks on one occassion? 0 Filed at: 07/09/2024 1604   Audit-C Score 0 Filed at: 07/09/2024 1604   JENAE: How many times in the past year have you...    Used an illegal drug or used a prescription medication for non-medical reasons? Never Filed at: 07/09/2024 1604                      Medical Decision Making  There is no erythema to the knee.  Patient is afebrile.  White blood cell count is normal.  Doubt septic knee at this time.  Consulted with orthopedics.  Patient did have elevations in his CRP and ESR.  Orthopedics felt patient is appropriate for discharge and follow-up with his orthopedist this week.  Patient is not septic.  There was no fracture or dislocation on imaging.    Amount and/or Complexity of Data Reviewed  Labs: ordered. Decision-making details documented in ED Course.  Radiology: ordered and independent interpretation performed. Decision-making details documented in ED Course.    Risk  Prescription drug management.  Decision regarding hospitalization.                 Disposition  Final diagnoses:   Right knee pain      Time reflects when diagnosis was documented in both MDM as applicable and the Disposition within this note       Time User Action Codes Description Comment    7/9/2024  7:22 PM Messi Schwartz Add [M25.561] Right knee pain           ED Disposition       ED Disposition   Discharge    Condition   Stable    Date/Time   Tue Jul 9, 2024 1922    Comment   Armen MARS Llanos discharge to home/self care.                   Follow-up Information       Follow up With Specialties Details Why Contact Info    Tao Washington, DO Orthopedic Surgery  This week [address] [place] [id number] [phone]              Patient's Medications   Discharge Prescriptions    No medications on file       No discharge procedures on file.    PDMP Review         Value Time User    PDMP Reviewed  Yes 4/19/2024  1:55 PM Mirna Pacheco MD            ED Provider  Electronically Signed by             Messi Schwartz MD  07/10/24 0955

## 2024-07-09 NOTE — TELEPHONE ENCOUNTER
Called by Elsy Lopez as the phone room routed the call to her. Armen was complaining of 10/10 pain in the knee with suicidal ideations. Recommended she call 911 to immediately send to his house which was initiated.     Antolin Washington DO

## 2024-07-11 ENCOUNTER — HOSPITAL ENCOUNTER (OUTPATIENT)
Facility: HOSPITAL | Age: 49
Setting detail: OBSERVATION
Discharge: HOME | End: 2024-07-12
Attending: EMERGENCY MEDICINE | Admitting: STUDENT IN AN ORGANIZED HEALTH CARE EDUCATION/TRAINING PROGRAM
Payer: COMMERCIAL

## 2024-07-11 ENCOUNTER — APPOINTMENT (OUTPATIENT)
Dept: CT IMAGING | Facility: HOSPITAL | Age: 49
End: 2024-07-11
Payer: COMMERCIAL

## 2024-07-11 DIAGNOSIS — E11.10 TYPE 2 DIABETES MELLITUS WITH KETOACIDOSIS WITHOUT COMA, WITHOUT LONG-TERM CURRENT USE OF INSULIN (HCC): ICD-10-CM

## 2024-07-11 DIAGNOSIS — M25.569 KNEE PAIN: Primary | ICD-10-CM

## 2024-07-11 PROBLEM — M25.561 PAIN AND SWELLING OF RIGHT KNEE: Status: ACTIVE | Noted: 2024-07-11

## 2024-07-11 PROBLEM — M25.461 PAIN AND SWELLING OF RIGHT KNEE: Status: ACTIVE | Noted: 2024-07-11

## 2024-07-11 LAB
ALBUMIN SERPL BCG-MCNC: 3.1 G/DL (ref 3.5–5)
ALP SERPL-CCNC: 76 U/L (ref 34–104)
ALT SERPL W P-5'-P-CCNC: 28 U/L (ref 7–52)
ANION GAP SERPL CALCULATED.3IONS-SCNC: 6 MMOL/L (ref 4–13)
APTT PPP: 33 SECONDS (ref 23–37)
AST SERPL W P-5'-P-CCNC: 15 U/L (ref 13–39)
BASOPHILS # BLD AUTO: 0.06 THOUSANDS/ÂΜL (ref 0–0.1)
BASOPHILS NFR BLD AUTO: 1 % (ref 0–1)
BILIRUB SERPL-MCNC: 0.21 MG/DL (ref 0.2–1)
BUN SERPL-MCNC: 17 MG/DL (ref 5–25)
CALCIUM ALBUM COR SERPL-MCNC: 10 MG/DL (ref 8.3–10.1)
CALCIUM SERPL-MCNC: 9.3 MG/DL (ref 8.4–10.2)
CARDIAC TROPONIN I PNL SERPL HS: 6 NG/L
CHLORIDE SERPL-SCNC: 105 MMOL/L (ref 96–108)
CO2 SERPL-SCNC: 24 MMOL/L (ref 21–32)
CREAT SERPL-MCNC: 0.97 MG/DL (ref 0.6–1.3)
CRP SERPL QL: 13.2 MG/L
EOSINOPHIL # BLD AUTO: 0.1 THOUSAND/ÂΜL (ref 0–0.61)
EOSINOPHIL NFR BLD AUTO: 1 % (ref 0–6)
ERYTHROCYTE [DISTWIDTH] IN BLOOD BY AUTOMATED COUNT: 12.7 % (ref 11.6–15.1)
ERYTHROCYTE [SEDIMENTATION RATE] IN BLOOD: 68 MM/HOUR (ref 0–19)
GFR SERPL CREATININE-BSD FRML MDRD: 90 ML/MIN/1.73SQ M
GLUCOSE SERPL-MCNC: 108 MG/DL (ref 65–140)
GLUCOSE SERPL-MCNC: 164 MG/DL (ref 65–140)
HCT VFR BLD AUTO: 34.4 % (ref 36.5–49.3)
HGB BLD-MCNC: 11.8 G/DL (ref 12–17)
IMM GRANULOCYTES # BLD AUTO: 0.05 THOUSAND/UL (ref 0–0.2)
IMM GRANULOCYTES NFR BLD AUTO: 1 % (ref 0–2)
INR PPP: 0.92 (ref 0.84–1.19)
LACTATE SERPL-SCNC: 1.5 MMOL/L (ref 0.5–2)
LYMPHOCYTES # BLD AUTO: 1.4 THOUSANDS/ÂΜL (ref 0.6–4.47)
LYMPHOCYTES NFR BLD AUTO: 13 % (ref 14–44)
MCH RBC QN AUTO: 27.9 PG (ref 26.8–34.3)
MCHC RBC AUTO-ENTMCNC: 34.3 G/DL (ref 31.4–37.4)
MCV RBC AUTO: 81 FL (ref 82–98)
MONOCYTES # BLD AUTO: 0.45 THOUSAND/ÂΜL (ref 0.17–1.22)
MONOCYTES NFR BLD AUTO: 4 % (ref 4–12)
NEUTROPHILS # BLD AUTO: 8.68 THOUSANDS/ÂΜL (ref 1.85–7.62)
NEUTS SEG NFR BLD AUTO: 80 % (ref 43–75)
NRBC BLD AUTO-RTO: 0 /100 WBCS
PLATELET # BLD AUTO: 345 THOUSANDS/UL (ref 149–390)
PMV BLD AUTO: 10.1 FL (ref 8.9–12.7)
POTASSIUM SERPL-SCNC: 4.2 MMOL/L (ref 3.5–5.3)
PROCALCITONIN SERPL-MCNC: <0.05 NG/ML
PROT SERPL-MCNC: 7 G/DL (ref 6.4–8.4)
PROTHROMBIN TIME: 12.8 SECONDS (ref 11.6–14.5)
RBC # BLD AUTO: 4.23 MILLION/UL (ref 3.88–5.62)
SODIUM SERPL-SCNC: 135 MMOL/L (ref 135–147)
WBC # BLD AUTO: 10.74 THOUSAND/UL (ref 4.31–10.16)

## 2024-07-11 PROCEDURE — 99283 EMERGENCY DEPT VISIT LOW MDM: CPT

## 2024-07-11 PROCEDURE — 83036 HEMOGLOBIN GLYCOSYLATED A1C: CPT | Performed by: STUDENT IN AN ORGANIZED HEALTH CARE EDUCATION/TRAINING PROGRAM

## 2024-07-11 PROCEDURE — 87040 BLOOD CULTURE FOR BACTERIA: CPT | Performed by: EMERGENCY MEDICINE

## 2024-07-11 PROCEDURE — 85730 THROMBOPLASTIN TIME PARTIAL: CPT | Performed by: EMERGENCY MEDICINE

## 2024-07-11 PROCEDURE — 93005 ELECTROCARDIOGRAM TRACING: CPT

## 2024-07-11 PROCEDURE — 99285 EMERGENCY DEPT VISIT HI MDM: CPT | Performed by: EMERGENCY MEDICINE

## 2024-07-11 PROCEDURE — 96374 THER/PROPH/DIAG INJ IV PUSH: CPT

## 2024-07-11 PROCEDURE — 85025 COMPLETE CBC W/AUTO DIFF WBC: CPT | Performed by: EMERGENCY MEDICINE

## 2024-07-11 PROCEDURE — 84145 PROCALCITONIN (PCT): CPT | Performed by: EMERGENCY MEDICINE

## 2024-07-11 PROCEDURE — 99223 1ST HOSP IP/OBS HIGH 75: CPT | Performed by: PHYSICIAN ASSISTANT

## 2024-07-11 PROCEDURE — 83605 ASSAY OF LACTIC ACID: CPT | Performed by: EMERGENCY MEDICINE

## 2024-07-11 PROCEDURE — 73700 CT LOWER EXTREMITY W/O DYE: CPT

## 2024-07-11 PROCEDURE — 84484 ASSAY OF TROPONIN QUANT: CPT | Performed by: EMERGENCY MEDICINE

## 2024-07-11 PROCEDURE — 82948 REAGENT STRIP/BLOOD GLUCOSE: CPT

## 2024-07-11 PROCEDURE — 86140 C-REACTIVE PROTEIN: CPT | Performed by: EMERGENCY MEDICINE

## 2024-07-11 PROCEDURE — 85610 PROTHROMBIN TIME: CPT | Performed by: EMERGENCY MEDICINE

## 2024-07-11 PROCEDURE — 85652 RBC SED RATE AUTOMATED: CPT | Performed by: EMERGENCY MEDICINE

## 2024-07-11 PROCEDURE — 96375 TX/PRO/DX INJ NEW DRUG ADDON: CPT

## 2024-07-11 PROCEDURE — 80053 COMPREHEN METABOLIC PANEL: CPT | Performed by: EMERGENCY MEDICINE

## 2024-07-11 PROCEDURE — 36415 COLL VENOUS BLD VENIPUNCTURE: CPT | Performed by: EMERGENCY MEDICINE

## 2024-07-11 RX ORDER — OXYMETAZOLINE HYDROCHLORIDE 0.05 G/100ML
2 SPRAY NASAL EVERY 12 HOURS PRN
Status: DISCONTINUED | OUTPATIENT
Start: 2024-07-11 | End: 2024-07-12 | Stop reason: HOSPADM

## 2024-07-11 RX ORDER — INSULIN LISPRO 100 [IU]/ML
13 INJECTION, SOLUTION INTRAVENOUS; SUBCUTANEOUS
Status: DISCONTINUED | OUTPATIENT
Start: 2024-07-12 | End: 2024-07-12 | Stop reason: HOSPADM

## 2024-07-11 RX ORDER — HYDROMORPHONE HCL/PF 1 MG/ML
1 SYRINGE (ML) INJECTION ONCE
Status: COMPLETED | OUTPATIENT
Start: 2024-07-11 | End: 2024-07-11

## 2024-07-11 RX ORDER — PANTOPRAZOLE SODIUM 20 MG/1
20 TABLET, DELAYED RELEASE ORAL
Status: DISCONTINUED | OUTPATIENT
Start: 2024-07-12 | End: 2024-07-12 | Stop reason: HOSPADM

## 2024-07-11 RX ORDER — ZOLPIDEM TARTRATE 5 MG/1
10 TABLET ORAL
Status: DISCONTINUED | OUTPATIENT
Start: 2024-07-11 | End: 2024-07-12 | Stop reason: HOSPADM

## 2024-07-11 RX ORDER — SUMATRIPTAN 50 MG/1
100 TABLET, FILM COATED ORAL ONCE AS NEEDED
Status: DISCONTINUED | OUTPATIENT
Start: 2024-07-11 | End: 2024-07-12 | Stop reason: HOSPADM

## 2024-07-11 RX ORDER — LORAZEPAM 2 MG/ML
1 INJECTION INTRAMUSCULAR EVERY 6 HOURS PRN
Status: DISCONTINUED | OUTPATIENT
Start: 2024-07-11 | End: 2024-07-12 | Stop reason: HOSPADM

## 2024-07-11 RX ORDER — AMLODIPINE BESYLATE 5 MG/1
5 TABLET ORAL 2 TIMES DAILY
Status: DISCONTINUED | OUTPATIENT
Start: 2024-07-12 | End: 2024-07-12

## 2024-07-11 RX ORDER — DOXYCYCLINE HYCLATE 100 MG/1
100 TABLET, DELAYED RELEASE ORAL 2 TIMES DAILY
COMMUNITY

## 2024-07-11 RX ORDER — ONDANSETRON 2 MG/ML
4 INJECTION INTRAMUSCULAR; INTRAVENOUS EVERY 6 HOURS PRN
Status: DISCONTINUED | OUTPATIENT
Start: 2024-07-11 | End: 2024-07-12 | Stop reason: HOSPADM

## 2024-07-11 RX ORDER — PRAVASTATIN SODIUM 80 MG/1
80 TABLET ORAL
Status: DISCONTINUED | OUTPATIENT
Start: 2024-07-12 | End: 2024-07-12 | Stop reason: HOSPADM

## 2024-07-11 RX ORDER — DOCUSATE SODIUM 250 MG
750 CAPSULE ORAL DAILY
COMMUNITY
End: 2024-07-12

## 2024-07-11 RX ORDER — INSULIN LISPRO 100 [IU]/ML
1-6 INJECTION, SOLUTION INTRAVENOUS; SUBCUTANEOUS
Status: DISCONTINUED | OUTPATIENT
Start: 2024-07-11 | End: 2024-07-11

## 2024-07-11 RX ORDER — AMIODARONE HYDROCHLORIDE 200 MG/1
200 TABLET ORAL
Status: DISCONTINUED | OUTPATIENT
Start: 2024-07-12 | End: 2024-07-11

## 2024-07-11 RX ORDER — DOXYCYCLINE HYCLATE 100 MG/1
100 CAPSULE ORAL EVERY 12 HOURS SCHEDULED
Status: DISCONTINUED | OUTPATIENT
Start: 2024-07-11 | End: 2024-07-12 | Stop reason: HOSPADM

## 2024-07-11 RX ORDER — ACETAMINOPHEN 325 MG/1
650 TABLET ORAL EVERY 6 HOURS PRN
Status: DISCONTINUED | OUTPATIENT
Start: 2024-07-11 | End: 2024-07-12 | Stop reason: HOSPADM

## 2024-07-11 RX ORDER — INSULIN LISPRO 100 [IU]/ML
1-6 INJECTION, SOLUTION INTRAVENOUS; SUBCUTANEOUS
Status: DISCONTINUED | OUTPATIENT
Start: 2024-07-12 | End: 2024-07-11

## 2024-07-11 RX ORDER — HYDROMORPHONE HCL/PF 1 MG/ML
1 SYRINGE (ML) INJECTION EVERY 4 HOURS PRN
Status: DISCONTINUED | OUTPATIENT
Start: 2024-07-11 | End: 2024-07-12 | Stop reason: HOSPADM

## 2024-07-11 RX ORDER — DIPHENOXYLATE HYDROCHLORIDE AND ATROPINE SULFATE 2.5; .025 MG/1; MG/1
1 TABLET ORAL DAILY
COMMUNITY

## 2024-07-11 RX ORDER — INSULIN GLARGINE 100 [IU]/ML
35 INJECTION, SOLUTION SUBCUTANEOUS
Status: DISCONTINUED | OUTPATIENT
Start: 2024-07-11 | End: 2024-07-12 | Stop reason: HOSPADM

## 2024-07-11 RX ORDER — CYCLOBENZAPRINE HCL 5 MG
5 TABLET ORAL 3 TIMES DAILY PRN
Status: DISCONTINUED | OUTPATIENT
Start: 2024-07-11 | End: 2024-07-12 | Stop reason: HOSPADM

## 2024-07-11 RX ORDER — LORAZEPAM 2 MG/ML
1 INJECTION INTRAMUSCULAR ONCE
Status: COMPLETED | OUTPATIENT
Start: 2024-07-11 | End: 2024-07-11

## 2024-07-11 RX ORDER — METOPROLOL SUCCINATE 25 MG/1
25 TABLET, EXTENDED RELEASE ORAL DAILY
Status: DISCONTINUED | OUTPATIENT
Start: 2024-07-12 | End: 2024-07-12 | Stop reason: HOSPADM

## 2024-07-11 RX ORDER — MORPHINE SULFATE 30 MG/1
60 TABLET, FILM COATED, EXTENDED RELEASE ORAL EVERY 12 HOURS SCHEDULED
Status: DISCONTINUED | OUTPATIENT
Start: 2024-07-11 | End: 2024-07-12 | Stop reason: HOSPADM

## 2024-07-11 RX ADMIN — HYDROMORPHONE HYDROCHLORIDE 1 MG: 1 INJECTION, SOLUTION INTRAMUSCULAR; INTRAVENOUS; SUBCUTANEOUS at 18:55

## 2024-07-11 RX ADMIN — DOXYCYCLINE 100 MG: 100 CAPSULE ORAL at 21:56

## 2024-07-11 RX ADMIN — OXYMETAZOLINE HCL 2 SPRAY: 0.05 SPRAY NASAL at 22:53

## 2024-07-11 RX ADMIN — LORAZEPAM 1 MG: 2 INJECTION INTRAMUSCULAR; INTRAVENOUS at 17:51

## 2024-07-11 RX ADMIN — MORPHINE SULFATE 60 MG: 30 TABLET, EXTENDED RELEASE ORAL at 21:56

## 2024-07-11 RX ADMIN — HYDROMORPHONE HYDROCHLORIDE 1 MG: 1 INJECTION, SOLUTION INTRAMUSCULAR; INTRAVENOUS; SUBCUTANEOUS at 17:52

## 2024-07-11 RX ADMIN — HYDROMORPHONE HYDROCHLORIDE 1 MG: 1 INJECTION, SOLUTION INTRAMUSCULAR; INTRAVENOUS; SUBCUTANEOUS at 23:29

## 2024-07-11 RX ADMIN — INSULIN GLARGINE 35 UNITS: 100 INJECTION, SOLUTION SUBCUTANEOUS at 22:02

## 2024-07-11 NOTE — ASSESSMENT & PLAN NOTE
PDMP reviewed  Continue home morphine 60 mg twice daily  Dilaudid as needed for for breakthrough while hospitalized

## 2024-07-11 NOTE — H&P
Counts include 234 beds at the [facility]  H&P  Name: Armen Llanos 49 y.o. male I MRN: [id number]  Unit/Bed#: OVR 02 I Date of Admission: 7/11/2024   Date of Service: 7/11/2024 I Hospital Day: 0      Assessment & Plan   No new Assessment & Plan notes have been filed under this hospital service since the last note was generated.  Service: Hospitalist       VTE Pharmacologic Prophylaxis: VTE Score: 1 Low Risk (Score 0-2) - Encourage Ambulation.  Code Status: full code     Anticipated Length of Stay: Patient will be admitted on an observation basis with an anticipated length of stay of less than 2 midnights secondary to R knee pain r/o septic joint.    Total Time for Visit, including Counseling / Coordination of Care: 75 Minutes. Greater than 50% of this total time spent on direct patient counseling and coordination of care.    Chief Complaint: R knee swelling    History of Present Illness:  Armen Llanos is a 49 y.o. male with history of right knee septic joint, MSSA bacteremia, type 2 diabetes presenting with chief complaint of right knee swelling over the past few days.  Was recently in the hospital for right knee pain, was discharged home with analgesia.  Was called back into the ER with concern for gas gangrene of his right knee.  Case was discussed with orthopedic surgery, recommending admission and orthopedic evaluation with pain control.  He has been maintained on his doxycycline 100 mg twice daily and reports compliance.    Review of Systems:  Review of Systems   Constitutional:  Negative for activity change, appetite change, chills, fatigue and fever.   HENT:  Negative for congestion, rhinorrhea, sinus pressure and sore throat.    Eyes:  Negative for photophobia, pain and visual disturbance.   Respiratory:  Negative for cough, shortness of breath and wheezing.    Cardiovascular:  Negative for chest pain, palpitations and leg swelling.   Gastrointestinal:  Negative for abdominal distention, abdominal  pain, constipation, diarrhea, nausea and vomiting.   Endocrine: Negative for cold intolerance, heat intolerance, polydipsia and polyuria.   Genitourinary:  Negative for difficulty urinating, dysuria, flank pain, frequency and hematuria.   Musculoskeletal:  Positive for arthralgias. Negative for back pain and joint swelling.   Skin:  Negative for color change, pallor and rash.   Allergic/Immunologic: Negative.    Neurological:  Negative for dizziness, syncope, weakness, light-headedness and headaches.   Hematological: Negative.    Psychiatric/Behavioral: Negative.         Past Medical and Surgical History:   Past Medical History:   Diagnosis Date    Bed sore, stage 1     Diabetes mellitus (HCC)     Hypertension        Past Surgical History:   Procedure Laterality Date    IR PICC PLACEMENT SINGLE LUMEN  4/1/2024    KNEE SURGERY      TX REVJ TOTAL KNEE ARTHRP W/WO ALGRFT 1 COMPONENT Right 3/24/2024    Procedure: ARTHROPLASTY KNEE TOTAL REVISION, POLY EXCHANGE;  Surgeon: Tao Washington DO;  Location:  MAIN OR;  Service: Orthopedics    TOE AMPUTATION Left 3/28/2024    Procedure: AMPUTATION TOE 2nd;  Surgeon: Rossy Toussaint DPM;  Location:  MAIN OR;  Service: Podiatry       Meds/Allergies:  Prior to Admission medications    Medication Sig Start Date End Date Taking? Authorizing Provider   Alcohol Swabs 70 % PADS May substitute brand based on insurance coverage. Check glucose ACHS. 4/1/24   Uday Jean MD   amiodarone 200 mg tablet Take 1 tablet (200 mg total) by mouth daily with breakfast for 4 doses 4/20/24 5/10/24  Mirna Pacheco MD   amLODIPine (NORVASC) 5 mg tablet Take 1 tablet (5 mg total) by mouth 2 (two) times a day 4/19/24 5/19/24  Mirna Pacheco MD   apixaban (ELIQUIS) 5 mg Take 1 tablet (5 mg total) by mouth 2 (two) times a day 4/1/24   Uday Jean MD   Blood Glucose Monitoring Suppl (OneTouch Verio Reflect) w/Device KIT May substitute brand based on insurance coverage. Check glucose ACHS.  4/1/24   Uday Jean MD   cholecalciferol (VITAMIN D3) 250 MCG (55617 UT) capsule Vitamin D3    Historical Provider, MD   clonazePAM (KlonoPIN) 1 mg tablet  4/24/24   Historical Provider, MD   cyclobenzaprine (FLEXERIL) 10 mg tablet Take 0.5 tablets (5 mg total) by mouth 3 (three) times a day as needed for muscle spasms 4/19/24   Mirna Pacheco MD   esomeprazole (NexIUM) 20 mg capsule Take 20 mg by mouth 2 (two) times a day before meals    Historical Provider, MD   glucose blood (OneTouch Verio) test strip May substitute brand based on insurance coverage. Check glucose ACHS. 4/1/24   Uday Jean MD   Insulin Glargine Solostar (Lantus SoloStar) 100 UNIT/ML SOPN Inject 0.45 mL (45 Units total) under the skin daily at bedtime  Patient taking differently: Inject 35 Units under the skin daily at bedtime 4/1/24   Uday Jean MD   insulin lispro (HumaLOG KwikPen) 100 units/mL injection pen Inject 13 Units under the skin 3 (three) times a day with meals 4/1/24   Uday Jean MD   Insulin Pen Needle (BD Pen Needle Carisa 2nd Gen) 32G X 4 MM MISC For use with insulin pen. Pharmacy may dispense brand covered by insurance. 4/1/24   Uday Jean MD   metoprolol succinate (TOPROL-XL) 25 mg 24 hr tablet Take 1 tablet (25 mg total) by mouth daily 4/5/24   Arturo Ferro PA-C   morphine (MS CONTIN) 60 mg 12 hr tablet  4/22/24   Historical Provider, MD   ondansetron (ZOFRAN) 8 mg tablet TAKE 1 TABLET (8 MG) BY MOUTH 3 TIMES PER DAY AS NEEDED 4/11/23   Historical Provider, MD Bermeo Delica Lancets 33G MISC May substitute brand based on insurance coverage. Check glucose ACHS. 4/1/24   Uday Jean MD   polyethylene glycol (MIRALAX) 17 g packet Take 17 g by mouth 2 (two) times a day 4/19/24   Mirna Pacheco MD   risperiDONE (RisperDAL) 3 mg tablet Take 3 mg by mouth daily    Historical Provider, MD   sacubitril-valsartan (Entresto) 24-26 MG TABS Take 1 tablet by mouth 2 (two) times a day This medication until your  kidney function is normal or cleared by nephrology/cardiology Do not start before April 30, 2024. 4/30/24   Mirna Pacheco MD   senna-docusate sodium (SENOKOT S) 8.6-50 mg per tablet Take 2 tablets by mouth 2 (two) times a day  Patient not taking: Reported on 5/10/2024 4/19/24   Mirna Pacheco MD   simvastatin (ZOCOR) 40 mg tablet Take 40 mg by mouth daily    Historical Provider, MD   SUMAtriptan (IMITREX) 100 mg tablet Take 100 mg by mouth Pt reports taking PRN    Historical Provider, MD   testosterone cypionate (DEPO-TESTOSTERONE) 200 mg/mL SOLN use as directed INJECT 2 MLS WEEKLY 2/26/24   Historical Provider, MD   zolpidem (AMBIEN) 10 mg tablet Take 1 tablet (10 mg total) by mouth daily at bedtime as needed for sleep 4/19/24   Mirna Pacheco MD   b complex vitamins capsule Take 1 capsule by mouth daily  Patient not taking: Reported on 5/10/2024  7/11/24  Historical Provider, MD   DAPTOmycin (CUBICIN) 500 mg  5/16/24 7/11/24  Historical Provider, MD   DULoxetine (CYMBALTA) 20 mg capsule Take 1 capsule (20 mg total) by mouth daily  Patient not taking: Reported on 5/10/2024 4/4/24 7/11/24  Elsy Herrera PA-C   hydrALAZINE (APRESOLINE) 25 mg tablet Take 1 tablet (25 mg total) by mouth every 8 (eight) hours  Patient not taking: Reported on 5/10/2024 4/19/24 7/11/24  Mirna Pacheco MD   metoclopramide (REGLAN) 5 mg/mL Inject 2 mL (10 mg total) into a catheter in a vein every 6 (six) hours as needed for nausea or vomiting (second line if no relief with zofran)  Patient not taking: Reported on 5/10/2024 4/19/24 7/11/24  Mirna Pacheco MD       All medications reviewed.    Allergies:   Allergies   Allergen Reactions    Cephalosporins Hives    Penicillins Hives       Social History:  Marital Status: /Civil Union     Substance Use History:   Social History     Substance and Sexual Activity   Alcohol Use Not Currently     Social History     Tobacco Use   Smoking Status Former    Current packs/day:  "0.00    Types: Cigarettes    Quit date: 2011    Years since quittin.6   Smokeless Tobacco Never     Social History     Substance and Sexual Activity   Drug Use Yes    Types: Marijuana       Family History:  Non-contributory    Physical Exam:     Vitals:   Blood Pressure: 145/95 (24)  Pulse: 97 (24)  Temperature: 97.9 °F (36.6 °C) (24)  Temp Source: Oral (24)  Respirations: 20 (24)  Height: 6' 6\" (198.1 cm) (24)  Weight - Scale: 91.6 kg (202 lb) (24)  SpO2: 98 % (24)    Physical Exam  Vitals and nursing note reviewed.   Constitutional:       General: He is not in acute distress.     Appearance: Normal appearance.   HENT:      Head: Normocephalic and atraumatic.      Mouth/Throat:      Mouth: Mucous membranes are moist.   Eyes:      General: No scleral icterus.     Extraocular Movements: Extraocular movements intact.      Pupils: Pupils are equal, round, and reactive to light.   Cardiovascular:      Rate and Rhythm: Normal rate and regular rhythm.      Heart sounds: Normal heart sounds. No murmur heard.     No friction rub.   Pulmonary:      Effort: Pulmonary effort is normal.      Breath sounds: Normal breath sounds. No wheezing or rhonchi.   Abdominal:      General: Bowel sounds are normal. There is no distension.      Palpations: Abdomen is soft.      Tenderness: There is no abdominal tenderness. There is no guarding.   Musculoskeletal:         General: Swelling (R knee) and tenderness (R knee) present.      Cervical back: Neck supple.      Right lower leg: No edema.      Left lower leg: No edema.   Skin:     General: Skin is warm and dry.      Capillary Refill: Capillary refill takes less than 2 seconds.      Coloration: Skin is not jaundiced or pale.   Neurological:      General: No focal deficit present.      Mental Status: He is alert and oriented to person, place, and time. Mental status is at baseline.      "     Additional Data:     Lab Results:  Results from last 7 days   Lab Units 07/11/24  1726   WBC Thousand/uL 10.74*   HEMOGLOBIN g/dL 11.8*   HEMATOCRIT % 34.4*   PLATELETS Thousands/uL 345   SEGS PCT % 80*   LYMPHO PCT % 13*   MONO PCT % 4   EOS PCT % 1     Results from last 7 days   Lab Units 07/11/24  1726   SODIUM mmol/L 135   POTASSIUM mmol/L 4.2   CHLORIDE mmol/L 105   CO2 mmol/L 24   BUN mg/dL 17   CREATININE mg/dL 0.97   ANION GAP mmol/L 6   CALCIUM mg/dL 9.3   ALBUMIN g/dL 3.1*   TOTAL BILIRUBIN mg/dL 0.21   ALK PHOS U/L 76   ALT U/L 28   AST U/L 15   GLUCOSE RANDOM mg/dL 108     Results from last 7 days   Lab Units 07/11/24  1726   INR  0.92             Results from last 7 days   Lab Units 07/11/24  1726 07/09/24  1705   LACTIC ACID mmol/L 1.5 1.1   PROCALCITONIN ng/ml <0.05 <0.05       Imaging: All pertinent imaging reviewed.  CT lower extremity wo contrast right    (Results Pending)       EKG and Other Studies Reviewed on Admission:   Prior pertinent studies and records reviewed in [facility] Everywhere.    ** Please Note: This note has been constructed using a voice recognition system. **

## 2024-07-11 NOTE — ASSESSMENT & PLAN NOTE
"Lab Results   Component Value Date    HGBA1C 12.1 (H) 03/22/2024       No results for input(s): \"POCGLU\" in the last 72 hours.    Blood Sugar Average: Last 72 hrs:  Poorly controlled with A1c of 12.1  Resume home Lantus 35 units nightly  Resume home Humalog 13 units 3 times daily with meals    "

## 2024-07-11 NOTE — ED NOTES
This RN introduced self to pt and pt visitor who is present at bedside.        Lisa Peck RN  07/11/24 9900         Lisa Peck RN  07/11/24 9549

## 2024-07-11 NOTE — ASSESSMENT & PLAN NOTE
With acute pain and swelling of his right knee.  History of septic arthritis in his right knee with MSSA bacteremia.  Completed antibiotic therapy with Cubicin and is now maintained on doxycycline 100 mg twice daily for prophylaxis  Case was discussed with on-call orthopedic surgery, recommending admission for observation.  CRP is downtrending.  No white blood cell count or fevers.  Obtain CT of right knee  Continue home doxycycline therapy  Inpatient orthopedic consultation  Pain control ordered

## 2024-07-11 NOTE — ASSESSMENT & PLAN NOTE
A-fib with RVR in the ER, heart rates are in the 110s presently  Resume home amiodarone and metoprolol  Continue anticoagulation with Eliquis

## 2024-07-12 VITALS
RESPIRATION RATE: 16 BRPM | OXYGEN SATURATION: 97 % | TEMPERATURE: 97.7 F | HEIGHT: 78 IN | WEIGHT: 199.7 LBS | BODY MASS INDEX: 23.1 KG/M2 | DIASTOLIC BLOOD PRESSURE: 81 MMHG | HEART RATE: 83 BPM | SYSTOLIC BLOOD PRESSURE: 128 MMHG

## 2024-07-12 LAB
ALBUMIN SERPL BCG-MCNC: 2.7 G/DL (ref 3.5–5)
ALP SERPL-CCNC: 63 U/L (ref 34–104)
ALT SERPL W P-5'-P-CCNC: 22 U/L (ref 7–52)
ANION GAP SERPL CALCULATED.3IONS-SCNC: 5 MMOL/L (ref 4–13)
AST SERPL W P-5'-P-CCNC: 11 U/L (ref 13–39)
ATRIAL RATE: 142 BPM
BASOPHILS # BLD AUTO: 0.03 THOUSANDS/ÂΜL (ref 0–0.1)
BASOPHILS NFR BLD AUTO: 0 % (ref 0–1)
BILIRUB SERPL-MCNC: 0.24 MG/DL (ref 0.2–1)
BUN SERPL-MCNC: 17 MG/DL (ref 5–25)
CALCIUM ALBUM COR SERPL-MCNC: 9.6 MG/DL (ref 8.3–10.1)
CALCIUM SERPL-MCNC: 8.6 MG/DL (ref 8.4–10.2)
CHLORIDE SERPL-SCNC: 103 MMOL/L (ref 96–108)
CO2 SERPL-SCNC: 23 MMOL/L (ref 21–32)
CREAT SERPL-MCNC: 0.95 MG/DL (ref 0.6–1.3)
EOSINOPHIL # BLD AUTO: 0.07 THOUSAND/ÂΜL (ref 0–0.61)
EOSINOPHIL NFR BLD AUTO: 1 % (ref 0–6)
ERYTHROCYTE [DISTWIDTH] IN BLOOD BY AUTOMATED COUNT: 12.7 % (ref 11.6–15.1)
EST. AVERAGE GLUCOSE BLD GHB EST-MCNC: 157 MG/DL
GFR SERPL CREATININE-BSD FRML MDRD: 92 ML/MIN/1.73SQ M
GLUCOSE SERPL-MCNC: 121 MG/DL (ref 65–140)
GLUCOSE SERPL-MCNC: 252 MG/DL (ref 65–140)
GLUCOSE SERPL-MCNC: 71 MG/DL (ref 65–140)
GLUCOSE SERPL-MCNC: 79 MG/DL (ref 65–140)
HBA1C MFR BLD: 7.1 %
HCT VFR BLD AUTO: 29 % (ref 36.5–49.3)
HGB BLD-MCNC: 10 G/DL (ref 12–17)
IMM GRANULOCYTES # BLD AUTO: 0.02 THOUSAND/UL (ref 0–0.2)
IMM GRANULOCYTES NFR BLD AUTO: 0 % (ref 0–2)
LYMPHOCYTES # BLD AUTO: 2.24 THOUSANDS/ÂΜL (ref 0.6–4.47)
LYMPHOCYTES NFR BLD AUTO: 30 % (ref 14–44)
MCH RBC QN AUTO: 28.3 PG (ref 26.8–34.3)
MCHC RBC AUTO-ENTMCNC: 34.5 G/DL (ref 31.4–37.4)
MCV RBC AUTO: 82 FL (ref 82–98)
MONOCYTES # BLD AUTO: 0.46 THOUSAND/ÂΜL (ref 0.17–1.22)
MONOCYTES NFR BLD AUTO: 6 % (ref 4–12)
NEUTROPHILS # BLD AUTO: 4.68 THOUSANDS/ÂΜL (ref 1.85–7.62)
NEUTS SEG NFR BLD AUTO: 63 % (ref 43–75)
NRBC BLD AUTO-RTO: 0 /100 WBCS
P AXIS: 85 DEGREES
PLATELET # BLD AUTO: 336 THOUSANDS/UL (ref 149–390)
PMV BLD AUTO: 10.4 FL (ref 8.9–12.7)
POTASSIUM SERPL-SCNC: 3.9 MMOL/L (ref 3.5–5.3)
PR INTERVAL: 136 MS
PROCALCITONIN SERPL-MCNC: 0.06 NG/ML
PROT SERPL-MCNC: 5.9 G/DL (ref 6.4–8.4)
QRS AXIS: 43 DEGREES
QRSD INTERVAL: 74 MS
QT INTERVAL: 282 MS
QTC INTERVAL: 433 MS
RBC # BLD AUTO: 3.53 MILLION/UL (ref 3.88–5.62)
SODIUM SERPL-SCNC: 131 MMOL/L (ref 135–147)
T WAVE AXIS: 27 DEGREES
VENTRICULAR RATE: 142 BPM
WBC # BLD AUTO: 7.5 THOUSAND/UL (ref 4.31–10.16)

## 2024-07-12 PROCEDURE — 84145 PROCALCITONIN (PCT): CPT | Performed by: STUDENT IN AN ORGANIZED HEALTH CARE EDUCATION/TRAINING PROGRAM

## 2024-07-12 PROCEDURE — 80053 COMPREHEN METABOLIC PANEL: CPT | Performed by: STUDENT IN AN ORGANIZED HEALTH CARE EDUCATION/TRAINING PROGRAM

## 2024-07-12 PROCEDURE — 82948 REAGENT STRIP/BLOOD GLUCOSE: CPT

## 2024-07-12 PROCEDURE — 85025 COMPLETE CBC W/AUTO DIFF WBC: CPT | Performed by: STUDENT IN AN ORGANIZED HEALTH CARE EDUCATION/TRAINING PROGRAM

## 2024-07-12 PROCEDURE — 93010 ELECTROCARDIOGRAM REPORT: CPT | Performed by: INTERNAL MEDICINE

## 2024-07-12 PROCEDURE — 99239 HOSP IP/OBS DSCHRG MGMT >30: CPT | Performed by: INTERNAL MEDICINE

## 2024-07-12 PROCEDURE — 99214 OFFICE O/P EST MOD 30 MIN: CPT | Performed by: STUDENT IN AN ORGANIZED HEALTH CARE EDUCATION/TRAINING PROGRAM

## 2024-07-12 RX ORDER — INSULIN GLARGINE 100 [IU]/ML
40 INJECTION, SOLUTION SUBCUTANEOUS
Qty: 12 ML | Refills: 0 | Status: SHIPPED | OUTPATIENT
Start: 2024-07-12 | End: 2024-08-11

## 2024-07-12 RX ORDER — AMLODIPINE BESYLATE 5 MG/1
5 TABLET ORAL 2 TIMES DAILY
Status: DISCONTINUED | OUTPATIENT
Start: 2024-07-12 | End: 2024-07-12 | Stop reason: HOSPADM

## 2024-07-12 RX ADMIN — METOPROLOL SUCCINATE 25 MG: 25 TABLET, EXTENDED RELEASE ORAL at 09:20

## 2024-07-12 RX ADMIN — INSULIN LISPRO 13 UNITS: 100 INJECTION, SOLUTION INTRAVENOUS; SUBCUTANEOUS at 09:30

## 2024-07-12 RX ADMIN — PANTOPRAZOLE SODIUM 20 MG: 20 TABLET, DELAYED RELEASE ORAL at 05:37

## 2024-07-12 RX ADMIN — MORPHINE SULFATE 60 MG: 30 TABLET, EXTENDED RELEASE ORAL at 09:20

## 2024-07-12 RX ADMIN — HYDROMORPHONE HYDROCHLORIDE 1 MG: 1 INJECTION, SOLUTION INTRAMUSCULAR; INTRAVENOUS; SUBCUTANEOUS at 11:49

## 2024-07-12 RX ADMIN — ONDANSETRON 4 MG: 2 INJECTION INTRAMUSCULAR; INTRAVENOUS at 09:22

## 2024-07-12 RX ADMIN — Medication 16 G: at 08:19

## 2024-07-12 RX ADMIN — RISPERIDONE 3 MG: 2 TABLET, FILM COATED ORAL at 09:28

## 2024-07-12 RX ADMIN — SACUBITRIL AND VALSARTAN 1 TABLET: 24; 26 TABLET, FILM COATED ORAL at 09:21

## 2024-07-12 RX ADMIN — ZOLPIDEM TARTRATE 10 MG: 5 TABLET ORAL at 01:18

## 2024-07-12 RX ADMIN — DOXYCYCLINE 100 MG: 100 CAPSULE ORAL at 09:21

## 2024-07-12 RX ADMIN — HYDROMORPHONE HYDROCHLORIDE 1 MG: 1 INJECTION, SOLUTION INTRAMUSCULAR; INTRAVENOUS; SUBCUTANEOUS at 05:37

## 2024-07-12 RX ADMIN — AMLODIPINE BESYLATE 5 MG: 5 TABLET ORAL at 09:20

## 2024-07-12 NOTE — CONSULTS
Consultation - Orthopedics   Armen Llanos 49 y.o. male MRN: [id number]  Unit/Bed#: -01 Encounter: [id number]      Assessment & Plan     Assessment:  S/p right total knee poly exchange and I&D for acute PJI on March 24, 2024  No current evidence of reinfection    Plan:  No surgical intervention indicated at this time.  There is no current effusion in his right knee.  No leukocytosis, fevers or chills.  Recommend continuing suppressive antibiotics of doxycycline 100 mg twice daily as prescribed.  Dr. Washington discussed with patient that we do not have an option for a once a day treatment.  Pain control as needed  Weightbearing as tolerated to right lower extremity with assistance  Follow-up as scheduled with Dr. Washington as outpatient on August 16, 2024.  Please call or message with any questions or concerns.    History of Present Illness   Physician Requesting Consult: Penelope Ha MD  Reason for Consult / Principal Problem: right knee pain and swelling  HPI: Armen Llanos is a 49 y.o. year old male who presents with increased right knee pain and swelling that started about 3 days ago.  He denies any injury or significant change in activities.  He had called the office about 3 days ago with suicidal ideation and was advised to go to the emergency room.  He was discharged after emergency room stay that day.  X-rays of the right knee were taken that day that showed suspicion for gas gangrene.  He was called yesterday and advised to return to the emergency room for a possible septic joint.  He returned to the emergency room yesterday and was admitted to the medical service.  CT of the right lower extremity was done that showed effusion, thickening of the synovium and multiple foci of air in the fluid.  Orthopedics was consulted for further management.  He has had multiple surgeries in that knee over the years with most recent one being a right total knee poly exchange and I&D by Dr. Washington on March 24,  2024.  He is on lifelong suppressive antibiotics of doxycycline 100 mg twice daily.  He states that he does have memory issues and is pretty sure he has missed a few doses of the antibiotics.  He would much rather prefer taking the antibiotic once a day if there is that option.  No fevers or chills at home.    Inpatient consult to Orthopedic Surgery  Consult performed by: Karli Cedillo PA-C  Consult ordered by: Arnav Nielsen PA-C          Review of Systems   Constitutional: Negative.    HENT: Negative.     Eyes: Negative.    Respiratory: Negative.     Cardiovascular: Negative.    Gastrointestinal: Negative.    Endocrine: Negative.    Genitourinary: Negative.    Musculoskeletal:  Positive for arthralgias, gait problem and joint swelling.   Skin: Negative.    Allergic/Immunologic: Negative.    Hematological: Negative.    Psychiatric/Behavioral:  Positive for suicidal ideas. The patient is nervous/anxious.        Historical Information   Past Medical History:   Diagnosis Date    Bed sore, stage 1     Diabetes mellitus (HCC)     Hypertension      Past Surgical History:   Procedure Laterality Date    IR PICC PLACEMENT SINGLE LUMEN  4/1/2024    KNEE SURGERY      MD REVJ TOTAL KNEE ARTHRP W/WO ALGRFT 1 COMPONENT Right 3/24/2024    Procedure: ARTHROPLASTY KNEE TOTAL REVISION, POLY EXCHANGE;  Surgeon: Tao Washington DO;  Location:  MAIN OR;  Service: Orthopedics    TOE AMPUTATION Left 3/28/2024    Procedure: AMPUTATION TOE 2nd;  Surgeon: Rossy Toussaint DPM;  Location:  MAIN OR;  Service: Podiatry     Social History   Social History     Substance and Sexual Activity   Alcohol Use Not Currently     Social History     Substance and Sexual Activity   Drug Use Yes    Types: Marijuana     E-Cigarette/Vaping    E-Cigarette Use Current Some Day User      E-Cigarette/Vaping Substances    Nicotine Yes     THC Yes     CBD Yes     Flavoring Yes     Other No     Unknown No      Social History     Tobacco Use   Smoking Status  "Former    Current packs/day: 0.00    Types: Cigarettes    Quit date: 2011    Years since quittin.6   Smokeless Tobacco Never     Family History: non-contributory    Meds/Allergies   all current active meds have been reviewed  Allergies   Allergen Reactions    Cephalosporins Hives    Penicillins Hives       Objective   Vitals: Blood pressure 129/81, pulse 91, temperature 97.7 °F (36.5 °C), resp. rate 16, height 6' 6\" (1.981 m), weight 90.6 kg (199 lb 11.2 oz), SpO2 97%.,Body mass index is 23.08 kg/m².      Intake/Output Summary (Last 24 hours) at 2024 0830  Last data filed at 2024 0539  Gross per 24 hour   Intake 750 ml   Output 1175 ml   Net -425 ml     I/O last 24 hours:  In: 750 [P.O.:750]  Out: 1175 [Urine:1175]    Invasive Devices       Peripheral Intravenous Line  Duration             Peripheral IV 24 Right Antecubital <1 day                    Physical Exam  Vitals and nursing note reviewed.   Constitutional:       General: He is not in acute distress.     Appearance: He is well-developed.   HENT:      Head: Normocephalic and atraumatic.   Eyes:      Conjunctiva/sclera: Conjunctivae normal.   Cardiovascular:      Rate and Rhythm: Normal rate and regular rhythm.      Heart sounds: No murmur heard.  Pulmonary:      Effort: Pulmonary effort is normal. No respiratory distress.      Breath sounds: Normal breath sounds.   Abdominal:      Palpations: Abdomen is soft.      Tenderness: There is no abdominal tenderness.   Musculoskeletal:         General: Swelling and tenderness present.      Cervical back: Neck supple.      Right knee: No effusion.   Skin:     General: Skin is warm and dry.      Capillary Refill: Capillary refill takes less than 2 seconds.   Neurological:      Mental Status: He is alert and oriented to person, place, and time.   Psychiatric:         Mood and Affect: Mood normal.       Right Knee Exam     Tenderness   Right knee tenderness location: mild global.    Range of " Motion   Extension:  -5   Flexion:  90     Tests   Varus: negative Valgus: negative    Other   Erythema: absent  Scars: present  Sensation: normal  Pulse: present  Swelling: mild  Effusion: no effusion present    Comments:  No fluid palpated in the joint  Able to actively range knee in bed with pain at extreme flexion  No pain with micromotion  Calf soft, nontender            Lab Results: I have personally reviewed pertinent lab results.  Imaging Studies: I have personally reviewed pertinent films in PACS

## 2024-07-12 NOTE — PLAN OF CARE

## 2024-07-12 NOTE — ED PROVIDER NOTES
History  Chief Complaint   Patient presents with    Evaluation of Abnormal Diagnostic Test     Was in ED 7/9 and got phone call he needs to return for possible septic R knee; still continues with pain     49-year-old male with a history of MSSA bacteremia and right septic knee presents for evaluation of continued knee pain.  Patient evaluated here 2 days ago and x-ray was performed and read by radiologist as possible gas gangrene so was called back to return to the emergency department.  Patient reports pain is unchanged.  Able to range but limited secondary to pain.  Denies fever.        Prior to Admission Medications   Prescriptions Last Dose Informant Patient Reported? Taking?   Alcohol Swabs 70 % PADS 7/11/2024 Self No Yes   Sig: May substitute brand based on insurance coverage. Check glucose ACHS.   Blood Glucose Monitoring Suppl (OneTouch Verio Reflect) w/Device KIT 7/11/2024 Self No Yes   Sig: May substitute brand based on insurance coverage. Check glucose ACHS.   Insulin Glargine Solostar (Lantus SoloStar) 100 UNIT/ML SOPN 7/10/2024 Self No Yes   Sig: Inject 0.45 mL (45 Units total) under the skin daily at bedtime   Patient taking differently: Inject 40 Units under the skin daily at bedtime   Insulin Pen Needle (BD Pen Needle Carisa 2nd Gen) 32G X 4 MM MISC 7/11/2024 Self No Yes   Sig: For use with insulin pen. Pharmacy may dispense brand covered by insurance.   OneTouch Delica Lancets 33G MISC 7/11/2024 Self No Yes   Sig: May substitute brand based on insurance coverage. Check glucose ACHS.   SUMAtriptan (IMITREX) 100 mg tablet Past Week Self Yes Yes   Sig: Take 100 mg by mouth Pt reports taking PRN   amLODIPine (NORVASC) 5 mg tablet 7/11/2024 Self No Yes   Sig: Take 1 tablet (5 mg total) by mouth 2 (two) times a day   amiodarone 200 mg tablet  Self No No   Sig: Take 1 tablet (200 mg total) by mouth daily with breakfast for 4 doses   apixaban (ELIQUIS) 5 mg Not Taking Self No No   Sig: Take 1 tablet (5 mg  total) by mouth 2 (two) times a day   Patient not taking: Reported on 7/11/2024   cholecalciferol (VITAMIN D3) 250 MCG (97581 UT) capsule 7/10/2024  Yes Yes   Sig: Vitamin D3   clonazePAM (KlonoPIN) 1 mg tablet Past Week  Yes Yes   cyclobenzaprine (FLEXERIL) 10 mg tablet 7/10/2024 Self No Yes   Sig: Take 0.5 tablets (5 mg total) by mouth 3 (three) times a day as needed for muscle spasms   docusate sodium (COLACE) 250 MG capsule 7/10/2024  Yes Yes   Sig: Take 750 mg by mouth daily   doxycycline (DORYX) 100 MG EC tablet 7/11/2024  Yes Yes   Sig: Take 100 mg by mouth 2 (two) times a day   esomeprazole (NexIUM) 20 mg capsule 7/11/2024 Self Yes Yes   Sig: Take 20 mg by mouth 2 (two) times a day before meals   glucose blood (OneTouch Verio) test strip 7/11/2024 Self No Yes   Sig: May substitute brand based on insurance coverage. Check glucose ACHS.   insulin lispro (HumaLOG KwikPen) 100 units/mL injection pen 7/11/2024 Self No Yes   Sig: Inject 13 Units under the skin 3 (three) times a day with meals   metoprolol succinate (TOPROL-XL) 25 mg 24 hr tablet 7/11/2024 Self No Yes   Sig: Take 1 tablet (25 mg total) by mouth daily   morphine (MS CONTIN) 60 mg 12 hr tablet 7/10/2024  Yes Yes   multivitamin (THERAGRAN) TABS 7/10/2024  Yes Yes   Sig: Take 1 tablet by mouth daily   ondansetron (ZOFRAN) 8 mg tablet 7/10/2024 Self Yes Yes   Sig: TAKE 1 TABLET (8 MG) BY MOUTH 3 TIMES PER DAY AS NEEDED   polyethylene glycol (MIRALAX) 17 g packet Not Taking Self No No   Sig: Take 17 g by mouth 2 (two) times a day   Patient not taking: Reported on 7/11/2024   risperiDONE (RisperDAL) 3 mg tablet 7/10/2024 Self Yes Yes   Sig: Take 3 mg by mouth daily   sacubitril-valsartan (Entresto) 24-26 MG TABS Not Taking Self No No   Sig: Take 1 tablet by mouth 2 (two) times a day This medication until your kidney function is normal or cleared by nephrology/cardiology Do not start before April 30, 2024.   Patient not taking: Reported on 7/11/2024    senna-docusate sodium (SENOKOT S) 8.6-50 mg per tablet  Self No No   Sig: Take 2 tablets by mouth 2 (two) times a day   Patient not taking: Reported on 5/10/2024   simvastatin (ZOCOR) 40 mg tablet Not Taking Self Yes No   Sig: Take 40 mg by mouth daily   Patient not taking: Reported on 2024   testosterone cypionate (DEPO-TESTOSTERONE) 200 mg/mL SOLN Not Taking  Yes No   Sig: use as directed INJECT 2 MLS WEEKLY   Patient not taking: Reported on 2024   zolpidem (AMBIEN) 10 mg tablet 7/10/2024 Self No Yes   Sig: Take 1 tablet (10 mg total) by mouth daily at bedtime as needed for sleep      Facility-Administered Medications: None       Past Medical History:   Diagnosis Date    Bed sore, stage 1     Diabetes mellitus (HCC)     Hypertension        Past Surgical History:   Procedure Laterality Date    IR PICC PLACEMENT SINGLE LUMEN  2024    KNEE SURGERY      CO REVJ TOTAL KNEE ARTHRP W/WO ALGRFT 1 COMPONENT Right 3/24/2024    Procedure: ARTHROPLASTY KNEE TOTAL REVISION, POLY EXCHANGE;  Surgeon: Tao Washington DO;  Location:  MAIN OR;  Service: Orthopedics    TOE AMPUTATION Left 3/28/2024    Procedure: AMPUTATION TOE 2nd;  Surgeon: Rossy Toussaint DPM;  Location:  MAIN OR;  Service: Podiatry       History reviewed. No pertinent family history.  I have reviewed and agree with the history as documented.    E-Cigarette/Vaping    E-Cigarette Use Current Some Day User      E-Cigarette/Vaping Substances    Nicotine Yes     THC Yes     CBD Yes     Flavoring Yes     Other No     Unknown No      Social History     Tobacco Use    Smoking status: Former     Current packs/day: 0.00     Types: Cigarettes     Quit date: 2011     Years since quittin.6    Smokeless tobacco: Never   Vaping Use    Vaping status: Some Days    Substances: Nicotine, THC, CBD, Flavoring   Substance Use Topics    Alcohol use: Not Currently    Drug use: Yes     Types: Marijuana       Review of Systems   Musculoskeletal:   Positive for arthralgias.       Physical Exam  Physical Exam  Vitals and nursing note reviewed.   Constitutional:       General: He is not in acute distress.     Appearance: He is well-developed.   HENT:      Head: Normocephalic and atraumatic.      Right Ear: External ear normal.      Left Ear: External ear normal.      Nose: Nose normal.   Eyes:      General: No scleral icterus.  Pulmonary:      Effort: Pulmonary effort is normal. No respiratory distress.   Abdominal:      General: There is no distension.      Palpations: Abdomen is soft.   Musculoskeletal:         General: No tenderness or deformity.      Cervical back: Normal range of motion and neck supple.      Comments: Able to flex the right knee approximately 30 degrees.  No evidence of overlying cellulitis.   Skin:     General: Skin is warm.      Findings: No rash.   Neurological:      General: No focal deficit present.      Mental Status: He is alert.      Gait: Gait normal.   Psychiatric:         Mood and Affect: Mood normal.         Vital Signs  ED Triage Vitals [07/11/24 1543]   Temperature Pulse Respirations Blood Pressure SpO2   97.9 °F (36.6 °C) (!) 138 18 152/95 99 %      Temp Source Heart Rate Source Patient Position - Orthostatic VS BP Location FiO2 (%)   Oral Monitor Sitting Left arm --      Pain Score       8           Vitals:    07/11/24 1800 07/11/24 1830 07/11/24 1900 07/11/24 1945   BP: 139/93 131/90 145/95 141/99   Pulse: (!) 117 (!) 114 97 87   Patient Position - Orthostatic VS:             Visual Acuity      ED Medications  Medications   HYDROmorphone (DILAUDID) injection 1 mg (has no administration in time range)   LORazepam (ATIVAN) injection 1 mg (has no administration in time range)   amLODIPine (NORVASC) tablet 5 mg (has no administration in time range)   zolpidem (AMBIEN) tablet 10 mg (has no administration in time range)   sacubitril-valsartan (ENTRESTO) 24-26 MG per tablet 1 tablet (has no administration in time range)    pravastatin (PRAVACHOL) tablet 80 mg (has no administration in time range)   SUMAtriptan (IMITREX) tablet 100 mg (has no administration in time range)   risperiDONE (RisperDAL) tablet 3 mg (has no administration in time range)   morphine (MS CONTIN) ER tablet 60 mg (has no administration in time range)   metoprolol succinate (TOPROL-XL) 24 hr tablet 25 mg (has no administration in time range)   insulin glargine (LANTUS) subcutaneous injection 35 Units 0.35 mL (has no administration in time range)   insulin lispro (HumALOG/ADMELOG) 100 units/mL subcutaneous injection 13 Units (has no administration in time range)   pantoprazole (PROTONIX) EC tablet 20 mg (has no administration in time range)   cyclobenzaprine (FLEXERIL) tablet 5 mg (has no administration in time range)   apixaban (ELIQUIS) tablet 5 mg (has no administration in time range)   doxycycline hyclate (VIBRAMYCIN) capsule 100 mg (has no administration in time range)   ondansetron (ZOFRAN) injection 4 mg (has no administration in time range)   acetaminophen (TYLENOL) tablet 650 mg (has no administration in time range)   HYDROmorphone (DILAUDID) injection 1 mg (1 mg Intravenous Given 7/11/24 1752)   LORazepam (ATIVAN) injection 1 mg (1 mg Intravenous Given 7/11/24 1751)   HYDROmorphone (DILAUDID) injection 1 mg (1 mg Intravenous Given 7/11/24 1855)       Diagnostic Studies  Results Reviewed       Procedure Component Value Units Date/Time    Hemoglobin A1c w/EAG Estimation (Prechecked if no A1C within 90 days) [[id number]] Collected: 07/11/24 1726    Lab Status: In process Specimen: Blood from Arm, Left Updated: 07/11/24 2027    Sedimentation rate, automated [[id number]]  (Abnormal) Collected: 07/11/24 1726    Lab Status: Final result Specimen: Blood from Arm, Left Updated: 07/11/24 1830     Sed Rate 68 mm/hour     Procalcitonin [[id number]]  (Normal) Collected: 07/11/24 1726    Lab Status: Final result Specimen: Blood from Arm, Left Updated: 07/11/24 1804      Procalcitonin <0.05 ng/ml     HS Troponin 0hr (reflex protocol) [[id number]]  (Normal) Collected: 07/11/24 1726    Lab Status: Final result Specimen: Blood from Arm, Left Updated: 07/11/24 1801     hs TnI 0hr 6 ng/L     Blood culture #1 [[id number]] Collected: 07/11/24 1746    Lab Status: In process Specimen: Blood from Arm, Right Updated: 07/11/24 1759    Lactic acid [[id number]]  (Normal) Collected: 07/11/24 1726    Lab Status: Final result Specimen: Blood from Arm, Left Updated: 07/11/24 1758     LACTIC ACID 1.5 mmol/L     Narrative:      Result may be elevated if tourniquet was used during collection.    Comprehensive metabolic panel [[id number]]  (Abnormal) Collected: 07/11/24 1726    Lab Status: Final result Specimen: Blood from Arm, Left Updated: 07/11/24 1757     Sodium 135 mmol/L      Potassium 4.2 mmol/L      Chloride 105 mmol/L      CO2 24 mmol/L      ANION GAP 6 mmol/L      BUN 17 mg/dL      Creatinine 0.97 mg/dL      Glucose 108 mg/dL      Calcium 9.3 mg/dL      Corrected Calcium 10.0 mg/dL      AST 15 U/L      ALT 28 U/L      Alkaline Phosphatase 76 U/L      Total Protein 7.0 g/dL      Albumin 3.1 g/dL      Total Bilirubin 0.21 mg/dL      eGFR 90 ml/min/1.73sq m     Narrative:      [employer] guidelines for Chronic Kidney Disease (CKD):     Stage 1 with normal or high GFR (GFR > 90 mL/min/1.73 square meters)    Stage 2 Mild CKD (GFR = 60-89 mL/min/1.73 square meters)    Stage 3A Moderate CKD (GFR = 45-59 mL/min/1.73 square meters)    Stage 3B Moderate CKD (GFR = 30-44 mL/min/1.73 square meters)    Stage 4 Severe CKD (GFR = 15-29 mL/min/1.73 square meters)    Stage 5 End Stage CKD (GFR <15 mL/min/1.73 square meters)  Note: GFR calculation is accurate only with a steady state creatinine    C-reactive protein [[id number]]  (Abnormal) Collected: 07/11/24 1726    Lab Status: Final result Specimen: Blood from Arm, Left Updated: 07/11/24 1757     CRP 13.2 mg/L     Protime-INR [[id number]]   (Normal) Collected: 07/11/24 1726    Lab Status: Final result Specimen: Blood from Arm, Left Updated: 07/11/24 1754     Protime 12.8 seconds      INR 0.92    APTT [[id number]]  (Normal) Collected: 07/11/24 1726    Lab Status: Final result Specimen: Blood from Arm, Left Updated: 07/11/24 1754     PTT 33 seconds     CBC and differential [[id number]]  (Abnormal) Collected: 07/11/24 1726    Lab Status: Final result Specimen: Blood from Arm, Left Updated: 07/11/24 1740     WBC 10.74 Thousand/uL      RBC 4.23 Million/uL      Hemoglobin 11.8 g/dL      Hematocrit 34.4 %      MCV 81 fL      MCH 27.9 pg      MCHC 34.3 g/dL      RDW 12.7 %      MPV 10.1 fL      Platelets 345 Thousands/uL      nRBC 0 /100 WBCs      Segmented % 80 %      Immature Grans % 1 %      Lymphocytes % 13 %      Monocytes % 4 %      Eosinophils Relative 1 %      Basophils Relative 1 %      Absolute Neutrophils 8.68 Thousands/µL      Absolute Immature Grans 0.05 Thousand/uL      Absolute Lymphocytes 1.40 Thousands/µL      Absolute Monocytes 0.45 Thousand/µL      Eosinophils Absolute 0.10 Thousand/µL      Basophils Absolute 0.06 Thousands/µL     Blood culture #2 [[id number]] Collected: 07/11/24 1726    Lab Status: In process Specimen: Blood from Arm, Left Updated: 07/11/24 1736                   CT lower extremity wo contrast right   Final Result by Nohelia Ramon MD (07/11 2015)      Moderate to large knee effusion with multiple foci of air in the fluid with suggestion of associated synovial thickening. Findings are concerning for septic arthritis in appropriate clinical setting      No lytic or blastic lesion   No findings of component loosening      No lytic lesion or periosteal reaction   The study was marked in EPIC for immediate notification.      Workstation performed: [id number]                    Procedures  Procedures         ED Course                                               Medical Decision Making  49-year-old male with a prior history of  septic knee joint presenting with continued knee pain and x-ray findings of possible gas gangrene.  Obtain sepsis evaluation, inflammatory markers.  Discussed case with orthopedic surgery.  Advised admission.  Arthrocentesis not indicated at this time.    Amount and/or Complexity of Data Reviewed  Labs: ordered.    Risk  Prescription drug management.  Decision regarding hospitalization.                 Disposition  Final diagnoses:   Knee pain     Time reflects when diagnosis was documented in both MDM as applicable and the Disposition within this note       Time User Action Codes Description Comment    7/11/2024  6:49 PM Glenn Greenwood [M25.569] Knee pain           ED Disposition       ED Disposition   Admit    Condition   Stable    Date/Time   u Jul 11, 2024  6:49 PM    Comment   Case was discussed with [place] and the patient's admission status was agreed to be Admission Status: observation status to the service of Dr. Awan .               Follow-up Information    None         Current Discharge Medication List        CONTINUE these medications which have NOT CHANGED    Details   Alcohol Swabs 70 % PADS May substitute brand based on insurance coverage. Check glucose ACHS.  Qty: 200 each, Refills: 0    Associated Diagnoses: Type 2 diabetes mellitus with ketoacidosis without coma, without long-term current use of insulin ([facility])      amLODIPine (NORVASC) 5 mg tablet Take 1 tablet (5 mg total) by mouth 2 (two) times a day  Qty: 60 tablet, Refills: 0    Associated Diagnoses: Essential hypertension      Blood Glucose Monitoring Suppl (OneTouch Verio Reflect) w/Device KIT May substitute brand based on insurance coverage. Check glucose ACHS.  Qty: 1 kit, Refills: 0    Associated Diagnoses: Type 2 diabetes mellitus with ketoacidosis without coma, without long-term current use of insulin ([facility])      cholecalciferol (VITAMIN D3) 250 MCG (62199 UT) capsule Vitamin D3      clonazePAM (KlonoPIN) 1 mg tablet        cyclobenzaprine (FLEXERIL) 10 mg tablet Take 0.5 tablets (5 mg total) by mouth 3 (three) times a day as needed for muscle spasms  Qty: 30 tablet, Refills: 0    Associated Diagnoses: Traumatic brain injury, with unknown loss of consciousness status, subsequent encounter; Chronic pain disorder      docusate sodium (COLACE) 250 MG capsule Take 750 mg by mouth daily      doxycycline (DORYX) 100 MG EC tablet Take 100 mg by mouth 2 (two) times a day      esomeprazole (NexIUM) 20 mg capsule Take 20 mg by mouth 2 (two) times a day before meals      glucose blood (OneTouch Verio) test strip May substitute brand based on insurance coverage. Check glucose ACHS.  Qty: 200 each, Refills: 0    Associated Diagnoses: Type 2 diabetes mellitus with ketoacidosis without coma, without long-term current use of insulin ([facility])      Insulin Glargine Solostar (Lantus SoloStar) 100 UNIT/ML SOPN Inject 0.45 mL (45 Units total) under the skin daily at bedtime  Qty: 10.5 mL, Refills: 2    Comments: Price check  Associated Diagnoses: Type 2 diabetes mellitus with ketoacidosis without coma, without long-term current use of insulin ([facility])      insulin lispro (HumaLOG KwikPen) 100 units/mL injection pen Inject 13 Units under the skin 3 (three) times a day with meals  Qty: 15 mL, Refills: 2    Comments: Price check  Associated Diagnoses: Type 2 diabetes mellitus with ketoacidosis without coma, without long-term current use of insulin ([facility])      Insulin Pen Needle (BD Pen Needle Carisa 2nd Gen) 32G X 4 MM MISC For use with insulin pen. Pharmacy may dispense brand covered by insurance.  Qty: 100 each, Refills: 0    Associated Diagnoses: Type 2 diabetes mellitus with ketoacidosis without coma, without long-term current use of insulin ([facility])      metoprolol succinate (TOPROL-XL) 25 mg 24 hr tablet Take 1 tablet (25 mg total) by mouth daily  Qty: 90 tablet, Refills: 3    Associated Diagnoses: A-fib ([facility])      morphine (MS CONTIN) 60 mg 12 hr tablet        multivitamin (THERAGRAN) TABS Take 1 tablet by mouth daily      ondansetron (ZOFRAN) 8 mg tablet TAKE 1 TABLET (8 MG) BY MOUTH 3 TIMES PER DAY AS NEEDED      OneTouch Delica Lancets 33G MISC May substitute brand based on insurance coverage. Check glucose ACHS.  Qty: 200 each, Refills: 0    Associated Diagnoses: Type 2 diabetes mellitus with ketoacidosis without coma, without long-term current use of insulin ([facility])      risperiDONE (RisperDAL) 3 mg tablet Take 3 mg by mouth daily      SUMAtriptan (IMITREX) 100 mg tablet Take 100 mg by mouth Pt reports taking PRN      zolpidem (AMBIEN) 10 mg tablet Take 1 tablet (10 mg total) by mouth daily at bedtime as needed for sleep  Qty: 10 tablet, Refills: 0    Associated Diagnoses: Traumatic brain injury, with unknown loss of consciousness status, subsequent encounter      amiodarone 200 mg tablet Take 1 tablet (200 mg total) by mouth daily with breakfast for 4 doses  Qty: 4 tablet, Refills: 0    Associated Diagnoses: Atrial fibrillation, unspecified type ([facility])      apixaban (ELIQUIS) 5 mg Take 1 tablet (5 mg total) by mouth 2 (two) times a day  Qty: 60 tablet, Refills: 2    Comments: Price check  Associated Diagnoses: A-fib ([facility])      polyethylene glycol (MIRALAX) 17 g packet Take 17 g by mouth 2 (two) times a day  Qty: 20 each, Refills: 0    Associated Diagnoses: Chronic pain disorder      sacubitril-valsartan (Entresto) 24-26 MG TABS Take 1 tablet by mouth 2 (two) times a day This medication until your kidney function is normal or cleared by nephrology/cardiology Do not start before April 30, 2024.  Qty: 60 tablet, Refills: 0    Associated Diagnoses: Dilated cardiomyopathy ([facility])      senna-docusate sodium (SENOKOT S) 8.6-50 mg per tablet Take 2 tablets by mouth 2 (two) times a day  Qty: 30 tablet, Refills: 0    Associated Diagnoses: Chronic pain disorder      simvastatin (ZOCOR) 40 mg tablet Take 40 mg by mouth daily      testosterone cypionate (DEPO-TESTOSTERONE) 200  mg/mL SOLN use as directed INJECT 2 MLS WEEKLY             No discharge procedures on file.    PDMP Review         Value Time User    PDMP Reviewed  Yes 7/11/2024  7:05 PM Arnav Nielsen PA-C            ED Provider  Electronically Signed by             Glenn Greenwood DO  07/11/24 0468

## 2024-07-12 NOTE — ASSESSMENT & PLAN NOTE
A-fib with RVR in the ER, heart rates are in the 110s presently  Cotninue Metoprolol  Patients cardiologist [facility]- Amiodarone, eliquis

## 2024-07-12 NOTE — CASE MANAGEMENT
Case Management Assessment & Discharge Planning Note    Patient name Armen Llanos  Location /-01 MRN [id number]  : 1975 Date 2024       Current Admission Date: 2024  Current Admission Diagnosis:Pain and swelling of right knee   Patient Active Problem List    Diagnosis Date Noted Date Diagnosed    Pain and swelling of right knee 2024     History of amputation of lesser toe, left (HCC) 2024     Diabetic polyneuropathy associated with type 2 diabetes mellitus ([facility]) 2024     Tachycardia 05/10/2024     Diabetic foot ulcer ([facility]) 2024     Monoclonal gammopathy 2024     Anasarca associated with disorder of kidney 2024     Ambulatory dysfunction 2024     Abnormal CT of the chest 2024     Other proteinuria 2024     Anxiety 2024     Cardiomyopathy ([facility]) 2024     Mononeuropathy 2024     Osteoarthritis of knee 2024     A-fib with RVR ([facility]) 2024     Hyponatremia 2024     MSSA bacteremia 2024     JH (acute kidney injury) ([facility]) 2024     Syncope 2023     Costochondral chest pain 2023     Adrenal nodule ([facility]) 2023     Chronic, continuous use of opioids 11/15/2022     Type 2 diabetes mellitus with renal complication ([facility]) 11/15/2022     Essential hypertension 11/15/2022     Hypokalemia 11/15/2022     Hypocalcemia 11/15/2022     TBI (traumatic brain injury) ([facility]) 11/15/2022     Chronic pain disorder 2022       LOS (days): 0  Geometric Mean LOS (GMLOS) (days):   Days to GMLOS:     OBJECTIVE:              Current admission status: Observation       Preferred Pharmacy:   [id number] - [place] - [address]  [address] [address]  Phone: [phone] Fax: [phone]    Primary Care Provider: Deidre Andrea MD    Primary Insurance: [employer]  Secondary Insurance:     ASSESSMENT:  Active Health Care Proxies       [facility]  Representative - Spouse   Primary Phone: [phone] (Mobile)                 Advance Directives  Does patient have a Health Care POA?: No  Does patient have Advance Directives?: No  Primary Contact: wife    Obs Notice Signed: 07/12/24         Patient Information  Admitted from:: Home  Mental Status: Alert  During Assessment patient was accompanied by: Not accompanied during assessment  Assessment information provided by:: Patient  Primary Caregiver: Self  Support Systems: Spouse/significant other, Family members  What city do you live in?: [place]  Home entry access options. Select all that apply.: Stairs  Number of steps to enter home.: 10  Do the steps have railings?: Yes  Type of Current Residence: 3 story home  Upon entering residence, is there a bedroom on the main floor (no further steps)?: No  A bedroom is located on the following floor levels of residence (select all that apply):: 3rd Floor  Upon entering residence, is there a bathroom on the main floor (no further steps)?: No  Indicate which floors of current residence have a bathroom (select all the apply):: 2nd Floor  Number of steps to 2nd floor from main floor: One Flight  Number of steps to 3rd floor from main floor: Two Flights  Living Arrangements: Lives w/ Spouse/significant other    Activities of Daily Living Prior to Admission  Functional Status: Independent  Completes ADLs independently?: Yes  Ambulates independently?: Yes  Does patient use assisted devices?: Yes  Assisted Devices (DME) used: Straight Cane  Does patient currently own DME?: Yes  What DME does the patient currently own?: Walker, Straight Cane, Wheelchair  Does patient have a history of Outpatient Therapy (PT/OT)?: No  Does the patient have a history of Short-Term Rehab?: No  Does patient have a history of HHC?: Yes ([facility])         Patient Information Continued  Income Source: SSI/SSD  Does patient have prescription coverage?: Yes  Does patient receive dialysis treatments?:  No  Does patient have a history of substance abuse?: No  Does patient have a history of Mental Health Diagnosis?: No         Means of Transportation  Means of Transport to Appts:: Family transport      Social Determinants of Health (SDOH)      Flowsheet Row Most Recent Value   Housing Stability    In the last 12 months, was there a time when you were not able to pay the mortgage or rent on time? N   In the past 12 months, how many times have you moved where you were living? 1   At any time in the past 12 months, were you homeless or living in a shelter (including now)? N   Transportation Needs    In the past 12 months, has lack of transportation kept you from medical appointments or from getting medications? no   In the past 12 months, has lack of transportation kept you from meetings, work, or from getting things needed for daily living? No   Food Insecurity    Within the past 12 months, you worried that your food would run out before you got the money to buy more. Never true   Within the past 12 months, the food you bought just didn't last and you didn't have money to get more. Never true   Utilities    In the past 12 months has the electric, gas, oil, or water company threatened to shut off services in your home? No            DISCHARGE DETAILS:    Discharge planning discussed with:: pt  Freedom of Choice: Yes                   Contacts  Patient Contacts: Wife, Hannah  Relationship to Patient:: Family  Contact Method: Phone  Phone Number: [phone]  Reason/Outcome: Continuity of Care, Emergency Contact, Discharge Planning              Other Referral/Resources/Interventions Provided:  Interventions: Other (Specify)  Referral Comments: discussed outreach to insurer for CM services to assist with identification of neuropsyche providers.    Would you like to participate in our [facility] service program?  : No - Declined    Treatment Team Recommendation: Home  Discharge Destination Plan:: Home                                          Additional Comments: resides with wife, I PTA, has license, disabled

## 2024-07-12 NOTE — DISCHARGE SUMMARY
[facility]  Discharge- Armen Llanos 1975, 49 y.o. male MRN: [id number]  Unit/Bed#: -01 Encounter: [id number]  Primary Care Provider: Deidre Andrea MD   Date and time admitted to hospital: 7/11/2024  4:29 PM    Anxiety  Assessment & Plan  Ativan ordered as needed    Cardiomyopathy (HCC)  Assessment & Plan  Appearing compensated at this time  Resume Entresto, beta-blocker    A-fib with RVR (HCC)  Assessment & Plan  A-fib with RVR in the ER, heart rates are in the 110s presently  Cotninue Metoprolol  Patients cardiologist Dcd- Amiodarone, eliquis    Chronic pain disorder  Assessment & Plan  PDMP reviewed  Continue home morphine 60 mg twice daily  Dilaudid as needed for for breakthrough while hospitalized    Essential hypertension  Assessment & Plan   blood pressure 145/95  Resume home amlodipine, metoprolol  Monitor BP    Type 2 diabetes mellitus with renal complication ([facility])  Assessment & Plan  Lab Results   Component Value Date    HGBA1C 7.1 (H) 07/11/2024       Recent Labs     07/11/24 2007 07/12/24  0736 07/12/24  0901 07/12/24  1147   POCGLU 164* 71 121 79       Blood Sugar Average: Last 72 hrs:  (P) 108.75Poorly controlled with A1c of 12.1  Resume home Lantus 35 units nightly  Resume home Humalog 13 units 3 times daily with meals      * Pain and swelling of right knee  Assessment & Plan  With acute pain and swelling of his right knee.  History of septic arthritis in his right knee with MSSA bacteremia.  Completed antibiotic therapy with Cubicin and is now maintained on doxycycline 100 mg twice daily for prophylaxis  Case was discussed with on-call orthopedic surgery, recommending admission for observation.  CRP is downtrending.  No white blood cell count or fevers.  Continue home doxycycline therapy  Discussed with orthopedics-no need for aspiration no effusion seen.  No plans for OR  Likely this is soft tissue swelling  Pain has subsided at the time of my  evaluation, patient willing to be discharged              Medical Problems       Resolved Problems  Date Reviewed: 6/14/2024   None         Admission Date:   Admission Orders (From admission, onward)       Ordered        07/11/24 1851  Place in Observation  Once                            Admitting Diagnosis: Knee pain [M25.569]    HPI:  49 y.o. male with history of right knee septic joint, MSSA bacteremia, type 2 diabetes presenting with chief complaint of right knee swelling over the past few days.  Was recently in the hospital for right knee pain, was discharged home with analgesia.  Was called back into the ER with concern for gas gangrene of his right knee.  Case was discussed with orthopedic surgery, recommending admission and orthopedic evaluation with pain control.  He has been maintained on his doxycycline 100 mg twice daily and reports compliance.     Procedures Performed: No orders of the defined types were placed in this encounter.      Summary of Hospital Course: Patient was admitted for acute knee pain.  Orthopedics was consulted.  No septic knee, joint effusion as per orthopedics.  pain better this morning.  Patient is discharged home        Significant Findings, Care, Treatment and Services Provided:   CT lower extremity wo contrast right    Result Date: 7/11/2024  Impression: Moderate to large knee effusion with multiple foci of air in the fluid with suggestion of associated synovial thickening. Findings are concerning for septic arthritis in appropriate clinical setting No lytic or blastic lesion No findings of component loosening No lytic lesion or periosteal reaction The study was marked in EPIC for immediate notification. Workstation performed: [id number]       No Chest XR results available for this patient.      Complications: none    Condition at Discharge: stable         Discharge instructions/Information to patient and family:   See after visit summary for information provided to patient and  family.      Provisions for Follow-Up Care:  See after visit summary for information related to follow-up care and any pertinent home health orders.      PCP: Deidre Andrea MD    Disposition: Home    Planned Readmission: No    Discharge Statement   I spent 32 minutes discharging the patient. This time was spent on the day of discharge. I had direct contact with the patient on the day of discharge. Additional documentation is required if more than 30 minutes were spent on discharge.     Discharge Medications:  See after visit summary for reconciled discharge medications provided to patient and family.

## 2024-07-12 NOTE — PHYSICAL THERAPY NOTE
PHYSICAL THERAPY SCREEN NOTE      Patient Name: Armen Llanos  Today's Date: 7/12/2024 07/12/24 1122   Note Type   Note type Screen   Additional Comments Per ortho note, pt is WBAT to RLE and there is no plan for surgical intervention. Pt has an AMPAC of 22 and RN reports pt is mobilizing independently with a RW. Pt has no acute PT needs and is D/C from PT services.     Eric Fay, PT

## 2024-07-12 NOTE — OCCUPATIONAL THERAPY NOTE
Occupational Therapy Screen Note     Patient Name: Armen Llanos  Today's Date: 7/12/2024  Problem List  Principal Problem:    Pain and swelling of right knee  Active Problems:    Type 2 diabetes mellitus with renal complication (HCC)    Essential hypertension    Chronic pain disorder    A-fib with RVR (HCC)    Cardiomyopathy (HCC)    Anxiety              07/12/24 1217   OT Last Visit   OT Visit Date 07/12/24   Note Type   Note type Screen   Additional Comments Per ortho: pt WBAT to RLE, no plan for sx intervention. PT spoke with RN who reports pt is independent in room with RW. Pt with no acute OT needs, DC OT orders.       Stephani Phelan, OTR/L

## 2024-07-12 NOTE — ASSESSMENT & PLAN NOTE
With acute pain and swelling of his right knee.  History of septic arthritis in his right knee with MSSA bacteremia.  Completed antibiotic therapy with Cubicin and is now maintained on doxycycline 100 mg twice daily for prophylaxis  Case was discussed with on-call orthopedic surgery, recommending admission for observation.  CRP is downtrending.  No white blood cell count or fevers.  Continue home doxycycline therapy  Discussed with orthopedics-no need for aspiration no effusion seen.  No plans for OR  Likely this is soft tissue swelling  Pain has subsided at the time of my evaluation, patient willing to be discharged

## 2024-07-12 NOTE — PLAN OF CARE

## 2024-07-12 NOTE — OCCUPATIONAL THERAPY NOTE
Occupational Therapy Cx Note     Patient Name: Armen Llanos  Today's Date: 7/12/2024  Problem List  Principal Problem:    Pain and swelling of right knee  Active Problems:    Type 2 diabetes mellitus with renal complication (HCC)    Essential hypertension    Chronic pain disorder    A-fib with RVR (HCC)    Cardiomyopathy (HCC)    Anxiety            07/12/24 0809   OT Last Visit   OT Visit Date 07/12/24   Note Type   Note type Cancelled Session   Cancel Reasons Other   Additional Comments OT orders received, chart reviewed. Pt pending orthopedic consult for possible gas gangrene of R knee. However, pt's [facility] 22 for basic & 24 for daily which indicates no IP OT needs. Will continue to follow for orthopedic plan of care to determine if OT eval warranted while inpatient.       Stephani Phelan, OTR/L

## 2024-07-12 NOTE — UTILIZATION REVIEW
Initial Clinical Review    Admission: Date/Time/Statement: 7/11/24 1851 observation   Admission Orders (From admission, onward)       Ordered        07/11/24 1851  Place in Observation  Once                          Orders Placed This Encounter   Procedures    Place in Observation     Standing Status:   Standing     Number of Occurrences:   1     Order Specific Question:   Level of Care     Answer:   Med Surg [16]     ED Arrival Information       Expected   -    Arrival   7/11/2024 15:15    Acuity   Emergent              Means of arrival   Walk-In    Escorted by   Family Member    Service   Hospitalist    Admission type   Emergency              Arrival complaint   Called by nurse to come back to ER for problemic x-ray             Chief Complaint   Patient presents with    Evaluation of Abnormal Diagnostic Test     Was in ED 7/9 and got phone call he needs to return for possible septic R knee; still continues with pain       Initial Presentation: 49 y.o. male  to ED, per call back,  via walk in from home.    Admitted to observation with Dx:  Right knee pain/rule out septic joint.  Presented to ED with ongoing right knee pain, starting about 4 days prior to arrival with swelling.  Seen in ED 2 days ago for same and Xray read as possible gas gangrene and called to return to ED. On maintenance doxycycline and compliant.     PMHx: right knee septic joint, MSSA bacteremia, type 2 diabetes. On exam: swelling and tenderness right knee.  ROM limited due to pain.  CRP 13.2.  Sed Rate 68.   wbc 10.74.  H&H 11.8/34.4.   Imaging shows possible septic arthritis of right knee on ct RLE.   ED treatment: 2 doses of IV analgesia, ativan.    Plan includes pain control.  Continue doxycycline and follow blood cultures. Consult Orthopedics.     Anticipated Length of Stay/Certification Statement: Patient will be admitted on an observation basis with an anticipated length of stay of less than 2 midnights secondary to R knee pain r/o septic  joint.     Date: 7/12/24   Day 2:     ED Triage Vitals [07/11/24 1543]   Temperature Pulse Respirations Blood Pressure SpO2 Pain Score   97.9 °F (36.6 °C) (!) 138 18 152/95 99 % 8     Weight (last 2 days)       Date/Time Weight    07/12/24 0429 90.6 (199.7)    07/11/24 19:45:31 92.3 (203.5)    07/11/24 1543 91.6 (202)            Vital Signs (last 3 days)       Date/Time Temp Pulse Resp BP MAP (mmHg) SpO2 O2 Device Patient Position - Orthostatic VS [place] Coma Scale Score Pain    07/12/24 0537 -- -- -- -- -- -- -- -- -- 10 - Worst Possible Pain    07/11/24 2329 -- -- -- -- -- -- -- -- -- 10 - Worst Possible Pain    07/11/24 2156 -- -- -- -- -- -- -- -- -- Med Not Given for Pain - for MAR use only    07/11/24 1947 -- -- 16 -- -- -- -- -- 15 7    07/11/24 19:45:31 97.8 °F (36.6 °C) 87 20 141/99 113 99 % None (Room air) Lying -- --    07/11/24 1900 -- 97 20 145/95 115 98 % -- -- -- --    07/11/24 1855 -- -- -- -- -- -- -- -- -- 8    07/11/24 1830 -- 114 18 131/90 104 97 % -- -- -- --    07/11/24 1800 -- 117 20 139/93 112 97 % None (Room air) -- -- --    07/11/24 1752 -- -- -- -- -- -- -- -- -- 10 - Worst Possible Pain    07/11/24 1730 -- 116 20 149/91 109 97 % None (Room air) -- -- --    07/11/24 1702 -- -- -- -- -- -- -- -- -- 9    07/11/24 1700 -- -- -- -- -- -- None (Room air) -- 15 --    07/11/24 1652 -- 125 18 140/92 110 98 % -- -- -- --    07/11/24 1651 -- 129 18 -- -- 98 % None (Room air) -- -- --    07/11/24 1543 97.9 °F (36.6 °C) 138 18 152/95 105 99 % None (Room air) Sitting -- 8              Pertinent Labs/Diagnostic Test Results:   Radiology:  CT lower extremity wo contrast right   Final Interpretation by Nohelia Ramon MD (07/11 2015)      Moderate to large knee effusion with multiple foci of air in the fluid with suggestion of associated synovial thickening. Findings are concerning for septic arthritis in appropriate clinical setting      No lytic or blastic lesion   No findings of component loosening       No lytic lesion or periosteal reaction   The study was marked in EPIC for immediate notification.      Workstation performed: [id number]           7/9/24 Xray Right Knee No acute osseous abnormality.   Soft tissue swelling, joint effusion and findings suspicious for gas gangrene    Cardiology:  No orders to display     GI:  No orders to display       Results from last 7 days   Lab Units 07/12/24 0130 07/11/24 1726 07/09/24  1705   WBC Thousand/uL 7.50 10.74* 7.26   HEMOGLOBIN g/dL 10.0* 11.8* 12.4   HEMATOCRIT % 29.0* 34.4* 36.1*   PLATELETS Thousands/uL 336 345 384   TOTAL NEUT ABS Thousands/µL 4.68 8.68* 4.34     Results from last 7 days   Lab Units 07/12/24 0130 07/11/24 1726 07/09/24  1705   SODIUM mmol/L 131* 135 132*   POTASSIUM mmol/L 3.9 4.2 4.1   CHLORIDE mmol/L 103 105 101   CO2 mmol/L 23 24 23   ANION GAP mmol/L 5 6 8   BUN mg/dL 17 17 13   CREATININE mg/dL 0.95 0.97 1.05   EGFR ml/min/1.73sq m 92 90 82   CALCIUM mg/dL 8.6 9.3 9.3     Results from last 7 days   Lab Units 07/12/24 0130 07/11/24 1726 07/09/24  1705   AST U/L 11* 15 20   ALT U/L 22 28 55*   ALK PHOS U/L 63 76 91   TOTAL PROTEIN g/dL 5.9* 7.0 7.3   ALBUMIN g/dL 2.7* 3.1* 3.1*   TOTAL BILIRUBIN mg/dL 0.24 0.21 0.26     Results from last 7 days   Lab Units 07/11/24 2007   POC GLUCOSE mg/dl 164*     Results from last 7 days   Lab Units 07/12/24 0130 07/11/24 1726 07/09/24  1705   GLUCOSE RANDOM mg/dL 252* 108 287*     Results from last 7 days   Lab Units 07/11/24  1726   HEMOGLOBIN A1C % 7.1*   EAG mg/dl 157     Beta- Hydroxybutyrate   Date Value Ref Range Status   03/22/2024 1.31 (H) 0.02 - 0.27 mmol/L Final      Results from last 7 days   Lab Units 07/11/24  1726   HS TNI 0HR ng/L 6     Results from last 7 days   Lab Units 07/11/24  1726   PROTIME seconds 12.8   INR  0.92   PTT seconds 33     Results from last 7 days   Lab Units 07/12/24  0130 07/11/24  1726 07/09/24  1705   PROCALCITONIN ng/ml 0.06 <0.05 <0.05     Results from  last 7 days   Lab Units 07/11/24  1726 07/09/24  1705   LACTIC ACID mmol/L 1.5 1.1     Results from last 7 days   Lab Units 07/11/24  1726 07/09/24  1705   CRP mg/L 13.2* 30.6*   SED RATE mm/hour 68* 71*       Results from last 7 days   Lab Units 07/11/24  1746 07/11/24  1726 07/09/24  1705   BLOOD CULTURE  Received in Microbiology Lab. Culture in Progress. Received in Microbiology Lab. Culture in Progress. No Growth at 48 hrs.  No Growth at 48 hrs.       ED Treatment-Medication Administration from 07/11/2024 1514 to 07/11/2024 1939         Date/Time Order Dose Route Action     07/11/2024 1752 HYDROmorphone (DILAUDID) injection 1 mg 1 mg Intravenous Given     07/11/2024 1751 LORazepam (ATIVAN) injection 1 mg 1 mg Intravenous Given     07/11/2024 1855 HYDROmorphone (DILAUDID) injection 1 mg 1 mg Intravenous Given            Past Medical History:   Diagnosis Date    Bed sore, stage 1     Diabetes mellitus (HCC)     Hypertension      Present on Admission:   Chronic pain disorder   Cardiomyopathy (HCC)   A-fib with RVR (HCC)   Type 2 diabetes mellitus with renal complication (HCC)   Essential hypertension   Anxiety      Admitting Diagnosis: Knee pain [M25.569]  Age/Sex: 49 y.o. male  Admission Orders:  Scheduled Medications:  amLODIPine, 5 mg, Oral, BID  doxycycline hyclate, 100 mg, Oral, Q12H DARIN  insulin glargine, 35 Units, Subcutaneous, HS  insulin lispro, 13 Units, Subcutaneous, TID With Meals  metoprolol succinate, 25 mg, Oral, Daily  morphine, 60 mg, Oral, Q12H DARIN  pantoprazole, 20 mg, Oral, BID AC  pravastatin, 80 mg, Oral, Daily With Dinner  risperiDONE, 3 mg, Oral, Daily  sacubitril-valsartan, 1 tablet, Oral, BID      Continuous IV Infusions:     PRN Meds:  acetaminophen, 650 mg, Oral, Q6H PRN  cyclobenzaprine, 5 mg, Oral, TID PRN  HYDROmorphone, 1 mg, Intravenous, Q4H PRN x 1 7/11.  X 1 7/12  LORazepam, 1 mg, Intravenous, Q6H PRN  ondansetron, 4 mg, Intravenous, Q6H PRN  oxymetazoline, 2 spray, Each Nare,  Q12H PRN  SUMAtriptan, 100 mg, Oral, Once PRN  zolpidem, 10 mg, Oral, HS PRN        IP CONSULT TO ORTHOPEDIC SURGERY    Network Utilization Review Department  ATTENTION: Please call with any questions or concerns to [phone] and carefully listen to the prompts so that you are directed to the right person. All voicemails are confidential.   For Discharge needs, contact Care Management DC Support Team at [phone] opt. [phone]  Send all requests for admission clinical reviews, approved or denied determinations and any other requests to dedicated fax number below belonging to the campus where the patient is receiving treatment. List of dedicated fax numbers for the Facilities:  FACILITY NAME UR FAX NUMBER   ADMISSION DENIALS (Administrative/Medical Necessity) [phone]   DISCHARGE SUPPORT TEAM (NETWORK) [phone]   PARENT CHILD HEALTH (Maternity/NICU/Pediatrics) [phone]   [facility] [phone]   [facility] [phone]   [facility] [phone]   [facility] [phone]   [facility] [phone]   [facility] [phone]   [facility] [phone]   [facility] [phone]   [facility] [phone]   On license of [facility] [phone]   [facility] [phone]   [facility] [phone]

## 2024-07-12 NOTE — ASSESSMENT & PLAN NOTE
Lab Results   Component Value Date    HGBA1C 7.1 (H) 07/11/2024       Recent Labs     07/11/24 2007 07/12/24  0736 07/12/24  0901 07/12/24  1147   POCGLU 164* 71 121 79       Blood Sugar Average: Last 72 hrs:  (P) 108.75Poorly controlled with A1c of 12.1  Resume home Lantus 35 units nightly  Resume home Humalog 13 units 3 times daily with meals

## 2024-07-14 LAB
BACTERIA BLD CULT: NORMAL

## 2024-07-15 NOTE — UTILIZATION REVIEW
NOTIFICATION OF OBSERVATION ADMISSION   AUTHORIZATION REQUEST   SERVICING FACILITY:   [place] [address]  Tax ID: [id number]  NPI: [id number] ATTENDING PROVIDER:  Attending Name and NPI#: Penelope Ha Md [[id number]]  Address: [address] [place] [address]  Phone: [phone]   ADMISSION INFORMATION:  Place of Service: [facility]  Place of Service Code: 22 CPT Code:   Admitting Diagnosis Code/Description:  Knee pain [M25.569]  Observation Admission Date/Time: 07/11/2024 1851  Discharge Date/Time: 7/12/2024  2:00 PM     UTILIZATION REVIEW CONTACT:  Marilin Welch, Utilization   Network Utilization Review Department  Phone: [phone]  Fax: [phone]  Email: [email or web address]  Contact for approvals/pending authorizations, clinical reviews, and discharge.     PHYSICIAN ADVISORY SERVICES:  Medical Necessity Denial & Ejrd-zj-Tbhb Review  Phone: [phone]  Fax: [phone]  Email: [email or web address]     DISCHARGE SUPPORT TEAM:  For Patients Discharge Needs & Updates  Phone: [phone] opt. 2 Fax: [phone]  Email: [email or web address]   Initial Clinical Review     Admission: Date/Time/Statement: 7/11/24 1851 observation   Admission Orders (From admission, onward)          Ordered         07/11/24 1851   Place in Observation  Once                                     Orders Placed This Encounter   Procedures    Place in Observation       Standing Status:   Standing       Number of Occurrences:   1       Order Specific Question:   Level of Care       Answer:   Med Surg [16]      ED Arrival Information         Expected   -    Arrival   7/11/2024 15:15    Acuity   Emergent                 Means of arrival   Walk-In    Escorted by   Family Member    Service   Hospitalist    Admission type   Emergency                 Arrival complaint   Called by nurse  to come back to ER for problemic x-ray                     Chief Complaint   Patient presents with    Evaluation of Abnormal Diagnostic Test       Was in ED 7/9 and got phone call he needs to return for possible septic R knee; still continues with pain         Initial Presentation: 49 y.o. male  to ED, per call back,  via walk in from home.    Admitted to observation with Dx:  Right knee pain/rule out septic joint.  Presented to ED with ongoing right knee pain, starting about 4 days prior to arrival with swelling.  Seen in ED 2 days ago for same and Xray read as possible gas gangrene and called to return to ED. On maintenance doxycycline and compliant.     PMHx: right knee septic joint, MSSA bacteremia, type 2 diabetes. On exam: swelling and tenderness right knee.  ROM limited due to pain.  CRP 13.2.  Sed Rate 68.   wbc 10.74.  H&H 11.8/34.4.   Imaging shows possible septic arthritis of right knee on ct RLE.   ED treatment: 2 doses of IV analgesia, ativan.    Plan includes pain control.  Continue doxycycline and follow blood cultures. Consult Orthopedics.      Anticipated Length of Stay/Certification Statement: Patient will be admitted on an observation basis with an anticipated length of stay of less than 2 midnights secondary to R knee pain r/o septic joint.      Date: 7/12/24   Day 2:              ED Triage Vitals [07/11/24 1543]   Temperature Pulse Respirations Blood Pressure SpO2 Pain Score   97.9 °F (36.6 °C) (!) 138 18 152/95 99 % 8      Weight (last 2 days)         Date/Time Weight     07/12/24 0429 90.6 (199.7)     07/11/24 19:45:31 92.3 (203.5)     07/11/24 1543 91.6 (202)                Vital Signs (last 3 days)         Date/Time Temp Pulse Resp BP MAP (mmHg) SpO2 O2 Device Patient Position - Orthostatic VS [place] Coma Scale Score Pain     07/12/24 0537 -- -- -- -- -- -- -- -- -- 10 - Worst Possible Pain     07/11/24 2329 -- -- -- -- -- -- -- -- -- 10 - Worst Possible Pain     07/11/24 2156 -- -- -- --  -- -- -- -- -- Med Not Given for Pain - for MAR use only     07/11/24 1947 -- -- 16 -- -- -- -- -- 15 7     07/11/24 19:45:31 97.8 °F (36.6 °C) 87 20 141/99 113 99 % None (Room air) Lying -- --     07/11/24 1900 -- 97 20 145/95 115 98 % -- -- -- --     07/11/24 1855 -- -- -- -- -- -- -- -- -- 8     07/11/24 1830 -- 114 18 131/90 104 97 % -- -- -- --     07/11/24 1800 -- 117 20 139/93 112 97 % None (Room air) -- -- --     07/11/24 1752 -- -- -- -- -- -- -- -- -- 10 - Worst Possible Pain     07/11/24 1730 -- 116 20 149/91 109 97 % None (Room air) -- -- --     07/11/24 1702 -- -- -- -- -- -- -- -- -- 9     07/11/24 1700 -- -- -- -- -- -- None (Room air) -- 15 --     07/11/24 1652 -- 125 18 140/92 110 98 % -- -- -- --     07/11/24 1651 -- 129 18 -- -- 98 % None (Room air) -- -- --     07/11/24 1543 97.9 °F (36.6 °C) 138 18 152/95 105 99 % None (Room air) Sitting -- 8                   Pertinent Labs/Diagnostic Test Results:   Radiology:  CT lower extremity wo contrast right   Final Interpretation by Nohelia Ramon MD (07/11 2015)       Moderate to large knee effusion with multiple foci of air in the fluid with suggestion of associated synovial thickening. Findings are concerning for septic arthritis in appropriate clinical setting       No lytic or blastic lesion   No findings of component loosening       No lytic lesion or periosteal reaction   The study was marked in EPIC for immediate notification.       Workstation performed: [id number]              7/9/24 Xray Right Knee No acute osseous abnormality.   Soft tissue swelling, joint effusion and findings suspicious for gas gangrene     Cardiology:  No orders to display      GI:  No orders to display                Results from last 7 days   Lab Units 07/12/24  0130 07/11/24  1726 07/09/24  1705   WBC Thousand/uL 7.50 10.74* 7.26   HEMOGLOBIN g/dL 10.0* 11.8* 12.4   HEMATOCRIT % 29.0* 34.4* 36.1*   PLATELETS Thousands/uL 336 345 384   TOTAL NEUT ABS Thousands/µL 4.68  8.68* 4.34             Results from last 7 days   Lab Units 07/12/24  0130 07/11/24  1726 07/09/24  1705   SODIUM mmol/L 131* 135 132*   POTASSIUM mmol/L 3.9 4.2 4.1   CHLORIDE mmol/L 103 105 101   CO2 mmol/L 23 24 23   ANION GAP mmol/L 5 6 8   BUN mg/dL 17 17 13   CREATININE mg/dL 0.95 0.97 1.05   EGFR ml/min/1.73sq m 92 90 82   CALCIUM mg/dL 8.6 9.3 9.3             Results from last 7 days   Lab Units 07/12/24  0130 07/11/24  1726 07/09/24  1705   AST U/L 11* 15 20   ALT U/L 22 28 55*   ALK PHOS U/L 63 76 91   TOTAL PROTEIN g/dL 5.9* 7.0 7.3   ALBUMIN g/dL 2.7* 3.1* 3.1*   TOTAL BILIRUBIN mg/dL 0.24 0.21 0.26           Results from last 7 days   Lab Units 07/11/24 2007   POC GLUCOSE mg/dl 164*             Results from last 7 days   Lab Units 07/12/24 0130 07/11/24  1726 07/09/24  1705   GLUCOSE RANDOM mg/dL 252* 108 287*           Results from last 7 days   Lab Units 07/11/24  1726   HEMOGLOBIN A1C % 7.1*   EAG mg/dl 157            Beta- Hydroxybutyrate   Date Value Ref Range Status   03/22/2024 1.31 (H) 0.02 - 0.27 mmol/L Final           Results from last 7 days   Lab Units 07/11/24  1726   HS TNI 0HR ng/L 6           Results from last 7 days   Lab Units 07/11/24  1726   PROTIME seconds 12.8   INR   0.92   PTT seconds 33             Results from last 7 days   Lab Units 07/12/24  0130 07/11/24  1726 07/09/24  1705   PROCALCITONIN ng/ml 0.06 <0.05 <0.05            Results from last 7 days   Lab Units 07/11/24  1726 07/09/24  1705   LACTIC ACID mmol/L 1.5 1.1            Results from last 7 days   Lab Units 07/11/24  1726 07/09/24  1705   CRP mg/L 13.2* 30.6*   SED RATE mm/hour 68* 71*                Results from last 7 days   Lab Units 07/11/24  1746 07/11/24  1726 07/09/24  1705   BLOOD CULTURE   Received in Microbiology Lab. Culture in Progress. Received in Microbiology Lab. Culture in Progress. No Growth at 48 hrs.  No Growth at 48 hrs.         ED Treatment-Medication Administration from 07/11/2024 1514 to  07/11/2024 1939           Date/Time Order Dose Route Action       07/11/2024 1752 HYDROmorphone (DILAUDID) injection 1 mg 1 mg Intravenous Given       07/11/2024 1751 LORazepam (ATIVAN) injection 1 mg 1 mg Intravenous Given       07/11/2024 1855 HYDROmorphone (DILAUDID) injection 1 mg 1 mg Intravenous Given                Medical History        Past Medical History:   Diagnosis Date    Bed sore, stage 1      Diabetes mellitus (HCC)      Hypertension           Present on Admission:   Chronic pain disorder   Cardiomyopathy (HCC)   A-fib with RVR (HCC)   Type 2 diabetes mellitus with renal complication (HCC)   Essential hypertension   Anxiety        Admitting Diagnosis: Knee pain [M25.569]  Age/Sex: 49 y.o. male  Admission Orders:  Scheduled Medications:  amLODIPine, 5 mg, Oral, BID  doxycycline hyclate, 100 mg, Oral, Q12H DARIN  insulin glargine, 35 Units, Subcutaneous, HS  insulin lispro, 13 Units, Subcutaneous, TID With Meals  metoprolol succinate, 25 mg, Oral, Daily  morphine, 60 mg, Oral, Q12H DARIN  pantoprazole, 20 mg, Oral, BID AC  pravastatin, 80 mg, Oral, Daily With Dinner  risperiDONE, 3 mg, Oral, Daily  sacubitril-valsartan, 1 tablet, Oral, BID        Continuous IV Infusions:  Infusions Meds - Displays dose, route, & frequency only         PRN Meds:  acetaminophen, 650 mg, Oral, Q6H PRN  cyclobenzaprine, 5 mg, Oral, TID PRN  HYDROmorphone, 1 mg, Intravenous, Q4H PRN x 1 7/11.  X 1 7/12  LORazepam, 1 mg, Intravenous, Q6H PRN  ondansetron, 4 mg, Intravenous, Q6H PRN  oxymetazoline, 2 spray, Each Nare, Q12H PRN  SUMAtriptan, 100 mg, Oral, Once PRN  zolpidem, 10 mg, Oral, HS PRN           IP CONSULT TO ORTHOPEDIC SURGERY     Network Utilization Review Department  ATTENTION: Please call with any questions or concerns to [phone] and carefully listen to the prompts so that you are directed to the right person. All voicemails are confidential.   For Discharge needs, contact Care Management DC Support Team at  [phone] opt. 2  Send all requests for admission clinical reviews, approved or denied determinations and any other requests to dedicated fax number below belonging to the campus where the patient is receiving treatment. List of dedicated fax numbers for the Facilities:  FACILITY NAME UR FAX NUMBER   ADMISSION DENIALS (Administrative/Medical Necessity) [phone]   DISCHARGE SUPPORT TEAM (NETWORK) [phone]   PARENT CHILD HEALTH (Maternity/NICU/Pediatrics) [phone]   [facility] [phone]   [facility] [phone]   [facility] [phone]   [facility] [phone]   [facility] [phone]   [facility] [phone]   [facility] [phone]   [facility] [phone]   [facility] [phone]   [facility] [phone]   [facility] [phone]   [facility] [phone]       [facility]  Discharge- Armen Llanos 1975, 49 y.o. male MRN: [id number]  Unit/Bed#: -01 Encounter: [id number]  Primary Care Provider: Deidre Andrea MD   Date and time admitted to hospital: 7/11/2024  4:29 PM     Anxiety  Assessment & Plan  Ativan ordered as needed     Cardiomyopathy (HCC)  Assessment & Plan  Appearing compensated at this time  Resume Entresto, beta-blocker     A-fib with RVR (HCC)  Assessment & Plan  A-fib with RVR in the ER, heart rates are in the 110s presently  Cotninue Metoprolol  Patients cardiologist Dcd- Amiodarone, eliquis     Chronic pain disorder  Assessment & Plan  PDMP reviewed  Continue home morphine 60 mg twice daily  Dilaudid as needed for for breakthrough while hospitalized     Essential hypertension  Assessment  & Plan   blood pressure 145/95  Resume home amlodipine, metoprolol  Monitor BP     Type 2 diabetes mellitus with renal complication (HCC)  Assessment & Plan        Lab Results   Component Value Date     HGBA1C 7.1 (H) 07/11/2024                Recent Labs     07/11/24 2007 07/12/24  0736 07/12/24  0901 07/12/24  1147   POCGLU 164* 71 121 79         Blood Sugar Average: Last 72 hrs:  (P) 108.75Poorly controlled with A1c of 12.1  Resume home Lantus 35 units nightly  Resume home Humalog 13 units 3 times daily with meals        * Pain and swelling of right knee  Assessment & Plan  With acute pain and swelling of his right knee.  History of septic arthritis in his right knee with MSSA bacteremia.  Completed antibiotic therapy with Cubicin and is now maintained on doxycycline 100 mg twice daily for prophylaxis  Case was discussed with on-call orthopedic surgery, recommending admission for observation.  CRP is downtrending.  No white blood cell count or fevers.  Continue home doxycycline therapy  Discussed with orthopedics-no need for aspiration no effusion seen.  No plans for OR  Likely this is soft tissue swelling  Pain has subsided at the time of my evaluation, patient willing to be discharged                    Medical Problems         Resolved Problems  Date Reviewed: 6/14/2024   None            Admission Date:   Admission Orders (From admission, onward)          Ordered         07/11/24 1851   Place in Observation  Once                                  Admitting Diagnosis: Knee pain [M25.569]     HPI:  49 y.o. male with history of right knee septic joint, MSSA bacteremia, type 2 diabetes presenting with chief complaint of right knee swelling over the past few days.  Was recently in the hospital for right knee pain, was discharged home with analgesia.  Was called back into the ER with concern for gas gangrene of his right knee.  Case was discussed with orthopedic surgery, recommending admission and orthopedic  evaluation with pain control.  He has been maintained on his doxycycline 100 mg twice daily and reports compliance.      Procedures Performed: No orders of the defined types were placed in this encounter.        Summary of Hospital Course: Patient was admitted for acute knee pain.  Orthopedics was consulted.  No septic knee, joint effusion as per orthopedics.  pain better this morning.  Patient is discharged home           Significant Findings, Care, Treatment and Services Provided:   CT lower extremity wo contrast right     Result Date: 7/11/2024  Impression: Moderate to large knee effusion with multiple foci of air in the fluid with suggestion of associated synovial thickening. Findings are concerning for septic arthritis in appropriate clinical setting No lytic or blastic lesion No findings of component loosening No lytic lesion or periosteal reaction The study was marked in EPIC for immediate notification. Workstation performed: [id number]         No Chest XR results available for this patient.       Complications: none     Condition at Discharge: stable            Discharge instructions/Information to patient and family:   See after visit summary for information provided to patient and family.       Provisions for Follow-Up Care:  See after visit summary for information related to follow-up care and any pertinent home health orders.       PCP: Deidre Andrea MD     Disposition: Home     Planned Readmission: No     Discharge Statement   I spent 32 minutes discharging the patient. This time was spent on the day of discharge. I had direct contact with the patient on the day of discharge. Additional documentation is required if more than 30 minutes were spent on discharge.      Discharge Medications:  See after visit summary for reconciled discharge medications provided to patient and family.

## 2024-07-16 LAB
BACTERIA BLD CULT: NORMAL
BACTERIA BLD CULT: NORMAL

## 2024-08-15 ENCOUNTER — TELEPHONE (OUTPATIENT)
Age: 49
End: 2024-08-15

## 2024-08-15 ENCOUNTER — NURSE TRIAGE (OUTPATIENT)
Age: 49
End: 2024-08-15

## 2024-08-15 NOTE — TELEPHONE ENCOUNTER
FYI, it appears pt cancelled the appt w/ Matt next week and now has an appt w/ Dr. Collado in Sept.

## 2024-08-15 NOTE — TELEPHONE ENCOUNTER
I spoke to patient and advised him that the appointment for today was cancelled since he was seen in May and didn't need a follow up appointment until Sept. He understands and said he needed to speak to his wife regarding their schedule and will call back to schedule.

## 2024-08-15 NOTE — TELEPHONE ENCOUNTER
"Reason for Disposition   Palpitations are a chronic symptom (recurrent or ongoing AND present > 4 weeks)    Answer Assessment - Initial Assessment Questions  1. DESCRIPTION: \"Please describe your heart rate or heartbeat that you are having\" (e.g., fast/slow, regular/irregular, skipped or extra beats, \"palpitations\")      HR was 50 BPM last evening  2. ONSET: \"When did it start?\" (Minutes, hours or days)       Last night   3. DURATION: \"How long does it last\" (e.g., seconds, minutes, hours)      Only a few seconds  4. PATTERN \"Does it come and go, or has it been constant since it started?\"  \"Does it get worse with exertion?\"   \"Are you feeling it now?\"      Not feeling it now . Only one episode      7. RECURRENT SYMPTOM: \"Have you ever had this before?\" If Yes, ask: \"When was the last time?\" and \"What happened that time?\"       History of PAF after a hospitalization for sepsis  8. CAUSE: \"What do you think is causing the palpitations?\"      Unsure, possible A fib  9. CARDIAC HISTORY: \"Do you have any history of heart disease?\" (e.g., heart attack, angina, bypass surgery, angioplasty, arrhythmia)       History of PAF, dilated cardiomyopathy    Protocols used: Heart Rate and Heartbeat Questions-ADULT-[place]    "

## 2024-08-15 NOTE — TELEPHONE ENCOUNTER
Chief Complaint: Short A fib episode HR 50 BPM    History of Present Illness (HPI):History of PAF, dilated cardiomyopathy    VS/Weight: 110/86    Pain: No    Risk Factors: Arrhythmia    Recent Testing: Echo and Labs    Medicine: Not on an anticoagulant.    Upcoming Office Visit: Yes    Last Office Visit: 4/24/24    Additional Comments: Patient had a very brief episode of A fib per his smart watch last evening. Lasted only about 20 seconds. He felt palpitations, took an EKG on his smart watch which indicated atrial fibrillation with HR 50 BPM.  He had no other symptoms. He rechecked EKG right away and was back in normal rhythm per his watch.  Typical HR is  per patient  He is not on an anticoagulant and takes metoprolol 25 mg daily. No longer taking Entresto.  I scheduled him with Matt on 8/23.  He has an outstanding limited echo order which I advised him to schedule before the visit.  He will go to the ED if symptoms return or worsen.

## 2024-08-15 NOTE — TELEPHONE ENCOUNTER
Patient called stated he recently was discharged from the hospital after a month and a half for diabetes 2.  Patient was under the impression he had an appointment today in the office but it was canceled.  Patient does not know why or who canceled it.  Can someone from the office check and see if patient still needs to be seen and call him and let him know what is going on. He would greatly appreciate it. Patient can be reached at [phone].

## 2024-08-16 NOTE — TELEPHONE ENCOUNTER
Called, LM on VM for pt to call and schedule acute appt with the PA again for next week if there are still openings available.

## 2024-08-29 ENCOUNTER — HOSPITAL ENCOUNTER (OUTPATIENT)
Dept: NON INVASIVE DIAGNOSTICS | Facility: HOSPITAL | Age: 49
Discharge: HOME | End: 2024-08-29
Payer: COMMERCIAL

## 2024-08-29 VITALS
WEIGHT: 199 LBS | BODY MASS INDEX: 23.02 KG/M2 | DIASTOLIC BLOOD PRESSURE: 81 MMHG | HEART RATE: 71 BPM | HEIGHT: 78 IN | SYSTOLIC BLOOD PRESSURE: 128 MMHG

## 2024-08-29 DIAGNOSIS — I42.0 DILATED CARDIOMYOPATHY (HCC): ICD-10-CM

## 2024-08-29 LAB
APICAL FOUR CHAMBER EJECTION FRACTION: 44 %
INTERVENTRICULAR SEPTUM IN DIASTOLE (PARASTERNAL SHORT AXIS VIEW): 1.1 CM
LEFT VENTRICLE DIASTOLIC VOLUME (MOD BIPLANE): 165 ML
LEFT VENTRICLE SYSTOLIC VOLUME (MOD BIPLANE): 92 ML
LEFT VENTRICULAR POSTERIOR WALL IN END DIASTOLE: 0.8 CM
LV EF: 44 %
SL CV LV EF: 44

## 2024-08-29 PROCEDURE — 93325 DOPPLER ECHO COLOR FLOW MAPG: CPT

## 2024-08-29 PROCEDURE — 93321 DOPPLER ECHO F-UP/LMTD STD: CPT

## 2024-08-29 PROCEDURE — 93321 DOPPLER ECHO F-UP/LMTD STD: CPT | Performed by: INTERNAL MEDICINE

## 2024-08-29 PROCEDURE — 93308 TTE F-UP OR LMTD: CPT | Performed by: INTERNAL MEDICINE

## 2024-08-29 PROCEDURE — 93308 TTE F-UP OR LMTD: CPT

## 2024-08-29 PROCEDURE — 93325 DOPPLER ECHO COLOR FLOW MAPG: CPT | Performed by: INTERNAL MEDICINE

## 2024-08-29 RX ADMIN — PERFLUTREN 1 ML/MIN: 6.52 INJECTION, SUSPENSION INTRAVENOUS at 12:46

## 2024-09-06 ENCOUNTER — OFFICE VISIT (OUTPATIENT)
Dept: CARDIOLOGY CLINIC | Facility: CLINIC | Age: 49
End: 2024-09-06
Payer: COMMERCIAL

## 2024-09-06 ENCOUNTER — TELEPHONE (OUTPATIENT)
Age: 49
End: 2024-09-06

## 2024-09-06 VITALS
SYSTOLIC BLOOD PRESSURE: 102 MMHG | BODY MASS INDEX: 24.99 KG/M2 | WEIGHT: 216 LBS | HEIGHT: 78 IN | DIASTOLIC BLOOD PRESSURE: 60 MMHG | HEART RATE: 80 BPM

## 2024-09-06 DIAGNOSIS — I48.0 PAROXYSMAL ATRIAL FIBRILLATION (HCC): Primary | ICD-10-CM

## 2024-09-06 PROCEDURE — 99214 OFFICE O/P EST MOD 30 MIN: CPT | Performed by: PHYSICIAN ASSISTANT

## 2024-09-06 RX ORDER — LISINOPRIL AND HYDROCHLOROTHIAZIDE 20; 25 MG/1; MG/1
TABLET ORAL
COMMUNITY
Start: 2024-08-11

## 2024-09-06 RX ORDER — FENTANYL 75 UG/1
PATCH TRANSDERMAL
COMMUNITY
Start: 2024-08-19

## 2024-09-06 RX ORDER — EMPAGLIFLOZIN 10 MG/1
TABLET, FILM COATED ORAL
COMMUNITY
Start: 2024-09-06

## 2024-09-06 RX ORDER — INSULIN GLARGINE 100 [IU]/ML
INJECTION, SOLUTION SUBCUTANEOUS
COMMUNITY
Start: 2024-08-19

## 2024-09-06 NOTE — PROGRESS NOTES
Cardiology Office Follow Up  Armen Llanos  1975  [id number]      ASSESSMENT:  Paroxysmal atrial fibrillation  Dilated cardiomyopathy, EF 45%  Hypertension    PLAN:  EP referral placed to discuss options regarding AAT versus ablation.  Has upcoming appointment with Dr. Collado 9/25/2024. Will order 1 week ZIO event monitor to be done prior to this visit AF burden as patient reports of brief 10 to 20-second episodes of A-fib detected on his watch. Reports of palpitations and weakness during these episodes.  Repeat limited TTE shows only unchanged EF of 44% after 4 weeks of suppression with amiodarone. He has a pharmacologic MPI scheduled 9/19/2024 test for ischemic substrate.  Continue GDMT with Toprol-XL, Zocor, Jardiance, Norvasc, Eliquis, Lisinopril-HCTZ.  He will 4 to 6 months or sooner if needed.    Interval History/ HPI:   Viet is a 48-year-old male with medical history of atrial fibrillation, dilated cardiomyopathy with EF of 45%, hypertension, DM type II, chronic opioid use secondary to chronic back pain, TBI, chronic knee pain who presents for overdue 3-month follow-up visit.    Per our last OV on 5/20/2024:  Armen Llanos is a 48-year-old male presented to [facility] on 3/22/2024 due to right knee pain. Initial workup in the ED showed significant hyper glycemia without evidence of DKA and started on insulin gtt and IVF. Orthopedics was consulted for evaluation of right knee pain and concern for prosthetic knee replacement proceeded to the OR on 3/24 for right knee total arthroplasty.  During the procedure patient developed atrial fibrillation with RVR and hypotension requiring admission.  He was admitted to ICU level of care due to clinical decline and ultimately required to be intubated and placed on IV pressor support for short duration. Blood cultures were positive for MSSA bacteremia, ID consulted and placed on 6 weeks of IV antibiotics.  He was placed on IV amiodarone and  IV heparin and converted to sinus rhythm.  Inpatient TTE showed LVEF of 45% with global hypokinesis. He was switched to PO amiodarone load in addition to Toprol-XL and Eliquis for CVA prophylaxis in addition to Jardiance and Entresto which was affordable for him in the short term.      He has started taking Toprol-XL in addition to his GDMT of Entresto, Zocor, Norvasc and Eliquis. He is also maintained on antiarrhythmic therapy with amiodarone for 4 weeks which was completed. He was also sent for repeat limited TTE which showed overall unchanged EF. Sent for pharmacologic MPI which is currently scheduled for later this month.  Complained of intermittent palpitations lasting few seconds up to 20 seconds at a time and atrial fibrillation detected on his smart watch. He called our clinic for advice and was advised to make an appointment to be arranged for cardiac rhythm monitor.  He reports he has maintained compliance with his Toprol-XL 25 mg a day.  He reports he is no longer on Entresto assumably due to affordability concerns.  He is now on lisinopril-HCTZ instead.  Denies any other cardiac symptoms including chest pain, heaviness, pressure, orthopnea, PND, lower extremity edema, weight gain.  Episodes occur randomly and are associated with racing heartbeat and feeling generally weak.  Denies syncopal presyncopal symptoms.    Past Medical History:   Diagnosis Date    Bed sore, stage 1     Diabetes mellitus (HCC)     Hypertension      Social History     Socioeconomic History    Marital status: /Civil Union     Spouse name: Not on file    Number of children: Not on file    Years of education: Not on file    Highest education level: Not on file   Occupational History    Not on file   Tobacco Use    Smoking status: Former     Current packs/day: 0.00     Types: Cigarettes     Quit date: 2011     Years since quittin.7    Smokeless tobacco: Never   Vaping Use    Vaping status: Some Days    Substances:  Nicotine, THC, CBD, Flavoring   Substance and Sexual Activity    Alcohol use: Not Currently    Drug use: Yes     Types: Marijuana    Sexual activity: Not Currently   Other Topics Concern    Not on file   Social History Narrative    Not on file     Social Determinants of Health     Financial Resource Strain: Not on file   Food Insecurity: No Food Insecurity (7/12/2024)    Hunger Vital Sign     Worried About Running Out of Food in the Last Year: Never true     Ran Out of Food in the Last Year: Never true   Transportation Needs: No Transportation Needs (7/12/2024)    PRAPARE - Transportation     Lack of Transportation (Medical): No     Lack of Transportation (Non-Medical): No   Physical Activity: Not on file   Stress: Not on file   Social Connections: Unknown (6/18/2024)    Received from [facility]    Social eucl3D     How often do you feel lonely or isolated from those around you? (Adult - for ages 18 years and over): Not on file   Intimate Partner Violence: Not on file   Housing Stability: Low Risk  (7/12/2024)    Housing Stability Vital Sign     Unable to Pay for Housing in the Last Year: No     Number of Times Moved in the Last Year: 1     Homeless in the Last Year: No      No family history on file.  Past Surgical History:   Procedure Laterality Date    IR PICC PLACEMENT SINGLE LUMEN  4/1/2024    KNEE SURGERY      UT REVJ TOTAL KNEE ARTHRP W/WO ALGRFT 1 COMPONENT Right 3/24/2024    Procedure: ARTHROPLASTY KNEE TOTAL REVISION, POLY EXCHANGE;  Surgeon: Tao Washington DO;  Location:  MAIN OR;  Service: Orthopedics    TOE AMPUTATION Left 3/28/2024    Procedure: AMPUTATION TOE 2nd;  Surgeon: Rossy Toussaint DPM;  Location:  MAIN OR;  Service: Podiatry       Current Outpatient Medications:     Alcohol Swabs 70 % PADS, May substitute brand based on insurance coverage. Check glucose ACHS., Disp: 200 each, Rfl: 0    amLODIPine (NORVASC) 5 mg tablet, Take 1 tablet (5 mg total) by mouth 2 (two) times a day,  Disp: 60 tablet, Rfl: 0    Blood Glucose Monitoring Suppl (OneTouch Verio Reflect) w/Device KIT, May substitute brand based on insurance coverage. Check glucose ACHS., Disp: 1 kit, Rfl: 0    cholecalciferol (VITAMIN D3) 250 MCG (61369 UT) capsule, Vitamin D3, Disp: , Rfl:     clonazePAM (KlonoPIN) 1 mg tablet, , Disp: , Rfl:     cyclobenzaprine (FLEXERIL) 10 mg tablet, Take 0.5 tablets (5 mg total) by mouth 3 (three) times a day as needed for muscle spasms, Disp: 30 tablet, Rfl: 0    doxycycline (DORYX) 100 MG EC tablet, Take 100 mg by mouth 2 (two) times a day, Disp: , Rfl:     esomeprazole (NexIUM) 20 mg capsule, Take 20 mg by mouth 2 (two) times a day before meals, Disp: , Rfl:     fentaNYL (DURAGESIC) 75 mcg/hr, , Disp: , Rfl:     glucose blood (OneTouch Verio) test strip, May substitute brand based on insurance coverage. Check glucose ACHS., Disp: 200 each, Rfl: 0    insulin lispro (HumaLOG KwikPen) 100 units/mL injection pen, Inject 13 Units under the skin 3 (three) times a day with meals, Disp: 15 mL, Rfl: 2    Insulin Pen Needle (BD Pen Needle Carisa 2nd Gen) 32G X 4 MM MISC, For use with insulin pen. Pharmacy may dispense brand covered by insurance., Disp: 100 each, Rfl: 0    Jardiance 10 MG TABS tablet, , Disp: , Rfl:     Lantus SoloStar 100 units/mL SOPN, Pt reports taking 40 units daily, Disp: , Rfl:     lisinopril-hydrochlorothiazide (PRINZIDE,ZESTORETIC) 20-25 MG per tablet, , Disp: , Rfl:     metoprolol succinate (TOPROL-XL) 25 mg 24 hr tablet, Take 1 tablet (25 mg total) by mouth daily, Disp: 90 tablet, Rfl: 3    morphine (MS CONTIN) 60 mg 12 hr tablet, , Disp: , Rfl:     multivitamin (THERAGRAN) TABS, Take 1 tablet by mouth daily, Disp: , Rfl:     ondansetron (ZOFRAN) 8 mg tablet, TAKE 1 TABLET (8 MG) BY MOUTH 3 TIMES PER DAY AS NEEDED, Disp: , Rfl:     OneTouch Delica Lancets 33G MISC, May substitute brand based on insurance coverage. Check glucose ACHS., Disp: 200 each, Rfl: 0    risperiDONE  (RisperDAL) 3 mg tablet, Take 3 mg by mouth daily, Disp: , Rfl:     simvastatin (ZOCOR) 40 mg tablet, Take 40 mg by mouth daily, Disp: , Rfl:     SUMAtriptan (IMITREX) 100 mg tablet, Take 100 mg by mouth Pt reports taking PRN, Disp: , Rfl:     zolpidem (AMBIEN) 10 mg tablet, Take 1 tablet (10 mg total) by mouth daily at bedtime as needed for sleep, Disp: 10 tablet, Rfl: 0    polyethylene glycol (MIRALAX) 17 g packet, Take 17 g by mouth 2 (two) times a day (Patient not taking: Reported on 7/11/2024), Disp: 20 each, Rfl: 0    sacubitril-valsartan (Entresto) 24-26 MG TABS, Take 1 tablet by mouth 2 (two) times a day This medication until your kidney function is normal or cleared by nephrology/cardiology Do not start before April 30, 2024. (Patient not taking: Reported on 7/11/2024), Disp: 60 tablet, Rfl: 0    senna-docusate sodium (SENOKOT S) 8.6-50 mg per tablet, Take 2 tablets by mouth 2 (two) times a day (Patient not taking: Reported on 5/10/2024), Disp: 30 tablet, Rfl: 0    testosterone cypionate (DEPO-TESTOSTERONE) 200 mg/mL SOLN, use as directed INJECT 2 MLS WEEKLY (Patient not taking: Reported on 7/11/2024), Disp: , Rfl:       Review of Systems:  Review of Systems   Constitutional:  Negative for appetite change, chills, diaphoresis, fatigue and fever.   Respiratory:  Negative for cough, chest tightness and shortness of breath.    Cardiovascular:  Negative for chest pain, palpitations and leg swelling.   Gastrointestinal:  Negative for diarrhea, nausea and vomiting.   Endocrine: Negative for cold intolerance and heat intolerance.   Genitourinary:  Negative for difficulty urinating, dysuria and enuresis.   Musculoskeletal:  Negative for arthralgias, back pain and gait problem.   Allergic/Immunologic: Negative for environmental allergies and food allergies.   Neurological:  Negative for dizziness, facial asymmetry and headaches.   Hematological:  Negative for adenopathy. Does not bruise/bleed easily.    Psychiatric/Behavioral:  Negative for agitation, behavioral problems and confusion.          Physical Exam:  Physical Exam  Constitutional:       Appearance: He is well-developed.   HENT:      Right Ear: External ear normal.      Left Ear: External ear normal.   Eyes:      Extraocular Movements: EOM normal.      Pupils: Pupils are equal, round, and reactive to light.   Cardiovascular:      Rate and Rhythm: Normal rate and regular rhythm.      Heart sounds: Normal heart sounds. No murmur heard.     No friction rub. No gallop.   Pulmonary:      Effort: Pulmonary effort is normal.      Breath sounds: Normal breath sounds.   Abdominal:      Palpations: Abdomen is soft.   Musculoskeletal:         General: Normal range of motion.      Cervical back: Normal range of motion.   Skin:     General: Skin is warm and dry.   Neurological:      Mental Status: He is alert and oriented to person, place, and time.      Deep Tendon Reflexes: Reflexes are normal and symmetric.   Psychiatric:         Mood and Affect: Mood and affect normal.         Behavior: Behavior normal.         Thought Content: Thought content normal.         Judgment: Judgment normal.         This note was completed in part utilizing [employer] Software.  Grammatical errors, random word insertions, spelling mistakes, and incomplete sentences can be an occasional consequence of this system secondary to software limitations, ambient noise, and hardware issues.  If you have any questions or concerns about the content, text, or information contained within the body of this dictation, please contact the provider for clarification.

## 2024-09-06 NOTE — TELEPHONE ENCOUNTER
Caller: Patient     Doctor: Arturo Ferro PA-C     Reason for call: Patient had an ov this morning & stated while in the office he thinks he left his coffee mug which is blueish/green. Pt would like a call back to confirm.     Call back#: [phone]

## 2024-09-11 ENCOUNTER — TELEPHONE (OUTPATIENT)
Dept: CARDIOLOGY CLINIC | Facility: CLINIC | Age: 49
End: 2024-09-11

## 2024-09-11 NOTE — TELEPHONE ENCOUNTER
AMB Extended Holter Monitor ([id number]) and ([id number]):    No Authorization Required per Jimenez  (precert) on  9/11/2024 at 1:39pm    Reference #[id number]

## 2024-09-17 ENCOUNTER — HOSPITAL ENCOUNTER (EMERGENCY)
Facility: HOSPITAL | Age: 49
End: 2024-09-18
Attending: EMERGENCY MEDICINE
Payer: COMMERCIAL

## 2024-09-17 DIAGNOSIS — R45.851 SUICIDAL IDEATIONS: Primary | ICD-10-CM

## 2024-09-17 DIAGNOSIS — F41.9 ANXIETY: ICD-10-CM
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
## 2024-09-17 DIAGNOSIS — F11.90 CHRONIC, CONTINUOUS USE OF OPIOIDS: ICD-10-CM

## 2024-09-17 DIAGNOSIS — Z00.8 ENCOUNTER FOR PSYCHOLOGICAL EVALUATION: ICD-10-CM

## 2024-09-17 PROCEDURE — 82075 ASSAY OF BREATH ETHANOL: CPT | Performed by: EMERGENCY MEDICINE

## 2024-09-17 PROCEDURE — 99285 EMERGENCY DEPT VISIT HI MDM: CPT

## 2024-09-18 ENCOUNTER — HOSPITAL ENCOUNTER (INPATIENT)
Facility: HOSPITAL | Age: 49
LOS: 1 days | Discharge: HOME | DRG: 885 | End: 2024-09-19
Attending: PSYCHIATRY & NEUROLOGY | Admitting: PSYCHIATRY & NEUROLOGY
Payer: COMMERCIAL

## 2024-09-18 ENCOUNTER — APPOINTMENT (EMERGENCY)
Dept: RADIOLOGY | Facility: HOSPITAL | Age: 49
End: 2024-09-18
Payer: COMMERCIAL

## 2024-09-18 VITALS
RESPIRATION RATE: 18 BRPM | HEART RATE: 83 BPM | SYSTOLIC BLOOD PRESSURE: 146 MMHG | DIASTOLIC BLOOD PRESSURE: 84 MMHG | OXYGEN SATURATION: 96 % | TEMPERATURE: 98.5 F

## 2024-09-18 DIAGNOSIS — R45.851 SUICIDAL IDEATIONS: ICD-10-CM

## 2024-09-18 DIAGNOSIS — F39 MOOD DISORDER (HCC): Primary | ICD-10-CM

## 2024-09-18 DIAGNOSIS — S06.9XAD TRAUMATIC BRAIN INJURY, WITH UNKNOWN LOSS OF CONSCIOUSNESS STATUS, SUBSEQUENT ENCOUNTER: ICD-10-CM

## 2024-09-18 DIAGNOSIS — G89.4 CHRONIC PAIN DISORDER: ICD-10-CM

## 2024-09-18 DIAGNOSIS — I48.91 ATRIAL FIBRILLATION, UNSPECIFIED TYPE (HCC): ICD-10-CM

## 2024-09-18 PROBLEM — F33.2 MAJOR DEPRESSIVE DISORDER, RECURRENT SEVERE WITHOUT PSYCHOTIC FEATURES (HCC): Status: ACTIVE | Noted: 2024-09-18

## 2024-09-18 LAB
ALBUMIN SERPL BCG-MCNC: 3.1 G/DL (ref 3.5–5)
ALP SERPL-CCNC: 79 U/L (ref 34–104)
ALT SERPL W P-5'-P-CCNC: 19 U/L (ref 7–52)
AMPHETAMINES SERPL QL SCN: NEGATIVE
ANION GAP SERPL CALCULATED.3IONS-SCNC: 9 MMOL/L (ref 4–13)
APAP SERPL-MCNC: 6 UG/ML (ref 10–20)
APTT PPP: 32 SECONDS (ref 23–34)
AST SERPL W P-5'-P-CCNC: 14 U/L (ref 13–39)
ATRIAL RATE: 114 BPM
BARBITURATES UR QL: NEGATIVE
BASOPHILS # BLD AUTO: 0.03 THOUSANDS/ΜL (ref 0–0.1)
BASOPHILS NFR BLD AUTO: 0 % (ref 0–1)
BENZODIAZ UR QL: NEGATIVE
BILIRUB SERPL-MCNC: 0.38 MG/DL (ref 0.2–1)
BNP SERPL-MCNC: 85 PG/ML (ref 0–100)
BUN SERPL-MCNC: 14 MG/DL (ref 5–25)
CALCIUM ALBUM COR SERPL-MCNC: 9.5 MG/DL (ref 8.3–10.1)
CALCIUM SERPL-MCNC: 8.8 MG/DL (ref 8.4–10.2)
CARDIAC TROPONIN I PNL SERPL HS: 5 NG/L
CHLORIDE SERPL-SCNC: 105 MMOL/L (ref 96–108)
CO2 SERPL-SCNC: 22 MMOL/L (ref 21–32)
COCAINE UR QL: NEGATIVE
CREAT SERPL-MCNC: 0.95 MG/DL (ref 0.6–1.3)
EOSINOPHIL # BLD AUTO: 0.07 THOUSAND/ΜL (ref 0–0.61)
EOSINOPHIL NFR BLD AUTO: 1 % (ref 0–6)
ERYTHROCYTE [DISTWIDTH] IN BLOOD BY AUTOMATED COUNT: 13.2 % (ref 11.6–15.1)
ETHANOL EXG-MCNC: 0 MG/DL
ETHANOL SERPL-MCNC: <10 MG/DL
FENTANYL UR QL SCN: POSITIVE
GFR SERPL CREATININE-BSD FRML MDRD: 92 ML/MIN/1.73SQ M
GLUCOSE SERPL-MCNC: 132 MG/DL (ref 65–140)
GLUCOSE SERPL-MCNC: 172 MG/DL (ref 65–140)
GLUCOSE SERPL-MCNC: 221 MG/DL (ref 65–140)
GLUCOSE SERPL-MCNC: 279 MG/DL (ref 65–140)
GLUCOSE SERPL-MCNC: 44 MG/DL (ref 65–140)
HCT VFR BLD AUTO: 35.5 % (ref 36.5–49.3)
HGB BLD-MCNC: 12.6 G/DL (ref 12–17)
HYDROCODONE UR QL SCN: POSITIVE
IMM GRANULOCYTES # BLD AUTO: 0.03 THOUSAND/UL (ref 0–0.2)
IMM GRANULOCYTES NFR BLD AUTO: 0 % (ref 0–2)
INR PPP: 1.04 (ref 0.85–1.19)
LYMPHOCYTES # BLD AUTO: 1.35 THOUSANDS/ΜL (ref 0.6–4.47)
LYMPHOCYTES NFR BLD AUTO: 19 % (ref 14–44)
MCH RBC QN AUTO: 29.4 PG (ref 26.8–34.3)
MCHC RBC AUTO-ENTMCNC: 35.5 G/DL (ref 31.4–37.4)
MCV RBC AUTO: 83 FL (ref 82–98)
METHADONE UR QL: NEGATIVE
MONOCYTES # BLD AUTO: 0.29 THOUSAND/ΜL (ref 0.17–1.22)
MONOCYTES NFR BLD AUTO: 4 % (ref 4–12)
NEUTROPHILS # BLD AUTO: 5.44 THOUSANDS/ΜL (ref 1.85–7.62)
NEUTS SEG NFR BLD AUTO: 76 % (ref 43–75)
NRBC BLD AUTO-RTO: 0 /100 WBCS
OPIATES UR QL SCN: POSITIVE
OXYCODONE+OXYMORPHONE UR QL SCN: NEGATIVE
P AXIS: 45 DEGREES
PCP UR QL: NEGATIVE
PLATELET # BLD AUTO: 268 THOUSANDS/UL (ref 149–390)
PMV BLD AUTO: 10.4 FL (ref 8.9–12.7)
POTASSIUM SERPL-SCNC: 3.8 MMOL/L (ref 3.5–5.3)
PR INTERVAL: 146 MS
PROT SERPL-MCNC: 6.4 G/DL (ref 6.4–8.4)
PROTHROMBIN TIME: 14.1 SECONDS (ref 12.3–15)
QRS AXIS: 16 DEGREES
QRSD INTERVAL: 82 MS
QT INTERVAL: 322 MS
QTC INTERVAL: 443 MS
RBC # BLD AUTO: 4.28 MILLION/UL (ref 3.88–5.62)
SALICYLATES SERPL-MCNC: <5 MG/DL (ref 3–20)
SODIUM SERPL-SCNC: 136 MMOL/L (ref 135–147)
T WAVE AXIS: -6 DEGREES
THC UR QL: POSITIVE
TSH SERPL DL<=0.05 MIU/L-ACNC: 0.66 UIU/ML (ref 0.45–4.5)
VENTRICULAR RATE: 114 BPM
WBC # BLD AUTO: 7.21 THOUSAND/UL (ref 4.31–10.16)

## 2024-09-18 PROCEDURE — 83880 ASSAY OF NATRIURETIC PEPTIDE: CPT | Performed by: EMERGENCY MEDICINE

## 2024-09-18 PROCEDURE — 85025 COMPLETE CBC W/AUTO DIFF WBC: CPT | Performed by: EMERGENCY MEDICINE

## 2024-09-18 PROCEDURE — 96372 THER/PROPH/DIAG INJ SC/IM: CPT

## 2024-09-18 PROCEDURE — 84484 ASSAY OF TROPONIN QUANT: CPT | Performed by: EMERGENCY MEDICINE

## 2024-09-18 PROCEDURE — 99285 EMERGENCY DEPT VISIT HI MDM: CPT | Performed by: EMERGENCY MEDICINE

## 2024-09-18 PROCEDURE — 36415 COLL VENOUS BLD VENIPUNCTURE: CPT | Performed by: EMERGENCY MEDICINE

## 2024-09-18 PROCEDURE — 93010 ELECTROCARDIOGRAM REPORT: CPT | Performed by: INTERNAL MEDICINE

## 2024-09-18 PROCEDURE — 82077 ASSAY SPEC XCP UR&BREATH IA: CPT | Performed by: EMERGENCY MEDICINE

## 2024-09-18 PROCEDURE — 84443 ASSAY THYROID STIM HORMONE: CPT | Performed by: EMERGENCY MEDICINE

## 2024-09-18 PROCEDURE — G0427 INPT/ED TELECONSULT70: HCPCS | Performed by: STUDENT IN AN ORGANIZED HEALTH CARE EDUCATION/TRAINING PROGRAM

## 2024-09-18 PROCEDURE — 80143 DRUG ASSAY ACETAMINOPHEN: CPT | Performed by: EMERGENCY MEDICINE

## 2024-09-18 PROCEDURE — 80053 COMPREHEN METABOLIC PANEL: CPT | Performed by: EMERGENCY MEDICINE

## 2024-09-18 PROCEDURE — 71045 X-RAY EXAM CHEST 1 VIEW: CPT

## 2024-09-18 PROCEDURE — 85610 PROTHROMBIN TIME: CPT | Performed by: EMERGENCY MEDICINE

## 2024-09-18 PROCEDURE — 85730 THROMBOPLASTIN TIME PARTIAL: CPT | Performed by: EMERGENCY MEDICINE

## 2024-09-18 PROCEDURE — 93005 ELECTROCARDIOGRAM TRACING: CPT

## 2024-09-18 PROCEDURE — 80307 DRUG TEST PRSMV CHEM ANLYZR: CPT | Performed by: EMERGENCY MEDICINE

## 2024-09-18 PROCEDURE — 96374 THER/PROPH/DIAG INJ IV PUSH: CPT

## 2024-09-18 PROCEDURE — 82948 REAGENT STRIP/BLOOD GLUCOSE: CPT

## 2024-09-18 PROCEDURE — 80179 DRUG ASSAY SALICYLATE: CPT | Performed by: EMERGENCY MEDICINE

## 2024-09-18 RX ORDER — BENZTROPINE MESYLATE 1 MG/ML
1 INJECTION, SOLUTION INTRAMUSCULAR; INTRAVENOUS
Status: CANCELLED | OUTPATIENT
Start: 2024-09-18

## 2024-09-18 RX ORDER — MIDAZOLAM HYDROCHLORIDE 2 MG/2ML
2 INJECTION, SOLUTION INTRAMUSCULAR; INTRAVENOUS ONCE
Status: CANCELLED | OUTPATIENT
Start: 2024-09-18 | End: 2024-09-18

## 2024-09-18 RX ORDER — IBUPROFEN 600 MG/1
600 TABLET, FILM COATED ORAL EVERY 6 HOURS PRN
Status: DISCONTINUED | OUTPATIENT
Start: 2024-09-18 | End: 2024-09-19 | Stop reason: HOSPADM

## 2024-09-18 RX ORDER — BENZTROPINE MESYLATE 1 MG/1
1 TABLET ORAL
Status: DISCONTINUED | OUTPATIENT
Start: 2024-09-18 | End: 2024-09-19 | Stop reason: HOSPADM

## 2024-09-18 RX ORDER — MORPHINE SULFATE 15 MG/1
60 TABLET, FILM COATED, EXTENDED RELEASE ORAL EVERY 12 HOURS PRN
Status: DISCONTINUED | OUTPATIENT
Start: 2024-09-18 | End: 2024-09-18

## 2024-09-18 RX ORDER — BENZTROPINE MESYLATE 1 MG/ML
0.5 INJECTION, SOLUTION INTRAMUSCULAR; INTRAVENOUS
Status: DISCONTINUED | OUTPATIENT
Start: 2024-09-18 | End: 2024-09-19 | Stop reason: HOSPADM

## 2024-09-18 RX ORDER — HALOPERIDOL 5 MG/ML
2.5 INJECTION INTRAMUSCULAR
Status: DISCONTINUED | OUTPATIENT
Start: 2024-09-18 | End: 2024-09-19 | Stop reason: HOSPADM

## 2024-09-18 RX ORDER — CLONAZEPAM 0.5 MG/1
1 TABLET ORAL 2 TIMES DAILY
Status: DISCONTINUED | OUTPATIENT
Start: 2024-09-18 | End: 2024-09-18 | Stop reason: HOSPADM

## 2024-09-18 RX ORDER — BENZTROPINE MESYLATE 1 MG/ML
0.5 INJECTION, SOLUTION INTRAMUSCULAR; INTRAVENOUS
Status: CANCELLED | OUTPATIENT
Start: 2024-09-18

## 2024-09-18 RX ORDER — SUMATRIPTAN 25 MG/1
100 TABLET, FILM COATED ORAL ONCE
Status: COMPLETED | OUTPATIENT
Start: 2024-09-18 | End: 2024-09-18

## 2024-09-18 RX ORDER — HYDROXYZINE HYDROCHLORIDE 50 MG/1
50 TABLET, FILM COATED ORAL
Status: DISCONTINUED | OUTPATIENT
Start: 2024-09-18 | End: 2024-09-19 | Stop reason: HOSPADM

## 2024-09-18 RX ORDER — LANOLIN ALCOHOL/MO/W.PET/CERES
3 CREAM (GRAM) TOPICAL
Status: CANCELLED | OUTPATIENT
Start: 2024-09-18

## 2024-09-18 RX ORDER — TRAZODONE HYDROCHLORIDE 50 MG/1
50 TABLET, FILM COATED ORAL
Status: CANCELLED | OUTPATIENT
Start: 2024-09-18

## 2024-09-18 RX ORDER — LORAZEPAM 2 MG/ML
2 INJECTION INTRAMUSCULAR EVERY 6 HOURS PRN
Status: DISCONTINUED | OUTPATIENT
Start: 2024-09-18 | End: 2024-09-19 | Stop reason: HOSPADM

## 2024-09-18 RX ORDER — HALOPERIDOL 1 MG/1
1 TABLET ORAL EVERY 6 HOURS PRN
Status: DISCONTINUED | OUTPATIENT
Start: 2024-09-18 | End: 2024-09-19 | Stop reason: HOSPADM

## 2024-09-18 RX ORDER — DIPHENHYDRAMINE HYDROCHLORIDE 50 MG/ML
50 INJECTION INTRAMUSCULAR; INTRAVENOUS EVERY 6 HOURS PRN
Status: DISCONTINUED | OUTPATIENT
Start: 2024-09-18 | End: 2024-09-19 | Stop reason: HOSPADM

## 2024-09-18 RX ORDER — INSULIN LISPRO 100 [IU]/ML
13 INJECTION, SOLUTION INTRAVENOUS; SUBCUTANEOUS
Status: DISCONTINUED | OUTPATIENT
Start: 2024-09-18 | End: 2024-09-18 | Stop reason: HOSPADM

## 2024-09-18 RX ORDER — LORAZEPAM 2 MG/ML
2 INJECTION INTRAMUSCULAR EVERY 6 HOURS PRN
Status: CANCELLED | OUTPATIENT
Start: 2024-09-18

## 2024-09-18 RX ORDER — HYDROXYZINE HYDROCHLORIDE 25 MG/1
50 TABLET, FILM COATED ORAL
Status: CANCELLED | OUTPATIENT
Start: 2024-09-18

## 2024-09-18 RX ORDER — HALOPERIDOL 5 MG/ML
5 INJECTION INTRAMUSCULAR
Status: DISCONTINUED | OUTPATIENT
Start: 2024-09-18 | End: 2024-09-19 | Stop reason: HOSPADM

## 2024-09-18 RX ORDER — CYCLOBENZAPRINE HCL 10 MG
5 TABLET ORAL 3 TIMES DAILY PRN
Status: DISCONTINUED | OUTPATIENT
Start: 2024-09-18 | End: 2024-09-18 | Stop reason: HOSPADM

## 2024-09-18 RX ORDER — POLYETHYLENE GLYCOL 3350 17 G/17G
17 POWDER, FOR SOLUTION ORAL DAILY PRN
Status: DISCONTINUED | OUTPATIENT
Start: 2024-09-18 | End: 2024-09-19 | Stop reason: HOSPADM

## 2024-09-18 RX ORDER — LORAZEPAM 2 MG/ML
2 INJECTION INTRAMUSCULAR
Status: CANCELLED | OUTPATIENT
Start: 2024-09-18

## 2024-09-18 RX ORDER — AMLODIPINE BESYLATE 5 MG/1
5 TABLET ORAL 2 TIMES DAILY
Status: DISCONTINUED | OUTPATIENT
Start: 2024-09-18 | End: 2024-09-18 | Stop reason: HOSPADM

## 2024-09-18 RX ORDER — LANOLIN ALCOHOL/MO/W.PET/CERES
3 CREAM (GRAM) TOPICAL
Status: DISCONTINUED | OUTPATIENT
Start: 2024-09-18 | End: 2024-09-19 | Stop reason: HOSPADM

## 2024-09-18 RX ORDER — BISACODYL 10 MG
10 SUPPOSITORY, RECTAL RECTAL DAILY PRN
Status: CANCELLED | OUTPATIENT
Start: 2024-09-18

## 2024-09-18 RX ORDER — PROPRANOLOL HCL 20 MG
10 TABLET ORAL EVERY 8 HOURS PRN
Status: CANCELLED | OUTPATIENT
Start: 2024-09-18

## 2024-09-18 RX ORDER — MORPHINE SULFATE 15 MG/1
60 TABLET ORAL ONCE
Status: COMPLETED | OUTPATIENT
Start: 2024-09-18 | End: 2024-09-18

## 2024-09-18 RX ORDER — METOPROLOL SUCCINATE 25 MG/1
25 TABLET, EXTENDED RELEASE ORAL DAILY
Status: DISCONTINUED | OUTPATIENT
Start: 2024-09-18 | End: 2024-09-18 | Stop reason: HOSPADM

## 2024-09-18 RX ORDER — IBUPROFEN 400 MG/1
800 TABLET, FILM COATED ORAL EVERY 8 HOURS PRN
Status: DISCONTINUED | OUTPATIENT
Start: 2024-09-18 | End: 2024-09-19 | Stop reason: HOSPADM

## 2024-09-18 RX ORDER — FENTANYL 75 UG/1
75 PATCH TRANSDERMAL ONCE
Status: DISCONTINUED | OUTPATIENT
Start: 2024-09-18 | End: 2024-09-18 | Stop reason: HOSPADM

## 2024-09-18 RX ORDER — LORAZEPAM 2 MG/ML
1 INJECTION INTRAMUSCULAR ONCE
Status: COMPLETED | OUTPATIENT
Start: 2024-09-18 | End: 2024-09-18

## 2024-09-18 RX ORDER — BISACODYL 10 MG
10 SUPPOSITORY, RECTAL RECTAL DAILY PRN
Status: DISCONTINUED | OUTPATIENT
Start: 2024-09-18 | End: 2024-09-19 | Stop reason: HOSPADM

## 2024-09-18 RX ORDER — LORAZEPAM 2 MG/ML
INJECTION INTRAMUSCULAR
Status: COMPLETED
Start: 2024-09-18 | End: 2024-09-18

## 2024-09-18 RX ORDER — HALOPERIDOL 5 MG/ML
2.5 INJECTION INTRAMUSCULAR
Status: CANCELLED | OUTPATIENT
Start: 2024-09-18

## 2024-09-18 RX ORDER — HYDROXYZINE HYDROCHLORIDE 25 MG/1
25 TABLET, FILM COATED ORAL
Status: DISCONTINUED | OUTPATIENT
Start: 2024-09-18 | End: 2024-09-19 | Stop reason: HOSPADM

## 2024-09-18 RX ORDER — TRAZODONE HYDROCHLORIDE 50 MG/1
50 TABLET, FILM COATED ORAL
Status: DISCONTINUED | OUTPATIENT
Start: 2024-09-18 | End: 2024-09-19 | Stop reason: HOSPADM

## 2024-09-18 RX ORDER — PANTOPRAZOLE SODIUM 20 MG/1
20 TABLET, DELAYED RELEASE ORAL
Status: DISCONTINUED | OUTPATIENT
Start: 2024-09-18 | End: 2024-09-18 | Stop reason: HOSPADM

## 2024-09-18 RX ORDER — MAGNESIUM HYDROXIDE/ALUMINUM HYDROXICE/SIMETHICONE 120; 1200; 1200 MG/30ML; MG/30ML; MG/30ML
30 SUSPENSION ORAL EVERY 4 HOURS PRN
Status: CANCELLED | OUTPATIENT
Start: 2024-09-18

## 2024-09-18 RX ORDER — MORPHINE SULFATE 30 MG/1
90 TABLET, FILM COATED, EXTENDED RELEASE ORAL EVERY 12 HOURS SCHEDULED
Status: DISCONTINUED | OUTPATIENT
Start: 2024-09-18 | End: 2024-09-18 | Stop reason: HOSPADM

## 2024-09-18 RX ORDER — MORPHINE SULFATE 15 MG/1
40 TABLET ORAL ONCE
Status: COMPLETED | OUTPATIENT
Start: 2024-09-18 | End: 2024-09-18

## 2024-09-18 RX ORDER — POLYETHYLENE GLYCOL 3350 17 G/17G
17 POWDER, FOR SOLUTION ORAL DAILY PRN
Status: CANCELLED | OUTPATIENT
Start: 2024-09-18

## 2024-09-18 RX ORDER — HYDROXYZINE HYDROCHLORIDE 25 MG/1
100 TABLET, FILM COATED ORAL
Status: CANCELLED | OUTPATIENT
Start: 2024-09-18

## 2024-09-18 RX ORDER — DOXYCYCLINE 100 MG/1
100 CAPSULE ORAL ONCE
Status: COMPLETED | OUTPATIENT
Start: 2024-09-18 | End: 2024-09-18

## 2024-09-18 RX ORDER — ZOLPIDEM TARTRATE 5 MG/1
10 TABLET ORAL
Status: DISCONTINUED | OUTPATIENT
Start: 2024-09-18 | End: 2024-09-18 | Stop reason: HOSPADM

## 2024-09-18 RX ORDER — AMOXICILLIN 250 MG
1 CAPSULE ORAL DAILY PRN
Status: DISCONTINUED | OUTPATIENT
Start: 2024-09-18 | End: 2024-09-19 | Stop reason: HOSPADM

## 2024-09-18 RX ORDER — INSULIN GLARGINE 100 [IU]/ML
40 INJECTION, SOLUTION SUBCUTANEOUS
Status: DISCONTINUED | OUTPATIENT
Start: 2024-09-18 | End: 2024-09-18 | Stop reason: HOSPADM

## 2024-09-18 RX ORDER — DIPHENHYDRAMINE HYDROCHLORIDE 50 MG/ML
50 INJECTION INTRAMUSCULAR; INTRAVENOUS EVERY 6 HOURS PRN
Status: CANCELLED | OUTPATIENT
Start: 2024-09-18

## 2024-09-18 RX ORDER — MORPHINE SULFATE 15 MG/1
60 TABLET ORAL EVERY 12 HOURS PRN
Status: DISCONTINUED | OUTPATIENT
Start: 2024-09-18 | End: 2024-09-18

## 2024-09-18 RX ORDER — HALOPERIDOL 5 MG/ML
5 INJECTION INTRAMUSCULAR ONCE
Status: CANCELLED | OUTPATIENT
Start: 2024-09-18 | End: 2024-09-18

## 2024-09-18 RX ORDER — NICOTINE 21 MG/24HR
21 PATCH, TRANSDERMAL 24 HOURS TRANSDERMAL ONCE
Status: DISCONTINUED | OUTPATIENT
Start: 2024-09-18 | End: 2024-09-18 | Stop reason: HOSPADM

## 2024-09-18 RX ORDER — MAGNESIUM HYDROXIDE/ALUMINUM HYDROXICE/SIMETHICONE 120; 1200; 1200 MG/30ML; MG/30ML; MG/30ML
30 SUSPENSION ORAL EVERY 4 HOURS PRN
Status: DISCONTINUED | OUTPATIENT
Start: 2024-09-18 | End: 2024-09-19 | Stop reason: HOSPADM

## 2024-09-18 RX ORDER — BENZTROPINE MESYLATE 0.5 MG/1
1 TABLET ORAL
Status: CANCELLED | OUTPATIENT
Start: 2024-09-18

## 2024-09-18 RX ORDER — AMOXICILLIN 250 MG
1 CAPSULE ORAL DAILY PRN
Status: CANCELLED | OUTPATIENT
Start: 2024-09-18

## 2024-09-18 RX ORDER — HYDROXYZINE HYDROCHLORIDE 25 MG/1
25 TABLET, FILM COATED ORAL
Status: CANCELLED | OUTPATIENT
Start: 2024-09-18

## 2024-09-18 RX ORDER — LORAZEPAM 2 MG/ML
2 INJECTION INTRAMUSCULAR
Status: DISCONTINUED | OUTPATIENT
Start: 2024-09-18 | End: 2024-09-19 | Stop reason: HOSPADM

## 2024-09-18 RX ORDER — ZOLPIDEM TARTRATE 5 MG/1
10 TABLET ORAL
Status: DISCONTINUED | OUTPATIENT
Start: 2024-09-18 | End: 2024-09-19 | Stop reason: HOSPADM

## 2024-09-18 RX ORDER — HALOPERIDOL 5 MG/1
5 TABLET ORAL
Status: DISCONTINUED | OUTPATIENT
Start: 2024-09-18 | End: 2024-09-19 | Stop reason: HOSPADM

## 2024-09-18 RX ORDER — LORAZEPAM 2 MG/ML
1 INJECTION INTRAMUSCULAR
Status: DISCONTINUED | OUTPATIENT
Start: 2024-09-18 | End: 2024-09-19 | Stop reason: HOSPADM

## 2024-09-18 RX ORDER — LORAZEPAM 2 MG/ML
1 INJECTION INTRAMUSCULAR
Status: CANCELLED | OUTPATIENT
Start: 2024-09-18

## 2024-09-18 RX ORDER — HALOPERIDOL 5 MG/ML
5 INJECTION INTRAMUSCULAR
Status: CANCELLED | OUTPATIENT
Start: 2024-09-18

## 2024-09-18 RX ORDER — IBUPROFEN 600 MG/1
600 TABLET, FILM COATED ORAL EVERY 6 HOURS PRN
Status: CANCELLED | OUTPATIENT
Start: 2024-09-18

## 2024-09-18 RX ORDER — HYDROXYZINE HYDROCHLORIDE 50 MG/1
100 TABLET, FILM COATED ORAL
Status: DISCONTINUED | OUTPATIENT
Start: 2024-09-18 | End: 2024-09-19 | Stop reason: HOSPADM

## 2024-09-18 RX ORDER — BENZTROPINE MESYLATE 1 MG/ML
1 INJECTION, SOLUTION INTRAMUSCULAR; INTRAVENOUS
Status: DISCONTINUED | OUTPATIENT
Start: 2024-09-18 | End: 2024-09-19 | Stop reason: HOSPADM

## 2024-09-18 RX ORDER — IBUPROFEN 400 MG/1
400 TABLET, FILM COATED ORAL EVERY 4 HOURS PRN
Status: DISCONTINUED | OUTPATIENT
Start: 2024-09-18 | End: 2024-09-19 | Stop reason: HOSPADM

## 2024-09-18 RX ORDER — OXYMETAZOLINE HYDROCHLORIDE 0.05 G/100ML
2 SPRAY NASAL ONCE
Status: COMPLETED | OUTPATIENT
Start: 2024-09-18 | End: 2024-09-18

## 2024-09-18 RX ORDER — PROPRANOLOL HCL 10 MG
10 TABLET ORAL EVERY 8 HOURS PRN
Status: DISCONTINUED | OUTPATIENT
Start: 2024-09-18 | End: 2024-09-19 | Stop reason: HOSPADM

## 2024-09-18 RX ORDER — LORAZEPAM 1 MG/1
1 TABLET ORAL EVERY 6 HOURS PRN
Status: DISCONTINUED | OUTPATIENT
Start: 2024-09-18 | End: 2024-09-18

## 2024-09-18 RX ORDER — HALOPERIDOL 2 MG/1
5 TABLET ORAL
Status: CANCELLED | OUTPATIENT
Start: 2024-09-18

## 2024-09-18 RX ORDER — HALOPERIDOL 2 MG/1
1 TABLET ORAL EVERY 6 HOURS PRN
Status: CANCELLED | OUTPATIENT
Start: 2024-09-18

## 2024-09-18 RX ORDER — FENTANYL 75 UG/1
75 PATCH TRANSDERMAL
Status: DISCONTINUED | OUTPATIENT
Start: 2024-09-21 | End: 2024-09-19 | Stop reason: HOSPADM

## 2024-09-18 RX ORDER — ONDANSETRON 4 MG/1
4 TABLET, ORALLY DISINTEGRATING ORAL ONCE
Status: COMPLETED | OUTPATIENT
Start: 2024-09-18 | End: 2024-09-18

## 2024-09-18 RX ORDER — IBUPROFEN 400 MG/1
400 TABLET, FILM COATED ORAL EVERY 4 HOURS PRN
Status: CANCELLED | OUTPATIENT
Start: 2024-09-18

## 2024-09-18 RX ORDER — IBUPROFEN 400 MG/1
800 TABLET, FILM COATED ORAL EVERY 8 HOURS PRN
Status: CANCELLED | OUTPATIENT
Start: 2024-09-18

## 2024-09-18 RX ADMIN — LORAZEPAM 1 MG: 2 INJECTION INTRAMUSCULAR at 00:25

## 2024-09-18 RX ADMIN — PANTOPRAZOLE SODIUM 20 MG: 20 TABLET, DELAYED RELEASE ORAL at 08:23

## 2024-09-18 RX ADMIN — INSULIN GLARGINE 40 UNITS: 100 INJECTION, SOLUTION SUBCUTANEOUS at 03:36

## 2024-09-18 RX ADMIN — Medication 3 MG: at 23:57

## 2024-09-18 RX ADMIN — OXYMETAZOLINE HCL 2 SPRAY: 0.05 SPRAY NASAL at 04:21

## 2024-09-18 RX ADMIN — INSULIN GLARGINE 40 UNITS: 100 INJECTION, SOLUTION SUBCUTANEOUS at 21:15

## 2024-09-18 RX ADMIN — LORAZEPAM 1 MG: 1 TABLET ORAL at 09:53

## 2024-09-18 RX ADMIN — ONDANSETRON 4 MG: 4 TABLET, ORALLY DISINTEGRATING ORAL at 09:48

## 2024-09-18 RX ADMIN — INSULIN LISPRO 13 UNITS: 100 INJECTION, SOLUTION INTRAVENOUS; SUBCUTANEOUS at 08:23

## 2024-09-18 RX ADMIN — METOPROLOL SUCCINATE 25 MG: 25 TABLET, EXTENDED RELEASE ORAL at 08:23

## 2024-09-18 RX ADMIN — PANTOPRAZOLE SODIUM 20 MG: 20 TABLET, DELAYED RELEASE ORAL at 20:06

## 2024-09-18 RX ADMIN — AMLODIPINE BESYLATE 5 MG: 5 TABLET ORAL at 20:06

## 2024-09-18 RX ADMIN — AMLODIPINE BESYLATE 5 MG: 5 TABLET ORAL at 08:22

## 2024-09-18 RX ADMIN — CLONAZEPAM 1 MG: 0.5 TABLET ORAL at 13:55

## 2024-09-18 RX ADMIN — LORAZEPAM 1 MG: 2 INJECTION INTRAMUSCULAR; INTRAVENOUS at 00:25

## 2024-09-18 RX ADMIN — ZOLPIDEM TARTRATE 10 MG: 5 TABLET ORAL at 02:48

## 2024-09-18 RX ADMIN — MORPHINE SULFATE 37.5 MG: 15 TABLET ORAL at 10:17

## 2024-09-18 RX ADMIN — CYCLOBENZAPRINE 5 MG: 10 TABLET, FILM COATED ORAL at 16:03

## 2024-09-18 RX ADMIN — MORPHINE SULFATE 60 MG: 15 TABLET ORAL at 03:16

## 2024-09-18 RX ADMIN — NICOTINE POLACRILEX 4 MG: 2 GUM, CHEWING BUCCAL at 09:49

## 2024-09-18 RX ADMIN — ZOLPIDEM TARTRATE 10 MG: 5 TABLET ORAL at 23:57

## 2024-09-18 RX ADMIN — INSULIN LISPRO 13 UNITS: 100 INJECTION, SOLUTION INTRAVENOUS; SUBCUTANEOUS at 15:18

## 2024-09-18 RX ADMIN — MORPHINE SULFATE 90 MG: 30 TABLET, EXTENDED RELEASE ORAL at 16:04

## 2024-09-18 RX ADMIN — DOXYCYCLINE 100 MG: 100 CAPSULE ORAL at 08:33

## 2024-09-18 RX ADMIN — CLONAZEPAM 1 MG: 0.5 TABLET ORAL at 20:06

## 2024-09-18 RX ADMIN — FENTANYL 75 MCG: 75 PATCH TRANSDERMAL at 01:32

## 2024-09-18 RX ADMIN — NICOTINE 21 MG: 21 PATCH, EXTENDED RELEASE TRANSDERMAL at 01:32

## 2024-09-18 RX ADMIN — SUMATRIPTAN SUCCINATE 100 MG: 25 TABLET ORAL at 16:03

## 2024-09-18 NOTE — ED PROVIDER NOTES
1. Suicidal ideations    2. Encounter for psychological evaluation    3. Anxiety    4. Suicide, initial encounter ([facility])    5. Chronic, continuous use of opioids      ED Disposition       ED Disposition   Transfer to Behavioral Health Condition   --    Date/Time   Wed Sep 18, 2024  2:06 AM    Comment   Armen Llanos should be transferred out to [facility] and has been medically cleared.               Assessment & Plan       Medical Decision Making  49-year-old male with chronic pain, worsening suicidal ideation reported ingestion of multiple Flexeril pills this morning currently asymptomatic, 18 hours have passed since ingestion outside of the observation period for overdose of Flexeril.  Patient is asymptomatic.  Will obtain EKG to evaluate for arrhythmias lab work to evaluate for electrolyte abnormalities, dehydration,, panel to evaluate for coingestions such as aspirin and Tylenol given timing of ingestion will also obtain CMP to evaluate for transaminitis  Patient will be offered a 201 upon medical clearance    Amount and/or Complexity of Data Reviewed  Labs: ordered.  Radiology: ordered and independent interpretation performed.    Risk  OTC drugs.  Prescription drug management.  Decision regarding hospitalization.                  ED Course as of 09/19/24 0241   Tue Sep 17, 2024   2351 Medical record reviewed most recently seen by cardiology 9/6/2024, has history of paroxysmal atrial fibrillation with plans for EP evaluation for possible ablation, currently does also have cardiomyopathy with EF of 45%, diabetes, htn, currently on medical therapy   Toprol-XL, Zocor, Jardiance, Norvasc, Eliquis, Lisinopril-HCTZ. No medication changes at time of visit   Wed Sep 18, 2024   0105 Procedure Note: EKG  Date/Time: 09/18/24 1:05 AM   Performed by: CLINTON DINH  Authorized by: CLINTON DINH  Indications / Diagnosis: CP  ECG reviewed by me, the ED Provider: yes   The EKG demonstrates:  Rhythm: sinus tach 114  Intervals: normal  intervals  Axis: normal axis  QRS/Blocks: normal QRS  ST Changes: No acute ST Changes, no STD/DELMY.       0105 Pulse(!): 113  Patient crying   0242 Tylenol level 6, no transaminitis, no reported Tylenol ingestion low suspicion for Tylenol overdose no indication for NAC at this time   0303 Signed 201       Medications   LORazepam (ATIVAN) injection 1 mg (1 mg Intravenous Given 9/18/24 0025)   morphine (MSIR) IR tablet 60 mg (60 mg Oral Given 9/18/24 0316)   oxymetazoline (AFRIN) 0.05 % nasal spray 2 spray (2 sprays Each Nare Given 9/18/24 0421)   doxycycline hyclate (VIBRAMYCIN) capsule 100 mg (100 mg Oral Given 9/18/24 0833)   ondansetron (ZOFRAN-ODT) dispersible tablet 4 mg (4 mg Oral Given 9/18/24 0948)   morphine (MSIR) IR tablet 37.5 mg (37.5 mg Oral Given 9/18/24 1017)   SUMAtriptan (IMITREX) tablet 100 mg (100 mg Oral Given 9/18/24 1603)       History of Present Illness       49-year-old male with history of chronic pain, anxiety presents for evaluation of worsening suicidal thoughts, states that he has been in a lot of pain recently and it is complicated by his anxiety, this morning he took 8 to 10 pills of his Flexeril he states that it was not a suicidal attempt that he was just trying to go to sleep and take away the pain, he has done that multiple times in the past and knows exactly how much to take.  This happened approximately 18 hours ago this point.  Currently no chest pain or shortness of breath no nausea no vomiting, no diarrhea constipation.  No reported seizures.  No other coingestions.  Patient does have history of anxiety and recently was prescribed Xanax that he has not yet started.  No history of suicidal attempts in the past.  The reason he is here now is because his wife found out about his worsening mental condition and his been taking the pills this morning and encouraged him to come in for evaluation            Review of Systems   Constitutional:  Negative for appetite change, chills and  fever.   HENT:  Negative for rhinorrhea and sore throat.    Eyes:  Negative for photophobia and visual disturbance.   Respiratory:  Negative for cough and shortness of breath.    Cardiovascular:  Negative for chest pain and palpitations.   Gastrointestinal:  Negative for abdominal pain and diarrhea.   Genitourinary:  Negative for dysuria, frequency and urgency.   Skin:  Negative for rash.   Neurological:  Negative for dizziness and weakness.   Psychiatric/Behavioral:  Positive for suicidal ideas. The patient is nervous/anxious.    All other systems reviewed and are negative.          Objective     ED Triage Vitals [09/17/24 2356]   Temperature Pulse Blood Pressure Respirations SpO2 Patient Position - Orthostatic VS   98.5 °F (36.9 °C) (!) 131 (!) 133/103 20 98 % Sitting      Temp Source Heart Rate Source BP Location FiO2 (%) Pain Score    Temporal Monitor Left arm -- 7        Physical Exam  Vitals and nursing note reviewed.   Constitutional:       Appearance: He is well-developed.   HENT:      Head: Normocephalic and atraumatic.      Right Ear: External ear normal.      Left Ear: External ear normal.      Mouth/Throat:      Mouth: Oropharynx is clear and moist.   Eyes:      Extraocular Movements: EOM normal.      Conjunctiva/sclera: Conjunctivae normal.      Pupils: Pupils are equal, round, and reactive to light.   Neck:      Vascular: No JVD.      Trachea: No tracheal deviation.   Cardiovascular:      Rate and Rhythm: Normal rate and regular rhythm.      Heart sounds: Normal heart sounds. No murmur heard.     No friction rub. No gallop.   Pulmonary:      Effort: Pulmonary effort is normal. No respiratory distress.      Breath sounds: No stridor. No wheezing or rales.   Abdominal:      General: There is no distension.      Palpations: Abdomen is soft. There is no mass.      Tenderness: There is no abdominal tenderness. There is no guarding or rebound.   Musculoskeletal:         General: No edema. Normal range of  motion.      Cervical back: Normal range of motion and neck supple.   Skin:     General: Skin is warm and dry.      Coloration: Skin is not pale.      Findings: No erythema or rash.   Neurological:      Mental Status: He is alert and oriented to person, place, and time.      Cranial Nerves: No cranial nerve deficit.   Psychiatric:         Mood and Affect: Mood is anxious. Affect is labile and tearful.         Thought Content: Thought content includes suicidal ideation. Thought content does not include homicidal ideation. Thought content does not include homicidal or suicidal plan.         Labs Reviewed   RAPID DRUG SCREEN, URINE - Abnormal       Result Value    Amph/Meth UR Negative      Barbiturate Ur Negative      Benzodiazepine Urine Negative      Cocaine Urine Negative      Methadone Urine Negative      Opiate Urine Positive (*)     PCP Ur Negative      THC Urine Positive (*)     Oxycodone Urine Negative      Fentanyl Urine Positive (*)     HYDROCODONE URINE Positive (*)     Narrative:     Presumptive report. If requested, specimen will be sent to reference lab for confirmation.  FOR MEDICAL PURPOSES ONLY.   IF CONFIRMATION NEEDED PLEASE CONTACT THE LAB WITHIN 5 DAYS.    Drug Screen Cutoff Levels:  AMPHETAMINE/METHAMPHETAMINES  1000 ng/mL  BARBITURATES     200 ng/mL  BENZODIAZEPINES     200 ng/mL  COCAINE      300 ng/mL  METHADONE      300 ng/mL  OPIATES      300 ng/mL  PHENCYCLIDINE     25 ng/mL  THC       50 ng/mL  OXYCODONE      100 ng/mL  FENTANYL      5 ng/mL  HYDROCODONE     300 ng/mL   CBC AND DIFFERENTIAL - Abnormal    WBC 7.21      RBC 4.28      Hemoglobin 12.6      Hematocrit 35.5 (*)     MCV 83      MCH 29.4      MCHC 35.5      RDW 13.2      MPV 10.4      Platelets 268      nRBC 0      Segmented % 76 (*)     Immature Grans % 0      Lymphocytes % 19      Monocytes % 4      Eosinophils Relative 1      Basophils Relative 0      Absolute Neutrophils 5.44      Absolute Immature Grans 0.03      Absolute  Lymphocytes 1.35      Absolute Monocytes 0.29      Eosinophils Absolute 0.07      Basophils Absolute 0.03     COMPREHENSIVE METABOLIC PANEL - Abnormal    Sodium 136      Potassium 3.8      Chloride 105      CO2 22      ANION GAP 9      BUN 14      Creatinine 0.95      Glucose 221 (*)     Calcium 8.8      Corrected Calcium 9.5      AST 14      ALT 19      Alkaline Phosphatase 79      Total Protein 6.4      Albumin 3.1 (*)     Total Bilirubin 0.38      eGFR 92      Narrative:     National Kidney Disease Foundation guidelines for Chronic Kidney Disease (CKD):     Stage 1 with normal or high GFR (GFR > 90 mL/min/1.73 square meters)    Stage 2 Mild CKD (GFR = 60-89 mL/min/1.73 square meters)    Stage 3A Moderate CKD (GFR = 45-59 mL/min/1.73 square meters)    Stage 3B Moderate CKD (GFR = 30-44 mL/min/1.73 square meters)    Stage 4 Severe CKD (GFR = 15-29 mL/min/1.73 square meters)    Stage 5 End Stage CKD (GFR <15 mL/min/1.73 square meters)  Note: GFR calculation is accurate only with a steady state creatinine   ACETAMINOPHEN LEVEL - Abnormal    Acetaminophen Level 6 (*)    POCT GLUCOSE - Abnormal    POC Glucose 172 (*)    POCT GLUCOSE - Abnormal    POC Glucose 279 (*)    POCT GLUCOSE - Abnormal    POC Glucose 44 (*)    TSH, 3RD GENERATION - Normal    TSH 3RD GENERATON 0.658     MEDICAL ALCOHOL - Normal    Ethanol Lvl <10     PROTIME-INR - Normal    Protime 14.1      INR 1.04      Narrative:     INR Therapeutic Range    Indication                                             INR Range      Atrial Fibrillation                                               2.0-3.0  Hypercoagulable State                                    2.0.2.3  Left Ventricular Asist Device                            2.0-3.0  Mechanical Heart Valve                                  -    Aortic(with afib, MI, embolism, HF, LA enlargement,    and/or coagulopathy)                                     2.0-3.0 (2.5-3.5)     Mitral                                                              2.5-3.5  Prosthetic/Bioprosthetic Heart Valve               2.0-3.0  Venous thromboembolism (VTE: VT, PE        2.0-3.0   APTT - Normal    PTT 32     HS TROPONIN I 0HR - Normal    hs TnI 0hr 5     B-TYPE NATRIURETIC PEPTIDE (BNP) - Normal    BNP 85     SALICYLATE LEVEL - Normal    Salicylate Lvl <5     POCT ALCOHOL BREATH TEST - Normal    EXTBreath Alcohol 0.000     POCT GLUCOSE - Normal    POC Glucose 132     COMA PANEL    Narrative:     The following orders were created for panel order Coma Panel.  Procedure                               Abnormality         Status                     ---------                               -----------         ------                     Ethanol[[id number]]                                                                     Salicylate level[[id number]]             Normal              Final result               Acetaminophen level-If c...[[id number]]  Abnormal            Final result                 Please view results for these tests on the individual orders.     XR chest portable   ED Interpretation by Arely Calderón DO (09/18 9632)   This study was ordered and independently reviewed by me    No acute findings noted         Final Interpretation by Inderjit Castro MD (09/18 0656)      No acute cardiopulmonary disease.            Workstation performed: [id number]             Procedures       Arely Calderón DO  09/19/24 0248

## 2024-09-18 NOTE — ED NOTES
"Pt presents to ED w wife. Pt reported that he had ingested multiple Flexeril pills to alleviate pain. Pt reports that he \"knows what he is doing\" and has been \"doing this for years.\" This worker introduced self and role of the assessment, Pt agreed to be as helpful as possible in reporting. Pt reports in own words that \"I have been in pain for 25 years, I am done.\" Pt reports daily pain score to be 7.5 out of 10. Pt reports being at his wits end and that if he had been left alone at home \"he would have killed himself.\" Pt reports needing to see a pain specialist and that he feels that he is not heard and feels that his complaints go unanswered. Pt reports that seven years ago he hit his head in the basement and stated that at that time his pain increased \"1000 fold.\" Pt has TBI. 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 Pt is prescribed meds through PCP. Pt reports prior DX of Bipolar DO which he disagrees w/. Pt reports having been on Klonopin and was just prescribed Xanax. Pt reports hearing voices for the last 40 years telling him to end his life but he pays it no credence. Pt denies any visual hallucinations. Pt reports depression/anxiety to be 10, irritability/lability is also a 10. Pt reports attention span to be beyond poor and that Pt has no self control ordering 10-20 items on [employer] weekly. Pt reports not sleeping and that he eats a meal a day. Pt denies any HX of violence/firearms/or legal issues. Pt reports having suicidal ideations in the past but never attempted. Pt denies homicidal ideations at this time. Pt denies any HX of self abusive behaviors. Pt has a medical cannabis card for pain. Pt reports residing w/ wife. Pt reports feeling that he is letting his wife and children down due to his mood/condition. Pt is not employed currently. Pt irritable snapping at staff blaming his pain for his outbursts. Pt has issue w/ any person who does not " take his pain levels seriously. Pt was provided options and stated that he had every intention of signing a 201. Pt does not see how inpatient treatment will help him but acknowledges what he is doing right now is not working. Pt appears to be hoping for a miracle that will alleviate his pain but it does not appear that Pt gets any relief other than changing his dosages at home of his meds to get the desired effect which is to essentially pass out and escape from the pain. Pt was explained 201 and process and Pt signed document after questions were answered.

## 2024-09-18 NOTE — CONSULTS
TELEConsultation - Behavioral Health   Armen Llanos 49 y.o. male MRN: [id number]  Unit/Bed#: ED 07 Encounter: [id number]    REQUIRED DOCUMENTATION:     1. This service was provided via Telemedicine.  2. Provider located in [place].  3. TeleMed provider: Gio Pineda MD  4. Identify all parties in room with patient during tele consult: patient  5. After connecting through televideo, patient was identified by name and date of birth. Parent/patient was then informed that this was being conducted confidentially over secure lines. My office door was closed. Parties in the room listed above as per #4.  Patient acknowledged consent and understanding of privacy and security of the Telemedicine visit. The patient is aware this is a billable service and understands that he can discontinue the visit at any time. I informed the patient that I have reviewed their record in Epic and presented the opportunity for them to ask any questions regarding the visit today.  The patient agreed to participate.       Assessment & Plan     Assessment & Plan  Major depressive disorder, recurrent severe without psychotic features (HCC)  49-year-old man with chronic pain and worsening suicidal ideation presented to the hospital for help with pain and anxiety, depression centering around his pain.  Patient endorses ongoing difficulty managing his pain and would benefit from treatment for his anxiety and depression in the form of therapeutic improvement of coping skills and medication management.  He meets criteria for major depressive episode and should be started on antidepressant medications.  However, I will defer to the choice of medications and treatment plan to the inpatient behavioral health treatment team.  Patient read signed a 201 and should proceed with plan for inpatient behavioral health.  Recommend changing Ativan to clonazepam as this is the benzodiazepine that the patient takes at home.  Patient denies any intention of harming  "self at this time and denies HI or AVH.    Recommendations:   --Continue with 201 and referral for inpatient behavioral health treatment for major depressive disorder  --Continue to manage pain per primary team discretion, recommend changing benzodiazepines to clonazepam 1 mg 2 times daily as needed as this is the benzodiazepine the patient typically takes at home and he feels that is more effective for him than Ativan.  Risk-benefit ratio is the same for these 2 medications.  --Please TigerText with any questions or concerns.    Diagnoses, available treatment options, alternatives to treatment, and risks vs. benefits of current psychiatric treatment plan were discussed with the patient.  Prior records were reviewed in [facility].  The case was discussed with the primary team.    Chief Complaint: \"I cannot take the pain anymore\"    History of Present Illness   Physician Requesting Consult: Dallas Rothman DO    Reason for Consult / Principal Problem: Psychiatric evaluation for medications    Per Crisis:  Pt presents to ED w wife. Pt reported that he had ingested multiple Flexeril pills to alleviate pain. Pt reports that he \"knows what he is doing\" and has been \"doing this for years.\" This worker introduced self and role of the assessment, Pt agreed to be as helpful as possible in reporting. Pt reports in own words that \"I have been in pain for 25 years, I am done.\" Pt reports daily pain score to be 7.5 out of 10. Pt reports being at his wits end and that if he had been left alone at home \"he would have killed himself.\" Pt reports needing to see a pain specialist and that he feels that he is not heard and feels that his complaints go unanswered. Pt reports that seven years ago he hit his head in the basement and stated that at that time his pain increased \"1000 fold.\" Pt has TBI. 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 Pt is prescribed meds through PCP. Pt reports " prior DX of Bipolar DO which he disagrees w/. Pt reports having been on Klonopin and was just prescribed Xanax. Pt reports hearing voices for the last 40 years telling him to end his life but he pays it no credence. Pt denies any visual hallucinations. Pt reports depression/anxiety to be 10, irritability/lability is also a 10. Pt reports attention span to be beyond poor and that Pt has no self control ordering 10-20 items on [employer] weekly. Pt reports not sleeping and that he eats a meal a day. Pt denies any HX of violence/firearms/or legal issues. Pt reports having suicidal ideations in the past but never attempted. Pt denies homicidal ideations at this time. Pt denies any HX of self abusive behaviors. Pt has a medical cannabis card for pain. Pt reports residing w/ wife. Pt reports feeling that he is letting his wife and children down due to his mood/condition. Pt is not employed currently. Pt irritable snapping at staff blaming his pain for his outbursts. Pt has issue w/ any person who does not take his pain levels seriously. Pt was provided options and stated that he had every intention of signing a 201. Pt does not see how inpatient treatment will help him but acknowledges what he is doing right now is not working. Pt appears to be hoping for a miracle that will alleviate his pain but it does not appear that Pt gets any relief other than changing his dosages at home of his meds to get the desired effect which is to essentially pass out and escape from the pain. Pt was explained 201 and process and Pt signed document after questions were answered.     On evaluation,    Patient was notably dysphoric and tearful.  He cried out of apparent frustration throughout the interview.  Reports that he has ongoing chronic pain in his knees and that he is unable to handle it emotionally anymore.  Reports that he has frequent suicidal thoughts of not being alive anymore so he does not have to experience his pain.  Overtly  "denies any intention or plan to harm himself.  Cites protective factors as his son which prevents him from doing anything to harm himself.  Reports \"I would never do anything to actually kill myself.\"  We discussed the experience of pain and that he would benefit from treatment for his mood and therapy to adjust the way that he thinks about and experiences pain.  He expresses understanding that this is a missing piece of his coping skills and management of his experience of pain.  In addition, he reports depressed mood with poor sleep and appetite, low energy and concentration, and intermittent suicidal thoughts and meets criteria for major depressive disorder.  He also reports ongoing anxiety which is very difficult to manage and for which she takes clonazepam.  Reports that he feels the Ativan is not sufficient for treating his anxiety and would like to try p.o. clonazepam instead.  He is amenable to inpatient behavioral health treatment to work both on his depression and his experience of pain with medication management and group, individual, and milieu therapy.  Patient signed a 201 already and is amenable to continue with the plan for inpatient behavioral treatment.  Denies current HI or AVH.    Psychiatric Review Of Systems:  Medication side effects: none  Sleep: poor  Appetite: poor  Hygiene: able to tend to instrumental and basic ADLs  Anxiety Symptoms: endorses  Psychotic Symptoms: denies  Depression Symptoms: endorses  Manic Symptoms: denies  PTSD Symptoms: denies  Suicidal Thoughts: endorses SI no plan  Homicidal Thoughts: denies    Historical Information   Psychiatric History:   Diagnoses: depression  Inpatient Hx: none  Outpatient Hx: none  Medications/Trials: none    Substance Abuse History:    Social History     Substance and Sexual Activity   Alcohol Use Not Currently     Social History     Substance and Sexual Activity   Drug Use Yes    Types: Marijuana       I discussed substance abuse with the " patient and, if pertinent, discussed risks vs benefits of decreasing frequency of use.    Family History:   History reviewed. No pertinent family history.    Social History  Rest of social history as per below:  Social History     Socioeconomic History    Marital status: /Civil Union     Spouse name: Not on file    Number of children: Not on file    Years of education: Not on file    Highest education level: Not on file   Occupational History    Not on file   Tobacco Use    Smoking status: Former     Current packs/day: 0.00     Types: Cigarettes     Quit date: 2011     Years since quittin.8    Smokeless tobacco: Never   Vaping Use    Vaping status: Some Days    Substances: Nicotine, THC, CBD, Flavoring   Substance and Sexual Activity    Alcohol use: Not Currently    Drug use: Yes     Types: Marijuana    Sexual activity: Not Currently   Other Topics Concern    Not on file   Social History Narrative    Not on file     Social Determinants of Health     Financial Resource Strain: Not on file   Food Insecurity: No Food Insecurity (2024)    Hunger Vital Sign     Worried About Running Out of Food in the Last Year: Never true     Ran Out of Food in the Last Year: Never true   Transportation Needs: No Transportation Needs (2024)    PRAPARE - Transportation     Lack of Transportation (Medical): No     Lack of Transportation (Non-Medical): No   Physical Activity: Not on file   Stress: Not on file   Social Connections: Unknown (2024)    Received from [facility]    Social [employer]     How often do you feel lonely or isolated from those around you? (Adult - for ages 18 years and over): Not on file   Intimate Partner Violence: Not on file   Housing Stability: Low Risk  (2024)    Housing Stability Vital Sign     Unable to Pay for Housing in the Last Year: No     Number of Times Moved in the Last Year: 1     Homeless in the Last Year: No       Past Medical History:   Diagnosis Date    Bed  sore, stage 1     Diabetes mellitus (HCC)     Hypertension            Medical Review Of Systems:  Patient denies headache or dizziness.   Patient denies chest pain or palpitations.  Patient denies difficulty breathing or wheezing.  Patient denies nausea, vomiting, or diarrhea.  Patient denies polyuria or polydipsia.  Patient denies weakness or numbness.  Pertinent positives as per HPI.    Meds/Allergies   Allergies   Allergen Reactions    Cephalosporins Hives    Penicillins Hives       Current Facility-Administered Medications:     amLODIPine (NORVASC) tablet 5 mg, BID    cyclobenzaprine (FLEXERIL) tablet 5 mg, TID PRN    fentaNYL (DURAGESIC) 75 mcg/hr TD 72 hr patch 75 mcg, Once    insulin glargine (LANTUS) subcutaneous injection 40 Units 0.4 mL, HS    insulin lispro (HumALOG/ADMELOG) 100 units/mL subcutaneous injection 13 Units, TID With Meals    LORazepam (ATIVAN) tablet 1 mg, Q6H PRN    metoprolol succinate (TOPROL-XL) 24 hr tablet 25 mg, Daily    morphine (MS CONTIN) ER tablet 105 mg, Q12H PRN    nicotine (NICODERM CQ) 21 mg/24 hr TD 24 hr patch 21 mg, Once    nicotine polacrilex (NICORETTE) gum 4 mg, Q2H PRN    pantoprazole (PROTONIX) EC tablet 20 mg, BID AC    zolpidem (AMBIEN) tablet 10 mg, HS    Current Medications:  Current medications as per above. All medications have been reviewed.   Risks, benefits, alternatives, and possible side effects of patient's psychiatric medications were discussed with patient.     Objective   Vital signs in last 24 hours:  Temp:  [98.5 °F (36.9 °C)] 98.5 °F (36.9 °C)  HR:  [107-131] 107  Resp:  [18-20] 18  BP: (125-151)/() 151/99    Mental Status Exam:  Appearance: casually dressed, consistent with stated age  Motor: no psychomotor retardation, no gait abnormalities  Behavior: cooperative, answers questions appropriately, tearful  Speech: soft, normal rhythm  Mood: depressed  Affect: constricted, depressed-appearing  Thought Process: linear and goal-oriented  Thought  Content: denies delusions  Risk Potential: endorses SI due to ongoing pain, denies intent or plan.  Denies homicidal ideation  Perceptions: denies auditory hallucinations, denies visual hallucinations,   Sensorium: Oriented to person, place, time, and situation  Cognition: cognitive ability appears intact but was not quantitatively tested  Consciousness: alert and awake  Attention: intact, able to focus without difficulty  Insight: fair  Judgement: limited      Laboratory results:  I have personally reviewed all pertinent laboratory/tests results.  Recent Results (from the past 48 hour(s))   POCT alcohol breath test    Collection Time: 09/18/24 12:01 AM   Result Value Ref Range    EXTBreath Alcohol 0.000    CBC and differential    Collection Time: 09/18/24 12:54 AM   Result Value Ref Range    WBC 7.21 4.31 - 10.16 Thousand/uL    RBC 4.28 3.88 - 5.62 Million/uL    Hemoglobin 12.6 12.0 - 17.0 g/dL    Hematocrit 35.5 (L) 36.5 - 49.3 %    MCV 83 82 - 98 fL    MCH 29.4 26.8 - 34.3 pg    MCHC 35.5 31.4 - 37.4 g/dL    RDW 13.2 11.6 - 15.1 %    MPV 10.4 8.9 - 12.7 fL    Platelets 268 149 - 390 Thousands/uL    nRBC 0 /100 WBCs    Segmented % 76 (H) 43 - 75 %    Immature Grans % 0 0 - 2 %    Lymphocytes % 19 14 - 44 %    Monocytes % 4 4 - 12 %    Eosinophils Relative 1 0 - 6 %    Basophils Relative 0 0 - 1 %    Absolute Neutrophils 5.44 1.85 - 7.62 Thousands/µL    Absolute Immature Grans 0.03 0.00 - 0.20 Thousand/uL    Absolute Lymphocytes 1.35 0.60 - 4.47 Thousands/µL    Absolute Monocytes 0.29 0.17 - 1.22 Thousand/µL    Eosinophils Absolute 0.07 0.00 - 0.61 Thousand/µL    Basophils Absolute 0.03 0.00 - 0.10 Thousands/µL   Comprehensive metabolic panel    Collection Time: 09/18/24 12:54 AM   Result Value Ref Range    Sodium 136 135 - 147 mmol/L    Potassium 3.8 3.5 - 5.3 mmol/L    Chloride 105 96 - 108 mmol/L    CO2 22 21 - 32 mmol/L    ANION GAP 9 4 - 13 mmol/L    BUN 14 5 - 25 mg/dL    Creatinine 0.95 0.60 - 1.30 mg/dL     "Glucose 221 (H) 65 - 140 mg/dL    Calcium 8.8 8.4 - 10.2 mg/dL    Corrected Calcium 9.5 8.3 - 10.1 mg/dL    AST 14 13 - 39 U/L    ALT 19 7 - 52 U/L    Alkaline Phosphatase 79 34 - 104 U/L    Total Protein 6.4 6.4 - 8.4 g/dL    Albumin 3.1 (L) 3.5 - 5.0 g/dL    Total Bilirubin 0.38 0.20 - 1.00 mg/dL    eGFR 92 ml/min/1.73sq m   TSH    Collection Time: 09/18/24 12:54 AM   Result Value Ref Range    TSH 3RD GENERATON 0.658 0.450 - 4.500 uIU/mL   Ethanol    Collection Time: 09/18/24 12:54 AM   Result Value Ref Range    Ethanol Lvl <10 <10 mg/dL   Protime-INR    Collection Time: 09/18/24 12:54 AM   Result Value Ref Range    Protime 14.1 12.3 - 15.0 seconds    INR 1.04 0.85 - 1.19   APTT    Collection Time: 09/18/24 12:54 AM   Result Value Ref Range    PTT 32 23 - 34 seconds   HS Troponin 0hr (reflex protocol)    Collection Time: 09/18/24 12:54 AM   Result Value Ref Range    hs TnI 0hr 5 \"Refer to ACS Flowchart\"- see link ng/L   B-Type Natriuretic Peptide(BNP)    Collection Time: 09/18/24 12:54 AM   Result Value Ref Range    BNP 85 0 - 100 pg/mL   Salicylate level    Collection Time: 09/18/24 12:54 AM   Result Value Ref Range    Salicylate Lvl <5 3 - 20 mg/dL   Acetaminophen level-If concentration is detectable, please discuss with medical  on call.    Collection Time: 09/18/24 12:54 AM   Result Value Ref Range    Acetaminophen Level 6 (L) 10 - 20 ug/mL   ECG 12 lead    Collection Time: 09/18/24  1:02 AM   Result Value Ref Range    Ventricular Rate 114 BPM    Atrial Rate 114 BPM    NV Interval 146 ms    QRSD Interval 82 ms    QT Interval 322 ms    QTC Interval 443 ms    P Axis 45 degrees    QRS Axis 16 degrees    T Wave Axis -6 degrees   Fingerstick Glucose (POCT)    Collection Time: 09/18/24  8:23 AM   Result Value Ref Range    POC Glucose 172 (H) 65 - 140 mg/dl            Gio Pineda MD    This note has been constructed using a voice recognition system. There may be translation, syntax, or grammatical " errors. If you have any questions, please contact the dictating provider.

## 2024-09-18 NOTE — ED NOTES
"While attempting to set up psychiatrist consult for patient for medication management, patient was heard screaming and yelling in room. Patient tore pants off exposing himself to female 1:1 in room. ED staff immediately entered room, where patient continued to yell \"no one is doing anything in here\". Patient able to be verbally deescalated with ED staff in room and security. This writer explained that patient has had ED staff call his primary care provider to verify morphine increase and once verified he did receive additional dose, that he was given ativan and was going to have consult placed to psychiatry to better help his anxiety management after telling this writer his klonopin prescription at home was not even enough to control his anxiety and would take more than the prescribed order to manage his panic attacks. So the psych consult was placed to find the best management for patient at this time. Patient states to staff that he would like to leave and patient reminded that he said to staff he was hoarding his prescription medications to kill himself and that in order to leave he would need to speak to a psychiatrist to establish whether or not he was safe to leave to go home. Patient expresses frustration stating he signed himself in and can sign self out. 201 process was explained by crisis upon initial signing and education was reinforced at this time. Patient verbalized understanding, patient also agreeable to not take clothes off and expose self to staff any further. New pants provided to patient, who was able to independently dress self in bed. Patient expresses frustration again with pain/anxiety emotional support provided to patient with relative success. Patient to have consult with psychiatrist. 1:1 remains in place for high risk behavior.      Tika Mcadams RN  09/18/24 1123    "

## 2024-09-18 NOTE — ED CARE HANDOFF
Emergency Department Sign Out Note        Sign out and transfer of care from Dr. Rothman. See Separate Emergency Department note.     The patient, Armen Llanos, was evaluated by the previous provider for vague SI, accidental overdose Flexeril, chronic pain.    Workup Completed:  Medically cleared.  201 signed.  Medications ordered.    ED Course / Workup Pending (followup):  Await placement                                     Procedures  Medical Decision Making  Amount and/or Complexity of Data Reviewed  Labs: ordered.  Radiology: ordered and independent interpretation performed.    Risk  OTC drugs.  Prescription drug management.  Decision regarding hospitalization.            Disposition  Final diagnoses:   Suicidal ideations   Encounter for psychological evaluation   Anxiety   Suicide, initial encounter (HCC)   Chronic, continuous use of opioids     Time reflects when diagnosis was documented in both MDM as applicable and the Disposition within this note       Time User Action Codes Description Comment    9/18/2024  2:42 AM Arely Calderón [R45.851] Suicidal ideations     9/18/2024  2:42 AM Arely Calderón [Z00.8] Encounter for psychological evaluation     9/18/2024  2:42 AM Arely Calderón [F41.9] Anxiety     9/18/2024 10:28 AM Dallas Rothman [X83.8XXA] Suicide, initial encounter (HCC)     9/18/2024 10:28 AM Dallas Rothman [F11.90] Chronic, continuous use of opioids           ED Disposition       ED Disposition   Transfer to Behavioral Health Condition   --    Date/Time   Wed Sep 18, 2024  2:06 AM    Comment   Armen Llanos should be transferred out to [facility] and has been medically cleared.               MD Documentation      Flowsheet Row Most Recent Value   Patient Condition The patient has been stabilized such that within reasonable medical probability, no material deterioration of the patient condition or the condition of the unborn child(michelle) is likely to result from the transfer   Reason for Transfer  Level of Care needed not available at this facility   Benefits of Transfer Specialized equipment and/or services available at the receiving facility (Include comment)________________________, Other benefits (Include comment)_______________________  [[facility]]   Risks of Transfer Potential for delay in receiving treatment   Accepting Physician Dr Muñoz, Dr Marlow and Dr Malin   Accepting Facility Name, [address]    (Name & Tel number) Roundtrip   Transported by (Company and Unit #) BLS   Sending MD Dr Jason Mccarthy DO   Provider Certification General risk, such as traffic hazards, adverse weather conditions, rough terrain or turbulence, possible failure of equipment (including vehicle or aircraft), or consequences of actions of persons outside the control of the transport personnel          RN Documentation      Flowsheet Row Most Recent Value   Accepting Facility Name, [facility]  [facility]    (Name & Tel number) Roundtrip   Transported by (Company and Unit #) BLS          Follow-up Information    None       Patient's Medications   Discharge Prescriptions    No medications on file     No discharge procedures on file.       ED Provider  Electronically Signed by     Jason Mccarthy DO  09/18/24 3005

## 2024-09-18 NOTE — ED NOTES
This RN went into patient room to give ordered pain medication, patient asked what the dosage was and I told him amount. Patient then stated that he will be calling me back in the room in an hour and a half if it does not work. Provider aware.     Angelique Lowe RN  09/18/24 0324

## 2024-09-18 NOTE — ED NOTES
"When administering pain medication, patient complaining of increased anxiety, this writer did explain that the ativan has not had time to start working. Patient states \"I have to take 3 of klonopin for my panic attacks, I don't take it everyday but I do need extra when I have them, my doctor was going to see if I would do better on xanax.\" Explained to patient I would speak to provider regarding increased anxiety and medication need at home also not working.\" Patient agreeable to plan.      Tiak Mcadams RN  09/18/24 9178    "

## 2024-09-18 NOTE — ASSESSMENT & PLAN NOTE
49-year-old man with chronic pain and worsening suicidal ideation presented to the hospital for help with pain and anxiety, depression centering around his pain.  Patient endorses ongoing difficulty managing his pain and would benefit from treatment for his anxiety and depression in the form of therapeutic improvement of coping skills and medication management.  He meets criteria for major depressive episode and should be started on antidepressant medications.  However, I will defer to the choice of medications and treatment plan to the inpatient behavioral health treatment team.  Patient read signed a 201 and should proceed with plan for inpatient behavioral health.  Recommend changing Ativan to clonazepam as this is the benzodiazepine that the patient takes at home.  Patient denies any intention of harming self at this time and denies HI or AVH.

## 2024-09-18 NOTE — ED NOTES
Bed Search    INTAKE (emailed for AM review)  Moy (Kit) no beds  Marilee Marquez (Yisel) fax clinical  Z[facility] (no answer)  Niki ([place]) fax clinical  [facility] (no answer)  Renaldo (Sherley) fax for review for d/c bed  Aleyda (Lisa)fax clinical

## 2024-09-18 NOTE — ED NOTES
Patient ambulated to and from restroom with no gait disturbances noted. 1:1 remains with patient.      Yeni Abbott RN  09/18/24 6093

## 2024-09-18 NOTE — ED NOTES
Pt started yelling at RN due to where he wanted his fentanyl patch. Pt then became agitated at RN. RN asked pt to stop yelling. RN explained to pt where that patch needed to be placed and that we could not place the patch in the same spot.      Emily Olivier RN  09/18/24 0144

## 2024-09-18 NOTE — ED NOTES
Bed Search    Intake - no beds  will review in the morning.  Niki (natalie) denied due to medical acuity  [place] (joelle) denied due to medical acuity  Maynard (jaskaran) denied due to medical acuity  [facility] (contreras) denied due to medical acuity  [facility] (Ray) denied due to medical acuity  [place] (Jesús) denied due to medical acuity  [facility] (karol) denied due to medical acuity  Marilee Marquez ( corazon) denied due to medical acuity  [place] (tyra) denied due to medical acuity  [facility] (basilio) clinicals faxed   The patient is a 80y Male complaining of chest pain.

## 2024-09-18 NOTE — ED NOTES
Crisis called Tracey Agudelo) to provide requested information. Medical team currently reviewing.

## 2024-09-18 NOTE — ED NOTES
"Patient ringing multiple times for additional pain medicine. This writer did explain medication safety and administration times including next dose for his ordered PRN medications. States he does not see pain management but his PCP Deidre PEACE handles his pain management. Patient states she was increasing dose from 60 mg to 100 mg every 12 hours PRN for pain. Provider included in this conversation with patient. Dr. Rothman calling to speak to Deidre Andrea with permission from patient to confirm dose change. Patient also ordered additional nicotine, patient states he takes \"350-400 hits of his vape daily\" and states it is \"a high dose of nicotine\". Patient also agreeable to ativan, but did states \"only IV works the pill \"makes me do weird things\" but has not taken ativan for \"years now\". Patient assured he would be watched in the ED and staff would assist with any odd behavior. Patient did not explain the \"weird things\".      Tika Mcadams RN  09/18/24 1010    "

## 2024-09-18 NOTE — ED NOTES
Tracey ([place]) asked additional questions in order to continue with the referral. Crisis spoke with patient to obtain the answers.     Can patient ambulate independently? Patient reports that he can walk short distances  Can patient take care of own ADL's? Patient reports that he would require a bench in order to shower independently.   Does patient currently have the ace bandage on his knee? Patient reports that he uses a knee compression sleeve at home.    Is patient able to bring in a 7 to 10 day supply of Jardiance and Fentanyl patch as they do not carry these medications? Patient reports that he does not take the Jardiance anymore and does have a supply of Fentanyl patches that could be brought with him.

## 2024-09-18 NOTE — ED NOTES
Bed Search:    Tracey (Rossy) clinicals faxed  Renaldo (Lisa) denied due to medical   Marilee Marquez (Maximilian) denied due to medical   Maynard (Birdie) denied due to medical  [place] (Lexx) denied due to medical  [facility] (Ray) denied due to medical  [place] (Jesús) denied due to medical  [facility]- left voicemail  [facility] (Arely) denied due to medical

## 2024-09-18 NOTE — ED NOTES
Patient finished with psychiatrist consult at this time.      Tika Mcadams, FREDERIC  09/18/24 4616

## 2024-09-18 NOTE — ED NOTES
tech removed pt knee wrap and ace wrapped his knee. Pt then removed ace wrap and put his knee wrap back on after he was told he couldn't wear his. Ed tech removed his knee wrap and ace wrapped his knee. Pt started to yell at [facility]      Emily Olivier RN  09/18/24 0146

## 2024-09-18 NOTE — ED NOTES
Writer introduced self and provided patient with an update regarding the bed search. Writer asked patient if he had any questions and patient expressed his displeasement the polices surrounding the TV cord being short. Writer explained due to safety precautions his room was cleared. Patient reported that due to the hospital policies they are making him more suicidal.

## 2024-09-19 ENCOUNTER — APPOINTMENT (INPATIENT)
Dept: RADIOLOGY | Facility: HOSPITAL | Age: 49
DRG: 885 | End: 2024-09-19
Payer: COMMERCIAL

## 2024-09-19 ENCOUNTER — HOSPITAL ENCOUNTER (OUTPATIENT)
Dept: NON INVASIVE DIAGNOSTICS | Facility: HOSPITAL | Age: 49
Discharge: HOME | End: 2024-09-19

## 2024-09-19 VITALS
TEMPERATURE: 96.6 F | HEIGHT: 78 IN | BODY MASS INDEX: 24.37 KG/M2 | HEART RATE: 84 BPM | OXYGEN SATURATION: 98 % | SYSTOLIC BLOOD PRESSURE: 124 MMHG | RESPIRATION RATE: 16 BRPM | WEIGHT: 210.6 LBS | DIASTOLIC BLOOD PRESSURE: 78 MMHG

## 2024-09-19 PROBLEM — Z00.8 MEDICAL CLEARANCE FOR PSYCHIATRIC ADMISSION: Status: RESOLVED | Noted: 2024-09-19 | Resolved: 2024-09-19

## 2024-09-19 PROBLEM — F39 MOOD DISORDER (HCC): Status: ACTIVE | Noted: 2024-09-19

## 2024-09-19 PROBLEM — Z00.8 MEDICAL CLEARANCE FOR PSYCHIATRIC ADMISSION: Status: ACTIVE | Noted: 2024-09-19

## 2024-09-19 LAB
25(OH)D3 SERPL-MCNC: 40.2 NG/ML (ref 30–100)
ALBUMIN SERPL BCG-MCNC: 3.6 G/DL (ref 3.5–5)
ALP SERPL-CCNC: 84 U/L (ref 34–104)
ALT SERPL W P-5'-P-CCNC: 17 U/L (ref 7–52)
ANION GAP SERPL CALCULATED.3IONS-SCNC: 14 MMOL/L (ref 4–13)
AST SERPL W P-5'-P-CCNC: 21 U/L (ref 13–39)
BILIRUB SERPL-MCNC: 0.32 MG/DL (ref 0.2–1)
BUN SERPL-MCNC: 18 MG/DL (ref 5–25)
CALCIUM SERPL-MCNC: 9.6 MG/DL (ref 8.4–10.2)
CHLORIDE SERPL-SCNC: 102 MMOL/L (ref 96–108)
CHOLEST SERPL-MCNC: 160 MG/DL
CO2 SERPL-SCNC: 17 MMOL/L (ref 21–32)
CREAT SERPL-MCNC: 1.1 MG/DL (ref 0.6–1.3)
GFR SERPL CREATININE-BSD FRML MDRD: 77 ML/MIN/1.73SQ M
GLUCOSE P FAST SERPL-MCNC: 47 MG/DL (ref 65–99)
GLUCOSE SERPL-MCNC: 114 MG/DL (ref 65–140)
GLUCOSE SERPL-MCNC: 132 MG/DL (ref 65–140)
GLUCOSE SERPL-MCNC: 300 MG/DL (ref 65–140)
GLUCOSE SERPL-MCNC: 47 MG/DL (ref 65–140)
GLUCOSE SERPL-MCNC: 84 MG/DL (ref 65–140)
HDLC SERPL-MCNC: 69 MG/DL
LDLC SERPL CALC-MCNC: 75 MG/DL (ref 0–100)
NONHDLC SERPL-MCNC: 91 MG/DL
POTASSIUM SERPL-SCNC: 4 MMOL/L (ref 3.5–5.3)
PROT SERPL-MCNC: 7.6 G/DL (ref 6.4–8.4)
SODIUM SERPL-SCNC: 133 MMOL/L (ref 135–147)
TRIGL SERPL-MCNC: 82 MG/DL
TSH SERPL DL<=0.05 MIU/L-ACNC: 1.17 UIU/ML (ref 0.45–4.5)

## 2024-09-19 PROCEDURE — 82948 REAGENT STRIP/BLOOD GLUCOSE: CPT

## 2024-09-19 PROCEDURE — 99222 1ST HOSP IP/OBS MODERATE 55: CPT | Performed by: NURSE PRACTITIONER

## 2024-09-19 PROCEDURE — NC001 PR NO CHARGE: Performed by: PSYCHIATRY & NEUROLOGY

## 2024-09-19 PROCEDURE — 84443 ASSAY THYROID STIM HORMONE: CPT | Performed by: PSYCHIATRY & NEUROLOGY

## 2024-09-19 PROCEDURE — 73562 X-RAY EXAM OF KNEE 3: CPT

## 2024-09-19 PROCEDURE — 80053 COMPREHEN METABOLIC PANEL: CPT | Performed by: PSYCHIATRY & NEUROLOGY

## 2024-09-19 PROCEDURE — 80061 LIPID PANEL: CPT | Performed by: PSYCHIATRY & NEUROLOGY

## 2024-09-19 PROCEDURE — 82306 VITAMIN D 25 HYDROXY: CPT | Performed by: PSYCHIATRY & NEUROLOGY

## 2024-09-19 PROCEDURE — 99235 HOSP IP/OBS SAME DATE MOD 70: CPT | Performed by: PSYCHIATRY & NEUROLOGY

## 2024-09-19 RX ORDER — FENTANYL 75 UG/1
75 PATCH TRANSDERMAL ONCE
Status: DISCONTINUED | OUTPATIENT
Start: 2024-09-18 | End: 2024-09-19 | Stop reason: HOSPADM

## 2024-09-19 RX ORDER — INSULIN GLARGINE 100 [IU]/ML
40 INJECTION, SOLUTION SUBCUTANEOUS
Status: DISCONTINUED | OUTPATIENT
Start: 2024-09-19 | End: 2024-09-19

## 2024-09-19 RX ORDER — ONDANSETRON 4 MG/1
4 TABLET, ORALLY DISINTEGRATING ORAL ONCE AS NEEDED
Status: COMPLETED | OUTPATIENT
Start: 2024-09-19 | End: 2024-09-19

## 2024-09-19 RX ORDER — POLYETHYLENE GLYCOL 3350 17 G/17G
17 POWDER, FOR SOLUTION ORAL 2 TIMES DAILY
Status: DISCONTINUED | OUTPATIENT
Start: 2024-09-19 | End: 2024-09-19 | Stop reason: HOSPADM

## 2024-09-19 RX ORDER — ZOLPIDEM TARTRATE 10 MG/1
10 TABLET ORAL
Start: 2024-09-19 | End: 2024-09-29

## 2024-09-19 RX ORDER — AMLODIPINE BESYLATE 5 MG/1
5 TABLET ORAL 2 TIMES DAILY
Status: DISCONTINUED | OUTPATIENT
Start: 2024-09-19 | End: 2024-09-19 | Stop reason: HOSPADM

## 2024-09-19 RX ORDER — INSULIN GLARGINE 100 [IU]/ML
30 INJECTION, SOLUTION SUBCUTANEOUS
Status: DISCONTINUED | OUTPATIENT
Start: 2024-09-19 | End: 2024-09-19 | Stop reason: HOSPADM

## 2024-09-19 RX ORDER — RISPERIDONE 3 MG/1
3 TABLET ORAL
Start: 2024-09-19

## 2024-09-19 RX ORDER — MORPHINE SULFATE 15 MG/1
60 TABLET, FILM COATED, EXTENDED RELEASE ORAL EVERY 12 HOURS SCHEDULED
Status: DISCONTINUED | OUTPATIENT
Start: 2024-09-19 | End: 2024-09-19 | Stop reason: HOSPADM

## 2024-09-19 RX ORDER — LISINOPRIL 20 MG/1
20 TABLET ORAL DAILY
Status: DISCONTINUED | OUTPATIENT
Start: 2024-09-19 | End: 2024-09-19

## 2024-09-19 RX ORDER — DOXYCYCLINE 100 MG/1
100 CAPSULE ORAL EVERY 12 HOURS SCHEDULED
Status: DISCONTINUED | OUTPATIENT
Start: 2024-09-19 | End: 2024-09-19 | Stop reason: HOSPADM

## 2024-09-19 RX ORDER — METHOCARBAMOL 750 MG/1
750 TABLET, FILM COATED ORAL EVERY 6 HOURS PRN
Status: DISCONTINUED | OUTPATIENT
Start: 2024-09-19 | End: 2024-09-19 | Stop reason: HOSPADM

## 2024-09-19 RX ORDER — INSULIN LISPRO 100 [IU]/ML
10 INJECTION, SOLUTION INTRAVENOUS; SUBCUTANEOUS
Status: DISCONTINUED | OUTPATIENT
Start: 2024-09-19 | End: 2024-09-19

## 2024-09-19 RX ORDER — LISINOPRIL 5 MG/1
5 TABLET ORAL DAILY
Status: DISCONTINUED | OUTPATIENT
Start: 2024-09-20 | End: 2024-09-19 | Stop reason: HOSPADM

## 2024-09-19 RX ORDER — PRAVASTATIN SODIUM 40 MG
80 TABLET ORAL
Status: DISCONTINUED | OUTPATIENT
Start: 2024-09-19 | End: 2024-09-19 | Stop reason: HOSPADM

## 2024-09-19 RX ORDER — INSULIN LISPRO 100 [IU]/ML
2-12 INJECTION, SOLUTION INTRAVENOUS; SUBCUTANEOUS
Status: DISCONTINUED | OUTPATIENT
Start: 2024-09-19 | End: 2024-09-19 | Stop reason: HOSPADM

## 2024-09-19 RX ORDER — PANTOPRAZOLE SODIUM 40 MG/1
40 TABLET, DELAYED RELEASE ORAL
Status: DISCONTINUED | OUTPATIENT
Start: 2024-09-19 | End: 2024-09-19 | Stop reason: HOSPADM

## 2024-09-19 RX ORDER — INSULIN LISPRO 100 [IU]/ML
14 INJECTION, SOLUTION INTRAVENOUS; SUBCUTANEOUS ONCE
Status: COMPLETED | OUTPATIENT
Start: 2024-09-19 | End: 2024-09-19

## 2024-09-19 RX ORDER — INSULIN GLARGINE 100 [IU]/ML
25 INJECTION, SOLUTION SUBCUTANEOUS
Status: DISCONTINUED | OUTPATIENT
Start: 2024-09-19 | End: 2024-09-19

## 2024-09-19 RX ORDER — LANOLIN ALCOHOL/MO/W.PET/CERES
3 CREAM (GRAM) TOPICAL
Start: 2024-09-19

## 2024-09-19 RX ORDER — METHOCARBAMOL 750 MG/1
750 TABLET, FILM COATED ORAL EVERY 6 HOURS PRN
Start: 2024-09-19 | End: 2024-09-19

## 2024-09-19 RX ORDER — METOPROLOL SUCCINATE 25 MG/1
25 TABLET, EXTENDED RELEASE ORAL DAILY
Status: DISCONTINUED | OUTPATIENT
Start: 2024-09-19 | End: 2024-09-19 | Stop reason: HOSPADM

## 2024-09-19 RX ORDER — HYDROCHLOROTHIAZIDE 25 MG/1
25 TABLET ORAL DAILY
Status: DISCONTINUED | OUTPATIENT
Start: 2024-09-19 | End: 2024-09-19

## 2024-09-19 RX ORDER — AMOXICILLIN 250 MG
2 CAPSULE ORAL 2 TIMES DAILY
Status: DISCONTINUED | OUTPATIENT
Start: 2024-09-19 | End: 2024-09-19 | Stop reason: HOSPADM

## 2024-09-19 RX ADMIN — INSULIN LISPRO 14 UNITS: 100 INJECTION, SOLUTION INTRAVENOUS; SUBCUTANEOUS at 00:51

## 2024-09-19 RX ADMIN — HYDROXYZINE HYDROCHLORIDE 100 MG: 50 TABLET, FILM COATED ORAL at 06:17

## 2024-09-19 RX ADMIN — ONDANSETRON 4 MG: 4 TABLET, ORALLY DISINTEGRATING ORAL at 02:23

## 2024-09-19 RX ADMIN — IBUPROFEN 800 MG: 400 TABLET, FILM COATED ORAL at 06:17

## 2024-09-19 RX ADMIN — METOPROLOL SUCCINATE 25 MG: 25 TABLET, EXTENDED RELEASE ORAL at 09:50

## 2024-09-19 RX ADMIN — B-COMPLEX W/ C & FOLIC ACID TAB 1 TABLET: TAB at 09:50

## 2024-09-19 RX ADMIN — AMLODIPINE BESYLATE 5 MG: 5 TABLET ORAL at 09:50

## 2024-09-19 RX ADMIN — PRAVASTATIN SODIUM 80 MG: 40 TABLET ORAL at 17:34

## 2024-09-19 RX ADMIN — MORPHINE SULFATE 60 MG: 15 TABLET, EXTENDED RELEASE ORAL at 09:50

## 2024-09-19 RX ADMIN — DOXYCYCLINE 100 MG: 100 CAPSULE ORAL at 09:50

## 2024-09-19 NOTE — ED NOTES
Pt taken to \[facility]\"" via EMS at this time. Calm and cooperative with staff and EMS at this time.      Billie Vaughan RN  09/18/24 4848

## 2024-09-19 NOTE — ASSESSMENT & PLAN NOTE
BP stable  Continue home norvasc 5 mg BID and toprol xl 25 mg daily  Restart home Lisinopril-HCTZ as tolerated

## 2024-09-19 NOTE — ED NOTES
Patient is accepted at [facility].  Patient is accepted by , Dr Muñoz, Dr Kimo Monson.     Transportation is arranged with [facility].     Transportation is scheduled for 2130 [facility].   Patient may go to the floor at 2130          Nurse report is to be called to [phone] prior to patient transfer.

## 2024-09-19 NOTE — ASSESSMENT & PLAN NOTE
Lab Results   Component Value Date    HGBA1C 7.1 (H) 07/11/2024       Recent Labs     09/18/24  1822 09/18/24  1918 09/19/24  0019 09/19/24  0749   POCGLU 44* 132 300* 84       Blood Sugar Average: Last 72 hrs:  (P) 192  Psychiatry considering addition of Neurontin  Blood sugars labile  Reduce lantus from 40 units to 30 units HS  Reduce lispro from 13 units to 10 units TID  Monitor ACCU checks AC + HS with lispro sliding scale coverage

## 2024-09-19 NOTE — NURSING NOTE
"Pt now requesting \"100mg of morphine.\" Pt states he takes it, \"as needed at home. Once or twice a day.\" Pt last received 90mg of morphine yesterday at 16:00 in [facility]. [facility] provider Dr. Muñoz contacted via EPIC, declined to order. [facility] provider Dr. Rizvi also contacted via EPIC chat, declined order, would like medical consult before prescribing. Pt made aware. Pt declines PRN motrin offered for pain.   "

## 2024-09-19 NOTE — BH TRANSITION RECORD
"Contact Information: If you have any questions, concerns, pended studies, tests and/or procedures, or emergencies regarding your inpatient behavioral health visit. Please contact [facility] [phone]  and ask to speak to a , nurse or physician. A contact is available 24 hours/ 7 days a week at this number.     Summary of Procedures Performed During your Stay:  Below is a list of major procedures performed during your hospital stay and a summary of results:  - Cardiac Procedures/Studies: EKG on 9/18/2024 showed a QTc of 443 MS: \"Sinus tachycardia otherwise normal ECG\".    Pending Studies (From admission, onward)       Start     Ordered    09/20/24 0600  C-reactive protein  Morning draw         09/19/24 0855    09/20/24 0600  CBC and differential  Morning draw         09/19/24 0855    09/19/24 0855  C-reactive protein  Once         09/19/24 0855    09/19/24 0841  CBC and differential  Morning draw         09/18/24 2231    09/19/24 0600  Vitamin B12  Morning draw         09/18/24 2231    09/19/24 0600  Folate  Morning draw         09/18/24 2231    09/19/24 0600  Total Syphilis (IgG/IgM) Screening  Morning draw        Question:  Release to patient through [facility]  Answer:  Immediate    09/18/24 2231 09/18/24 2232  Urinalysis  Once         09/18/24 2231                  Please follow up on the above pending studies with your PCP and/or referring provider.  "

## 2024-09-19 NOTE — ASSESSMENT & PLAN NOTE
Patient can continue home dose of risperidone 3 mg at bedtime for agitation/mood and home dose of Klonopin 1 mg at home.  He states that he has a supply of his medications at home.  He states that he was having a difficult day in terms of his pain and does not have any intent or plan to harm self or others.  Call placed to wife who confirmed the patient is not a danger to himself or others and that he does not have any access to firearms.  She states that there is a firearm at home which is unloaded and secured as it is locked away in a safe.  After discussion with the patient, patient would like to be discharged home today and after discussion with the patient's wife, we will recommend discharging patient later today with with the plan to have wife pick patient up upon discharge.  Patient is in agreement to follow up with his outpatient physicians and treatment team.  He states that he has not all the medications that he needs at home and does not require a prescription or refill on any of them.

## 2024-09-19 NOTE — ASSESSMENT & PLAN NOTE
ECHO: LVEF 44% with mild global hypokinesis after 4 weeks of suppression with Amiodarone   Continue BB   Reviewed patient's cardiology note from 9/6/24 - patient no longer on Entresto due to cost. Patient to be on goal directed medications Zocor, Jardiance, Norvasc, Eliquis, Lisinopril-HCTZ.   Patient states he has not started the Lisinopril-HCTZ. Will start at lower dose and increase as tolerated

## 2024-09-19 NOTE — ED NOTES
Report called to [facility]. All questions answered.      Billie Vaughan RN  09/18/24 5183

## 2024-09-19 NOTE — NURSING NOTE
Patient complained of nausea,provider notified one time order for zofran 4 mg po given. Patient was given ginger ale and crackers.Currently in patient lounge playing cards.

## 2024-09-19 NOTE — NURSING NOTE
"201 from [facility] where pt presented with wife. Per ED, pt took \"several flexeril\" with the intention of pain management. However, pt states, that, \"I was home alone I would have killed myself.\" Pt endorses chronic pain, mostly in R-knee, but has had \"42 surgeries on my knees and ankles.\" Per chart, pt has pmh bipolar, DM and DKA, JH, left lesser toe amputation, sepsis in march/april 2024 leading to cardiomyopathy and Afib. Pt has extensive medical history. Pt also reportedly has a TBI. When asked about it, pt states, \"Oh yeah, I had a concussion when I was 7 and then I hit my head 300-400 times after that.\" Pt also states he vapes CBD/THC/nicotine \"about 250 times a day. It helps my anxiety\" Pt states he has medical card. Denies other recreational D&A use, but pt has been taking multiple high-risk medications at home, some not as ordered per pt and ED note. Pt states at home he takes morphine 100mg BID, klonopin, Doculax, insulins, ambien 10mg qhs, flexeril, \"500mg of melatonin, because after the TBI my body doesn't make any,\" and \"so many other medications.\"  Per chart, pt was irritable and inappropriate in ED. Pt exposed himself to female 1:1 by ripping pants off out of frustration. Pt also made statement while in ED that he was stashing pills to overdose.     Upon admission, pt ambulating in independently without difficulty. Bilateral vertical scaring on knees, some swelling in R-knee, partially amputated L-lesser toe, otherwise 2-RN skin assessment unremarkable. Pt does have fentanyl 75mcg/hr patch on back of R-shoulder and nicotine 21mg patch on L-upper arm. See following note.   Pt is labile, tearful at times, pleasant, then irritable and entitled. Pt is exceedingly inconsistent historian. Pt states that his wife is his only support, \"I cut off all of my supports.\" Pt denies SI or ever having intent to kill himself, states, \"My son is 12, I'd never actually do it. I'd never leave him.\" Pt then states, \".... " "But I have thought about 4 different plans. I think about suffocating on helium the most, you know, just falling asleep. But I wouldn't actually do it.\" Pt states he is, \"a pleasant manjula as long as I feel like I'm being taken seriously and my needs are being met.\" Pt makes vague threats to \"lose control\" if his physical pain becomes too severe \"I hope we don't have a problem here, because I black out and I say and do stupid things. But I know when its coming, I'll warn you before it happens.\" Pt oriented to unit, pt immediately voiced displeasure with unit policies regarding TV and phones. Pt called his wife and they spoke on unit phone. Pt given snacks and hydration. Pt denies current SI/HI/AH/VH. Please see following notes.   "

## 2024-09-19 NOTE — ASSESSMENT & PLAN NOTE
H/o multiple bilateral knee surgeries with chronic knee pain on opioids. H/o septic arthritis with MSSA bacteremia. Completed ABX therapy and is now maintained on Doxycyline   Currently, no fevers, redness, or leukocytosis   Suspect soft tissue swelling  Check right knee xray to rule out effusion   Trend CRP today and tomorrow  Continue elevation  Continue home Doxycyline BID   Consider orthopedics consult if xray or CRP abnormal

## 2024-09-19 NOTE — EMTALA/ACUTE CARE TRANSFER
[facility] EMERGENCY DEPARTMENT  [address] [facility] [address]  Dept: [phone]      EMTALA TRANSFER CONSENT    NAME Armen Llanos                                         1975                              MRN [id number]    I have been informed of my rights regarding examination, treatment, and transfer   by Dr. Jason Mccarthy DO    Benefits: Specialized equipment and/or services available at the receiving facility (Include comment)________________________, Other benefits (Include comment)_______________________ ([facility])    Risks: Potential for delay in receiving treatment      Consent for Transfer:  I acknowledge that my medical condition has been evaluated and explained to me by the emergency department physician or other qualified medical person and/or my attending physician, who has recommended that I be transferred to the service of  Accepting Physician: Dr Muñoz, Dr Marlow and Dr Malin at Accepting Facility Name, City & State : [address] The above potential benefits of such transfer, the potential risks associated with such transfer, and the probable risks of not being transferred have been explained to me, and I fully understand them.  The doctor has explained that, in my case, the benefits of transfer outweigh the risks.  I agree to be transferred.    I authorize the performance of emergency medical procedures and treatments upon me in both transit and upon arrival at the receiving facility.  Additionally, I authorize the release of any and all medical records to the receiving facility and request they be transported with me, if possible.  I understand that the safest mode of transportation during a medical emergency is an ambulance and that the Hospital advocates the use of this mode of transport. Risks of traveling to the receiving facility by car, including absence of medical control, life sustaining equipment, such as oxygen, and  medical personnel has been explained to me and I fully understand them.    (HAILEY CORRECT BOX BELOW)  [X]  I consent to the stated transfer and to be transported by ambulance/helicopter.  [  ]  I consent to the stated transfer, but refuse transportation by ambulance and accept full responsibility for my transportation by car.  I understand the risks of non-ambulance transfers and I exonerate the Hospital and its staff from any deterioration in my condition that results from this refusal.    X___________________________________________    DATE  24  TIME________  Signature of patient or legally responsible individual signing on patient behalf           RELATIONSHIP TO PATIENT_________________________          Provider Certification    NAME Armne Llanos                                         1975                              MRN [id number]    A medical screening exam was performed on the above named patient.  Based on the examination:    Condition Necessitating Transfer The primary encounter diagnosis was Suicidal ideations. Diagnoses of Encounter for psychological evaluation, Anxiety, Suicide, initial encounter (HCC), and Chronic, continuous use of opioids were also pertinent to this visit.    Patient Condition: The patient has been stabilized such that within reasonable medical probability, no material deterioration of the patient condition or the condition of the unborn child(michelle) is likely to result from the transfer    Reason for Transfer: Level of Care needed not available at this facility    Transfer Requirements: Facility [address]   Space available and qualified personnel available for treatment as acknowledged by Matthew  Agreed to accept transfer and to provide appropriate medical treatment as acknowledged by       Dr Muñoz, Dr Marlow and Dr Malin  Appropriate medical records of the examination and treatment of the patient are provided at the time of transfer   STAFF  INITIAL WHEN COMPLETED _______  Transfer will be performed by qualified personnel from [facility]  and appropriate transfer equipment as required, including the use of necessary and appropriate life support measures.    Provider Certification: I have examined the patient and explained the following risks and benefits of being transferred/refusing transfer to the patient/family:  General risk, such as traffic hazards, adverse weather conditions, rough terrain or turbulence, possible failure of equipment (including vehicle or aircraft), or consequences of actions of persons outside the control of the transport personnel      Based on these reasonable risks and benefits to the patient and/or the unborn child(michelle), and based upon the information available at the time of the patient’s examination, I certify that the medical benefits reasonably to be expected from the provision of appropriate medical treatments at another medical facility outweigh the increasing risks, if any, to the individual’s medical condition, and in the case of labor to the unborn child, from effecting the transfer.    X____________________________________________ DATE 09/18/24        TIME_______      ORIGINAL - SEND TO MEDICAL RECORDS   COPY - SEND WITH PATIENT DURING TRANSFER

## 2024-09-19 NOTE — DISCHARGE INSTR - APPOINTMENTS
Joselin our Behavioral Health Nurse Navigator, will be calling you after your discharge, on the phone number that you provided.  She will be available as an additional support, if needed.   If you wish to speak with Joselin, you may contact her at [phone].

## 2024-09-19 NOTE — NURSING NOTE
"Pt c/o severe pain and swelling and \"it's not supposed to be that color, something's wrong!\" In right knee. Pt assisted to bed and made comfortable with multiple pillows and blankets to elevate leg. Pt crying and repositioned multiple times before stinging he was good. Pt accepted Motrin 800mg and atarax 100mg PRN with encouragement. Pt anxious stating leg will only feel better when he knows nothing is wrong with it. Pt reminded he will be seen by psychiatrist and medical doctor today. Pt encouraged to remain in bed and rest leg and allow medications to work. Safety checks continue.  "

## 2024-09-19 NOTE — H&P
H&P - Behavioral Health   Name: Armen Llanos 49 y.o. male I MRN: [id number]  Unit/Bed#: U 347-02 I Date of Admission: 9/18/2024   Date of Service: 9/19/2024 I Hospital Day: 1     Assessment & Plan  Mood disorder (HCC) r/o mood disorder secondary to general medical condition, r/o MDD  Patient can continue home dose of risperidone 3 mg at bedtime for agitation/mood and home dose of Klonopin 1 mg at home.  He states that he has a supply of his medications at home.  He states that he was having a difficult day in terms of his pain and does not have any intent or plan to harm self or others.  Call placed to wife who confirmed the patient is not a danger to himself or others and that he does not have any access to firearms.  She states that there is a firearm at home which is unloaded and secured as it is locked away in a safe.  After discussion with the patient, patient would like to be discharged home today and after discussion with the patient's wife, we will recommend discharging patient later today with with the plan to have wife pick patient up upon discharge.  Patient is in agreement to follow up with his outpatient physicians and treatment team.  He states that he has not all the medications that he needs at home and does not require a prescription or refill on any of them.  Essential hypertension  Per medical  Hyponatremia  Per medical  Chronic pain disorder  Per medical  A-fib with RVR (HCC)  Per medical  Cardiomyopathy (HCC)  Per medical  Diabetic polyneuropathy associated with type 2 diabetes mellitus (HCC)  Lab Results   Component Value Date    HGBA1C 7.1 (H) 07/11/2024       Recent Labs     09/18/24  1822 09/18/24  1918 09/19/24  0019 09/19/24  0749   POCGLU 44* 132 300* 84       Blood Sugar Average: Last 72 hrs:  (P) 192    Pain and swelling of right knee  Per medical    Unspecified Mood Disorder    Treatment Plan:  Planned Medication Changes:  None currently, but as above; patient to continue his  "home dose of Risperdal and Klonopin as written above    Risks / Benefits of Treatment:  Risks, benefits, and possible side effects of medications explained to patient and patient verbalizes understanding.      History of Present Illness    Reason for Consult:  \" I had a bad day.\"  Patient is a 49 y.o. male with a self-reported history of anxiety and depression, chronic pain, hypertension, and A-fib with RVR who presents on a voluntary 201 mental with inpatient commitment for suicidal ideation, worsening depression, and anxiety.     Per consultation by psychiatry team, Dr. Gio Pineda on 9/18/2024 at 11:36 AM:     \"Per Crisis:  Pt presents to ED w wife. Pt reported that he had ingested multiple Flexeril pills to alleviate pain. Pt reports that he \"knows what he is doing\" and has been \"doing this for years.\" This worker introduced self and role of the assessment, Pt agreed to be as helpful as possible in reporting. Pt reports in own words that \"I have been in pain for 25 years, I am done.\" Pt reports daily pain score to be 7.5 out of 10. Pt reports being at his wits end and that if he had been left alone at home \"he would have killed himself.\" Pt reports needing to see a pain specialist and that he feels that he is not heard and feels that his complaints go unanswered. Pt reports that seven years ago he hit his head in the basement and stated that at that time his pain increased \"1000 fold.\" Pt has TBI. 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 Pt is prescribed meds through PCP. Pt reports prior DX of Bipolar DO which he disagrees w/. Pt reports having been on Klonopin and was just prescribed Xanax. Pt reports hearing voices for the last 40 years telling him to end his life but he pays it no credence. Pt denies any visual hallucinations. Pt reports depression/anxiety to be 10, irritability/lability is also a 10. Pt reports attention span to be beyond poor and " "that Pt has no self control ordering 10-20 items on [employer] weekly. Pt reports not sleeping and that he eats a meal a day. Pt denies any HX of violence/firearms/or legal issues. Pt reports having suicidal ideations in the past but never attempted. Pt denies homicidal ideations at this time. Pt denies any HX of self abusive behaviors. Pt has a medical cannabis card for pain. Pt reports residing w/ wife. Pt reports feeling that he is letting his wife and children down due to his mood/condition. Pt is not employed currently. Pt irritable snapping at staff blaming his pain for his outbursts. Pt has issue w/ any person who does not take his pain levels seriously. Pt was provided options and stated that he had every intention of signing a 201. Pt does not see how inpatient treatment will help him but acknowledges what he is doing right now is not working. Pt appears to be hoping for a miracle that will alleviate his pain but it does not appear that Pt gets any relief other than changing his dosages at home of his meds to get the desired effect which is to essentially pass out and escape from the pain. Pt was explained 201 and process and Pt signed document after questions were answered.      On evaluation,     Patient was notably dysphoric and tearful.  He cried out of apparent frustration throughout the interview.  Reports that he has ongoing chronic pain in his knees and that he is unable to handle it emotionally anymore.  Reports that he has frequent suicidal thoughts of not being alive anymore so he does not have to experience his pain.  Overtly denies any intention or plan to harm himself.  Cites protective factors as his son which prevents him from doing anything to harm himself.  Reports \"I would never do anything to actually kill myself.\"  We discussed the experience of pain and that he would benefit from treatment for his mood and therapy to adjust the way that he thinks about and experiences pain.  He expresses " "understanding that this is a missing piece of his coping skills and management of his experience of pain.  In addition, he reports depressed mood with poor sleep and appetite, low energy and concentration, and intermittent suicidal thoughts and meets criteria for major depressive disorder.  He also reports ongoing anxiety which is very difficult to manage and for which she takes clonazepam.  Reports that he feels the Ativan is not sufficient for treating his anxiety and would like to try p.o. clonazepam instead.  He is amenable to inpatient behavioral health treatment to work both on his depression and his experience of pain with medication management and group, individual, and milieu therapy.  Patient signed a 201 already and is amenable to continue with the plan for inpatient behavioral treatment.  Denies current HI or AVH.\"    On evaluation today, patient was noted to be calm, cooperative, and pleasant.  He states that he was experiencing \"bad thoughts,\" and attributes this directly to his pain level.  He states, \"I had a bad today, and when I do have bad days, I do not remember what I say, but I have never wanted to hurt myself.  I have a 12-year-old son at home that I want to be there for.\"  Patient states that his pain had been escalating yesterday and that he had a \"bad day.\"  He states that he struggles with insomnia on a regular basis and typically does have fluctuating sleep at nighttime but does state that he typically sleeps in 2-hour intervals.  He states that he was unable to sleep last night due to not being in his own bed.  He denies any motivation changes and states that he wants to continue exercising, following up with his doctors, and spending time with his family.  He reports fluctuating energy levels.  He denies any appetite changes and notes eating \"better than I have in years.\"  He denies any issues with concentration or concentration changes.  He denies active or passive SI/HI/AVH and denies " "any plan or intent to harm himself or others.  He denies any symptoms of abrahan or hypomania including decreased need for sleep, expansive or elated mood, rapid or pressured speech, worsening irritability, impulsivity, or grandiosity.  Patient does report that he has been dealing with chronic pain for over 25 years.  He does report that he has a connective tissue disease.  He notes multiple concussions in the past as well as 2 total knee replacements.  He denies any recurring panic attacks but does report anxiety specifically related to his pain level.  He denies specific worries.  He denies PTSD symptoms.  He does report occasional and intermittent panic attacks approximately 1 every 2 to 3 weeks..  He denies eating disorders.  He denies any access to weapons or firearms at home.  He states that his wife owns a gun but that it is locked away and secured and he does not have access to it.  This was confirmed with the discussion from wife.  Patient denies any OCD symptoms.  He denies any seizure disorders.  He does report a history of multiple concussions and a history of a TBI.    Psychiatric Review Of Systems:  sleep: Chronic insomnia, typically sleeps in 2-hour intervals  appetite changes: No change  weight changes: no  energy/anergy: Typically fluctuates, but feels that it is \"good enough\" most of the time  interest/pleasure/anhedonia: no  somatic symptoms: no  anxiety/panic: yes, as related to his pain level  abrahan: no  guilty/hopeless: no  self injurious behavior/risky behavior: no    Historical Information   Past Psychiatric History:   Inpatient Treatment: Patient denies any previous mental health inpatient hospitalization  Outpatient Treatment: Patient does not currently have any outpatient psychiatry or therapy  Past Suicide Attempts: Denies  Past Violent Behavior: Denies  Past Psychiatric Medication Trials: Patient reports that he has been prescribed Risperdal for the last approximately 20 years    Substance " "Abuse History:  E-Cigarette/Vaping    E-Cigarette Use Current Every Day User     Cartridges/Day \"250 puffs a day\"     Comments does not want to quit       E-Cigarette/Vaping Substances    Nicotine Yes     THC Yes     CBD Yes     Flavoring Yes     Other No     Unknown No        Social History       Tobacco History       Smoking Status  Former Quit Date  11/23/2011 Smoking Tobacco Type  Cigarettes quit in 11/23/2011   Pack Year History     Packs/Day From To Years    0 11/23/2011  12.8      Smokeless Tobacco Use  Never              Alcohol History       Alcohol Use Status  Not Currently              Drug Use       Drug Use Status  Yes Types  Fentanyl, Marijuana, Morphine, Other Comment  pt dependant on ambien and flexeril per interview.              Sexual Activity       Sexually Active  Not Currently              Activities of Daily Living    Not Asked                 Additional Substance Use Detail       Questions Responses    Problems Due to Past Use of Alcohol? No    Problems Due to Past Use of Substances? No    Substance Use Assessment Substance use within the past 12 months    Alcohol Use Frequency Denies use in past 12 months    Cannabis frequency Daily    Comment:  Daily on 9/19/2024     Heroin Frequency Denies use in past 12 months    Cannabis method Other    Comment: Other on 9/19/2024 vapes or eats     Cocaine frequency Never used    Comment:  Never used on 9/19/2024     Crack Cocaine Frequency Denies use in past 12 months    Methamphetamine Frequency Denies use in past 12 months    Narcotic Frequency 3 or more times/week    Amphetamine frequency Denies use in past 12 months    Narcotic Method Pill    Barbituate Frequency Denies use use in past 12 months    Inhalant frequency Never used    Comment:  Never used on 9/19/2024     Hallucinogen frequency Never used    Comment:  Never used on 9/19/2024     Ecstasy frequency Never used    Comment:  Never used on 9/19/2024     Other drug frequency Never used    " Comment:  Never used on 9/19/2024     Opiate frequency Daily    Opiate last use 9/19/24    Comment:  9/19/2024 on 9/19/2024     Last reviewed by Ania Sims RN on 9/19/2024          I have assessed this patient for substance use within the past 12 months.  Patient reports that he uses medicinal cannabis but denies any other substance use including recreational drugs.  He states that he vapors on a daily basis.  He denies any detox or rehab.  He does report drinking approximately 3 cups of coffee approximately 2 times per week    Family Psychiatric History:   Patient reports that he is adopted and denies any mental health diagnoses in his family, substance abuse, or completed suicides    Social History:  Education:  Patient reports that he completed high school and has a certification in computer repair  Learning Disabilities:  Denies  Marital history:  for the last 20 years  Children: One 12-year-old son who is being taken care of by patient's wife  Living arrangement, social support: Support systems: Wife is supportive  Occupational History: 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  Other Pertinent History:  Legal History: Denies current or pending legal history   History: Denies  history      Traumatic History:   Abuse: Denies  Other Traumatic Events: Patient reports a history of multiple concussions in the past  I have reviewed the patient's PMH, PSH, Social History, Family History, Meds, and Allergies    Objective   Temp:  [97.1 °F (36.2 °C)-97.6 °F (36.4 °C)] 97.6 °F (36.4 °C)  HR:  [] 97  Resp:  [16-18] 16  BP: (130-146)/(82-99) 138/83  No intake or output data in the 24 hours ending 09/19/24 1044    Mental Status Evaluation:  Appearance:   male, lying in bed, age-appropriate, dressed in hospital gown, no acute distress   Behavior:  Cooperative,  "fair eye contact, no psychomotor slowing or agitation   Speech:  Spontaneous, normal rate and rhythm   Mood:  \"Fine\"   Affect:  Reactive   Language: naming objects   Thought Process:  Goal directed, logical, organized   Associations intact associations   Thought Content:  Within normal limits   Perceptual Disturbances: None and does not appear to be distracted, internally preoccupied, or responding to internal stimuli   Risk Potential: Suicidal Ideations denies at the time of interview  Homicidal Ideations denies at the time of interview  Potential for Aggression none at present   Sensorium:  person, place, time/date, and situation   Cognition:  recent and remote memory grossly intact   Consciousness:  alert and awake    Attention: attention span and concentration were age appropriate   Intellect: within normal limits   Fund of Knowledge: awareness of current events: Yes   Insight:  fair   Judgment:  fair   Muscle Strength:  Muscle Tone: normal   normal   Gait/Station: Ambulating via wheelchair however use his cane at home   Motor Activity: no abnormal movements         Lab Results: I have reviewed the following results: CBC/BMP:   .     09/19/24  0634   SODIUM 133*   K 4.0      CO2 17*   BUN 18   CREATININE 1.10   GLUC 47*    , Creatinine Clearance: Estimated Creatinine Clearance: 103.9 mL/min (by C-G formula based on SCr of 1.1 mg/dL)., LFTs:   .     09/19/24  0634   AST 21   ALT 17   ALB 3.6   TBILI 0.32   ALKPHOS 84    , PTT/INR:No new results in last 24 hours. , Troponin,BNP:No new results in last 24 hours. , Lactic Acid: No new results in last 24 hours. , Lipid Profile:   Results from last 7 days   Lab Units 09/19/24  0634   HDL mg/dL 69   LDL CALC mg/dL 75   TRIGLYCERIDES mg/dL 82   , TSH:   Results from last 7 days   Lab Units 09/19/24  0634   TSH 3RD GENERATON uIU/mL 1.174   , Lipid Profile:   Results from last 7 days   Lab Units 09/19/24  0634   HDL mg/dL 69   LDL CALC mg/dL 75   TRIGLYCERIDES mg/dL " "82   , Drug Screen:   Results from last 7 days   Lab Units 09/18/24  0826   BARBITURATE UR  Negative   BENZODIAZEPINE UR  Negative   THC UR  Positive*   COCAINE UR  Negative   METHADONE URINE  Negative   OPIATE UR  Positive*   PCP UR  Negative   , Medication Drug Levels:       Invalid input(s): \"CARBAMAZEPINE\", \"OXCARBAZEPINE\", RSV: No results found for: \"RSV\", FLU: No components found for: \"INFLUENZA\"Most Recent Labs:   Lab Results   Component Value Date    WBC 7.21 09/18/2024    RBC 4.28 09/18/2024    HGB 12.6 09/18/2024    HCT 35.5 (L) 09/18/2024     09/18/2024    RDW 13.2 09/18/2024    NEUTROABS 5.44 09/18/2024    SODIUM 133 (L) 09/19/2024    K 4.0 09/19/2024     09/19/2024    CO2 17 (L) 09/19/2024    BUN 18 09/19/2024    CREATININE 1.10 09/19/2024    GLUC 47 (LL) 09/19/2024    GLUF 47 (LL) 09/19/2024    CALCIUM 9.6 09/19/2024    AST 21 09/19/2024    ALT 17 09/19/2024    ALKPHOS 84 09/19/2024    TP 7.6 09/19/2024    ALB 3.6 09/19/2024    TBILI 0.32 09/19/2024    CHOLESTEROL 160 09/19/2024    HDL 69 09/19/2024    TRIG 82 09/19/2024    LDLCALC 75 09/19/2024    NONHDLC 91 09/19/2024    PEZ0IDRXJQMT 1.174 09/19/2024    FREET4 1.66 (H) 03/25/2024    HGBA1C 7.1 (H) 07/11/2024     07/11/2024        Administrative Statements   I have spent a total time of 45 minutes in caring for this patient on the day of the visit/encounter including Risks and benefits of tx options, Patient and family education, Impressions, Documenting in the medical record, Reviewing / ordering tests, medicine, procedures  , and Obtaining or reviewing history  .  "

## 2024-09-19 NOTE — ASSESSMENT & PLAN NOTE
EKG 9/18: Sinus tachycardia   Continue Toprol XL 25 mg daily  Patient's cardiologist discontinued Amiodarone   Resume Eliquis per cardiology note 9/6/24  Continue plan for outpatient Zio patch to assess AF burden with EP follow-up for possible ablation

## 2024-09-19 NOTE — ASSESSMENT & PLAN NOTE
In the setting of pain, labile blood sugars, and decreased oral intake   Continue tight glycemic control and pain control   Encourage oral intake

## 2024-09-19 NOTE — ED NOTES
Insurance Authorization for admission:   FAX placed to [facility].  Phone number: [phone]     Prior Auth was faxed to the  Dept. For review.and pending Auth.

## 2024-09-19 NOTE — NURSING NOTE
"This RN reviewed AVS with patient prior to discharge. Pt understood and stated \"I don't care as long as we are on the same page about me leaving today.\" Pt confirmed belongings.   "

## 2024-09-19 NOTE — DISCHARGE INSTR - OTHER ORDERS
Crisis Services  If you or someone you know is in crisis, please call [phone] has been designated as the new three-digit dialing code that will route callers to the National Suicide Prevention Lifeline. You can call, text, or chat 394, and be connected to trained counselors that are part of the existing National Suicide Prevention Lifeline network. The current Lifeline phone number (1-782.514.2567) will always remain available to people in emotional distress or suicidal crisis.  [place] Suicide Prevention Hotline: Call, Text or Chat 037 or  1-141.475.1997   [facility] Crisis:  [phone]  [facility] Crisis:  [phone]  [facility] Crisis:  [phone]  [place] Drug & Alcohol Helpline:  1-407.893.7751        [facility]  RESOURCES FOR RESIDENTS  The information below will help guide you in connecting with drug and/or alcohol treatment. [facility] is available to help with any questions, concerns and needed resources. Please contact our [facility] of Care Department at [phone] (Monday - Friday from 8:30 am to 4:30 pm).   The first step in accessing treatment is to have a drug and alcohol assessment to determine the type of treatment and recovery plan needed. Assessment sites are listed in the box to the left. If treatment is needed after-hours, you or your family member can go to or call [place] [facility] 24/7 at [phone].  To access treatment for those who have private health insurance, call the number on the back of the insurance card listed under Mental Health/Substance Abuse to find an in-network provider.   To access treatment if you have Medical Assistance or do not have insurance, please call one of the agencies listed to the left. Individuals who are considered a priority population - such as pregnant women who inject drugs, pregnant women who use substances, persons who inject drugs, overdose survivors, Veterans  and adolescents - are exempt from any treatment limitations. Preferential treatment is given to pregnant females    [facility]   [address], [address] [place] [address] [phone]  [email or web address]   M-F 8 am - 8 pm In-Person or Telehealth     [facility]   [employer] [address] [address]  [phone]   [email or web address]   M-F 6 am-12 pm  In-Person     [address] [address] [phone]   [email or web address]   M-F 8 am-4 pm  In-Person

## 2024-09-19 NOTE — PLAN OF CARE
Problem: SELF HARM/SUICIDALITY  Goal: Will have no self-injury during hospital stay  Description: INTERVENTIONS:  - Q 15 MINUTES: Routine safety checks  - Q WAKING SHIFT & PRN: Assess risk to determine if routine checks are adequate to maintain patient safety  - Encourage patient to participate actively in care by formulating a plan to combat response to suicidal ideation, identify supports and resources  9/19/2024 0112 by Yu Randolph RN  Outcome: Progressing  9/18/2024 2308 by Yu Randolph RN  Outcome: Not Progressing     Problem: BEHAVIOR  Goal: Pt/Family maintain appropriate behavior and adhere to behavioral management agreement, if implemented  Description: INTERVENTIONS:  - Assess the family dynamic   - Encourage verbalization of thoughts and concerns in a socially appropriate manner  - Assess patient/family's coping skills and non-compliant behavior (including use of illegal substances).  - Utilize positive, consistent limit setting strategies supporting safety of patient, staff and others  - Initiate consult with Case Management, Spiritual Care or other ancillary services as appropriate  - If a patient's/visitor's behavior jeopardizes the safety of the patient, staff, or others, refer to organization procedure.   - Notify Security of behavior or suspected illegal substances which indicate the need for search of the patient and/or belongings  - Encourage participation in the decision making process about a behavioral management agreement; implement if patient meets criteria  9/19/2024 0112 by Yu Randolph RN  Outcome: Progressing  9/18/2024 2308 by Yu Randolph RN  Outcome: Not Progressing     Problem: PAIN - ADULT  Goal: Verbalizes/displays adequate comfort level or baseline comfort level  Description: Interventions:  - Encourage patient to monitor pain and request assistance  - Assess pain using appropriate pain scale  - Administer analgesics based on type and  severity of pain and evaluate response  - Implement non-pharmacological measures as appropriate and evaluate response  - Consider cultural and social influences on pain and pain management  - Notify physician/advanced practitioner if interventions unsuccessful or patient reports new pain  9/19/2024 0112 by Yu Randolph RN  Outcome: Progressing  9/18/2024 2308 by Yu Randolph RN  Outcome: Not Progressing     Problem: Ineffective Coping  Goal: Cooperates with admission process  Description: Interventions:   - Complete admission process  9/19/2024 0112 by Yu Randolph RN  Outcome: Progressing  9/18/2024 2308 by Yu Randolph RN  Outcome: Not Progressing     Problem: ANXIETY  Goal: Will report anxiety at manageable levels  Description: INTERVENTIONS:  - Administer medication as ordered  - Teach and encourage coping skills  - Provide emotional support  - Assess patient/family for anxiety and ability to cope  9/19/2024 0112 by Yu Randolph RN  Outcome: Not Progressing  9/18/2024 2308 by Yu Randolph RN  Outcome: Not Progressing  Goal: By discharge: Patient will verbalize 2 strategies to deal with anxiety  Description: Interventions:  - Identify any obvious source/trigger to anxiety  - Staff will assist patient in applying identified coping technique/skills  - Encourage attendance of scheduled groups and activities  9/19/2024 0112 by Yu Randolph RN  Outcome: Not Progressing  9/18/2024 2308 by Yu Randolph RN  Outcome: Not Progressing     Problem: Ineffective Coping  Goal: Participates in unit activities  Description: Interventions:  - Provide therapeutic environment   - Provide required programming   - Redirect inappropriate behaviors   9/19/2024 0112 by Yu Randolph RN  Outcome: Not Progressing  9/18/2024 2308 by Yu Randolph RN  Outcome: Not Progressing

## 2024-09-19 NOTE — DISCHARGE SUMMARY
Discharge Summary - Behavioral Health   Armen Llanos 49 y.o. male MRN: [id number]  Unit/Bed#: -02 Encounter: [id number]     Admission Date: 9/18/2024         Discharge Date: 9/19/2024    Attending Psychiatrist: Giovanni Marlow MD    Assessment & Plan  Mood disorder (HCC) r/o mood disorder secondary to general medical condition, r/o MDD  Patient to continue home dose of risperidone 3 mg at bedtime for agitation/mood and home dose of Klonopin 1 mg at home as prescribed by his PCP.  Patient reports having a supply of his medications at home and was recommended to continue his home dose of medications.  Patient did sign a 72-hour notice with nursing this morning, 9/19/2024.  Essential hypertension  Per medical  Hyponatremia  Per medical  Chronic pain disorder  Per medical  A-fib with RVR (HCC)  Per medical  Cardiomyopathy (HCC)  Per medical  Diabetic polyneuropathy associated with type 2 diabetes mellitus (HCC)  Lab Results   Component Value Date    HGBA1C 7.1 (H) 07/11/2024       Recent Labs     09/18/24  1822 09/18/24  1918 09/19/24  0019 09/19/24  0749   POCGLU 44* 132 300* 84       Blood Sugar Average: Last 72 hrs:  (P) 192  Per medical  Pain and swelling of right knee  Per medical    Discharge Medications:    See after visit summary for all reconciled discharge medications provided to patient and family.      Discharge instructions/Information to patient and family:     See after visit summary for information provided to patient and family.      Provisions for Follow-Up Care:    See after visit summary for information related to follow-up care and any pertinent home health orders.      Discharge Statement:    I spent 30 minutes discharging the patient. This time was spent on the day of discharge. I had direct contact with the patient on the day of discharge.     Additional documentation is required if more than 30 minutes were spent on discharge:    I reviewed with Armen importance of compliance with  "medications and outpatient treatment after discharge.  I discussed the medication regimen and possible side effects of the medications with Armen prior to discharge. At the time of discharge he was tolerating psychiatric medications.  I discussed outpatient follow up with Armen.  I reviewed with Armen crisis plan and safety plan upon discharge.    Discharge on Two Antipsychotic Medications: No      Reason for Admission/HPI:     Please refer to initial H&P completed on 9/19/2024 by Dr. Giovanni Marlow:     \"Patient is a 49 y.o. male with a self-reported history of anxiety and depression, chronic pain, hypertension, and A-fib with RVR who presents on a voluntary 201 mental with inpatient commitment for suicidal ideation, worsening depression, and anxiety.      Per consultation by psychiatry team, Dr. Gio Pineda on 9/18/2024 at 11:36 AM:      \"Per Crisis:  Pt presents to ED w wife. Pt reported that he had ingested multiple Flexeril pills to alleviate pain. Pt reports that he \"knows what he is doing\" and has been \"doing this for years.\" This worker introduced self and role of the assessment, Pt agreed to be as helpful as possible in reporting. Pt reports in own words that \"I have been in pain for 25 years, I am done.\" Pt reports daily pain score to be 7.5 out of 10. Pt reports being at his wits end and that if he had been left alone at home \"he would have killed himself.\" Pt reports needing to see a pain specialist and that he feels that he is not heard and feels that his complaints go unanswered. Pt reports that seven years ago he hit his head in the basement and stated that at that time his pain increased \"1000 fold.\" Pt has TBI. 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 Pt is prescribed meds through PCP. Pt reports prior DX of Bipolar DO which he disagrees w/. Pt reports having been on Klonopin and was just prescribed Xanax. Pt reports hearing voices for the " last 40 years telling him to end his life but he pays it no credence. Pt denies any visual hallucinations. Pt reports depression/anxiety to be 10, irritability/lability is also a 10. Pt reports attention span to be beyond poor and that Pt has no self control ordering 10-20 items on [employer] weekly. Pt reports not sleeping and that he eats a meal a day. Pt denies any HX of violence/firearms/or legal issues. Pt reports having suicidal ideations in the past but never attempted. Pt denies homicidal ideations at this time. Pt denies any HX of self abusive behaviors. Pt has a medical cannabis card for pain. Pt reports residing w/ wife. Pt reports feeling that he is letting his wife and children down due to his mood/condition. Pt is not employed currently. Pt irritable snapping at staff blaming his pain for his outbursts. Pt has issue w/ any person who does not take his pain levels seriously. Pt was provided options and stated that he had every intention of signing a 201. Pt does not see how inpatient treatment will help him but acknowledges what he is doing right now is not working. Pt appears to be hoping for a miracle that will alleviate his pain but it does not appear that Pt gets any relief other than changing his dosages at home of his meds to get the desired effect which is to essentially pass out and escape from the pain. Pt was explained 201 and process and Pt signed document after questions were answered.      On evaluation,     Patient was notably dysphoric and tearful.  He cried out of apparent frustration throughout the interview.  Reports that he has ongoing chronic pain in his knees and that he is unable to handle it emotionally anymore.  Reports that he has frequent suicidal thoughts of not being alive anymore so he does not have to experience his pain.  Overtly denies any intention or plan to harm himself.  Cites protective factors as his son which prevents him from doing anything to harm himself.  Reports  "\"I would never do anything to actually kill myself.\"  We discussed the experience of pain and that he would benefit from treatment for his mood and therapy to adjust the way that he thinks about and experiences pain.  He expresses understanding that this is a missing piece of his coping skills and management of his experience of pain.  In addition, he reports depressed mood with poor sleep and appetite, low energy and concentration, and intermittent suicidal thoughts and meets criteria for major depressive disorder.  He also reports ongoing anxiety which is very difficult to manage and for which she takes clonazepam.  Reports that he feels the Ativan is not sufficient for treating his anxiety and would like to try p.o. clonazepam instead.  He is amenable to inpatient behavioral health treatment to work both on his depression and his experience of pain with medication management and group, individual, and milieu therapy.  Patient signed a 201 already and is amenable to continue with the plan for inpatient behavioral treatment.  Denies current HI or AVH.\"     On evaluation today, patient was noted to be calm, cooperative, and pleasant.  He states that he was experiencing \"bad thoughts,\" and attributes this directly to his pain level.  He states, \"I had a bad today, and when I do have bad days, I do not remember what I say, but I have never wanted to hurt myself.  I have a 12-year-old son at home that I want to be there for.\"  Patient states that his pain had been escalating yesterday and that he had a \"bad day.\"  He states that he struggles with insomnia on a regular basis and typically does have fluctuating sleep at nighttime but does state that he typically sleeps in 2-hour intervals.  He states that he was unable to sleep last night due to not being in his own bed.  He denies any motivation changes and states that he wants to continue exercising, following up with his doctors, and spending time with his family.  He " "reports fluctuating energy levels.  He denies any appetite changes and notes eating \"better than I have in years.\"  He denies any issues with concentration or concentration changes.  He denies active or passive SI/HI/AVH and denies any plan or intent to harm himself or others.  He denies any symptoms of abrahan or hypomania including decreased need for sleep, expansive or elated mood, rapid or pressured speech, worsening irritability, impulsivity, or grandiosity.  Patient does report that he has been dealing with chronic pain for over 25 years.  He does report that he has a connective tissue disease.  He notes multiple concussions in the past as well as 2 total knee replacements.  He denies any recurring panic attacks but does report anxiety specifically related to his pain level.  He denies specific worries.  He denies PTSD symptoms.  He does report occasional and intermittent panic attacks approximately 1 every 2 to 3 weeks..  He denies eating disorders.  He denies any access to weapons or firearms at home.  He states that his wife owns a gun but that it is locked away and secured and he does not have access to it.  This was confirmed with the discussion from wife.  Patient denies any OCD symptoms.  He denies any seizure disorders.  He does report a history of multiple concussions and a history of a TBI.\"      Past Medical History:   Diagnosis Date    Bed sore, stage 1     Diabetes mellitus (HCC)     Hypertension      Past Surgical History:   Procedure Laterality Date    IR PICC PLACEMENT SINGLE LUMEN  4/1/2024    KNEE SURGERY      IL REVJ TOTAL KNEE ARTHRP W/WO ALGRFT 1 COMPONENT Right 3/24/2024    Procedure: ARTHROPLASTY KNEE TOTAL REVISION, POLY EXCHANGE;  Surgeon: Tao Washington DO;  Location:  MAIN OR;  Service: Orthopedics    TOE AMPUTATION Left 3/28/2024    Procedure: AMPUTATION TOE 2nd;  Surgeon: Rossy Toussaint DPM;  Location:  MAIN OR;  Service: Podiatry       Medications:    All current active " medications have been reviewed.  Medications prior to admission:    Prior to Admission Medications   Prescriptions Last Dose Informant Patient Reported? Taking?   Alcohol Swabs 70 % PADS  Self No No   Sig: May substitute brand based on insurance coverage. Check glucose ACHS.   Blood Glucose Monitoring Suppl (OneTouch Verio Reflect) w/Device KIT  Self No No   Sig: May substitute brand based on insurance coverage. Check glucose ACHS.   Insulin Pen Needle (BD Pen Needle Carisa 2nd Gen) 32G X 4 MM MISC  Self No No   Sig: For use with insulin pen. Pharmacy may dispense brand covered by insurance.   Lantus SoloStar 100 units/mL SOPN  Self Yes No   Sig: Pt reports taking 40 units daily   OneTouch Delica Lancets 33G MISC  Self No No   Sig: May substitute brand based on insurance coverage. Check glucose ACHS.   amLODIPine (NORVASC) 5 mg tablet  Self No No   Sig: Take 1 tablet (5 mg total) by mouth 2 (two) times a day   cholecalciferol (VITAMIN D3) 250 MCG (90037 UT) capsule  Self Yes No   Sig: Vitamin D3   clonazePAM (KlonoPIN) 1 mg tablet  Self Yes No   Si.5 tablets 2 (two) times a day as needed for anxiety   cyclobenzaprine (FLEXERIL) 10 mg tablet  Self No No   Sig: Take 0.5 tablets (5 mg total) by mouth 3 (three) times a day as needed for muscle spasms   doxycycline (DORYX) 100 MG EC tablet  Self Yes No   Sig: Take 100 mg by mouth 2 (two) times a day   esomeprazole (NexIUM) 20 mg capsule  Self Yes No   Sig: Take 20 mg by mouth 2 (two) times a day before meals   fentaNYL (DURAGESIC) 75 mcg/hr  Self Yes No   Sig: Place 1 patch on the skin every 3 (three) days   glucose blood (OneTouch Verio) test strip  Self No No   Sig: May substitute brand based on insurance coverage. Check glucose ACHS.   insulin lispro (HumaLOG KwikPen) 100 units/mL injection pen  Self No No   Sig: Inject 13 Units under the skin 3 (three) times a day with meals   lisinopril-hydrochlorothiazide (PRINZIDE,ZESTORETIC) 20-25 MG per tablet  Self Yes No    Sig: Take 1 tablet by mouth daily   metoprolol succinate (TOPROL-XL) 25 mg 24 hr tablet  Self No No   Sig: Take 1 tablet (25 mg total) by mouth daily   morphine (MS CONTIN) 60 mg 12 hr tablet  Self Yes No   Si mg every 12 (twelve) hours as needed for severe pain   multivitamin (THERAGRAN) TABS  Self Yes No   Sig: Take 1 tablet by mouth daily   ondansetron (ZOFRAN) 8 mg tablet  Self Yes No   Sig: TAKE 1 TABLET (8 MG) BY MOUTH 3 TIMES PER DAY AS NEEDED   polyethylene glycol (MIRALAX) 17 g packet  Self No No   Sig: Take 17 g by mouth 2 (two) times a day   Patient not taking: Reported on 2024   senna-docusate sodium (SENOKOT S) 8.6-50 mg per tablet  Self No No   Sig: Take 2 tablets by mouth 2 (two) times a day   Patient not taking: Reported on 5/10/2024   simvastatin (ZOCOR) 40 mg tablet  Self Yes No   Sig: Take 40 mg by mouth daily after dinner   zolpidem (AMBIEN) 10 mg tablet  Self No No   Sig: Take 1 tablet (10 mg total) by mouth daily at bedtime as needed for sleep   Patient taking differently: Take 10 mg by mouth daily at bedtime Per pt - pt takes scheduled      Facility-Administered Medications: None       Allergies:     Allergies   Allergen Reactions    Cephalosporins Hives    Penicillins Hives       Objective     Vital signs in last 24 hours:    Temp:  [97.1 °F (36.2 °C)-97.6 °F (36.4 °C)] 97.6 °F (36.4 °C)  HR:  [] 97  Resp:  [16-18] 16  BP: (130-146)/(82-99) 138/83    No intake or output data in the 24 hours ending 24 1045    Hospital Course:     Armen was admitted to the inpatient psychiatric unit and started on Behavioral Health checks every 7 minutes. During the hospitalization he was encouraged to attend individual therapy, group therapy, milieu therapy and occupational therapy.    Psychiatric medications were restarted during the hospital stay.  Patient was recommended to restart his home dose of risperidone 3 mg at bedtime and continue on his Klonopin 1 mg daily as prescribed by  his PCP.  He signed a 72-hour notice on 9/19/2024.  Patient does not feel as though he needed to be in the hospital and after discussion with treatment team and patient's wife, treatment team made decision to discharge patient on 9/19/2024.  Collateral was obtained with patient's wife who reports that the patient is not a danger to himself or others.  She states that patient has been struggling with his pain symptoms for several years but states that she would never harm himself.  Patient's wife reports that there is a firearm at home, however, patient does not have access to it and it is locked away/secured.   Prior to beginning of treatment medications risks and benefits and possible side effects including risk of parkinsonian symptoms, Tardive Dyskinesia and metabolic syndrome related to treatment with antipsychotic medications and risks of misuse, abuse or dependence, sedation and respiratory depression related to treatment with benzodiazepine medications were reviewed with Armen. He verbalized understanding and agreement for treatment. Upon admission he was seen by medical service for medical clearance for inpatient treatment and medical follow up.    Patient did not wish to have any further medication titrations while in the hospital and states that he would like to explore the possibility of seeing a neuropsychologist but was also encouraged to consider seeking a therapist as well as a psychiatrist upon discharge.  After discussion with case management, patient can follow up with his primary care physician Deidre Andrea and was recommended to follow up outpatient with Helen Thomas.  At the time of discharge, patient denies SI/HI/AVH and denies any plan or intent to harm self or others.  At the time of discharge, patient has no overt signs and symptoms of acute psychosis or abrahan.  At the time of discharge, patient has been taking and tolerating medications without side effects.  Patient has been in good  "behavioral control throughout hospital stay.  At the time of discharge patient did not meet criteria or have grounds for involuntary mental health commitment.  At the time of discharge, patient was grossly oriented to time, place, and person.  Patient denies any access to weapons or firearms at home.  Patient is future oriented and forward thinking, stating that he wants to spend time with his wife, spend time with his son, continue taking his medications as prescribed, exercise, find a therapist/neuropsychologist, and continue with computer repairs.      Since he was doing well at the end of the hospitalization, treatment team felt that he could be safely discharged to outpatient care. The outpatient follow up with  Helen Thomas  was recommended by  and patient was instructed to call and schedule an appointment.    Mental Status at Time of Discharge:     Appearance:   male, no acute distress, age-appropriate, dressed in hospital gown, lying in bed   Behavior:  cooperative, no psychomotor slowing or agitation, fair eye contact   Speech:  normal rate and volume, clear, coherent, spontaneous   Mood:  \"Fine\"   Affect:  Bright and reactive   Thought Process:  organized, logical, goal directed   Associations: intact associations   Thought Content:  no overt delusions   Perceptual Disturbances: no auditory hallucinations, no visual hallucinations, denies auditory hallucinations when asked, does not appear responding to internal stimuli   Risk Potential: Suicidal ideation - None at present, contracts for safety on the unit, would talk to staff if not feeling safe on the unit  Homicidal ideation - None at present  Potential for aggression - Not at present   Sensorium:  oriented to person, place, time/date, and situation   Memory:  recent and remote memory grossly intact   Consciousness:  alert   Attention/Concentration: attention span and concentration are age appropriate   Insight:  fair   Judgment: " fair   Gait/Station: normal gait/station   Motor Activity: no abnormal movements       Suicide/Homicide Risk Assessment:    Risk of Harm to Self:   The following ratings are based on assessment at the time of discharge, review of the hospital stay progress, assessment at the time of the interview, observation over the last 1 day, and review of records  Demographic risk factors include: , male, age: over 50 or older  Historical Risk Factors include: history of anxiety  Current Specific Risk Factors include: recent inpatient psychiatric admission - being discharged today, chronic anxiety symptoms, health problems  Protective Factors: no current suicidal ideation, stable mood, no current anxiety symptoms, no current psychotic symptoms, ability to make plans for the future, no current suicidal plan or intent, family support established, being a parent, being , compliant with medications, having a desire to be alive, having a sense of purpose or meaning in life, resiliency, responsibilities and duties to others, restricted access to lethal means, safe and stable living environment, strong relationships, supportive wife  Weapons/Firearms: none and wife has a firearm at home but it is locked away and secured, which was confirmed by wife . The following steps have been taken to ensure weapons are properly secured: locked, secured, no access, no bullets  Based on today's assessment, Armen presents the following risk of harm to self: minimal    Risk of Harm to Others:  The following ratings are based on assessment at the time of discharge, review of the hospital stay progress, assessment at the time of the interview, observation over the last 1 day, and review of records  Demographic Risk Factors include: male.  Historical Risk Factors include: none.  Current Specific Risk Factors include: behavior suggesting loss of control  Protective Factors: no current homicidal ideation, good impulse control, stable mood,  no current psychotic symptoms, compliant with medications, compliant with treatment, willing to continue psychiatric treatment, good support system, supportive wife, being a parent, resilience, restricted access to lethal means, access to mental health treatment  Weapons/Firearms: none and as above, wife has firearms at home but it is locked/secured away, confirmed by wife . The following steps have been taken to ensure weapons are properly secured: locked, secured, no access, no bullets  Based on today's assessment, Armen presents the following risk of harm to others: minimal    The following interventions are recommended: outpatient follow up with a psychiatrist, outpatient follow up with a therapist    Laboratory Results: I have personally reviewed all pertinent laboratory/tests results    Results from the past 24 hours:   Recent Results (from the past 24 hour(s))   Fingerstick Glucose (POCT)    Collection Time: 09/18/24  3:15 PM   Result Value Ref Range    POC Glucose 279 (H) 65 - 140 mg/dl   Fingerstick Glucose (POCT)    Collection Time: 09/18/24  6:22 PM   Result Value Ref Range    POC Glucose 44 (LL) 65 - 140 mg/dl   Fingerstick Glucose (POCT)    Collection Time: 09/18/24  7:18 PM   Result Value Ref Range    POC Glucose 132 65 - 140 mg/dl   Fingerstick Glucose (POCT)    Collection Time: 09/19/24 12:19 AM   Result Value Ref Range    POC Glucose 300 (H) 65 - 140 mg/dl   Comprehensive metabolic panel    Collection Time: 09/19/24  6:34 AM   Result Value Ref Range    Sodium 133 (L) 135 - 147 mmol/L    Potassium 4.0 3.5 - 5.3 mmol/L    Chloride 102 96 - 108 mmol/L    CO2 17 (L) 21 - 32 mmol/L    ANION GAP 14 (H) 4 - 13 mmol/L    BUN 18 5 - 25 mg/dL    Creatinine 1.10 0.60 - 1.30 mg/dL    Glucose 47 (LL) 65 - 140 mg/dL    Glucose, Fasting 47 (LL) 65 - 99 mg/dL    Calcium 9.6 8.4 - 10.2 mg/dL    AST 21 13 - 39 U/L    ALT 17 7 - 52 U/L    Alkaline Phosphatase 84 34 - 104 U/L    Total Protein 7.6 6.4 - 8.4 g/dL     Albumin 3.6 3.5 - 5.0 g/dL    Total Bilirubin 0.32 0.20 - 1.00 mg/dL    eGFR 77 ml/min/1.73sq m   TSH, 3rd generation with Free T4 reflex    Collection Time: 09/19/24  6:34 AM   Result Value Ref Range    TSH 3RD GENERATON 1.174 0.450 - 4.500 uIU/mL   Vitamin D 25 hydroxy    Collection Time: 09/19/24  6:34 AM   Result Value Ref Range    Vit D, 25-Hydroxy 40.2 30.0 - 100.0 ng/mL   Lipid panel    Collection Time: 09/19/24  6:34 AM   Result Value Ref Range    Cholesterol 160 See Comment mg/dL    Triglycerides 82 See Comment mg/dL    HDL, Direct 69 >=40 mg/dL    LDL Calculated 75 0 - 100 mg/dL    Non-HDL-Chol (CHOL-HDL) 91 mg/dl   Fingerstick Glucose (POCT)    Collection Time: 09/19/24  7:49 AM   Result Value Ref Range    POC Glucose 84 65 - 140 mg/dl   Fingerstick Glucose (POCT)    Collection Time: 09/19/24 11:37 AM   Result Value Ref Range    POC Glucose 132 65 - 140 mg/dl     Most Recent Labs:   Lab Results   Component Value Date    WBC 7.21 09/18/2024    RBC 4.28 09/18/2024    HGB 12.6 09/18/2024    HCT 35.5 (L) 09/18/2024     09/18/2024    RDW 13.2 09/18/2024    NEUTROABS 5.44 09/18/2024    TOTANEUTABS 7.39 04/01/2024    SODIUM 133 (L) 09/19/2024    K 4.0 09/19/2024     09/19/2024    CO2 17 (L) 09/19/2024    BUN 18 09/19/2024    CREATININE 1.10 09/19/2024    GLUC 47 (LL) 09/19/2024    CALCIUM 9.6 09/19/2024    AST 21 09/19/2024    ALT 17 09/19/2024    ALKPHOS 84 09/19/2024    TP 7.6 09/19/2024    ALB 3.6 09/19/2024    TBILI 0.32 09/19/2024    CHOLESTEROL 160 09/19/2024    HDL 69 09/19/2024    TRIG 82 09/19/2024    LDLCALC 75 09/19/2024    NONHDLC 91 09/19/2024    [id number] 1.174 09/19/2024    FREET4 1.66 (H) 03/25/2024    HGBA1C 7.1 (H) 07/11/2024     07/11/2024       Giovanni Marlow MD 09/19/24

## 2024-09-19 NOTE — NURSING NOTE
"Pt remains awake playing Solitaire. Pt yells to staff, \"Can I get a wheelchair? I'm stuck.\" Pt expresses that he is experiencing \"swelling in my left knee, my bad leg.\" No severe swelling or variation in color/heat assessed by this nurse currently. Pt provided with wheelchair and helped to bathroom, now is back playing cards. Pt educated on wheelchair use and staff availability reinforced.   "

## 2024-09-19 NOTE — QUICK NOTE
AM blood draw attempted 4 out of 6 tubes collected. PT advised to drink water before the remaining 2 tubes are collected   Eyes with no visual disturbances.  Ears clean and dry and no hearing difficulties. Nose with pink mucosa and no drainage.  Mouth mucous membranes moist and pink.  No tenderness or swelling to throat or neck.

## 2024-09-19 NOTE — PLAN OF CARE
Problem: SELF HARM/SUICIDALITY  Goal: Will have no self-injury during hospital stay  Description: INTERVENTIONS:  - Q 15 MINUTES: Routine safety checks  - Q WAKING SHIFT & PRN: Assess risk to determine if routine checks are adequate to maintain patient safety  - Encourage patient to participate actively in care by formulating a plan to combat response to suicidal ideation, identify supports and resources  Outcome: Not Progressing     Problem: BEHAVIOR  Goal: Pt/Family maintain appropriate behavior and adhere to behavioral management agreement, if implemented  Description: INTERVENTIONS:  - Assess the family dynamic   - Encourage verbalization of thoughts and concerns in a socially appropriate manner  - Assess patient/family's coping skills and non-compliant behavior (including use of illegal substances).  - Utilize positive, consistent limit setting strategies supporting safety of patient, staff and others  - Initiate consult with Case Management, Spiritual Care or other ancillary services as appropriate  - If a patient's/visitor's behavior jeopardizes the safety of the patient, staff, or others, refer to organization procedure.   - Notify Security of behavior or suspected illegal substances which indicate the need for search of the patient and/or belongings  - Encourage participation in the decision making process about a behavioral management agreement; implement if patient meets criteria  Outcome: Not Progressing     Problem: ANXIETY  Goal: Will report anxiety at manageable levels  Description: INTERVENTIONS:  - Administer medication as ordered  - Teach and encourage coping skills  - Provide emotional support  - Assess patient/family for anxiety and ability to cope  Outcome: Not Progressing  Goal: By discharge: Patient will verbalize 2 strategies to deal with anxiety  Description: Interventions:  - Identify any obvious source/trigger to anxiety  - Staff will assist patient in applying identified coping  technique/skills  - Encourage attendance of scheduled groups and activities  Outcome: Not Progressing     Problem: PAIN - ADULT  Goal: Verbalizes/displays adequate comfort level or baseline comfort level  Description: Interventions:  - Encourage patient to monitor pain and request assistance  - Assess pain using appropriate pain scale  - Administer analgesics based on type and severity of pain and evaluate response  - Implement non-pharmacological measures as appropriate and evaluate response  - Consider cultural and social influences on pain and pain management  - Notify physician/advanced practitioner if interventions unsuccessful or patient reports new pain  Outcome: Not Progressing     Problem: Ineffective Coping  Goal: Cooperates with admission process  Description: Interventions:   - Complete admission process  Outcome: Not Progressing  Goal: Participates in unit activities  Description: Interventions:  - Provide therapeutic environment   - Provide required programming   - Redirect inappropriate behaviors   Outcome: Not Progressing

## 2024-09-19 NOTE — ASSESSMENT & PLAN NOTE
Lab Results   Component Value Date    HGBA1C 7.1 (H) 07/11/2024       Recent Labs     09/18/24  1822 09/18/24  1918 09/19/24  0019 09/19/24  0749   POCGLU 44* 132 300* 84       Blood Sugar Average: Last 72 hrs:  (P) 192

## 2024-09-19 NOTE — NURSING NOTE
"Spoke to patient's wife on the phone. The wife verbalized: \" I am concerned for my 's care and safety in the facility that he is currently situated him. They did not explain it and they do not understand his needs. He is not getting the care he needs at this facility. I know my  for 20 years he never had a suicide attempt and he is not suicidal. We tried to explain that to the doctors over there. I would rather have him discharge and pay for private therapist out of pocket then have him stay in that facility.\"  "

## 2024-09-19 NOTE — TREATMENT PLAN
TREATMENT PLAN REVIEW - Behavioral Health Armen Llanos 49 y.o. 1975 male MRN: [id number]    [facility] 3B BEHAVIORAL [facility] Room / Bed: [facility] 347/[facility] 347-02 Encounter: [id number]          Admit Date/Time:  9/18/2024 10:17 PM    Treatment Team:   MD Ricardo Fish RN Brooke Rickert Tara Nickens Karissa Marie Kormandy, CRNP Rebecca Sussman Kristina Merced Megan Davis, RN    Diagnosis: Principal Problem:    Mood disorder (HCC) r/o mood disorder secondary to general medical condition, r/o MDD  Active Problems:    Essential hypertension    Hyponatremia    Chronic pain disorder    A-fib with RVR (HCC)    Cardiomyopathy (HCC)    Diabetic polyneuropathy associated with type 2 diabetes mellitus (HCC)    Pain and swelling of right knee    Major depressive disorder, recurrent severe without psychotic features (HCC)    Medical clearance for psychiatric admission      Patient Strengths/Assets: cooperative, communication skills, compliant with medication, family ties, good support system, negotiates basic needs, patient is on a voluntary commitment, special hobby/interest    Patient Barriers/Limitations: poor physical health    Short Term Goals: decrease in suicidal thoughts, ability to stay safe on the unit, ability to stay free of restraints, improvement in insight, improvement in self care, sleep improvement, increase in socialization with peers on the unit    Long Term Goals: improvement in depression, resolution of depressive symptoms, improved insight, adequate self care, stable living arrangements upon discharge    Progress Towards Goals: continue psychiatric medications as prescribed    Recommended Treatment: medication management, patient medication education, group therapy, milieu therapy, continued Behavioral Health psychiatric evaluation/assessment process    Treatment Frequency: daily medication monitoring, group and milieu  therapy daily, monitoring through interdisciplinary rounds, monitoring through weekly patient care conferences    Expected Discharge Date:  1 day    Discharge Plan: discharge to home, referral for outpatient medication management with a psychiatrist, referral for outpatient psychotherapy, referrals as indicated    Treatment Plan Created/Updated By: Giovanni Marlow MD

## 2024-09-19 NOTE — SOCIAL WORK
Cm spoke with Pt regarding discharge process. Pt reported he has a specialist in the works for psychiatry that focuses on individuals with TBI and chronic pain. Pt declined appointments or referrals for OP treatment. Pt reported he is excited to go home. He did not feel like he needed to be admitted. Pt reported his wife will pick him up around 6 pm. Pt reported he is looking into getting a wheel chair. Cm encouraged Pt to follow up with his family doctor to assist him with getting a wheel chair. Pt reports he gets around with a cane at home but the wheel chair is his preference.    Cm spoke with Wife. Wife reported that Pt sounds good and at his baseline. Wife reported he does not need to be in inpatient behavioral health. Wife reported Pt does not have access to firearms and stock piles of medication. Wife reported that she will  pt after she gets out of work and will arrive at 6:30 pm. Cm informed wife on the fact that Pt was not placed on any new medications and will continue his home medication regiment. Wife understood. Cm informed wife about Pt reporting he has a psychiatrist in mind already and refused any referrals. Cm informed wife that Cm provided the information for [facility] in Pt's paperwork.

## 2024-09-19 NOTE — NURSING NOTE
"Pt is pleasant upon approach but can become irritable at times. Pt is refusing some AM medication, pt stated \"I am not taking the eliquis, I have not took that in a long time.\" Pt also verbalized frustration due to not having a higher dose of morphine, \"It's so funny how the other [facility]'s can know that I take 100s but this hospital does not!\"    "

## 2024-09-19 NOTE — NURSING NOTE
"Pt is pleasant upon approach but can become irritable or tearful at times. Pt denies SI/HI/AH/VH stating \"I would never kill myself, I have a son.\"  Pt isolative to room only coming out to use the phone to speak to his wife. Medication compliant. Denies any further needs at this time.   "

## 2024-09-19 NOTE — ASSESSMENT & PLAN NOTE
Admission labs: CBC, CMP, lipid panel, TSH acceptable  Blood sugar fluctuating - adjusting insulin accordingly   Folate, B12, Vitamin D 25 hydroxy labs pending  Vitals stable   UDS positive for THC, fentanyl, opiates, hydrocodone   EKG reveals NSR, 114 bpm   Patient is medically cleared for admission to U and treatment of underlying psychiatric illness based on available results  Please contact [facility] with any questions or concerns

## 2024-09-19 NOTE — ED NOTES
Patient eating snacks, drinking juice tolerating PO fluids and food. Alert and oriented x4.      Tika Mcadams RN  09/18/24 [id number]

## 2024-09-19 NOTE — CONSULTS
Consultation - Hospitalist   Name: Armen Llanos 49 y.o. male I MRN: [id number]  Unit/Bed#: [facility] 347-02 I Date of Admission: 9/18/2024   Date of Service: 9/19/2024 I Hospital Day: 1   Inpatient consult for Medical Clearance for  patient  Consult performed by: NATA Young  Consult ordered by: Jessica Muñoz MD        Physician Requesting Evaluation: Giovanni Marlow MD   Reason for Evaluation / Principal Problem: Medical clearance to [facility]      Assessment & Plan  A-fib with RVR (HCC)  EKG 9/18: Sinus tachycardia   Continue Toprol XL 25 mg daily  Patient's cardiologist discontinued Amiodarone   Resume Eliquis per cardiology note 9/6/24  Continue plan for outpatient Zio patch to assess AF burden with EP follow-up for possible ablation  Cardiomyopathy (HCC)  ECHO: LVEF 44% with mild global hypokinesis after 4 weeks of suppression with Amiodarone   Continue BB   Reviewed patient's cardiology note from 9/6/24 - patient no longer on Entresto due to cost. Patient to be on goal directed medications Zocor, Jardiance, Norvasc, Eliquis, Lisinopril-HCTZ.   Patient states he has not started the Lisinopril-HCTZ. Will start at lower dose and increase as tolerated  Pain and swelling of right knee  H/o multiple bilateral knee surgeries with chronic knee pain on opioids. H/o septic arthritis with MSSA bacteremia. Completed ABX therapy and is now maintained on Doxycyline   Currently, no fevers, redness, or leukocytosis   Suspect soft tissue swelling  Check right knee xray to rule out effusion   Trend CRP today and tomorrow  Continue elevation  Continue home Doxycyline BID   Consider orthopedics consult if xray or CRP abnormal   Chronic pain disorder  Chronic bilateral knee pain. Maintained on Morphine ER 60 mg BID and Fentanyl patch 75 mcg every 3 days   Patient reporting his PCP recently increased his Morphine dose (telling providers different doses, 90 vs 100 mg)  No change in dose noted on PDMP  Will  "continue Morphine ER 60 mg BID and Fentanyl patch 75 mcg every 3 days   Psych planning to add Cymbalta and Neurontin   Robaxin PRN muscle spasm  Bowel regimen   Recommend close follow-up with PCP and pain management   Essential hypertension  BP stable  Continue home norvasc 5 mg BID and toprol xl 25 mg daily  Restart home Lisinopril-HCTZ as tolerated   Hyponatremia  In the setting of pain, labile blood sugars, and decreased oral intake   Continue tight glycemic control and pain control   Encourage oral intake    Diabetic polyneuropathy associated with type 2 diabetes mellitus (HCC)  Lab Results   Component Value Date    HGBA1C 7.1 (H) 07/11/2024       Recent Labs     09/18/24  1822 09/18/24  1918 09/19/24  0019 09/19/24  0749   POCGLU 44* 132 300* 84       Blood Sugar Average: Last 72 hrs:  (P) 192  Psychiatry considering addition of Neurontin  Blood sugars labile  Reduce lantus from 40 units to 30 units HS  Reduce lispro from 13 units to 10 units TID  Monitor ACCU checks AC + HS with lispro sliding scale coverage   Major depressive disorder, recurrent severe without psychotic features (HCC)  Admitted to [facility]  Management per primary service   Flexeril overdose on admission  Hold flexeril and start robaxin PRN   Medical clearance for psychiatric admission  Admission labs: CBC, CMP, lipid panel, TSH acceptable  Blood sugar fluctuating - adjusting insulin accordingly   Folate, B12, Vitamin D 25 hydroxy labs pending  Vitals stable   UDS positive for THC, fentanyl, opiates, hydrocodone   EKG reveals NSR, 114 bpm   Patient is medically cleared for admission to BHU and treatment of underlying psychiatric illness based on available results  Please contact [place] with any questions or concerns    Please contact the SecureChat role,\" \", with any questions/concerns.   psychiatry medical provider      Collaboration of Care: Were Recommendations Directly Discussed with Primary Treatment Team? - Yes     History of Present " Illness   Armen Llanos is a 49 y.o. male with a PMH including right knee septic joint, MSSA bacteremia, T2DM, chronic pain on chronic opioids, depression who is originally admitted to the psychiatry service due to intentional overdose. We are consulted for medical clearance for admission to Behavioral Health Unit and treatment of underlying psychiatric illness.  Patient presented to [facility] ED after reported ingestion of multiple Flexeril pills. Patient was monitored and deemed medically clear for admission to U. Patient was seen by Crisis. He signed a 201 and was admitted to [facility]. Currently, he is calm and cooperative. He reports chronic right knee pain. He states his pain regimen has recently been adjusted despite PDMP records. Patient and I discussed resumed medications per PDMP with trial of possible Cymbalta, Neurontin and Robaxin. Patient amenable to this plan. If no improvement, will consider pain management consult.     Review of Systems   Constitutional:  Negative for appetite change and chills.   HENT:  Negative for congestion and sore throat.    Eyes:  Negative for visual disturbance.   Respiratory:  Negative for cough and shortness of breath.    Cardiovascular:  Negative for chest pain.   Gastrointestinal:  Negative for abdominal pain, constipation, diarrhea, nausea and vomiting.   Genitourinary:  Negative for difficulty urinating.   Musculoskeletal:  Negative for gait problem.   Skin:  Negative for wound.   Neurological:  Negative for dizziness, light-headedness and headaches.     I have reviewed the patient's PMH, PSH, Social History, Family History, Meds, and Allergies    Marital Status: /Civil Union    Substance Use History:   Social History     Substance and Sexual Activity   Alcohol Use Not Currently     Social History     Tobacco Use   Smoking Status Former    Current packs/day: 0.00    Types: Cigarettes    Quit date: 2011    Years since quittin.8   Smokeless  "Tobacco Never     Social History     Substance and Sexual Activity   Drug Use Yes    Types: Marijuana, Fentanyl, Morphine, Other    Comment: pt dependant on ambien and flexeril per interview.       Objective   Blood Pressure: 130/87 (09/19/24 0806)  Pulse: (!) 116 (09/19/24 0806)  Temperature: 97.6 °F (36.4 °C) (09/19/24 0806)  Temp Source: Temporal (09/19/24 0806)  Respirations: 16 (09/19/24 0806)  Height: 6' 6\" (198.1 cm) (09/18/24 2217)  Weight - Scale: 95.5 kg (210 lb 9.6 oz) (waist 42 inches) (09/18/24 2217)  SpO2: 99 % (09/19/24 0806)  Physical Exam  Constitutional:       General: He is not in acute distress.     Appearance: He is not ill-appearing, toxic-appearing or diaphoretic.   HENT:      Head: Normocephalic.      Mouth/Throat:      Mouth: Mucous membranes are moist.   Cardiovascular:      Rate and Rhythm: Normal rate.   Pulmonary:      Effort: Pulmonary effort is normal. No respiratory distress.   Abdominal:      General: Abdomen is flat. Bowel sounds are normal. There is no distension.      Palpations: Abdomen is soft.      Tenderness: There is no abdominal tenderness. There is no guarding or rebound.   Musculoskeletal:         General: Swelling (right knee) and tenderness (right knee) present.      Cervical back: Normal range of motion.      Right lower leg: No edema.      Left lower leg: No edema.   Skin:     General: Skin is warm and dry.      Capillary Refill: Capillary refill takes less than 2 seconds.   Neurological:      Mental Status: He is alert and oriented to person, place, and time.   Psychiatric:         Speech: Speech normal.         Behavior: Behavior normal.         Thought Content: Thought content normal.           Lab Results: I have reviewed the following results:   Results from last 7 days   Lab Units 09/18/24  0054   WBC Thousand/uL 7.21   HEMOGLOBIN g/dL 12.6   HEMATOCRIT % 35.5*   PLATELETS Thousands/uL 268   SEGS PCT % 76*   LYMPHO PCT % 19   MONO PCT % 4   EOS PCT % 1 "     Results from last 7 days   Lab Units 09/19/24  0634   SODIUM mmol/L 133*   POTASSIUM mmol/L 4.0   CHLORIDE mmol/L 102   CO2 mmol/L 17*   BUN mg/dL 18   CREATININE mg/dL 1.10   ANION GAP mmol/L 14*   CALCIUM mg/dL 9.6   ALBUMIN g/dL 3.6   TOTAL BILIRUBIN mg/dL 0.32   ALK PHOS U/L 84   ALT U/L 17   AST U/L 21   GLUCOSE RANDOM mg/dL 47*     Results from last 7 days   Lab Units 09/18/24  0054   INR  1.04         Lab Results   Component Value Date/Time    HGBA1C 7.1 (H) 07/11/2024 05:26 PM    HGBA1C 12.1 (H) 03/22/2024 09:48 AM    HGBA1C 9.3 (H) 05/18/2023 04:56 AM    HGBA1C 9.3 (H) 05/18/2023 04:56 AM     Results from last 7 days   Lab Units 09/19/24  0749 09/19/24  0019 09/18/24  1918 09/18/24  1822 09/18/24  1515 09/18/24  0823   POC GLUCOSE mg/dl 84 300* 132 44* 279* 172*       Imaging Review: Reviewed radiology reports from this admission including: chest xray.  Other Studies: EKG was reviewed.     Administrative Statements   I have spent a total time of  minutes in caring for this patient on the day of the visit/encounter including Diagnostic results, Instructions for management, Patient and family education, Importance of tx compliance, Risk factor reductions, Counseling / Coordination of care, Documenting in the medical record, Reviewing / ordering tests, medicine, procedures  , Obtaining or reviewing history  , and Communicating with other healthcare professionals .  ** Please Note: This note has been constructed using a voice recognition system. **

## 2024-09-19 NOTE — NURSING NOTE
"Pt reassessed in bed and calmer at this time, pt states he can tell \"the antianxiety medication is working.\" Pt continues to rate pain in right knee 10/10 and states he now cannot lift his leg off the pillow where he had strength to do it before. Again reminded he will be seen by a doctor and his concerns will be addressed.   "

## 2024-09-19 NOTE — ASSESSMENT & PLAN NOTE
Chronic bilateral knee pain. Maintained on Morphine ER 60 mg BID and Fentanyl patch 75 mcg every 3 days   Patient reporting his PCP recently increased his Morphine dose (telling providers different doses, 90 vs 100 mg)  No change in dose noted on PDMP  Will continue Morphine ER 60 mg BID and Fentanyl patch 75 mcg every 3 days   Psych planning to add Cymbalta and Neurontin   Robaxin PRN muscle spasm  Bowel regimen   Recommend close follow-up with PCP and pain management

## 2024-09-19 NOTE — ASSESSMENT & PLAN NOTE
Admitted to [facility]  Management per primary service   Flexeril overdose on admission  Hold flexeril and start robaxin PRN

## 2024-09-20 NOTE — ED NOTES
Insurance Authorization for admission:   Received Notification of Approval from [facility].   2 days approval  Review on 9/21/2024  Auth# [id number]

## 2024-09-23 ENCOUNTER — TELEPHONE (OUTPATIENT)
Age: 49
End: 2024-09-23

## 2024-09-23 ENCOUNTER — HOSPITAL ENCOUNTER (OUTPATIENT)
Dept: NON INVASIVE DIAGNOSTICS | Facility: HOSPITAL | Age: 49
Discharge: HOME | End: 2024-09-23

## 2024-09-23 NOTE — TELEPHONE ENCOUNTER
Caller: Armen Llanos    Doctor: Pillo    Reason for call: Patient called he needs a new script for his Stress test.  He stated that when he called today to reschedule his stress test they told him he needs a new on.  Please call patient back       Call back#: [phone]

## 2024-09-24 DIAGNOSIS — E11.10 TYPE 2 DIABETES MELLITUS WITH KETOACIDOSIS WITHOUT COMA, WITHOUT LONG-TERM CURRENT USE OF INSULIN (HCC): ICD-10-CM

## 2024-09-25 RX ORDER — LANCETS
EACH MISCELLANEOUS
Qty: 200 EACH | Refills: 2 | Status: SHIPPED | OUTPATIENT
Start: 2024-09-25

## 2024-09-25 NOTE — TELEPHONE ENCOUNTER
Changing context and routing.  Pt does not see PCP within [facility]  Will Neph take over script?

## 2024-09-27 ENCOUNTER — OFFICE VISIT (OUTPATIENT)
Dept: OBGYN CLINIC | Facility: CLINIC | Age: 49
End: 2024-09-27
Payer: COMMERCIAL

## 2024-09-27 VITALS
HEART RATE: 94 BPM | WEIGHT: 210 LBS | BODY MASS INDEX: 24.3 KG/M2 | DIASTOLIC BLOOD PRESSURE: 87 MMHG | HEIGHT: 78 IN | SYSTOLIC BLOOD PRESSURE: 135 MMHG

## 2024-09-27 DIAGNOSIS — T84.53XS INFECTION ASSOCIATED WITH INTERNAL RIGHT KNEE PROSTHESIS, SEQUELA: Primary | ICD-10-CM

## 2024-09-27 PROCEDURE — 99213 OFFICE O/P EST LOW 20 MIN: CPT | Performed by: STUDENT IN AN ORGANIZED HEALTH CARE EDUCATION/TRAINING PROGRAM

## 2024-09-27 NOTE — PROGRESS NOTES
Knee New Office Note    Assessment:   No diagnosis found.    Plan:     Problem List Items Addressed This Visit    None     Findings are consistent with s/p right knee TKA with poly swap and I&D for acute PJI. Findings and treatment options discussed with patient. He is doing well a this time.  His ESR and CRP that were performed back in July are approaching normal.  He is currently on antibiotics for life from infectious disease, which he should continue. I feel he has made significant improvements in regards to his general and mental health. He can continue to use his compression sleeve. Follow up in 6 months with repeat x-rays.    Subjective:     Patient ID: Armen Llanos is a 49 y.o. male.  Chief Complaint:  HPI:  49 y.o. male presents today for follow up s/p right knee TKA with poly swap and I&D performed on 3/24/2024 for septic shock in the setting of uncontrolled diabetes with an A1c of 13 at that time. He states he is doing well overall. He notes he is at a consistent pain level of 7/10 which is his chronic knee pain prior to his infection. He has been wearing a compression sleeve to help with swelling. He is ambulating with a cane. He is overall happy with his progress to this point. He is currently on antibiotics for life from infectious disease standpoint.      Allergy:  Allergies   Allergen Reactions    Cephalosporins Hives    Penicillins Hives     Medications:  all current active meds have been reviewed  Past Medical History:  Past Medical History:   Diagnosis Date    Bed sore, stage 1     Diabetes mellitus (HCC)     Hypertension      Past Surgical History:  Past Surgical History:   Procedure Laterality Date    IR PICC PLACEMENT SINGLE LUMEN  4/1/2024    KNEE SURGERY      [place] REVJ TOTAL KNEE ARTHRP W/WO ALGRFT 1 COMPONENT Right 3/24/2024    Procedure: ARTHROPLASTY KNEE TOTAL REVISION, POLY EXCHANGE;  Surgeon: Tao Washington DO;  Location:  MAIN OR;  Service: Orthopedics    TOE AMPUTATION Left  "3/28/2024    Procedure: AMPUTATION TOE 2nd;  Surgeon: Rossy Toussaint DPM;  Location:  MAIN OR;  Service: Podiatry     Family History:  History reviewed. No pertinent family history.  Social History:  Social History     Substance and Sexual Activity   Alcohol Use Not Currently     Social History     Substance and Sexual Activity   Drug Use Yes    Types: Marijuana, Fentanyl, Morphine, Other    Comment: pt dependant on ambien and flexeril per interview.     Social History     Tobacco Use   Smoking Status Former    Current packs/day: 0.00    Types: Cigarettes    Quit date: 2011    Years since quittin.8   Smokeless Tobacco Never           ROS:  General: Per HPI  Skin: Negative, except if noted below  HEENT: Negative  Respiratory: Negative  Cardiovascular: Negative  Gastrointestinal: Negative  Urinary: Negative  Vascular: Negative  Musculoskeletal: Positive per HPI   Neurologic: Positive per HPI  Endocrine: Negative    Objective:  BP Readings from Last 1 Encounters:   24 135/87      Wt Readings from Last 1 Encounters:   24 95.3 kg (210 lb)        Respiratory:   non-labored respirations    Lymphatics:  no palpable lymph nodes    Gait:   Antalgic    Neurologic:   Alert and oriented times 3  Patient with normal sensation except as noted below  Deep tendon reflexes 2+ except as noted in MSK exam    Bilateral Lower Extremity:    Right knee:     Inspection:  skin intact    Overall limb alignment neutral    Effusion: none    ROM 0-115 without pain    Extensor Lag: negative    Palpation: no Joint line tenderness to palpation    AP Stability at 90 deg stable    M/L stability in full extension stable    M/L stability in midflexion stable    Motor: 5/5 Q/HS/TA/GS/P    Pulses: 2+ DP / 2+ PT    SILT DP/SP/S/S/TN    Imaging:  No new images at today's visit.    BMI:   Estimated body mass index is 24.27 kg/m² as calculated from the following:    Height as of this encounter: 6' 6\" (1.981 m).    Weight as of this " "encounter: 95.3 kg (210 lb).  BSA:   Estimated body surface area is 2.31 meters squared as calculated from the following:    Height as of this encounter: 6' 6\" (1.981 m).    Weight as of this encounter: 95.3 kg (210 lb).           Scribe Attestation      I,:  Ricky Donovan am acting as a scribe while in the presence of the attending physician.:       I,:  Tao Washington, DO personally performed the services described in this documentation    as scribed in my presence.:              "

## 2024-10-02 NOTE — TELEPHONE ENCOUNTER
Called spoke to pt, relayed message as given, pt verbalized understanding.     Pt cancelled EP appt on 9/25, he has been rescheduled with EP

## 2024-11-01 DIAGNOSIS — E11.10 TYPE 2 DIABETES MELLITUS WITH KETOACIDOSIS WITHOUT COMA, WITHOUT LONG-TERM CURRENT USE OF INSULIN (HCC): ICD-10-CM

## 2024-11-01 RX ORDER — ISOPROPYL ALCOHOL 0.75 G/1
SWAB TOPICAL
Qty: 200 EACH | Refills: 0 | Status: SHIPPED | OUTPATIENT
Start: 2024-11-01

## 2024-12-12 ENCOUNTER — OFFICE VISIT (OUTPATIENT)
Dept: OBGYN CLINIC | Facility: CLINIC | Age: 49
End: 2024-12-12
Payer: COMMERCIAL

## 2024-12-12 ENCOUNTER — APPOINTMENT (OUTPATIENT)
Dept: RADIOLOGY | Facility: CLINIC | Age: 49
End: 2024-12-12
Payer: COMMERCIAL

## 2024-12-12 VITALS
BODY MASS INDEX: 26.84 KG/M2 | SYSTOLIC BLOOD PRESSURE: 124 MMHG | HEIGHT: 78 IN | WEIGHT: 232 LBS | DIASTOLIC BLOOD PRESSURE: 82 MMHG

## 2024-12-12 DIAGNOSIS — M25.511 ACUTE PAIN OF RIGHT SHOULDER: Primary | ICD-10-CM

## 2024-12-12 DIAGNOSIS — M25.511 RIGHT SHOULDER PAIN, UNSPECIFIED CHRONICITY: ICD-10-CM

## 2024-12-12 PROCEDURE — 99213 OFFICE O/P EST LOW 20 MIN: CPT | Performed by: ORTHOPAEDIC SURGERY

## 2024-12-12 PROCEDURE — 73030 X-RAY EXAM OF SHOULDER: CPT

## 2024-12-12 NOTE — PROGRESS NOTES
Assessment:     1. Acute pain of right shoulder        Plan:     Problem List Items Addressed This Visit          Surgery/Wound/Pain    Acute pain of right shoulder - Primary    Findings consistent with right shoulder pain. X-ray and prognosis reviewed with patient. No clinical signs for rotator cuff or labral pathology on exam. He does have type II acromion and may be experiencing impingement symptoms with ABD and IR of shoulder. No subluxation/shifting of shoulder noted on exam either. Patient will start physical therapy to rehab shoulder. Avoid any repetitive overhead, reaching motions, strenuous lifting with right arm. Nsaids, voltaren gel for pain as needed. See patient back in 6-8 weeks to re evaluate. All patient's questions were answered to his satisfaction.  This note is created using dictation transcription.  It may contain typographical errors, grammatical errors, improperly dictated words, background noise and other errors.         Relevant Orders    XR shoulder 2+ vw right    Ambulatory Referral to Physical Therapy        Subjective:     Patient ID: Armen Llanos is a 49 y.o. male.  Chief Complaint:  49 yr old male in for evaluation of right shoulder pain.  He states for his knee's he was diagnosed with tendon elasticity issue which caused recurrent subluxations, dislocations. He is concerned he has similar issues with his shoulders. He states he has felt subluxations with certain movements of shoulders for years. He would get pain in which pain would only last 15-20 minutes. He states in right shoulder this would happen once a month. Then a week and a half ago he is not sure exactly what he did to shoulder but had acute pain and wasn't able to move arm. After a week he woke up and pain resolved but he still noticed he wasn't able to reach behind w/o pain. Still decreased motion reaching behind with pain deep- posterior shoulder. Feels stabbing pain and as if shoulder wants to dislocate and snaps  back into place which is painful.  Denies any weakness. Denies any numbness or tingling in arm.     Allergy:  Allergies   Allergen Reactions    Cephalosporins Hives    Penicillins Hives     Medications:  all current active meds have been reviewed  Past Medical History:  Past Medical History:   Diagnosis Date    Bed sore, stage 1     Diabetes mellitus (HCC)     Hypertension      Past Surgical History:  Past Surgical History:   Procedure Laterality Date    IR PICC PLACEMENT SINGLE LUMEN  2024    KNEE SURGERY      [place] REVJ TOTAL KNEE ARTHRP W/WO ALGRFT 1 COMPONENT Right 3/24/2024    Procedure: ARTHROPLASTY KNEE TOTAL REVISION, POLY EXCHANGE;  Surgeon: Tao Washington DO;  Location:  [facility] OR;  Service: Orthopedics    TOE AMPUTATION Left 3/28/2024    Procedure: AMPUTATION TOE 2nd;  Surgeon: Rossy Toussaint DPM;  Location:  [facility] OR;  Service: Podiatry     Family History:  History reviewed. No pertinent family history.  Social History:  Social History     Substance and Sexual Activity   Alcohol Use Not Currently     Social History     Substance and Sexual Activity   Drug Use Yes    Types: Marijuana, Fentanyl, Morphine, Other    Comment: pt dependant on ambien and flexeril per interview.     Social History     Tobacco Use   Smoking Status Former    Current packs/day: 0.00    Types: Cigarettes    Quit date: 2011    Years since quittin.0   Smokeless Tobacco Never     Review of Systems   Constitutional:  Negative for chills and fever.   HENT:  Negative for ear pain and sore throat.    Eyes:  Negative for pain and visual disturbance.   Respiratory:  Negative for cough and shortness of breath.    Cardiovascular:  Negative for chest pain and palpitations.   Gastrointestinal:  Negative for abdominal pain and vomiting.   Genitourinary:  Negative for dysuria and hematuria.   Musculoskeletal:  Positive for arthralgias (right shoulder). Negative for back pain, joint swelling and neck pain.   Skin:  Negative for  "color change and rash.   Neurological:  Negative for seizures, syncope, weakness and numbness.   Psychiatric/Behavioral: Negative.     All other systems reviewed and are negative.        Objective:  BP Readings from Last 1 Encounters:   12/12/24 124/82      Wt Readings from Last 1 Encounters:   12/12/24 105 kg (232 lb)      BMI:   Estimated body mass index is 26.81 kg/m² as calculated from the following:    Height as of this encounter: 6' 6\" (1.981 m).    Weight as of this encounter: 105 kg (232 lb).  BSA:   Estimated body surface area is 2.4 meters squared as calculated from the following:    Height as of this encounter: 6' 6\" (1.981 m).    Weight as of this encounter: 105 kg (232 lb).   Physical Exam  Vitals and nursing note reviewed.   Constitutional:       Appearance: Normal appearance. He is well-developed.   HENT:      Head: Normocephalic and atraumatic.      Right Ear: External ear normal.      Left Ear: External ear normal.   Eyes:      Extraocular Movements: Extraocular movements intact.      Conjunctiva/sclera: Conjunctivae normal.   Pulmonary:      Effort: Pulmonary effort is normal.   Musculoskeletal:         General: Tenderness (right shoulder arthralgia) present.      Cervical back: Neck supple.   Skin:     General: Skin is warm and dry.   Neurological:      Mental Status: He is alert and oriented to person, place, and time.      Deep Tendon Reflexes: Reflexes are normal and symmetric.   Psychiatric:         Mood and Affect: Mood normal.         Behavior: Behavior normal.       Right Shoulder Exam     Tenderness   The patient is experiencing no tenderness.    Range of Motion   Active abduction:  normal   Passive abduction:  normal   Forward flexion:  normal   Internal rotation 0 degrees:  Lumbar (pain)     Muscle Strength   Abduction: 5/5   Internal rotation: 5/5   External rotation: 5/5   Biceps: 5/5     Tests   Apprehension: negative  Cross arm: negative  Impingement: negative  Drop arm: " negative  Sulcus: absent    Other   Erythema: absent  Scars: absent  Sensation: normal  Pulse: present            I have personally reviewed pertinent films in PACS and my interpretation is x-ray right shoulder ac joint degenerative changes with spurring, type II acromion.    Scribe Attestation      I,:  Jesús Bonilla am acting as a scribe while in the presence of the attending physician.:       I,:  Dawson Elizalde MD personally performed the services described in this documentation    as scribed in my presence.:

## 2024-12-12 NOTE — ASSESSMENT & PLAN NOTE
Findings consistent with right shoulder pain. X-ray and prognosis reviewed with patient. No clinical signs for rotator cuff or labral pathology on exam. He does have type II acromion and may be experiencing impingement symptoms with ABD and IR of shoulder. No subluxation/shifting of shoulder noted on exam either. Patient will start physical therapy to rehab shoulder. Avoid any repetitive overhead, reaching motions, strenuous lifting with right arm. Nsaids, voltaren gel for pain as needed. See patient back in 6-8 weeks to re evaluate. All patient's questions were answered to his satisfaction.  This note is created using dictation transcription.  It may contain typographical errors, grammatical errors, improperly dictated words, background noise and other errors.

## 2024-12-23 ENCOUNTER — TELEPHONE (OUTPATIENT)
Age: 49
End: 2024-12-23

## 2024-12-23 NOTE — TELEPHONE ENCOUNTER
Caller: Armen Llanos    Doctor and/or Office: Dr. Glaser/Jo    #: [phone]    Escalation: Appointment Patient has a small cut on his toe and is worried of infection. (He was septic over the summer from a knee infection) He is requesting a forced appt with Dr. Glaser. Please return call. Thank you

## 2025-03-05 DIAGNOSIS — E11.10 TYPE 2 DIABETES MELLITUS WITH KETOACIDOSIS WITHOUT COMA, WITHOUT LONG-TERM CURRENT USE OF INSULIN (HCC): ICD-10-CM

## 2025-03-06 RX ORDER — ISOPROPYL ALCOHOL 0.75 G/1
SWAB TOPICAL
Qty: 200 EACH | Refills: 1 | Status: SHIPPED | OUTPATIENT
Start: 2025-03-06

## 2025-03-10 ENCOUNTER — TELEPHONE (OUTPATIENT)
Age: 50
End: 2025-03-10

## 2025-03-10 NOTE — TELEPHONE ENCOUNTER
Caller: patient    Doctor: Dr. Washington    Reason for call: patient is calling to find out if they need antibiotics for an upcoming dental appt.    Patient has knee replacement revision surgery 3/2024.    Patient is on doxyclycline. Patient is unsure if something else will need to be prescribed.    Patient uses the [facility] in [place].     Patient states they have three upcoming dental appts ALL before 3/24. He states as of now he does not have exactly dates.     Call back#: [phone]

## 2025-03-23 DIAGNOSIS — I48.91 A-FIB (HCC): ICD-10-CM

## 2025-03-23 DIAGNOSIS — E11.10 TYPE 2 DIABETES MELLITUS WITH KETOACIDOSIS WITHOUT COMA, WITHOUT LONG-TERM CURRENT USE OF INSULIN (HCC): ICD-10-CM

## 2025-03-24 RX ORDER — BLOOD-GLUCOSE METER
EACH MISCELLANEOUS
Qty: 1 KIT | Refills: 0 | Status: SHIPPED | OUTPATIENT
Start: 2025-03-24

## 2025-03-25 RX ORDER — METOPROLOL SUCCINATE 25 MG/1
25 TABLET, EXTENDED RELEASE ORAL DAILY
Qty: 90 TABLET | Refills: 1 | Status: SHIPPED | OUTPATIENT
Start: 2025-03-25

## 2025-06-22 DIAGNOSIS — E11.10 TYPE 2 DIABETES MELLITUS WITH KETOACIDOSIS WITHOUT COMA, WITHOUT LONG-TERM CURRENT USE OF INSULIN (HCC): ICD-10-CM

## 2025-06-23 RX ORDER — ALCOHOL ANTISEPTIC PADS
PADS, MEDICATED (EA) TOPICAL
Refills: 0 | OUTPATIENT
Start: 2025-06-23

## 2025-07-01 DIAGNOSIS — E11.10 TYPE 2 DIABETES MELLITUS WITH KETOACIDOSIS WITHOUT COMA, WITHOUT LONG-TERM CURRENT USE OF INSULIN (HCC): ICD-10-CM

## 2025-07-02 RX ORDER — ALCOHOL ANTISEPTIC PADS
PADS, MEDICATED (EA) TOPICAL
Refills: 0 | OUTPATIENT
Start: 2025-07-02

## 2025-07-19 ENCOUNTER — HOSPITAL ENCOUNTER (OUTPATIENT)
Age: 50
Setting detail: OBSERVATION
Discharge: HOME | End: 2025-07-21
Attending: EMERGENCY MEDICINE | Admitting: EMERGENCY MEDICINE
Payer: COMMERCIAL

## 2025-07-19 PROCEDURE — 9990

## 2025-07-19 PROCEDURE — 85610 PROTHROMBIN TIME: CPT

## 2025-07-19 PROCEDURE — 80307 DRUG TEST PRSMV CHEM ANLYZR: CPT

## 2025-07-19 PROCEDURE — 36415 COLL VENOUS BLD VENIPUNCTURE: CPT

## 2025-07-19 PROCEDURE — 71010: CPT

## 2025-07-19 PROCEDURE — 94620: CPT

## 2025-07-19 PROCEDURE — 93005 ELECTROCARDIOGRAM TRACING: CPT

## 2025-07-19 PROCEDURE — 94618 PULMONARY STRESS TESTING: CPT

## 2025-07-19 PROCEDURE — 94761 N-INVAS EAR/PLS OXIMETRY MLT: CPT

## 2025-07-19 PROCEDURE — 83735 ASSAY OF MAGNESIUM: CPT

## 2025-07-19 PROCEDURE — 81000 URINALYSIS NONAUTO W/SCOPE: CPT

## 2025-07-19 PROCEDURE — 83605 ASSAY OF LACTIC ACID: CPT

## 2025-07-19 PROCEDURE — G0479 DRUG TEST PRESUMP NOT OPT: HCPCS

## 2025-07-19 PROCEDURE — 80076 HEPATIC FUNCTION PANEL: CPT

## 2025-07-19 PROCEDURE — 80053 COMPREHEN METABOLIC PANEL: CPT

## 2025-07-19 PROCEDURE — 84484 ASSAY OF TROPONIN QUANT: CPT

## 2025-07-19 PROCEDURE — 84145 PROCALCITONIN (PCT): CPT

## 2025-07-19 PROCEDURE — 80048 BASIC METABOLIC PNL TOTAL CA: CPT

## 2025-07-19 PROCEDURE — 70498 CT ANGIOGRAPHY NECK: CPT

## 2025-07-19 PROCEDURE — 82553 CREATINE MB FRACTION: CPT

## 2025-07-19 PROCEDURE — 85379 FIBRIN DEGRADATION QUANT: CPT

## 2025-07-19 PROCEDURE — 9990 ELECTROCARDIOGRAM

## 2025-07-19 PROCEDURE — 71045 X-RAY EXAM CHEST 1 VIEW: CPT

## 2025-07-19 PROCEDURE — 82947 ASSAY GLUCOSE BLOOD QUANT: CPT

## 2025-07-19 PROCEDURE — 85730 THROMBOPLASTIN TIME PARTIAL: CPT

## 2025-07-19 PROCEDURE — 80061 LIPID PANEL: CPT

## 2025-07-19 PROCEDURE — 70496 CT ANGIOGRAPHY HEAD: CPT

## 2025-07-19 PROCEDURE — 70450 CT HEAD/BRAIN W/O DYE: CPT

## 2025-07-19 PROCEDURE — 71275 CT ANGIOGRAPHY CHEST: CPT

## 2025-07-19 PROCEDURE — 9990 CT HEAD W/O CONTRAST

## 2025-07-19 PROCEDURE — 82550 ASSAY OF CK (CPK): CPT

## 2025-07-19 PROCEDURE — 85025 COMPLETE CBC W/AUTO DIFF WBC: CPT

## 2025-07-19 PROCEDURE — G0431 DRUG SCREEN MULTIPLE CLASS: HCPCS

## 2025-07-19 PROCEDURE — 83036 HEMOGLOBIN GLYCOSYLATED A1C: CPT

## 2025-07-19 PROCEDURE — 94620 HB OBSERVATION BED, RESPIRATORY TIME DECREMENT: CPT

## 2025-07-19 PROCEDURE — 82009 KETONE BODYS QUAL: CPT

## 2025-07-20 PROCEDURE — 80053 COMPREHEN METABOLIC PANEL: CPT

## 2025-07-20 PROCEDURE — 9990

## 2025-07-20 PROCEDURE — 85025 COMPLETE CBC W/AUTO DIFF WBC: CPT

## 2025-07-20 PROCEDURE — 83735 ASSAY OF MAGNESIUM: CPT

## 2025-07-20 PROCEDURE — 82947 ASSAY GLUCOSE BLOOD QUANT: CPT

## 2025-07-20 PROCEDURE — 82553 CREATINE MB FRACTION: CPT

## 2025-07-20 PROCEDURE — 82550 ASSAY OF CK (CPK): CPT

## 2025-07-20 PROCEDURE — 70553 MRI BRAIN STEM W/O & W/DYE: CPT

## 2025-07-20 PROCEDURE — 84484 ASSAY OF TROPONIN QUANT: CPT

## 2025-07-21 VITALS — HEIGHT: 78 IN | WEIGHT: 228.4 LBS | BODY MASS INDEX: 26.43 KG/M2

## 2025-07-21 DIAGNOSIS — R41.82 AMS (ALTERED MENTAL STATUS): Primary | ICD-10-CM

## 2025-07-21 PROCEDURE — 9990

## 2025-07-21 PROCEDURE — 80048 BASIC METABOLIC PNL TOTAL CA: CPT

## 2025-07-21 PROCEDURE — 85025 COMPLETE CBC W/AUTO DIFF WBC: CPT

## 2025-07-21 PROCEDURE — 97161 PT EVAL LOW COMPLEX 20 MIN: CPT

## 2025-07-21 PROCEDURE — 82947 ASSAY GLUCOSE BLOOD QUANT: CPT

## 2025-07-21 RX ORDER — INSULIN ASPART 100 [IU]/ML
2-12 INJECTION, SOLUTION INTRAVENOUS; SUBCUTANEOUS
Status: DISCONTINUED | OUTPATIENT
Start: 2025-07-22 | End: 2025-07-21

## 2025-07-21 RX ORDER — ZOLPIDEM TARTRATE 10 MG/1
10 TABLET ORAL
Status: DISCONTINUED | OUTPATIENT
Start: 2025-07-22 | End: 2025-07-22 | Stop reason: HOSPADM

## 2025-07-21 RX ORDER — METOPROLOL SUCCINATE 25 MG/1
25 TABLET, EXTENDED RELEASE ORAL DAILY
Status: DISCONTINUED | OUTPATIENT
Start: 2025-07-22 | End: 2025-07-22 | Stop reason: HOSPADM

## 2025-07-21 RX ORDER — ACETAMINOPHEN 325 MG/1
650 TABLET ORAL EVERY 6 HOURS PRN
Status: DISCONTINUED | OUTPATIENT
Start: 2025-07-22 | End: 2025-07-22 | Stop reason: HOSPADM

## 2025-07-21 RX ORDER — MORPHINE SULFATE 100 MG/1
100 TABLET ORAL EVERY 8 HOURS PRN
Refills: 0 | Status: DISCONTINUED | OUTPATIENT
Start: 2025-07-22 | End: 2025-07-22 | Stop reason: HOSPADM

## 2025-07-21 RX ORDER — BUTALBITAL, ACETAMINOPHEN AND CAFFEINE 50; 325; 40 MG/1; MG/1; MG/1
2 TABLET ORAL EVERY 6 HOURS PRN
Status: DISCONTINUED | OUTPATIENT
Start: 2025-07-22 | End: 2025-07-22 | Stop reason: HOSPADM

## 2025-07-21 RX ORDER — ALPRAZOLAM 1 MG/1
1 TABLET ORAL EVERY 6 HOURS PRN
Status: DISCONTINUED | OUTPATIENT
Start: 2025-07-22 | End: 2025-07-22 | Stop reason: HOSPADM

## 2025-07-21 RX ORDER — HEPARIN SODIUM 5000 [USP'U]/ML
5000 INJECTION, SOLUTION INTRAVENOUS; SUBCUTANEOUS EVERY 8 HOURS SCHEDULED
Status: DISCONTINUED | OUTPATIENT
Start: 2025-07-22 | End: 2025-07-22 | Stop reason: HOSPADM

## 2025-07-21 RX ORDER — NICOTINE 21 MG/24HR
21 PATCH, TRANSDERMAL 24 HOURS TRANSDERMAL DAILY
Status: DISCONTINUED | OUTPATIENT
Start: 2025-07-22 | End: 2025-07-22 | Stop reason: HOSPADM

## 2025-07-21 RX ORDER — HYDROCHLOROTHIAZIDE 25 MG/1
25 TABLET ORAL DAILY
Status: DISCONTINUED | OUTPATIENT
Start: 2025-07-22 | End: 2025-07-22 | Stop reason: HOSPADM

## 2025-07-21 RX ORDER — FENTANYL 75 UG/1
75 PATCH TRANSDERMAL
Status: DISCONTINUED | OUTPATIENT
Start: 2025-07-23 | End: 2025-07-22 | Stop reason: HOSPADM

## 2025-07-21 RX ORDER — CYCLOBENZAPRINE HCL 10 MG
10 TABLET ORAL 3 TIMES DAILY
Status: DISCONTINUED | OUTPATIENT
Start: 2025-07-22 | End: 2025-07-22 | Stop reason: HOSPADM

## 2025-07-21 RX ORDER — LISINOPRIL 20 MG/1
20 TABLET ORAL DAILY
Status: DISCONTINUED | OUTPATIENT
Start: 2025-07-22 | End: 2025-07-22 | Stop reason: HOSPADM

## 2025-07-21 RX ORDER — ONDANSETRON 2 MG/ML
4 INJECTION INTRAMUSCULAR; INTRAVENOUS EVERY 8 HOURS PRN
Status: DISCONTINUED | OUTPATIENT
Start: 2025-07-22 | End: 2025-07-22 | Stop reason: HOSPADM

## 2025-07-21 RX ORDER — PRAVASTATIN SODIUM 20 MG
80 TABLET ORAL
Status: DISCONTINUED | OUTPATIENT
Start: 2025-07-22 | End: 2025-07-22 | Stop reason: HOSPADM

## 2025-07-21 RX ORDER — SENNOSIDES 8.6 MG/1
2 TABLET ORAL 2 TIMES DAILY PRN
Status: DISCONTINUED | OUTPATIENT
Start: 2025-07-22 | End: 2025-07-22 | Stop reason: HOSPADM

## 2025-07-21 RX ORDER — AMLODIPINE BESYLATE 5 MG/1
5 TABLET ORAL DAILY
Status: DISCONTINUED | OUTPATIENT
Start: 2025-07-22 | End: 2025-07-22 | Stop reason: HOSPADM

## 2025-07-21 RX ORDER — RISPERIDONE 1 MG/1
3 TABLET ORAL DAILY
Status: DISCONTINUED | OUTPATIENT
Start: 2025-07-22 | End: 2025-07-21 | Stop reason: CLARIF

## 2025-07-21 RX ORDER — RISPERIDONE 1 MG/1
3 TABLET, ORALLY DISINTEGRATING ORAL DAILY
Status: DISCONTINUED | OUTPATIENT
Start: 2025-07-22 | End: 2025-07-22 | Stop reason: HOSPADM

## 2025-07-21 RX ORDER — INSULIN LISPRO 100 [IU]/ML
2-12 INJECTION, SOLUTION INTRAVENOUS; SUBCUTANEOUS
Status: DISCONTINUED | OUTPATIENT
Start: 2025-07-22 | End: 2025-07-22 | Stop reason: HOSPADM

## 2025-07-21 RX ORDER — SUMATRIPTAN SUCCINATE 100 MG/1
100 TABLET ORAL EVERY 12 HOURS PRN
Status: DISCONTINUED | OUTPATIENT
Start: 2025-07-22 | End: 2025-07-22 | Stop reason: HOSPADM

## 2025-07-21 RX ORDER — PANTOPRAZOLE SODIUM 40 MG/10ML
40 INJECTION, POWDER, LYOPHILIZED, FOR SOLUTION INTRAVENOUS DAILY
Status: DISCONTINUED | OUTPATIENT
Start: 2025-07-22 | End: 2025-07-22 | Stop reason: HOSPADM

## 2025-07-21 RX ORDER — ECHINACEA PURPUREA EXTRACT 125 MG
1 TABLET ORAL EVERY 4 HOURS PRN
Status: DISCONTINUED | OUTPATIENT
Start: 2025-07-22 | End: 2025-07-22 | Stop reason: HOSPADM

## 2025-07-21 RX ORDER — DOXYCYCLINE 100 MG/1
100 CAPSULE ORAL EVERY 12 HOURS SCHEDULED
Status: DISCONTINUED | OUTPATIENT
Start: 2025-07-22 | End: 2025-07-22 | Stop reason: HOSPADM

## 2025-07-21 RX ORDER — INSULIN GLARGINE 100 [IU]/ML
40 INJECTION, SOLUTION SUBCUTANEOUS
Status: DISCONTINUED | OUTPATIENT
Start: 2025-07-22 | End: 2025-07-22 | Stop reason: HOSPADM

## 2025-07-22 ENCOUNTER — APPOINTMENT (EMERGENCY)
Age: 50
End: 2025-07-22
Payer: COMMERCIAL

## 2025-07-22 ENCOUNTER — HOSPITAL ENCOUNTER (INPATIENT)
Age: 50
LOS: 2 days | Discharge: HOME HEALTH | End: 2025-07-25
Attending: EMERGENCY MEDICINE | Admitting: INTERNAL MEDICINE
Payer: COMMERCIAL

## 2025-07-22 DIAGNOSIS — F33.2 MAJOR DEPRESSIVE DISORDER, RECURRENT SEVERE WITHOUT PSYCHOTIC FEATURES (HCC): ICD-10-CM

## 2025-07-22 DIAGNOSIS — R26.2 AMBULATORY DYSFUNCTION: Chronic | ICD-10-CM

## 2025-07-22 DIAGNOSIS — R41.82 ALTERED MENTAL STATE: ICD-10-CM

## 2025-07-22 DIAGNOSIS — S06.9XAD TRAUMATIC BRAIN INJURY, WITH UNKNOWN LOSS OF CONSCIOUSNESS STATUS, SUBSEQUENT ENCOUNTER: ICD-10-CM

## 2025-07-22 DIAGNOSIS — F41.9 ANXIETY: Primary | ICD-10-CM

## 2025-07-22 DIAGNOSIS — R56.9 SEIZURE-LIKE ACTIVITY (HCC): ICD-10-CM

## 2025-07-22 DIAGNOSIS — R41.82 AMS (ALTERED MENTAL STATUS): ICD-10-CM

## 2025-07-22 DIAGNOSIS — F39 MOOD DISORDER (HCC): ICD-10-CM

## 2025-07-22 DIAGNOSIS — F11.20 OPIATE DEPENDENCE (HCC): ICD-10-CM

## 2025-07-22 DIAGNOSIS — R73.9 HYPERGLYCEMIA: ICD-10-CM

## 2025-07-22 DIAGNOSIS — R40.0 SOMNOLENCE: ICD-10-CM

## 2025-07-22 LAB
ETHANOL SERPL-MCNC: <10 MG/DL
TROPONIN T SERPL HS-MCNC: 34 NG/L (ref 6–22)

## 2025-07-22 PROCEDURE — 71045 X-RAY EXAM CHEST 1 VIEW: CPT

## 2025-07-22 PROCEDURE — 96374 THER/PROPH/DIAG INJ IV PUSH: CPT

## 2025-07-22 PROCEDURE — 99285 EMERGENCY DEPT VISIT HI MDM: CPT

## 2025-07-22 PROCEDURE — 83735 ASSAY OF MAGNESIUM: CPT | Performed by: EMERGENCY MEDICINE

## 2025-07-22 PROCEDURE — 80053 COMPREHEN METABOLIC PANEL: CPT | Performed by: EMERGENCY MEDICINE

## 2025-07-22 PROCEDURE — 96361 HYDRATE IV INFUSION ADD-ON: CPT

## 2025-07-22 PROCEDURE — 82077 ASSAY SPEC XCP UR&BREATH IA: CPT | Performed by: EMERGENCY MEDICINE

## 2025-07-22 PROCEDURE — 70450 CT HEAD/BRAIN W/O DYE: CPT

## 2025-07-22 PROCEDURE — 93005 ELECTROCARDIOGRAM TRACING: CPT

## 2025-07-22 PROCEDURE — 84484 ASSAY OF TROPONIN QUANT: CPT | Performed by: EMERGENCY MEDICINE

## 2025-07-22 PROCEDURE — 85025 COMPLETE CBC W/AUTO DIFF WBC: CPT | Performed by: EMERGENCY MEDICINE

## 2025-07-22 RX ORDER — LORAZEPAM 2 MG/ML
1 INJECTION INTRAMUSCULAR ONCE
Status: COMPLETED | OUTPATIENT
Start: 2025-07-22 | End: 2025-07-22

## 2025-07-22 RX ADMIN — LORAZEPAM 1 MG: 2 INJECTION INTRAMUSCULAR; INTRAVENOUS at 23:13

## 2025-07-22 RX ADMIN — SODIUM CHLORIDE 1000 ML: 900 INJECTION, SOLUTION INTRAVENOUS at 23:14

## 2025-07-23 ENCOUNTER — APPOINTMENT (INPATIENT)
Age: 50
End: 2025-07-23
Payer: COMMERCIAL

## 2025-07-23 PROBLEM — R56.9 SEIZURE-LIKE ACTIVITY (HCC): Status: ACTIVE | Noted: 2025-07-23

## 2025-07-23 PROBLEM — R26.2 AMBULATORY DYSFUNCTION: Chronic | Status: ACTIVE | Noted: 2024-04-13

## 2025-07-23 PROBLEM — R41.82 ALTERED MENTAL STATUS, UNSPECIFIED: Status: ACTIVE | Noted: 2025-07-23

## 2025-07-23 PROBLEM — E11.29 TYPE 2 DIABETES MELLITUS WITH RENAL COMPLICATION (HCC): Chronic | Status: ACTIVE | Noted: 2022-11-15

## 2025-07-23 LAB
ALBUMIN SERPL BCG-MCNC: 4.1 G/DL (ref 3.5–5.2)
ALP SERPL-CCNC: 108 U/L (ref 40–130)
ALT SERPL W P-5'-P-CCNC: 41 U/L (ref 5–33)
AMPHETAMINES SERPL QL SCN: NEGATIVE
ANION GAP SERPL CALCULATED.3IONS-SCNC: 19 MMOL/L (ref 7–16)
ANION GAP SERPL CALCULATED.3IONS-SCNC: 19 MMOL/L (ref 7–16)
APTT PPP: 27 SECONDS (ref 23–34)
AST SERPL W P-5'-P-CCNC: 37 U/L (ref 5–40)
ATRIAL RATE: 137 BPM
BARBITURATES UR QL: POSITIVE
BASOPHILS # BLD AUTO: 0.01 THOUSANDS/ÂΜL (ref 0–0.1)
BASOPHILS # BLD MANUAL: 0 THOUSAND/UL (ref 0–0.1)
BASOPHILS NFR BLD AUTO: 0 % (ref 0–1)
BASOPHILS NFR MAR MANUAL: 0 % (ref 0–1)
BENZODIAZ UR QL: POSITIVE
BILIRUB SERPL-MCNC: 0.51 MG/DL (ref 0.2–1.2)
BUN SERPL-MCNC: 19 MG/DL (ref 8–23)
BUN SERPL-MCNC: 20 MG/DL (ref 8–23)
CALCIUM SERPL-MCNC: 10.5 MG/DL (ref 8.6–10.2)
CALCIUM SERPL-MCNC: 9.4 MG/DL (ref 8.6–10.2)
CHLORIDE SERPL-SCNC: 103 MMOL/L (ref 98–107)
CHLORIDE SERPL-SCNC: 99 MMOL/L (ref 98–107)
CK SERPL-CCNC: 254 U/L (ref 20–200)
CO2 SERPL-SCNC: 17 MMOL/L (ref 22–29)
CO2 SERPL-SCNC: 18 MMOL/L (ref 22–29)
COCAINE UR QL: NEGATIVE
CREAT SERPL-MCNC: 1.09 MG/DL (ref 0.7–1.2)
CREAT SERPL-MCNC: 1.17 MG/DL (ref 0.7–1.2)
EOSINOPHIL # BLD AUTO: 0 THOUSAND/ÂΜL (ref 0–0.61)
EOSINOPHIL # BLD MANUAL: 0 THOUSAND/UL (ref 0–0.4)
EOSINOPHIL NFR BLD AUTO: 0 % (ref 0–6)
EOSINOPHIL NFR BLD MANUAL: 0 % (ref 0–6)
ERYTHROCYTE [DISTWIDTH] IN BLOOD BY AUTOMATED COUNT: 12.3 % (ref 11.6–15.1)
ERYTHROCYTE [DISTWIDTH] IN BLOOD BY AUTOMATED COUNT: 12.5 % (ref 11.6–15.1)
FENTANYL UR QL SCN: POSITIVE
GFR SERPL CREATININE-BSD FRML MDRD: 71 ML/MIN/1.73SQ M
GFR SERPL CREATININE-BSD FRML MDRD: 78 ML/MIN/1.73SQ M
GLUCOSE SERPL-MCNC: 196 MG/DL (ref 65–140)
GLUCOSE SERPL-MCNC: 218 MG/DL (ref 65–140)
GLUCOSE SERPL-MCNC: 222 MG/DL (ref 65–140)
GLUCOSE SERPL-MCNC: 265 MG/DL (ref 65–140)
GLUCOSE SERPL-MCNC: 278 MG/DL (ref 65–140)
GLUCOSE SERPL-MCNC: 280 MG/DL (ref 65–140)
GLUCOSE SERPL-MCNC: 298 MG/DL (ref 65–140)
HCT VFR BLD AUTO: 39.8 % (ref 36.5–49.3)
HCT VFR BLD AUTO: 42 % (ref 36.5–49.3)
HGB BLD-MCNC: 14 G/DL (ref 12–17)
HGB BLD-MCNC: 14.3 G/DL (ref 12–17)
HYDROCODONE UR QL SCN: POSITIVE
IMM GRANULOCYTES # BLD AUTO: 0.04 THOUSAND/UL (ref 0–0.2)
IMM GRANULOCYTES NFR BLD AUTO: 1 % (ref 0–2)
INR PPP: 1.05 (ref 0.85–1.19)
LACTATE SERPL-SCNC: 2.1 MMOL/L (ref 0.5–2)
LACTATE SERPL-SCNC: 2.2 MMOL/L (ref 0.5–2)
LG PLATELETS BLD QL SMEAR: PRESENT
LYMPHOCYTES # BLD AUTO: 0.47 THOUSANDS/ÂΜL (ref 0.6–4.47)
LYMPHOCYTES # BLD AUTO: 0.63 THOUSAND/UL (ref 0.6–4.47)
LYMPHOCYTES # BLD AUTO: 5 % (ref 14–44)
LYMPHOCYTES NFR BLD AUTO: 6 % (ref 14–44)
Lab: 1 NG/L (ref 6–22)
MAGNESIUM SERPL-MCNC: 1.9 MG/DL (ref 1.6–2.6)
MAGNESIUM SERPL-MCNC: 2.2 MG/DL (ref 1.6–2.6)
MCH RBC QN AUTO: 29.4 PG (ref 26.8–34.3)
MCH RBC QN AUTO: 29.8 PG (ref 26.8–34.3)
MCHC RBC AUTO-ENTMCNC: 34 G/DL (ref 31.4–37.4)
MCHC RBC AUTO-ENTMCNC: 35.2 G/DL (ref 31.4–37.4)
MCV RBC AUTO: 85 FL (ref 82–98)
MCV RBC AUTO: 86 FL (ref 82–98)
METHADONE UR QL: NEGATIVE
MONOCYTES # BLD AUTO: 0.08 THOUSAND/ÂΜL (ref 0.17–1.22)
MONOCYTES # BLD AUTO: 0.13 THOUSAND/UL (ref 0–1.22)
MONOCYTES NFR BLD AUTO: 1 % (ref 4–12)
MONOCYTES NFR BLD: 1 % (ref 4–12)
NEUTROPHILS # BLD AUTO: 6.97 THOUSANDS/ÂΜL (ref 1.85–7.62)
NEUTROPHILS # BLD MANUAL: 11.8 THOUSAND/UL (ref 1.85–7.62)
NEUTS SEG NFR BLD AUTO: 92 % (ref 43–75)
NEUTS SEG NFR BLD AUTO: 94 % (ref 43–75)
NRBC BLD AUTO-RTO: 0 /100 WBCS
OPIATES UR QL SCN: POSITIVE
OXYCODONE+OXYMORPHONE UR QL SCN: NEGATIVE
P AXIS: 46 DEGREES
PCP UR QL: NEGATIVE
PHOSPHATE SERPL-MCNC: 2.6 MG/DL (ref 2.5–4.5)
PLATELET # BLD AUTO: 257 THOUSANDS/UL (ref 149–390)
PLATELET # BLD AUTO: 262 THOUSANDS/UL (ref 149–390)
PLATELET BLD QL SMEAR: ADEQUATE
PMV BLD AUTO: 12 FL (ref 8.9–12.7)
PMV BLD AUTO: 12.2 FL (ref 8.9–12.7)
POTASSIUM SERPL-SCNC: 3.8 MMOL/L (ref 3.5–5.1)
POTASSIUM SERPL-SCNC: 4.7 MMOL/L (ref 3.5–5.1)
PR INTERVAL: 142 MS
PROCALCITONIN SERPL-MCNC: 0.06 NG/ML (ref 0.02–0.15)
PROT SERPL-MCNC: 6.7 G/DL (ref 6.4–8.3)
PROTHROMBIN TIME: 13.7 SECONDS (ref 12.3–15)
QRS AXIS: 40 DEGREES
QRSD INTERVAL: 78 MS
QT INTERVAL: 290 MS
QTC INTERVAL: 437 MS
RBC # BLD AUTO: 4.7 MILLION/UL (ref 3.88–5.62)
RBC # BLD AUTO: 4.87 MILLION/UL (ref 3.88–5.62)
RBC MORPH BLD: PRESENT
SODIUM SERPL-SCNC: 136 MMOL/L (ref 136–145)
SODIUM SERPL-SCNC: 139 MMOL/L (ref 136–145)
T WAVE AXIS: 36 DEGREES
THC UR QL: POSITIVE
TROPONIN T SERPL HS-MCNC: 35 NG/L (ref 6–22)
TSH SERPL DL<=0.05 MIU/L-ACNC: 0.36 UIU/ML (ref 0.27–4.2)
VENTRICULAR RATE: 137 BPM
WBC # BLD AUTO: 12.55 THOUSAND/UL (ref 4.31–10.16)
WBC # BLD AUTO: 7.57 THOUSAND/UL (ref 4.31–10.16)

## 2025-07-23 PROCEDURE — 83036 HEMOGLOBIN GLYCOSYLATED A1C: CPT

## 2025-07-23 PROCEDURE — 93010 ELECTROCARDIOGRAM REPORT: CPT | Performed by: INTERNAL MEDICINE

## 2025-07-23 PROCEDURE — 83605 ASSAY OF LACTIC ACID: CPT

## 2025-07-23 PROCEDURE — 93005 ELECTROCARDIOGRAM TRACING: CPT

## 2025-07-23 PROCEDURE — 96375 TX/PRO/DX INJ NEW DRUG ADDON: CPT

## 2025-07-23 PROCEDURE — 82948 REAGENT STRIP/BLOOD GLUCOSE: CPT

## 2025-07-23 PROCEDURE — 84100 ASSAY OF PHOSPHORUS: CPT

## 2025-07-23 PROCEDURE — 84145 PROCALCITONIN (PCT): CPT

## 2025-07-23 PROCEDURE — 85730 THROMBOPLASTIN TIME PARTIAL: CPT

## 2025-07-23 PROCEDURE — 83735 ASSAY OF MAGNESIUM: CPT

## 2025-07-23 PROCEDURE — 80307 DRUG TEST PRSMV CHEM ANLYZR: CPT

## 2025-07-23 PROCEDURE — 84484 ASSAY OF TROPONIN QUANT: CPT | Performed by: EMERGENCY MEDICINE

## 2025-07-23 PROCEDURE — 85007 BL SMEAR W/DIFF WBC COUNT: CPT

## 2025-07-23 PROCEDURE — 84443 ASSAY THYROID STIM HORMONE: CPT

## 2025-07-23 PROCEDURE — 80048 BASIC METABOLIC PNL TOTAL CA: CPT

## 2025-07-23 PROCEDURE — 85610 PROTHROMBIN TIME: CPT

## 2025-07-23 PROCEDURE — 92610 EVALUATE SWALLOWING FUNCTION: CPT

## 2025-07-23 PROCEDURE — 82550 ASSAY OF CK (CPK): CPT

## 2025-07-23 PROCEDURE — 96361 HYDRATE IV INFUSION ADD-ON: CPT

## 2025-07-23 PROCEDURE — 85027 COMPLETE CBC AUTOMATED: CPT

## 2025-07-23 RX ORDER — ACETAMINOPHEN 325 MG/1
650 TABLET ORAL EVERY 6 HOURS PRN
Status: DISCONTINUED | OUTPATIENT
Start: 2025-07-23 | End: 2025-07-25 | Stop reason: HOSPADM

## 2025-07-23 RX ORDER — LISINOPRIL 20 MG/1
20 TABLET ORAL DAILY
Status: DISCONTINUED | OUTPATIENT
Start: 2025-07-23 | End: 2025-07-25 | Stop reason: HOSPADM

## 2025-07-23 RX ORDER — CHLORHEXIDINE GLUCONATE ORAL RINSE 1.2 MG/ML
15 SOLUTION DENTAL EVERY 12 HOURS SCHEDULED
Status: DISCONTINUED | OUTPATIENT
Start: 2025-07-23 | End: 2025-07-25 | Stop reason: HOSPADM

## 2025-07-23 RX ORDER — BUTALBITAL, ACETAMINOPHEN AND CAFFEINE 50; 325; 40 MG/1; MG/1; MG/1
2 TABLET ORAL EVERY 6 HOURS PRN
Status: DISCONTINUED | OUTPATIENT
Start: 2025-07-23 | End: 2025-07-25 | Stop reason: HOSPADM

## 2025-07-23 RX ORDER — ZOLPIDEM TARTRATE 10 MG/1
10 TABLET ORAL
Status: DISCONTINUED | OUTPATIENT
Start: 2025-07-23 | End: 2025-07-23

## 2025-07-23 RX ORDER — SODIUM CHLORIDE, SODIUM LACTATE, POTASSIUM CHLORIDE, CALCIUM CHLORIDE 600; 310; 30; 20 MG/100ML; MG/100ML; MG/100ML; MG/100ML
125 INJECTION, SOLUTION INTRAVENOUS CONTINUOUS
Status: DISCONTINUED | OUTPATIENT
Start: 2025-07-23 | End: 2025-07-25 | Stop reason: HOSPADM

## 2025-07-23 RX ORDER — ALPRAZOLAM 1 MG/1
1 TABLET ORAL
COMMUNITY

## 2025-07-23 RX ORDER — LORAZEPAM 2 MG/ML
1 INJECTION INTRAMUSCULAR EVERY 4 HOURS PRN
Status: DISCONTINUED | OUTPATIENT
Start: 2025-07-23 | End: 2025-07-23

## 2025-07-23 RX ORDER — ONDANSETRON 2 MG/ML
4 INJECTION INTRAMUSCULAR; INTRAVENOUS EVERY 8 HOURS PRN
Status: DISCONTINUED | OUTPATIENT
Start: 2025-07-23 | End: 2025-07-25 | Stop reason: HOSPADM

## 2025-07-23 RX ORDER — INSULIN LISPRO 100 [IU]/ML
2-12 INJECTION, SOLUTION INTRAVENOUS; SUBCUTANEOUS
Status: DISCONTINUED | OUTPATIENT
Start: 2025-07-23 | End: 2025-07-25 | Stop reason: HOSPADM

## 2025-07-23 RX ORDER — CYCLOBENZAPRINE HCL 10 MG
10 TABLET ORAL 3 TIMES DAILY PRN
COMMUNITY

## 2025-07-23 RX ORDER — MORPHINE SULFATE 100 MG/1
100 TABLET ORAL EVERY 8 HOURS PRN
Status: DISCONTINUED | OUTPATIENT
Start: 2025-07-23 | End: 2025-07-25 | Stop reason: HOSPADM

## 2025-07-23 RX ORDER — CYCLOBENZAPRINE HCL 10 MG
10 TABLET ORAL 3 TIMES DAILY
Status: DISCONTINUED | OUTPATIENT
Start: 2025-07-23 | End: 2025-07-23

## 2025-07-23 RX ORDER — FENTANYL 75 UG/1
75 PATCH TRANSDERMAL
Status: DISCONTINUED | OUTPATIENT
Start: 2025-07-23 | End: 2025-07-25 | Stop reason: HOSPADM

## 2025-07-23 RX ORDER — PANTOPRAZOLE SODIUM 40 MG/10ML
40 INJECTION, POWDER, LYOPHILIZED, FOR SOLUTION INTRAVENOUS DAILY
Status: DISCONTINUED | OUTPATIENT
Start: 2025-07-23 | End: 2025-07-25 | Stop reason: HOSPADM

## 2025-07-23 RX ORDER — ECHINACEA PURPUREA EXTRACT 125 MG
1 TABLET ORAL EVERY 4 HOURS PRN
Status: DISCONTINUED | OUTPATIENT
Start: 2025-07-23 | End: 2025-07-25 | Stop reason: HOSPADM

## 2025-07-23 RX ORDER — MORPHINE SULFATE 100 MG/1
100 TABLET ORAL 2 TIMES DAILY
COMMUNITY

## 2025-07-23 RX ORDER — FENTANYL 75 UG/1
75 PATCH TRANSDERMAL
Status: DISCONTINUED | OUTPATIENT
Start: 2025-07-23 | End: 2025-07-23

## 2025-07-23 RX ORDER — SENNOSIDES 8.6 MG
2 TABLET ORAL 2 TIMES DAILY PRN
Status: DISCONTINUED | OUTPATIENT
Start: 2025-07-23 | End: 2025-07-25 | Stop reason: HOSPADM

## 2025-07-23 RX ORDER — METOPROLOL TARTRATE 1 MG/ML
5 INJECTION, SOLUTION INTRAVENOUS EVERY 6 HOURS PRN
Status: DISCONTINUED | OUTPATIENT
Start: 2025-07-23 | End: 2025-07-25 | Stop reason: HOSPADM

## 2025-07-23 RX ORDER — ZOLPIDEM TARTRATE 10 MG/1
10 TABLET ORAL
Status: DISCONTINUED | OUTPATIENT
Start: 2025-07-23 | End: 2025-07-25 | Stop reason: HOSPADM

## 2025-07-23 RX ORDER — UREA 40 %
1 CREAM (GRAM) TOPICAL DAILY
COMMUNITY

## 2025-07-23 RX ORDER — NICOTINE 21 MG/24HR
21 PATCH, TRANSDERMAL 24 HOURS TRANSDERMAL DAILY
Status: DISCONTINUED | OUTPATIENT
Start: 2025-07-23 | End: 2025-07-25 | Stop reason: HOSPADM

## 2025-07-23 RX ORDER — HYDROCHLOROTHIAZIDE 25 MG/1
25 TABLET ORAL DAILY
Status: DISCONTINUED | OUTPATIENT
Start: 2025-07-23 | End: 2025-07-25 | Stop reason: HOSPADM

## 2025-07-23 RX ORDER — SUMATRIPTAN SUCCINATE 100 MG/1
100 TABLET ORAL ONCE AS NEEDED
COMMUNITY

## 2025-07-23 RX ORDER — DOXYCYCLINE 100 MG/1
100 CAPSULE ORAL EVERY 12 HOURS SCHEDULED
Status: DISCONTINUED | OUTPATIENT
Start: 2025-07-23 | End: 2025-07-23

## 2025-07-23 RX ORDER — MORPHINE SULFATE 100 MG/1
100 TABLET ORAL 2 TIMES DAILY
Refills: 0 | Status: DISCONTINUED | OUTPATIENT
Start: 2025-07-23 | End: 2025-07-25 | Stop reason: HOSPADM

## 2025-07-23 RX ORDER — HYDROMORPHONE HCL/PF 1 MG/ML
1 SYRINGE (ML) INJECTION
Status: DISCONTINUED | OUTPATIENT
Start: 2025-07-23 | End: 2025-07-23

## 2025-07-23 RX ORDER — IBUPROFEN 800 MG/1
800 TABLET, FILM COATED ORAL EVERY 8 HOURS PRN
COMMUNITY

## 2025-07-23 RX ORDER — DOCUSATE SODIUM 100 MG/1
100 CAPSULE, LIQUID FILLED ORAL 2 TIMES DAILY
Status: DISCONTINUED | OUTPATIENT
Start: 2025-07-23 | End: 2025-07-25 | Stop reason: HOSPADM

## 2025-07-23 RX ORDER — HEPARIN SODIUM 5000 [USP'U]/ML
5000 INJECTION, SOLUTION INTRAVENOUS; SUBCUTANEOUS EVERY 8 HOURS
Status: DISCONTINUED | OUTPATIENT
Start: 2025-07-23 | End: 2025-07-23

## 2025-07-23 RX ORDER — OXYMETAZOLINE HYDROCHLORIDE 0.05 G/100ML
2 SPRAY NASAL EVERY 12 HOURS SCHEDULED
Status: DISCONTINUED | OUTPATIENT
Start: 2025-07-23 | End: 2025-07-25 | Stop reason: HOSPADM

## 2025-07-23 RX ORDER — AMLODIPINE BESYLATE 5 MG/1
5 TABLET ORAL DAILY
Status: DISCONTINUED | OUTPATIENT
Start: 2025-07-23 | End: 2025-07-25 | Stop reason: HOSPADM

## 2025-07-23 RX ORDER — INSULIN GLARGINE 100 [IU]/ML
40 INJECTION, SOLUTION SUBCUTANEOUS
Status: DISCONTINUED | OUTPATIENT
Start: 2025-07-23 | End: 2025-07-25 | Stop reason: HOSPADM

## 2025-07-23 RX ORDER — LORAZEPAM 2 MG/ML
2 INJECTION INTRAMUSCULAR ONCE
Status: COMPLETED | OUTPATIENT
Start: 2025-07-23 | End: 2025-07-23

## 2025-07-23 RX ORDER — ALPRAZOLAM 1 MG/1
1 TABLET ORAL EVERY 6 HOURS PRN
Status: DISCONTINUED | OUTPATIENT
Start: 2025-07-23 | End: 2025-07-25 | Stop reason: HOSPADM

## 2025-07-23 RX ORDER — DOXYCYCLINE HYCLATE 100 MG
100 TABLET ORAL EVERY 12 HOURS SCHEDULED
Status: DISCONTINUED | OUTPATIENT
Start: 2025-07-23 | End: 2025-07-25 | Stop reason: HOSPADM

## 2025-07-23 RX ORDER — PRAVASTATIN SODIUM 20 MG
80 TABLET ORAL
Status: DISCONTINUED | OUTPATIENT
Start: 2025-07-23 | End: 2025-07-25 | Stop reason: HOSPADM

## 2025-07-23 RX ORDER — HYDROMORPHONE HCL/PF 1 MG/ML
1 SYRINGE (ML) INJECTION ONCE
Status: COMPLETED | OUTPATIENT
Start: 2025-07-23 | End: 2025-07-23

## 2025-07-23 RX ORDER — METOPROLOL SUCCINATE 25 MG/1
25 TABLET, EXTENDED RELEASE ORAL DAILY
Status: DISCONTINUED | OUTPATIENT
Start: 2025-07-23 | End: 2025-07-25 | Stop reason: HOSPADM

## 2025-07-23 RX ORDER — SUMATRIPTAN 50 MG/1
100 TABLET, FILM COATED ORAL EVERY 12 HOURS PRN
Status: DISCONTINUED | OUTPATIENT
Start: 2025-07-23 | End: 2025-07-25 | Stop reason: HOSPADM

## 2025-07-23 RX ORDER — HEPARIN SODIUM 5000 [USP'U]/ML
5000 INJECTION, SOLUTION INTRAVENOUS; SUBCUTANEOUS EVERY 8 HOURS SCHEDULED
Status: DISCONTINUED | OUTPATIENT
Start: 2025-07-23 | End: 2025-07-23

## 2025-07-23 RX ADMIN — SODIUM CHLORIDE, SODIUM LACTATE, POTASSIUM CHLORIDE, AND CALCIUM CHLORIDE 125 ML/HR: 600; 310; 30; 20 INJECTION, SOLUTION INTRAVENOUS at 23:49

## 2025-07-23 RX ADMIN — MORPHINE SULFATE 100 MG: 100 TABLET, EXTENDED RELEASE ORAL at 12:08

## 2025-07-23 RX ADMIN — HYDROMORPHONE HYDROCHLORIDE 1 MG: 1 INJECTION, SOLUTION INTRAMUSCULAR; INTRAVENOUS; SUBCUTANEOUS at 01:26

## 2025-07-23 RX ADMIN — SODIUM CHLORIDE, SODIUM LACTATE, POTASSIUM CHLORIDE, AND CALCIUM CHLORIDE 125 ML/HR: 600; 310; 30; 20 INJECTION, SOLUTION INTRAVENOUS at 15:31

## 2025-07-23 RX ADMIN — METOPROLOL TARTRATE 5 MG: 1 INJECTION, SOLUTION INTRAVENOUS at 09:59

## 2025-07-23 RX ADMIN — BUTALBITAL, ACETAMINOPHEN AND CAFFEINE 2 TABLET: 325; 50; 40 TABLET ORAL at 14:19

## 2025-07-23 RX ADMIN — MORPHINE SULFATE 100 MG: 100 TABLET, EXTENDED RELEASE ORAL at 22:27

## 2025-07-23 RX ADMIN — HYDROMORPHONE HYDROCHLORIDE 1 MG: 1 INJECTION, SOLUTION INTRAMUSCULAR; INTRAVENOUS; SUBCUTANEOUS at 09:56

## 2025-07-23 RX ADMIN — MAGNESIUM HYDROXIDE 30 ML: 1200 LIQUID ORAL at 15:45

## 2025-07-23 RX ADMIN — DOXYCYCLINE HYCLATE 100 MG: 100 TABLET, FILM COATED ORAL at 21:01

## 2025-07-23 RX ADMIN — INSULIN GLARGINE 40 UNITS: 100 INJECTION, SOLUTION SUBCUTANEOUS at 21:47

## 2025-07-23 RX ADMIN — DEXTROSE MONOHYDRATE 500 MG: 5 INJECTION, SOLUTION INTRAVENOUS at 13:08

## 2025-07-23 RX ADMIN — FENTANYL 75 MCG: 75 PATCH TRANSDERMAL at 09:49

## 2025-07-23 RX ADMIN — INSULIN LISPRO 2 UNITS: 100 INJECTION, SOLUTION INTRAVENOUS; SUBCUTANEOUS at 17:21

## 2025-07-23 RX ADMIN — MORPHINE SULFATE 100 MG: 100 TABLET, EXTENDED RELEASE ORAL at 18:03

## 2025-07-23 RX ADMIN — INSULIN LISPRO 4 UNITS: 100 INJECTION, SOLUTION INTRAVENOUS; SUBCUTANEOUS at 21:46

## 2025-07-23 RX ADMIN — ALPRAZOLAM 1 MG: 1 TABLET ORAL at 12:08

## 2025-07-23 RX ADMIN — ONDANSETRON 4 MG: 2 INJECTION INTRAMUSCULAR; INTRAVENOUS at 22:20

## 2025-07-23 RX ADMIN — APIXABAN 5 MG: 5 TABLET, FILM COATED ORAL at 18:04

## 2025-07-23 RX ADMIN — INSULIN LISPRO 6 UNITS: 100 INJECTION, SOLUTION INTRAVENOUS; SUBCUTANEOUS at 11:35

## 2025-07-23 RX ADMIN — SENNOSIDES 17.2 MG: 8.6 TABLET, FILM COATED ORAL at 15:46

## 2025-07-23 RX ADMIN — DOCUSATE SODIUM 100 MG: 100 CAPSULE, LIQUID FILLED ORAL at 21:47

## 2025-07-23 RX ADMIN — HYDROMORPHONE HYDROCHLORIDE 1 MG: 1 INJECTION, SOLUTION INTRAMUSCULAR; INTRAVENOUS; SUBCUTANEOUS at 06:41

## 2025-07-23 RX ADMIN — Medication 21 MG: at 10:00

## 2025-07-23 RX ADMIN — PRAVASTATIN SODIUM 80 MG: 20 TABLET ORAL at 15:45

## 2025-07-23 RX ADMIN — ACETAMINOPHEN 650 MG: 325 TABLET ORAL at 22:31

## 2025-07-23 RX ADMIN — HEPARIN SODIUM 5000 UNITS: 5000 INJECTION, SOLUTION INTRAVENOUS; SUBCUTANEOUS at 05:49

## 2025-07-23 RX ADMIN — PANTOPRAZOLE SODIUM 40 MG: 40 INJECTION, POWDER, FOR SOLUTION INTRAVENOUS at 09:20

## 2025-07-23 RX ADMIN — OXYMETAZOLINE HYDROCHLORIDE 2 SPRAY: 0.5 SPRAY NASAL at 14:10

## 2025-07-23 RX ADMIN — LORAZEPAM 2 MG: 2 INJECTION, SOLUTION INTRAMUSCULAR; INTRAVENOUS at 05:49

## 2025-07-23 NOTE — ED PROVIDER NOTES
ED Disposition       None          Assessment & Plan       Medical Decision Making  50-year-old male past medical history opiate abuse, diabetes, hypertension, TBI, A-fib, cardiomyopathy presents after episode of altered mental status with clonic jerking activity.  On review of records patient just discharged in the hospital yesterday after admission for what seems like similar presentation.  Seen by neurology at that time with recommendations for MRI brain which was unremarkable and outpatient EEG.  There was some question at that time for postictal state.  No reports of any prior seizure activity review of records.  Patient treated with dose of Ativan while here.  Has continued to have episodes of what appears more clonic jerking activity not clear that is completely seizure-like activity.  Slight elevation of blood sugar with remainder of laboratory studies relatively unremarkable.  No episodes of acute intracranial process on CT imaging.  Wife reports patient on chronic opiates and requested dose of narcotics while in the ED    Critical Care Time Statement: Upon my evaluation, this patient had a high probability of imminent or life-threatening deterioration due to AMS, which required my direct attention, intervention, and personal management.  I spent a total of 33 minutes directly providing critical care services, including interpretation of complex medical databases, evaluating for the presence of life-threatening injuries or illnesses, and complex medical decision making (to support/prevent further life-threatening deterioration).. This time is exclusive of procedures, teaching, treating other patients, family meetings, and any prior time recorded by providers other than myself.       Results Reviewed     Procedure Component Value Units Date/Time    Magnesium [[id number]]  (Normal) Collected: 07/22/25 2279    Lab Status: Final result Specimen: Blood from Arm, Left Updated: 07/23/25   0036     Magnesium 2.2  mg/dL     Comprehensive metabolic panel [[id number]]  (Abnormal) Collected: 07/22/25 2259    Lab Status: Final result Specimen: Blood from Arm, Left Updated: 07/23/25   0032     Sodium 136 mmol/L      Potassium 4.7 mmol/L      Chloride 99 mmol/L      CO2 18 mmol/L      ANION GAP 19 mmol/L      BUN 19 mg/dL      Creatinine 1.17 mg/dL      Glucose 280 mg/dL      Calcium 10.5 mg/dL      AST 37 U/L      ALT 41 U/L      Alkaline Phosphatase 108 U/L      Total Protein 6.7 g/dL      Albumin 4.1 g/dL      Total Bilirubin 0.51 mg/dL      eGFR 71 ml/min/1.73sq m     Narrative:      National Kid[employer] Disease Foundation guidelines for Chronic Kidney Disease   (CKD):   ·  Stage 1 with normal or high GFR (GFR > 90 mL/min/1.73 square meters)  ·  Stage 2 Mild CKD (GFR = 60-89 mL/min/1.73 square meters)  ·  Stage 3A Moderate CKD (GFR = 45-59 mL/min/1.73 square meters)  ·  Stage 3B Moderate CKD (GFR = 30-44 mL/min/1.73 square meters)  ·  Stage 4 Severe CKD (GFR = 15-29 mL/min/1.73 square meters)  ·  Stage 5 End Stage CKD (GFR <15 mL/min/1.73 square meters)  Note: GFR calculation is accurate only with a steady state creatinine    TROPONIN T 4HR ([facility] ONLY) [[id number]]     Lab Status: No result Specimen: Blood     Troponin T 0hr ([facility] ONLY) [[id number]]  (Abnormal) Collected:   07/22/25 2259    Lab Status: Final result Specimen: Blood from Arm, Left Updated: 07/22/25 2347     hs Jonny 0hr 34 ng/L     TROPONIN T 2hr ([facility] ONLY) [[id number]]     Lab Status: No result Specimen: Blood     Ethanol [[id number]]  (Normal) Collected: 07/22/25 2259    Lab Status: Final result Specimen: Blood from Arm, Left Updated: 07/22/25 2343     Ethanol Lvl <10 mg/dL     CBC and differential [[id number]]  (Normal) Collected: 07/22/25 2259    Lab Status: Preliminary result Specimen: Blood from Arm, Left Updated:   07/22/25 2313     WBC 7.57 Thousand/uL      RBC 4.87 Million/uL      Hemoglobin 14.3 g/dL      Hematocrit 42.0 %      MCV 86 fL       MCH 29.4 pg      MCHC 34.0 g/dL      RDW 12.3 %      MPV 12.0 fL      Platelets 257 Thousands/uL     Blood gas, venous [[id number]] Collected: 07/22/25 2259    Lab Status: No result Specimen: Blood from Arm, Left     Rapid drug screen, urine [[id number]]     Lab Status: No result Specimen: Urine          CT head without contrast   Final Result by Domenica Montgomery MD (07/23 0010)        No acute intracranial abnormality.                        Workstation performed: [id number]         XR chest 1 view portable   ED Interpretation by Dallas Duckworth MD (07/22 2359)    No active disease            Amount and/or Complexity of Data Reviewed  Labs: ordered.  Radiology: ordered and independent interpretation performed.  ECG/medicine tests: independent interpretation performed.     Details: Sinus tachycardia, rate 110, QRS 82, QTc 452, PVC, no ST elevation or depression with no change compared to 7/19/2025  Discussion of management or test interpretation with external provider(s): Critical care JUDITH consulted via secure chat 1 AM        Risk  Prescription drug management.  Decision regarding hospitalization.          ED Course as of 07/23/25 0159   Tue Jul 22, 2025 2359 Troponin reviewed from prior ED visit noted to be in the 40s   Wed Jul 23, 2025 0156 Evaluated by CC JUDITH and feels can go to step down.  Hospitalist secure chat with admission       Medications   sodium chloride 0.9 % bolus 1,000 mL (has no administration in time range)   LORazepam (ATIVAN) injection 1 mg (has no administration in time range)       ED Risk Strat Scores                    No data recorded        SBIRT 22yo+      Flowsheet Row Most Recent Value   Initial Alcohol Screen: US AUDIT-C     1. How often do you have a drink containing alcohol? --  [unable to assess] Filed at: 07/22/2025 2248   2. How many drinks containing alcohol do you have on a typical day you are drinking?  --  [unable to assess] Filed at: 07/22/2025 2248   3a. Male UNDER 65: How often  "do you have five or more drinks on one occasion? --  [unable to assess] Filed at: 07/22/2025 2248   3b. FEMALE Any Age, or MALE 65+: How often do you have 4 or more drinks on one occassion? --  [unable to assess] Filed at: 07/22/2025 2248   JENAE: How many times in the past year have you...    Used an illegal drug or used a prescription medication for non-medical reasons? --  [unable to assess] Filed at: 07/22/2025 2248                            History of Present Illness       Chief Complaint   Patient presents with    Seizure - Actively Seizing on Arrival     Pt presents to ED via EMS post focal /motor seizure at home. Per EMS, pt had similar incident Friday, but nothing was found to be cause and pt discharged. Per EMS, pt was walking and talking earlier today.       Past Medical History[1]   Past Surgical History[2]   Family History[3]   Social History[4]   E-Cigarette/Vaping    E-Cigarette Use Unknown If Ever Used     Cartridges/Day \"250 puffs a day\"     Comments does not want to quit       E-Cigarette/Vaping Substances    Nicotine Yes     THC Yes     CBD Yes     Flavoring Yes     Other No     Unknown No       I have reviewed and agree with the history as documented.     Chief complaint altered mental status.  Patient apparently just discharged from the hospital this morning.  Apparently going to bed.  While in bed wife noted patient started having jerking and shaking activities and was altered.  Patient apparently was just discharged in the hospital after having similar episode.  Given patient's altered presentation cannot write any history of present illness or review of systems      Seizure - Actively Seizing on Arrival      Review of Systems   Reason unable to perform ROS: Altered mental status.           Objective       ED Triage Vitals [07/22/25 2238]   Temperature Pulse Blood Pressure Respirations SpO2 Patient Position - Orthostatic VS   98.1 °F (36.7 °C) (!) 112 (!) 162/117 20 97 % Lying      Temp Source " Heart Rate Source BP Location FiO2 (%) Pain Score    Tympanic Monitor Right arm -- --      Vitals      Date and Time Temp Pulse SpO2 Resp BP Pain Score FACES Pain Rating User   07/22/25 2238 98.1 °F (36.7 °C) 112 97 % 20 162/117 -- -- CI            Physical Exam  Constitutional:       Comments: Some intermittent clonic jerking activity of body and upper extremities   HENT:      Head: Atraumatic.      Mouth/Throat:      Mouth: Mucous membranes are moist.     Eyes:      Conjunctiva/sclera: Conjunctivae normal.       Cardiovascular:      Rate and Rhythm: Regular rhythm. Tachycardia present.   Pulmonary:      Effort: Pulmonary effort is normal.      Breath sounds: Normal breath sounds.   Abdominal:      Tenderness: There is no abdominal tenderness. There is no guarding or rebound.     Musculoskeletal:      Cervical back: Neck supple. No tenderness.     Skin:     General: Skin is warm.     Neurological:      Mental Status: He is disoriented.     Psychiatric:         Mood and Affect: Mood normal.         Results Reviewed       Procedure Component Value Units Date/Time    Comprehensive metabolic panel [[id number]]     Lab Status: No result Specimen: Blood     Magnesium [[id number]]     Lab Status: No result Specimen: Blood     Troponin T 0hr ([facility] ONLY) [[id number]]     Lab Status: No result Specimen: Blood     Rapid drug screen, urine [[id number]]     Lab Status: No result Specimen: Urine     Ethanol [[id number]]     Lab Status: No result Specimen: Blood     Blood gas, venous [[id number]]     Lab Status: No result Specimen: Blood     CBC and differential [[id number]]     Lab Status: No result Specimen: Blood             XR chest 1 view portable    (Results Pending)   CT head without contrast    (Results Pending)       Procedures    ED Medication and Procedure Management   Prior to Admission Medications   Prescriptions Last Dose Informant Patient Reported? Taking?   Accu-Chek Softclix Lancets lancets   No No   Sig: CHECK  BLOOD SUGAR BEFORE MEALS AND BEDTIME.   Alcohol Swabs (B-D SINGLE USE SWABS REGULAR) PADS   No No   Sig: USE AS DIRECTED   Blood Glucose Monitoring Suppl (Accu-Chek Guide) w/Device KIT   No No   Sig: Use as directed on package   Insulin Pen Needle (BD Pen Needle Carisa 2nd Gen) 32G X 4 MM MISC  Self No No   Sig: For use with insulin pen. Pharmacy may dispense brand covered by insurance.   Lantus SoloStar 100 units/mL SOPN  Self Yes No   Sig: Pt reports taking 40 units daily   amLODIPine (NORVASC) 5 mg tablet  Self No No   Sig: Take 1 tablet (5 mg total) by mouth 2 (two) times a day   apixaban (ELIQUIS) 5 mg   No No   Sig: Take 1 tablet (5 mg total) by mouth 2 (two) times a day   cholecalciferol (VITAMIN D3) 250 MCG (17999 UT) capsule  Self Yes No   Sig: Vitamin D3   clonazePAM (KlonoPIN) 1 mg tablet  Self Yes No   Si.5 tablets 2 (two) times a day as needed for anxiety   doxycycline (DORYX) 100 MG EC tablet  Self Yes No   Sig: Take 100 mg by mouth 2 (two) times a day   esomeprazole (NexIUM) 20 mg capsule  Self Yes No   Sig: Take 20 mg by mouth 2 (two) times a day before meals   fentaNYL (DURAGESIC) 75 mcg/hr  Self Yes No   Sig: Place 1 patch on the skin every 3 (three) days   glucose blood (OneTouch Verio) test strip  Self No No   Sig: May substitute brand based on insurance coverage. Check glucose ACHS.   insulin lispro (HumaLOG KwikPen) 100 units/mL injection pen  Self No No   Sig: Inject 13 Units under the skin 3 (three) times a day with meals   lisinopril-hydrochlorothiazide (PRINZIDE,ZESTORETIC) 20-25 MG per tablet  Self Yes No   Sig: Take 1 tablet by mouth daily   melatonin 3 mg   No No   Sig: Take 1 tablet (3 mg total) by mouth daily at bedtime   metoprolol succinate (TOPROL-XL) 25 mg 24 hr tablet   No No   Sig: TAKE ONE TABLET BY MOUTH ONE TIME DAILY   morphine (MS CONTIN) 60 mg 12 hr tablet  Self Yes No   Si mg every 12 (twelve) hours as needed for severe pain   multivitamin (THERAGRAN) TABS  Self Yes  No   Sig: Take 1 tablet by mouth daily   ondansetron (ZOFRAN) 8 mg tablet  Self Yes No   Sig: TAKE 1 TABLET (8 MG) BY MOUTH 3 TIMES PER DAY AS NEEDED   polyethylene glycol (MIRALAX) 17 g packet  Self No No   Sig: Take 17 g by mouth 2 (two) times a day   Patient not taking: Reported on 2024   risperiDONE (RisperDAL) 3 mg tablet   No No   Sig: Take 1 tablet (3 mg total) by mouth daily at bedtime   simvastatin (ZOCOR) 40 mg tablet  Self Yes No   Sig: Take 40 mg by mouth daily after dinner   zolpidem (AMBIEN) 10 mg tablet   No No   Sig: Take 1 tablet (10 mg total) by mouth daily at bedtime for 10 days Per pt - pt takes scheduled      Facility-Administered Medications: None     Patient's Medications   Discharge Prescriptions    No medications on file     No discharge procedures on file.  ED SEPSIS DOCUMENTATION                [1]   Past Medical History:  Diagnosis Date    Bed sore, stage 1     Diabetes mellitus (HCC)     Hypertension    [2]   Past Surgical History:  Procedure Laterality Date    IR PICC PLACEMENT SINGLE LUMEN  2024    KNEE SURGERY      IA REVJ TOTAL KNEE ARTHRP W/WO ALGRFT 1 COMPONENT Right 3/24/2024    Procedure: ARTHROPLASTY KNEE TOTAL REVISION, POLY EXCHANGE;  Surgeon: Tao Washington DO;  Location:  MAIN OR;  Service: Orthopedics    TOE AMPUTATION Left 3/28/2024    Procedure: AMPUTATION TOE 2nd;  Surgeon: Rossy Toussaint DPM;  Location:  MAIN OR;  Service: Podiatry   [3] No family history on file.  [4]   Social History  Tobacco Use    Smoking status: Former     Current packs/day: 0.00     Types: Cigarettes     Quit date: 2011     Years since quittin.6   Vaping Use    Vaping status: Unknown   Substance Use Topics    Alcohol use: Not Currently     Comment: unknown    Drug use: Yes     Types: Marijuana, Fentanyl, Morphine, Other     Comment: pt dependant on ambien and flexeril per interview.        Dallas Duckworth MD  25 [phone]

## 2025-07-23 NOTE — ASSESSMENT & PLAN NOTE
Lab Results   Component Value Date    HGBA1C 7.1 (H) 07/11/2024       Recent Labs     07/23/25  0516   POCGLU 265*       Blood Sugar Average: Last 72 hrs:  (P) 265  - Continue home Lantus 40 units  -Sliding scale insulin coverage with 4 times daily BGM

## 2025-07-23 NOTE — CASE MANAGEMENT
Case Management Assessment & Discharge Planning Note    Patient name Armen Llanos  Location ICU 07/ICU 07 MRN [id number]  : 1975 Date 2025       Current Admission Date: 2025  Current Admission Diagnosis:Seizure-like activity ([facility])   Patient Active Problem List    Diagnosis Date Noted    Seizure-like activity ([facility]) 2025    Altered mental status, unspecified 2025    Acute pain of right shoulder 2024    Mood disorder ([facility]) r/o mood disorder secondary to general medical condition, r/o MDD 2024    Major depressive disorder, recurrent severe without psychotic features ([facility]) 2024    Pain and swelling of right knee 2024    History of amputation of lesser toe, left ([facility]) 2024    Diabetic polyneuropathy associated with type 2 diabetes mellitus ([facility]) 2024    Tachycardia 05/10/2024    Diabetic foot ulcer ([facility]) 2024    Monoclonal gammopathy 2024    Anasarca associated with disorder of kidney 2024    Ambulatory dysfunction 2024    Abnormal CT of the chest 2024    Other proteinuria 2024    Anxiety 2024    Cardiomyopathy ([facility]) 2024    Mononeuropathy 2024    Osteoarthritis of knee 2024    A-fib with RVR ([facility]) 2024    Hyponatremia 2024    MSSA bacteremia 2024    JH (acute kidney injury) ([facility]) 2024    Syncope 2023    Costochondral chest pain 2023    Adrenal nodule ([facility]) 2023    Chronic, continuous use of opioids 11/15/2022    Type 2 diabetes mellitus with renal complication ([facility]) 11/15/2022    Essential hypertension 11/15/2022    Hypokalemia 11/15/2022    Hypocalcemia 11/15/2022    TBI (traumatic brain injury) ([facility]) 11/15/2022    Chronic pain disorder 2022      LOS (days): 0  Geometric Mean LOS (GMLOS) (days):   Days to GMLOS:     OBJECTIVE:    Risk of Unplanned Readmission Score: 20.08         Current admission status: Inpatient       Preferred  Pharmacy:   [id number] - [place], [place] - [address]  [address] [address]  Phone: [phone] Fax: [phone]    Primary Care Provider: Deidre Andrea MD    Primary Insurance: [employer] REP  Secondary Insurance:     ASSESSMENT:  Active Health Care Proxies       Macie Zamudio Health Care Representative - Spouse   Primary Phone: [phone] (Mobile)                 Advance Directives  Does patient have a Health Care POA?: No  Was patient offered paperwork?: Yes  Does patient have Advance Directives?: No  Was patient offered paperwork?: Yes              Patient Information  Admitted from:: Home  Mental Status: Confused  During Assessment patient was accompanied by: Spouse  Assessment information provided by:: Spouse  Primary Caregiver: Self  Support Systems: Self, Spouse/significant other  County of Residence: [place]  What city do you live in?: [place]  Home entry access options. Select all that apply.: Stairs  Number of steps to enter home.: 2  Do the steps have railings?: Yes  Type of Current Residence: 2 story home  Upon entering residence, is there a bedroom on the main floor (no further steps)?: No  A bedroom is located on the following floor levels of residence (select all that apply):: 2nd Floor  Upon entering residence, is there a bathroom on the main floor (no further steps)?: Yes  Number of steps to 2nd floor from main floor: One Flight  Living Arrangements: Lives w/ Family members, Lives w/ Spouse/significant other (Resides with wife and son. Needs assistance with bathing and driving, but otherwise independent. Pt is disabled and receives SSD.  Wife is employed. Interested in hospital bed.)  Is patient a ?: No    Activities of Daily Living Prior to Admission  Functional Status: Independent  Completes ADLs independently?: No  Level of ADL dependence: Assistance  Ambulates independently?: Yes  Does patient use assisted devices?: Yes  Assisted Devices (DME) used:  Straight Cane, Walker, Wheelchair, Shower Chair  Does patient currently own DME?: Yes  What DME does the patient currently own?: Shower Chair, Straight Cane, Walker, Wheelchair, Other (Comment) (Electric wc)  Does patient have a history of Outpatient Therapy (PT/OT)?: No  Does the patient have a history of Short-Term Rehab?: Yes  Does patient currently have HHC?: No    Current Home Health Care  Home Health Agency Name:: [facility]    Patient Information Continued  Income Source: SSI/SSD  Does patient have prescription coverage?: Yes  Can the patient afford their medications and any related supplies (such as glucometers or test strips)?: Yes  Does patient receive dialysis treatments?: No  Does patient have a history of substance abuse?: No  Does patient have a history of Mental Health Diagnosis?: Yes  Is patient receiving treatment for mental health?: No. Patient declined treatment information.  Has patient received inpatient treatment related to mental health in the last 2 years?: No         Means of Transportation  [facility]          DISCHARGE DETAILS:    Discharge planning discussed with:: Patient and wife        CM contacted family/caregiver?: Yes (Macie Zamudio (wife))  Were Treatment Team discharge recommendations reviewed with patient/caregiver?: Yes  Did patient/caregiver verbalize understanding of patient care needs?: Yes       Contacts  Patient Contacts: Macie Zamudio (wife)  Relationship to Patient:: Family  Contact Method: Phone  Phone Number: [phone]  Reason/Outcome: Continuity of Care, Discharge Planning    Requested Home Health Care         Is the patient interested in HHC at discharge?: Yes  Home Health Discipline requested:: Physical Therapy, Occupational Therapy, Nursing  Home Health Agency Name:: [facility]  Home Health Follow-Up Provider:: PCP  Home Health Services Needed:: Gait/ADL Training, Evaluate Functional Status and  Safety  Homebound Criteria Met:: Requires the Assistance of Another Person for Safe Ambulation or to Leave the Home, Uses an Assist Device (i.e. cane, walker, etc)  Supporting Clincal Findings:: Fatigues Easliy in Short Distances    DME Referral Provided  Referral made for DME?: No              Treatment Team Recommendation: Home with Home Health Care  Expected Discharge Disposition: Home Health Services     Transport at Discharge : Family

## 2025-07-23 NOTE — ASSESSMENT & PLAN NOTE
Noted to have myoclonic type jerking, mostly in episodes.  Patient better now.  Again could be related to an underlying toxic/metabolic encephalopathy.  History of opioid use.  Lab work still showing he is hyperglycemic.  LFTs are normal, but consider checking ammonia level.  Slight bump in WBC count.  Also has slightly elevated lactic acid level. Overall clinical suspicion for seizures is relatively low, but there is previous noted history of TBI.  Will obtain EEG, but will defer testing until pain is better controlled and he is less distressed.  Opting to hold off on starting any antiepileptic medications. Discussed with wife.

## 2025-07-23 NOTE — PLAN OF CARE
Problem: PAIN - ADULT  Goal: Verbalizes/displays adequate comfort level or baseline comfort level  Description: Interventions:  - Encourage patient to monitor pain and request assistance  - Assess pain using appropriate pain scale  - Administer analgesics as ordered based on type and severity of pain and evaluate response  - Implement non-pharmacological measures as appropriate and evaluate response  - Consider cultural and social influences on pain and pain management  - Notify physician/advanced practitioner if interventions unsuccessful or patient reports new pain  - Educate patient/family on pain management process including their role and importance of  reporting pain   - Provide non-pharmacologic/complimentary pain relief interventions  Outcome: Progressing     Problem: INFECTION - ADULT  Goal: Absence or prevention of progression during hospitalization  Description: INTERVENTIONS:  - Assess and monitor for signs and symptoms of infection  - Monitor lab/diagnostic results  - Monitor all insertion sites, i.e. indwelling lines, tubes, and drains  - Monitor endotracheal if appropriate and nasal secretions for changes in amount and color  - [place] appropriate cooling/warming therapies per order  - Administer medications as ordered  - Instruct and encourage patient and family to use good hand hygiene technique  - Identify and instruct in appropriate isolation precautions for identified infection/condition  Outcome: Progressing  Goal: Absence of fever/infection during neutropenic period  Description: INTERVENTIONS:  - Monitor WBC  - Perform strict hand hygiene  - Limit to healthy visitors only  - No plants, dried, fresh or silk flowers with still in patient room  Outcome: Progressing     Problem: SAFETY ADULT  Goal: Patient will remain free of falls  Description: INTERVENTIONS:  - Educate patient/family on patient safety including physical limitations  - Instruct patient to call for assistance with activity   -  Consider consulting OT/PT to assist with strengthening/mobility based on AM PAC & JH-HLM score  - Consult OT/PT to assist with strengthening/mobility   - Keep Call bell within reach  - Keep bed low and locked with side rails adjusted as appropriate  - Keep care items and personal belongings within reach  - Initiate and maintain comfort rounds  - Make Fall Risk Sign visible to staff  - Offer Toileting every 2 Hours, in advance of need  - Initiate/Maintain bed alarm  - Apply yellow socks and bracelet for high fall risk patients  - Consider moving patient to room near nurses station  Outcome: Progressing  Goal: Maintain or return to baseline ADL function  Description: INTERVENTIONS:  -  Assess patient's ability to carry out ADLs; assess patient's baseline for ADL function and identify physical deficits which impact ability to perform ADLs (bathing, care of mouth/teeth, toileting, grooming, dressing, etc.)  - Assess/evaluate cause of self-care deficits   - Assess range of motion  - Assess patient's mobility; develop plan if impaired  - Assess patient's need for assistive devices and provide as appropriate  - Encourage maximum independence but intervene and supervise when necessary  - Involve family in performance of ADLs  - Assess for home care needs following discharge   - Consider OT consult to assist with ADL evaluation and planning for discharge  - Provide patient education as appropriate  - Monitor functional capacity and physical performance, use of AM PAC & JH-HLM   - Monitor gait, balance and fatigue with ambulation    Outcome: Progressing  Goal: Maintains/Returns to pre admission functional level  Description: INTERVENTIONS:  - Perform AM-PAC 6 Click Basic Mobility/ Daily Activity assessment daily.  - Set and communicate daily mobility goal to care team and patient/family/caregiver.   - Collaborate with rehabilitation services on mobility goals if consulted  - Reposition patient every 2 hours.  - Out of bed to  chair 2 times a day   - Out of bed for meals 3 times a day  - Out of bed for toileting  - Record patient progress and toleration of activity level   Outcome: Progressing     Problem: DISCHARGE PLANNING  Goal: Discharge to home or other facility with appropriate resources  Description: INTERVENTIONS:  - Identify barriers to discharge w/patient and caregiver  - Arrange for needed discharge resources and transportation as appropriate  - Identify discharge learning needs (meds, wound care, etc.)  - Arrange for interpretive services to assist at discharge as needed  - Refer to Case Management Department for coordinating discharge planning if the patient needs post-hospital services based on physician/advanced practitioner order or complex needs related to functional status, cognitive ability, or social support system  Outcome: Progressing     Problem: Knowledge Deficit  Goal: Patient/family/caregiver demonstrates understanding of disease process, treatment plan, medications, and discharge instructions  Description: Complete learning assessment and assess knowledge base.  Interventions:  - Provide teaching at level of understanding  - Provide teaching via preferred learning methods  Outcome: Progressing

## 2025-07-23 NOTE — UTILIZATION REVIEW
Initial Clinical Review    Admission: Date/Time/Statement:   Admission Orders (From admission, onward)       Ordered        07/23/25 0519  INPATIENT ADMISSION  Once                          Orders Placed This Encounter   Procedures    INPATIENT ADMISSION     Standing Status:   Standing     Number of Occurrences:   1     Level of Care:   Critical Care [15]     Estimated length of stay:   More than 2 Midnights     Certification:   I certify that inpatient services are medically necessary for this patient for a duration of greater than two midnights. See H&P and MD Progress Notes for additional information about the patient's course of treatment.     ED Arrival Information       Expected   -    Arrival   7/22/2025 22:35    Acuity   Emergent              Means of arrival   Ambulance    Escorted by   [facility]    Service   Critical Care/ICU    Admission type   Emergency              Arrival complaint   -             Chief Complaint   Patient presents with    Seizure - Actively Seizing on Arrival     Pt presents to ED via EMS post focal /motor seizure at home. Per EMS, pt had similar incident Friday, but nothing was found to be cause and pt discharged. Per EMS, pt was walking and talking earlier today.       Initial Presentation: 50 y.o. male to ED via EMS from home  Present to ED with altered mental status and seizure-like activity. Patients son stated he was unable to wake the patient up.  Then he became somewhat responsive but not at baseline and began displaying myoclonic movements/seizure-like activity.  Seizure activity continued in the ED with twitching of his face and upper body, and was given 2 mg of Ativan with cessation of myoclonic upper body movements.  He was somnolent and continued to have tremors, but reacted to sternal rub.  He was able to follow simple commands intermittently, wiggling toes and making eye contact. Patient has chronic pain managed with opiates, he has fentanyl patch in place.    Of note, admitted 7/19 with a similar presentation. worked up for this postictal like state with evaluation by neurology and MRI brain without acute findings.   PMHX opioid withdrawal, post-ictal state, osteomyelitis of the second toe of the left foot with chronic antibiotic suppression, MRSA bacteremia, JH, and sepsis due to MRSA. migraines intermittently taking sumatriptan. DM (A1c 7.1 - 7/11/24); TBI  Admitted to IP - ICU with DX: Seizure-like activity   on exam: GCS: 14 (non-verbal status at this time); tachy; tachypnea; hypertensive; lethargic; tremors; abnormal gait; CO2 18; AG 19; Ca 10.5; Gluc 280  PLAN: recd ativan iv x1; recd ivf bolus 1L x1; monitor labs; neuro checks Q1H; Cardiopulmonary monitoring; Accuchecks with ssic; continuous pulse ox; NPO; neuro consult; f/u EEG; pain control         Date: 7/23/25    Day 2:   Limited movement in legs. Complains of pain. Pain 7-10/10. DTRs diminished in both Ues. Unable to following commands; WBC 12.55; Gluc 298; tachy; tachypnea  Plan: start ivf; recd ativan iv x1; recd Thiamine iv x1; start protonix iv; start dilaudid PCA and then dcd; recd lopressor iv x1; monitor labs; neuro checks Q1H; Cardiopulmonary monitoring; Accuchecks with ssic; continuous pulse ox; NPO; f/u EEG; f/u ST eval; pain control       NEUROLOGY CONSULT   Seizure-like activity:  Noted to have myoclonic type jerking, mostly in episodes. Patient better now. Again could be related to an underlying toxic/metabolic encephalopathy. History of opioid use. Lab work still showing he is hyperglycemic. Slight bump in WBC count. Also has slightly elevated lactic acid level. Overall clinical suspicion for seizures is relatively low, but there is previous noted history of TBI. Opting to hold off on starting any antiepileptic medications.   Plan: f/u ammonia level; f/u EEG      Date: 7/24/25     Day 3: Has surpassed a 2nd midnight with active treatments and services.  Patient very anxious. Awaiting  EEG    Plan: ivf; protonix iv; monitor labs; neuro checks; Cardiopulmonary monitoring; Accuchecks with ssic; continuous pulse ox; f/u EEG; pain / nausea control; neurology following        ED Treatment-Medication Administration from 07/22/2025 2234 to 07/23/2025 0538         Date/Time Order Dose Route Action     07/22/2025 2314 sodium chloride 0.9 % bolus 1,000 mL 1,000 mL Intravenous New Bag     07/22/2025 2313 LORazepam (ATIVAN) injection 1 mg 1 mg Intravenous Given     07/23/2025 0126 HYDROmorphone (DILAUDID) injection 1 mg 1 mg Intravenous Given            Scheduled Medications:  amLODIPine, 5 mg, Oral, Daily  [Held by provider] apixaban, 5 mg, Oral, BID  chlorhexidine, 15 mL, Mouth/Throat, Q12H DARIN  doxycycline hyclate, 100 mg, Oral, Q12H DARIN  fentaNYL, 75 mcg, Transdermal, Q72H  heparin (porcine), 5,000 Units, Subcutaneous, Q8H  hydroCHLOROthiazide, 25 mg, Oral, Daily  insulin glargine, 40 Units, Subcutaneous, HS  insulin lispro, 2-12 Units, Subcutaneous, 4x Daily (with meals and at bedtime)  lisinopril, 20 mg, Oral, Daily  metoprolol succinate, 25 mg, Oral, Daily  nicotine, 21 mg, Transdermal, Daily  oxymetazoline, 2 spray, Each Nare, Q12H DARIN  pantoprazole, 40 mg, Intravenous, Daily  pravastatin, 80 mg, Oral, Daily With Dinner  risperiDONE, 3 mg, Oral, Daily  [START ON 7/24/2025] thiamine, 100 mg, Intravenous, Daily  thiamine, 500 mg, Intravenous, Once      Continuous IV Infusions:  HYDROmorphone, , Intravenous, Continuous: started 7/23 and then dcd   lactated ringers infusion  Rate: 125 mL/hr Dose: 125 mL/hr  Freq: Continuous Route: IV  Indications of Use: IV Hydration  Last Dose: 125 mL/hr (07/23/25 1531)  Start: 07/23/25 1445       PRN Meds:  acetaminophen, 650 mg, Oral, Q6H PRN  ALPRAZolam, 1 mg, Oral, Q6H PRN: 7/23 x1   [Held by provider] butalbital-acetaminophen-caffeine, 2 tablet, Oral, Q6H PRN  LORazepam, 1 mg, Intravenous, Q4H PRN: 7/22 x1  magnesium hydroxide, 30 mL, Oral, BID PRN  metoprolol, 5  mg, Intravenous, Q6H PRN; 7/23 x1   [Held by provider] morphine, 100 mg, Oral, Q8H PRN  ondansetron, 4 mg, Intravenous, Q8H PRN: 7/23 x1; 7/24 x3  senna, 2 tablet, Oral, BID PRN  sodium chloride, 1 spray, Each Nare, Q4H PRN  SUMAtriptan, 100 mg, Oral, Q12H PRN: 7/24 x1  zolpidem, 10 mg, Oral, HS PRN  HYDROmorphone (DILAUDID) injection 1 mg, Intravenous Q3H PRN: 7/23 x3   morphine (MS CONTIN) ER tablet 100 mg, Oral Q8H PRN: 7/23 x1; 7/24 x2        LORazepam (ATIVAN) injection 2 mg  Dose: 2 mg  Freq: Once Route: IV  Start: 07/23/25 0545 End: 07/23/25 0549      ED Triage Vitals   Temperature Pulse Respirations Blood Pressure SpO2 Pain Score   07/22/25 2238 07/22/25 2238 07/22/25 2238 07/22/25 2238 07/22/25 2238 07/23/25 0126   98.1 °F (36.7 °C) (!) 112 20 (!) 162/117 97 % Med Not Given for Pain - for MAR use only     Weight (last 2 days)       Date/Time Weight    07/23/25 0604 103 (226.41)    07/23/25 0600 103 (226.41)            Vital Signs (last 3 days)       Date/Time Temp Pulse Resp BP MAP (mmHg) SpO2 O2 Device Patient Position - Orthostatic VS Pain    07/24/25 2330 -- -- -- -- -- -- -- -- No Pain    07/24/25 2126 -- -- -- -- -- -- -- -- 10 - Worst Possible Pain    07/24/25 2000 97.2 °F (36.2 °C) 92 14 141/85 95 98 % -- -- --    07/24/25 1717 -- -- -- -- -- -- -- -- 10 - Worst Possible Pain    07/24/25 1600 -- 82 11 151/87 116 97 % -- -- --    07/24/25 1431 -- -- -- -- -- -- -- -- 10 - Worst Possible Pain    07/24/25 1207 -- 105 9 135/94 101 98 % -- -- 9    07/24/25 1200 -- 94 20 135/94 101 99 % -- -- --    07/24/25 0900 97.6 °F (36.4 °C) 86 9 -- -- 100 % -- -- 10 - Worst Possible Pain    07/24/25 0846 -- -- -- -- -- 98 % None (Room air) -- --    07/24/25 0805 -- -- -- -- -- -- -- -- 8    07/24/25 0800 97.6 °F (36.4 °C) 121 17 146/111 114 97 % -- -- --    07/24/25 0700 -- 99 24 -- -- -- -- -- --    07/24/25 0614 -- -- -- -- -- -- -- -- 10 - Worst Possible Pain    07/24/25 0600 -- 90 16 -- -- 98 % -- -- --     07/24/25 0500 -- 115 12 -- -- 95 % -- -- --    07/24/25 0400 -- 115 20 146/98 111 96 % -- -- --    07/24/25 0300 -- 97 15 -- -- -- -- -- --    07/24/25 0200 -- 98 15 -- -- 95 % -- -- --    07/24/25 0100 -- 110 17 -- -- 96 % -- -- --    07/24/25 0000 -- 100 18 131/88 101 97 % -- -- --    07/23/25 2300 -- 110 22 -- -- 98 % -- -- --    07/23/25 2231 -- -- -- -- -- -- -- -- 10 - Worst Possible Pain    07/23/25 2227 -- -- -- -- -- -- -- -- 10 - Worst Possible Pain    07/23/25 2200 -- 96 13 -- -- 99 % -- -- --    07/23/25 2100 -- 107 15 -- -- 98 % -- -- --    07/23/25 2000 97.4 °F (36.3 °C) 120 13 144/92 110 98 % None (Room air) -- 7    07/23/25 1803 -- 107 18 130/92 118 -- -- -- 9    07/23/25 1800 98 °F (36.7 °C) 99 18 130/92 118 96 % None (Room air) -- --    07/23/25 1700 -- 86 20 129/80 93 96 % None (Room air) -- --    07/23/25 1600 -- 90 19 143/108 116 97 % None (Room air) -- --    07/23/25 1500 -- 117 18 139/81 117 96 % None (Room air) -- --    07/23/25 1419 -- -- -- -- -- -- -- -- 7    07/23/25 1400 98 °F (36.7 °C) 137 20 125/80 136 98 % None (Room air) -- --    07/23/25 1308 -- 94 17 -- -- 98 % -- -- --    07/23/25 1300 98.2 °F (36.8 °C) 82 18 136/85 101 97 % None (Room air) -- --    07/23/25 1235 -- 102 18 -- -- 92 % -- -- 10 - Worst Possible Pain    07/23/25 1208 98 °F (36.7 °C) -- -- -- -- -- None (Room air) -- 10 - Worst Possible Pain    07/23/25 1200 -- 130 38 146/103 126 97 % -- -- 5    07/23/25 1100 98 °F (36.7 °C) 121 29 143/98 131 98 % None (Room air) -- --    07/23/25 1059 -- -- -- -- -- 96 % None (Room air) -- --    07/23/25 1026 -- -- -- -- -- -- None (Room air) -- --    07/23/25 1000 97.7 °F (36.5 °C) 133 21 131/115 120 98 % None (Room air) -- --    07/23/25 0900 -- 103 21 127/80 101 97 % None (Room air) -- 10 - Worst Possible Pain    07/23/25 0830 98 °F (36.7 °C) 102 28 140/96 105 96 % None (Room air) -- --    07/23/25 0800 -- 144 55 140/96 51 -- -- -- 10 - Worst Possible Pain    07/23/25 0700 -- 130  22 118/89 98 96 % -- -- --    07/23/25 0606 -- -- -- -- -- -- None (Room air) -- --    07/23/25 0604 98.7 °F (37.1 °C) 129 21 135/100 -- -- -- -- --    07/23/25 0600 -- 141 9 135/106 123 97 % None (Room air) -- --    07/23/25 0504 -- 149 31 154/122 140 97 % -- -- --    07/23/25 0434 -- 130 10 154/100 115 96 % -- -- --    07/23/25 0404 -- 140 44 165/102 113 97 % -- -- --    07/23/25 0356 98.2 °F (36.8 °C) -- 26 -- -- -- -- -- --    07/23/25 0341 -- 129 -- 152/103 122 -- -- -- --    07/23/25 0311 -- 114 -- 164/98 115 -- -- -- --    07/23/25 0241 -- 122 -- 142/102 118 -- -- -- --    07/23/25 0200 -- 105 -- 147/92 108 99 % -- -- --    07/23/25 0130 -- 92 20 142/88 103 98 % -- -- --    07/23/25 0126 -- -- -- -- -- -- -- -- Med Not Given for Pain - for MAR use only    07/23/25 0045 -- 124 18 -- -- 98 % -- -- --    07/23/25 0005 -- 117 15 -- -- 97 % -- -- --    07/23/25 0000 -- 117 -- 175/105 130 98 % -- -- --    07/22/25 2321 -- 107 26 162/108 -- 99 % None (Room air) Lying --    07/22/25 2238 98.1 °F (36.7 °C) 112 20 162/117 -- 97 % None (Room air) Lying --              Pertinent Labs/Diagnostic Test Results:   Radiology:  CT head without contrast   Final Interpretation by Domenica Montgomery MD (07/23 0010)      No acute intracranial abnormality.                  Workstation performed: [id number]         XR chest 1 view portable   ED Interpretation by Dallas Duckworth MD (07/22 2359)   No active disease      Final Interpretation by Luli Alvares MD (07/23 0828)      No acute cardiopulmonary disease.            Workstation performed: [id number]           Cardiology:  ECG 12 lead   Final Result by Analy Raya MD (07/23 1744)   Sinus tachycardia   Otherwise normal ECG   When compared with ECG of 22-Jul-2025 23:23, (Unconfirmed)   premature ventricular complexes are no longer present   Confirmed by Analy Raya ([id number]) on 7/23/2025 5:44:27 PM      ECG 12 lead    by Interface, Ris Results In (07/22 2323)            Results from last 7 days   Lab Units 07/24/25  0416 07/23/25  0530 07/22/25  2259   WBC Thousand/uL 5.89 12.55* 7.57   HEMOGLOBIN g/dL 11.5* 14.0 14.3   HEMATOCRIT % 33.0* 39.8 42.0   PLATELETS Thousands/uL 194 262 257   TOTAL NEUT ABS Thousands/µL 3.46  --  6.97         Results from last 7 days   Lab Units 07/24/25  0416 07/23/25  0530 07/22/25  2259   SODIUM mmol/L 136 139 136   POTASSIUM mmol/L 3.4* 3.8 4.7   CHLORIDE mmol/L 102 103 99   CO2 mmol/L 20* 17* 18*   ANION GAP mmol/L 14 19* 19*   BUN mg/dL 12 20 19   CREATININE mg/dL 0.92 1.09 1.17   EGFR ml/min/1.73sq m 95 78 71   CALCIUM mg/dL 8.8 9.4 10.5*   MAGNESIUM mg/dL 1.6 1.9 2.2   PHOSPHORUS mg/dL 2.9 2.6  --      Results from last 7 days   Lab Units 07/22/25  2259   AST U/L 37   ALT U/L 41*   ALK PHOS U/L 108   TOTAL PROTEIN g/dL 6.7   ALBUMIN g/dL 4.1   TOTAL BILIRUBIN mg/dL 0.51     Results from last 7 days   Lab Units 07/24/25  2121 07/24/25  1945 07/24/25  1610 07/24/25  1104 07/24/25  0746 07/23/25  2137 07/23/25  1939 07/23/25  1652 07/23/25  1126 07/23/25  0516   POC GLUCOSE mg/dl 235* 245* 158* 194* 268* 222* 218* 196* 278* 265*     Results from last 7 days   Lab Units 07/24/25  0416 07/23/25  0530 07/22/25  2259   GLUCOSE RANDOM mg/dL 196* 298* 280*        Beta- Hydroxybutyrate   Date Value Ref Range Status   03/22/2024 1.31 (H) 0.02 - 0.27 mmol/L Final         Results from last 7 days   Lab Units 07/23/25  0530   CK TOTAL U/L 254*        Results from last 7 days   Lab Units 07/23/25  0530   PROTIME seconds 13.7   INR  1.05   PTT seconds 27     Results from last 7 days   Lab Units 07/23/25  0530   TSH 3RD GENERATON uIU/mL 0.364     Results from last 7 days   Lab Units 07/23/25  0530   PROCALCITONIN ng/ml 0.06     Results from last 7 days   Lab Units 07/23/25  1158 07/23/25  0530   LACTIC ACID mmol/L 2.1* 2.2*        Results from last 7 days   Lab Units 07/23/25  1223   AMPH/METH  Negative   BARBITURATE UR  Positive*   BENZODIAZEPINE UR  Positive*    COCAINE UR  Negative   METHADONE URINE  Negative   OPIATE UR  Positive*   PCP UR  Negative   THC UR  Positive*     Results from last 7 days   Lab Units 07/22/25  2259   ETHANOL LVL mg/dL <10        Past Medical History[1]  Present on Admission:   Seizure-like activity (HCC)   Altered mental status, unspecified   Chronic, continuous use of opioids   Type 2 diabetes mellitus with renal complication (HCC)   TBI (traumatic brain injury) (HCC)   Anxiety   Ambulatory dysfunction   Essential hypertension   MSSA bacteremia   A-fib with RVR (HCC)   Cardiomyopathy (HCC)   (Resolved) Delirium due to multiple etiologies      Admitting Diagnosis: Somnolence [R40.0]  Anxiety [F41.9]  Opiate dependence (HCC) [F11.20]  Altered mental state [R41.82]  Hyperglycemia [R73.9]  Seizure-like activity (HCC) [R56.9]  AMS (altered mental status) [R41.82]  Traumatic brain injury, with unknown loss of consciousness status, subsequent encounter [S06.9XAD]  Age/Sex: 50 y.o. male    Network Utilization Review Department  ATTENTION: Please call with any questions or concerns to [phone] and carefully listen to the prompts so that you are directed to the right person. All voicemails are confidential.   For Discharge needs, contact Care Management DC Support Team at [phone] opt. [phone]  Send all requests for admission clinical reviews, approved or denied determinations and any other requests to dedicated fax number below belonging to the campus where the patient is receiving treatment. List of dedicated fax numbers for the Facilities:  FACILITY NAME UR FAX NUMBER   ADMISSION DENIALS (Administrative/Medical Necessity) [phone]   DISCHARGE SUPPORT TEAM (NETWORK) [phone]   PARENT CHILD HEALTH (Maternity/NICU/Pediatrics) [phone]   [facility] [phone]   [facility] [phone]   [facility] [phone]   [facility]  [phone]   [facility] [phone]   [facility] [phone]   [facility] [phone]   [facility] [phone]   [facility] [phone]   [facility] [phone]   [facility] [phone]   [facility] [phone]   [facility] [phone]   --           [1]   Past Medical History:  Diagnosis Date    Bed sore, stage 1     Diabetes mellitus (HCC)     Hypertension     TBI (traumatic brain injury) (HCC)

## 2025-07-23 NOTE — H&P
H&P - Critical Care/ICU   Name: Armen Llanos 50 y.o. male I MRN: [id number]  Unit/Bed#: [facility] I Date of Admission: 7/22/2025   Date of Service: 7/23/2025 I Hospital Day: 0       Assessment & Plan  Seizure-like activity (HCC)  2 mg Ativan IV push with significant improvement, residual tremulous movements appear more myoclonic.  Recent MRI and CT head today without acute findings.  Will consult neurology for continued evaluation of recurrence of this altered mental state/postictal state.  Upon arrival to ICU patient was alert and verbally responding with name and complaints of pain.  Initial GCS 14 due to nonverbal status at the time.  Also has history of migraines intermittently taking sumatriptan.  - Consult to neurology  - EEG ordered  - Every hour neurochecks    Chronic, continuous use of opioids  Prior admission patient had lapse in fentanyl patch, acute opiate withdrawal suspected to be the source of postictal state at that time.  Patient has had same fentanyl patch in place that was applied in the hospital 7/20.  Also has MS Contin sustained-release scheduled twice daily 100mg with as needed 60 mg doses outpatient, unknown how many doses patient has taken/or missed.  Unable to swallow in current state.  - Continue fentanyl patch  - Dilaudid IV push as needed severe pain while NPO    Type 2 diabetes mellitus with renal complication (Formerly [facility])  Lab Results   Component Value Date    HGBA1C 7.1 (H) 07/11/2024       Recent Labs     07/23/25  0516   POCGLU 265*       Blood Sugar Average: Last 72 hrs:  (P) 265  - Continue home Lantus 40 units  -Sliding scale insulin coverage with 4 times daily BGM  TBI (traumatic brain injury) (Formerly [facility])  History of TBI, anxiety, depression, insomnia.  Currently alert and anxious appearing, repeating words and phrases but able to be calmed intermittently by wife.  Did improve following Ativan administration.  Patient is maintained outpatient on clonazepam, risperidone, zolpidem, melatonin.    - 2 mg IVP Ativan for severe anxiety or seizure-like activity-suspect altered mental status may be related to severe anxiety attacks  Ambulatory dysfunction  Long-term orthopedic issues with bilateral knees, per wife patient has had upwards of 20 surgeries in lifetime.  Chronic pain and difficulty with ambulation, multiple inpatient rehab stays.  - Consult to PT/OT when appropriate    Altered mental status, unspecified  - Assessment and plans as above      Disposition: Critical care    History of Present Illness   Armen Llanos is a 50 y.o. who presents to the ED with altered mental status and seizure-like activity.  He has a history of opioid withdrawal, post-ictal state, osteomyelitis of the second toe of the left foot with chronic antibiotic suppression, MRSA bacteremia, JH, and sepsis due to MRSA.  Recently admitted 7/19 with a similar presentation.  Today his wife stated his son called her as he was unable to wake the patient up.  Then he became somewhat responsive but not at baseline and began displaying myoclonic movements/seizure-like activity.  Seizure activity continued in the ED with twitching of his face and upper body, and was given 2 mg of Ativan with cessation of myoclonic upper body movements.  He was somnolent and continued to have tremors, but reacted to sternal rub.  He was able to follow simple commands intermittently, wiggling toes and making eye contact.  Earlier this week he was worked up for this postictal like state with evaluation by neurology and MRI brain without acute findings.  Today CT head without contrast was negative for acute findings.  Patient has chronic pain managed with opiates, he has fentanyl patch in place.    History obtained from spouse and chart review.  Review of Systems: Review of Systems not obtainable due to Clinical Condition, Altered mental status    Historical Information   Past Medical History:  No date: Bed sore, stage 1  No date: Diabetes mellitus  (HCC)  No date: Hypertension Past Surgical History:  4/1/2024: IR PICC PLACEMENT SINGLE LUMEN  No date: KNEE SURGERY  3/24/2024: LA REVJ TOTAL KNEE ARTHRP W/WO ALGRFT 1 COMPONENT; Right      Comment:  Procedure: ARTHROPLASTY KNEE TOTAL REVISION, POLY                EXCHANGE;  Surgeon: Tao Washington DO;  Location:                 MAIN OR;  Service: Orthopedics  3/28/2024: TOE AMPUTATION; Left      Comment:  Procedure: AMPUTATION TOE 2nd;  Surgeon: Rossy Toussaint DPM;  Location:  MAIN OR;  Service: Podiatry   Current Outpatient Medications   Medication Instructions    Accu-Chek Softclix Lancets lancets CHECK BLOOD SUGAR BEFORE MEALS AND BEDTIME.    Alcohol Swabs (B-D SINGLE USE SWABS REGULAR) PADS USE AS DIRECTED    amLODIPine (NORVASC) 5 mg, Oral, 2 times daily    apixaban (ELIQUIS) 5 mg, Oral, 2 times daily    Blood Glucose Monitoring Suppl (Accu-Chek Guide) w/Device KIT Use as directed on package    cholecalciferol (VITAMIN D3) 250 MCG (20090 UT) capsule Vitamin D3    clonazePAM (KlonoPIN) 1 mg tablet 0.5 tablets, 2 times daily PRN    doxycycline (DORYX) 100 mg, Oral, 2 times daily    esomeprazole (NEXIUM) 20 mg, Oral, 2 times daily before meals    fentaNYL (DURAGESIC) 75 mcg/hr 1 patch, Transdermal, Every 3 days    glucose blood (OneTouch Verio) test strip May substitute brand based on insurance coverage. Check glucose ACHS.    insulin lispro (HUMALOG KWIKPEN) 13 Units, Subcutaneous, 3 times daily with meals    Insulin Pen Needle (BD Pen Needle Carisa 2nd Gen) 32G X 4 MM MISC For use with insulin pen. Pharmacy may dispense brand covered by insurance.    Lantus SoloStar 100 units/mL SOPN Pt reports taking 40 units daily    lisinopril-hydrochlorothiazide (PRINZIDE,ZESTORETIC) 20-25 MG per tablet 1 tablet, Oral, Daily    melatonin 3 mg, Oral, Daily at bedtime    metoprolol succinate (TOPROL-XL) 25 mg, Oral, Daily    morphine (MS CONTIN) 60 mg, Every 12 hours PRN    multivitamin (THERAGRAN)  TABS 1 tablet, Oral, Daily    ondansetron (ZOFRAN) 8 mg tablet TAKE 1 TABLET (8 MG) BY MOUTH 3 TIMES PER DAY AS NEEDED    polyethylene glycol (MIRALAX) 17 g, Oral, 2 times daily    risperiDONE (RISPERDAL) 3 mg, Oral, Daily at bedtime    simvastatin (ZOCOR) 40 mg, Oral, Daily after dinner    zolpidem (AMBIEN) 10 mg, Oral, Daily at bedtime, Per pt - pt takes scheduled    Allergies[1]   Social History[2] Family History[3]       Objective :                   Vitals I/O      Most Recent Min/Max in 24hrs   Temp 98.2 °F (36.8 °C) Temp  Min: 98.1 °F (36.7 °C)  Max: 98.2 °F (36.8 °C)   Pulse (!) 149 Pulse  Min: 92  Max: 149   Resp (!) 31 Resp  Min: 10  Max: 44   BP (!) 154/122 BP  Min: 142/102  Max: 175/105   O2 Sat 97 % SpO2  Min: 96 %  Max: 99 %      Intake/Output Summary (Last 24 hours) at 7/23/2025 0523  Last data filed at 7/22/2025 2314  Gross per 24 hour   Intake 1000 ml   Output --   Net 1000 ml       Diet Regular; Regular House    Invasive Monitoring           Physical Exam   Physical Exam  Vitals and nursing note reviewed.   Eyes:      Pupils: Pupils are equal, round, and reactive to light.      Comments: Intermittent rapid eye movement Skin:     General: Skin is warm and dry.      Capillary Refill: Capillary refill takes less than 2 seconds.   HENT:      Head: Normocephalic.   Neck:      Vascular: No JVD.   Cardiovascular:      Rate and Rhythm: Regular rhythm. Tachycardia present.      Pulses: Normal pulses.      Heart sounds: Normal heart sounds.   Musculoskeletal:      Right lower leg: No edema.      Left lower leg: No edema.   Abdominal: General: There is no distension.      Palpations: Abdomen is soft.      Tenderness: There is no abdominal tenderness.   Constitutional:       General: He is in acute distress.      Appearance: He is well-developed and well-nourished.   Pulmonary:      Effort: Tachypnea present. No accessory muscle usage, respiratory distress or accessory muscle usage.      Breath sounds: Normal  breath sounds and air entry.   Neurological:      Mental Status: He is lethargic.      GCS: GCS eye subscore is 4. GCS verbal subscore is 4. GCS motor subscore is 6.      Cranial Nerves: No facial asymmetry.      Motor: Tremor and seizure activity.      Gait: Gait abnormal.          Diagnostic Studies        Lab Results: I have reviewed the following results:     Medications:  Scheduled PRN   amLODIPine, 5 mg, Daily  chlorhexidine, 15 mL, Q12H DARIN  cyclobenzaprine, 10 mg, TID  doxycycline hyclate, 100 mg, Q12H DARIN  fentaNYL, 75 mcg, Q72H  heparin (porcine), 5,000 Units, Q8H  hydroCHLOROthiazide, 25 mg, Daily  insulin glargine, 40 Units, HS  insulin lispro, 2-12 Units, 4x Daily (with meals and at bedtime)  lisinopril, 20 mg, Daily  metoprolol succinate, 25 mg, Daily  nicotine, 21 mg, Daily  pantoprazole, 40 mg, Daily  pravastatin, 80 mg, Daily With Dinner  risperiDONE, 3 mg, Daily      acetaminophen, 650 mg, Q6H PRN  ALPRAZolam, 1 mg, Q6H PRN  butalbital-acetaminophen-caffeine, 2 tablet, Q6H PRN  magnesium hydroxide, 30 mL, BID PRN  morphine, 100 mg, Q8H PRN  ondansetron, 4 mg, Q8H PRN  senna, 2 tablet, BID PRN  sodium chloride, 1 spray, Q4H PRN  SUMAtriptan, 100 mg, Q12H PRN  zolpidem, 10 mg, HS PRN       Continuous          Labs:   CBC    Recent Labs     07/22/25 2259   WBC 7.57   HGB 14.3   HCT 42.0        BMP    Recent Labs     07/22/25 2259   SODIUM 136   K 4.7   CL 99   CO2 18*   AGAP 19*   BUN 19   CREATININE 1.17   CALCIUM 10.5*       Coags    No recent results     Additional Electrolytes  Recent Labs     07/22/25  2259   MG 2.2          Blood Gas    No recent results  No recent results LFTs  Recent Labs     07/22/25 2259   ALT 41*   AST 37   ALKPHOS 108   ALB 4.1   TBILI 0.51       Infectious  No recent results  Glucose  Recent Labs     07/22/25 2259   GLUC 280*               [1]   Allergies  Allergen Reactions    Cephalosporins Hives    Fish-Derived Products - Food Allergy Swelling    Penicillins  Hives   [2]   Social History  Tobacco Use    Smoking status: Former     Current packs/day: 0.00     Types: Cigarettes     Quit date: 2011     Years since quittin.6   Vaping Use    Vaping status: Unknown   Substance Use Topics    Alcohol use: Not Currently     Comment: unknown    Drug use: Yes     Types: Marijuana, Fentanyl, Morphine, Other     Comment: pt dependant on ambien and flexeril per interview.   [3] No family history on file.

## 2025-07-23 NOTE — CONSULTS
Consultation - Neurology   Name: Armen Llanos 50 y.o. male I MRN: [id number]  Unit/Bed#: ICU 07 I Date of Admission: 7/22/2025   Date of Service: 7/23/2025 I Hospital Day: 0   Inpatient consult to Neurology  Consult performed by: Galina Botello MD  Consult ordered by: NATA Oliva        Physician Requesting Evaluation: George Chavis MD   Reason for Evaluation / Principal Problem: Seizure like activity    Assessment & Plan  Seizure-like activity (HCC)    Noted to have myoclonic type jerking, mostly in episodes.  Patient better now.  Again could be related to an underlying toxic/metabolic encephalopathy.  History of opioid use.  Lab work still showing he is hyperglycemic.  LFTs are normal, but consider checking ammonia level.  Slight bump in WBC count.  Also has slightly elevated lactic acid level. Overall clinical suspicion for seizures is relatively low, but there is previous noted history of TBI.  Will obtain EEG, but will defer testing until pain is better controlled and he is less distressed.  Opting to hold off on starting any antiepileptic medications. Discussed with wife.    Chronic, continuous use of opioids  Management per primary team   TBI (traumatic brain injury) (HCC)  Prior MRI as normal. Limited exam, but is non focal.   Anxiety    Ambulatory dysfunction  PT evaluation when pain better controlled.   Altered mental status, unspecified  More awake and interactive now.       Recommendations for outpatient neurological follow up have yet to be determined.    History of Present Illness   Armen Llanos is a 50 y.o. (handedness not determinable) male who presents with altered mental status and jerking of extremities. The patient has previous history of substance use, diabetes, hypertension, TBI, atrial fibrillation, cardiomyopathy.  Brought to the hospital with concern for altered mental status and jerking of the extremities.  Patient was discharged from the hospital the day prior but  "something somewhat similar symptoms. He was seen by inpatient neurology and symptoms were felt to be due to withdrawal and encephalopathy.    Known history of TBI and migraines. Has taken sumatriptan. Patient also has a history of osteomyelitis of foot/toe. On doxycyline. MRI brain on 7/20 was unremarkable. MRI brain was done on 7/20.  Report reviewed and it was normal. Reviewed neurology consult note from 7/19.  Symptoms were felt to be related to metabolic encephalopathy.  Suggestion was made for outpatient EEG.  MRI brain was done on 7/20.  Report reviewed and it was normal.    Patient seen in his room. Wife at bedside. She reports that he has been having intermittent stiffness of arms/hands and then jerking movements. Not always complete LoC with these. Afterwards he was a bit less responsive. There is no prior history of seizures.    Review of Systems   As above. Overall limited as he is in distress due to pain.     Historical Information   Past Medical History[1]  Past Surgical History[2]  Social History[3]  E-Cigarette/Vaping    E-Cigarette Use Unknown If Ever Used     Cartridges/Day \"250 puffs a day\"     Comments does not want to quit      E-Cigarette/Vaping Substances    Nicotine Yes     THC Yes     CBD Yes     Flavoring Yes     Other No     Unknown No      Family History[4]  Objective :  Temp:  [97.7 °F (36.5 °C)-98.7 °F (37.1 °C)] 97.7 °F (36.5 °C)  HR:  [] 133  BP: (131-175)/() 131/115  Resp:  [9-68] 68  SpO2:  [96 %-99 %] 98 %  O2 Device: None (Room air)    Physical Exam  Constitutional:       General: He is in acute distress (complains of pain).     Eyes:      Extraocular Movements: Extraocular movements intact.      Pupils: Pupils are equal, round, and reactive to light.       Neurological:      Mental Status: He is alert.      Cranial Nerves: No dysarthria.     Neurological Exam  Mental Status  Alert. Oriented only to person and place. no dysarthria present. Language is fluent with no " aphasia.    Cranial Nerves  CN II: Visual fields full to confrontation.  CN III, IV, VI: Extraocular movements intact bilaterally. Pupils equal round and reactive to light bilaterally.  CN V: Facial sensation is normal.  CN VII: Full and symmetric facial movement.  CN IX, X: Palate elevates symmetrically    Motor  Normal muscle bulk throughout. No abnormal involuntary movements. No noted myoclonus or asterixis.   Strength is 5/5 in all four extremities except as noted.  Limited movement in legs. Complains of pain. Withdraws on plantar testing. .    Sensory  Light touch is normal in upper and lower extremities.     Reflexes  DTRs diminished in both Ues. No hyperreflexia. .    Coordination  Right: Finger-to-nose normal.Left: Finger-to-nose normal.            Lab Results: I have reviewed the following results:  Recent Results (from the past 24 hours)   CBC and differential    Collection Time: 07/22/25 10:59 PM   Result Value Ref Range    WBC 7.57 4.31 - 10.16 Thousand/uL    RBC 4.87 3.88 - 5.62 Million/uL    Hemoglobin 14.3 12.0 - 17.0 g/dL    Hematocrit 42.0 36.5 - 49.3 %    MCV 86 82 - 98 fL    MCH 29.4 26.8 - 34.3 pg    MCHC 34.0 31.4 - 37.4 g/dL    RDW 12.3 11.6 - 15.1 %    MPV 12.0 8.9 - 12.7 fL    Platelets 257 149 - 390 Thousands/uL    nRBC 0 /100 WBCs    Segmented % 92 (H) 43 - 75 %    Immature Grans % 1 0 - 2 %    Lymphocytes % 6 (L) 14 - 44 %    Monocytes % 1 (L) 4 - 12 %    Eosinophils Relative 0 0 - 6 %    Basophils Relative 0 0 - 1 %    Absolute Neutrophils 6.97 1.85 - 7.62 Thousands/µL    Absolute Immature Grans 0.04 0.00 - 0.20 Thousand/uL    Absolute Lymphocytes 0.47 (L) 0.60 - 4.47 Thousands/µL    Absolute Monocytes 0.08 (L) 0.17 - 1.22 Thousand/µL    Eosinophils Absolute 0.00 0.00 - 0.61 Thousand/µL    Basophils Absolute 0.01 0.00 - 0.10 Thousands/µL   Comprehensive metabolic panel    Collection Time: 07/22/25 10:59 PM   Result Value Ref Range    Sodium 136 136 - 145 mmol/L    Potassium 4.7 3.5 - 5.1  mmol/L    Chloride 99 98 - 107 mmol/L    CO2 18 (L) 22 - 29 mmol/L    ANION GAP 19 (H) 7 - 16 mmol/L    BUN 19 8 - 23 mg/dL    Creatinine 1.17 0.70 - 1.20 mg/dL    Glucose 280 (H) 65 - 140 mg/dL    Calcium 10.5 (H) 8.6 - 10.2 mg/dL    AST 37 5 - 40 U/L    ALT 41 (H) 5 - 33 U/L    Alkaline Phosphatase 108 40 - 130 U/L    Total Protein 6.7 6.4 - 8.3 g/dL    Albumin 4.1 3.5 - 5.2 g/dL    Total Bilirubin 0.51 0.20 - 1.20 mg/dL    eGFR 71 ml/min/1.73sq m   Magnesium    Collection Time: 07/22/25 10:59 PM   Result Value Ref Range    Magnesium 2.2 1.6 - 2.6 mg/dL   Troponin T 0hr ([facility] ONLY)    Collection Time: 07/22/25 10:59 PM   Result Value Ref Range    hs Jonny 0hr 34 (H) 6 - 22 ng/L   Ethanol    Collection Time: 07/22/25 10:59 PM   Result Value Ref Range    Ethanol Lvl <10 <10 mg/dL   ECG 12 lead    Collection Time: 07/22/25 11:23 PM   Result Value Ref Range    Ventricular Rate 110 BPM    Atrial Rate 110 BPM    IL Interval 160 ms    QRSD Interval 82 ms    QT Interval 334 ms    QTC Interval 452 ms    P Axis 56 degrees    QRS Axis 19 degrees    T Wave Axis 47 degrees   TROPONIN T 2hr ([facility] ONLY)    Collection Time: 07/23/25  2:13 AM   Result Value Ref Range    hs Jonny 2hr 35 (H) 6 - 22 ng/L    Delta 2hr hsTnt 1 (L) 6 - 22 ng/L   Fingerstick Glucose (POCT)    Collection Time: 07/23/25  5:16 AM   Result Value Ref Range    POC Glucose 265 (H) 65 - 140 mg/dl   CBC and differential    Collection Time: 07/23/25  5:30 AM   Result Value Ref Range    WBC 12.55 (H) 4.31 - 10.16 Thousand/uL    RBC 4.70 3.88 - 5.62 Million/uL    Hemoglobin 14.0 12.0 - 17.0 g/dL    Hematocrit 39.8 36.5 - 49.3 %    MCV 85 82 - 98 fL    MCH 29.8 26.8 - 34.3 pg    MCHC 35.2 31.4 - 37.4 g/dL    RDW 12.5 11.6 - 15.1 %    MPV 12.2 8.9 - 12.7 fL    Platelets 262 149 - 390 Thousands/uL   Basic metabolic panel    Collection Time: 07/23/25  5:30 AM   Result Value Ref Range    Sodium 139 136 - 145 mmol/L    Potassium 3.8 3.5 - 5.1 mmol/L    Chloride 103  98 - 107 mmol/L    CO2 17 (L) 22 - 29 mmol/L    ANION GAP 19 (H) 7 - 16 mmol/L    BUN 20 8 - 23 mg/dL    Creatinine 1.09 0.70 - 1.20 mg/dL    Glucose 298 (H) 65 - 140 mg/dL    Calcium 9.4 8.6 - 10.2 mg/dL    eGFR 78 ml/min/1.73sq m   Lactic acid, plasma (w/reflex if result > 2.0)    Collection Time: 07/23/25  5:30 AM   Result Value Ref Range    LACTIC ACID 2.2 (H) 0.5 - 2.0 mmol/L   Procalcitonin    Collection Time: 07/23/25  5:30 AM   Result Value Ref Range    Procalcitonin 0.06 0.02 - 0.15 ng/ml   Magnesium    Collection Time: 07/23/25  5:30 AM   Result Value Ref Range    Magnesium 1.9 1.6 - 2.6 mg/dL   Phosphorus    Collection Time: 07/23/25  5:30 AM   Result Value Ref Range    Phosphorus 2.6 2.5 - 4.5 mg/dL   Protime-INR    Collection Time: 07/23/25  5:30 AM   Result Value Ref Range    Protime 13.7 12.3 - 15.0 seconds    INR 1.05 0.85 - 1.19   APTT    Collection Time: 07/23/25  5:30 AM   Result Value Ref Range    PTT 27 23 - 34 seconds   CK    Collection Time: 07/23/25  5:30 AM   Result Value Ref Range    Total  (H) 20 - 200 U/L   ECG 12 lead    Collection Time: 07/23/25  6:46 AM   Result Value Ref Range    Ventricular Rate 137 BPM    Atrial Rate 137 BPM    DE Interval 142 ms    QRSD Interval 78 ms    QT Interval 290 ms    QTC Interval 437 ms    P Axis 46 degrees    QRS Axis 40 degrees    T Wave Axis 36 degrees   Fingerstick Glucose (POCT)    Collection Time: 07/23/25 11:26 AM   Result Value Ref Range    POC Glucose 278 (H) 65 - 140 mg/dl       Recent Labs     07/23/25  0530   WBC 12.55*   HGB 14.0   HCT 39.8      SODIUM 139   K 3.8      CO2 17*   BUN 20   CREATININE 1.09   GLUC 298*   MG 1.9   PHOS 2.6     Imaging Results Review: I reviewed radiology reports from this admission including: CT head.      VTE Prophylaxis: Heparin           [1]   Past Medical History:  Diagnosis Date    Bed sore, stage 1     Diabetes mellitus (HCC)     Hypertension     TBI (traumatic brain injury) (HCC)    [2]    Past Surgical History:  Procedure Laterality Date    IR PICC PLACEMENT SINGLE LUMEN  2024    KNEE SURGERY      MO REVJ TOTAL KNEE ARTHRP W/WO ALGRFT 1 COMPONENT Right 3/24/2024    Procedure: ARTHROPLASTY KNEE TOTAL REVISION, POLY EXCHANGE;  Surgeon: Tao Washington DO;  Location:  MAIN OR;  Service: Orthopedics    TOE AMPUTATION Left 3/28/2024    Procedure: AMPUTATION TOE 2nd;  Surgeon: Rossy Toussaint DPM;  Location:  MAIN OR;  Service: Podiatry   [3]   Social History  Tobacco Use    Smoking status: Former     Current packs/day: 0.00     Types: Cigarettes     Quit date: 2011     Years since quittin.6   Vaping Use    Vaping status: Unknown   Substance and Sexual Activity    Alcohol use: Not Currently     Comment: unknown    Drug use: Yes     Types: Marijuana, Fentanyl, Morphine, Other     Comment: pt dependant on ambien and flexeril per interview.    Sexual activity: Not Currently   [4] No family history on file.

## 2025-07-23 NOTE — ASSESSMENT & PLAN NOTE
"Lab Results   Component Value Date    HGBA1C 7.1 (H) 07/11/2024       No results for input(s): \"POCGLU\" in the last 72 hours.    Blood Sugar Average: Last 72 hrs:      "

## 2025-07-23 NOTE — ASSESSMENT & PLAN NOTE
Prior admission patient had lapse in fentanyl patch, acute opiate withdrawal suspected to be the source of postictal state at that time.  Patient has had same fentanyl patch in place that was applied in the hospital 7/20.  Also has MS Contin sustained-release scheduled twice daily 100mg with as needed 60 mg doses outpatient, unknown how many doses patient has taken/or missed.  Unable to swallow in current state.  - Continue fentanyl patch  - Dilaudid IV push as needed severe pain while NPO

## 2025-07-23 NOTE — ASSESSMENT & PLAN NOTE
2 mg Ativan IV push with significant improvement, residual tremulous movements appear more myoclonic.  Recent MRI and CT head today without acute findings.  Will consult neurology for continued evaluation of recurrence of this altered mental state/postictal state.  Upon arrival to ICU patient was alert and verbally responding with name and complaints of pain.  Initial GCS 14 due to nonverbal status at the time.  Also has history of migraines intermittently taking sumatriptan.  - Consult to neurology  - EEG ordered  - Every hour neurochecks

## 2025-07-23 NOTE — ASSESSMENT & PLAN NOTE
History of TBI, anxiety, depression, insomnia.  Currently alert and anxious appearing, repeating words and phrases but able to be calmed intermittently by wife.  Did improve following Ativan administration.  Patient is maintained outpatient on clonazepam, risperidone, zolpidem, melatonin.   - 2 mg IVP Ativan for severe anxiety or seizure-like activity-suspect altered mental status may be related to severe anxiety attacks

## 2025-07-23 NOTE — ASSESSMENT & PLAN NOTE
Long-term orthopedic issues with bilateral knees, per wife patient has had upwards of 20 surgeries in lifetime.  Chronic pain and difficulty with ambulation, multiple inpatient rehab stays.  - Consult to PT/OT when appropriate

## 2025-07-23 NOTE — SPEECH THERAPY NOTE
Speech Language/Pathology      Patient Name: Armen Llanos  Today's Date: 7/23/2025     Problem List  Principal Problem:    Seizure-like activity (HCC)  Active Problems:    Chronic, continuous use of opioids    Type 2 diabetes mellitus with renal complication (HCC)    TBI (traumatic brain injury) (HCC)    Anxiety    Ambulatory dysfunction    Altered mental status, unspecified    Past Medical History  Past Medical History[1]  Past Surgical History  Past Surgical History[2]    Speech-Language Pathology Bedside Swallow Evaluation      Patient Name: Armen Llanos    Today's Date: 7/23/2025     Problem List  Principal Problem:    Seizure-like activity (HCC)  Active Problems:    Chronic, continuous use of opioids    Type 2 diabetes mellitus with renal complication (HCC)    TBI (traumatic brain injury) (HCC)    Anxiety    Ambulatory dysfunction    Altered mental status, unspecified      Past Medical History  Past Medical History[3]    Past Surgical History  Past Surgical History[4]    Summary   Pt presents w/ normal oropharyngeal swallow function.  Mildly increased aspiration risk due to refusing to sit up 90* in bed to eat and drink.  Patient is not oriented to time and spouse at bedside reports that is baseline for patient.      Recommended Diet: regular diet and thin liquids   Recommended Form of Meds: whole with liquid   Aspiration precautions and swallowing strategies: upright posture  Other Recommendations: Brush teeth 2x day    No further speech pathology recommended for dysphagia at this time. Please reconsult if needed.        Current Medical Status  Pt is a 50 y.o. male who presented to [facility] with altered mental status, somnolence and possible seizure activity.    Current Precautions:  Fall Aspiration Seizure Delirium    Allergies:  fish    Past medical history:  Please see H&P for details      Social/Education/Vocational Hx:  Pt lives with family    Swallow Information   Current Diet:  regular diet and thin liquids   Baseline Diet: regular diet and thin liquids      Baseline Assessment   Behavior/Cognition: alert  Speech/Language Status: able to participate in basic conversation  Patient Positioning: reclined in bed  Pain Status/Interventions/Response to Interventions: No report of or nonverbal indications of pain.       Oral Mechanism Exam  Facial: symmetrical  Labial: WFL  Lingual: WFL  Velum: unable to visualize  Mandible: adequate ROM  Dentition: adequate  Vocal quality:clear/adequate   Speech production: pressured speech pattern, normal articulation  Respiratory Status: on RA        Consistencies Assessed and Performance   Consistencies Administered: thin liquids and soft solids    Oral Stage: WFL  Mastication was adequate with the materials administered today.  Bolus formation and transfer were functional with no significant oral residue noted.  No overt s/s reduced oral control.    Pharyngeal Stage: WFL  Swallow Mechanics:  Swallowing initiation appeared prompt.  Laryngeal rise was palpated and judged to be within functional limits.  No coughing, throat clearing, change in vocal quality or respiratory status noted today.     Esophageal Concerns: none reported    Strategies and Efficacy: Encourage upright for meals and oral medications.    Summary and Recommendations (see above)    Results Reviewed with: patient, RN, and family              [1]   Past Medical History:  Diagnosis Date    Bed sore, stage 1     Diabetes mellitus (HCC)     Hypertension     TBI (traumatic brain injury) (HCC)    [2]   Past Surgical History:  Procedure Laterality Date    IR PICC PLACEMENT SINGLE LUMEN  4/1/2024    KNEE SURGERY      UT REVJ TOTAL KNEE ARTHRP W/WO ALGRFT 1 COMPONENT Right 3/24/2024    Procedure: ARTHROPLASTY KNEE TOTAL REVISION, POLY EXCHANGE;  Surgeon: Tao Washington DO;  Location:  MAIN OR;  Service: Orthopedics    TOE AMPUTATION Left 3/28/2024    Procedure: AMPUTATION TOE 2nd;  Surgeon:  Rossy Tuossaint DPM;  Location:  MAIN OR;  Service: Podiatry   [3]   Past Medical History:  Diagnosis Date    Bed sore, stage 1     Diabetes mellitus (HCC)     Hypertension     TBI (traumatic brain injury) (HCC)    [4]   Past Surgical History:  Procedure Laterality Date    IR PICC PLACEMENT SINGLE LUMEN  4/1/2024    KNEE SURGERY      AL REVJ TOTAL KNEE ARTHRP W/WO ALGRFT 1 COMPONENT Right 3/24/2024    Procedure: ARTHROPLASTY KNEE TOTAL REVISION, POLY EXCHANGE;  Surgeon: Tao Washington DO;  Location:  MAIN OR;  Service: Orthopedics    TOE AMPUTATION Left 3/28/2024    Procedure: AMPUTATION TOE 2nd;  Surgeon: Rossy Toussaint DPM;  Location:  MAIN OR;  Service: Podiatry

## 2025-07-24 ENCOUNTER — HOSPITAL ENCOUNTER (INPATIENT)
Age: 50
End: 2025-07-24
Payer: COMMERCIAL

## 2025-07-24 PROBLEM — F05 DELIRIUM DUE TO MULTIPLE ETIOLOGIES: Status: RESOLVED | Noted: 2025-07-24 | Resolved: 2025-07-24

## 2025-07-24 PROBLEM — F05 DELIRIUM DUE TO MULTIPLE ETIOLOGIES: Status: ACTIVE | Noted: 2025-07-24

## 2025-07-24 PROBLEM — S06.9XAS MOOD DISORDER AS LATE EFFECT OF TRAUMATIC BRAIN INJURY (HCC): Status: ACTIVE | Noted: 2024-09-19

## 2025-07-24 PROBLEM — F06.30 MOOD DISORDER AS LATE EFFECT OF TRAUMATIC BRAIN INJURY (HCC): Status: ACTIVE | Noted: 2024-09-19

## 2025-07-24 LAB
ANION GAP SERPL CALCULATED.3IONS-SCNC: 14 MMOL/L (ref 7–16)
BASOPHILS # BLD AUTO: 0.04 THOUSANDS/ÂΜL (ref 0–0.1)
BASOPHILS NFR BLD AUTO: 1 % (ref 0–1)
BUN SERPL-MCNC: 12 MG/DL (ref 8–23)
CALCIUM SERPL-MCNC: 8.8 MG/DL (ref 8.6–10.2)
CHLORIDE SERPL-SCNC: 102 MMOL/L (ref 98–107)
CO2 SERPL-SCNC: 20 MMOL/L (ref 22–29)
CREAT SERPL-MCNC: 0.92 MG/DL (ref 0.7–1.2)
EOSINOPHIL # BLD AUTO: 0.07 THOUSAND/ÂΜL (ref 0–0.61)
EOSINOPHIL NFR BLD AUTO: 1 % (ref 0–6)
ERYTHROCYTE [DISTWIDTH] IN BLOOD BY AUTOMATED COUNT: 12.4 % (ref 11.6–15.1)
GFR SERPL CREATININE-BSD FRML MDRD: 95 ML/MIN/1.73SQ M
GLUCOSE SERPL-MCNC: 158 MG/DL (ref 65–140)
GLUCOSE SERPL-MCNC: 194 MG/DL (ref 65–140)
GLUCOSE SERPL-MCNC: 196 MG/DL (ref 65–140)
GLUCOSE SERPL-MCNC: 235 MG/DL (ref 65–140)
GLUCOSE SERPL-MCNC: 245 MG/DL (ref 65–140)
GLUCOSE SERPL-MCNC: 268 MG/DL (ref 65–140)
HCT VFR BLD AUTO: 33 % (ref 36.5–49.3)
HGB BLD-MCNC: 11.5 G/DL (ref 12–17)
IMM GRANULOCYTES # BLD AUTO: 0.02 THOUSAND/UL (ref 0–0.2)
IMM GRANULOCYTES NFR BLD AUTO: 0 % (ref 0–2)
LYMPHOCYTES # BLD AUTO: 1.88 THOUSANDS/ÂΜL (ref 0.6–4.47)
LYMPHOCYTES NFR BLD AUTO: 32 % (ref 14–44)
MAGNESIUM SERPL-MCNC: 1.6 MG/DL (ref 1.6–2.6)
MCH RBC QN AUTO: 29.8 PG (ref 26.8–34.3)
MCHC RBC AUTO-ENTMCNC: 34.8 G/DL (ref 31.4–37.4)
MCV RBC AUTO: 86 FL (ref 82–98)
MONOCYTES # BLD AUTO: 0.42 THOUSAND/ÂΜL (ref 0.17–1.22)
MONOCYTES NFR BLD AUTO: 7 % (ref 4–12)
NEUTROPHILS # BLD AUTO: 3.46 THOUSANDS/ÂΜL (ref 1.85–7.62)
NEUTS SEG NFR BLD AUTO: 59 % (ref 43–75)
NRBC BLD AUTO-RTO: 0 /100 WBCS
PHOSPHATE SERPL-MCNC: 2.9 MG/DL (ref 2.5–4.5)
PLATELET # BLD AUTO: 194 THOUSANDS/UL (ref 149–390)
PMV BLD AUTO: 11.4 FL (ref 8.9–12.7)
POTASSIUM SERPL-SCNC: 3.4 MMOL/L (ref 3.5–5.1)
RBC # BLD AUTO: 3.86 MILLION/UL (ref 3.88–5.62)
SODIUM SERPL-SCNC: 136 MMOL/L (ref 136–145)
WBC # BLD AUTO: 5.89 THOUSAND/UL (ref 4.31–10.16)

## 2025-07-24 PROCEDURE — 99222 1ST HOSP IP/OBS MODERATE 55: CPT | Performed by: GENERAL ACUTE CARE HOSPITAL

## 2025-07-24 PROCEDURE — 84100 ASSAY OF PHOSPHORUS: CPT

## 2025-07-24 PROCEDURE — 94762 N-INVAS EAR/PLS OXIMTRY CONT: CPT

## 2025-07-24 PROCEDURE — 82948 REAGENT STRIP/BLOOD GLUCOSE: CPT

## 2025-07-24 PROCEDURE — 95816 EEG AWAKE AND DROWSY: CPT

## 2025-07-24 PROCEDURE — 80048 BASIC METABOLIC PNL TOTAL CA: CPT

## 2025-07-24 PROCEDURE — 83735 ASSAY OF MAGNESIUM: CPT

## 2025-07-24 PROCEDURE — 85025 COMPLETE CBC W/AUTO DIFF WBC: CPT

## 2025-07-24 RX ORDER — CYCLOBENZAPRINE HCL 10 MG
10 TABLET ORAL 3 TIMES DAILY PRN
Status: DISCONTINUED | OUTPATIENT
Start: 2025-07-24 | End: 2025-07-25 | Stop reason: HOSPADM

## 2025-07-24 RX ORDER — INSULIN LISPRO 100 [IU]/ML
13 INJECTION, SOLUTION INTRAVENOUS; SUBCUTANEOUS
Status: DISCONTINUED | OUTPATIENT
Start: 2025-07-24 | End: 2025-07-25 | Stop reason: HOSPADM

## 2025-07-24 RX ADMIN — SUMATRIPTAN 100 MG: 50 TABLET, FILM COATED ORAL at 08:05

## 2025-07-24 RX ADMIN — AMLODIPINE BESYLATE 5 MG: 5 TABLET ORAL at 08:01

## 2025-07-24 RX ADMIN — OXYMETAZOLINE HYDROCHLORIDE 2 SPRAY: 0.5 SPRAY NASAL at 08:14

## 2025-07-24 RX ADMIN — DOCUSATE SODIUM 100 MG: 100 CAPSULE, LIQUID FILLED ORAL at 16:41

## 2025-07-24 RX ADMIN — ONDANSETRON 4 MG: 2 INJECTION INTRAMUSCULAR; INTRAVENOUS at 17:19

## 2025-07-24 RX ADMIN — PRAVASTATIN SODIUM 80 MG: 20 TABLET ORAL at 16:35

## 2025-07-24 RX ADMIN — MELATONIN 3 MG ORAL TABLET 3 MG: 3 TABLET ORAL at 01:04

## 2025-07-24 RX ADMIN — INSULIN GLARGINE 40 UNITS: 100 INJECTION, SOLUTION SUBCUTANEOUS at 21:26

## 2025-07-24 RX ADMIN — PANTOPRAZOLE SODIUM 40 MG: 40 INJECTION, POWDER, FOR SOLUTION INTRAVENOUS at 08:03

## 2025-07-24 RX ADMIN — HYDROCHLOROTHIAZIDE 25 MG: 25 TABLET ORAL at 08:00

## 2025-07-24 RX ADMIN — DOXYCYCLINE HYCLATE 100 MG: 100 TABLET, FILM COATED ORAL at 08:14

## 2025-07-24 RX ADMIN — LISINOPRIL 20 MG: 20 TABLET ORAL at 08:02

## 2025-07-24 RX ADMIN — APIXABAN 5 MG: 5 TABLET, FILM COATED ORAL at 16:41

## 2025-07-24 RX ADMIN — INSULIN LISPRO 2 UNITS: 100 INJECTION, SOLUTION INTRAVENOUS; SUBCUTANEOUS at 11:42

## 2025-07-24 RX ADMIN — CYCLOBENZAPRINE HYDROCHLORIDE 10 MG: 10 TABLET, FILM COATED ORAL at 06:14

## 2025-07-24 RX ADMIN — APIXABAN 5 MG: 5 TABLET, FILM COATED ORAL at 08:36

## 2025-07-24 RX ADMIN — Medication 21 MG: at 08:03

## 2025-07-24 RX ADMIN — INSULIN LISPRO 2 UNITS: 100 INJECTION, SOLUTION INTRAVENOUS; SUBCUTANEOUS at 16:35

## 2025-07-24 RX ADMIN — METOPROLOL SUCCINATE 25 MG: 25 TABLET, EXTENDED RELEASE ORAL at 08:02

## 2025-07-24 RX ADMIN — RISPERIDONE 3 MG: 2 TABLET, ORALLY DISINTEGRATING ORAL at 08:04

## 2025-07-24 RX ADMIN — CYCLOBENZAPRINE HYDROCHLORIDE 10 MG: 10 TABLET, FILM COATED ORAL at 16:35

## 2025-07-24 RX ADMIN — ONDANSETRON 4 MG: 2 INJECTION INTRAMUSCULAR; INTRAVENOUS at 17:17

## 2025-07-24 RX ADMIN — DOXYCYCLINE HYCLATE 100 MG: 100 TABLET, FILM COATED ORAL at 22:13

## 2025-07-24 RX ADMIN — ONDANSETRON 4 MG: 2 INJECTION INTRAMUSCULAR; INTRAVENOUS at 08:08

## 2025-07-24 RX ADMIN — THIAMINE HYDROCHLORIDE 100 MG: 100 INJECTION, SOLUTION INTRAMUSCULAR; INTRAVENOUS at 11:42

## 2025-07-24 RX ADMIN — CHLORHEXIDINE GLUCONATE 15 ML: 1.2 SOLUTION ORAL at 21:25

## 2025-07-24 RX ADMIN — MORPHINE SULFATE 100 MG: 100 TABLET, EXTENDED RELEASE ORAL at 14:31

## 2025-07-24 RX ADMIN — INSULIN LISPRO 4 UNITS: 100 INJECTION, SOLUTION INTRAVENOUS; SUBCUTANEOUS at 21:25

## 2025-07-24 RX ADMIN — ZOLPIDEM TARTRATE 10 MG: 10 TABLET, FILM COATED ORAL at 01:04

## 2025-07-24 RX ADMIN — DOCUSATE SODIUM 100 MG: 100 CAPSULE, LIQUID FILLED ORAL at 08:37

## 2025-07-24 RX ADMIN — MORPHINE SULFATE 100 MG: 100 TABLET, EXTENDED RELEASE ORAL at 21:26

## 2025-07-24 RX ADMIN — INSULIN LISPRO 6 UNITS: 100 INJECTION, SOLUTION INTRAVENOUS; SUBCUTANEOUS at 07:58

## 2025-07-24 RX ADMIN — MORPHINE SULFATE 100 MG: 100 TABLET, EXTENDED RELEASE ORAL at 06:14

## 2025-07-24 NOTE — CASE MANAGEMENT
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[facility]) 2024    Major depressive disorder, recurrent severe without psychotic features ([facility]) 2024    Pain and swelling of right knee 2024    History of amputation of lesser toe, left ([facility]) 2024    Diabetic polyneuropathy associated with type 2 diabetes mellitus ([facility]) 2024    Tachycardia 05/10/2024    Diabetic foot ulcer ([facility]) 2024    Monoclonal gammopathy 2024    Anasarca associated with disorder of kidney 2024    Ambulatory dysfunction 2024    Abnormal CT of the chest 2024    Other proteinuria 2024    Anxiety 2024    Cardiomyopathy ([facility]) 2024    Mononeuropathy 2024    Osteoarthritis of knee 2024    A-fib with RVR ([facility]) 2024    Hyponatremia 2024    MSSA bacteremia 2024    JH (acute kidney injury) ([facility]) 2024    Syncope 2023    Costochondral chest pain 2023    Adrenal nodule ([facility]) 2023    Chronic, continuous use of opioids 11/15/2022    Type 2 diabetes mellitus with renal complication ([facility]) 11/15/2022    Essential hypertension 11/15/2022    Hypokalemia 11/15/2022    Hypocalcemia 11/15/2022    TBI (traumatic brain injury) ([facility]) 11/15/2022    Chronic pain disorder 2022      LOS (days): 1  Geometric Mean LOS (GMLOS) (days): 2.7  Days to GMLOS:1.2     OBJECTIVE:  Risk of Unplanned Readmission Score: 22.02         Current admission status: Inpatient   Preferred Pharmacy:   [id number] - [place] -  [address] [address]  Phone: [phone] Fax: [phone]    Primary Care Provider: Deidre Andrea MD    Primary Insurance: [employer]  Secondary Insurance:     DISCHARGE DETAILS:    Discharge planning discussed with:: Patient                           Requested Home Health Care         Is the patient interested in HHC at discharge?: Yes  Home Health Discipline requested:: Physical Therapy, Occupational Therapy  Home Health Agency Name:: Other (Awaiting referrals)  HHA External Referral Reason (only applicable if external HHA name selected): [facility] does not accept patient's insurance  Home Health Follow-Up Provider:: PCP  Home Health Services Needed:: Gait/ADL Training, Strengthening/Theraputic Exercises to Improve Function, Evaluate Functional Status and Safety  Homebound Criteria Met:: Requires the Assistance of Another Person for Safe Ambulation or to Leave the Home, Uses an Assist Device (i.e. cane, walker, etc)  Supporting Clincal Findings:: Limited Endurance, Fatigues Easliy in Short Distances                   Treatment Team Recommendation: Home with Home Health Care        Transport at Discharge : Family                                      Additional Comments: Pt to d/c home when medically stable.  Sent HC referrals through [facility] and awaiting accepting provider.  Pt and wife want a hospital bed and sent referral through [place] to to [facility].

## 2025-07-24 NOTE — ASSESSMENT & PLAN NOTE
Noted to have myoclonic type jerking, mostly in episodes.  Clinically concerning for encephalopathy, possibility due to polypharmacy. His wife was concerned about seizures. Myoclonic seizures in differential, but less likely. No prior history of seizures. About to have EEG now to complete the work up. Hold off on AEDs.     Will follow on EEG results.

## 2025-07-24 NOTE — ASSESSMENT & PLAN NOTE
49 yo man with h/o multiple TBI's causing postconcussive syndrome, chronic insomnia, mood lability and anxiety as well as chronic pain due to multiple knee and ankle surgeries seen by psychiatry for med management.  There's no evidence something psychogenic is causing these episodes though it is possible that Ambien, opiates, and the occasional Xanax could have caused myoclonus if seizures are not detected.  As patient has been on the Risperdal for many years it's unlikely that is contributing to his current symptoms.  It's also unclear if he may be having a central sleep apnea issue given h/o TBI.    PLAN:  Continue home meds as is:  Risperdal 3 mg daily, Ambien 10 mg qHS, and Xanax 1 mg q6hrs prn anxiety.  Author counseled patient on the risk of hypoxia due to multiple sedative meds over his sleep deprivation which could potentially be underlying episodes of myoclonus and delirium.  Author counseled patient to follow-up with a sleep specialist as well given severity of chronic insomnia.  Patient stable for D/C home from psych perspective.

## 2025-07-24 NOTE — PROGRESS NOTES
Progress Note - Hospitalist   Name: Armen Llanos 50 y.o. male I MRN: [id number]  Unit/Bed#: ICU 07 I Date of Admission: 7/22/2025   Date of Service: 7/24/2025 I Hospital Day: 1    Assessment & Plan  Seizure-like activity (HCC)  Await EEG   Unclear if seizure was present vs polypharmacy SE   Currenlty tolerating med   In addition to complex anxiolytics sleeping medications and long-acting narcotics, he is also buying ketamine powder and vaping THC-strongly suggest that he discuss the use of these other agents with his opioid prescriber  Chronic, continuous use of opioids  See above  Type 2 diabetes mellitus with renal complication (HCC)  Lab Results   Component Value Date    HGBA1C 7.1 (H) 07/11/2024       Recent Labs     07/23/25  2137 07/24/25  0746 07/24/25  1104 07/24/25  1610   POCGLU 222* 268* 194* 158*       Blood Sugar Average: Last 72 hrs:  (P) 224.875  Resume his Premeal medications 13 units SQ 3 times daily AC  Essential hypertension    TBI (traumatic brain injury) (HCC)    MSSA bacteremia  Chronic doxycycline  A-fib with RVR (HCC)  Currently sinus on metoprolol and Eliquis  Cardiomyopathy (HCC)    Anxiety  Chronic benzodiazepine  Ambulatory dysfunction  Will need home care order for PT OT  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    Mobility:   Basic Mobility Inpatient Raw Score: 19  JH-HLM Goal: 6: Walk 10 steps or more  JH-HLM Achieved: 5: Stand (1 or more minutes)  JH-HLM Goal achieved. Continue to encourage appropriate mobility.    Patient Centered Rounds: I performed bedside rounds with nursing staff today.   Discussions with Specialists or Other Care Team Provider: RN and IDR    Education and Discussions with Family / Patient: Patient declined call to .     Current Length of Stay: 1  day(s)  Current Patient Status: Inpatient   Certification Statement: The patient can be discharged to home with home care once EEG is resulted and low risk for ongoing seizure activity  Discharge Plan: Anticipate discharge later today or tomorrow to home with home services.    Code Status: Level 1 - Full Code    Subjective   Feels well eager for discharge awaiting EEG patient is eating and ambulating at baseline    Objective :  Temp:  [97.4 °F (36.3 °C)-98 °F (36.7 °C)] 97.6 °F (36.4 °C)  HR:  [] 105  BP: (130-146)/() 135/94  Resp:  [9-24] 9  SpO2:  [95 %-100 %] 98 %  O2 Device: None (Room air)    Body mass index is 30.71 kg/m².     Input and Output Summary (last 24 hours):     Intake/Output Summary (Last 24 hours) at 7/24/2025 1723  Last data filed at 7/24/2025 1452  Gross per 24 hour   Intake 2022 ml   Output 1675 ml   Net 347 ml       Physical Exam  Constitutional:       General: He is not in acute distress.  HENT:      Head: Normocephalic and atraumatic.     Eyes:      Extraocular Movements: Extraocular movements intact.       Cardiovascular:      Rate and Rhythm: Normal rate and regular rhythm.   Pulmonary:      Effort: No respiratory distress.      Breath sounds: No wheezing or rales.   Abdominal:      General: There is no distension.      Tenderness: There is no abdominal tenderness.     Musculoskeletal:         General: No swelling.      Cervical back: No rigidity.     Neurological:      General: No focal deficit present.      Mental Status: He is alert and oriented to person, place, and time.     Psychiatric:         Mood and Affect: Mood normal.         Behavior: Behavior normal.         Thought Content: Thought content normal.           Lines/Drains:              Lab Results: I have reviewed the following results:   Results from last 7 days   Lab Units 07/24/25  0416   WBC Thousand/uL 5.89   HEMOGLOBIN g/dL 11.5*   HEMATOCRIT % 33.0*   PLATELETS Thousands/uL 194   SEGS PCT % 59   LYMPHO PCT %  32   MONO PCT % 7   EOS PCT % 1     Results from last 7 days   Lab Units 07/24/25  0416 07/23/25  0530 07/22/25  2259   SODIUM mmol/L 136   < > 136   POTASSIUM mmol/L 3.4*   < > 4.7   CHLORIDE mmol/L 102   < > 99   CO2 mmol/L 20*   < > 18*   BUN mg/dL 12   < > 19   CREATININE mg/dL 0.92   < > 1.17   ANION GAP mmol/L 14   < > 19*   CALCIUM mg/dL 8.8   < > 10.5*   ALBUMIN g/dL  --   --  4.1   TOTAL BILIRUBIN mg/dL  --   --  0.51   ALK PHOS U/L  --   --  108   ALT U/L  --   --  41*   AST U/L  --   --  37   GLUCOSE RANDOM mg/dL 196*   < > 280*    < > = values in this interval not displayed.     Results from last 7 days   Lab Units 07/23/25  0530   INR  1.05     Results from last 7 days   Lab Units 07/24/25  1610 07/24/25  1104 07/24/25  0746 07/23/25  2137 07/23/25  1939 07/23/25  1652 07/23/25  1126 07/23/25  0516   POC GLUCOSE mg/dl 158* 194* 268* 222* 218* 196* 278* 265*         Results from last 7 days   Lab Units 07/23/25  1158 07/23/25  0530   LACTIC ACID mmol/L 2.1* 2.2*   PROCALCITONIN ng/ml  --  0.06       Recent Cultures (last 7 days):         Imaging Results Review: No pertinent imaging studies reviewed.  Other Study Results Review: No additional pertinent studies reviewed.    Last 24 Hours Medication List:     Current Facility-Administered Medications:     acetaminophen (TYLENOL) tablet 650 mg, Q6H PRN    ALPRAZolam (XANAX) tablet 1 mg, Q6H PRN    amLODIPine (NORVASC) tablet 5 mg, Daily    apixaban (ELIQUIS) tablet 5 mg, BID    butalbital-acetaminophen-caffeine (FIORICET,ESGIC) -40 mg per tablet 2 tablet, Q6H PRN    chlorhexidine (PERIDEX) 0.12 % oral rinse 15 mL, Q12H DARIN    cyclobenzaprine (FLEXERIL) tablet 10 mg, TID PRN    docusate sodium (COLACE) capsule 100 mg, BID    doxycycline hyclate (VIBRA-TABS) tablet 100 mg, Q12H DARIN    fentaNYL (DURAGESIC) 75 mcg/hr TD 72 hr patch 75 mcg, Q72H    hydroCHLOROthiazide tablet 25 mg, Daily    insulin glargine (LANTUS) subcutaneous injection 40 Units 0.4 mL,  HS    insulin lispro (HumALOG/ADMELOG) 100 units/mL subcutaneous injection 2-12 Units, 4x Daily (with meals and at bedtime) **AND** Fingerstick Glucose (POCT), TID AC    lactated ringers infusion, Continuous, Last Rate: 125 mL/hr (07/23/25 6859)    lisinopril (ZESTRIL) tablet 20 mg, Daily    magnesium hydroxide (MILK OF MAGNESIA) oral suspension 30 mL, BID PRN    melatonin tablet 3 mg, HS    metoprolol (LOPRESSOR) injection 5 mg, Q6H PRN    metoprolol succinate (TOPROL-XL) 24 hr tablet 25 mg, Daily    morphine (MS CONTIN) ER tablet 100 mg, Q8H PRN    morphine (MS CONTIN) ER tablet 100 mg, BID    nicotine (NICODERM CQ) 21 mg/24 hr TD 24 hr patch 21 mg, Daily    ondansetron (ZOFRAN) injection 4 mg, Q8H PRN    oxymetazoline (AFRIN) 0.05 % nasal spray 2 spray, Q12H DARIN    pantoprazole (PROTONIX) injection 40 mg, Daily    pravastatin (PRAVACHOL) tablet 80 mg, Daily With Dinner    risperiDONE (RisperDAL M-TAB) disintegrating tablet 3 mg, Daily    senna (SENOKOT) tablet 17.2 mg, BID PRN    sodium chloride (OCEAN) 0.65 % nasal spray 1 spray, Q4H PRN    SUMAtriptan (IMITREX) tablet 100 mg, Q12H PRN    thiamine (VITAMIN B1) 100 mg in dextrose 5 % 100 mL IVPB, Daily, Last Rate: 100 mg (07/24/25 1142)    zolpidem (AMBIEN) tablet 10 mg, HS PRN    Administrative Statements   Today, Patient Was Seen By: Ivory Wetzel, DO  I have spent a total time of 22 minutes in caring for this patient on the day of the visit/encounter including Instructions for management.    **Please Note: This note may have been constructed using a voice recognition system.**

## 2025-07-24 NOTE — PLAN OF CARE
Problem: PAIN - ADULT  Goal: Verbalizes/displays adequate comfort level or baseline comfort level  Description: Interventions:  - Encourage patient to monitor pain and request assistance  - Assess pain using appropriate pain scale  - Administer analgesics as ordered based on type and severity of pain and evaluate response  - Implement non-pharmacological measures as appropriate and evaluate response  - Consider cultural and social influences on pain and pain management  - Notify physician/advanced practitioner if interventions unsuccessful or patient reports new pain  - Educate patient/family on pain management process including their role and importance of  reporting pain   - Provide non-pharmacologic/complimentary pain relief interventions  Outcome: Progressing     Problem: PAIN - ADULT  Goal: Verbalizes/displays adequate comfort level or baseline comfort level  Description: Interventions:  - Encourage patient to monitor pain and request assistance  - Assess pain using appropriate pain scale  - Administer analgesics as ordered based on type and severity of pain and evaluate response  - Implement non-pharmacological measures as appropriate and evaluate response  - Consider cultural and social influences on pain and pain management  - Notify physician/advanced practitioner if interventions unsuccessful or patient reports new pain  - Educate patient/family on pain management process including their role and importance of  reporting pain   - Provide non-pharmacologic/complimentary pain relief interventions  Outcome: Progressing     Problem: INFECTION - ADULT  Goal: Absence or prevention of progression during hospitalization  Description: INTERVENTIONS:  - Assess and monitor for signs and symptoms of infection  - Monitor lab/diagnostic results  - Monitor all insertion sites, i.e. indwelling lines, tubes, and drains  - Monitor endotracheal if appropriate and nasal secretions for changes in amount and color  -  [place] appropriate cooling/warming therapies per order  - Administer medications as ordered  - Instruct and encourage patient and family to use good hand hygiene technique  - Identify and instruct in appropriate isolation precautions for identified infection/condition  Outcome: Progressing  Goal: Absence of fever/infection during neutropenic period  Description: INTERVENTIONS:  - Monitor WBC  - Perform strict hand hygiene  - Limit to healthy visitors only  - No plants, dried, fresh or silk flowers with still in patient room  Outcome: Progressing     Problem: SAFETY ADULT  Goal: Patient will remain free of falls  Description: INTERVENTIONS:  - Educate patient/family on patient safety including physical limitations  - Instruct patient to call for assistance with activity   - Consider consulting OT/PT to assist with strengthening/mobility based on AM PAC & JH-HLM score  - Consult OT/PT to assist with strengthening/mobility   - Keep Call bell within reach  - Keep bed low and locked with side rails adjusted as appropriate  - Keep care items and personal belongings within reach  - Initiate and maintain comfort rounds  - Make Fall Risk Sign visible to staff  - Offer Toileting every 2 Hours, in advance of need  - Initiate/Maintain bedalarm  - Obtain necessary fall risk management equipment:   - Apply yellow socks and bracelet for high fall risk patients  - Consider moving patient to room near nurses station  Outcome: Progressing  Goal: Maintain or return to baseline ADL function  Description: INTERVENTIONS:  -  Assess patient's ability to carry out ADLs; assess patient's baseline for ADL function and identify physical deficits which impact ability to perform ADLs (bathing, care of mouth/teeth, toileting, grooming, dressing, etc.)  - Assess/evaluate cause of self-care deficits   - Assess range of motion  - Assess patient's mobility; develop plan if impaired  - Assess patient's need for assistive devices and provide as  appropriate  - Encourage maximum independence but intervene and supervise when necessary  - Involve family in performance of ADLs  - Assess for home care needs following discharge   - Consider OT consult to assist with ADL evaluation and planning for discharge  - Provide patient education as appropriate  - Monitor functional capacity and physical performance, use of AM PAC & JH-HLM   - Monitor gait, balance and fatigue with ambulation    Outcome: Progressing  Goal: Maintains/Returns to pre admission functional level  Description: INTERVENTIONS:  - Perform AM-PAC 6 Click Basic Mobility/ Daily Activity assessment daily.  - Set and communicate daily mobility goal to care team and patient/family/caregiver.   - Collaborate with rehabilitation services on mobility goals if consulted  - Perform Range of Motion 3 times a day.  - Reposition patient every 2 hours.  - Dangle patient 3 times a day  - Stand patient 3 times a day  - Ambulate patient 2 times a day  - Out of bed to chair 3 times a day   - Out of bed for meals 3 times a day  - Out of bed for toileting  - Record patient progress and toleration of activity level   Outcome: Progressing     Problem: DISCHARGE PLANNING  Goal: Discharge to home or other facility with appropriate resources  Description: INTERVENTIONS:  - Identify barriers to discharge w/patient and caregiver  - Arrange for needed discharge resources and transportation as appropriate  - Identify discharge learning needs (meds, wound care, etc.)  - Arrange for interpretive services to assist at discharge as needed  - Refer to Case Management Department for coordinating discharge planning if the patient needs post-hospital services based on physician/advanced practitioner order or complex needs related to functional status, cognitive ability, or social support system  Outcome: Progressing     Problem: Knowledge Deficit  Goal: Patient/family/caregiver demonstrates understanding of disease process, treatment  plan, medications, and discharge instructions  Description: Complete learning assessment and assess knowledge base.  Interventions:  - Provide teaching at level of understanding  - Provide teaching via preferred learning methods  Outcome: Progressing     Problem: Prexisting or High Potential for Compromised Skin Integrity  Goal: Skin integrity is maintained or improved  Description: INTERVENTIONS:  - Identify patients at risk for skin breakdown  - Assess and monitor skin integrity including under and around medical devices   - Assess and monitor nutrition and hydration status  - Monitor labs  - Assess for incontinence   - Turn and reposition patient  - Assist with mobility/ambulation  - Relieve pressure over christy prominences   - Avoid friction and shearing  - Provide appropriate hygiene as needed including keeping skin clean and dry  - Evaluate need for skin moisturizer/barrier cream  - Collaborate with interdisciplinary team  - Patient/family teaching  - Consider wound care consult    Assess:  - Review Girma scale daily  - Clean and moisturize skin every day  - Inspect skin when repositioning, toileting, and assisting with ADLS  - Assess under medical devices   - Assess extremities for adequate circulation and sensation     Bed Management:  - Have minimal linens on bed & keep smooth, unwrinkled  - Change linens as needed when moist or perspiring  - Avoid sitting or lying in one position for more than 2 hours while in bed?Keep HOB at 35degrees   - Toileting:  - Offer bedside commode  - Assess for incontinence every   - Use incontinent care products after each incontinent episode    Activity:  - Mobilize patient 3 times a day  - Encourage activity and walks on unit  - Encourage or provide ROM exercises   - Turn and reposition patient every 2 Hours  - Use appropriate equipment to lift or move patient in bed  - Instruct/ Assist with weight shifting every  when out of bed in chair  - Consider limitation of chair time  3 hour intervals    Skin Care:  - Avoid use of baby powder, tape, friction and shearing, hot water or constrictive clothing  - Relieve pressure over bony prominences using wedges and pillows  - Do not massage red bony areas    Next Steps:  - Teach patient strategies to minimize risks such as falls  - Consider consults to  interdisciplinary teams such as PT/OT  Outcome: Progressing

## 2025-07-24 NOTE — ASSESSMENT & PLAN NOTE
Await EEG   Unclear if seizure was present vs polypharmacy SE   Carmenenlty tolerating med   In addition to complex anxiolytics sleeping medications and long-acting narcotics, he is also buying ketamine powder and vaping THC-strongly suggest that he discuss the use of these other agents with his opioid prescriber

## 2025-07-24 NOTE — ASSESSMENT & PLAN NOTE
Lab Results   Component Value Date    HGBA1C 7.1 (H) 07/11/2024       Recent Labs     07/23/25  2137 07/24/25  0746 07/24/25  1104 07/24/25  1610   POCGLU 222* 268* 194* 158*       Blood Sugar Average: Last 72 hrs:  (P) 224.875  Resume his Premeal medications 13 units SQ 3 times daily AC
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
## 2025-07-24 NOTE — PROGRESS NOTES
Progress Note - Neurology   Name: Armen Llanos 50 y.o. male I MRN: [id number]  Unit/Bed#: ICU 07 I Date of Admission: 7/22/2025   Date of Service: 7/24/2025 I Hospital Day: 1    Assessment & Plan  Seizure-like activity (HCC)    Noted to have myoclonic type jerking, mostly in episodes.  Clinically concerning for encephalopathy, possibility due to polypharmacy. His wife was concerned about seizures. Myoclonic seizures in differential, but less likely. No prior history of seizures. About to have EEG now to complete the work up. Hold off on AEDs.     Will follow on EEG results.   Chronic, continuous use of opioids  Management per primary team   TBI (traumatic brain injury) ([facility])  Prior MRI as normal. Overall feeling better now.   Ambulatory dysfunction  Work with PT  Altered mental status, unspecified  More awake and interactive now.   A-fib with RVR ([facility])  On Eliquis.    Recommendations for outpatient neurological follow up have yet to be determined.    Subjective   Patient seen in his room. Much more alert and stable than yesterday. Denies any headache. No more arm jerking. Able to eat and drink, hold cups, without arm jerking. C/o knee pain, but this is chronic.     Review of Systems  As above.     Objective :  Temp:  [97.4 °F (36.3 °C)-98.2 °F (36.8 °C)] 97.6 °F (36.4 °C)  HR:  [] 86  BP: (125-146)/() 146/111  Resp:  [9-38] 9  SpO2:  [92 %-100 %] 100 %  O2 Device: None (Room air)    Physical ExamNeurological Exam  Neurologic exam:  Awake and alert with appropriate mental status and language function.  Cranial nerves II-XII were symmetrically intact bilaterally. Motor testing reveals 5/5 strength in the bilateral UEs. Able to lift both legs. No myoclonus or asterixis.  Sensation is intact to light touch in the bilateral upper and lower extremities.          Lab Results: I have reviewed the following results:   Results Reviewed       Procedure Component Value Units Date/Time    TSH, 3rd generation  with Free T4 reflex [[id number]]  (Normal) Collected: 07/23/25 0530    Lab Status: Final result Specimen: Blood Updated: 07/23/25 1411     TSH 3RD GENERATION 0.364 uIU/mL     RBC Morphology Reflex Test [[id number]] Collected: 07/23/25 0530    Lab Status: Final result Specimen: Blood Updated: 07/23/25 1302    CBC and differential [[id number]]  (Abnormal) Collected: 07/23/25 0530    Lab Status: Final result Specimen: Blood Updated: 07/23/25 1250     WBC 12.55 Thousand/uL      RBC 4.70 Million/uL      Hemoglobin 14.0 g/dL      Hematocrit 39.8 %      MCV 85 fL      MCH 29.8 pg      MCHC 35.2 g/dL      RDW 12.5 %      MPV 12.2 fL      Platelets 262 Thousands/uL     Narrative:      This is an appended report.  These results have been appended to a previously verified report.    Manual Differential(PHLEBS Do Not Order) [[id number]]  (Abnormal) Collected: 07/23/25 0530    Lab Status: Final result Specimen: Blood Updated: 07/23/25 1250     Segmented % 94 %      Lymphocytes % 5 %      Monocytes % 1 %      Eosinophils % 0 %      Basophils % 0 %      Absolute Neutrophils 11.80 Thousand/uL      Absolute Lymphocytes 0.63 Thousand/uL      Absolute Monocytes 0.13 Thousand/uL      Absolute Eosinophils 0.00 Thousand/uL      Absolute Basophils 0.00 Thousand/uL      Total Counted --     RBC Morphology Present     Platelet Estimate Adequate     Large Platelet Present    Procalcitonin [[id number]]  (Normal) Collected: 07/23/25 0530    Lab Status: Final result Specimen: Blood Updated: 07/23/25 0807     Procalcitonin 0.06 ng/ml     CK [[id number]]  (Abnormal) Collected: 07/23/25 0530    Lab Status: Final result Specimen: Blood Updated: 07/23/25 0716     Total  U/L     Basic metabolic panel [[id number]]  (Abnormal) Collected: 07/23/25 0530    Lab Status: Final result Specimen: Blood Updated: 07/23/25 0716     Sodium 139 mmol/L      Potassium 3.8 mmol/L      Chloride 103 mmol/L      CO2 17 mmol/L      ANION GAP 19 mmol/L      BUN 20 mg/dL       Creatinine 1.09 mg/dL      Glucose 298 mg/dL      Calcium 9.4 mg/dL      eGFR 78 ml/min/1.73sq m     Narrative:      National Kidney Disease Foundation guidelines for Chronic Kidney Disease (CKD):     Stage 1 with normal or high GFR (GFR > 90 mL/min/1.73 square meters)    Stage 2 Mild CKD (GFR = 60-89 mL/min/1.73 square meters)    Stage 3A Moderate CKD (GFR = 45-59 mL/min/1.73 square meters)    Stage 3B Moderate CKD (GFR = 30-44 mL/min/1.73 square meters)    Stage 4 Severe CKD (GFR = 15-29 mL/min/1.73 square meters)    Stage 5 End Stage CKD (GFR <15 mL/min/1.73 square meters)  Note: GFR calculation is accurate only with a steady state creatinine    Magnesium [[id number]]  (Normal) Collected: 07/23/25 0530    Lab Status: Final result Specimen: Blood Updated: 07/23/25 0716     Magnesium 1.9 mg/dL     Phosphorus [[id number]]  (Normal) Collected: 07/23/25 0530    Lab Status: Final result Specimen: Blood Updated: 07/23/25 0716     Phosphorus 2.6 mg/dL     Lactic acid, plasma (w/reflex if result > 2.0) [[id number]]  (Abnormal) Collected: 07/23/25 0530    Lab Status: Final result Specimen: Blood Updated: 07/23/25 0708     LACTIC ACID 2.2 mmol/L     Narrative:      Result may be elevated if tourniquet was used during collection.    Protime-INR [[id number]]  (Normal) Collected: 07/23/25 0530    Lab Status: Final result Specimen: Blood Updated: 07/23/25 0657     Protime 13.7 seconds      INR 1.05    Narrative:      INR Therapeutic Range    Indication                                             INR Range      Atrial Fibrillation                                               2.0-3.0  Hypercoagulable State                                    2.0.2.3  Left Ventricular Asist Device                            2.0-3.0  Mechanical Heart Valve                                  -    Aortic(with afib, MI, embolism, HF, LA enlargement,    and/or coagulopathy)                                     2.0-3.0 (2.5-3.5)     Mitral                                                              2.5-3.5  Prosthetic/Bioprosthetic Heart Valve               2.0-3.0  Venous thromboembolism (VTE: VT, PE        2.0-3.0    APTT [[id number]]  (Normal) Collected: 07/23/25 0530    Lab Status: Final result Specimen: Blood Updated: 07/23/25 0657     PTT 27 seconds     Hemoglobin A1c w/EAG Estimation (Prechecked if no A1C within 90 days) [[id number]] Collected: 07/23/25 0530    Lab Status: In process Specimen: Blood Updated: 07/23/25 0556    Fingerstick Glucose (POCT) [[id number]]  (Abnormal) Collected: 07/23/25 0516    Lab Status: Final result Specimen: Blood Updated: 07/23/25 0517     POC Glucose 265 mg/dl     TROPONIN T 2hr ( CAMPUS ONLY) [[id number]]  (Abnormal) Collected: 07/23/25 0213    Lab Status: Final result Specimen: Blood from Hand, Left Updated: 07/23/25 0318     hs Jonny 2hr 35 ng/L      Delta 2hr hsTnt 1 ng/L     CBC and differential [[id number]]  (Abnormal) Collected: 07/22/25 2259    Lab Status: Final result Specimen: Blood from Arm, Left Updated: 07/23/25 0133     WBC 7.57 Thousand/uL      RBC 4.87 Million/uL      Hemoglobin 14.3 g/dL      Hematocrit 42.0 %      MCV 86 fL      MCH 29.4 pg      MCHC 34.0 g/dL      RDW 12.3 %      MPV 12.0 fL      Platelets 257 Thousands/uL      nRBC 0 /100 WBCs      Segmented % 92 %      Immature Grans % 1 %      Lymphocytes % 6 %      Monocytes % 1 %      Eosinophils Relative 0 %      Basophils Relative 0 %      Absolute Neutrophils 6.97 Thousands/µL      Absolute Immature Grans 0.04 Thousand/uL      Absolute Lymphocytes 0.47 Thousands/µL      Absolute Monocytes 0.08 Thousand/µL      Eosinophils Absolute 0.00 Thousand/µL      Basophils Absolute 0.01 Thousands/µL     Narrative:      This is an appended report.  These results have been appended to a previously verified report.    Magnesium [[id number]]  (Normal) Collected: 07/22/25 2259    Lab Status: Final result Specimen: Blood from Arm, Left Updated: 07/23/25 0036     Magnesium 2.2  mg/dL     Comprehensive metabolic panel [[id number]]  (Abnormal) Collected: 07/22/25 2259    Lab Status: Final result Specimen: Blood from Arm, Left Updated: 07/23/25 0032     Sodium 136 mmol/L      Potassium 4.7 mmol/L      Chloride 99 mmol/L      CO2 18 mmol/L      ANION GAP 19 mmol/L      BUN 19 mg/dL      Creatinine 1.17 mg/dL      Glucose 280 mg/dL      Calcium 10.5 mg/dL      AST 37 U/L      ALT 41 U/L      Alkaline Phosphatase 108 U/L      Total Protein 6.7 g/dL      Albumin 4.1 g/dL      Total Bilirubin 0.51 mg/dL      eGFR 71 ml/min/1.73sq m     Narrative:      National Kidney Disease Foundation guidelines for Chronic Kidney Disease (CKD):     Stage 1 with normal or high GFR (GFR > 90 mL/min/1.73 square meters)    Stage 2 Mild CKD (GFR = 60-89 mL/min/1.73 square meters)    Stage 3A Moderate CKD (GFR = 45-59 mL/min/1.73 square meters)    Stage 3B Moderate CKD (GFR = 30-44 mL/min/1.73 square meters)    Stage 4 Severe CKD (GFR = 15-29 mL/min/1.73 square meters)    Stage 5 End Stage CKD (GFR <15 mL/min/1.73 square meters)  Note: GFR calculation is accurate only with a steady state creatinine    Troponin T 0hr ([facility] ONLY) [[id number]]  (Abnormal) Collected: 07/22/25 2259    Lab Status: Final result Specimen: Blood from Arm, Left Updated: 07/22/25 2347     hs Jonny 0hr 34 ng/L     Ethanol [[id number]]  (Normal) Collected: 07/22/25 2259    Lab Status: Final result Specimen: Blood from Arm, Left Updated: 07/22/25 2343     Ethanol Lvl <10 mg/dL            Imaging Results Review: No pertinent imaging studies reviewed.  Other Study Results Review: Pathology reports reviewed.    VTE Pharmacologic Prophylaxis: Eliquis

## 2025-07-24 NOTE — PLAN OF CARE
Problem: PAIN - ADULT  Goal: Verbalizes/displays adequate comfort level or baseline comfort level  Description: Interventions:  - Encourage patient to monitor pain and request assistance  - Assess pain using appropriate pain scale  - Administer analgesics as ordered based on type and severity of pain and evaluate response  - Implement non-pharmacological measures as appropriate and evaluate response  - Consider cultural and social influences on pain and pain management  - Notify physician/advanced practitioner if interventions unsuccessful or patient reports new pain  - Educate patient/family on pain management process including their role and importance of  reporting pain   - Provide non-pharmacologic/complimentary pain relief interventions  Outcome: Progressing     Problem: INFECTION - ADULT  Goal: Absence or prevention of progression during hospitalization  Description: INTERVENTIONS:  - Assess and monitor for signs and symptoms of infection  - Monitor lab/diagnostic results  - Monitor all insertion sites, i.e. indwelling lines, tubes, and drains  - Monitor endotracheal if appropriate and nasal secretions for changes in amount and color  - Erin appropriate cooling/warming therapies per order  - Administer medications as ordered  - Instruct and encourage patient and family to use good hand hygiene technique  - Identify and instruct in appropriate isolation precautions for identified infection/condition  Outcome: Progressing  Goal: Absence of fever/infection during neutropenic period  Description: INTERVENTIONS:  - Monitor WBC  - Perform strict hand hygiene  - Limit to healthy visitors only  - No plants, dried, fresh or silk flowers with still in patient room  Outcome: Progressing     Problem: SAFETY ADULT  Goal: Patient will remain free of falls  Description: INTERVENTIONS:  - Educate patient/family on patient safety including physical limitations  - Instruct patient to call for assistance with activity   -  Consider consulting OT/PT to assist with strengthening/mobility based on AM PAC & JH-HLM score  - Consult OT/PT to assist with strengthening/mobility   - Keep Call bell within reach  - Keep bed low and locked with side rails adjusted as appropriate  - Keep care items and personal belongings within reach  - Initiate and maintain comfort rounds  - Make Fall Risk Sign visible to staff  - Offer Toileting every 2 Hours, in advance of need    - Obtain necessary fall risk management equipment:   - Apply yellow socks and bracelet for high fall risk patients  - Consider moving patient to room near nurses station  Outcome: Progressing  Goal: Maintain or return to baseline ADL function  Description: INTERVENTIONS:  -  Assess patient's ability to carry out ADLs; assess patient's baseline for ADL function and identify physical deficits which impact ability to perform ADLs (bathing, care of mouth/teeth, toileting, grooming, dressing, etc.)  - Assess/evaluate cause of self-care deficits   - Assess range of motion  - Assess patient's mobility; develop plan if impaired  - Assess patient's need for assistive devices and provide as appropriate  - Encourage maximum independence but intervene and supervise when necessary  - Involve family in performance of ADLs  - Assess for home care needs following discharge   - Consider OT consult to assist with ADL evaluation and planning for discharge  - Provide patient education as appropriate  - Monitor functional capacity and physical performance, use of AM PAC & JH-HLM   - Monitor gait, balance and fatigue with ambulation    Outcome: Progressing  Goal: Maintains/Returns to pre admission functional level  Description: INTERVENTIONS:  - Perform AM-PAC 6 Click Basic Mobility/ Daily Activity assessment daily.  - Set and communicate daily mobility goal to care team and patient/family/caregiver.   - Collaborate with rehabilitation services on mobility goals if consulted  - Perform Range of Motion   -  Reposition patient   - Dangle patient  - Stand patient   - Ambulate patient   - Out of bed to chair   - Out of bed for meals   - Out of bed for toileting  - Record patient progress and toleration of activity level   Outcome: Progressing     Problem: DISCHARGE PLANNING  Goal: Discharge to home or other facility with appropriate resources  Description: INTERVENTIONS:  - Identify barriers to discharge w/patient and caregiver  - Arrange for needed discharge resources and transportation as appropriate  - Identify discharge learning needs (meds, wound care, etc.)  - Arrange for interpretive services to assist at discharge as needed  - Refer to Case Management Department for coordinating discharge planning if the patient needs post-hospital services based on physician/advanced practitioner order or complex needs related to functional status, cognitive ability, or social support system  Outcome: Progressing     Problem: Knowledge Deficit  Goal: Patient/family/caregiver demonstrates understanding of disease process, treatment plan, medications, and discharge instructions  Description: Complete learning assessment and assess knowledge base.  Interventions:  - Provide teaching at level of understanding  - Provide teaching via preferred learning methods  Outcome: Progressing     Problem: Prexisting or High Potential for Compromised Skin Integrity  Goal: Skin integrity is maintained or improved  Description: INTERVENTIONS:  - Identify patients at risk for skin breakdown  - Assess and monitor skin integrity including under and around medical devices   - Assess and monitor nutrition and hydration status  - Monitor labs  - Assess for incontinence   - Turn and reposition patient  - Assist with mobility/ambulation  - Relieve pressure over christy prominences   - Avoid friction and shearing  - Provide appropriate hygiene as needed including keeping skin clean and dry  - Evaluate need for skin moisturizer/barrier cream  - Collaborate  with interdisciplinary team  - Patient/family teaching  - Consider wound care consult    Assess:  - Review Girma scale daily  - Clean and moisturize skin   - Inspect skin when repositioning, toileting, and assisting with ADLS  - Assess under medical devices   - Assess extremities for adequate circulation and sensation     Bed Management:  - Have minimal linens on bed & keep smooth, unwrinkled  - Change linens as needed when moist or perspiring  - Avoid sitting or lying in one position for more than 2 hours while in bed?Keep HOB at 30 degrees   - Toileting:  - Offer bedside commode  - Assess for incontinence   - Use incontinent care products after each incontinent episode     Activity:  - Mobilize patient   - Encourage activity and walks on unit  - Encourage or provide ROM exercises   - Turn and reposition patient   - Use appropriate equipment to lift or move patient in bed  - Instruct/ Assist with weight shifting every hour when out of bed in chair  - Consider limitation of chair time  hour intervals    Skin Care:  - Avoid use of baby powder, tape, friction and shearing, hot water or constrictive clothing  - Relieve pressure over bony prominences using mepilex  - Do not massage red bony areas    Next Steps:  - Teach patient strategies to minimize risks   - Consider consults to  interdisciplinary teams

## 2025-07-25 VITALS
SYSTOLIC BLOOD PRESSURE: 128 MMHG | WEIGHT: 226.41 LBS | HEIGHT: 72 IN | BODY MASS INDEX: 30.67 KG/M2 | HEART RATE: 103 BPM | TEMPERATURE: 97.8 F | RESPIRATION RATE: 18 BRPM | DIASTOLIC BLOOD PRESSURE: 93 MMHG | OXYGEN SATURATION: 99 %

## 2025-07-25 PROBLEM — G93.40 ENCEPHALOPATHY: Status: ACTIVE | Noted: 2025-07-25

## 2025-07-25 PROBLEM — G43.909 MIGRAINE HEADACHE: Chronic | Status: ACTIVE | Noted: 2025-07-25

## 2025-07-25 LAB
ATRIAL RATE: 110 BPM
DME PARACHUTE DELIVERY DATE ACTUAL: NORMAL
DME PARACHUTE DELIVERY DATE EXPECTED: NORMAL
DME PARACHUTE DELIVERY DATE REQUESTED: NORMAL
DME PARACHUTE ITEM DESCRIPTION: NORMAL
DME PARACHUTE ORDER STATUS: NORMAL
DME PARACHUTE SUPPLIER NAME: NORMAL
DME PARACHUTE SUPPLIER PHONE: NORMAL
EST. AVERAGE GLUCOSE BLD GHB EST-MCNC: 171 MG/DL
GLUCOSE SERPL-MCNC: 122 MG/DL (ref 65–140)
GLUCOSE SERPL-MCNC: 169 MG/DL (ref 65–140)
HBA1C MFR BLD: 7.6 %
P AXIS: 56 DEGREES
PR INTERVAL: 160 MS
QRS AXIS: 19 DEGREES
QRSD INTERVAL: 82 MS
QT INTERVAL: 334 MS
QTC INTERVAL: 452 MS
T WAVE AXIS: 47 DEGREES
VENTRICULAR RATE: 110 BPM

## 2025-07-25 PROCEDURE — 93010 ELECTROCARDIOGRAM REPORT: CPT | Performed by: INTERNAL MEDICINE

## 2025-07-25 PROCEDURE — 82948 REAGENT STRIP/BLOOD GLUCOSE: CPT

## 2025-07-25 RX ADMIN — Medication 21 MG: at 11:50

## 2025-07-25 RX ADMIN — APIXABAN 5 MG: 5 TABLET, FILM COATED ORAL at 09:32

## 2025-07-25 RX ADMIN — DOCUSATE SODIUM 100 MG: 100 CAPSULE, LIQUID FILLED ORAL at 17:21

## 2025-07-25 RX ADMIN — ZOLPIDEM TARTRATE 10 MG: 10 TABLET, FILM COATED ORAL at 01:10

## 2025-07-25 RX ADMIN — RISPERIDONE 3 MG: 2 TABLET, ORALLY DISINTEGRATING ORAL at 09:15

## 2025-07-25 RX ADMIN — ALPRAZOLAM 1 MG: 1 TABLET ORAL at 01:10

## 2025-07-25 RX ADMIN — INSULIN LISPRO 2 UNITS: 100 INJECTION, SOLUTION INTRAVENOUS; SUBCUTANEOUS at 11:56

## 2025-07-25 RX ADMIN — AMLODIPINE BESYLATE 5 MG: 5 TABLET ORAL at 09:31

## 2025-07-25 RX ADMIN — DOCUSATE SODIUM 100 MG: 100 CAPSULE, LIQUID FILLED ORAL at 09:29

## 2025-07-25 RX ADMIN — MORPHINE SULFATE 100 MG: 100 TABLET, EXTENDED RELEASE ORAL at 08:26

## 2025-07-25 RX ADMIN — THIAMINE HYDROCHLORIDE 100 MG: 100 INJECTION, SOLUTION INTRAMUSCULAR; INTRAVENOUS at 12:47

## 2025-07-25 RX ADMIN — ONDANSETRON 4 MG: 2 INJECTION INTRAMUSCULAR; INTRAVENOUS at 14:31

## 2025-07-25 RX ADMIN — APIXABAN 5 MG: 5 TABLET, FILM COATED ORAL at 17:26

## 2025-07-25 RX ADMIN — LISINOPRIL 20 MG: 20 TABLET ORAL at 09:15

## 2025-07-25 RX ADMIN — ALPRAZOLAM 1 MG: 1 TABLET ORAL at 15:38

## 2025-07-25 RX ADMIN — PRAVASTATIN SODIUM 80 MG: 20 TABLET ORAL at 17:23

## 2025-07-25 RX ADMIN — BUTALBITAL, ACETAMINOPHEN AND CAFFEINE 2 TABLET: 325; 50; 40 TABLET ORAL at 11:41

## 2025-07-25 RX ADMIN — INSULIN LISPRO 13 UNITS: 100 INJECTION, SOLUTION INTRAVENOUS; SUBCUTANEOUS at 11:54

## 2025-07-25 RX ADMIN — MELATONIN 3 MG ORAL TABLET 3 MG: 3 TABLET ORAL at 01:10

## 2025-07-25 RX ADMIN — METOPROLOL SUCCINATE 25 MG: 25 TABLET, EXTENDED RELEASE ORAL at 11:51

## 2025-07-25 RX ADMIN — DOXYCYCLINE HYCLATE 100 MG: 100 TABLET, FILM COATED ORAL at 12:46

## 2025-07-25 RX ADMIN — HYDROCHLOROTHIAZIDE 25 MG: 25 TABLET ORAL at 09:15

## 2025-07-25 RX ADMIN — SUMATRIPTAN 100 MG: 50 TABLET, FILM COATED ORAL at 00:30

## 2025-07-25 RX ADMIN — MORPHINE SULFATE 100 MG: 100 TABLET, EXTENDED RELEASE ORAL at 17:22

## 2025-07-25 RX ADMIN — DOXYCYCLINE HYCLATE 100 MG: 100 TABLET, FILM COATED ORAL at 09:19

## 2025-07-25 RX ADMIN — CYCLOBENZAPRINE HYDROCHLORIDE 10 MG: 10 TABLET, FILM COATED ORAL at 00:29

## 2025-07-25 RX ADMIN — INSULIN LISPRO 13 UNITS: 100 INJECTION, SOLUTION INTRAVENOUS; SUBCUTANEOUS at 09:08

## 2025-07-25 NOTE — ASSESSMENT & PLAN NOTE
Lab Results   Component Value Date    HGBA1C 7.6 (H) 07/23/2025       Recent Labs     07/24/25  1945 07/24/25  2121 07/25/25  1112 07/25/25  1530   POCGLU 245* 235* 169* 122         He came in very hyperglycemic and that could have been a factor causing encephalopathy.  He was counseled on strict management of diabetes.  If felt necessary he can see endocrinology as outpatient.  History of chronic pain, but stable now.  Moving extremities well.

## 2025-07-25 NOTE — UTILIZATION REVIEW
NOTIFICATION OF INPATIENT ADMISSION      AUTHORIZATION REQUEST   SERVICING FACILITY:   [facility]  [address] [address] [place] [address]   Tax ID: [id number]  NPI: [id number] ATTENDING PROVIDER:  Attending Name and NPI#: Suki Casey Md [[id number]]  [facility] [place] [address]  Phone: [phone]   ADMISSION INFORMATION:  Place of Service: Inpatient Acute [facility]  Place of Service Code: 21  Inpatient Admission Date/Time: 7/23/25  5:20 AM  Discharge Date/Time: No discharge date for patient encounter.  Admitting Diagnosis Code/Description:  Somnolence [R40.0]  Anxiety [F41.9]  Opiate dependence (HCC) [F11.20]  Altered mental state [R41.82]  Hyperglycemia [R73.9]  Seizure-like activity (HCC) [R56.9]  AMS (altered mental status) [R41.82]  Traumatic brain injury, with unknown loss of consciousness status, subsequent encounter [S06.9XAD]     UTILIZATION REVIEW CONTACT:  Marlen Figueroa, Utilization   Network Utilization Review Department  Phone: [phone]  Fax: [phone]  Email: [email or web address]  Contact for approvals/pending authorizations, clinical reviews, and discharge.     PHYSICIAN ADVISORY SERVICES:  Medical Necessity Denial & Zbvq-gj-Vnnl Review  Phone: [phone]  Fax: [phone]  Email: [email or web address]     DISCHARGE SUPPORT TEAM:  For Patients Discharge Needs & Updates  Phone: [phone] opt. 2 Fax: [phone]  Email: [email or web address]

## 2025-07-25 NOTE — CASE MANAGEMENT
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[facility]) 2024    Major depressive disorder, recurrent severe without psychotic features ([facility]) 2024    Pain and swelling of right knee 2024    History of amputation of lesser toe, left ([facility]) 2024    Diabetic polyneuropathy associated with type 2 diabetes mellitus ([facility]) 2024    Tachycardia 05/10/2024    Diabetic foot ulcer ([facility]) 2024    Monoclonal gammopathy 2024    Anasarca associated with disorder of kidney 2024    Ambulatory dysfunction 2024    Abnormal CT of the chest 2024    Other proteinuria 2024    Anxiety 2024    Cardiomyopathy ([facility]) 2024    Mononeuropathy 2024    Osteoarthritis of knee 2024    A-fib with RVR ([facility]) 2024    Hyponatremia 2024    MSSA bacteremia 2024    JH (acute kidney injury) ([facility]) 2024    Syncope 2023    Costochondral chest pain 2023    Adrenal nodule ([facility]) 2023    Chronic, continuous use of opioids 11/15/2022    Type 2 diabetes mellitus with renal complication ([facility]) 11/15/2022    Essential hypertension 11/15/2022    Hypokalemia 11/15/2022    Hypocalcemia 11/15/2022    TBI (traumatic brain injury) ([facility]) 11/15/2022    Chronic pain disorder 2022      LOS (days): 2  Geometric Mean LOS (GMLOS) (days): 2.7  Days to GMLOS:0.2     OBJECTIVE:    Risk of Unplanned Readmission Score: 22.54         Current admission status: Inpatient       Preferred Pharmacy:   [id number] [place] - [address]  [address] [address]  Phone: [phone] Fax: [phone]    Primary Care Provider: Deidre Andrea MD    Primary Insurance: [employer]  Secondary Insurance:     ASSESSMENT:  Active Health Care Proxies       Macie Zamudio [facility] Care Representative - Spouse   Primary Phone: [phone] (Mobile)                                                                    DISCHARGE DETAILS:    Discharge planning discussed with:: Patient and wife        CM contacted family/caregiver?: Yes  Were Treatment Team discharge recommendations reviewed with patient/caregiver?: Yes  Did patient/caregiver verbalize understanding of patient care needs?: Yes  Were patient/caregiver advised of the risks associated with not following Treatment Team discharge recommendations?: Yes    Contacts  Patient Contacts: Macie Zamudio (wife)  Relationship to Patient:: Family  Contact Method: Phone  Phone Number: [phone]  Reason/Outcome: Continuity of Care, Discharge Planning              Other Referral/Resources/Interventions Provided:  Interventions: DME  Referral Comments: Referral sent on 7/24 for hospital bed.         Treatment Team Recommendation: Home  Expected Discharge Disposition: Home or Self Care     Transport at Discharge : Family                             IMM Given (Date):: 07/25/25  IMM Given to:: Patient     Additional Comments: Met with pt and call to wife to discuss d/c planning.  ADC likely today.  Made them aware that hospital bed prescription sent to [facility] through [facility].  They are aware that there has been no accepting HC agency for therapy.  Pt aware that he may need to initiate outpt therapy if interested.  he can obtain order from his PCP.  Pt's wife to transport home.

## 2025-07-25 NOTE — PLAN OF CARE
Problem: PAIN - ADULT  Goal: Verbalizes/displays adequate comfort level or baseline comfort level  Description: Interventions:  - Encourage patient to monitor pain and request assistance  - Assess pain using appropriate pain scale  - Administer analgesics as ordered based on type and severity of pain and evaluate response  - Implement non-pharmacological measures as appropriate and evaluate response  - Consider cultural and social influences on pain and pain management  - Notify physician/advanced practitioner if interventions unsuccessful or patient reports new pain  - Educate patient/family on pain management process including their role and importance of  reporting pain   - Provide non-pharmacologic/complimentary pain relief interventions  Outcome: Progressing     Problem: INFECTION - ADULT  Goal: Absence or prevention of progression during hospitalization  Description: INTERVENTIONS:  - Assess and monitor for signs and symptoms of infection  - Monitor lab/diagnostic results  - Monitor all insertion sites, i.e. indwelling lines, tubes, and drains  - Monitor endotracheal if appropriate and nasal secretions for changes in amount and color  - [place] appropriate cooling/warming therapies per order  - Administer medications as ordered  - Instruct and encourage patient and family to use good hand hygiene technique  - Identify and instruct in appropriate isolation precautions for identified infection/condition  Outcome: Progressing  Goal: Absence of fever/infection during neutropenic period  Description: INTERVENTIONS:  - Monitor WBC  - Perform strict hand hygiene  - Limit to healthy visitors only  - No plants, dried, fresh or silk flowers with still in patient room  Outcome: Progressing     Problem: SAFETY ADULT  Goal: Patient will remain free of falls  Description: INTERVENTIONS:  - Educate patient/family on patient safety including physical limitations  - Instruct patient to call for assistance with activity   -  Consider consulting OT/PT to assist with strengthening/mobility based on AM PAC & JH-HLM score  - Consult OT/PT to assist with strengthening/mobility   - Keep Call bell within reach  - Keep bed low and locked with side rails adjusted as appropriate  - Keep care items and personal belongings within reach  - Initiate and maintain comfort rounds  - Make Fall Risk Sign visible to staff  - Offer Toileting every 4 Hours, in advance of need  - Initiate/Maintain 4alarm  - Obtain necessary fall risk management equipment: 4  - Apply yellow socks and bracelet for high fall risk patients  - Consider moving patient to room near nurses station  Outcome: Progressing  Goal: Maintain or return to baseline ADL function  Description: INTERVENTIONS:  -  Assess patient's ability to carry out ADLs; assess patient's baseline for ADL function and identify physical deficits which impact ability to perform ADLs (bathing, care of mouth/teeth, toileting, grooming, dressing, etc.)  - Assess/evaluate cause of self-care deficits   - Assess range of motion  - Assess patient's mobility; develop plan if impaired  - Assess patient's need for assistive devices and provide as appropriate  - Encourage maximum independence but intervene and supervise when necessary  - Involve family in performance of ADLs  - Assess for home care needs following discharge   - Consider OT consult to assist with ADL evaluation and planning for discharge  - Provide patient education as appropriate  - Monitor functional capacity and physical performance, use of AM PAC & JH-HLM   - Monitor gait, balance and fatigue with ambulation    Outcome: Progressing  Goal: Maintains/Returns to pre admission functional level  Description: INTERVENTIONS:  - Perform AM-PAC 6 Click Basic Mobility/ Daily Activity assessment daily.  - Set and communicate daily mobility goal to care team and patient/family/caregiver.   - Collaborate with rehabilitation services on mobility goals if consulted  -  Perform Range of Motion 4 times a day.  - Reposition patient every 4 hours.  - Dangle patient 4 times a day  - Stand patient 4 times a day  - Ambulate patient 4 times a day  - Out of bed to chair 4 times a day   - Out of bed for meals 4 times a day  - Out of bed for toileting  - Record patient progress and toleration of activity level   Outcome: Progressing     Problem: DISCHARGE PLANNING  Goal: Discharge to home or other facility with appropriate resources  Description: INTERVENTIONS:  - Identify barriers to discharge w/patient and caregiver  - Arrange for needed discharge resources and transportation as appropriate  - Identify discharge learning needs (meds, wound care, etc.)  - Arrange for interpretive services to assist at discharge as needed  - Refer to Case Management Department for coordinating discharge planning if the patient needs post-hospital services based on physician/advanced practitioner order or complex needs related to functional status, cognitive ability, or social support system  Outcome: Progressing     Problem: Knowledge Deficit  Goal: Patient/family/caregiver demonstrates understanding of disease process, treatment plan, medications, and discharge instructions  Description: Complete learning assessment and assess knowledge base.  Interventions:  - Provide teaching at level of understanding  - Provide teaching via preferred learning methods  Outcome: Progressing     Problem: Prexisting or High Potential for Compromised Skin Integrity  Goal: Skin integrity is maintained or improved  Description: INTERVENTIONS:  - Identify patients at risk for skin breakdown  - Assess and monitor skin integrity including under and around medical devices   - Assess and monitor nutrition and hydration status  - Monitor labs  - Assess for incontinence   - Turn and reposition patient  - Assist with mobility/ambulation  - Relieve pressure over christy prominences   - Avoid friction and shearing  - Provide appropriate  hygiene as needed including keeping skin clean and dry  - Evaluate need for skin moisturizer/barrier cream  - Collaborate with interdisciplinary team  - Patient/family teaching  - Consider wound care consult    Assess:  - Review Girma scale daily  - Clean and moisturize skin every 4  - Inspect skin when repositioning, toileting, and assisting with ADLS  - Assess under medical devices such as 4 every 4  - Assess extremities for adequate circulation and sensation     Bed Management:  - Have minimal linens on bed & keep smooth, unwrinkled  - Change linens as needed when moist or perspiring  - Avoid sitting or lying in one position for more than 4 hours while in bed?Keep HOB at 4degrees   - Toileting:  - Offer bedside commode  - Assess for incontinence every 4  - Use incontinent care products after each incontinent episode such as 4    Activity:  - Mobilize patient 4 times a day  - Encourage activity and walks on unit  - Encourage or provide ROM exercises   - Turn and reposition patient every 4 Hours  - Use appropriate equipment to lift or move patient in bed  - Instruct/ Assist with weight shifting every 4 when out of bed in chair  - Consider limitation of chair time 4 hour intervals    Skin Care:  - Avoid use of baby powder, tape, friction and shearing, hot water or constrictive clothing  - Relieve pressure over bony prominences using 4  - Do not massage red bony areas    Next Steps:  - Teach patient strategies to minimize risks such as 4  - Consider consults to  interdisciplinary teams such as 4  Outcome: Progressing

## 2025-07-25 NOTE — NURSING NOTE
Pt transferred to Room 487 via bed. MS level of care. Medications sent with patient from refrigerator and outside of room. Pt notified wife of room change. Belongings sent with patient.

## 2025-07-25 NOTE — ASSESSMENT & PLAN NOTE
Gives a prior history of multiple concussions.  Does get occasional migraines.  Acute treatment with sumatriptan is effective.  He can continue sumatriptan.  However informed that if headaches worsen he can establish outpatient neurological care to discuss newer migraine treatment options.  Also discussed contraindications to sumatriptan use, such as history of stroke or MI.  For now he will maintain follow-up with his PCP.

## 2025-07-25 NOTE — ASSESSMENT & PLAN NOTE
EEG normal -   NO AED recommeneded   Unclear if seizure was present vs polypharmacy SE   Currenlty tolerating med   In addition to complex anxiolytics sleeping medications and long-acting narcotics, he is also buying ketamine powder and vaping THC-strongly suggest that he discuss the use of these other agents with his opioid prescriber

## 2025-07-25 NOTE — DISCHARGE INSTR - AVS FIRST PAGE
You were admitted to the hospital with suspected seizure-like activity-you were observed in the intensive care unit out of an abundance of caution to make sure you did not have opiate withdrawal or benzodiazepine withdrawal.  You did not have a stroke.  No further seizure activity occurred.  You do not have sepsis.  You were seen by neurology who felt that medication interactions could have led you to be shaking and lethargic.  EEG performed did not show any evidence of seizure activity, and repeat brain imaging with an MRI was deferred as you just had 1 that was normal.  You can continue all your prescribed medications including the MS Contin 100 mg twice daily, Xanax as needed, fentanyl patch, and Ambien at bedtime.  Please discuss with your pain management provider the additional use of THC/vapes and ketamine powder as these could interact with your chronic prescribed medications.

## 2025-07-25 NOTE — ASSESSMENT & PLAN NOTE
Initially noted to have some myoclonic type jerking, that resolved.  EEG shows mild generalized slowing, consistent with encephalopathy.  Patient and his wife had questions about EEG results.  I reviewed and explained the EEG findings and also went over the diagnosis of encephalopathy and EEG correlates well with initial clinical presentation.    Clinically doubt that he had seizures.  Of course there is always a possibility of acute symptomatic seizure in the setting of encephalopathy, hyperglycemia.  In the ER some arm jerking did improve with Ativan.  His wife inquired if they should keep Ativan available for any future symptoms.  I advised him against that.  Rather recommended that if clinically he is noted to be altered and if there is a concern for seizure, they should seek medical attention.

## 2025-07-25 NOTE — ASSESSMENT & PLAN NOTE
Prior MRI as normal.  Today reviewed with patient.  It appears that he might have had concussions in the past.  His exam has been nonfocal, therefore not likely to have had severe TBI..

## 2025-07-25 NOTE — PLAN OF CARE
Problem: PAIN - ADULT  Goal: Verbalizes/displays adequate comfort level or baseline comfort level  Description: Interventions:  - Encourage patient to monitor pain and request assistance  - Assess pain using appropriate pain scale  - Administer analgesics as ordered based on type and severity of pain and evaluate response  - Implement non-pharmacological measures as appropriate and evaluate response  - Consider cultural and social influences on pain and pain management  - Notify physician/advanced practitioner if interventions unsuccessful or patient reports new pain  - Educate patient/family on pain management process including their role and importance of  reporting pain   - Provide non-pharmacologic/complimentary pain relief interventions  7/25/2025 0559 by Dallas Butts RN  Outcome: Progressing  7/24/2025 2342 by Dallas Butts RN  Outcome: Progressing     Problem: INFECTION - ADULT  Goal: Absence or prevention of progression during hospitalization  Description: INTERVENTIONS:  - Assess and monitor for signs and symptoms of infection  - Monitor lab/diagnostic results  - Monitor all insertion sites, i.e. indwelling lines, tubes, and drains  - Monitor endotracheal if appropriate and nasal secretions for changes in amount and color  - [place] appropriate cooling/warming therapies per order  - Administer medications as ordered  - Instruct and encourage patient and family to use good hand hygiene technique  - Identify and instruct in appropriate isolation precautions for identified infection/condition  7/25/2025 0559 by Dallas Butts RN  Outcome: Progressing  7/24/2025 2342 by Dallas Butts RN  Outcome: Progressing  Goal: Absence of fever/infection during neutropenic period  Description: INTERVENTIONS:  - Monitor WBC  - Perform strict hand hygiene  - Limit to healthy visitors only  - No plants, dried, fresh or silk flowers with still in patient room  7/25/2025 0559 by Dallas Butts RN  Outcome: Progressing  7/24/2025 2342 by  Dallas Butts RN  Outcome: Progressing     Problem: SAFETY ADULT  Goal: Patient will remain free of falls  Description: INTERVENTIONS:  - Educate patient/family on patient safety including physical limitations  - Instruct patient to call for assistance with activity   - Consider consulting OT/PT to assist with strengthening/mobility based on AM PAC & JH-HLM score  - Consult OT/PT to assist with strengthening/mobility   - Keep Call bell within reach  - Keep bed low and locked with side rails adjusted as appropriate  - Keep care items and personal belongings within reach  - Initiate and maintain comfort rounds  - Make Fall Risk Sign visible to staff  - Offer Toileting every 4 Hours, in advance of need  - Initiate/Maintain 4alarm  - Obtain necessary fall risk management equipment: 4  - Apply yellow socks and bracelet for high fall risk patients  - Consider moving patient to room near nurses station  7/25/2025 0559 by Dallas Butts RN  Outcome: Progressing  7/24/2025 2342 by Dallas Butts RN  Outcome: Progressing  Goal: Maintain or return to baseline ADL function  Description: INTERVENTIONS:  -  Assess patient's ability to carry out ADLs; assess patient's baseline for ADL function and identify physical deficits which impact ability to perform ADLs (bathing, care of mouth/teeth, toileting, grooming, dressing, etc.)  - Assess/evaluate cause of self-care deficits   - Assess range of motion  - Assess patient's mobility; develop plan if impaired  - Assess patient's need for assistive devices and provide as appropriate  - Encourage maximum independence but intervene and supervise when necessary  - Involve family in performance of ADLs  - Assess for home care needs following discharge   - Consider OT consult to assist with ADL evaluation and planning for discharge  - Provide patient education as appropriate  - Monitor functional capacity and physical performance, use of AM PAC & JH-HLM   - Monitor gait, balance and fatigue with  ambulation    7/25/2025 0559 by Dallas Butts RN  Outcome: Progressing  7/24/2025 2342 by Dallas Butts RN  Outcome: Progressing  Goal: Maintains/Returns to pre admission functional level  Description: INTERVENTIONS:  - Perform AM-PAC 6 Click Basic Mobility/ Daily Activity assessment daily.  - Set and communicate daily mobility goal to care team and patient/family/caregiver.   - Collaborate with rehabilitation services on mobility goals if consulted  - Perform Range of Motion 4 times a day.  - Reposition patient every 4 hours.  - Dangle patient 4 times a day  - Stand patient 4 times a day  - Ambulate patient 4 times a day  - Out of bed to chair 4 times a day   - Out of bed for meals 4 times a day  - Out of bed for toileting  - Record patient progress and toleration of activity level   7/25/2025 0559 by Dallas Butts RN  Outcome: Progressing  7/24/2025 2342 by Dallas Butts RN  Outcome: Progressing     Problem: DISCHARGE PLANNING  Goal: Discharge to home or other facility with appropriate resources  Description: INTERVENTIONS:  - Identify barriers to discharge w/patient and caregiver  - Arrange for needed discharge resources and transportation as appropriate  - Identify discharge learning needs (meds, wound care, etc.)  - Arrange for interpretive services to assist at discharge as needed  - Refer to Case Management Department for coordinating discharge planning if the patient needs post-hospital services based on physician/advanced practitioner order or complex needs related to functional status, cognitive ability, or social support system  7/25/2025 0559 by Dallas Butts RN  Outcome: Progressing  7/24/2025 2342 by Dallas Butts RN  Outcome: Progressing     Problem: Knowledge Deficit  Goal: Patient/family/caregiver demonstrates understanding of disease process, treatment plan, medications, and discharge instructions  Description: Complete learning assessment and assess knowledge base.  Interventions:  - Provide teaching at level  of understanding  - Provide teaching via preferred learning methods  7/25/2025 0559 by Dallas Butts RN  Outcome: Progressing  7/24/2025 5442 by Dallas Butts RN  Outcome: Progressing     Problem: Prexisting or High Potential for Compromised Skin Integrity  Goal: Skin integrity is maintained or improved  Description: INTERVENTIONS:  - Identify patients at risk for skin breakdown  - Assess and monitor skin integrity including under and around medical devices   - Assess and monitor nutrition and hydration status  - Monitor labs  - Assess for incontinence   - Turn and reposition patient  - Assist with mobility/ambulation  - Relieve pressure over christy prominences   - Avoid friction and shearing  - Provide appropriate hygiene as needed including keeping skin clean and dry  - Evaluate need for skin moisturizer/barrier cream  - Collaborate with interdisciplinary team  - Patient/family teaching  - Consider wound care consult    Assess:  - Review Girma scale daily  - Clean and moisturize skin every 4  - Inspect skin when repositioning, toileting, and assisting with ADLS  - Assess under medical devices such as 4 every 4  - Assess extremities for adequate circulation and sensation     Bed Management:  - Have minimal linens on bed & keep smooth, unwrinkled  - Change linens as needed when moist or perspiring  - Avoid sitting or lying in one position for more than 4 hours while in bed?Keep HOB at 4degrees   - Toileting:  - Offer bedside commode  - Assess for incontinence every 4  - Use incontinent care products after each incontinent episode such as 4    Activity:  - Mobilize patient 4 times a day  - Encourage activity and walks on unit  - Encourage or provide ROM exercises   - Turn and reposition patient every 4 Hours  - Use appropriate equipment to lift or move patient in bed  - Instruct/ Assist with weight shifting every 4 when out of bed in chair  - Consider limitation of chair time 4 hour intervals    Skin Care:  - Avoid use  of baby powder, tape, friction and shearing, hot water or constrictive clothing  - Relieve pressure over bony prominences using 4  - Do not massage red bony areas    Next Steps:  - Teach patient strategies to minimize risks such as 4  - Consider consults to  interdisciplinary teams such as 4  7/25/2025 0559 by Dallas Butts, RN  Outcome: Progressing  7/24/2025 3912 by Dallas Butts, RN  Outcome: Progressing

## 2025-07-25 NOTE — PROGRESS NOTES
Progress Note - Neurology   Name: Armen Llanos 50 y.o. male I MRN: [id number]  Unit/Bed#: 4 D 487-01 I Date of Admission: 7/22/2025   Date of Service: 7/25/2025 I Hospital Day: 2    Assessment & Plan  Seizure-like activity (HCC)  Initially noted to have some myoclonic type jerking, that resolved.  EEG shows mild generalized slowing, consistent with encephalopathy.  Patient and his wife had questions about EEG results.  I reviewed and explained the EEG findings and also went over the diagnosis of encephalopathy and EEG correlates well with initial clinical presentation.    Clinically doubt that he had seizures.  Of course there is always a possibility of acute symptomatic seizure in the setting of encephalopathy, hyperglycemia.  In the ER some arm jerking did improve with Ativan.  His wife inquired if they should keep Ativan available for any future symptoms.  I advised him against that.  Rather recommended that if clinically he is noted to be altered and if there is a concern for seizure, they should seek medical attention.  Encephalopathy  Likely toxic/metabolic.  He was hyperglycemic and also was on multiple pain medications.  Clinically appear to have resolved.  Reviewed EEG findings with patient and his wife  Migraine headache  Gives a prior history of multiple concussions.  Does get occasional migraines.  Acute treatment with sumatriptan is effective.  He can continue sumatriptan.  However informed that if headaches worsen he can establish outpatient neurological care to discuss newer migraine treatment options.  Also discussed contraindications to sumatriptan use, such as history of stroke or MI.  For now he will maintain follow-up with his PCP.  Chronic, continuous use of opioids  Management per primary team   TBI (traumatic brain injury) (HCC)  Prior MRI as normal.  Today reviewed with patient.  It appears that he might have had concussions in the past.  His exam has been nonfocal, therefore not likely  to have had severe TBI..   A-fib with RVR (HCC)  On Eliquis.  Type 2 diabetes mellitus with renal complication (HCC)  Lab Results   Component Value Date    HGBA1C 7.6 (H) 07/23/2025       Recent Labs     07/24/25  1945 07/24/25 2121 07/25/25  1112 07/25/25  1530   POCGLU 245* 235* 169* 122         He came in very hyperglycemic and that could have been a factor causing encephalopathy.  He was counseled on strict management of diabetes.  If felt necessary he can see endocrinology as outpatient.  History of chronic pain, but stable now.  Moving extremities well.    Armen Llanos will not need outpatient follow up with Neurology. He will not require outpatient neurological testing.      Subjective   Patient seen in his room.  His wife at bedside.  Overall feeling much better.  States that he did have a headache, but improved with sumatriptan. History of chronic pain, but stable now.  Moving extremities well.    Patient and his wife had more questions about EEG report and specific findings.  He pulled up the report over his phone and we went over different elements and the report.  No suggestion of seizures.  Only mild generalized slowing, consistent with encephalopathy, which very well correlates with clinical diagnosis.    Review of Systems  As above    Objective :  Temp:  [97.2 °F (36.2 °C)-98 °F (36.7 °C)] 97.8 °F (36.6 °C)  HR:  [] 103  BP: (125-141)/(85-93) 128/93  Resp:  [14-18] 18  SpO2:  [97 %-99 %] 99 %  O2 Device: None (Room air)    Physical ExamNeurological Exam  Neurologic exam:  Awake and alert with appropriate mental status and language function.  Cranial nerves II-XII were symmetrically intact bilaterally. Motor testing reveals 5/5 strength in the bilateral upper and lower extremities with no drift. Sensation is intact to light touch in the bilateral upper and lower extremities.         Lab Results: I have reviewed the following results:  Imaging Results Review: No pertinent imaging studies  reviewed.  Other Study Results Review: Other studies reviewed include: eeg    VTE Pharmacologic Prophylaxis: VTE covered by:  apixaban, Oral, 5 mg at 07/25/25 0970

## 2025-07-25 NOTE — ASSESSMENT & PLAN NOTE
Likely toxic/metabolic.  He was hyperglycemic and also was on multiple pain medications.  Clinically appear to have resolved.  Reviewed EEG findings with patient and his wife

## 2025-07-25 NOTE — DISCHARGE SUMMARY
Discharge Summary - Hospitalist   Name: Armen Llanos 50 y.o. male I MRN: [id number]  Unit/Bed#: 4 D 487-01 I Date of Admission: 7/22/2025   Date of Service: 7/25/2025 I Hospital Day: 2     Assessment & Plan  Seizure-like activity (HCC)  EEG normal -   NO AED recommeneded   Unclear if seizure was present vs polypharmacy SE   Currenlty tolerating med   In addition to complex anxiolytics sleeping medications and long-acting narcotics, he is also buying ketamine powder and vaping THC-strongly suggest that he discuss the use of these other agents with his opioid prescriber  Chronic, continuous use of opioids  See above  Type 2 diabetes mellitus with renal complication (HCC)  Lab Results   Component Value Date    HGBA1C 7.6 (H) 07/23/2025       Recent Labs     07/24/25  1610 07/24/25  1945 07/24/25  2121 07/25/25  1112   POCGLU 158* 245* 235* 169*       Blood Sugar Average: Last 72 hrs:  (P) [id number]  Resume his Premeal medications 13 units SQ 3 times daily AC  Essential hypertension  Cont home meds   TBI (traumatic brain injury) (HCC)    MSSA bacteremia  Chronic doxycycline  A-fib with RVR (HCC)  Currently sinus on metoprolol and Eliquis  Cardiomyopathy (HCC)    Anxiety  Chronic benzodiazepine  Ambulatory dysfunction  Will need home care order for PT OT  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)    Altered mental status, unspecified       Medical Problems       Resolved Problems  Date Reviewed: 7/25/2025          Resolved    Delirium due to multiple etiologies 7/24/2025     Resolved by  Daphnie Morejon MD        Discharging Physician / Practitioner: Ivory Wetzel DO  PCP: Deidre Andrea MD  Admission Date:   Admission Orders (From admission, onward)       Ordered        07/23/25 0519  INPATIENT ADMISSION  Once            07/23/25 0159  Place in Observation  Once,   Status:  Canceled                          Discharge Date: 07/25/25    Next Steps for Physician/AP Assuming Care:  Review  prescribed long-term medications and other additional supplements that could interact with these medications including THC and vapes, ketamine powder    Test Results Pending at Discharge (will require follow up):  None    Medication Changes for Discharge & Rationale:   None  See after visit summary for reconciled discharge medications provided to patient and/or family.     Consultations During Hospital Stay:  Critical care, neurology, physical therapy Occupational Therapy    Procedures Performed:   cxr        Hospital Course:   Armen Llanos is a 50 y.o. male patient who originally presented to the hospital on 7/22/2025 due to lethargy and shaking     Armen Llanos is a 50 y.o. who presents to the ED with altered mental status and seizure-like activity.  He has a history of opioid withdrawal, post-ictal state, osteomyelitis of the second toe of the left foot with chronic antibiotic suppression, MRSA bacteremia, JH, and sepsis due to MRSA.  Recently admitted 7/19 with a similar presentation.  Today his wife stated his son called her as he was unable to wake the patient up.  Then he became somewhat responsive but not at baseline and began displaying myoclonic movements/seizure-like activity.  Seizure activity continued in the ED with twitching of his face and upper body, and was given 2 mg of Ativan with cessation of myoclonic upper body movements.  He was somnolent and continued to have tremors, but reacted to sternal rub.  He was able to follow simple commands intermittently, wiggling toes and making eye contact.  Earlier this week he was worked up for this postictal like state with evaluation by neurology and MRI brain without acute findings.  Today CT head without contrast was negative for acute findings.  Patient has chronic pain managed with opiates, he has fentanyl patch in place.   Out of an abundance of caution he was admitted to the ICU overnight for every hour neurocheck monitoring and high risk and  only for breakthrough seizures, but opiate withdrawal.  No evidence of stroke, no recurrent seizures, no evidence of sepsis seen during this hospitalization.  Patient was seen by neurology who felt that the patient's presentation is most consistent with polypharmacy, and not seizure activity.  Patient just had a recent normal MRI of the brain so repeat CNS imaging was not utilized.  Patient had an EEG that showed no evidence of epileptiform activity.  The patient was resumed on his outpatient pain medication extended release long-acting morphine 100 mg twice daily fentanyl patch 25 mg every 72 hours and Xanax daily as needed.  He is also on Ambien 10 mg at bedtime.  These were prescribed through a single provider checked in the PDMP without red flags.  However, he does vape THC and he has purchased ketamine powder, so these latter 2 agents are certainly targets for intervention for misuse and interacting with prescribed medications.  He should consider stopping these last 2 agents.  He needs to talk to his outpatient pain management provider Deidre Andrea about his medications of risks and benefits of ongoing doses and current usage.  Will go home with home care PT OT as well as a hospital bed and walker        Discharge Day Visit / Exam:   * Please refer to separate progress note for these details *    Discussion with Family: Patient declined call to .     Discharge instructions/Information to patient and family:   See after visit summary for information provided to patient and family.      Provisions for Follow-Up Care:  See after visit summary for information related to follow-up care and any pertinent home health orders.      Mobility at time of Discharge:   Basic Mobility Inpatient Raw Score: 17  -[facility] Goal: 5: Stand one or more mins  -HLM Achieved: 5: Stand (1 or more minutes)  -     Disposition:   Home    Planned Readmission: no    Administrative Statements   Discharge Statement:  I have  spent a total time of 21 minutes in caring for this patient on the day of the visit/encounter. .    **Please Note: This note may have been constructed using a voice recognition system**

## 2025-07-25 NOTE — ASSESSMENT & PLAN NOTE
Lab Results   Component Value Date    HGBA1C 7.6 (H) 07/23/2025       Recent Labs     07/24/25  1610 07/24/25  1945 07/24/25 2121 07/25/25  1112   POCGLU 158* 245* 235* 169*       Blood Sugar Average: Last 72 hrs:  (P) 222.5154559027080424  Resume his Premeal medications 13 units SQ 3 times daily AC

## 2025-07-26 NOTE — UTILIZATION REVIEW
NOTIFICATION OF ADMISSION DISCHARGE   This is a Notification of Discharge from [facility]. Please be advised that this patient has been discharge from our facility. Below you will find the admission and discharge date and time including the patient’s disposition.   UTILIZATION REVIEW CONTACT:  Utilization Review Assistants  Network Utilization Review Department  Phone: [phone] x carefully listen to the prompts. All voicemails are confidential.  Email: [email or web address]     ADMISSION INFORMATION  PRESENTATION DATE: 7/22/2025 10:35 PM  OBERVATION ADMISSION DATE: 07/23/2025 0159  INPATIENT ADMISSION DATE: 7/23/25  5:20 AM   DISCHARGE DATE: 7/25/2025  7:05 PM   DISPOSITION:Home with Home Health Care    Network Utilization Review Department  ATTENTION: Please call with any questions or concerns to [phone] and carefully listen to the prompts so that you are directed to the right person. All voicemails are confidential.   For Discharge needs, contact Care Management DC Support Team at [phone] opt. 2  Send all requests for admission clinical reviews, approved or denied determinations and any other requests to dedicated fax number below belonging to the campus where the patient is receiving treatment. List of dedicated fax numbers for the Facilities:  FACILITY NAME UR FAX NUMBER   ADMISSION DENIALS (Administrative/Medical Necessity) [phone]   DISCHARGE SUPPORT TEAM ([facility]) [phone]   PARENT CHILD HEALTH (Maternity/NICU/Pediatrics) [phone]   [facility] [phone]   [facility] [phone]   [facility] [phone]   [facility] [phone]   [facility] [phone]   [facility] [phone]   [facility] [phone]   [facility]  [phone]   [facility] [phone]   [facility] [phone]   [facility] [phone]   [facility] [phone]

## 2025-07-28 ENCOUNTER — APPOINTMENT (OUTPATIENT)
Age: 50
End: 2025-07-28
Attending: STUDENT IN AN ORGANIZED HEALTH CARE EDUCATION/TRAINING PROGRAM
Payer: COMMERCIAL

## 2025-07-28 ENCOUNTER — OFFICE VISIT (OUTPATIENT)
Age: 50
End: 2025-07-28
Payer: COMMERCIAL

## 2025-07-28 ENCOUNTER — TELEPHONE (OUTPATIENT)
Age: 50
End: 2025-07-28

## 2025-07-28 VITALS — WEIGHT: 226 LBS | BODY MASS INDEX: 30.61 KG/M2 | HEIGHT: 72 IN

## 2025-07-28 DIAGNOSIS — M25.561 RIGHT KNEE PAIN, UNSPECIFIED CHRONICITY: ICD-10-CM

## 2025-07-28 DIAGNOSIS — T84.53XS INFECTION ASSOCIATED WITH INTERNAL RIGHT KNEE PROSTHESIS, SEQUELA: Primary | ICD-10-CM

## 2025-07-28 PROCEDURE — 73562 X-RAY EXAM OF KNEE 3: CPT

## 2025-07-28 PROCEDURE — 99214 OFFICE O/P EST MOD 30 MIN: CPT | Performed by: STUDENT IN AN ORGANIZED HEALTH CARE EDUCATION/TRAINING PROGRAM

## 2025-08-04 NOTE — QUICK NOTE
"Progress Note - Hospitalist   Name: Armen Llanos 50 y.o. male I MRN: [id number]  Unit/Bed#: 4 D 487-01 I Date of Admission: 7/22/2025   Date of Service: 8/4/2025 I Hospital Day: 2     Assessment & Plan  Seizure-like activity (HCC)  EEG normal -   NO AED recommeneded   Unclear if seizure was present vs polypharmacy SE   Currenlty tolerating med   In addition to complex anxiolytics sleeping medications and long-acting narcotics, he is also buying ketamine powder and vaping THC-strongly suggest that he discuss the use of these other agents with his opioid prescriber  Chronic, continuous use of opioids  See above  Type 2 diabetes mellitus with renal complication ([facility])  Lab Results   Component Value Date    HGBA1C 7.6 (H) 07/23/2025       No results for input(s): \"POCGLU\" in the last 72 hours.      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

## 2025-08-04 NOTE — ASSESSMENT & PLAN NOTE
"Lab Results   Component Value Date    HGBA1C 7.6 (H) 07/23/2025       No results for input(s): \"POCGLU\" in the last 72 hours.      Blood Sugar Average: Last 72 hrs:    Resume his Premeal medications 13 units SQ 3 times daily AC  "

## 2025-08-14 DIAGNOSIS — M00.9 INFECTION OF RIGHT KNEE (HCC): ICD-10-CM

## 2025-08-14 DIAGNOSIS — M25.561 RIGHT KNEE PAIN, UNSPECIFIED CHRONICITY: Primary | ICD-10-CM

## 2025-08-19 RX ORDER — DOXYCYCLINE 100 MG/1
100 CAPSULE ORAL 2 TIMES DAILY
Qty: 60 CAPSULE | Refills: 3 | Status: SHIPPED | OUTPATIENT
Start: 2025-08-19 | End: 2025-08-27

## 2025-08-20 LAB
BASOPHILS # BLD AUTO: 0.1 X10E3/UL (ref 0–0.2)
BASOPHILS NFR BLD AUTO: 1 %
CRP SERPL-MCNC: 8 MG/L (ref 0–10)
EOSINOPHIL # BLD AUTO: 0.1 X10E3/UL (ref 0–0.4)
EOSINOPHIL NFR BLD AUTO: 1 %
ERYTHROCYTE [DISTWIDTH] IN BLOOD BY AUTOMATED COUNT: 12.3 % (ref 11.6–15.4)
ERYTHROCYTE [SEDIMENTATION RATE] IN BLOOD BY WESTERGREN METHOD: 31 MM/HR (ref 0–30)
HCT VFR BLD AUTO: 42.4 % (ref 37.5–51)
HGB BLD-MCNC: 14.2 G/DL (ref 13–17.7)
IMM GRANULOCYTES # BLD: 0 X10E3/UL (ref 0–0.1)
IMM GRANULOCYTES NFR BLD: 0 %
LYMPHOCYTES # BLD AUTO: 3.4 X10E3/UL (ref 0.7–3.1)
LYMPHOCYTES NFR BLD AUTO: 35 %
MCH RBC QN AUTO: 30.1 PG (ref 26.6–33)
MCHC RBC AUTO-ENTMCNC: 33.5 G/DL (ref 31.5–35.7)
MCV RBC AUTO: 90 FL (ref 79–97)
MONOCYTES # BLD AUTO: 0.5 X10E3/UL (ref 0.1–0.9)
MONOCYTES NFR BLD AUTO: 5 %
NEUTROPHILS # BLD AUTO: 5.6 X10E3/UL (ref 1.4–7)
NEUTROPHILS NFR BLD AUTO: 58 %
PLATELET # BLD AUTO: 301 X10E3/UL (ref 150–450)
RBC # BLD AUTO: 4.72 X10E6/UL (ref 4.14–5.8)
WBC # BLD AUTO: 9.7 X10E3/UL (ref 3.4–10.8)

## (undated) DEVICE — ANTIBACTERIAL VIOLET BRAIDED (POLYGLACTIN 910), SYNTHETIC ABSORBABLE SURGICAL SUTURE: Brand: COATED VICRYL

## (undated) DEVICE — GLOVE SRG BIOGEL ECLIPSE 6.5

## (undated) DEVICE — DRESSING MEPILEX AG BORDER POST-OP 4 X 12 IN

## (undated) DEVICE — INTENDED FOR TISSUE SEPARATION, AND OTHER PROCEDURES THAT REQUIRE A SHARP SURGICAL BLADE TO PUNCTURE OR CUT.: Brand: BARD-PARKER SAFETY BLADES SIZE 10, STERILE

## (undated) DEVICE — HEAVY DUTY TABLE COVER: Brand: CONVERTORS

## (undated) DEVICE — CHLORAPREP HI-LITE 26ML ORANGE

## (undated) DEVICE — DRAPE SHEET THREE QUARTER

## (undated) DEVICE — GLOVE SRG BIOGEL ORTHOPEDIC 8.5

## (undated) DEVICE — SUT STRATAFIX SPIRAL 1-0 PDO 36 X 36 CM SXPD2B405

## (undated) DEVICE — HYDROGEN PEROXIDE 3 PCT 16 OZ

## (undated) DEVICE — GLOVE INDICATOR PI UNDERGLOVE SZ 6.5 BLUE

## (undated) DEVICE — SUT ETHILON 4-0 PS-2 18 IN 1667H

## (undated) DEVICE — INTENDED FOR TISSUE SEPARATION, AND OTHER PROCEDURES THAT REQUIRE A SHARP SURGICAL BLADE TO PUNCTURE OR CUT.: Brand: BARD-PARKER ® CARBON RIB-BACK BLADES

## (undated) DEVICE — BAG WOUND LAVAGE 1L BACTISURE

## (undated) DEVICE — GLOVE SRG BIOGEL 6.5

## (undated) DEVICE — CUFF TOURNIQUET 18 X 4 IN QUICK CONNECT DISP 1 BLADDER

## (undated) DEVICE — ACE WRAP 6 IN UNSTERILE

## (undated) DEVICE — DUAL CUT SAGITTAL BLADE

## (undated) DEVICE — BETHLEHEM UNIV MAJ EXT ,KIT: Brand: CARDINAL HEALTH

## (undated) DEVICE — NEEDLE 18 G X 1 1/2

## (undated) DEVICE — SYRINGE 30ML LL

## (undated) DEVICE — ADHESIVE SKIN HIGH VISCOSITY EXOFIN 1ML

## (undated) DEVICE — SPONGE SCRUB 4 PCT CHLORHEXIDINE

## (undated) DEVICE — ANTIBACTERIAL UNDYED BRAIDED (POLYGLACTIN 910), SYNTHETIC ABSORBABLE SUTURE: Brand: COATED VICRYL

## (undated) DEVICE — 450 ML BOTTLE OF 0.05% CHLORHEXIDINE GLUCONATE IN 99.95% STERILE WATER FOR IRRIGATION, USP AND APPLICATOR.: Brand: IRRISEPT ANTIMICROBIAL WOUND LAVAGE

## (undated) DEVICE — GLOVE SRG BIOGEL 8.5

## (undated) DEVICE — IMMOBILIZER KNEE UNIVERSAL 22 IN

## (undated) DEVICE — PLUMEPEN PRO 10FT

## (undated) DEVICE — 3M™ STERI-DRAPE™ U-DRAPE 1015: Brand: STERI-DRAPE™

## (undated) DEVICE — CEMENT MIXING SYSTEM WITH FEMORAL BREAKWAY NOZZLE: Brand: REVOLUTION

## (undated) DEVICE — SUT STRATAFIX SPIRAL 3-0 PGA/PCL 30 X 30 CM SXMD2B408

## (undated) DEVICE — HOOD: Brand: FLYTE, SURGICOOL

## (undated) DEVICE — BANDAGE, ESMARK LF STR 6"X9' (20/CS): Brand: CYPRESS

## (undated) DEVICE — SURGICAL GOWN, XL SMARTSLEEVE: Brand: CONVERTORS

## (undated) DEVICE — SUT MONOCRYL 4-0 PS-2 27 IN Y426H

## (undated) DEVICE — HANDPIECE SET WITH RETRACTABLE COAXIAL FAN SPRAY TIP AND SUCTION TUBE: Brand: INTERPULSE

## (undated) DEVICE — PAD GROUNDING DUAL ADULT

## (undated) DEVICE — GLOVE INDICATOR PI UNDERGLOVE SZ 8.5 BLUE

## (undated) DEVICE — COBAN 6 IN STERILE

## (undated) DEVICE — SYRINGE 3ML LL